# Patient Record
Sex: FEMALE | Race: WHITE | NOT HISPANIC OR LATINO | Employment: OTHER | URBAN - METROPOLITAN AREA
[De-identification: names, ages, dates, MRNs, and addresses within clinical notes are randomized per-mention and may not be internally consistent; named-entity substitution may affect disease eponyms.]

---

## 2017-01-23 ENCOUNTER — LAB (OUTPATIENT)
Dept: LAB | Facility: HOSPITAL | Age: 67
End: 2017-01-23

## 2017-01-23 DIAGNOSIS — C90.00 METASTATIC MULTIPLE MYELOMA TO BONE (HCC): ICD-10-CM

## 2017-01-23 DIAGNOSIS — C90.01 MULTIPLE MYELOMA IN REMISSION (HCC): ICD-10-CM

## 2017-01-23 LAB
ALBUMIN SERPL-MCNC: 4.5 G/DL (ref 3.2–4.8)
ALBUMIN/GLOB SERPL: 1.7 G/DL (ref 1.5–2.5)
ALP SERPL-CCNC: 111 U/L (ref 25–100)
ALT SERPL W P-5'-P-CCNC: 19 U/L (ref 7–40)
ANION GAP SERPL CALCULATED.3IONS-SCNC: 11 MMOL/L (ref 3–11)
AST SERPL-CCNC: 23 U/L (ref 0–33)
BASOPHILS # BLD AUTO: 0.01 10*3/MM3 (ref 0–0.2)
BASOPHILS NFR BLD AUTO: 0.3 % (ref 0–1)
BILIRUB SERPL-MCNC: 0.4 MG/DL (ref 0.3–1.2)
BUN BLD-MCNC: 20 MG/DL (ref 9–23)
BUN/CREAT SERPL: 16.7 (ref 7–25)
CALCIUM SPEC-SCNC: 9 MG/DL (ref 8.7–10.4)
CHLORIDE SERPL-SCNC: 111 MMOL/L (ref 99–109)
CO2 SERPL-SCNC: 28 MMOL/L (ref 20–31)
CREAT BLD-MCNC: 1.2 MG/DL (ref 0.6–1.3)
DEPRECATED RDW RBC AUTO: 44.7 FL (ref 37–54)
EOSINOPHIL # BLD AUTO: 0.04 10*3/MM3 (ref 0.1–0.3)
EOSINOPHIL NFR BLD AUTO: 1.1 % (ref 0–3)
ERYTHROCYTE [DISTWIDTH] IN BLOOD BY AUTOMATED COUNT: 12.9 % (ref 11.3–14.5)
GFR SERPL CREATININE-BSD FRML MDRD: 45 ML/MIN/1.73
GLOBULIN UR ELPH-MCNC: 2.7 GM/DL
GLUCOSE BLD-MCNC: 73 MG/DL (ref 70–100)
HCT VFR BLD AUTO: 36.9 % (ref 34.5–44)
HGB BLD-MCNC: 12.8 G/DL (ref 11.5–15.5)
IMM GRANULOCYTES # BLD: 0.01 10*3/MM3 (ref 0–0.03)
IMM GRANULOCYTES NFR BLD: 0.3 % (ref 0–0.6)
LYMPHOCYTES # BLD AUTO: 0.65 10*3/MM3 (ref 0.6–4.8)
LYMPHOCYTES NFR BLD AUTO: 18.6 % (ref 24–44)
MAGNESIUM SERPL-MCNC: 2.1 MG/DL (ref 1.3–2.7)
MCH RBC QN AUTO: 33.2 PG (ref 27–31)
MCHC RBC AUTO-ENTMCNC: 34.7 G/DL (ref 32–36)
MCV RBC AUTO: 95.6 FL (ref 80–99)
MONOCYTES # BLD AUTO: 0.26 10*3/MM3 (ref 0–1)
MONOCYTES NFR BLD AUTO: 7.4 % (ref 0–12)
NEUTROPHILS # BLD AUTO: 2.52 10*3/MM3 (ref 1.5–8.3)
NEUTROPHILS NFR BLD AUTO: 72.3 % (ref 41–71)
PHOSPHATE SERPL-MCNC: 3.7 MG/DL (ref 2.4–5.1)
PLATELET # BLD AUTO: 40 10*3/MM3 (ref 150–450)
PMV BLD AUTO: 11.4 FL (ref 6–12)
POTASSIUM BLD-SCNC: 5.3 MMOL/L (ref 3.5–5.5)
PROT SERPL-MCNC: 7.2 G/DL (ref 5.7–8.2)
RBC # BLD AUTO: 3.86 10*6/MM3 (ref 3.89–5.14)
SODIUM BLD-SCNC: 150 MMOL/L (ref 132–146)
WBC NRBC COR # BLD: 3.49 10*3/MM3 (ref 3.5–10.8)

## 2017-01-23 PROCEDURE — 85025 COMPLETE CBC W/AUTO DIFF WBC: CPT | Performed by: INTERNAL MEDICINE

## 2017-01-23 PROCEDURE — 84155 ASSAY OF PROTEIN SERUM: CPT | Performed by: INTERNAL MEDICINE

## 2017-01-23 PROCEDURE — 80053 COMPREHEN METABOLIC PANEL: CPT | Performed by: INTERNAL MEDICINE

## 2017-01-23 PROCEDURE — 86334 IMMUNOFIX E-PHORESIS SERUM: CPT | Performed by: INTERNAL MEDICINE

## 2017-01-23 PROCEDURE — 83883 ASSAY NEPHELOMETRY NOT SPEC: CPT | Performed by: INTERNAL MEDICINE

## 2017-01-23 PROCEDURE — 84100 ASSAY OF PHOSPHORUS: CPT | Performed by: INTERNAL MEDICINE

## 2017-01-23 PROCEDURE — 83735 ASSAY OF MAGNESIUM: CPT | Performed by: INTERNAL MEDICINE

## 2017-01-23 PROCEDURE — 36415 COLL VENOUS BLD VENIPUNCTURE: CPT

## 2017-01-23 PROCEDURE — 84165 PROTEIN E-PHORESIS SERUM: CPT | Performed by: INTERNAL MEDICINE

## 2017-01-24 LAB
ALBUMIN SERPL-MCNC: 3.7 G/DL (ref 2.9–4.4)
ALBUMIN/GLOB SERPL: 1.2 {RATIO} (ref 0.7–1.7)
ALPHA1 GLOB FLD ELPH-MCNC: 0.3 G/DL (ref 0–0.4)
ALPHA2 GLOB SERPL ELPH-MCNC: 0.8 G/DL (ref 0.4–1)
B-GLOBULIN SERPL ELPH-MCNC: 1.3 G/DL (ref 0.7–1.3)
GAMMA GLOB SERPL ELPH-MCNC: 0.9 G/DL (ref 0.4–1.8)
GLOBULIN SER CALC-MCNC: 3.3 G/DL (ref 2.2–3.9)
IGA SERPL-MCNC: 207 MG/DL (ref 87–352)
IGG SERPL-MCNC: 827 MG/DL (ref 700–1600)
IGM SERPL-MCNC: 21 MG/DL (ref 26–217)
INTERPRETATION SERPL IEP-IMP: ABNORMAL
KAPPA LC SERPL-MCNC: 19.17 MG/L (ref 3.3–19.4)
KAPPA LC/LAMBDA SER: 1.39 {RATIO} (ref 0.26–1.65)
LAMBDA LC FREE SERPL-MCNC: 13.8 MG/L (ref 5.71–26.3)
Lab: ABNORMAL
M-SPIKE: ABNORMAL G/DL
PROT SERPL-MCNC: 7 G/DL (ref 6–8.5)

## 2017-01-30 ENCOUNTER — INFUSION (OUTPATIENT)
Dept: ONCOLOGY | Facility: HOSPITAL | Age: 67
End: 2017-01-30

## 2017-01-30 ENCOUNTER — OFFICE VISIT (OUTPATIENT)
Dept: ONCOLOGY | Facility: CLINIC | Age: 67
End: 2017-01-30

## 2017-01-30 VITALS
SYSTOLIC BLOOD PRESSURE: 137 MMHG | BODY MASS INDEX: 28.07 KG/M2 | DIASTOLIC BLOOD PRESSURE: 74 MMHG | HEART RATE: 95 BPM | WEIGHT: 151 LBS | RESPIRATION RATE: 16 BRPM | TEMPERATURE: 98.1 F

## 2017-01-30 VITALS
BODY MASS INDEX: 27.79 KG/M2 | SYSTOLIC BLOOD PRESSURE: 144 MMHG | DIASTOLIC BLOOD PRESSURE: 70 MMHG | TEMPERATURE: 97.4 F | RESPIRATION RATE: 14 BRPM | HEIGHT: 62 IN | WEIGHT: 151 LBS | HEART RATE: 92 BPM

## 2017-01-30 DIAGNOSIS — C90.00 METASTATIC MULTIPLE MYELOMA TO BONE (HCC): ICD-10-CM

## 2017-01-30 DIAGNOSIS — C90.01 MULTIPLE MYELOMA IN REMISSION (HCC): ICD-10-CM

## 2017-01-30 DIAGNOSIS — C90.01 MULTIPLE MYELOMA IN REMISSION (HCC): Primary | ICD-10-CM

## 2017-01-30 PROCEDURE — 25010000002 ZOLEDRONIC ACID PER 1 MG: Performed by: INTERNAL MEDICINE

## 2017-01-30 PROCEDURE — 99214 OFFICE O/P EST MOD 30 MIN: CPT | Performed by: INTERNAL MEDICINE

## 2017-01-30 PROCEDURE — 96374 THER/PROPH/DIAG INJ IV PUSH: CPT

## 2017-01-30 RX ORDER — SODIUM CHLORIDE 9 MG/ML
250 INJECTION, SOLUTION INTRAVENOUS ONCE
Status: CANCELLED | OUTPATIENT
Start: 2017-03-28 | End: 2017-03-27

## 2017-01-30 RX ORDER — SODIUM CHLORIDE 9 MG/ML
250 INJECTION, SOLUTION INTRAVENOUS ONCE
Status: CANCELLED | OUTPATIENT
Start: 2017-02-28 | End: 2017-02-27

## 2017-01-30 RX ADMIN — ZOLEDRONIC ACID 3.3 MG: 4 INJECTION, SOLUTION, CONCENTRATE INTRAVENOUS at 10:35

## 2017-01-30 NOTE — CONSULTS
Zometa dose reduced to 3.3 mg due to SCr= 1.2 and CrCl= 41.4 ml/min;     Elias Alvares [facility]  1/30/2017  10:26 AM

## 2017-01-30 NOTE — MR AVS SNAPSHOT
Doris Miranda   2017 9:00 AM   Office Visit    Dept Phone:  [phone]   Encounter #:  [id number]    Provider:  Joseph Mckinley MD   Department:  [facility] GROUP HEMATOLOGY  AND ONCOLOGY                Your Full Care Plan              Your Updated Medication List          This list is accurate as of: 17  9:14 AM.  Always use your most recent med list.                acyclovir 400 MG tablet   Commonly known as:  ZOVIRAX       aspirin 81 MG EC tablet       cyclobenzaprine 10 MG tablet   Commonly known as:  FLEXERIL   Take 1 tablet by mouth 3 (three) times a day as needed for muscle spasms.       docusate sodium 100 MG capsule   Commonly known as:  COLACE       melatonin 5 MG tablet tablet       Omeprazole Magnesium 20.6 (20 BASE) MG capsule delayed-release       pregabalin 100 MG capsule   Commonly known as:  LYRICA   Take 1 capsule by mouth 2 (Two) Times a Day.               Instructions     None    Patient Instructions History      Thrupoint Signup     [facility] Thrupoint allows you to send messages to your doctor, view your test results, renew your prescriptions, schedule appointments, and more. To sign up, go to [employer] and click on the Sign Up Now link in the New User? box. Enter your Thrupoint Activation Code exactly as it appears below along with the last four digits of your Social Security Number and your Date of Birth () to complete the sign-up process. If you do not sign up before the expiration date, you must request a new code.    Thrupoint Activation Code: [email or web address]  Expires: 2017  9:10 AM    If you have questions, you can email [email or web address] or call [phone] to talk to our Thrupoint staff. Remember, Thrupoint is NOT to be used for urgent needs. For medical emergencies, dial 911.               Other Info from Your Visit           Your appointments     Date & Time Provider Appointment Department    ,  "2017 11:00 AM EST DANILO MEJIA [facility] CHAIR 4 INFUSION [facility] OUTPATIENT ONCOLOGY AT [facility]    Apr 24, 2017  9:00 AM EDT Joseph Mckinley MD FOLLOW UP [facility] HEMATOLOGY  AND ONCOLOGY    Apr 24, 2017 10:30 AM EDT DANILO MEJIA [facility] CHAIR 3 INFUSION [facility] OUTPATIENT ONCOLOGY AT [facility] OUTPATIENT ONCOLOGY AT [facility]  [address]  [facility] [address] [phone] [facility] HEMATOLOGY  AND ONCOLOGY  [place]  [address] [facility] [address]  [facility] [id number] [phone]          Vital Signs     Blood Pressure Pulse Temperature Respirations Height Weight    144/70 92 97.4 °F (36.3 °C) (Temporal Artery ) 14 61.5\" (156.2 cm) 151 lb (68.5 kg)    Body Mass Index Smoking Status                28.07 kg/m2 Former Smoker          Problems and Diagnoses Noted     Metastatic multiple myeloma to bone    Multiple myeloma in remission        "

## 2017-01-30 NOTE — MR AVS SNAPSHOT
Doris Miranda   2017 11:00 AM   Infusion    Dept Phone:  [phone]   Encounter #:  [id number]    Provider:  DANILO OLMEDO CHAIR 4   Department:  [facility] OUTPATIENT ONCOLOGY AT [facility]                Your Full Care Plan              Your Updated Medication List          This list is accurate as of: 17 10:46 AM.  Always use your most recent med list.                acyclovir 400 MG tablet   Commonly known as:  ZOVIRAX       aspirin 81 MG EC tablet       cyclobenzaprine 10 MG tablet   Commonly known as:  FLEXERIL   Take 1 tablet by mouth 3 (three) times a day as needed for muscle spasms.       docusate sodium 100 MG capsule   Commonly known as:  COLACE       melatonin 5 MG tablet tablet       Omeprazole Magnesium 20.6 (20 BASE) MG capsule delayed-release       pregabalin 100 MG capsule   Commonly known as:  LYRICA   Take 1 capsule by mouth 2 (Two) Times a Day.               Instructions     None    Patient Instructions History      [employer] Signup     [facility] [employer]X [employer]ORY allows you to send messages to your doctor, view your test results, renew your prescriptions, schedule appointments, and more. To sign up, go to [employer] and click on the Sign Up Now link in the New User? box. Enter your [employer] Activation Code exactly as it appears below along with the last four digits of your Social Security Number and your Date of Birth () to complete the sign-up process. If you do not sign up before the expiration date, you must request a new code.    [employer] Activation Code: [email or web address]  Expires: 2017  9:10 AM    If you have questions, you can email [email or web address] or call [phone] to talk to our OX FACTORY staff. Remember, OX FACTORY is NOT to be used for urgent needs. For medical emergencies, dial 911.               Other Info from Your Visit           Your appointments     Date & Time Provider Appointment Department    ,  2017 11:00 AM EST [facility] CHAIR 4 INFUSION [facility] OUTPATIENT ONCOLOGY AT [facility]    Feb 27, 2017 11:00 AM EST [facility] CHAIR 4 INFUSION [facility] OUTPATIENT ONCOLOGY AT [facility]    Apr 24, 2017  9:00 AM EDT Joseph Mckinley MD FOLLOW UP [facility] HEMATOLOGY  AND ONCOLOGY    Apr 24, 2017 10:30 AM EDT [facility] CHAIR 3 INFUSION [facility] OUTPATIENT ONCOLOGY AT [facility] OUTPATIENT ONCOLOGY AT [facility]  [address]  [facility] [address]  [phone] [facility] HEMATOLOGY  AND ONCOLOGY  [place]  [address] [facility] [address]  [facility] [id number] [phone]          Vital Signs     Blood Pressure Pulse Temperature Respirations Weight Body Mass Index    137/74 95 98.1 °F (36.7 °C) 16 151 lb (68.5 kg) 28.07 kg/m2    Smoking Status                   Former Smoker           Problems and Diagnoses Noted     Metastatic multiple myeloma to bone    Multiple myeloma in remission      Medications Administered     zoledronic acid (ZOMETA) 3.3 mg in sodium chloride 0.9 % 100 mL IVPB

## 2017-01-30 NOTE — PROGRESS NOTES
.justyna  PROBLEM LIST:  Oncology/Hematology History    PROBLEM LIST:   1. Stage III IgA kappa clonal multiple myeloma. Diagnosed 9/20152. FISH panel reports multiple abnormalities including gain of 1q21 monosomy.  3. Monosomy 13 and gain of chromosomes 9, 15 and CCND1.  4. Initial M-spike of 7.1 g/dL at time of diagnosis and after 3 cycles, had  undetectable M-spike currently on Velcade, Revlimid and dexamethasone cycle #6  this week.  5. S/p Autologous SCT at [facility] with Dr. Venu Aceves in March 11,2016  6. Will Start on Revlimid maintenance on 15mg/day 21 on and 7 off  7.Right leg pain - on lyrica        Multiple myeloma in remission    7/11/2016 Initial Diagnosis    Multiple myeloma       REASON FOR VISIT: Multiple myeloma    HISTORY OF PRESENT ILLNESS:   66-year-old lady with multiple myeloma returns today for follow-up.  She is 11 months out from completion of autologous stem cell transplant.  She is currently on Zometa .  Her Revlimid is currently on hold secondary to severe thrombocytopenia.  So she is only receiving monthly Zometa.  She reports bilateral shoulder plane that but otherwise doing well.  She visits with Dr. Spicer next month for series of vaccinations.  Denies any other issues ongoing at this time.    Past medical history, social history and family history was reviewed and unchanged from prior visit.    Review of Systems:    Review of Systems   Constitutional: Positive for fatigue.   HENT:  Negative.    Eyes: Negative.    Respiratory: Negative.    Cardiovascular: Negative.    Gastrointestinal: Negative.    Endocrine: Negative.    Genitourinary: Negative.     Musculoskeletal: Positive for arthralgias.   Skin: Negative.    Neurological: Negative.    Hematological: Negative.    Psychiatric/Behavioral: Negative.       A comprehensive 14 point review of systems was performed and was negative except as mentioned.      Medications:  The current medication list was reviewed in the EMR    ALLERGIES:  No  "Known Allergies      Physical Exam    VITAL SIGNS:  Visit Vitals   • /70   • Pulse 92   • Temp 97.4 °F (36.3 °C) (Temporal Artery )   • Resp 14   • Ht 61.5\" (156.2 cm)   • Wt 151 lb (68.5 kg)   • BMI 28.07 kg/m2        Performance Status: 1    General: well appearing, in no acute distress  HEENT: sclera anicteric, oropharynx clear, neck is supple  Lymphatics: no cervical, supraclavicular, or axillary adenopathy  Cardiovascular: regular rate and rhythm, no murmurs, rubs or gallops  Lungs: clear to auscultation bilaterally  Abdomen: soft, nontender, nondistended.  No palpable organomegaly  Extremities: no lower extremity edema  Skin: no rashes, lesions, bruising, or petechiae  Msk:  Shows no weakness of the large muscle groups  Psych: Mood is stable        RECENT LABS:    Lab Results   Component Value Date    WBC 3.49 (L) 01/23/2017    HGB 12.8 01/23/2017    HCT 36.9 01/23/2017    MCV 95.6 01/23/2017    PLT 40 (L) 01/23/2017     Lab Results   Component Value Date    MSPIKE Not Observed 01/23/2017    MSPIKE Not Observed 12/05/2016    MSPIKE Not Observed 11/07/2016     Lab Results   Component Value Date    FREEKAPPAL 19.17 01/23/2017    FREEKAPPAL 23.47 (H) 12/05/2016    FREEKAPPAL 28.46 (H) 11/07/2016     Lab Results   Component Value Date    IGLFLC 13.80 01/23/2017    IGLFLC 13.54 12/05/2016    IGLFLC 17.74 11/07/2016     Lab Results   Component Value Date    GLUCOSE 73 01/23/2017    BUN 20 01/23/2017    CREATININE 1.20 01/23/2017    EGFRIFNONA 45 (L) 01/23/2017    BCR 16.7 01/23/2017    CO2 28.0 01/23/2017    CALCIUM 9.0 01/23/2017    PROTENTOTREF 7.0 01/23/2017    ALBUMIN 3.7 01/23/2017    ALBUMIN 4.50 01/23/2017    LABIL2 1.2 01/23/2017    LABIL2 1.7 01/23/2017    AST 23 01/23/2017    ALT 19 01/23/2017         Assessment/Plan    66-year-old lady with multiple myeloma status post autologous stem cell transplant.  I'm going to continue holding her Revlimid until her platelets recover.  This is likely liver " related to marrow suppression related to her transplant conditioning.  Unfortunately we cannot place her on maintenance due to platelets being well below 50,000.  Her M spike continues to be undetectable at this time.  I will repeat it in April.  We'll see if Dr. Spicer recommends very low dose Revlimid.  If she has recurrence we would still have issues with treatment due to the severe thrombocytopenia.     See her back my clinic in 3 month.  She continues to get Zometa monthly.            Joseph Mckinley MD  [facility] Hematology and Oncology    1/30/2017

## 2017-02-13 RX ORDER — CYCLOBENZAPRINE HCL 10 MG
TABLET ORAL
Qty: 90 TABLET | Refills: 4 | Status: SHIPPED | OUTPATIENT
Start: 2017-02-13 | End: 2017-08-07 | Stop reason: SDUPTHER

## 2017-02-27 ENCOUNTER — INFUSION (OUTPATIENT)
Dept: ONCOLOGY | Facility: HOSPITAL | Age: 67
End: 2017-02-27

## 2017-02-27 VITALS
RESPIRATION RATE: 16 BRPM | HEART RATE: 94 BPM | BODY MASS INDEX: 29.56 KG/M2 | DIASTOLIC BLOOD PRESSURE: 79 MMHG | WEIGHT: 159 LBS | SYSTOLIC BLOOD PRESSURE: 147 MMHG | TEMPERATURE: 97.8 F

## 2017-02-27 DIAGNOSIS — C90.00 METASTATIC MULTIPLE MYELOMA TO BONE (HCC): ICD-10-CM

## 2017-02-27 DIAGNOSIS — C90.01 MULTIPLE MYELOMA IN REMISSION (HCC): Primary | ICD-10-CM

## 2017-02-27 LAB
ALBUMIN SERPL-MCNC: 4.3 G/DL (ref 3.2–4.8)
ALBUMIN/GLOB SERPL: 1.7 G/DL (ref 1.5–2.5)
ALP SERPL-CCNC: 98 U/L (ref 25–100)
ALT SERPL W P-5'-P-CCNC: 19 U/L (ref 7–40)
ANION GAP SERPL CALCULATED.3IONS-SCNC: 8 MMOL/L (ref 3–11)
AST SERPL-CCNC: 24 U/L (ref 0–33)
BASOPHILS # BLD AUTO: 0.01 10*3/MM3 (ref 0–0.2)
BASOPHILS NFR BLD AUTO: 0.3 % (ref 0–1)
BILIRUB SERPL-MCNC: 0.4 MG/DL (ref 0.3–1.2)
BUN BLD-MCNC: 22 MG/DL (ref 9–23)
BUN/CREAT SERPL: 22 (ref 7–25)
CALCIUM SPEC-SCNC: 8.8 MG/DL (ref 8.7–10.4)
CHLORIDE SERPL-SCNC: 106 MMOL/L (ref 99–109)
CO2 SERPL-SCNC: 27 MMOL/L (ref 20–31)
CREAT BLD-MCNC: 1 MG/DL (ref 0.6–1.3)
CREAT BLDA-MCNC: 1.2 MG/DL (ref 0.6–1.3)
CREAT SERPL-MCNC: 1.2 MG/DL
DEPRECATED RDW RBC AUTO: 45.2 FL (ref 37–54)
EOSINOPHIL # BLD AUTO: 0.04 10*3/MM3 (ref 0.1–0.3)
EOSINOPHIL NFR BLD AUTO: 1 % (ref 0–3)
ERYTHROCYTE [DISTWIDTH] IN BLOOD BY AUTOMATED COUNT: 12.9 % (ref 11.3–14.5)
GFR SERPL CREATININE-BSD FRML MDRD: 55 ML/MIN/1.73
GLOBULIN UR ELPH-MCNC: 2.5 GM/DL
GLUCOSE BLD-MCNC: 76 MG/DL (ref 70–100)
HCT VFR BLD AUTO: 37.3 % (ref 34.5–44)
HGB BLD-MCNC: 12.9 G/DL (ref 11.5–15.5)
IMM GRANULOCYTES # BLD: 0.01 10*3/MM3 (ref 0–0.03)
IMM GRANULOCYTES NFR BLD: 0.3 % (ref 0–0.6)
LYMPHOCYTES # BLD AUTO: 0.63 10*3/MM3 (ref 0.6–4.8)
LYMPHOCYTES NFR BLD AUTO: 16.4 % (ref 24–44)
MAGNESIUM SERPL-MCNC: 2.1 MG/DL (ref 1.3–2.7)
MCH RBC QN AUTO: 33.2 PG (ref 27–31)
MCHC RBC AUTO-ENTMCNC: 34.6 G/DL (ref 32–36)
MCV RBC AUTO: 95.9 FL (ref 80–99)
MONOCYTES # BLD AUTO: 0.27 10*3/MM3 (ref 0–1)
MONOCYTES NFR BLD AUTO: 7 % (ref 0–12)
NEUTROPHILS # BLD AUTO: 2.87 10*3/MM3 (ref 1.5–8.3)
NEUTROPHILS NFR BLD AUTO: 75 % (ref 41–71)
PHOSPHATE SERPL-MCNC: 3.9 MG/DL (ref 2.4–5.1)
PLATELET # BLD AUTO: 37 10*3/MM3 (ref 150–450)
PMV BLD AUTO: 11.5 FL (ref 6–12)
POTASSIUM BLD-SCNC: 4.3 MMOL/L (ref 3.5–5.5)
PROT SERPL-MCNC: 6.8 G/DL (ref 5.7–8.2)
RBC # BLD AUTO: 3.89 10*6/MM3 (ref 3.89–5.14)
SODIUM BLD-SCNC: 141 MMOL/L (ref 132–146)
WBC NRBC COR # BLD: 3.83 10*3/MM3 (ref 3.5–10.8)

## 2017-02-27 PROCEDURE — 96374 THER/PROPH/DIAG INJ IV PUSH: CPT

## 2017-02-27 PROCEDURE — 25010000002 ZOLEDRONIC ACID PER 1 MG: Performed by: INTERNAL MEDICINE

## 2017-02-27 PROCEDURE — 36415 COLL VENOUS BLD VENIPUNCTURE: CPT

## 2017-02-27 PROCEDURE — 96365 THER/PROPH/DIAG IV INF INIT: CPT

## 2017-02-27 RX ADMIN — ZOLEDRONIC ACID 3.3 MG: 4 INJECTION, SOLUTION, CONCENTRATE INTRAVENOUS at 11:21

## 2017-02-27 NOTE — PLAN OF CARE
Zometa dose reduced to 3.3 mg due to SCr= 1.2, CrCl= 42.4 ml/min;    Elias Alvares [facility]  2/27/2017  11:09 AM

## 2017-02-28 LAB
ALBUMIN SERPL-MCNC: 3.9 G/DL (ref 2.9–4.4)
ALBUMIN/GLOB SERPL: 1.4 {RATIO} (ref 0.7–1.7)
ALPHA1 GLOB FLD ELPH-MCNC: 0.3 G/DL (ref 0–0.4)
ALPHA2 GLOB SERPL ELPH-MCNC: 0.7 G/DL (ref 0.4–1)
B-GLOBULIN SERPL ELPH-MCNC: 1.2 G/DL (ref 0.7–1.3)
GAMMA GLOB SERPL ELPH-MCNC: 0.6 G/DL (ref 0.4–1.8)
GLOBULIN SER CALC-MCNC: 2.8 G/DL (ref 2.2–3.9)
IGA SERPL-MCNC: 177 MG/DL (ref 87–352)
IGG SERPL-MCNC: 722 MG/DL (ref 700–1600)
IGM SERPL-MCNC: 18 MG/DL (ref 26–217)
INTERPRETATION SERPL IEP-IMP: ABNORMAL
KAPPA LC SERPL-MCNC: 16.54 MG/L (ref 3.3–19.4)
KAPPA LC/LAMBDA SER: 1.62 {RATIO} (ref 0.26–1.65)
LAMBDA LC FREE SERPL-MCNC: 10.21 MG/L (ref 5.71–26.3)
Lab: ABNORMAL
M-SPIKE: ABNORMAL G/DL
PROT SERPL-MCNC: 6.7 G/DL (ref 6–8.5)

## 2017-03-16 RX ORDER — PREGABALIN 100 MG/1
100 CAPSULE ORAL 2 TIMES DAILY
Qty: 60 CAPSULE | Refills: 3 | OUTPATIENT
Start: 2017-03-16 | End: 2017-07-14 | Stop reason: SDUPTHER

## 2017-03-27 ENCOUNTER — INFUSION (OUTPATIENT)
Dept: ONCOLOGY | Facility: HOSPITAL | Age: 67
End: 2017-03-27

## 2017-03-27 VITALS
HEART RATE: 91 BPM | DIASTOLIC BLOOD PRESSURE: 66 MMHG | RESPIRATION RATE: 16 BRPM | SYSTOLIC BLOOD PRESSURE: 124 MMHG | BODY MASS INDEX: 30.11 KG/M2 | TEMPERATURE: 96.8 F | WEIGHT: 162 LBS

## 2017-03-27 DIAGNOSIS — C90.00 METASTATIC MULTIPLE MYELOMA TO BONE (HCC): ICD-10-CM

## 2017-03-27 DIAGNOSIS — C90.01 MULTIPLE MYELOMA IN REMISSION (HCC): Primary | ICD-10-CM

## 2017-03-27 LAB
ALBUMIN SERPL-MCNC: 4.3 G/DL (ref 3.2–4.8)
ALBUMIN/GLOB SERPL: 2.2 G/DL (ref 1.5–2.5)
ALP SERPL-CCNC: 116 U/L (ref 25–100)
ALT SERPL W P-5'-P-CCNC: 24 U/L (ref 7–40)
ANION GAP SERPL CALCULATED.3IONS-SCNC: 5 MMOL/L (ref 3–11)
AST SERPL-CCNC: 24 U/L (ref 0–33)
BASOPHILS # BLD AUTO: 0.03 10*3/MM3 (ref 0–0.2)
BASOPHILS NFR BLD AUTO: 0.9 % (ref 0–1)
BILIRUB SERPL-MCNC: 0.3 MG/DL (ref 0.3–1.2)
BUN BLD-MCNC: 21 MG/DL (ref 9–23)
BUN/CREAT SERPL: 19.1 (ref 7–25)
CALCIUM SPEC-SCNC: 9 MG/DL (ref 8.7–10.4)
CHLORIDE SERPL-SCNC: 108 MMOL/L (ref 99–109)
CO2 SERPL-SCNC: 29 MMOL/L (ref 20–31)
CREAT BLD-MCNC: 1.1 MG/DL (ref 0.6–1.3)
CREAT BLDA-MCNC: 1.2 MG/DL (ref 0.6–1.3)
CREAT SERPL-MCNC: 1.2 MG/DL
DEPRECATED RDW RBC AUTO: 42.7 FL (ref 37–54)
EOSINOPHIL # BLD AUTO: 0.08 10*3/MM3 (ref 0.1–0.3)
EOSINOPHIL NFR BLD AUTO: 2.4 % (ref 0–3)
ERYTHROCYTE [DISTWIDTH] IN BLOOD BY AUTOMATED COUNT: 12.6 % (ref 11.3–14.5)
GFR SERPL CREATININE-BSD FRML MDRD: 50 ML/MIN/1.73
GLOBULIN UR ELPH-MCNC: 2 GM/DL
GLUCOSE BLD-MCNC: 75 MG/DL (ref 70–100)
HCT VFR BLD AUTO: 35.3 % (ref 34.5–44)
HGB BLD-MCNC: 12.3 G/DL (ref 11.5–15.5)
IMM GRANULOCYTES # BLD: 0.01 10*3/MM3 (ref 0–0.03)
IMM GRANULOCYTES NFR BLD: 0.3 % (ref 0–0.6)
LYMPHOCYTES # BLD AUTO: 0.67 10*3/MM3 (ref 0.6–4.8)
LYMPHOCYTES NFR BLD AUTO: 19.9 % (ref 24–44)
MAGNESIUM SERPL-MCNC: 2.1 MG/DL (ref 1.3–2.7)
MCH RBC QN AUTO: 32 PG (ref 27–31)
MCHC RBC AUTO-ENTMCNC: 34.8 G/DL (ref 32–36)
MCV RBC AUTO: 91.9 FL (ref 80–99)
MONOCYTES # BLD AUTO: 0.25 10*3/MM3 (ref 0–1)
MONOCYTES NFR BLD AUTO: 7.4 % (ref 0–12)
NEUTROPHILS # BLD AUTO: 2.33 10*3/MM3 (ref 1.5–8.3)
NEUTROPHILS NFR BLD AUTO: 69.1 % (ref 41–71)
PHOSPHATE SERPL-MCNC: 3.7 MG/DL (ref 2.4–5.1)
PLATELET # BLD AUTO: 40 10*3/MM3 (ref 150–450)
PMV BLD AUTO: 11.1 FL (ref 6–12)
POTASSIUM BLD-SCNC: 4.2 MMOL/L (ref 3.5–5.5)
PROT SERPL-MCNC: 6.3 G/DL (ref 5.7–8.2)
RBC # BLD AUTO: 3.84 10*6/MM3 (ref 3.89–5.14)
SODIUM BLD-SCNC: 142 MMOL/L (ref 132–146)
WBC NRBC COR # BLD: 3.37 10*3/MM3 (ref 3.5–10.8)

## 2017-03-27 PROCEDURE — 96374 THER/PROPH/DIAG INJ IV PUSH: CPT

## 2017-03-27 PROCEDURE — 25010000002 ZOLEDRONIC ACID PER 1 MG: Performed by: INTERNAL MEDICINE

## 2017-03-27 RX ADMIN — ZOLEDRONIC ACID 3.3 MG: 4 INJECTION, SOLUTION, CONCENTRATE INTRAVENOUS at 11:21

## 2017-03-27 NOTE — PHARMACY RECOMMENDATION
Zometa dose reduced to 3.3 mg due to ClCr= 42.8 ml/min;     Elias Alvares [facility]  3/27/2017  11:15 AM

## 2017-03-28 LAB
ALBUMIN SERPL-MCNC: 3.4 G/DL (ref 2.9–4.4)
ALBUMIN/GLOB SERPL: 1.3 {RATIO} (ref 0.7–1.7)
ALPHA1 GLOB FLD ELPH-MCNC: 0.3 G/DL (ref 0–0.4)
ALPHA2 GLOB SERPL ELPH-MCNC: 0.7 G/DL (ref 0.4–1)
B-GLOBULIN SERPL ELPH-MCNC: 1.1 G/DL (ref 0.7–1.3)
GAMMA GLOB SERPL ELPH-MCNC: 0.6 G/DL (ref 0.4–1.8)
GLOBULIN SER CALC-MCNC: 2.7 G/DL (ref 2.2–3.9)
IGA SERPL-MCNC: 157 MG/DL (ref 87–352)
IGG SERPL-MCNC: 635 MG/DL (ref 700–1600)
IGM SERPL-MCNC: 19 MG/DL (ref 26–217)
INTERPRETATION SERPL IEP-IMP: ABNORMAL
KAPPA LC SERPL-MCNC: 18.64 MG/L (ref 3.3–19.4)
KAPPA LC/LAMBDA SER: 1.64 {RATIO} (ref 0.26–1.65)
LAMBDA LC FREE SERPL-MCNC: 11.38 MG/L (ref 5.71–26.3)
Lab: ABNORMAL
M-SPIKE: ABNORMAL G/DL
PROT SERPL-MCNC: 6.1 G/DL (ref 6–8.5)

## 2017-04-21 DIAGNOSIS — C90.00 METASTATIC MULTIPLE MYELOMA TO BONE (HCC): ICD-10-CM

## 2017-04-21 DIAGNOSIS — C90.01 MULTIPLE MYELOMA IN REMISSION (HCC): ICD-10-CM

## 2017-04-21 RX ORDER — SODIUM CHLORIDE 9 MG/ML
250 INJECTION, SOLUTION INTRAVENOUS ONCE
Status: CANCELLED | OUTPATIENT
Start: 2017-04-24 | End: 2017-04-25

## 2017-04-24 ENCOUNTER — OFFICE VISIT (OUTPATIENT)
Dept: ONCOLOGY | Facility: CLINIC | Age: 67
End: 2017-04-24

## 2017-04-24 ENCOUNTER — INFUSION (OUTPATIENT)
Dept: ONCOLOGY | Facility: HOSPITAL | Age: 67
End: 2017-04-24

## 2017-04-24 ENCOUNTER — HOSPITAL ENCOUNTER (OUTPATIENT)
Dept: GENERAL RADIOLOGY | Facility: HOSPITAL | Age: 67
Discharge: HOME | End: 2017-04-24
Attending: INTERNAL MEDICINE | Admitting: INTERNAL MEDICINE

## 2017-04-24 ENCOUNTER — LAB (OUTPATIENT)
Dept: LAB | Facility: HOSPITAL | Age: 67
End: 2017-04-24

## 2017-04-24 VITALS
TEMPERATURE: 97.5 F | HEIGHT: 62 IN | BODY MASS INDEX: 30.36 KG/M2 | DIASTOLIC BLOOD PRESSURE: 71 MMHG | HEART RATE: 95 BPM | SYSTOLIC BLOOD PRESSURE: 125 MMHG | WEIGHT: 165 LBS | RESPIRATION RATE: 16 BRPM

## 2017-04-24 DIAGNOSIS — C90.01 MULTIPLE MYELOMA IN REMISSION (HCC): Primary | ICD-10-CM

## 2017-04-24 DIAGNOSIS — C90.01 MULTIPLE MYELOMA IN REMISSION (HCC): ICD-10-CM

## 2017-04-24 DIAGNOSIS — C90.00 METASTATIC MULTIPLE MYELOMA TO BONE (HCC): ICD-10-CM

## 2017-04-24 LAB
ALBUMIN SERPL-MCNC: 4.5 G/DL (ref 3.2–4.8)
ALBUMIN/GLOB SERPL: 1.7 G/DL (ref 1.5–2.5)
ALP SERPL-CCNC: 101 U/L (ref 25–100)
ALT SERPL W P-5'-P-CCNC: 17 U/L (ref 7–40)
ANION GAP SERPL CALCULATED.3IONS-SCNC: 10 MMOL/L (ref 3–11)
AST SERPL-CCNC: 23 U/L (ref 0–33)
BILIRUB SERPL-MCNC: 0.4 MG/DL (ref 0.3–1.2)
BUN BLD-MCNC: 19 MG/DL (ref 9–23)
BUN/CREAT SERPL: 14.6 (ref 7–25)
CALCIUM SPEC-SCNC: 9.1 MG/DL (ref 8.7–10.4)
CHLORIDE SERPL-SCNC: 107 MMOL/L (ref 99–109)
CO2 SERPL-SCNC: 25 MMOL/L (ref 20–31)
CREAT BLD-MCNC: 1.3 MG/DL (ref 0.6–1.3)
ERYTHROCYTE [DISTWIDTH] IN BLOOD BY AUTOMATED COUNT: 13.2 % (ref 11.3–14.5)
GFR SERPL CREATININE-BSD FRML MDRD: 41 ML/MIN/1.73
GLOBULIN UR ELPH-MCNC: 2.7 GM/DL
GLUCOSE BLD-MCNC: 79 MG/DL (ref 70–100)
HCT VFR BLD AUTO: 36.9 % (ref 34.5–44)
HGB BLD-MCNC: 12.4 G/DL (ref 11.5–15.5)
LYMPHOCYTES # BLD AUTO: 0.8 10*3/MM3 (ref 0.6–4.8)
LYMPHOCYTES NFR BLD AUTO: 17 % (ref 24–44)
MAGNESIUM SERPL-MCNC: 2.2 MG/DL (ref 1.3–2.7)
MCH RBC QN AUTO: 31.2 PG (ref 27–31)
MCHC RBC AUTO-ENTMCNC: 33.6 G/DL (ref 32–36)
MCV RBC AUTO: 92.8 FL (ref 80–99)
MONOCYTES # BLD AUTO: 0.1 10*3/MM3 (ref 0–1)
MONOCYTES NFR BLD AUTO: 2.9 % (ref 0–12)
NEUTROPHILS # BLD AUTO: 3.6 10*3/MM3 (ref 1.5–8.3)
NEUTROPHILS NFR BLD AUTO: 80.1 % (ref 41–71)
PHOSPHATE SERPL-MCNC: 4.2 MG/DL (ref 2.4–5.1)
PLATELET # BLD AUTO: 39 10*3/MM3 (ref 150–450)
PMV BLD AUTO: 9.9 FL (ref 6–12)
POTASSIUM BLD-SCNC: 4 MMOL/L (ref 3.5–5.5)
PROT SERPL-MCNC: 7.2 G/DL (ref 5.7–8.2)
RBC # BLD AUTO: 3.97 10*6/MM3 (ref 3.89–5.14)
SODIUM BLD-SCNC: 142 MMOL/L (ref 132–146)
WBC NRBC COR # BLD: 4.5 10*3/MM3 (ref 3.5–10.8)

## 2017-04-24 PROCEDURE — 84155 ASSAY OF PROTEIN SERUM: CPT

## 2017-04-24 PROCEDURE — 84100 ASSAY OF PHOSPHORUS: CPT

## 2017-04-24 PROCEDURE — 36415 COLL VENOUS BLD VENIPUNCTURE: CPT

## 2017-04-24 PROCEDURE — 85025 COMPLETE CBC W/AUTO DIFF WBC: CPT

## 2017-04-24 PROCEDURE — 80053 COMPREHEN METABOLIC PANEL: CPT

## 2017-04-24 PROCEDURE — 83735 ASSAY OF MAGNESIUM: CPT

## 2017-04-24 PROCEDURE — 86334 IMMUNOFIX E-PHORESIS SERUM: CPT

## 2017-04-24 PROCEDURE — 25010000002 ZOLEDRONIC ACID PER 1 MG: Performed by: INTERNAL MEDICINE

## 2017-04-24 PROCEDURE — 99214 OFFICE O/P EST MOD 30 MIN: CPT | Performed by: INTERNAL MEDICINE

## 2017-04-24 PROCEDURE — 96374 THER/PROPH/DIAG INJ IV PUSH: CPT

## 2017-04-24 PROCEDURE — 83883 ASSAY NEPHELOMETRY NOT SPEC: CPT

## 2017-04-24 PROCEDURE — 84165 PROTEIN E-PHORESIS SERUM: CPT

## 2017-04-24 PROCEDURE — 77075 RADEX OSSEOUS SURVEY COMPL: CPT

## 2017-04-24 RX ORDER — SODIUM CHLORIDE 9 MG/ML
250 INJECTION, SOLUTION INTRAVENOUS ONCE
Status: CANCELLED | OUTPATIENT
Start: 2017-06-19 | End: 2017-06-19

## 2017-04-24 RX ORDER — SODIUM CHLORIDE 9 MG/ML
250 INJECTION, SOLUTION INTRAVENOUS ONCE
Status: CANCELLED | OUTPATIENT
Start: 2017-05-22 | End: 2017-05-23

## 2017-04-24 RX ADMIN — ZOLEDRONIC ACID 3.3 MG: 4 INJECTION, SOLUTION, CONCENTRATE INTRAVENOUS at 11:22

## 2017-04-24 NOTE — PHARMACY RECOMMENDATION
Zometa dose reduced to 3.3 mg due to Clcr = 40 ml/min;     Elias Alvares, [facility]  4/24/2017  11:08 AM

## 2017-04-24 NOTE — PROGRESS NOTES
.justyna  PROBLEM LIST:  Oncology/Hematology History    PROBLEM LIST:   1. Stage III IgA kappa clonal multiple myeloma. Diagnosed 9/20152. FISH panel reports multiple abnormalities including gain of 1q21 monosomy.  3. Monosomy 13 and gain of chromosomes 9, 15 and CCND1.  4. Initial M-spike of 7.1 g/dL at time of diagnosis and after 3 cycles, had  undetectable M-spike currently on Velcade, Revlimid and dexamethasone cycle #6  this week.  5. S/p Autologous SCT at [facility] with Dr. Venu Spicer in March 11,2016  6. Revlimid on hold due to low platelets  7.Right leg pain - on lyrica        Multiple myeloma in remission    7/11/2016 Initial Diagnosis    Multiple myeloma       REASON FOR VISIT: Multiple myeloma    HISTORY OF PRESENT ILLNESS:   66-year-old lady with multiple myeloma returns today for follow-up.  She is 11 months out from completion of autologous stem cell transplant.  She is currently on Zometa .  Her Revlimid is currently on hold secondary to severe thrombocytopenia.  She has considerable difficulty ambulating with a cane.  She is having pain in her right leg and also bilateral shoulders.  I'm going to repeat a bone survey to see if there is any changes.  Otherwise clinically no sign of recurrence at this time.  These are been chronic issues.  She is scheduled for post transplant vaccinations to be performed in May.      Past medical history, social history and family history was reviewed and unchanged from prior visit.    Review of Systems:    Review of Systems   Constitutional: Positive for fatigue.   HENT:  Negative.    Eyes: Negative.    Respiratory: Negative.    Cardiovascular: Negative.    Gastrointestinal: Negative.    Endocrine: Negative.    Genitourinary: Negative.     Musculoskeletal: Positive for arthralgias and back pain.   Skin: Negative.    Neurological: Negative.    Hematological: Negative.    Psychiatric/Behavioral: Negative.       A comprehensive 14 point review of systems was performed and was  "negative except as mentioned.      Medications:  The current medication list was reviewed in the EMR    ALLERGIES:  No Known Allergies      Physical Exam    VITAL SIGNS:  /71  Pulse 95  Temp 97.5 °F (36.4 °C) (Temporal Artery )   Resp 16  Ht 61.5\" (156.2 cm)  Wt 165 lb (74.8 kg)  BMI 30.67 kg/m2     Performance Status: 1    General: well appearing, in no acute distress  HEENT: sclera anicteric, oropharynx clear, neck is supple  Lymphatics: no cervical, supraclavicular, or axillary adenopathy  Cardiovascular: regular rate and rhythm, no murmurs, rubs or gallops  Lungs: clear to auscultation bilaterally  Abdomen: soft, nontender, nondistended.  No palpable organomegaly  Extremities: no lower extremity edema  Skin: no rashes, lesions, bruising, or petechiae  Msk:  Shows no weakness of the large muscle groups  Psych: Mood is stable        RECENT LABS:    Lab Results   Component Value Date    WBC 4.50 04/24/2017    HGB 12.4 04/24/2017    HCT 36.9 04/24/2017    MCV 92.8 04/24/2017    PLT 39 (L) 04/24/2017     Lab Results   Component Value Date    MSPIKE Not Observed 03/27/2017    MSPIKE Not Observed 02/27/2017    MSPIKE Not Observed 01/23/2017     Lab Results   Component Value Date    FREEKAPPAL 18.64 03/27/2017    FREEKAPPAL 16.54 02/27/2017    FREEKAPPAL 19.17 01/23/2017     Lab Results   Component Value Date    IGLFLC 11.38 03/27/2017    IGLFLC 10.21 02/27/2017    IGLFLC 13.80 01/23/2017     Lab Results   Component Value Date    GLUCOSE 75 03/27/2017    BUN 21 03/27/2017    CREATININE 1.20 03/27/2017    EGFRIFNONA 50 (L) 03/27/2017    BCR 19.1 03/27/2017    CO2 29.0 03/27/2017    CALCIUM 9.0 03/27/2017    PROTENTOTREF 6.1 03/27/2017    ALBUMIN 3.4 03/27/2017    ALBUMIN 4.30 03/27/2017    LABIL2 1.3 03/27/2017    LABIL2 2.2 03/27/2017    AST 24 03/27/2017    ALT 24 03/27/2017         Assessment/Plan    66-year-old lady with multiple myeloma status post autologous stem cell transplant.  Her platelets are " still fairly severely depressed.  Difficult to place her on even low-dose Revlimid due to this.  The other option is considering Velcade maintenance.  However at this time I think it's reasonable just to watch her since she has no M spike.  If she does have progression it is going to be difficult to see how we can treat her due to cytopenia.  Will repeat her xrays to see if there is any new lesions.      Joseph Mckinley MD  [facility] Hematology and Oncology    4/24/2017

## 2017-04-25 LAB
ALBUMIN SERPL-MCNC: 4 G/DL (ref 2.9–4.4)
ALBUMIN/GLOB SERPL: 1.3 {RATIO} (ref 0.7–1.7)
ALPHA1 GLOB FLD ELPH-MCNC: 0.3 G/DL (ref 0–0.4)
ALPHA2 GLOB SERPL ELPH-MCNC: 0.8 G/DL (ref 0.4–1)
B-GLOBULIN SERPL ELPH-MCNC: 1.3 G/DL (ref 0.7–1.3)
GAMMA GLOB SERPL ELPH-MCNC: 0.7 G/DL (ref 0.4–1.8)
GLOBULIN SER CALC-MCNC: 3.1 G/DL (ref 2.2–3.9)
IGA SERPL-MCNC: 176 MG/DL (ref 87–352)
IGG SERPL-MCNC: 744 MG/DL (ref 700–1600)
IGM SERPL-MCNC: 29 MG/DL (ref 26–217)
INTERPRETATION SERPL IEP-IMP: ABNORMAL
KAPPA LC SERPL-MCNC: 21.75 MG/L (ref 3.3–19.4)
KAPPA LC/LAMBDA SER: 1.63 {RATIO} (ref 0.26–1.65)
LAMBDA LC FREE SERPL-MCNC: 13.35 MG/L (ref 5.71–26.3)
Lab: ABNORMAL
M-SPIKE: ABNORMAL G/DL
PROT SERPL-MCNC: 7.1 G/DL (ref 6–8.5)

## 2017-05-22 ENCOUNTER — INFUSION (OUTPATIENT)
Dept: ONCOLOGY | Facility: HOSPITAL | Age: 67
End: 2017-05-22

## 2017-05-22 VITALS
TEMPERATURE: 97.9 F | WEIGHT: 167 LBS | BODY MASS INDEX: 31.04 KG/M2 | RESPIRATION RATE: 20 BRPM | HEART RATE: 97 BPM | DIASTOLIC BLOOD PRESSURE: 84 MMHG | SYSTOLIC BLOOD PRESSURE: 142 MMHG

## 2017-05-22 DIAGNOSIS — C90.01 MULTIPLE MYELOMA IN REMISSION (HCC): Primary | ICD-10-CM

## 2017-05-22 DIAGNOSIS — C90.00 METASTATIC MULTIPLE MYELOMA TO BONE (HCC): ICD-10-CM

## 2017-05-22 LAB
ALBUMIN SERPL-MCNC: 4.6 G/DL (ref 3.2–4.8)
ALBUMIN/GLOB SERPL: 2.1 G/DL (ref 1.5–2.5)
ALP SERPL-CCNC: 101 U/L (ref 25–100)
ALT SERPL W P-5'-P-CCNC: 15 U/L (ref 7–40)
ANION GAP SERPL CALCULATED.3IONS-SCNC: 8 MMOL/L (ref 3–11)
AST SERPL-CCNC: 21 U/L (ref 0–33)
BASOPHILS # BLD AUTO: 0.02 10*3/MM3 (ref 0–0.2)
BASOPHILS NFR BLD AUTO: 0.4 % (ref 0–1)
BILIRUB SERPL-MCNC: 0.4 MG/DL (ref 0.3–1.2)
BUN BLD-MCNC: 23 MG/DL (ref 9–23)
BUN/CREAT SERPL: 17.7 (ref 7–25)
CALCIUM SPEC-SCNC: 9.3 MG/DL (ref 8.7–10.4)
CHLORIDE SERPL-SCNC: 109 MMOL/L (ref 99–109)
CO2 SERPL-SCNC: 23 MMOL/L (ref 20–31)
CREAT BLD-MCNC: 1.3 MG/DL (ref 0.6–1.3)
CREAT BLDA-MCNC: 1.4 MG/DL (ref 0.6–1.3)
CREAT SERPL-MCNC: 1.4 MG/DL
DEPRECATED RDW RBC AUTO: 45.2 FL (ref 37–54)
EOSINOPHIL # BLD AUTO: 0.06 10*3/MM3 (ref 0.1–0.3)
EOSINOPHIL NFR BLD AUTO: 1.3 % (ref 0–3)
ERYTHROCYTE [DISTWIDTH] IN BLOOD BY AUTOMATED COUNT: 13.2 % (ref 11.3–14.5)
GFR SERPL CREATININE-BSD FRML MDRD: 41 ML/MIN/1.73
GLOBULIN UR ELPH-MCNC: 2.2 GM/DL
GLUCOSE BLD-MCNC: 104 MG/DL (ref 70–100)
HCT VFR BLD AUTO: 37.4 % (ref 34.5–44)
HGB BLD-MCNC: 12.6 G/DL (ref 11.5–15.5)
IMM GRANULOCYTES # BLD: 0.01 10*3/MM3 (ref 0–0.03)
IMM GRANULOCYTES NFR BLD: 0.2 % (ref 0–0.6)
LYMPHOCYTES # BLD AUTO: 0.66 10*3/MM3 (ref 0.6–4.8)
LYMPHOCYTES NFR BLD AUTO: 14.4 % (ref 24–44)
MAGNESIUM SERPL-MCNC: 2.1 MG/DL (ref 1.3–2.7)
MCH RBC QN AUTO: 31.3 PG (ref 27–31)
MCHC RBC AUTO-ENTMCNC: 33.7 G/DL (ref 32–36)
MCV RBC AUTO: 92.8 FL (ref 80–99)
MONOCYTES # BLD AUTO: 0.29 10*3/MM3 (ref 0–1)
MONOCYTES NFR BLD AUTO: 6.3 % (ref 0–12)
NEUTROPHILS # BLD AUTO: 3.53 10*3/MM3 (ref 1.5–8.3)
NEUTROPHILS NFR BLD AUTO: 77.4 % (ref 41–71)
PHOSPHATE SERPL-MCNC: 3.4 MG/DL (ref 2.4–5.1)
PLATELET # BLD AUTO: 41 10*3/MM3 (ref 150–450)
PMV BLD AUTO: 11.3 FL (ref 6–12)
POTASSIUM BLD-SCNC: 3.7 MMOL/L (ref 3.5–5.5)
PROT SERPL-MCNC: 6.8 G/DL (ref 5.7–8.2)
RBC # BLD AUTO: 4.03 10*6/MM3 (ref 3.89–5.14)
SODIUM BLD-SCNC: 140 MMOL/L (ref 132–146)
WBC NRBC COR # BLD: 4.57 10*3/MM3 (ref 3.5–10.8)

## 2017-05-22 PROCEDURE — 83735 ASSAY OF MAGNESIUM: CPT

## 2017-05-22 PROCEDURE — 85025 COMPLETE CBC W/AUTO DIFF WBC: CPT

## 2017-05-22 PROCEDURE — 25010000002 ZOLEDRONIC ACID PER 1 MG: Performed by: INTERNAL MEDICINE

## 2017-05-22 PROCEDURE — 82565 ASSAY OF CREATININE: CPT

## 2017-05-22 PROCEDURE — 80053 COMPREHEN METABOLIC PANEL: CPT

## 2017-05-22 PROCEDURE — G0009 ADMIN PNEUMOCOCCAL VACCINE: HCPCS | Performed by: INTERNAL MEDICINE

## 2017-05-22 PROCEDURE — 96365 THER/PROPH/DIAG IV INF INIT: CPT

## 2017-05-22 PROCEDURE — 36415 COLL VENOUS BLD VENIPUNCTURE: CPT

## 2017-05-22 PROCEDURE — 84165 PROTEIN E-PHORESIS SERUM: CPT

## 2017-05-22 PROCEDURE — 83883 ASSAY NEPHELOMETRY NOT SPEC: CPT

## 2017-05-22 PROCEDURE — 25010000002 PNEUMOCOCCAL CONJ. 13-VALENT SUSPENSION: Performed by: INTERNAL MEDICINE

## 2017-05-22 PROCEDURE — 90670 PCV13 VACCINE IM: CPT | Performed by: INTERNAL MEDICINE

## 2017-05-22 PROCEDURE — 90471 IMMUNIZATION ADMIN: CPT

## 2017-05-22 PROCEDURE — 90472 IMMUNIZATION ADMIN EACH ADD: CPT

## 2017-05-22 PROCEDURE — 86334 IMMUNOFIX E-PHORESIS SERUM: CPT

## 2017-05-22 PROCEDURE — 84100 ASSAY OF PHOSPHORUS: CPT

## 2017-05-22 PROCEDURE — 84155 ASSAY OF PROTEIN SERUM: CPT

## 2017-05-22 PROCEDURE — 96372 THER/PROPH/DIAG INJ SC/IM: CPT

## 2017-05-22 RX ADMIN — POLIOVIRUS TYPE 1 ANTIGEN (FORMALDEHYDE INACTIVATED), POLIOVIRUS TYPE 2 ANTIGEN (FORMALDEHYDE INACTIVATED), AND POLIOVIRUS TYPE 3 ANTIGEN (FORMALDEHYDE INACTIVATED) 0.5 ML: 40; 8; 32 INJECTION, SUSPENSION INTRAMUSCULAR at 11:36

## 2017-05-22 RX ADMIN — DIPHTHERIA AND TETANUS TOXOIDS AND ACELLULAR PERTUSSIS VACCINE ADSORBED 0.5 ML: 10; 25; 25; 25; 8 SUSPENSION INTRAMUSCULAR at 12:03

## 2017-05-22 RX ADMIN — ZOLEDRONIC ACID 3.3 MG: 4 INJECTION, SOLUTION, CONCENTRATE INTRAVENOUS at 11:41

## 2017-05-22 RX ADMIN — PNEUMOCOCCAL 13-VALENT CONJUGATE VACCINE 0.5 ML: 2.2; 2.2; 2.2; 2.2; 2.2; 4.4; 2.2; 2.2; 2.2; 2.2; 2.2; 2.2; 2.2 INJECTION, SUSPENSION INTRAMUSCULAR at 11:32

## 2017-05-22 RX ADMIN — HAEMOPHILUS INFLUENZAE TYPE B STRAIN 1482 CAPSULAR POLYSACCHARIDE TETANUS TOXOID CONJUGATE ANTIGEN 0.5 ML: KIT at 11:29

## 2017-05-23 LAB
ALBUMIN SERPL-MCNC: 4 G/DL (ref 2.9–4.4)
ALBUMIN/GLOB SERPL: 1.5 {RATIO} (ref 0.7–1.7)
ALPHA1 GLOB FLD ELPH-MCNC: 0.3 G/DL (ref 0–0.4)
ALPHA2 GLOB SERPL ELPH-MCNC: 0.8 G/DL (ref 0.4–1)
B-GLOBULIN SERPL ELPH-MCNC: 1.1 G/DL (ref 0.7–1.3)
GAMMA GLOB SERPL ELPH-MCNC: 0.7 G/DL (ref 0.4–1.8)
GLOBULIN SER CALC-MCNC: 2.8 G/DL (ref 2.2–3.9)
IGA SERPL-MCNC: 169 MG/DL (ref 87–352)
IGG SERPL-MCNC: 661 MG/DL (ref 700–1600)
IGM SERPL-MCNC: 27 MG/DL (ref 26–217)
INTERPRETATION SERPL IEP-IMP: ABNORMAL
KAPPA LC SERPL-MCNC: 17.99 MG/L (ref 3.3–19.4)
KAPPA LC/LAMBDA SER: 1.29 {RATIO} (ref 0.26–1.65)
LAMBDA LC FREE SERPL-MCNC: 13.9 MG/L (ref 5.71–26.3)
Lab: ABNORMAL
M-SPIKE: ABNORMAL G/DL
PROT SERPL-MCNC: 6.8 G/DL (ref 6–8.5)

## 2017-06-16 RX ORDER — ACYCLOVIR 400 MG/1
400 TABLET ORAL 2 TIMES DAILY
Qty: 60 TABLET | Refills: 5 | Status: SHIPPED | OUTPATIENT
Start: 2017-06-16 | End: 2017-12-22 | Stop reason: SDUPTHER

## 2017-06-19 ENCOUNTER — INFUSION (OUTPATIENT)
Dept: ONCOLOGY | Facility: HOSPITAL | Age: 67
End: 2017-06-19

## 2017-06-19 VITALS
BODY MASS INDEX: 31.23 KG/M2 | TEMPERATURE: 98 F | SYSTOLIC BLOOD PRESSURE: 135 MMHG | RESPIRATION RATE: 18 BRPM | DIASTOLIC BLOOD PRESSURE: 61 MMHG | WEIGHT: 168 LBS | HEART RATE: 93 BPM

## 2017-06-19 DIAGNOSIS — C90.00 METASTATIC MULTIPLE MYELOMA TO BONE (HCC): ICD-10-CM

## 2017-06-19 DIAGNOSIS — C90.01 MULTIPLE MYELOMA IN REMISSION (HCC): Primary | ICD-10-CM

## 2017-06-19 LAB
ALBUMIN SERPL-MCNC: 4.5 G/DL (ref 3.2–4.8)
ALBUMIN/GLOB SERPL: 1.6 G/DL (ref 1.5–2.5)
ALP SERPL-CCNC: 117 U/L (ref 25–100)
ALT SERPL W P-5'-P-CCNC: 20 U/L (ref 7–40)
ANION GAP SERPL CALCULATED.3IONS-SCNC: 11 MMOL/L (ref 3–11)
AST SERPL-CCNC: 30 U/L (ref 0–33)
BASOPHILS # BLD AUTO: 0.02 10*3/MM3 (ref 0–0.2)
BASOPHILS NFR BLD AUTO: 0.4 % (ref 0–1)
BILIRUB SERPL-MCNC: 0.4 MG/DL (ref 0.3–1.2)
BUN BLD-MCNC: 20 MG/DL (ref 9–23)
BUN/CREAT SERPL: 14.3 (ref 7–25)
CALCIUM SPEC-SCNC: 9.5 MG/DL (ref 8.7–10.4)
CHLORIDE SERPL-SCNC: 110 MMOL/L (ref 99–109)
CO2 SERPL-SCNC: 23 MMOL/L (ref 20–31)
CREAT BLD-MCNC: 1.4 MG/DL (ref 0.6–1.3)
CREAT BLDA-MCNC: 1.4 MG/DL (ref 0.6–1.3)
CREAT SERPL-MCNC: 1.4 MG/DL
DEPRECATED RDW RBC AUTO: 44.4 FL (ref 37–54)
EOSINOPHIL # BLD AUTO: 0.11 10*3/MM3 (ref 0.1–0.3)
EOSINOPHIL NFR BLD AUTO: 2.3 % (ref 0–3)
ERYTHROCYTE [DISTWIDTH] IN BLOOD BY AUTOMATED COUNT: 13.2 % (ref 11.3–14.5)
GFR SERPL CREATININE-BSD FRML MDRD: 38 ML/MIN/1.73
GLOBULIN UR ELPH-MCNC: 2.8 GM/DL
GLUCOSE BLD-MCNC: 87 MG/DL (ref 70–100)
HCT VFR BLD AUTO: 37.1 % (ref 34.5–44)
HGB BLD-MCNC: 12.5 G/DL (ref 11.5–15.5)
IMM GRANULOCYTES # BLD: 0.01 10*3/MM3 (ref 0–0.03)
IMM GRANULOCYTES NFR BLD: 0.2 % (ref 0–0.6)
LYMPHOCYTES # BLD AUTO: 0.65 10*3/MM3 (ref 0.6–4.8)
LYMPHOCYTES NFR BLD AUTO: 13.7 % (ref 24–44)
MAGNESIUM SERPL-MCNC: 2.3 MG/DL (ref 1.3–2.7)
MCH RBC QN AUTO: 30.9 PG (ref 27–31)
MCHC RBC AUTO-ENTMCNC: 33.7 G/DL (ref 32–36)
MCV RBC AUTO: 91.8 FL (ref 80–99)
MONOCYTES # BLD AUTO: 0.42 10*3/MM3 (ref 0–1)
MONOCYTES NFR BLD AUTO: 8.8 % (ref 0–12)
NEUTROPHILS # BLD AUTO: 3.54 10*3/MM3 (ref 1.5–8.3)
NEUTROPHILS NFR BLD AUTO: 74.6 % (ref 41–71)
PHOSPHATE SERPL-MCNC: 4 MG/DL (ref 2.4–5.1)
PLAT MORPH BLD: NORMAL
PLATELET # BLD AUTO: 49 10*3/MM3 (ref 150–450)
PMV BLD AUTO: 12.5 FL (ref 6–12)
POTASSIUM BLD-SCNC: 4.2 MMOL/L (ref 3.5–5.5)
PROT SERPL-MCNC: 7.3 G/DL (ref 5.7–8.2)
RBC # BLD AUTO: 4.04 10*6/MM3 (ref 3.89–5.14)
RBC MORPH BLD: NORMAL
SODIUM BLD-SCNC: 144 MMOL/L (ref 132–146)
WBC MORPH BLD: NORMAL
WBC NRBC COR # BLD: 4.75 10*3/MM3 (ref 3.5–10.8)

## 2017-06-19 PROCEDURE — 83883 ASSAY NEPHELOMETRY NOT SPEC: CPT

## 2017-06-19 PROCEDURE — 85025 COMPLETE CBC W/AUTO DIFF WBC: CPT

## 2017-06-19 PROCEDURE — 84155 ASSAY OF PROTEIN SERUM: CPT

## 2017-06-19 PROCEDURE — 84165 PROTEIN E-PHORESIS SERUM: CPT

## 2017-06-19 PROCEDURE — 80053 COMPREHEN METABOLIC PANEL: CPT

## 2017-06-19 PROCEDURE — 85007 BL SMEAR W/DIFF WBC COUNT: CPT

## 2017-06-19 PROCEDURE — 82565 ASSAY OF CREATININE: CPT

## 2017-06-19 PROCEDURE — 36415 COLL VENOUS BLD VENIPUNCTURE: CPT

## 2017-06-19 PROCEDURE — 84100 ASSAY OF PHOSPHORUS: CPT

## 2017-06-19 PROCEDURE — 96365 THER/PROPH/DIAG IV INF INIT: CPT

## 2017-06-19 PROCEDURE — 83735 ASSAY OF MAGNESIUM: CPT

## 2017-06-19 PROCEDURE — 86334 IMMUNOFIX E-PHORESIS SERUM: CPT

## 2017-06-19 PROCEDURE — 25010000002 ZOLEDRONIC ACID PER 1 MG: Performed by: INTERNAL MEDICINE

## 2017-06-19 PROCEDURE — 96374 THER/PROPH/DIAG INJ IV PUSH: CPT

## 2017-06-19 RX ADMIN — ZOLEDRONIC ACID 3 MG: 4 INJECTION, SOLUTION, CONCENTRATE INTRAVENOUS at 10:31

## 2017-06-19 NOTE — PLAN OF CARE
zometa dose reduced to 3 mg due to SCr=1.4 (CrCl= 37.4 ml/min);    Elias Alvares [facility]  6/19/2017  10:24 AM

## 2017-06-21 LAB
ALBUMIN SERPL-MCNC: 3.8 G/DL (ref 2.9–4.4)
ALBUMIN/GLOB SERPL: 1.4 {RATIO} (ref 0.7–1.7)
ALPHA1 GLOB FLD ELPH-MCNC: 0.3 G/DL (ref 0–0.4)
ALPHA2 GLOB SERPL ELPH-MCNC: 0.8 G/DL (ref 0.4–1)
B-GLOBULIN SERPL ELPH-MCNC: 1.1 G/DL (ref 0.7–1.3)
GAMMA GLOB SERPL ELPH-MCNC: 0.7 G/DL (ref 0.4–1.8)
GLOBULIN SER CALC-MCNC: 2.9 G/DL (ref 2.2–3.9)
IGA SERPL-MCNC: 174 MG/DL (ref 87–352)
IGG SERPL-MCNC: 673 MG/DL (ref 700–1600)
IGM SERPL-MCNC: 28 MG/DL (ref 26–217)
INTERPRETATION SERPL IEP-IMP: ABNORMAL
KAPPA LC SERPL-MCNC: 20 MG/L (ref 3.3–19.4)
KAPPA LC/LAMBDA SER: 1.43 {RATIO} (ref 0.26–1.65)
LAMBDA LC FREE SERPL-MCNC: 14 MG/L (ref 5.7–26.3)
Lab: ABNORMAL
M-SPIKE: ABNORMAL G/DL
PROT SERPL-MCNC: 6.7 G/DL (ref 6–8.5)

## 2017-07-14 ENCOUNTER — TELEPHONE (OUTPATIENT)
Dept: ONCOLOGY | Facility: CLINIC | Age: 67
End: 2017-07-14

## 2017-07-14 RX ORDER — PREGABALIN 100 MG/1
100 CAPSULE ORAL 2 TIMES DAILY
Qty: 60 CAPSULE | Refills: 3 | OUTPATIENT
Start: 2017-07-14 | End: 2017-09-27

## 2017-07-14 NOTE — TELEPHONE ENCOUNTER
----- Message from Arlette Galindo sent at 7/14/2017 10:44 AM EDT -----  Regarding: ELIO - RX REFILL   Contact: [phone]  PATIENT NEEDS A REFILL ON  LYRICA 100 MG CAPSULE.     PHARMACY:  [place], [place]

## 2017-07-17 ENCOUNTER — APPOINTMENT (OUTPATIENT)
Dept: ONCOLOGY | Facility: HOSPITAL | Age: 67
End: 2017-07-17

## 2017-07-21 ENCOUNTER — HOSPITAL ENCOUNTER (OUTPATIENT)
Dept: GENERAL RADIOLOGY | Facility: HOSPITAL | Age: 67
Discharge: HOME | End: 2017-07-21
Admitting: NURSE PRACTITIONER

## 2017-07-21 ENCOUNTER — INFUSION (OUTPATIENT)
Dept: ONCOLOGY | Facility: HOSPITAL | Age: 67
End: 2017-07-21

## 2017-07-21 ENCOUNTER — OFFICE VISIT (OUTPATIENT)
Dept: ONCOLOGY | Facility: CLINIC | Age: 67
End: 2017-07-21

## 2017-07-21 VITALS
BODY MASS INDEX: 31.1 KG/M2 | HEART RATE: 101 BPM | TEMPERATURE: 97.9 F | RESPIRATION RATE: 18 BRPM | WEIGHT: 169 LBS | SYSTOLIC BLOOD PRESSURE: 136 MMHG | HEIGHT: 62 IN | DIASTOLIC BLOOD PRESSURE: 79 MMHG

## 2017-07-21 DIAGNOSIS — C90.00 METASTATIC MULTIPLE MYELOMA TO BONE (HCC): Primary | ICD-10-CM

## 2017-07-21 DIAGNOSIS — C90.01 MULTIPLE MYELOMA IN REMISSION (HCC): ICD-10-CM

## 2017-07-21 PROCEDURE — 73590 X-RAY EXAM OF LOWER LEG: CPT

## 2017-07-21 PROCEDURE — 99213 OFFICE O/P EST LOW 20 MIN: CPT | Performed by: NURSE PRACTITIONER

## 2017-07-21 RX ORDER — SODIUM CHLORIDE 9 MG/ML
250 INJECTION, SOLUTION INTRAVENOUS ONCE
Status: CANCELLED | OUTPATIENT
Start: 2017-07-21 | End: 2017-07-21

## 2017-07-21 NOTE — PROGRESS NOTES
"      PROBLEM LIST:  Oncology/Hematology History     PROBLEM LIST:   1. Stage III IgA kappa clonal multiple myeloma. Diagnosed 9/20152. FISH panel reports multiple abnormalities including gain of 1q21 monosomy.  3. Monosomy 13 and gain of chromosomes 9, 15 and CCND1.  4. Initial M-spike of 7.1 g/dL at time of diagnosis and after 3 cycles, had  undetectable M-spike currently on Velcade, Revlimid and dexamethasone cycle #6  this week.  5. S/p Autologous SCT at [facility] with Dr. Venu Spicer in March 11,2016  6. Revlimid on hold due to low platelets  7.Right leg pain - on lyrica       Multiple myeloma in remission     7/11/2016 Initial Diagnosis     Multiple myeloma         REASON FOR VISIT: Multiple myeloma       Subjective     HISTORY OF PRESENT ILLNESS:   Ms. Miranda is here for follow up evaluation of myeloma management. She complains of right hip and lower extremity pain that has been getting progressively worse over the past month. The pain can be moderate at times and is made worse with standing and walking. It is relieved by rest. She denies any recent falls or injuries. She denies any peripheral neuropathy. No fevers, recurring infections, nose/gum bleeding, melena or hematochezia. Her Revlimid remains on hold due to thrombocytopenia. She received her post transplant immunizations 5/22/2017. Her next immunizations are due 3/2018.     Past Medical History, Past Surgical History, Social History, Family History have been reviewed and are without significant changes except as mentioned.    Review of Systems   A comprehensive 14 point review of systems was performed and was negative except as mentioned.    Medications:  The current medication list was reviewed in the EMR    ALLERGIES:  No Known Allergies    Objective      /79 Comment: LUE  Pulse 101  Temp 97.9 °F (36.6 °C) (Temporal Artery )   Resp 18  Ht 61.5\" (156.2 cm)  Wt 169 lb (76.7 kg)  BMI 31.42 kg/m2     Performance Status: 1    General: well " appearing, in no acute distress  HEENT: sclera anicteric, oropharynx clear  Lymphatics: no cervical, supraclavicular, or axillary adenopathy  Cardiovascular: regular rate and rhythm, no murmurs  Lungs: clear to auscultation bilaterally  Abdomen: soft, nontender, nondistended.  No palpable organomegaly  Extremeties: no lower extremity edema  Skin: no rashes, lesions, bruising, or petechiae    RECENT LABS:  Hematology WBC   Date Value Ref Range Status   06/19/2017 4.75 3.50 - 10.80 10*3/mm3 Final     Hemoglobin   Date Value Ref Range Status   06/19/2017 12.5 11.5 - 15.5 g/dL Final     Hematocrit   Date Value Ref Range Status   06/19/2017 37.1 34.5 - 44.0 % Final     MCV   Date Value Ref Range Status   06/19/2017 91.8 80.0 - 99.0 fL Final     RDW   Date Value Ref Range Status   06/19/2017 13.2 11.3 - 14.5 % Final     MPV   Date Value Ref Range Status   06/19/2017 12.5 (H) 6.0 - 12.0 fL Final     Platelets   Date Value Ref Range Status   06/19/2017 49 (L) 150 - 450 10*3/mm3 Final     Immature Grans %   Date Value Ref Range Status   06/19/2017 0.2 0.0 - 0.6 % Final     Neutrophils, Absolute   Date Value Ref Range Status   06/19/2017 3.54 1.50 - 8.30 10*3/mm3 Final     Lymphocytes, Absolute   Date Value Ref Range Status   06/19/2017 0.65 0.60 - 4.80 10*3/mm3 Final     Monocytes, Absolute   Date Value Ref Range Status   06/19/2017 0.42 0.00 - 1.00 10*3/mm3 Final     Eosinophils, Absolute   Date Value Ref Range Status   06/19/2017 0.11 0.10 - 0.30 10*3/mm3 Final     Basophils, Absolute   Date Value Ref Range Status   06/19/2017 0.02 0.00 - 0.20 10*3/mm3 Final     Immature Grans, Absolute   Date Value Ref Range Status   06/19/2017 0.01 0.00 - 0.03 10*3/mm3 Final       Glucose   Date Value Ref Range Status   06/19/2017 87 70 - 100 mg/dL Final     Sodium   Date Value Ref Range Status   06/19/2017 144 132 - 146 mmol/L Final     Potassium   Date Value Ref Range Status   06/19/2017 4.2 3.5 - 5.5 mmol/L Final     CO2   Date Value  Ref Range Status   06/19/2017 23.0 20.0 - 31.0 mmol/L Final     Chloride   Date Value Ref Range Status   06/19/2017 110 (H) 99 - 109 mmol/L Final     Anion Gap   Date Value Ref Range Status   06/19/2017 11.0 3.0 - 11.0 mmol/L Final     Creatinine   Date Value Ref Range Status   06/19/2017 1.40 (H) 0.60 - 1.30 mg/dL Final     Comment:     Serial Number: [id number]    : [id number]   06/19/2017 1.4 mg/dL Final     BUN   Date Value Ref Range Status   06/19/2017 20 9 - 23 mg/dL Final     BUN/Creatinine Ratio   Date Value Ref Range Status   06/19/2017 14.3 7.0 - 25.0 Final     Calcium   Date Value Ref Range Status   06/19/2017 9.5 8.7 - 10.4 mg/dL Final     eGFR Non  Amer   Date Value Ref Range Status   06/19/2017 38 (L) >60 mL/min/1.73 Final     Alkaline Phosphatase   Date Value Ref Range Status   06/19/2017 117 (H) 25 - 100 U/L Final     Total Protein   Date Value Ref Range Status   06/19/2017 7.3 5.7 - 8.2 g/dL Final     ALT (SGPT)   Date Value Ref Range Status   06/19/2017 20 7 - 40 U/L Final     AST (SGOT)   Date Value Ref Range Status   06/19/2017 30 0 - 33 U/L Final     Total Bilirubin   Date Value Ref Range Status   06/19/2017 0.4 0.3 - 1.2 mg/dL Final     Albumin   Date Value Ref Range Status   06/19/2017 3.8 2.9 - 4.4 g/dL Final   06/19/2017 4.50 3.20 - 4.80 g/dL Final     Globulin   Date Value Ref Range Status   06/19/2017 2.8 gm/dL Final     A/G Ratio   Date Value Ref Range Status   06/19/2017 1.4 0.7 - 1.7 Final   06/19/2017 1.6 1.5 - 2.5 g/dL Final       No results found for: LDH, URICACID    Lab Results   Component Value Date    MSPIKE Not Observed 06/19/2017    KAPPALAMB 1.43 06/19/2017    IGLFLC 14.0 06/19/2017    FREEKAPPAL 20.0 (H) 06/19/2017    FREEKAPPAL 17.99 05/22/2017    FREEKAPPAL 21.75 (H) 04/24/2017             Assessment/Plan   66-year-old lady with multiple myeloma status post autologous stem cell transplant.  Her platelets remain low so we are continuing to hold her Revlimid.  We will  continue to watch her off maintenance treatment since she has no M spike.  If she does have progression it is going to be difficult to see how we can treat her due to cytopenia. Since she is having increased right hip and leg pain we will do  xrays to see if there are any new lesions. She will receive her Zometa today and labs obtained. We will contact her with any adverse findings. We will see her back in clinic in 3 months for follow up evaluation. She has been instructed to contact us in the interm if any new symptoms arise.             Wandy Richards, [facility] Hematology and Oncology    7/21/2017          CC:

## 2017-07-24 ENCOUNTER — INFUSION (OUTPATIENT)
Dept: ONCOLOGY | Facility: HOSPITAL | Age: 67
End: 2017-07-24

## 2017-07-24 ENCOUNTER — TELEPHONE (OUTPATIENT)
Dept: ONCOLOGY | Facility: CLINIC | Age: 67
End: 2017-07-24

## 2017-07-24 VITALS
DIASTOLIC BLOOD PRESSURE: 56 MMHG | SYSTOLIC BLOOD PRESSURE: 121 MMHG | RESPIRATION RATE: 18 BRPM | TEMPERATURE: 97.6 F | BODY MASS INDEX: 31.23 KG/M2 | HEART RATE: 88 BPM | WEIGHT: 168 LBS

## 2017-07-24 DIAGNOSIS — C90.00 METASTATIC MULTIPLE MYELOMA TO BONE (HCC): ICD-10-CM

## 2017-07-24 DIAGNOSIS — C90.01 MULTIPLE MYELOMA IN REMISSION (HCC): Primary | ICD-10-CM

## 2017-07-24 LAB
ALBUMIN SERPL-MCNC: 4.5 G/DL (ref 3.2–4.8)
ALBUMIN/GLOB SERPL: 1.9 G/DL (ref 1.5–2.5)
ALP SERPL-CCNC: 116 U/L (ref 25–100)
ALT SERPL W P-5'-P-CCNC: 19 U/L (ref 7–40)
ANION GAP SERPL CALCULATED.3IONS-SCNC: 8 MMOL/L (ref 3–11)
AST SERPL-CCNC: 23 U/L (ref 0–33)
BASOPHILS # BLD AUTO: 0.02 10*3/MM3 (ref 0–0.2)
BASOPHILS NFR BLD AUTO: 0.5 % (ref 0–1)
BILIRUB SERPL-MCNC: 0.4 MG/DL (ref 0.3–1.2)
BUN BLD-MCNC: 23 MG/DL (ref 9–23)
BUN/CREAT SERPL: 15.3 (ref 7–25)
CALCIUM SPEC-SCNC: 9.5 MG/DL (ref 8.7–10.4)
CHLORIDE SERPL-SCNC: 110 MMOL/L (ref 99–109)
CO2 SERPL-SCNC: 23 MMOL/L (ref 20–31)
CREAT BLD-MCNC: 1.5 MG/DL (ref 0.6–1.3)
CREAT BLDA-MCNC: 1.5 MG/DL (ref 0.6–1.3)
CREAT SERPL-MCNC: 1.5 MG/DL
DEPRECATED RDW RBC AUTO: 43.4 FL (ref 37–54)
EOSINOPHIL # BLD AUTO: 0.1 10*3/MM3 (ref 0–0.3)
EOSINOPHIL NFR BLD AUTO: 2.3 % (ref 0–3)
ERYTHROCYTE [DISTWIDTH] IN BLOOD BY AUTOMATED COUNT: 13.1 % (ref 11.3–14.5)
GFR SERPL CREATININE-BSD FRML MDRD: 35 ML/MIN/1.73
GLOBULIN UR ELPH-MCNC: 2.4 GM/DL
GLUCOSE BLD-MCNC: 92 MG/DL (ref 70–100)
HCT VFR BLD AUTO: 36.2 % (ref 34.5–44)
HGB BLD-MCNC: 12.6 G/DL (ref 11.5–15.5)
IMM GRANULOCYTES # BLD: 0.01 10*3/MM3 (ref 0–0.03)
IMM GRANULOCYTES NFR BLD: 0.2 % (ref 0–0.6)
LYMPHOCYTES # BLD AUTO: 0.56 10*3/MM3 (ref 0.6–4.8)
LYMPHOCYTES NFR BLD AUTO: 12.8 % (ref 24–44)
MAGNESIUM SERPL-MCNC: 2.3 MG/DL (ref 1.3–2.7)
MCH RBC QN AUTO: 31.7 PG (ref 27–31)
MCHC RBC AUTO-ENTMCNC: 34.8 G/DL (ref 32–36)
MCV RBC AUTO: 91.2 FL (ref 80–99)
MONOCYTES # BLD AUTO: 0.4 10*3/MM3 (ref 0–1)
MONOCYTES NFR BLD AUTO: 9.1 % (ref 0–12)
NEUTROPHILS # BLD AUTO: 3.3 10*3/MM3 (ref 1.5–8.3)
NEUTROPHILS NFR BLD AUTO: 75.1 % (ref 41–71)
PHOSPHATE SERPL-MCNC: 3.6 MG/DL (ref 2.4–5.1)
PLATELET # BLD AUTO: 52 10*3/MM3 (ref 150–450)
PMV BLD AUTO: 12.9 FL (ref 6–12)
POTASSIUM BLD-SCNC: 4.1 MMOL/L (ref 3.5–5.5)
PROT SERPL-MCNC: 6.9 G/DL (ref 5.7–8.2)
RBC # BLD AUTO: 3.97 10*6/MM3 (ref 3.89–5.14)
SODIUM BLD-SCNC: 141 MMOL/L (ref 132–146)
WBC NRBC COR # BLD: 4.39 10*3/MM3 (ref 3.5–10.8)

## 2017-07-24 PROCEDURE — 90732 PPSV23 VACC 2 YRS+ SUBQ/IM: CPT | Performed by: INTERNAL MEDICINE

## 2017-07-24 PROCEDURE — 25010000002 PNEUMOCOCCAL VAC POLYVALENT PER 0.5 ML: Performed by: INTERNAL MEDICINE

## 2017-07-24 PROCEDURE — G0010 ADMIN HEPATITIS B VACCINE: HCPCS

## 2017-07-24 PROCEDURE — 80053 COMPREHEN METABOLIC PANEL: CPT

## 2017-07-24 PROCEDURE — 83883 ASSAY NEPHELOMETRY NOT SPEC: CPT

## 2017-07-24 PROCEDURE — 82565 ASSAY OF CREATININE: CPT

## 2017-07-24 PROCEDURE — 84165 PROTEIN E-PHORESIS SERUM: CPT

## 2017-07-24 PROCEDURE — 90744 HEPB VACC 3 DOSE PED/ADOL IM: CPT

## 2017-07-24 PROCEDURE — 84155 ASSAY OF PROTEIN SERUM: CPT

## 2017-07-24 PROCEDURE — 84100 ASSAY OF PHOSPHORUS: CPT

## 2017-07-24 PROCEDURE — 25010000002 ZOLEDRONIC ACID PER 1 MG: Performed by: NURSE PRACTITIONER

## 2017-07-24 PROCEDURE — 36415 COLL VENOUS BLD VENIPUNCTURE: CPT

## 2017-07-24 PROCEDURE — 96365 THER/PROPH/DIAG IV INF INIT: CPT

## 2017-07-24 PROCEDURE — 96374 THER/PROPH/DIAG INJ IV PUSH: CPT

## 2017-07-24 PROCEDURE — 96372 THER/PROPH/DIAG INJ SC/IM: CPT

## 2017-07-24 PROCEDURE — 85025 COMPLETE CBC W/AUTO DIFF WBC: CPT

## 2017-07-24 PROCEDURE — G0009 ADMIN PNEUMOCOCCAL VACCINE: HCPCS | Performed by: INTERNAL MEDICINE

## 2017-07-24 PROCEDURE — 83735 ASSAY OF MAGNESIUM: CPT

## 2017-07-24 PROCEDURE — 25010000002

## 2017-07-24 PROCEDURE — 86334 IMMUNOFIX E-PHORESIS SERUM: CPT

## 2017-07-24 RX ADMIN — ZOLEDRONIC ACID 3 MG: 4 INJECTION, SOLUTION, CONCENTRATE INTRAVENOUS at 11:01

## 2017-07-24 RX ADMIN — PNEUMOCOCCAL VACCINE POLYVALENT 0.5 ML
25; 25; 25; 25; 25; 25; 25; 25; 25; 25; 25; 25; 25; 25; 25; 25; 25; 25; 25; 25; 25; 25; 25 INJECTION, SOLUTION INTRAMUSCULAR; SUBCUTANEOUS at 10:53

## 2017-07-25 LAB
ALBUMIN SERPL-MCNC: 3.7 G/DL (ref 2.9–4.4)
ALBUMIN/GLOB SERPL: 1.2 {RATIO} (ref 0.7–1.7)
ALPHA1 GLOB FLD ELPH-MCNC: 0.3 G/DL (ref 0–0.4)
ALPHA2 GLOB SERPL ELPH-MCNC: 0.9 G/DL (ref 0.4–1)
B-GLOBULIN SERPL ELPH-MCNC: 1.2 G/DL (ref 0.7–1.3)
GAMMA GLOB SERPL ELPH-MCNC: 0.7 G/DL (ref 0.4–1.8)
GLOBULIN SER CALC-MCNC: 3.2 G/DL (ref 2.2–3.9)
IGA SERPL-MCNC: 166 MG/DL (ref 87–352)
IGG SERPL-MCNC: 643 MG/DL (ref 700–1600)
IGM SERPL-MCNC: 31 MG/DL (ref 26–217)
INTERPRETATION SERPL IEP-IMP: ABNORMAL
KAPPA LC SERPL-MCNC: 22.7 MG/L (ref 3.3–19.4)
KAPPA LC/LAMBDA SER: 1.52 {RATIO} (ref 0.26–1.65)
LAMBDA LC FREE SERPL-MCNC: 14.9 MG/L (ref 5.7–26.3)
Lab: ABNORMAL
M-SPIKE: ABNORMAL G/DL
PROT SERPL-MCNC: 6.9 G/DL (ref 6–8.5)

## 2017-08-07 ENCOUNTER — TELEPHONE (OUTPATIENT)
Dept: ONCOLOGY | Facility: CLINIC | Age: 67
End: 2017-08-07

## 2017-08-07 RX ORDER — CYCLOBENZAPRINE HCL 10 MG
10 TABLET ORAL 3 TIMES DAILY PRN
Qty: 90 TABLET | Refills: 4 | Status: SHIPPED | OUTPATIENT
Start: 2017-08-07 | End: 2018-02-23 | Stop reason: SDUPTHER

## 2017-08-07 NOTE — TELEPHONE ENCOUNTER
----- Message from Arlette Galindo sent at 8/7/2017  1:05 PM EDT -----  Regarding: ELIO - RX REFILL   Contact: [phone]  PATIENT CALLED FOR REFILL HER MUSCLE RELAXER , Cyclobencaprine 10 mg.     PHARMACY: [place], [place]

## 2017-08-18 DIAGNOSIS — C90.01 MULTIPLE MYELOMA IN REMISSION (HCC): ICD-10-CM

## 2017-08-18 DIAGNOSIS — C90.00 METASTATIC MULTIPLE MYELOMA TO BONE (HCC): ICD-10-CM

## 2017-08-18 RX ORDER — SODIUM CHLORIDE 9 MG/ML
250 INJECTION, SOLUTION INTRAVENOUS ONCE
Status: CANCELLED | OUTPATIENT
Start: 2017-08-21 | End: 2017-08-18

## 2017-08-21 ENCOUNTER — INFUSION (OUTPATIENT)
Dept: ONCOLOGY | Facility: HOSPITAL | Age: 67
End: 2017-08-21

## 2017-08-21 VITALS
RESPIRATION RATE: 18 BRPM | DIASTOLIC BLOOD PRESSURE: 79 MMHG | HEART RATE: 88 BPM | TEMPERATURE: 97.1 F | SYSTOLIC BLOOD PRESSURE: 127 MMHG

## 2017-08-21 DIAGNOSIS — C90.00 METASTATIC MULTIPLE MYELOMA TO BONE (HCC): ICD-10-CM

## 2017-08-21 DIAGNOSIS — C90.01 MULTIPLE MYELOMA IN REMISSION (HCC): Primary | ICD-10-CM

## 2017-08-21 LAB
ALBUMIN SERPL-MCNC: 4.3 G/DL (ref 3.2–4.8)
ALBUMIN/GLOB SERPL: 1.9 G/DL (ref 1.5–2.5)
ALP SERPL-CCNC: 109 U/L (ref 25–100)
ALT SERPL W P-5'-P-CCNC: 18 U/L (ref 7–40)
ANION GAP SERPL CALCULATED.3IONS-SCNC: 9 MMOL/L (ref 3–11)
AST SERPL-CCNC: 17 U/L (ref 0–33)
BASOPHILS # BLD AUTO: 0.02 10*3/MM3 (ref 0–0.2)
BASOPHILS NFR BLD AUTO: 0.4 % (ref 0–1)
BILIRUB SERPL-MCNC: 0.5 MG/DL (ref 0.3–1.2)
BUN BLD-MCNC: 24 MG/DL (ref 9–23)
BUN/CREAT SERPL: 17.1 (ref 7–25)
CALCIUM SPEC-SCNC: 9.2 MG/DL (ref 8.7–10.4)
CHLORIDE SERPL-SCNC: 109 MMOL/L (ref 99–109)
CO2 SERPL-SCNC: 24 MMOL/L (ref 20–31)
CREAT BLD-MCNC: 1.4 MG/DL (ref 0.6–1.3)
CREATINE SERPL-MCNC: 1.5 MG/DL
DEPRECATED RDW RBC AUTO: 46.3 FL (ref 37–54)
EOSINOPHIL # BLD AUTO: 0.06 10*3/MM3 (ref 0–0.3)
EOSINOPHIL NFR BLD AUTO: 1.3 % (ref 0–3)
ERYTHROCYTE [DISTWIDTH] IN BLOOD BY AUTOMATED COUNT: 13.4 % (ref 11.3–14.5)
GFR SERPL CREATININE-BSD FRML MDRD: 38 ML/MIN/1.73
GLOBULIN UR ELPH-MCNC: 2.3 GM/DL
GLUCOSE BLD-MCNC: 80 MG/DL (ref 70–100)
HCT VFR BLD AUTO: 37.3 % (ref 34.5–44)
HGB BLD-MCNC: 12.3 G/DL (ref 11.5–15.5)
IMM GRANULOCYTES # BLD: 0 10*3/MM3 (ref 0–0.03)
IMM GRANULOCYTES NFR BLD: 0 % (ref 0–0.6)
LYMPHOCYTES # BLD AUTO: 0.63 10*3/MM3 (ref 0.6–4.8)
LYMPHOCYTES NFR BLD AUTO: 13.6 % (ref 24–44)
MAGNESIUM SERPL-MCNC: 2.1 MG/DL (ref 1.3–2.7)
MCH RBC QN AUTO: 31 PG (ref 27–31)
MCHC RBC AUTO-ENTMCNC: 33 G/DL (ref 32–36)
MCV RBC AUTO: 94 FL (ref 80–99)
MONOCYTES # BLD AUTO: 0.51 10*3/MM3 (ref 0–1)
MONOCYTES NFR BLD AUTO: 11 % (ref 0–12)
NEUTROPHILS # BLD AUTO: 3.41 10*3/MM3 (ref 1.5–8.3)
NEUTROPHILS NFR BLD AUTO: 73.7 % (ref 41–71)
PHOSPHATE SERPL-MCNC: 3.7 MG/DL (ref 2.4–5.1)
PLATELET # BLD AUTO: 44 10*3/MM3 (ref 150–450)
PMV BLD AUTO: 12.5 FL (ref 6–12)
POTASSIUM BLD-SCNC: 3.9 MMOL/L (ref 3.5–5.5)
PROT SERPL-MCNC: 6.6 G/DL (ref 5.7–8.2)
RBC # BLD AUTO: 3.97 10*6/MM3 (ref 3.89–5.14)
SODIUM BLD-SCNC: 142 MMOL/L (ref 132–146)
WBC NRBC COR # BLD: 4.63 10*3/MM3 (ref 3.5–10.8)

## 2017-08-21 PROCEDURE — 85025 COMPLETE CBC W/AUTO DIFF WBC: CPT

## 2017-08-21 PROCEDURE — 82565 ASSAY OF CREATININE: CPT

## 2017-08-21 PROCEDURE — 96374 THER/PROPH/DIAG INJ IV PUSH: CPT

## 2017-08-21 PROCEDURE — 83735 ASSAY OF MAGNESIUM: CPT

## 2017-08-21 PROCEDURE — 84100 ASSAY OF PHOSPHORUS: CPT

## 2017-08-21 PROCEDURE — 83883 ASSAY NEPHELOMETRY NOT SPEC: CPT

## 2017-08-21 PROCEDURE — 25010000002 ZOLEDRONIC ACID PER 1 MG: Performed by: INTERNAL MEDICINE

## 2017-08-21 PROCEDURE — 86334 IMMUNOFIX E-PHORESIS SERUM: CPT

## 2017-08-21 PROCEDURE — 80053 COMPREHEN METABOLIC PANEL: CPT

## 2017-08-21 PROCEDURE — 84155 ASSAY OF PROTEIN SERUM: CPT

## 2017-08-21 PROCEDURE — 96365 THER/PROPH/DIAG IV INF INIT: CPT

## 2017-08-21 PROCEDURE — 84165 PROTEIN E-PHORESIS SERUM: CPT

## 2017-08-21 RX ADMIN — ZOLEDRONIC ACID 3 MG: 4 INJECTION, SOLUTION, CONCENTRATE INTRAVENOUS at 10:09

## 2017-08-22 LAB
ALBUMIN SERPL-MCNC: 3.7 G/DL (ref 2.9–4.4)
ALBUMIN/GLOB SERPL: 1.3 {RATIO} (ref 0.7–1.7)
ALPHA1 GLOB FLD ELPH-MCNC: 0.3 G/DL (ref 0–0.4)
ALPHA2 GLOB SERPL ELPH-MCNC: 0.7 G/DL (ref 0.4–1)
B-GLOBULIN SERPL ELPH-MCNC: 1.2 G/DL (ref 0.7–1.3)
CREAT BLDA-MCNC: 1.5 MG/DL (ref 0.6–1.3)
GAMMA GLOB SERPL ELPH-MCNC: 0.6 G/DL (ref 0.4–1.8)
GLOBULIN SER CALC-MCNC: 2.9 G/DL (ref 2.2–3.9)
IGA SERPL-MCNC: 162 MG/DL (ref 87–352)
IGG SERPL-MCNC: 616 MG/DL (ref 700–1600)
IGM SERPL-MCNC: 26 MG/DL (ref 26–217)
INTERPRETATION SERPL IEP-IMP: ABNORMAL
KAPPA LC SERPL-MCNC: 19 MG/L (ref 3.3–19.4)
KAPPA LC/LAMBDA SER: 1.84 {RATIO} (ref 0.26–1.65)
LAMBDA LC FREE SERPL-MCNC: 10.3 MG/L (ref 5.7–26.3)
Lab: ABNORMAL
M-SPIKE: ABNORMAL G/DL
PROT SERPL-MCNC: 6.6 G/DL (ref 6–8.5)

## 2017-08-24 ENCOUNTER — OFFICE VISIT (OUTPATIENT)
Dept: INTERNAL MEDICINE | Facility: CLINIC | Age: 67
End: 2017-08-24

## 2017-08-24 VITALS
BODY MASS INDEX: 31.1 KG/M2 | SYSTOLIC BLOOD PRESSURE: 120 MMHG | HEIGHT: 62 IN | DIASTOLIC BLOOD PRESSURE: 74 MMHG | HEART RATE: 68 BPM | WEIGHT: 169 LBS

## 2017-08-24 DIAGNOSIS — M54.50 CHRONIC BILATERAL LOW BACK PAIN WITHOUT SCIATICA: ICD-10-CM

## 2017-08-24 DIAGNOSIS — J44.9 CHRONIC OBSTRUCTIVE PULMONARY DISEASE, UNSPECIFIED COPD TYPE (HCC): ICD-10-CM

## 2017-08-24 DIAGNOSIS — G89.29 CHRONIC BILATERAL LOW BACK PAIN WITHOUT SCIATICA: ICD-10-CM

## 2017-08-24 DIAGNOSIS — C90.01 MULTIPLE MYELOMA IN REMISSION (HCC): ICD-10-CM

## 2017-08-24 DIAGNOSIS — K57.30 DIVERTICULOSIS OF LARGE INTESTINE WITHOUT HEMORRHAGE: ICD-10-CM

## 2017-08-24 DIAGNOSIS — M19.90 ARTHRITIS: ICD-10-CM

## 2017-08-24 DIAGNOSIS — C90.00 METASTATIC MULTIPLE MYELOMA TO BONE (HCC): ICD-10-CM

## 2017-08-24 DIAGNOSIS — K21.9 GASTROESOPHAGEAL REFLUX DISEASE WITHOUT ESOPHAGITIS: ICD-10-CM

## 2017-08-24 DIAGNOSIS — I10 ESSENTIAL HYPERTENSION: Primary | ICD-10-CM

## 2017-08-24 DIAGNOSIS — D12.6 TUBULAR ADENOMA OF COLON: ICD-10-CM

## 2017-08-24 PROCEDURE — G0439 PPPS, SUBSEQ VISIT: HCPCS | Performed by: INTERNAL MEDICINE

## 2017-08-24 NOTE — PROGRESS NOTES
QUICK REFERENCE INFORMATION:  The ABCs of the Annual Wellness Visit    Subsequent Medicare Wellness Visit    HEALTH RISK ASSESSMENT    1950    Recent Hospitalizations:  No hospitalization(s) within the last year..        Current Medical Providers:  Patient Care Team:  Earl Fuentes MD as PCP - General  Earl Fuentes MD as PCP - Family Medicine  Joseph Mckinley MD as PCP - Claims Attributed  Josiah Euceda MD as Consulting Physician (Hematology and Oncology)        Smoking Status:  History   Smoking Status   • Former Smoker   • Quit date: 8/1/2015   Smokeless Tobacco   • Never Used       Alcohol Consumption:  History   Alcohol Use No       Depression Screen:   PHQ-9 Depression Screening 8/24/2017   Little interest or pleasure in doing things 1   Feeling down, depressed, or hopeless 0   Trouble falling or staying asleep, or sleeping too much 0   Feeling tired or having little energy 0   Poor appetite or overeating 0   Feeling bad about yourself - or that you are a failure or have let yourself or your family down 0   Trouble concentrating on things, such as reading the newspaper or watching television 0   Moving or speaking so slowly that other people could have noticed. Or the opposite - being so fidgety or restless that you have been moving around a lot more than usual 0   Thoughts that you would be better off dead, or of hurting yourself in some way 0   PHQ-9 Total Score 1   If you checked off any problems, how difficult have these problems made it for you to do your work, take care of things at home, or get along with other people? Not difficult at all       Health Habits and Functional and Cognitive Screening:  Functional & Cognitive Status 8/24/2017   Do you have difficulty preparing food and eating? No   Do you have difficulty bathing yourself? No   Do you have difficulty getting dressed? No   Do you have difficulty using the toilet? No   Do you have difficulty moving around from place  to place? No   In the past year have you fallen or experienced a near fall? No   Do you need help using the phone?  No   Are you deaf or do you have serious difficulty hearing?  No   Do you need help with transportation? No   Do you need help shopping? No   Do you need help preparing meals?  No   Do you need help with housework?  No   Do you need help with laundry? No   Do you need help taking your medications? No   Do you need help managing money? No   Do you have difficulty concentrating, remembering or making decisions? No       Health Habits  Current Diet: Well Balanced Diet  Dental Exam: Up to date  Eye Exam: Up to date  Exercise (times per week): 3 times per week  Current Exercise Activities Include: Housecleaning      Does the patient have evidence of cognitive impairment? No    Aspirin use counseling: Taking ASA appropriately as indicated      Recent Lab Results:  CMP:  Lab Results   Component Value Date    BUN 24 (H) 08/21/2017    CREATININE 1.50 (H) 08/21/2017    EGFRIFNONA 38 (L) 08/21/2017    BCR 17.1 08/21/2017     08/21/2017    K 3.9 08/21/2017    CO2 24.0 08/21/2017    CALCIUM 9.2 08/21/2017    PROTENTOTREF 6.6 08/21/2017    ALBUMIN 3.7 08/21/2017    ALBUMIN 4.30 08/21/2017    LABGLOBREF 2.9 08/21/2017    LABIL2 1.3 08/21/2017    LABIL2 1.9 08/21/2017    BILITOT 0.5 08/21/2017    ALKPHOS 109 (H) 08/21/2017    AST 17 08/21/2017    ALT 18 08/21/2017     Lipid Panel:     HbA1c:       Visual Acuity:  No exam data present    Age-appropriate Screening Schedule:  Refer to the list below for future screening recommendations based on patient's age, sex and/or medical conditions. Orders for these recommended tests are listed in the plan section. The patient has been provided with a written plan.    Health Maintenance   Topic Date Due   • TDAP/TD VACCINES (1 - Tdap) 09/07/1969   • MAMMOGRAM  04/21/2017   • ZOSTER VACCINE  04/21/2017   • INFLUENZA VACCINE  09/01/2017   • COLONOSCOPY  12/29/2026   •  PNEUMOCOCCAL VACCINES (65+ LOW/MEDIUM RISK)  Completed        Subjective   History of Present Illness    Doris Miranda is a 66 y.o. female who presents for an Subsequent Wellness Visit.    The following portions of the patient's history were reviewed and updated as appropriate: current medications, past family history, past medical history, past social history, past surgical history and problem list.    Outpatient Medications Prior to Visit   Medication Sig Dispense Refill   • acyclovir (ZOVIRAX) 400 MG tablet Take 1 tablet by mouth 2 (Two) Times a Day. 60 tablet 5   • aspirin 81 MG EC tablet Take 81 mg by mouth daily.     • cyclobenzaprine (FLEXERIL) 10 MG tablet Take 1 tablet by mouth 3 (Three) Times a Day As Needed for Muscle Spasms. 90 tablet 4   • docusate sodium (COLACE) 100 MG capsule Take 100 mg by mouth Daily.     • melatonin 5 MG tablet tablet Take 11 mg by mouth every night.     • Omeprazole Magnesium 20.6 (20 BASE) MG capsule delayed-release Take  by mouth daily.     • pregabalin (LYRICA) 100 MG capsule Take 1 capsule by mouth 2 (Two) Times a Day. 60 capsule 3     No facility-administered medications prior to visit.        Patient Active Problem List   Diagnosis   • Multiple myeloma in remission   • Metastatic multiple myeloma to bone   • Diverticulosis of large intestine without hemorrhage   • Tubular adenoma of colon   • Hypertension   • Multiple myeloma, failed remission   • COPD (chronic obstructive pulmonary disease)   • Chronic bronchitis   • Arthritis   • Gastroesophageal reflux disease without esophagitis   • Chronic bilateral low back pain without sciatica       Advance Care Planning:  has NO advance directive - not interested in additional information    Identification of Risk Factors:  Risk factors include: weight , unhealthy diet, inactivity, chronic pain, lack of transportation and financial stress.    Review of Systems   Constitutional: Positive for fatigue. Negative for chills, fever and  unexpected weight change.   HENT: Negative for congestion, ear pain, hearing loss, rhinorrhea, sinus pressure, sore throat and trouble swallowing.    Eyes: Negative for discharge and itching.   Respiratory: Negative for cough, chest tightness and shortness of breath.    Cardiovascular: Negative for chest pain, palpitations and leg swelling.   Gastrointestinal: Negative for abdominal pain, blood in stool, constipation, diarrhea and vomiting.        11/13 colonoscopy with hyperplastic polyps  12/16 colonoscopy with TA times one   Endocrine: Negative for polydipsia and polyuria.   Genitourinary: Negative for difficulty urinating, dysuria, enuresis, frequency, hematuria and urgency.   Musculoskeletal: Positive for arthralgias, back pain and gait problem. Negative for joint swelling.   Skin: Negative for rash and wound.   Allergic/Immunologic: Negative for immunocompromised state.   Neurological: Positive for weakness. Negative for dizziness, syncope, light-headedness, numbness and headaches.   Hematological: Does not bruise/bleed easily.   Psychiatric/Behavioral: Negative for behavioral problems, dysphoric mood and sleep disturbance. The patient is not nervous/anxious.        Compared to one year ago, the patient feels her physical health is better.  Compared to one year ago, the patient feels her mental health is better.    Objective     Physical Exam   Constitutional: She is oriented to person, place, and time. She appears well-developed and well-nourished.   HENT:   Head: Normocephalic and atraumatic.   Right Ear: External ear normal.   Left Ear: External ear normal.   Mouth/Throat: Oropharynx is clear and moist.   Eyes: Conjunctivae and EOM are normal.   Neck: Normal range of motion. Neck supple.   Cardiovascular: Normal rate, regular rhythm and normal heart sounds.    Pulmonary/Chest: Effort normal.   Mild bibasilar rales   Abdominal: Soft. Bowel sounds are normal.   Musculoskeletal:   Using cane   Lymphadenopathy:  "    She has no cervical adenopathy.   Neurological: She is alert and oriented to person, place, and time.   Skin: Skin is warm and dry.   Psychiatric: She has a normal mood and affect. Her behavior is normal. Thought content normal.       Vitals:    08/24/17 1339 08/24/17 1348   BP: 124/74 120/74   BP Location: Left arm    Patient Position: Sitting    Pulse: 68    Weight: 169 lb (76.7 kg)    Height: 61.5\" (156.2 cm)    PainSc:   4        Body mass index is 31.42 kg/(m^2).  Discussed the patient's BMI with her. The BMI is above average; no BMI management plan is appropriate..    Assessment/Plan   Patient Self-Management and Personalized Health Advice  The patient has been provided with information about: diet, exercise, weight management and fall prevention and preventive services including:   · Exercise counseling provided, Nutrition counseling provided.    Visit Diagnoses:    ICD-10-CM ICD-9-CM   1. Essential hypertension I10 401.9   2. Chronic obstructive pulmonary disease, unspecified COPD type J44.9 496   3. Diverticulosis of large intestine without hemorrhage K57.30 562.10   4. Gastroesophageal reflux disease without esophagitis K21.9 530.81   5. Tubular adenoma of colon D12.6 211.3   6. Arthritis M19.90 716.90   7. Metastatic multiple myeloma to bone C90.00 203.00   8. Multiple myeloma in remission C90.01 203.01   9. Chronic bilateral low back pain without sciatica M54.5 724.2    G89.29 338.29       No orders of the defined types were placed in this encounter.      Outpatient Encounter Prescriptions as of 8/24/2017   Medication Sig Dispense Refill   • acyclovir (ZOVIRAX) 400 MG tablet Take 1 tablet by mouth 2 (Two) Times a Day. 60 tablet 5   • aspirin 81 MG EC tablet Take 81 mg by mouth daily.     • cyclobenzaprine (FLEXERIL) 10 MG tablet Take 1 tablet by mouth 3 (Three) Times a Day As Needed for Muscle Spasms. 90 tablet 4   • docusate sodium (COLACE) 100 MG capsule Take 100 mg by mouth Daily.     • melatonin " 5 MG tablet tablet Take 11 mg by mouth every night.     • Omeprazole Magnesium 20.6 (20 BASE) MG capsule delayed-release Take  by mouth daily.     • pregabalin (LYRICA) 100 MG capsule Take 1 capsule by mouth 2 (Two) Times a Day. 60 capsule 3     No facility-administered encounter medications on file as of 8/24/2017.        Reviewed use of high risk medication in the elderly: yes  Reviewed for potential of harmful drug interactions in the elderly: yes    Follow Up:  No Follow-up on file.     An After Visit Summary and PPPS with all of these plans were given to the patient.         nausea-not an issue  htn-stable off meds  copd-not an issue since stopping tob  gerd/gastritis-cont ppi  MM/back pain with radiculopathy-overall improved, Oncology notes reviewed  insomnia-cont melatonin

## 2017-09-12 ENCOUNTER — APPOINTMENT (OUTPATIENT)
Dept: GENERAL RADIOLOGY | Facility: HOSPITAL | Age: 67
End: 2017-09-12

## 2017-09-12 ENCOUNTER — TELEPHONE (OUTPATIENT)
Dept: INTERNAL MEDICINE | Facility: CLINIC | Age: 67
End: 2017-09-12

## 2017-09-12 ENCOUNTER — HOSPITAL ENCOUNTER (INPATIENT)
Facility: HOSPITAL | Age: 67
LOS: 6 days | Discharge: HOME | End: 2017-09-18
Attending: EMERGENCY MEDICINE | Admitting: FAMILY MEDICINE

## 2017-09-12 DIAGNOSIS — J18.9 PNEUMONIA OF RIGHT LOWER LOBE DUE TO INFECTIOUS ORGANISM: ICD-10-CM

## 2017-09-12 DIAGNOSIS — J44.1 CHRONIC OBSTRUCTIVE PULMONARY DISEASE WITH ACUTE EXACERBATION (HCC): Primary | ICD-10-CM

## 2017-09-12 DIAGNOSIS — Z87.891 FORMER SMOKER: Chronic | ICD-10-CM

## 2017-09-12 DIAGNOSIS — R09.02 HYPOXEMIA: ICD-10-CM

## 2017-09-12 DIAGNOSIS — C90.01 MULTIPLE MYELOMA IN REMISSION (HCC): Chronic | ICD-10-CM

## 2017-09-12 DIAGNOSIS — I36.1 NON-RHEUMATIC TRICUSPID VALVE INSUFFICIENCY: Chronic | ICD-10-CM

## 2017-09-12 DIAGNOSIS — D69.6 THROMBOCYTOPENIA (HCC): ICD-10-CM

## 2017-09-12 LAB
ALBUMIN SERPL-MCNC: 4 G/DL (ref 3.2–4.8)
ALBUMIN/GLOB SERPL: 1.3 G/DL (ref 1.5–2.5)
ALP SERPL-CCNC: 127 U/L (ref 25–100)
ALT SERPL W P-5'-P-CCNC: 9 U/L (ref 7–40)
ANION GAP SERPL CALCULATED.3IONS-SCNC: 13 MMOL/L (ref 3–11)
AST SERPL-CCNC: 8 U/L (ref 0–33)
BACTERIA UR QL AUTO: ABNORMAL /HPF
BASOPHILS # BLD AUTO: 0.01 10*3/MM3 (ref 0–0.2)
BASOPHILS NFR BLD AUTO: 0.1 % (ref 0–1)
BILIRUB SERPL-MCNC: 1.6 MG/DL (ref 0.3–1.2)
BILIRUB UR QL STRIP: ABNORMAL
BNP SERPL-MCNC: 147 PG/ML (ref 0–100)
BUN BLD-MCNC: 22 MG/DL (ref 9–23)
BUN/CREAT SERPL: 13.8 (ref 7–25)
CALCIUM SPEC-SCNC: 8.7 MG/DL (ref 8.7–10.4)
CHLORIDE SERPL-SCNC: 105 MMOL/L (ref 99–109)
CLARITY UR: ABNORMAL
CO2 SERPL-SCNC: 19 MMOL/L (ref 20–31)
COLOR UR: ABNORMAL
CREAT BLD-MCNC: 1.6 MG/DL (ref 0.6–1.3)
CRP SERPL-MCNC: 29.48 MG/DL (ref 0–1)
D-LACTATE SERPL-SCNC: 1 MMOL/L (ref 0.5–2)
DEPRECATED RDW RBC AUTO: 43.9 FL (ref 37–54)
EOSINOPHIL # BLD AUTO: 0 10*3/MM3 (ref 0–0.3)
EOSINOPHIL NFR BLD AUTO: 0 % (ref 0–3)
ERYTHROCYTE [DISTWIDTH] IN BLOOD BY AUTOMATED COUNT: 13 % (ref 11.3–14.5)
FLUAV SUBTYP SPEC NAA+PROBE: NOT DETECTED
FLUBV RNA ISLT QL NAA+PROBE: NOT DETECTED
GFR SERPL CREATININE-BSD FRML MDRD: 32 ML/MIN/1.73
GLOBULIN UR ELPH-MCNC: 3 GM/DL
GLUCOSE BLD-MCNC: 109 MG/DL (ref 70–100)
GLUCOSE UR STRIP-MCNC: NEGATIVE MG/DL
HCT VFR BLD AUTO: 36 % (ref 34.5–44)
HGB BLD-MCNC: 12.4 G/DL (ref 11.5–15.5)
HGB UR QL STRIP.AUTO: ABNORMAL
HYALINE CASTS UR QL AUTO: ABNORMAL /LPF
IMM GRANULOCYTES # BLD: 0.02 10*3/MM3 (ref 0–0.03)
IMM GRANULOCYTES NFR BLD: 0.3 % (ref 0–0.6)
KETONES UR QL STRIP: ABNORMAL
LEUKOCYTE ESTERASE UR QL STRIP.AUTO: NEGATIVE
LYMPHOCYTES # BLD AUTO: 0.33 10*3/MM3 (ref 0.6–4.8)
LYMPHOCYTES NFR BLD AUTO: 4.7 % (ref 24–44)
MCH RBC QN AUTO: 31.4 PG (ref 27–31)
MCHC RBC AUTO-ENTMCNC: 34.4 G/DL (ref 32–36)
MCV RBC AUTO: 91.1 FL (ref 80–99)
MONOCYTES # BLD AUTO: 0.82 10*3/MM3 (ref 0–1)
MONOCYTES NFR BLD AUTO: 11.7 % (ref 0–12)
NEUTROPHILS # BLD AUTO: 5.82 10*3/MM3 (ref 1.5–8.3)
NEUTROPHILS NFR BLD AUTO: 83.2 % (ref 41–71)
NITRITE UR QL STRIP: NEGATIVE
PH UR STRIP.AUTO: 5.5 [PH] (ref 5–8)
PLAT MORPH BLD: NORMAL
PLATELET # BLD AUTO: 22 10*3/MM3 (ref 150–450)
PMV BLD AUTO: 11.6 FL (ref 6–12)
POTASSIUM BLD-SCNC: 3.1 MMOL/L (ref 3.5–5.5)
PROCALCITONIN SERPL-MCNC: 3.08 NG/ML
PROT SERPL-MCNC: 7 G/DL (ref 5.7–8.2)
PROT UR QL STRIP: ABNORMAL
RBC # BLD AUTO: 3.95 10*6/MM3 (ref 3.89–5.14)
RBC # UR: ABNORMAL /HPF
RBC MORPH BLD: NORMAL
REF LAB TEST METHOD: ABNORMAL
SODIUM BLD-SCNC: 137 MMOL/L (ref 132–146)
SP GR UR STRIP: 1.01 (ref 1–1.03)
SQUAMOUS #/AREA URNS HPF: ABNORMAL /HPF
TROPONIN I SERPL-MCNC: 0 NG/ML (ref 0–0.07)
UROBILINOGEN UR QL STRIP: ABNORMAL
WBC MORPH BLD: NORMAL
WBC NRBC COR # BLD: 7 10*3/MM3 (ref 3.5–10.8)
WBC UR QL AUTO: ABNORMAL /HPF

## 2017-09-12 PROCEDURE — 87040 BLOOD CULTURE FOR BACTERIA: CPT | Performed by: PHYSICIAN ASSISTANT

## 2017-09-12 PROCEDURE — 99284 EMERGENCY DEPT VISIT MOD MDM: CPT

## 2017-09-12 PROCEDURE — 87449 NOS EACH ORGANISM AG IA: CPT | Performed by: INTERNAL MEDICINE

## 2017-09-12 PROCEDURE — 94760 N-INVAS EAR/PLS OXIMETRY 1: CPT

## 2017-09-12 PROCEDURE — 87086 URINE CULTURE/COLONY COUNT: CPT | Performed by: INTERNAL MEDICINE

## 2017-09-12 PROCEDURE — 94799 UNLISTED PULMONARY SVC/PX: CPT

## 2017-09-12 PROCEDURE — 86140 C-REACTIVE PROTEIN: CPT | Performed by: INTERNAL MEDICINE

## 2017-09-12 PROCEDURE — 87899 AGENT NOS ASSAY W/OPTIC: CPT | Performed by: INTERNAL MEDICINE

## 2017-09-12 PROCEDURE — 85007 BL SMEAR W/DIFF WBC COUNT: CPT | Performed by: PHYSICIAN ASSISTANT

## 2017-09-12 PROCEDURE — 87502 INFLUENZA DNA AMP PROBE: CPT | Performed by: INTERNAL MEDICINE

## 2017-09-12 PROCEDURE — 80053 COMPREHEN METABOLIC PANEL: CPT | Performed by: PHYSICIAN ASSISTANT

## 2017-09-12 PROCEDURE — 85025 COMPLETE CBC W/AUTO DIFF WBC: CPT | Performed by: PHYSICIAN ASSISTANT

## 2017-09-12 PROCEDURE — 25010000002 CEFTRIAXONE PER 250 MG: Performed by: PHYSICIAN ASSISTANT

## 2017-09-12 PROCEDURE — 99223 1ST HOSP IP/OBS HIGH 75: CPT | Performed by: INTERNAL MEDICINE

## 2017-09-12 PROCEDURE — 84145 PROCALCITONIN (PCT): CPT | Performed by: PHYSICIAN ASSISTANT

## 2017-09-12 PROCEDURE — 25010000002 PIPERACILLIN SOD-TAZOBACTAM PER 1 G: Performed by: INTERNAL MEDICINE

## 2017-09-12 PROCEDURE — 81001 URINALYSIS AUTO W/SCOPE: CPT | Performed by: INTERNAL MEDICINE

## 2017-09-12 PROCEDURE — 93005 ELECTROCARDIOGRAM TRACING: CPT | Performed by: INTERNAL MEDICINE

## 2017-09-12 PROCEDURE — 94640 AIRWAY INHALATION TREATMENT: CPT

## 2017-09-12 PROCEDURE — 71010 HC CHEST PA OR AP: CPT

## 2017-09-12 PROCEDURE — 25010000002 AZITHROMYCIN: Performed by: PHYSICIAN ASSISTANT

## 2017-09-12 PROCEDURE — 84484 ASSAY OF TROPONIN QUANT: CPT

## 2017-09-12 PROCEDURE — 93005 ELECTROCARDIOGRAM TRACING: CPT | Performed by: PHYSICIAN ASSISTANT

## 2017-09-12 PROCEDURE — 83605 ASSAY OF LACTIC ACID: CPT | Performed by: PHYSICIAN ASSISTANT

## 2017-09-12 PROCEDURE — 83880 ASSAY OF NATRIURETIC PEPTIDE: CPT | Performed by: PHYSICIAN ASSISTANT

## 2017-09-12 PROCEDURE — 93010 ELECTROCARDIOGRAM REPORT: CPT | Performed by: INTERNAL MEDICINE

## 2017-09-12 RX ORDER — DOXYCYCLINE HYCLATE 100 MG
100 TABLET ORAL EVERY 12 HOURS SCHEDULED
Status: DISCONTINUED | OUTPATIENT
Start: 2017-09-12 | End: 2017-09-18 | Stop reason: HOSPADM

## 2017-09-12 RX ORDER — CHOLECALCIFEROL (VITAMIN D3) 125 MCG
5 CAPSULE ORAL NIGHTLY PRN
COMMUNITY

## 2017-09-12 RX ORDER — PANTOPRAZOLE SODIUM 40 MG/1
40 TABLET, DELAYED RELEASE ORAL EVERY MORNING
Status: DISCONTINUED | OUTPATIENT
Start: 2017-09-13 | End: 2017-09-18 | Stop reason: HOSPADM

## 2017-09-12 RX ORDER — ALBUTEROL SULFATE 2.5 MG/3ML
2.5 SOLUTION RESPIRATORY (INHALATION) EVERY 4 HOURS PRN
Status: DISCONTINUED | OUTPATIENT
Start: 2017-09-12 | End: 2017-09-18 | Stop reason: HOSPADM

## 2017-09-12 RX ORDER — IPRATROPIUM BROMIDE AND ALBUTEROL SULFATE 2.5; .5 MG/3ML; MG/3ML
3 SOLUTION RESPIRATORY (INHALATION)
Status: DISCONTINUED | OUTPATIENT
Start: 2017-09-12 | End: 2017-09-18 | Stop reason: HOSPADM

## 2017-09-12 RX ORDER — CEFTRIAXONE SODIUM 1 G/50ML
1 INJECTION, SOLUTION INTRAVENOUS
Status: DISCONTINUED | OUTPATIENT
Start: 2017-09-13 | End: 2017-09-12

## 2017-09-12 RX ORDER — CYCLOBENZAPRINE HCL 10 MG
10 TABLET ORAL 3 TIMES DAILY PRN
Status: DISCONTINUED | OUTPATIENT
Start: 2017-09-12 | End: 2017-09-18 | Stop reason: HOSPADM

## 2017-09-12 RX ORDER — PREGABALIN 100 MG/1
100 CAPSULE ORAL EVERY 12 HOURS SCHEDULED
Status: DISCONTINUED | OUTPATIENT
Start: 2017-09-12 | End: 2017-09-18 | Stop reason: HOSPADM

## 2017-09-12 RX ORDER — CEFTRIAXONE SODIUM 1 G/50ML
1 INJECTION, SOLUTION INTRAVENOUS ONCE
Status: COMPLETED | OUTPATIENT
Start: 2017-09-12 | End: 2017-09-12

## 2017-09-12 RX ORDER — ACETAMINOPHEN 325 MG/1
650 TABLET ORAL EVERY 4 HOURS PRN
Status: DISCONTINUED | OUTPATIENT
Start: 2017-09-12 | End: 2017-09-18 | Stop reason: HOSPADM

## 2017-09-12 RX ORDER — DOCUSATE SODIUM 100 MG/1
100 CAPSULE, LIQUID FILLED ORAL DAILY
Status: DISCONTINUED | OUTPATIENT
Start: 2017-09-12 | End: 2017-09-18 | Stop reason: HOSPADM

## 2017-09-12 RX ORDER — ACYCLOVIR 200 MG/1
400 CAPSULE ORAL EVERY 12 HOURS SCHEDULED
Status: DISCONTINUED | OUTPATIENT
Start: 2017-09-12 | End: 2017-09-18 | Stop reason: HOSPADM

## 2017-09-12 RX ORDER — IPRATROPIUM BROMIDE AND ALBUTEROL SULFATE 2.5; .5 MG/3ML; MG/3ML
3 SOLUTION RESPIRATORY (INHALATION) ONCE
Status: COMPLETED | OUTPATIENT
Start: 2017-09-12 | End: 2017-09-12

## 2017-09-12 RX ORDER — POTASSIUM CHLORIDE 750 MG/1
40 CAPSULE, EXTENDED RELEASE ORAL ONCE
Status: COMPLETED | OUTPATIENT
Start: 2017-09-12 | End: 2017-09-12

## 2017-09-12 RX ORDER — SODIUM CHLORIDE 9 MG/ML
75 INJECTION, SOLUTION INTRAVENOUS CONTINUOUS
Status: ACTIVE | OUTPATIENT
Start: 2017-09-12 | End: 2017-09-13

## 2017-09-12 RX ORDER — SODIUM CHLORIDE 0.9 % (FLUSH) 0.9 %
1-10 SYRINGE (ML) INJECTION AS NEEDED
Status: DISCONTINUED | OUTPATIENT
Start: 2017-09-12 | End: 2017-09-18 | Stop reason: HOSPADM

## 2017-09-12 RX ORDER — ONDANSETRON 4 MG/1
4 TABLET, FILM COATED ORAL EVERY 6 HOURS PRN
Status: DISCONTINUED | OUTPATIENT
Start: 2017-09-12 | End: 2017-09-18 | Stop reason: HOSPADM

## 2017-09-12 RX ORDER — BISACODYL 5 MG/1
5 TABLET, DELAYED RELEASE ORAL DAILY PRN
Status: DISCONTINUED | OUTPATIENT
Start: 2017-09-12 | End: 2017-09-18 | Stop reason: HOSPADM

## 2017-09-12 RX ORDER — ONDANSETRON 2 MG/ML
4 INJECTION INTRAMUSCULAR; INTRAVENOUS EVERY 6 HOURS PRN
Status: DISCONTINUED | OUTPATIENT
Start: 2017-09-12 | End: 2017-09-18 | Stop reason: HOSPADM

## 2017-09-12 RX ADMIN — CEFTRIAXONE SODIUM 1 G: 1 INJECTION, SOLUTION INTRAVENOUS at 11:03

## 2017-09-12 RX ADMIN — IPRATROPIUM BROMIDE AND ALBUTEROL SULFATE 3 ML: .5; 3 SOLUTION RESPIRATORY (INHALATION) at 09:34

## 2017-09-12 RX ADMIN — POTASSIUM CHLORIDE 40 MEQ: 750 CAPSULE, EXTENDED RELEASE ORAL at 12:53

## 2017-09-12 RX ADMIN — CEFTAROLINE FOSAMIL 400 MG: 600 POWDER, FOR SOLUTION INTRAVENOUS at 18:54

## 2017-09-12 RX ADMIN — IPRATROPIUM BROMIDE AND ALBUTEROL SULFATE 3 ML: .5; 3 SOLUTION RESPIRATORY (INHALATION) at 14:59

## 2017-09-12 RX ADMIN — DOCUSATE SODIUM 100 MG: 100 CAPSULE, LIQUID FILLED ORAL at 21:23

## 2017-09-12 RX ADMIN — ACYCLOVIR 400 MG: 200 CAPSULE ORAL at 21:50

## 2017-09-12 RX ADMIN — ACETAMINOPHEN 650 MG: 325 TABLET, FILM COATED ORAL at 15:15

## 2017-09-12 RX ADMIN — CYCLOBENZAPRINE HYDROCHLORIDE 10 MG: 10 TABLET, FILM COATED ORAL at 21:50

## 2017-09-12 RX ADMIN — SODIUM CHLORIDE 75 ML/HR: 9 INJECTION, SOLUTION INTRAVENOUS at 15:16

## 2017-09-12 RX ADMIN — IPRATROPIUM BROMIDE AND ALBUTEROL SULFATE 3 ML: .5; 3 SOLUTION RESPIRATORY (INHALATION) at 21:33

## 2017-09-12 RX ADMIN — TAZOBACTAM SODIUM AND PIPERACILLIN SODIUM 3.38 G: 375; 3 INJECTION, SOLUTION INTRAVENOUS at 22:23

## 2017-09-12 RX ADMIN — TAZOBACTAM SODIUM AND PIPERACILLIN SODIUM 3.38 G: 375; 3 INJECTION, SOLUTION INTRAVENOUS at 18:24

## 2017-09-12 RX ADMIN — AZITHROMYCIN 500 MG: 500 INJECTION, POWDER, LYOPHILIZED, FOR SOLUTION INTRAVENOUS at 12:32

## 2017-09-12 RX ADMIN — DOXYCYCLINE HYCLATE 100 MG: 100 TABLET, COATED ORAL at 21:23

## 2017-09-12 RX ADMIN — Medication 5 MG: at 21:23

## 2017-09-12 RX ADMIN — PREGABALIN 100 MG: 100 CAPSULE ORAL at 21:23

## 2017-09-12 NOTE — CONSULTS
INFECTIOUS DISEASE CONSULT/INITIAL HOSPITAL VISIT    Doris Miranda  1950  [id number]    Date of consult: 9/12/2017    Admit date: 9/12/2017    Requesting Provider: Dionisio Srivastava MD  Evaluating physician: Mario Odell MD  Reason for Consultation: Pneumonia, immunocompromised host  Chief Complaint: Same as above      Subjective   History of present illness:  Patient is a very pleasant  67 y.o.  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  She denied any ill contacts, zoonotic exposures, travel history, or smoking times years.  She was admitted to [facility] on 9/12/17.  I was consulted on 9/12/17 by Dr. Bryant.  Urinalysis was negative, creatinine 1.6, alkaline phosphatase 126, total bilirubin 1.6, lactate 1.0, pro-calcitonin 3.08, , WBC 7.0, hemoglobin 12.4, platelets 22,000, 83% neutrophils, 4% lymphocytes.  Chest x-ray with bibasilar increased infiltrates right greater than left with an increased interstitial pattern bilateral.  Cardiac size normal.    Past Medical History:   Diagnosis Date   • Arthritis    • Chronic bronchitis    • COPD (chronic obstructive pulmonary disease)    • Hypertension    • Multiple myeloma    • Multiple myeloma, failed remission        Past Surgical History:   Procedure Laterality Date   • LIMBAL STEM CELL TRANSPLANT  03/2016    @[facility] Dr. Josiah Spicer   • MOUTH SURGERY         Pediatric History   Patient Guardian Status   • Not on file.     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    No Known Allergies    Immunization History   Administered Date(s) Administered   • DTaP 05/22/2017   • Hib (PRP-T) 05/22/2017   • IPV 05/22/2017   • Pneumococcal Conjugate 13-Valent 05/22/2017   • Pneumococcal Polysaccharide 07/24/2017       Medication:    Current Facility-Administered Medications:   •  acetaminophen (TYLENOL) tablet 650 mg, 650 mg, Oral, Q4H PRN, Yoav Bryant MD, 650 mg at 09/12/17 1515  •  albuterol (PROVENTIL) nebulizer solution 0.083% 2.5 mg/3mL, 2.5 mg, Nebulization, Q4H PRN, Yoav Bryant MD  •  [START ON 9/13/2017] AZITHROMYCIN 500 MG/250 ML 0.9% NS IVPB (MBP), 500 mg, Intravenous, Q24H, Yova Bryant MD  •  bisacodyl (DULCOLAX) EC tablet 5 mg, 5 mg, Oral, Daily PRN, Yoav Bryant MD  •  [START ON 9/13/2017] cefTRIAXone (ROCEPHIN) IVPB 1 g, 1 g, Intravenous, Q24H, Yoav Bryant MD  •  ipratropium-albuterol (DUO-NEB) nebulizer solution 3 mL, 3 mL, Nebulization, 4x Daily - RT, Yoav Bryant MD, 3 mL at 09/12/17 1459  •  ondansetron (ZOFRAN) tablet 4 mg, 4 mg, Oral, Q6H PRN **OR** ondansetron (ZOFRAN) injection 4 mg, 4 mg, Intravenous, Q6H PRN, Yoav Bryant MD  •  sodium chloride 0.9 % flush 1-10 mL, 1-10 mL, Intravenous, PRN, Yoav Bryant MD  •  sodium chloride 0.9 % infusion, 75 mL/hr, Intravenous, Continuous, Yoav Bryant MD, Last Rate: 75 mL/hr at 09/12/17 1516, 75 mL/hr at 09/12/17 1516    Current Outpatient Prescriptions:   •  acyclovir (ZOVIRAX) 400 MG tablet, Take 1 tablet by mouth 2 (Two) Times a Day., Disp: 60 tablet, Rfl: 5  •  aspirin 81 MG EC tablet, Take 81 mg by mouth daily., Disp: , Rfl:   •  cyclobenzaprine (FLEXERIL) 10 MG tablet, Take 1 tablet by mouth 3 (Three) Times a Day As Needed for Muscle Spasms., Disp: 90 tablet, Rfl: 4  •  docusate sodium (COLACE) 100 MG capsule, Take 100 mg by mouth Daily., Disp: , Rfl:   •  esomeprazole (nexIUM) 20 MG capsule, Take 20 mg by mouth Every Morning Before Breakfast., Disp: , Rfl:   •  Melatonin 5 MG  "chewable tablet, Chew 5 mg Every Night., Disp: , Rfl:   •  pregabalin (LYRICA) 100 MG capsule, Take 1 capsule by mouth 2 (Two) Times a Day., Disp: 60 capsule, Rfl: 3    Please refer to the medical record for a full medication list    Review of Systems:    Constitutional-- Fever (up to 101 Fahrenheit), chills, but no sweats.  Appetite poor, and no malaise. No fatigue.  HEENT-- No new vision, hearing or throat complaints.  No epistaxis or oral sores.  Denies odynophagia or dysphagia.  No odynophagia or dysphagia. No headache, photophobia or neck stiffness.  CV-- No chest pain, palpitation or syncope  Resp-- SOB/cough with green sputum production, no Hemoptysis  GI- No nausea, vomiting, or diarrhea.  No hematochezia, melena, or hematemesis. Denies jaundice or chronic liver disease.  -- No dysuria, hematuria, or flank pain.  Denies hesitancy, urgency or flank pain.  Lymph- no swollen lymph nodes in neck/axilla or groin.   Heme- No active bruising or bleeding; no Hx of DVT or PE.  MS-- no swelling or pain in the bones or joints of arms/legs.  No new back pain.  Neuro-- No acute focal weakness or numbness in the arms or legs.  No seizures.  Skin--No rashes or lesions    Physical Exam:   Vital Signs   Temp:  [98.6 °F (37 °C)-99.9 °F (37.7 °C)] 99.9 °F (37.7 °C)  Heart Rate:  [] 57  Resp:  [16-22] 20  BP: (102-143)/(66-85) 124/66    Temp  Min: 98.6 °F (37 °C)  Max: 99.9 °F (37.7 °C)  BP  Min: 102/72  Max: 143/75  Pulse  Min: 57  Max: 122  Resp  Min: 16  Max: 22  SpO2  Min: 89 %  Max: 98 %    Blood pressure 124/66, pulse 57, temperature 99.9 °F (37.7 °C), temperature source Rectal, resp. rate 20, height 61\" (154.9 cm), weight 159 lb (72.1 kg), SpO2 98 %.  GENERAL: Awake and alert, in moderate distress. Appears older than stated age.  HEENT:  Normocephalic, atraumatic.  Oropharynx without thrush. Dentition in good repair. No cervical adenopathy. No neck masses.  Ears externally normal, Nose externally normal.  EYES: " PERRL. No conjunctival injection. No icterus. EOM full.  LYMPHATICS: No lymphadenopathy of the neck or axillary or inguinal regions.   HEART: No murmur, gallop, or pericardial friction rub. Reg rate rhythm, No JVD at 45 degrees.  LUNGS: Bilateral rales lower lobes. No respiratory distress, no use of accessory muscles.  ABDOMEN: Soft, nontender, nondistended. No appreciable HSM.  Bowel sounds normal.  GENITAL: No external lesions, breasts without masses, back straight, no CVAT, rectal external without lesions.   SKIN: Warm and dry without cutaneous eruptions.  No nodules.    PSYCHIATRIC: Mental status lucid. No confusion.  EXT:  No cellulitic change.  NEURO: Oriented to name, CN 2 to 12 intact, DTR 1 + and symmetric, sensory intact to LT upper and lower extremitiy, motor 5/5 upper and lower extremity, cerebellar and gait not tested.      Results Review:   I reviewed the patient's new clinical results.  I reviewed the patient's new imaging results and agree with the interpretation.  I reviewed the patient's other test results and agree with the interpretation.      Results from last 7 days  Lab Units 09/12/17  1019   WBC 10*3/mm3 7.00   HEMOGLOBIN g/dL 12.4   HEMATOCRIT % 36.0   PLATELETS 10*3/mm3 22*       Results from last 7 days  Lab Units 09/12/17  1019   SODIUM mmol/L 137   POTASSIUM mmol/L 3.1*   CHLORIDE mmol/L 105   CO2 mmol/L 19.0*   BUN mg/dL 22   CREATININE mg/dL 1.60*   GLUCOSE mg/dL 109*   CALCIUM mg/dL 8.7       Results from last 7 days  Lab Units 09/12/17  1019   ALK PHOS U/L 127*   BILIRUBIN mg/dL 1.6*   ALT (SGPT) U/L 9   AST (SGOT) U/L 8                   Results from last 7 days  Lab Units 09/12/17  1019   LACTATE mmol/L 1.0     Estimated Creatinine Clearance: 31 mL/min (by C-G formula based on Cr of 1.6).    Microbiology:  Microbiology Results Abnormal     None          Radiology:  Imaging Results (last 72 hours)     Procedure Component Value Units Date/Time    XR Chest 1 View [[id number]]  Collected:  09/12/17 1122     Updated:  09/12/17 1147    Narrative:       EXAMINATION: XR CHEST 1 VW-09/12/2017:      INDICATION: Cough, shortness of breath.      COMPARISON: NONE.     FINDINGS: The cardiac silhouette is normal. There are patchy bibasilar  airspace changes, greater on the right than on the left. There is no  consolidation or effusion.           Impression:       There is patchy bibasilar airspace disease, right greater  than left. There is no old chest x-ray for comparison.     D:  09/12/2017  E:  09/12/2017     This report was finalized on 9/12/2017 11:45 AM by Dr. Claudio Chong MD.             IMPRESSION:     1.  Right greater than left lower lobe pneumonia in an immunocompromised host.  Increased risk for progression to respiratory failure.  Usual organisms are staph, strep, possible gram-negative rods given her COPD.  Less likely mycobacterial or fungal.  2.  Possible sepsis present on admission with elevated pro-calcitonin level, thrombocytopenia, and a bone marrow transplantation.  Probably from pulmonary source.  3.  Acute hypoxic respiratory failure.  4.  Underlying COPD with no smoking since 2015.  5.  Thrombocytopenia, probably related to her underlying previous myeloma and bone marrow transplant versus other.  6.  Multiple myeloma status post stem cell transplant 2015 at [facility].  7.  Hypokalemia 3.1.  8.  Elevated alkaline phosphatase 127, probably related to bone marrow process.  9.  Cholestasis with bilirubin 1.6, probably related to medications versus other.  10.  Acute renal failure creatinine 1.6.  May have underlying CK D.    RECOMMENDATIONS:    1.  Diagnostically, continue to follow patient's physical exam, CBC, CMP, CRP, lactic acid, pro-calcitonin level, respiratory panel PCR, blood cultures ×2, sputum CNS, radiographic studies, fungal serologies, QuantiFERON gold TB assay, serum cryptococcal antigen, histoplasma urine antigen, sputum for fungus and AFB, and  ABGs.  2.  Therapeutically, consider Zosyn, doxycycline, and Teflaro pending further culture data covering Pseudomonas, atypicals, anaerobes, and MRSA.  Avoiding vanc given her renal failure.  3.  O2 supportive care.    I discussed the patients findings and my recommendations with patient, family, nursing staff and primary care team.  Increased risk for adverse drug reaction, readmission, death.    Thank you for asking me to see Doris HECK Miranda.  Our group would be pleased to follow this patient over the course of their hospitalization and assist with outpatient antimicrobial therapy, as indicated.  Further recommendations depend on the results of the cultures and clinical course.    Mario Odell MD  9/12/2017

## 2017-09-12 NOTE — ED PROVIDER NOTES
Subjective   HPI Comments: 67-year-old female presents to the emergency department with complaints of cough productive of green sputum.  She is also having shortness of breath.  Her cough has been present for about 3 weeks and is gradually worsening.  She noted low-grade fever this past weekend.  She states that she has a history of multiple myeloma with metastases to the bone, currently in remission after stem cell transplant at .  She receives Zometa shots monthly.  She also has a history of COPD (not on home O2), hypertension, arthritis, chronic bronchitis.  She is a former smoker but quit in 2015.  She denies any alcohol or drug use.  Her oncologist is Dr. Mckinley.  Her PCP is Dr. Goode.      Patient is a 67 y.o. female presenting with cough.   History provided by:  Patient  Cough   Cough characteristics:  Productive  Sputum characteristics:  Green  Severity:  Moderate  Onset quality:  Gradual  Duration: 3 weeks.  Timing:  Constant  Progression:  Waxing and waning  Chronicity:  Recurrent  Smoker: Former smoker, quit 2 years ago.    Relieved by:  Nothing  Worsened by:  Activity  Ineffective treatments:  Beta-agonist inhaler  Associated symptoms: fever (low-grade fever last weekend) and shortness of breath    Associated symptoms: no chest pain, no chills, no diaphoresis, no ear pain, no eye discharge, no headaches, no rash, no rhinorrhea, no sore throat and no wheezing        Review of Systems   Constitutional: Positive for fatigue and fever (low-grade fever last weekend). Negative for chills and diaphoresis.   HENT: Negative for congestion, ear pain, nosebleeds, rhinorrhea and sore throat.    Eyes: Negative for pain, discharge and visual disturbance.   Respiratory: Positive for cough and shortness of breath. Negative for wheezing.    Cardiovascular: Negative for chest pain, palpitations and leg swelling.   Gastrointestinal: Negative for abdominal pain, blood in stool, diarrhea, nausea and vomiting.    Endocrine: Negative.    Genitourinary: Negative for dysuria, hematuria and urgency.   Musculoskeletal: Negative for arthralgias and back pain.   Skin: Negative for pallor and rash.   Allergic/Immunologic: Negative for immunocompromised state.   Neurological: Negative for dizziness, speech difficulty, weakness and headaches.   Hematological: Negative for adenopathy. Does not bruise/bleed easily.   Psychiatric/Behavioral: Negative.        Past Medical History:   Diagnosis Date   • Arthritis    • Chronic bronchitis    • COPD (chronic obstructive pulmonary disease)    • Hypertension    • Multiple myeloma    • Multiple myeloma, failed remission        No Known Allergies    Past Surgical History:   Procedure Laterality Date   • LIMBAL STEM CELL TRANSPLANT  03/2016    @ Dr. Josiah Spicer   • MOUTH SURGERY         Family History   Problem Relation Age of Onset   • Breast cancer Other    • Lung cancer Other    • Liver cancer Other    • Bone cancer Other        Social History     Social History   • Marital status:      Spouse name: N/A   • Number of children: N/A   • Years of education: N/A     Social History Main Topics   • Smoking status: Former Smoker     Quit date: 8/1/2015   • Smokeless tobacco: Never Used   • Alcohol use No   • Drug use: No   • Sexual activity: Not Asked     Other Topics Concern   • None     Social History Narrative   • None           Objective   Physical Exam   Constitutional: She is oriented to person, place, and time. She appears well-developed and well-nourished. No distress.   HENT:   Head: Normocephalic and atraumatic.   Nose: Nose normal.   Mouth/Throat: Oropharynx is clear and moist.   Eyes: EOM are normal. Pupils are equal, round, and reactive to light. No scleral icterus.   Neck: Normal range of motion. Neck supple.   Cardiovascular: Normal rate, regular rhythm, normal heart sounds and intact distal pulses.    No murmur heard.  Pulmonary/Chest:   Moderate bilateral rhonchi  posteriorly, right greater than left.   Abdominal: Soft. Bowel sounds are normal. There is no tenderness.   Musculoskeletal: Normal range of motion. She exhibits no edema or tenderness.   Neurological: She is alert and oriented to person, place, and time.   Skin: Skin is warm and dry. No rash noted. She is not diaphoretic.   Psychiatric: She has a normal mood and affect.   Nursing note and vitals reviewed.      Procedures         ED Course  ED Course    Chest x-ray shows an area of increased density in the right lower lung concerning for infiltrate.  The patient has hypoxemia without supplemental oxygen with O2 sats in the upper 80s.  She is not on any oxygen at home.  Blood cultures have been obtained.  She'll be started on IV antibiotics and admitted after her labs have resulted.                MDM    Final diagnoses:   Pneumonia of right lower lobe due to infectious organism   Hypoxemia   Thrombocytopenia            SUMI Valentin  09/12/17 1824

## 2017-09-12 NOTE — TELEPHONE ENCOUNTER
ESTELLE Keyana called and her mother is in [facility]. SHE HAS PNUEMONIA.

## 2017-09-12 NOTE — H&P
[facility] Medicine Services  HISTORY AND PHYSICAL    Primary Care Physician: Earl Fuentes MD    Subjective     Chief Complaint:  Fever, cough    History of Present Illness:     66 yo with history of COPD and Multiple Myeloma, s/p autologous stem cell transplant 3/2016 with subsequent Revlimid therapy on hold due to thrombocytopenia, presents with fever and cough. Patient says the cough has been present for four weeks, worsened approximately 2 weeks ago with green sputum production. No improvement with OTC Delsym and Robitussin.  Fevers noted over the past 3 days, with Tmax of 101.         Review of Systems   Constitutional: Positive for activity change, fatigue and fever.   HENT: Negative.    Eyes: Negative.    Respiratory: Positive for cough and shortness of breath.    Cardiovascular: Negative.  Negative for chest pain, palpitations and leg swelling.   Gastrointestinal: Negative.  Negative for diarrhea and vomiting.   Endocrine: Negative.    Genitourinary: Positive for urgency.   Musculoskeletal: Negative.    Skin: Negative.    Allergic/Immunologic: Positive for immunocompromised state.   Neurological: Positive for weakness.   Hematological: Negative.    Psychiatric/Behavioral: Negative.       Otherwise complete 10 system ROS performed and negative except as mentioned in the HPI.    Past Medical History:   Diagnosis Date   • Arthritis    • Chronic bronchitis    • COPD (chronic obstructive pulmonary disease)    • Hypertension    • Multiple myeloma    • Multiple myeloma, failed remission        Past Surgical History:   Procedure Laterality Date   • LIMBAL STEM CELL TRANSPLANT  03/2016    @ Dr. Josiah Spicer   • MOUTH SURGERY         Family History   Problem Relation Age of Onset   • Breast cancer Other    • Lung cancer Other    • Liver cancer Other    • Bone cancer Other        Social History     Social History   • Marital status:      Spouse name: N/A   • Number of  "children: N/A   • Years of education: N/A     Occupational History   • Not on file.     Social History Main Topics   • Smoking status: Former Smoker     Quit date: 8/1/2015   • Smokeless tobacco: Never Used   • Alcohol use No   • Drug use: No   • Sexual activity: Not on file     Other Topics Concern   • Not on file     Social History Narrative   • No narrative on file       Medications:    (Not in a hospital admission)    Allergies:  No Known Allergies      Objective     Physical Exam:  Vital Signs: /82  Pulse (!) 122  Temp 98.6 °F (37 °C) (Oral)   Resp 18  Ht 61\" (154.9 cm)  Wt 159 lb (72.1 kg)  SpO2 95%  BMI 30.04 kg/m2  Physical Exam     Gen-ill appearing, in bed   HEENT-NCAT  CV-RRR, S1 S2 normal, no m/r/g  Resp-moderately diminished bilaterally with RLL rales, rare end expiratory wheezes  Abd-soft, non-tender, non-distended, normo active bowel sounds  Ext-No lower extremity cyanosis, clubbing or edema bilaterally  Neuro-alert and oriented, speech clear, moves all extremities   Psych-flat affect   Skin- No rash on exposed UE or LE bilaterally        Results Reviewed:    Results from last 7 days  Lab Units 09/12/17  1019   WBC 10*3/mm3 7.00   HEMOGLOBIN g/dL 12.4   PLATELETS 10*3/mm3 22*       Results from last 7 days  Lab Units 09/12/17  1019   SODIUM mmol/L 137   POTASSIUM mmol/L 3.1*   CO2 mmol/L 19.0*   CREATININE mg/dL 1.60*   GLUCOSE mg/dL 109*   CALCIUM mg/dL 8.7       I have personally reviewed and interpreted available lab data, radiology studies and ECG obtained at time of admission.     Assessment / Plan     Problem List:   Hospital Problem List     Multiple myeloma in remission    Overview Signed 9/6/2016 11:21 AM by Neha Phillip              Hypertension    COPD (chronic obstructive pulmonary disease)    Gastroesophageal reflux disease without esophagitis    Pneumonia of right lower lobe due to infectious organism          Assessment/Plan    RLL pneumonia  - immunocompromised state " secondary to multiple myeloma and stem cell transplant  - at risk for not only viral, encapsulated and gram negative bacterial infections, but also PCP pneumonia  - check influenza pcr, urinary antigens  - continue rocephin and azithromycin  - nebs scheduled and prn  - cxr am  - consider speech eval for dysphagia  - ID consultation  - with rales on exam and elevated BNP, will also check Echo (revlimid can be associated with CHF)    Multiple Myeloma  - s/p autologous SCT 2016  - revlimid on hold due to thrombocytopenia    Thrombocytopenia    CKDIII?  - creatinine appears near baseline    Tachycardia  - sinus    COPD  - prior smoker    DVT prophylaxis: mechanical only due to thrombocytopenia  Code Status:   Admission Status: Patient will be admitted to INPATIENT status due to the need for care which can only be reasonably provided in an hospital setting such as aggressive/expedited ancillary services and/or consultation services, the necessity for IV medications, close physician monitoring and/or the possible need for procedures.  In such, I feel patient’s risk for adverse outcomes and need for care warrant INPATIENT evaluation and predict the patient’s care encounter to likely last beyond 2 midnights.       Yoav Bryant MD 09/12/17 2:02 PM

## 2017-09-13 ENCOUNTER — EPISODE CHANGES (OUTPATIENT)
Dept: CASE MANAGEMENT | Facility: OTHER | Age: 67
End: 2017-09-13

## 2017-09-13 ENCOUNTER — APPOINTMENT (OUTPATIENT)
Dept: CARDIOLOGY | Facility: HOSPITAL | Age: 67
End: 2017-09-13
Attending: INTERNAL MEDICINE

## 2017-09-13 ENCOUNTER — APPOINTMENT (OUTPATIENT)
Dept: GENERAL RADIOLOGY | Facility: HOSPITAL | Age: 67
End: 2017-09-13

## 2017-09-13 LAB
ALBUMIN SERPL-MCNC: 3.8 G/DL (ref 3.2–4.8)
ALBUMIN/GLOB SERPL: 1.4 G/DL (ref 1.5–2.5)
ALP SERPL-CCNC: 126 U/L (ref 25–100)
ALT SERPL W P-5'-P-CCNC: 14 U/L (ref 7–40)
ANION GAP SERPL CALCULATED.3IONS-SCNC: 9 MMOL/L (ref 3–11)
AST SERPL-CCNC: 14 U/L (ref 0–33)
BASOPHILS # BLD AUTO: 0.02 10*3/MM3 (ref 0–0.2)
BASOPHILS NFR BLD AUTO: 0.3 % (ref 0–1)
BILIRUB SERPL-MCNC: 0.9 MG/DL (ref 0.3–1.2)
BUN BLD-MCNC: 24 MG/DL (ref 9–23)
BUN/CREAT SERPL: 16 (ref 7–25)
CALCIUM SPEC-SCNC: 9 MG/DL (ref 8.7–10.4)
CHLORIDE SERPL-SCNC: 111 MMOL/L (ref 99–109)
CO2 SERPL-SCNC: 22 MMOL/L (ref 20–31)
CREAT BLD-MCNC: 1.5 MG/DL (ref 0.6–1.3)
CRYPTOC AG CSF QL: NEGATIVE
D-LACTATE SERPL-SCNC: 1.5 MMOL/L (ref 0.5–2)
DEPRECATED RDW RBC AUTO: 46.9 FL (ref 37–54)
EOSINOPHIL # BLD AUTO: 0.02 10*3/MM3 (ref 0–0.3)
EOSINOPHIL NFR BLD AUTO: 0.3 % (ref 0–3)
ERYTHROCYTE [DISTWIDTH] IN BLOOD BY AUTOMATED COUNT: 13.5 % (ref 11.3–14.5)
GFR SERPL CREATININE-BSD FRML MDRD: 35 ML/MIN/1.73
GLOBULIN UR ELPH-MCNC: 2.8 GM/DL
GLUCOSE BLD-MCNC: 90 MG/DL (ref 70–100)
HCT VFR BLD AUTO: 37 % (ref 34.5–44)
HGB BLD-MCNC: 11.7 G/DL (ref 11.5–15.5)
IMM GRANULOCYTES # BLD: 0.03 10*3/MM3 (ref 0–0.03)
IMM GRANULOCYTES NFR BLD: 0.4 % (ref 0–0.6)
KOH PREP NAIL: NORMAL
LYMPHOCYTES # BLD AUTO: 0.46 10*3/MM3 (ref 0.6–4.8)
LYMPHOCYTES NFR BLD AUTO: 6.7 % (ref 24–44)
MCH RBC QN AUTO: 29.6 PG (ref 27–31)
MCHC RBC AUTO-ENTMCNC: 31.6 G/DL (ref 32–36)
MCV RBC AUTO: 93.7 FL (ref 80–99)
MONOCYTES # BLD AUTO: 0.71 10*3/MM3 (ref 0–1)
MONOCYTES NFR BLD AUTO: 10.3 % (ref 0–12)
NEUTROPHILS # BLD AUTO: 5.66 10*3/MM3 (ref 1.5–8.3)
NEUTROPHILS NFR BLD AUTO: 82 % (ref 41–71)
PLAT MORPH BLD: NORMAL
PLATELET # BLD AUTO: 24 10*3/MM3 (ref 150–450)
PMV BLD AUTO: ABNORMAL FL (ref 6–12)
POTASSIUM BLD-SCNC: 4.3 MMOL/L (ref 3.5–5.5)
PROCALCITONIN SERPL-MCNC: 2.26 NG/ML
PROT SERPL-MCNC: 6.6 G/DL (ref 5.7–8.2)
RBC # BLD AUTO: 3.95 10*6/MM3 (ref 3.89–5.14)
RBC MORPH BLD: NORMAL
SODIUM BLD-SCNC: 142 MMOL/L (ref 132–146)
WBC MORPH BLD: NORMAL
WBC NRBC COR # BLD: 6.9 10*3/MM3 (ref 3.5–10.8)

## 2017-09-13 PROCEDURE — 94640 AIRWAY INHALATION TREATMENT: CPT

## 2017-09-13 PROCEDURE — 87106 FUNGI IDENTIFICATION YEAST: CPT | Performed by: INTERNAL MEDICINE

## 2017-09-13 PROCEDURE — 83605 ASSAY OF LACTIC ACID: CPT | Performed by: INTERNAL MEDICINE

## 2017-09-13 PROCEDURE — 87581 M.PNEUMON DNA AMP PROBE: CPT | Performed by: INTERNAL MEDICINE

## 2017-09-13 PROCEDURE — 85007 BL SMEAR W/DIFF WBC COUNT: CPT | Performed by: INTERNAL MEDICINE

## 2017-09-13 PROCEDURE — 87070 CULTURE OTHR SPECIMN AEROBIC: CPT | Performed by: INTERNAL MEDICINE

## 2017-09-13 PROCEDURE — 87798 DETECT AGENT NOS DNA AMP: CPT | Performed by: INTERNAL MEDICINE

## 2017-09-13 PROCEDURE — 94799 UNLISTED PULMONARY SVC/PX: CPT

## 2017-09-13 PROCEDURE — 87449 NOS EACH ORGANISM AG IA: CPT | Performed by: INTERNAL MEDICINE

## 2017-09-13 PROCEDURE — 25010000002 PIPERACILLIN SOD-TAZOBACTAM PER 1 G: Performed by: INTERNAL MEDICINE

## 2017-09-13 PROCEDURE — 94760 N-INVAS EAR/PLS OXIMETRY 1: CPT

## 2017-09-13 PROCEDURE — 87205 SMEAR GRAM STAIN: CPT | Performed by: INTERNAL MEDICINE

## 2017-09-13 PROCEDURE — 87899 AGENT NOS ASSAY W/OPTIC: CPT | Performed by: INTERNAL MEDICINE

## 2017-09-13 PROCEDURE — 80053 COMPREHEN METABOLIC PANEL: CPT | Performed by: INTERNAL MEDICINE

## 2017-09-13 PROCEDURE — 99233 SBSQ HOSP IP/OBS HIGH 50: CPT | Performed by: INTERNAL MEDICINE

## 2017-09-13 PROCEDURE — 86480 TB TEST CELL IMMUN MEASURE: CPT | Performed by: INTERNAL MEDICINE

## 2017-09-13 PROCEDURE — 85025 COMPLETE CBC W/AUTO DIFF WBC: CPT | Performed by: INTERNAL MEDICINE

## 2017-09-13 PROCEDURE — 87486 CHLMYD PNEUM DNA AMP PROBE: CPT | Performed by: INTERNAL MEDICINE

## 2017-09-13 PROCEDURE — 84145 PROCALCITONIN (PCT): CPT | Performed by: INTERNAL MEDICINE

## 2017-09-13 PROCEDURE — 71010 HC CHEST PA OR AP: CPT

## 2017-09-13 PROCEDURE — 87220 TISSUE EXAM FOR FUNGI: CPT | Performed by: INTERNAL MEDICINE

## 2017-09-13 PROCEDURE — 87102 FUNGUS ISOLATION CULTURE: CPT | Performed by: INTERNAL MEDICINE

## 2017-09-13 PROCEDURE — 87633 RESP VIRUS 12-25 TARGETS: CPT | Performed by: INTERNAL MEDICINE

## 2017-09-13 RX ORDER — ACETYLCYSTEINE 200 MG/ML
4 SOLUTION ORAL; RESPIRATORY (INHALATION)
Status: DISPENSED | OUTPATIENT
Start: 2017-09-13 | End: 2017-09-14

## 2017-09-13 RX ORDER — SACCHAROMYCES BOULARDII 250 MG
250 CAPSULE ORAL 2 TIMES DAILY
Status: DISCONTINUED | OUTPATIENT
Start: 2017-09-13 | End: 2017-09-18 | Stop reason: HOSPADM

## 2017-09-13 RX ADMIN — DOXYCYCLINE HYCLATE 100 MG: 100 TABLET, COATED ORAL at 20:54

## 2017-09-13 RX ADMIN — IPRATROPIUM BROMIDE AND ALBUTEROL SULFATE 3 ML: .5; 3 SOLUTION RESPIRATORY (INHALATION) at 11:29

## 2017-09-13 RX ADMIN — IPRATROPIUM BROMIDE AND ALBUTEROL SULFATE 3 ML: .5; 3 SOLUTION RESPIRATORY (INHALATION) at 20:22

## 2017-09-13 RX ADMIN — CEFTAROLINE FOSAMIL 400 MG: 600 POWDER, FOR SOLUTION INTRAVENOUS at 17:11

## 2017-09-13 RX ADMIN — CEFTAROLINE FOSAMIL 400 MG: 600 POWDER, FOR SOLUTION INTRAVENOUS at 05:05

## 2017-09-13 RX ADMIN — Medication 5 MG: at 20:54

## 2017-09-13 RX ADMIN — PANTOPRAZOLE SODIUM 40 MG: 40 TABLET, DELAYED RELEASE ORAL at 06:37

## 2017-09-13 RX ADMIN — IPRATROPIUM BROMIDE AND ALBUTEROL SULFATE 3 ML: .5; 3 SOLUTION RESPIRATORY (INHALATION) at 08:15

## 2017-09-13 RX ADMIN — ACYCLOVIR 400 MG: 200 CAPSULE ORAL at 20:54

## 2017-09-13 RX ADMIN — CYCLOBENZAPRINE HYDROCHLORIDE 10 MG: 10 TABLET, FILM COATED ORAL at 20:54

## 2017-09-13 RX ADMIN — PREGABALIN 100 MG: 100 CAPSULE ORAL at 09:25

## 2017-09-13 RX ADMIN — ACYCLOVIR 400 MG: 200 CAPSULE ORAL at 09:25

## 2017-09-13 RX ADMIN — TAZOBACTAM SODIUM AND PIPERACILLIN SODIUM 3.38 G: 375; 3 INJECTION, SOLUTION INTRAVENOUS at 06:38

## 2017-09-13 RX ADMIN — PREGABALIN 100 MG: 100 CAPSULE ORAL at 20:54

## 2017-09-13 RX ADMIN — Medication 250 MG: at 18:27

## 2017-09-13 RX ADMIN — IPRATROPIUM BROMIDE AND ALBUTEROL SULFATE 3 ML: .5; 3 SOLUTION RESPIRATORY (INHALATION) at 16:10

## 2017-09-13 RX ADMIN — TAZOBACTAM SODIUM AND PIPERACILLIN SODIUM 3.38 G: 375; 3 INJECTION, SOLUTION INTRAVENOUS at 15:18

## 2017-09-13 RX ADMIN — TAZOBACTAM SODIUM AND PIPERACILLIN SODIUM 3.38 G: 375; 3 INJECTION, SOLUTION INTRAVENOUS at 23:33

## 2017-09-13 RX ADMIN — DOXYCYCLINE HYCLATE 100 MG: 100 TABLET, COATED ORAL at 09:25

## 2017-09-13 NOTE — PROGRESS NOTES
[facility] Progress Note    Admission Date: 9/12/2017    Doris Miranda  1950  [id number]    Date: 9/13/2017    Antibiotics:  IV Anti-Infectives     Ordered     Dose/Rate Route Frequency Start Stop    09/12/17 1615  piperacillin-tazobactam (ZOSYN) 3.375 g in iso-osmotic dextrose 50 ml (premix)     Ordering Provider:  Mario Odell MD    3.375 g  over 4 Hours Intravenous Every 8 Hours 09/12/17 2300      09/12/17 1611  doxycycline (VIBRAMYICN) tablet 100 mg     Ordering Provider:  Mario Odell MD    100 mg Oral Every 12 Hours Scheduled 09/12/17 2100      09/12/17 2009  acyclovir (ZOVIRAX) capsule 400 mg     Ordering Provider:  Yoav Bryant MD    400 mg Oral Every 12 Hours Scheduled 09/12/17 2100      09/12/17 1611  ceftaroline (TEFLARO) 400 mg/100 mL 0.9% NS (mbp)     Ordering Provider:  Mario Odell MD    400 mg Intravenous Every 12 Hours 09/12/17 1700      09/12/17 1615  piperacillin-tazobactam (ZOSYN) 3.375 g in iso-osmotic dextrose 50 ml (premix)     Ordering Provider:  Mario Odell MD    3.375 g  over 30 Minutes Intravenous Once 09/12/17 1700 09/12/17 1849    09/12/17 1039  cefTRIAXone (ROCEPHIN) IVPB 1 g     Ordering Provider:  SUMI Valentin    1 g  over 30 Minutes Intravenous Once 09/12/17 1041 09/12/17 1133    09/12/17 1039  AZITHROMYCIN 500 MG/250 ML 0.9% NS IVPB (MBP)     Ordering Provider:  SUMI Valentin    500 mg  over 60 Minutes Intravenous Once 09/12/17 1041 09/12/17 1356             CC:  pneumonia    HPI:  Patient is a very pleasant  67 y.o.  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  She denied any ill contacts, zoonotic exposures, travel history, or smoking times years.  She was admitted to [facility] on  9/12/17.  I was consulted on 9/12/17 by Dr. Bryant.  Urinalysis was negative, creatinine 1.6, alkaline phosphatase 126, total bilirubin 1.6, lactate 1.0, pro-calcitonin 3.08, , WBC 7.0, hemoglobin 12.4, platelets 22,000, 83% neutrophils, 4% lymphocytes.  Chest x-ray with bibasilar increased infiltrates right greater than left with an increased interstitial pattern bilateral.  Cardiac size normal.    9/13/17 history reviewed.  Still with difficulties breathing.  Some improvement with nebulized treatment.  Minimum sputum production.  No hemoptysis.    ROS:  No f/c/s. No n/v/d. No rash. No new ADR to Abx.     Constitutional-- No Fever, chills or sweats.  Appetite good, and no malaise. No fatigue.  Heent-- No new vision, hearing or throat complaints.  No epistaxis or oral sores.  Denies odynophagia or dysphagia.  No flashers, floaters or eye pain. No odynophagia or dysphagia. No headache, photophobia or neck stiffness.  CV-- No chest pain, palpitation or syncope  Resp-- SOB/cough With minimum sputum/No Hemoptysis  GI- No nausea, vomiting, or diarrhea.  No hematochezia, melena, or hematemesis. Denies jaundice or chronic liver disease.  -- No dysuria, hematuria, or flank pain.  Denies hesitancy, urgency or flank pain.  Lymph- no swollen lymph nodes in neck/axilla or groin.   Heme- No active bruising or bleeding; no Hx of DVT or PE.  MS-- no swelling or pain in the bones or joints of arms/legs.  No new back pain.  Neuro-- No acute focal weakness or numbness in the arms or legs.  No seizures.  Skin--No rash, nodules, blisters.    Objective   PE:  Vital Signs  Temp  Min: 98.1 °F (36.7 °C)  Max: 99.9 °F (37.7 °C)  BP  Min: 102/72  Max: 138/77  Pulse  Min: 44  Max: 122  Resp  Min: 16  Max: 24  SpO2  Min: 91 %  Max: 98 %    GENERAL: Awake and alert, in Moderate distress. Appears older than stated age.  HEENT: Normocephalic, atraumatic.  PERRL. EOMI. No conjunctival injection. No icterus. Oropharynx clear without  evidence of thrush or exudate. No evidence of peridontal disease.    NECK: Supple without nuchal rigidity. No mass.  LYMPH: No cervical, axillary or inguinal lymphadenopathy.  HEART: RRR; No murmur, rubs, gallops.   LUNGS: Rales bilateral. Normal respiratory effort. Nonlabored. No dullness.  ABDOMEN: Soft, nontender, nondistended. Positive bowel sounds. No rebound or guarding. NO mass or HSM.Obese.  EXT:  No cyanosis, clubbing or edema. No cord.  MSK: FROM without joint effusions noted arms/legs.    SKIN: Warm and dry without cutaneous eruptions on Inspection/palpation.    NEURO: Oriented to PPT. No focal deficits on motor/sensory exam at arms/legs.    Laboratory Data      Results from last 7 days  Lab Units 09/13/17  0458 09/12/17  1019   WBC 10*3/mm3 6.90 7.00   HEMOGLOBIN g/dL 11.7 12.4   HEMATOCRIT % 37.0 36.0   PLATELETS 10*3/mm3 24* 22*       Results from last 7 days  Lab Units 09/13/17  0458   SODIUM mmol/L 142   POTASSIUM mmol/L 4.3   CHLORIDE mmol/L 111*   CO2 mmol/L 22.0   BUN mg/dL 24*   CREATININE mg/dL 1.50*   GLUCOSE mg/dL 90   CALCIUM mg/dL 9.0       Results from last 7 days  Lab Units 09/13/17  0458   ALK PHOS U/L 126*   BILIRUBIN mg/dL 0.9   ALT (SGPT) U/L 14   AST (SGOT) U/L 14           Results from last 7 days  Lab Units 09/12/17  1019   CRP mg/dL 29.48*               Results from last 7 days  Lab Units 09/13/17  0458   LACTATE mmol/L 1.5     Estimated Creatinine Clearance: 32.9 mL/min (by C-G formula based on Cr of 1.5).      Microbiology:  Microbiology Results Abnormal     Procedure Component Value - Date/Time    Blood Culture [[id number]]  (Normal) Collected:  09/12/17 1005    Lab Status:  Preliminary result Specimen:  Blood from Arm, Left Updated:  09/13/17 1101     Blood Culture No growth at 24 hours    Blood Culture [[id number]]  (Normal) Collected:  09/12/17 1015    Lab Status:  Preliminary result Specimen:  Blood from Arm, Left Updated:  09/13/17 1101     Blood Culture No growth at 24 hours     Urine Culture [[id number]]  (Normal) Collected:  09/12/17 1456    Lab Status:  Preliminary result Specimen:  Urine from Urine, Clean Catch Updated:  09/13/17 0744     Urine Culture No growth    Influenza A & B, RT PCR [[id number]]  (Normal) Collected:  09/12/17 1451    Lab Status:  Final result Specimen:  Swab from Nasopharynx Updated:  09/12/17 1759     Influenza A PCR Not Detected     Influenza B PCR Not Detected          Radiology:  Imaging Results (last 72 hours)     Procedure Component Value Units Date/Time    XR Chest 1 View [[id number]] Collected:  09/12/17 1122     Updated:  09/12/17 1147    Narrative:       EXAMINATION: XR CHEST 1 VW-09/12/2017:      INDICATION: Cough, shortness of breath.      COMPARISON: NONE.     FINDINGS: The cardiac silhouette is normal. There are patchy bibasilar  airspace changes, greater on the right than on the left. There is no  consolidation or effusion.           Impression:       There is patchy bibasilar airspace disease, right greater  than left. There is no old chest x-ray for comparison.     D:  09/12/2017  E:  09/12/2017     This report was finalized on 9/12/2017 11:45 AM by Dr. Claudio Chong MD.       XR Chest 1 View [[id number]] Collected:  09/13/17 1019     Updated:  09/13/17 1140    Narrative:       EXAMINATION: XR CHEST 1 VW- 09/13/2017     INDICATION:  J18.9-Pneumonia, unspecified organism; R09.02-Hypoxemia;  D69.6-Thrombocytopenia, unspecified      COMPARISON: 09/12/2017     FINDINGS: There is a somewhat fibronodular pulmonary pattern, relatively  sparing of the apical regions, right greater than left. Cardiac  silhouette is normal. There has been no change since 09/12/2017.           Impression:       There has been no change since 09/12/2017.     D:  09/13/2017  E:  09/13/2017     This report was finalized on 9/13/2017 11:38 AM by Dr. Claudio Chong MD.             I personally reviewed the radiographic studies     Assessment/Plan   IMPRESSION:     1.  Right greater  than left lower lobe pneumonia in an immunocompromised host.  Increased risk for progression to respiratory failure.  Usual organisms are staph, strep, possible gram-negative rods given her COPD.  Less likely mycobacterial or fungal.  Remains quite ill.  2.  Possible sepsis present on admission with elevated pro-calcitonin level, thrombocytopenia, and a bone marrow transplantation.  Probably from pulmonary source.  3.  Acute hypoxic respiratory failure.  Seems worse.  4.  Underlying COPD with no smoking since 2015.  5.  Thrombocytopenia, probably related to her underlying previous myeloma and bone marrow transplant versus other.  6.  Multiple myeloma status post stem cell transplant 2015 at the [facility].  7.  Hypokalemia 3.1.  8.  Elevated alkaline phosphatase 127, probably related to bone marrow process.  9.  Cholestasis with bilirubin 1.6, probably related to medications versus other.  10.  Acute renal failure creatinine 1.5.  May have underlying CK D.     Plan:     1.  Diagnostically, continue to follow patient's physical exam, CBC, CMP, CRP, lactic acid, pro-calcitonin level, respiratory panel PCR, blood cultures ×2, sputum CNS, radiographic studies, fungal serologies, QuantiFERON gold TB assay, serum cryptococcal antigen, histoplasma urine antigen, sputum for fungus and AFB, and ABGs.  Consider pulmonary bronchoscopy.  2.  Therapeutically, consider Zosyn, doxycycline, and Teflaro pending further culture data covering Pseudomonas, atypicals, anaerobes, and MRSA.  Avoiding vanc given her renal failure.  3.  O2 supportive care.    Increased risk for adverse drug reactions,  progressive respiratory failure, Death.  Discussed with the patient's .    Mario Odell MD  9/13/2017

## 2017-09-13 NOTE — PLAN OF CARE
Problem: Patient Care Overview (Adult)  Goal: Plan of Care Review  Outcome: Ongoing (interventions implemented as appropriate)    09/13/17 1728   Coping/Psychosocial Response Interventions   Plan Of Care Reviewed With patient   Patient Care Overview   Progress improving   Outcome Evaluation   Outcome Summary/Follow up Plan vss, o2 sat 90 % on 2L NC, on iv abx       Goal: Adult Individualization and Mutuality  Outcome: Ongoing (interventions implemented as appropriate)    09/13/17 1728   Individualization   Patient Specific Goals pt will maintain o2 sat > 90% on 2L NC   Patient Specific Interventions maintain pt on 02 2L NC, assess o2 sat         Problem: Pneumonia (Adult)  Goal: Signs and Symptoms of Listed Potential Problems Will be Absent or Manageable (Pneumonia)  Outcome: Ongoing (interventions implemented as appropriate)    09/13/17 1728   Pneumonia   Problems Assessed (Pneumonia) all   Problems Present (Pneumonia) progression of infection;respiratory compromise         Problem: Fall Risk (Adult)  Goal: Identify Related Risk Factors and Signs and Symptoms  Outcome: Ongoing (interventions implemented as appropriate)    09/13/17 1728   Fall Risk   Fall Risk: Related Risk Factors age-related changes;fatigue/slow reaction;gait/mobility problems;environment unfamiliar   Fall Risk: Signs and Symptoms presence of risk factors       Goal: Absence of Falls  Outcome: Ongoing (interventions implemented as appropriate)    09/13/17 1728   Fall Risk (Adult)   Absence of Falls making progress toward outcome

## 2017-09-13 NOTE — PROGRESS NOTES
"      HOSPITALIST DAILY PROGRESS NOTE    Chief Complaint: cough and fever    Subjective   SUBJECTIVE/OVERNIGHT EVENTS   No acute events overnight, patient states that she had arestful night, denies any fevers, cough and SOA still present.    Review of Systems:  Gen-no fevers, no chills  CV-no chest pain, no palpitations  Resp-+ cough, + dyspnea  GI-no N/V/D, no abd pain    Otherwise complete ROS is negative except as mentioned in the HPI.    Objective   OBJECTIVE   I have reviewed the vital signs.  /65 (BP Location: Right arm, Patient Position: Lying)  Pulse 111  Temp 99 °F (37.2 °C) (Oral)   Resp 24  Ht 61\" (154.9 cm)  Wt 157 lb 12.8 oz (71.6 kg)  SpO2 95%  BMI 29.82 kg/m2    Physical Exam:  Gen-this lady chronically ill appearing inno acute distress  CV-tachy RR, S1 S2 normal, no m/r/g  Resp-mod labored respirations, coarse BS with bibasilar rales, + wheezes  Abd-soft, NT, ND, +BS  Ext-no edema  Neuro-A&Ox3, no focal deficits  Psych-appropriate mood    Results:  I have reviewed the labs, culture data, radiology results, and diagnostic studies.      Results from last 7 days  Lab Units 09/13/17  0458 09/12/17  1019   WBC 10*3/mm3 6.90 7.00   HEMOGLOBIN g/dL 11.7 12.4   HEMATOCRIT % 37.0 36.0   PLATELETS 10*3/mm3 24* 22*       Results from last 7 days  Lab Units 09/13/17  0458   SODIUM mmol/L 142   POTASSIUM mmol/L 4.3   CHLORIDE mmol/L 111*   CO2 mmol/L 22.0   BUN mg/dL 24*   CREATININE mg/dL 1.50*   GLUCOSE mg/dL 90   CALCIUM mg/dL 9.0       Culture Data:  Cultures:    Blood Culture   Date Value Ref Range Status   09/12/2017 No growth at less than 24 hours  Preliminary   09/12/2017 No growth at less than 24 hours  Preliminary       Radiology Results:  Imaging Results (last 24 hours)     Procedure Component Value Units Date/Time    XR Chest 1 View [[id number]] Collected:  09/12/17 1122     Updated:  09/12/17 1147    Narrative:       EXAMINATION: XR CHEST 1 VW-09/12/2017:      INDICATION: Cough, shortness " of breath.      COMPARISON: NONE.     FINDINGS: The cardiac silhouette is normal. There are patchy bibasilar  airspace changes, greater on the right than on the left. There is no  consolidation or effusion.           Impression:       There is patchy bibasilar airspace disease, right greater  than left. There is no old chest x-ray for comparison.     D:  09/12/2017  E:  09/12/2017     This report was finalized on 9/12/2017 11:45 AM by Dr. Claudio Chong MD.       XR Chest 1 View [[id number]] Updated:  09/13/17 0600        I have reviewed the medications.    Assessment/Plan   ASSESSMENT/PLAN    Principal Problem:    Pneumonia of right lower lobe due to infectious organism  Active Problems:    Multiple myeloma in remission    Hypertension    COPD (chronic obstructive pulmonary disease)    Gastroesophageal reflux disease without esophagitis      67 yof with hx of COPD, HTN and MM s/p ASCT 3/2016 on revlimid therapy, p/w fever and cough, admitted for suspected RLL pneumonia    Plan  - Pneumonia in a immunocompromised host entails a wide ddx, viral vs bacterial vs PCP,  started on rocephin, doxy and azithromycin, ID consulted to guide therapy  - f/u sputum cultures, continue o2 supp, duonebs, give a trial of mucomyst  - Patient with elevated BNP, echo ordered, concern for revlimid toxicity  - she is s/p asct on revlimId which is currently on hold sec to thrombocytopenia, follows with   - continue to monitor platelets  - resume home rx  - DVT ppx- mechanical    Dispo: KERRY Almendarez MD  09/13/17  8:51 AM

## 2017-09-13 NOTE — PLAN OF CARE
Problem: Patient Care Overview (Adult)  Goal: Plan of Care Review  Outcome: Ongoing (interventions implemented as appropriate)    09/13/17 0418   Coping/Psychosocial Response Interventions   Plan Of Care Reviewed With patient   Patient Care Overview   Progress progress toward functional goals as expected   Outcome Evaluation   Outcome Summary/Follow up Plan pt no longer running tempature, vitals remain stable, pt on ivfluids and iv antibiotics. Pt does need assistance to ambulate, consult with hem/onc on 9/13/17         Problem: Pneumonia (Adult)  Goal: Signs and Symptoms of Listed Potential Problems Will be Absent or Manageable (Pneumonia)  Outcome: Ongoing (interventions implemented as appropriate)    Problem: Fall Risk (Adult)  Goal: Identify Related Risk Factors and Signs and Symptoms  Outcome: Ongoing (interventions implemented as appropriate)

## 2017-09-13 NOTE — PROGRESS NOTES
Discharge Planning Assessment  [facility]     Patient Name: Doris Miranda  MRN: [id number]  Today's Date: 9/13/2017    Admit Date: 9/12/2017          Discharge Needs Assessment       09/13/17 1402    Living Environment    Lives With spouse    Living Arrangements house    Home Accessibility no concerns    Stair Railings at Home none    Type of Financial/Environmental Concern none    Transportation Available car;family or friend will provide    Living Environment    Provides Primary Care For no one    Quality Of Family Relationships supportive;helpful;involved    Able to Return to Prior Living Arrangements yes    Discharge Needs Assessment    Concerns To Be Addressed discharge planning concerns    Readmission Within The Last 30 Days no previous admission in last 30 days    Anticipated Changes Related to Illness inability to care for self    Equipment Currently Used at Home cane, quad;walker, rolling    Equipment Needed After Discharge none    Discharge Facility/Level Of Care Needs home with home health    Discharge Contact Information if Applicable Doron Miranda [phone]            Discharge Plan       09/13/17 1406    Case Management/Social Work Plan    Plan Home    Patient/Family In Agreement With Plan yes    Additional Comments Pt lives in [facility] with her . She uses a rollator for mobility and also owns a cane. No other DME at this time. Pt denies current use of HH. She was independent with all ADLs prior to admit. She is followed by her PCP and reports her insurance covers the cost of her medications. Her goal for discharge is to return home with HH if needed. CM to follow.        Discharge Placement     No information found        Expected Discharge Date and Time     Expected Discharge Date Expected Discharge Time    Sep 14, 2017               Demographic Summary       09/13/17 1401    Referral Information    Admission Type inpatient    Referral Source physician    Reason For Consult  discharge planning    Contact Information    Permission Granted to Share Information With ;family/designee    Primary Care Physician Information    Name Wandy Euceda            Functional Status       09/13/17 8342    Functional Status Current    Current Functional Level Comment See PT eval    Functional Status Prior    Ambulation 1-->assistive equipment    Transferring 1-->assistive equipment    Toileting 0-->independent    Bathing 0-->independent    Dressing 0-->independent    Eating 0-->independent    Communication 0-->understands/communicates without difficulty    Swallowing 0-->swallows foods/liquids without difficulty    IADL    Medications independent    Meal Preparation independent    Housekeeping independent    Laundry independent    Shopping independent    Oral Care independent            Psychosocial     None            Abuse/Neglect     None            Legal     None            Substance Abuse     None            Patient Forms     None          Nicole Baca RN

## 2017-09-14 ENCOUNTER — APPOINTMENT (OUTPATIENT)
Dept: CARDIOLOGY | Facility: HOSPITAL | Age: 67
End: 2017-09-14
Attending: INTERNAL MEDICINE

## 2017-09-14 ENCOUNTER — APPOINTMENT (OUTPATIENT)
Dept: CT IMAGING | Facility: HOSPITAL | Age: 67
End: 2017-09-14

## 2017-09-14 PROBLEM — N18.30 CKD (CHRONIC KIDNEY DISEASE), STAGE III: Chronic | Status: ACTIVE | Noted: 2017-09-14

## 2017-09-14 PROBLEM — I36.1 NON-RHEUMATIC TRICUSPID VALVE INSUFFICIENCY: Status: ACTIVE | Noted: 2017-09-14

## 2017-09-14 PROBLEM — T50.905A THROMBOCYTOPENIA DUE TO DRUGS: Chronic | Status: ACTIVE | Noted: 2017-09-14

## 2017-09-14 PROBLEM — Z87.891 FORMER SMOKER: Status: ACTIVE | Noted: 2017-09-14

## 2017-09-14 PROBLEM — D84.9 IMMUNOCOMPROMISED STATE (HCC): Status: ACTIVE | Noted: 2017-09-14

## 2017-09-14 PROBLEM — I27.20 PULMONARY HYPERTENSION: Chronic | Status: ACTIVE | Noted: 2017-09-14

## 2017-09-14 PROBLEM — D69.59 THROMBOCYTOPENIA DUE TO DRUGS: Status: ACTIVE | Noted: 2017-09-14

## 2017-09-14 PROBLEM — I36.1 NON-RHEUMATIC TRICUSPID VALVE INSUFFICIENCY: Chronic | Status: ACTIVE | Noted: 2017-09-14

## 2017-09-14 PROBLEM — K21.9 GASTROESOPHAGEAL REFLUX DISEASE WITHOUT ESOPHAGITIS: Chronic | Status: ACTIVE | Noted: 2017-08-24

## 2017-09-14 PROBLEM — J96.11 CHRONIC RESPIRATORY FAILURE WITH HYPOXIA: Status: ACTIVE | Noted: 2017-09-14

## 2017-09-14 PROBLEM — Z87.891 FORMER SMOKER: Chronic | Status: ACTIVE | Noted: 2017-09-14

## 2017-09-14 PROBLEM — D69.59 THROMBOCYTOPENIA DUE TO DRUGS: Chronic | Status: ACTIVE | Noted: 2017-09-14

## 2017-09-14 PROBLEM — T50.905A THROMBOCYTOPENIA DUE TO DRUGS: Status: ACTIVE | Noted: 2017-09-14

## 2017-09-14 PROBLEM — Z94.84: Chronic | Status: ACTIVE | Noted: 2017-09-14

## 2017-09-14 PROBLEM — D84.9 IMMUNOCOMPROMISED STATE: Chronic | Status: ACTIVE | Noted: 2017-09-14

## 2017-09-14 PROBLEM — N18.30 CKD (CHRONIC KIDNEY DISEASE), STAGE III (HCC): Status: ACTIVE | Noted: 2017-09-14

## 2017-09-14 PROBLEM — Z94.84 HX OF AUTOLOGOUS STEM CELL TRANSPLANT (HCC): Status: ACTIVE | Noted: 2017-09-14

## 2017-09-14 LAB
B PERT.PT PRMT NPH QL NAA+NON-PROBE: NOT DETECTED
BACTERIA FLD CULT: NORMAL
BACTERIA SPEC AEROBE CULT: NORMAL
BH CV ECHO MEAS - AO ROOT AREA (BSA CORRECTED): 1.6
BH CV ECHO MEAS - AO ROOT AREA: 6.2 CM^2
BH CV ECHO MEAS - AO ROOT DIAM: 2.8 CM
BH CV ECHO MEAS - BSA(HAYCOCK): 1.8 M^2
BH CV ECHO MEAS - BSA: 1.7 M^2
BH CV ECHO MEAS - BZI_BMI: 31 KILOGRAMS/M^2
BH CV ECHO MEAS - BZI_METRIC_HEIGHT: 154.9 CM
BH CV ECHO MEAS - BZI_METRIC_WEIGHT: 74.4 KG
BH CV ECHO MEAS - CONTRAST EF (2CH): 57.8 ML/M^2
BH CV ECHO MEAS - CONTRAST EF 4CH: 62.2 ML/M^2
BH CV ECHO MEAS - EDV(CUBED): 62.6 ML
BH CV ECHO MEAS - EDV(MOD-SP2): 64 ML
BH CV ECHO MEAS - EDV(MOD-SP4): 74 ML
BH CV ECHO MEAS - EDV(TEICH): 68.8 ML
BH CV ECHO MEAS - EF(CUBED): 76.8 %
BH CV ECHO MEAS - EF(MOD-SP2): 57.8 %
BH CV ECHO MEAS - EF(MOD-SP4): 62.2 %
BH CV ECHO MEAS - EF(TEICH): 69.4 %
BH CV ECHO MEAS - ESV(CUBED): 14.5 ML
BH CV ECHO MEAS - ESV(MOD-SP2): 27 ML
BH CV ECHO MEAS - ESV(MOD-SP4): 28 ML
BH CV ECHO MEAS - ESV(TEICH): 21 ML
BH CV ECHO MEAS - FS: 38.5 %
BH CV ECHO MEAS - IVS/LVPW: 0.97
BH CV ECHO MEAS - IVSD: 0.91 CM
BH CV ECHO MEAS - LA DIMENSION: 2.9 CM
BH CV ECHO MEAS - LA/AO: 1
BH CV ECHO MEAS - LAT PEAK E' VEL: 9.2 CM/SEC
BH CV ECHO MEAS - LV DIASTOLIC VOL/BSA (35-75): 42.6 ML/M^2
BH CV ECHO MEAS - LV IVRT: 0.09 SEC
BH CV ECHO MEAS - LV MASS(C)D: 111.7 GRAMS
BH CV ECHO MEAS - LV MASS(C)DI: 64.3 GRAMS/M^2
BH CV ECHO MEAS - LV SYSTOLIC VOL/BSA (12-30): 16.1 ML/M^2
BH CV ECHO MEAS - LVIDD: 4 CM
BH CV ECHO MEAS - LVIDS: 2.4 CM
BH CV ECHO MEAS - LVLD AP2: 7.1 CM
BH CV ECHO MEAS - LVLD AP4: 7.3 CM
BH CV ECHO MEAS - LVLS AP2: 6 CM
BH CV ECHO MEAS - LVLS AP4: 5.9 CM
BH CV ECHO MEAS - LVOT AREA (M): 2.5 CM^2
BH CV ECHO MEAS - LVOT AREA: 2.5 CM^2
BH CV ECHO MEAS - LVOT DIAM: 1.8 CM
BH CV ECHO MEAS - LVPWD: 0.93 CM
BH CV ECHO MEAS - MED PEAK E' VEL: 10.2 CM/SEC
BH CV ECHO MEAS - MV A MAX VEL: 116 CM/SEC
BH CV ECHO MEAS - MV DEC TIME: 0.19 SEC
BH CV ECHO MEAS - MV E MAX VEL: 67.2 CM/SEC
BH CV ECHO MEAS - MV E/A: 0.58
BH CV ECHO MEAS - PA ACC SLOPE: 1146 CM/SEC^2
BH CV ECHO MEAS - PA ACC TIME: 0.08 SEC
BH CV ECHO MEAS - PA PR(ACCEL): 41.7 MMHG
BH CV ECHO MEAS - RAP SYSTOLE: 3 MMHG
BH CV ECHO MEAS - RVSP: 48 MMHG
BH CV ECHO MEAS - SI(CUBED): 27.7 ML/M^2
BH CV ECHO MEAS - SI(MOD-SP2): 21.3 ML/M^2
BH CV ECHO MEAS - SI(MOD-SP4): 26.5 ML/M^2
BH CV ECHO MEAS - SI(TEICH): 27.5 ML/M^2
BH CV ECHO MEAS - SV(CUBED): 48 ML
BH CV ECHO MEAS - SV(MOD-SP2): 37 ML
BH CV ECHO MEAS - SV(MOD-SP4): 46 ML
BH CV ECHO MEAS - SV(TEICH): 47.7 ML
BH CV ECHO MEAS - TAPSE (>1.6): 2.1 CM2
BH CV ECHO MEAS - TR MAX V: 45 MMHG
BH CV ECHO MEAS - TR MAX VEL: 335 CM/SEC
BH CV VAS BP LEFT ARM: NORMAL MMHG
BH CV XLRA - RV BASE: 4.1 CM
BH CV XLRA - RV LENGTH: 5.8 CM
BH CV XLRA - RV MID: 3.6 CM
BH CV XLRA - TDI S': 15.4 CM/SEC
C PNEUM DNA NPH QL NAA+NON-PROBE: NOT DETECTED
E/E' RATIO: 7
FLUAV H1 RNA NPH QL NAA+NON-PROBE: NOT DETECTED
FLUAV H3 RNA NPH QL NAA+NON-PROBE: NOT DETECTED
FLUAV RNA SPEC QL NAA+PROBE: NOT DETECTED
FLUBV RNA SPEC QL NAA+PROBE: NOT DETECTED
HADV DNA SPEC QL NAA+PROBE: NOT DETECTED
HCOV 229E RNA NPH QL NAA+NON-PROBE: NOT DETECTED
HCOV HKU1 RNA NPH QL NAA+NON-PROBE: NOT DETECTED
HCOV NL63 RNA NPH QL NAA+NON-PROBE: NOT DETECTED
HCOV OC43 RNA NPH QL NAA+NON-PROBE: NOT DETECTED
HMPV RNA SPEC QL NAA+PROBE: NOT DETECTED
HPIV1 RNA SPEC QL NAA+PROBE: NOT DETECTED
HPIV2 SPEC QL CULT: NOT DETECTED
HPIV3 SPEC QL CULT: NOT DETECTED
HPIV4 RNA NPH QL NAA+NON-PROBE: NOT DETECTED
INR PPP: NOT DETECTED
LEFT ATRIUM VOLUME INDEX: 24.1 ML/M2
LV EF 2D ECHO EST: 75 %
M PNEUMO DNA SPEC QL NAA+PROBE: NOT DETECTED
ORGANISM ID: NORMAL
RHINOVIRUS RNA SPEC QL NAA+PROBE: DETECTED
RSV AG SPEC QL: NOT DETECTED
S PNEUM AG SPEC QL LA: NEGATIVE
SPECIMEN SOURCE: NORMAL

## 2017-09-14 PROCEDURE — 93306 TTE W/DOPPLER COMPLETE: CPT | Performed by: INTERNAL MEDICINE

## 2017-09-14 PROCEDURE — 25010000002 PIPERACILLIN SOD-TAZOBACTAM PER 1 G: Performed by: FAMILY MEDICINE

## 2017-09-14 PROCEDURE — 94640 AIRWAY INHALATION TREATMENT: CPT

## 2017-09-14 PROCEDURE — 94760 N-INVAS EAR/PLS OXIMETRY 1: CPT

## 2017-09-14 PROCEDURE — 99233 SBSQ HOSP IP/OBS HIGH 50: CPT | Performed by: FAMILY MEDICINE

## 2017-09-14 PROCEDURE — 99222 1ST HOSP IP/OBS MODERATE 55: CPT | Performed by: INTERNAL MEDICINE

## 2017-09-14 PROCEDURE — 71250 CT THORAX DX C-: CPT

## 2017-09-14 PROCEDURE — 25010000002 PIPERACILLIN SOD-TAZOBACTAM PER 1 G: Performed by: INTERNAL MEDICINE

## 2017-09-14 PROCEDURE — 94799 UNLISTED PULMONARY SVC/PX: CPT

## 2017-09-14 PROCEDURE — 25010000002 METHYLPREDNISOLONE PER 125 MG: Performed by: FAMILY MEDICINE

## 2017-09-14 PROCEDURE — 93306 TTE W/DOPPLER COMPLETE: CPT

## 2017-09-14 RX ORDER — METHYLPREDNISOLONE SODIUM SUCCINATE 125 MG/2ML
60 INJECTION, POWDER, LYOPHILIZED, FOR SOLUTION INTRAMUSCULAR; INTRAVENOUS EVERY 12 HOURS
Status: COMPLETED | OUTPATIENT
Start: 2017-09-14 | End: 2017-09-15

## 2017-09-14 RX ORDER — BUDESONIDE 0.5 MG/2ML
0.5 INHALANT ORAL
Status: DISCONTINUED | OUTPATIENT
Start: 2017-09-14 | End: 2017-09-18 | Stop reason: HOSPADM

## 2017-09-14 RX ADMIN — CEFTAROLINE FOSAMIL 400 MG: 600 POWDER, FOR SOLUTION INTRAVENOUS at 05:07

## 2017-09-14 RX ADMIN — ACETYLCYSTEINE 4 ML: 200 SOLUTION ORAL; RESPIRATORY (INHALATION) at 03:29

## 2017-09-14 RX ADMIN — METHYLPREDNISOLONE SODIUM SUCCINATE 60 MG: 125 INJECTION, POWDER, FOR SOLUTION INTRAMUSCULAR; INTRAVENOUS at 13:27

## 2017-09-14 RX ADMIN — PREGABALIN 100 MG: 100 CAPSULE ORAL at 21:00

## 2017-09-14 RX ADMIN — DOXYCYCLINE HYCLATE 100 MG: 100 TABLET, COATED ORAL at 09:42

## 2017-09-14 RX ADMIN — TAZOBACTAM SODIUM AND PIPERACILLIN SODIUM 3.38 G: 375; 3 INJECTION, SOLUTION INTRAVENOUS at 17:50

## 2017-09-14 RX ADMIN — DOXYCYCLINE HYCLATE 100 MG: 100 TABLET, COATED ORAL at 21:07

## 2017-09-14 RX ADMIN — Medication 250 MG: at 17:50

## 2017-09-14 RX ADMIN — IPRATROPIUM BROMIDE AND ALBUTEROL SULFATE 3 ML: .5; 3 SOLUTION RESPIRATORY (INHALATION) at 06:47

## 2017-09-14 RX ADMIN — PANTOPRAZOLE SODIUM 40 MG: 40 TABLET, DELAYED RELEASE ORAL at 06:23

## 2017-09-14 RX ADMIN — BUDESONIDE 0.5 MG: 0.5 INHALANT RESPIRATORY (INHALATION) at 20:41

## 2017-09-14 RX ADMIN — IPRATROPIUM BROMIDE AND ALBUTEROL SULFATE 3 ML: .5; 3 SOLUTION RESPIRATORY (INHALATION) at 20:41

## 2017-09-14 RX ADMIN — CEFTAROLINE FOSAMIL 400 MG: 600 POWDER, FOR SOLUTION INTRAVENOUS at 16:48

## 2017-09-14 RX ADMIN — CYCLOBENZAPRINE HYDROCHLORIDE 10 MG: 10 TABLET, FILM COATED ORAL at 21:07

## 2017-09-14 RX ADMIN — ACETYLCYSTEINE 4 ML: 200 SOLUTION ORAL; RESPIRATORY (INHALATION) at 06:47

## 2017-09-14 RX ADMIN — IPRATROPIUM BROMIDE AND ALBUTEROL SULFATE 3 ML: .5; 3 SOLUTION RESPIRATORY (INHALATION) at 03:30

## 2017-09-14 RX ADMIN — TAZOBACTAM SODIUM AND PIPERACILLIN SODIUM 3.38 G: 375; 3 INJECTION, SOLUTION INTRAVENOUS at 06:22

## 2017-09-14 RX ADMIN — IPRATROPIUM BROMIDE AND ALBUTEROL SULFATE 3 ML: .5; 3 SOLUTION RESPIRATORY (INHALATION) at 15:57

## 2017-09-14 RX ADMIN — Medication 5 MG: at 21:07

## 2017-09-14 RX ADMIN — PREGABALIN 100 MG: 100 CAPSULE ORAL at 09:42

## 2017-09-14 RX ADMIN — ACYCLOVIR 400 MG: 200 CAPSULE ORAL at 09:42

## 2017-09-14 RX ADMIN — Medication 250 MG: at 09:42

## 2017-09-14 RX ADMIN — ACYCLOVIR 400 MG: 200 CAPSULE ORAL at 21:07

## 2017-09-14 NOTE — DISCHARGE PLACEMENT REQUEST
"Luisa Miranda (67 y.o. Female)   Nicole (CM) [phone]    Date of Birth Social Security Number Address Home Phone MRN    1950  [address] [place] [address] [phone] [id number]    Anabaptist Marital Status          None        Admission Date Admission Type Admitting Provider Attending Provider Department, Room/Bed    9/12/17 Emergency Flavia Bender MD Hall, Holly, MD [address], [address]    Discharge Date Discharge Disposition Discharge Destination                      Attending Provider: Flavia Bender MD     Allergies:  No Known Allergies    Isolation:  Airborne   Infection:  None   Code Status:  FULL    Ht:  61\" (154.9 cm)   Wt:  164 lb (74.4 kg)    Admission Cmt:  None   Principal Problem:  Pneumonia of right lower lobe due to infectious organism [J18.9]                 Active Insurance as of 9/12/2017     Primary Coverage     Payor Plan Insurance Group Employer/Plan Group    MEDICARE MEDICARE A & B      Payor Plan Address Payor Plan Phone Number Effective From Effective To    [address] [phone] 9/1/2015     [place] [address]       Subscriber Name Subscriber Birth Date Member ID       LUISA MIRANDA 1950 [id number]           Secondary Coverage     Payor Plan Insurance Group Employer/Plan Group    AARP MED SUPP AARP HEALTH CARE OPTIONS PLAN F     Payor Plan Address Payor Plan Phone Number Effective From Effective To    [facility] [phone] 1/1/2016     [address]       [place] [address]       Subscriber Name Subscriber Birth Date Member ID       LUISA MIRANDA 1950 [id number]                 Emergency Contacts      (Rel.) Home Phone Work Phone Mobile Phone    Doron Miranda (Spouse) [phone] -- --            Insurance Information                MEDICARE/MEDICARE A & B Phone: [phone]    Subscriber: Luisa Miranda Subscriber#: [id number]    Group#:  Precert#:         AARP MED SUPP/AARP HEALTH CARE OPTIONS " Phone: [phone]    Subscriber: Doris Miranda Subscriber#: [id number]    Group#: PLAN [id number] Precert#:              History & Physical      Yoav Bryant MD at 9/12/2017  2:01 PM              [facility] Medicine [facility]  HISTORY AND PHYSICAL    Primary Care Physician: Earl Fuentes MD    Subjective     Chief Complaint:  Fever, cough    History of Present Illness:     68 yo with history of COPD and Multiple Myeloma, s/p autologous stem cell transplant 3/2016 with subsequent Revlimid therapy on hold due to thrombocytopenia, presents with fever and cough. Patient says the cough has been present for four weeks, worsened approximately 2 weeks ago with green sputum production. No improvement with OTC Delsym and Robitussin.  Fevers noted over the past 3 days, with Tmax of 101.         Review of Systems   Constitutional: Positive for activity change, fatigue and fever.   HENT: Negative.    Eyes: Negative.    Respiratory: Positive for cough and shortness of breath.    Cardiovascular: Negative.  Negative for chest pain, palpitations and leg swelling.   Gastrointestinal: Negative.  Negative for diarrhea and vomiting.   Endocrine: Negative.    Genitourinary: Positive for urgency.   Musculoskeletal: Negative.    Skin: Negative.    Allergic/Immunologic: Positive for immunocompromised state.   Neurological: Positive for weakness.   Hematological: Negative.    Psychiatric/Behavioral: Negative.       Otherwise complete 10 system ROS performed and negative except as mentioned in the HPI.    Past Medical History:   Diagnosis Date   • Arthritis    • Chronic bronchitis    • COPD (chronic obstructive pulmonary disease)    • Hypertension    • Multiple myeloma    • Multiple myeloma, failed remission        Past Surgical History:   Procedure Laterality Date   • LIMBAL STEM CELL TRANSPLANT  03/2016    @ Dr. Josiah Spicer   • MOUTH SURGERY         Family History   Problem Relation Age of Onset   • Breast  "cancer Other    • Lung cancer Other    • Liver cancer Other    • Bone cancer Other        Social History     Social History   • Marital status:      Spouse name: N/A   • Number of children: N/A   • Years of education: N/A     Occupational History   • Not on file.     Social History Main Topics   • Smoking status: Former Smoker     Quit date: 8/1/2015   • Smokeless tobacco: Never Used   • Alcohol use No   • Drug use: No   • Sexual activity: Not on file     Other Topics Concern   • Not on file     Social History Narrative   • No narrative on file       Medications:    (Not in a hospital admission)    Allergies:  No Known Allergies      Objective     Physical Exam:  Vital Signs: /82  Pulse (!) 122  Temp 98.6 °F (37 °C) (Oral)   Resp 18  Ht 61\" (154.9 cm)  Wt 159 lb (72.1 kg)  SpO2 95%  BMI 30.04 kg/m2  Physical Exam     Gen-ill appearing, in bed   HEENT-NCAT  CV-RRR, S1 S2 normal, no m/r/g  Resp-moderately diminished bilaterally with RLL rales, rare end expiratory wheezes  Abd-soft, non-tender, non-distended, normo active bowel sounds  Ext-No lower extremity cyanosis, clubbing or edema bilaterally  Neuro-alert and oriented, speech clear, moves all extremities   Psych-flat affect   Skin- No rash on exposed UE or LE bilaterally        Results Reviewed:    Results from last 7 days  Lab Units 09/12/17  1019   WBC 10*3/mm3 7.00   HEMOGLOBIN g/dL 12.4   PLATELETS 10*3/mm3 22*       Results from last 7 days  Lab Units 09/12/17  1019   SODIUM mmol/L 137   POTASSIUM mmol/L 3.1*   CO2 mmol/L 19.0*   CREATININE mg/dL 1.60*   GLUCOSE mg/dL 109*   CALCIUM mg/dL 8.7       I have personally reviewed and interpreted available lab data, radiology studies and ECG obtained at time of admission.     Assessment / Plan     Problem List:   Hospital Problem List     Multiple myeloma in remission    Overview Signed 9/6/2016 11:21 AM by Neha Phillip              Hypertension    COPD (chronic obstructive pulmonary disease) "    Gastroesophageal reflux disease without esophagitis    Pneumonia of right lower lobe due to infectious organism          Assessment/Plan    RLL pneumonia  - immunocompromised state secondary to multiple myeloma and stem cell transplant  - at risk for not only viral, encapsulated and gram negative bacterial infections, but also PCP pneumonia  - check influenza pcr, urinary antigens  - continue rocephin and azithromycin  - nebs scheduled and prn  - cxr am  - consider speech eval for dysphagia  - ID consultation  - with rales on exam and elevated BNP, will also check Echo (revlimid can be associated with CHF)    Multiple Myeloma  - s/p autologous SCT   - revlimid on hold due to thrombocytopenia    Thrombocytopenia    CKDIII?  - creatinine appears near baseline    Tachycardia  - sinus    COPD  - prior smoker    DVT prophylaxis: mechanical only due to thrombocytopenia  Code Status:   Admission Status: Patient will be admitted to INPATIENT status due to the need for care which can only be reasonably provided in an hospital setting such as aggressive/expedited ancillary services and/or consultation services, the necessity for IV medications, close physician monitoring and/or the possible need for procedures.  In such, I feel patient’s risk for adverse outcomes and need for care warrant INPATIENT evaluation and predict the patient’s care encounter to likely last beyond 2 midnights.       Yoav Bryant MD 17 2:02 PM         Electronically signed by Yoav Bryant MD at 2017  2:42 PM        [address] [address]  Phone:  [phone]  Fax:          Patient:     Doris Miranda MRN:  [id number]   [address] [place], [place] [address] :  1950  SSN:    Phone: [phone] Sex:  F      INSURANCE PAYOR PLAN GROUP # SUBSCRIBER ID   Primary:  Secondary: MEDICARE  AARP MED SUPP [id number]  [id number]    PLAN F [id number]  [id number]    Admitting Diagnosis: Pneumonia of right lower lobe due to infectious organism [J18.9]  Order Date:  Sep 14, 2017               Inpatient Consult to Case [facility]        (Order ID: [id number])     Diagnosis:         Priority:  Routine Expected Date:   Expiration Date:        Interval:  Once Count:    Reason for Consult? Please arrange oxygen for discharge, patient has a history of outpatient oxygen but has noncompliant.  Unsure if she has appropriate oxygen at home or concentrator.     Specimen Type:   Specimen Source:   Specimen Taken Date:   Specimen Taken Time:                         Authorizing Provider:Flavia Bender MD  Authorizing Provider's NPI: [id number]  Order Entered By: Flavia Bender MD 9/14/2017  2:02 PM     Electronically signed by: Flavia Bender MD 9/14/2017  2:02 PM         SpO2  92 %    89 %  90 %  89 %      Pulse Oximetry Type               SpO2: Pre-Ductal (Right Hand)               SpO2: Post-Ductal (Left Hand/Foot)               O2 Device  nasal cannula    nasal cannula  nasal cannula  nasal cannula           pt had oxygen  off. placed on  pt. after Tamara-  Tment

## 2017-09-14 NOTE — PLAN OF CARE
Problem: Patient Care Overview (Adult)  Goal: Plan of Care Review  Outcome: Ongoing (interventions implemented as appropriate)    09/14/17 0230   Coping/Psychosocial Response Interventions   Plan Of Care Reviewed With patient   Patient Care Overview   Progress progress toward functional goals is gradual   Outcome Evaluation   Outcome Summary/Follow up Plan pt vitals stable, o2 on 2 liters NC  94% iv antbiotics continues, probiotic started for frequent loose stool, pt states she is feeling better.         Problem: Pneumonia (Adult)  Goal: Signs and Symptoms of Listed Potential Problems Will be Absent or Manageable (Pneumonia)  Outcome: Ongoing (interventions implemented as appropriate)    Problem: Fall Risk (Adult)  Goal: Identify Related Risk Factors and Signs and Symptoms  Outcome: Ongoing (interventions implemented as appropriate)

## 2017-09-14 NOTE — PROGRESS NOTES
Pulmonary New Patient Evaluation     REFERRING PHYSICIAN:  Dr. Bender      Chief Complaint: Shortness of breath, cough         SUBJECTIVE     Ms. Miranda is a 66yo F with a history of multiple myeloma s/p autologous stem cell transplant on 3/11/2017 who presented on 9/12 with cough and shortness of breath. She notes that she first developed a cough approximately 4 weeks ago. This worsened in the last 2 weeks and she now has productive green sputum with fevers over the past 3 months. Her Tmax was 101.     She has not been on any therapy for multiple myeloma since her transplant. She has not been on Velcade or Revlimid due to thrombocytopenia. She has not been on Prednisone. She is only on Zometa for fracture prevention. She has been told that she had COPD in the past and that she needed home oxygen. She, however, did not like the home O2 and sent the tanks back.     Since admission, she has been started on Ceftaroline, Zosyn, and Doxycycline. She notes some improvement in her symptoms with inhalers. Her cough has improved and she is afebrile.       PMH: She  has a past medical history of Arthritis; Chronic bronchitis; COPD (chronic obstructive pulmonary disease); Hypertension; Multiple myeloma; and Multiple myeloma, failed remission.   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  [email or web address]      Medications:     Current Facility-Administered Medications:   •  acetaminophen (TYLENOL) tablet 650 mg, 650 mg, Oral, Q4H PRN, Yoav Bryant MD, 650 mg at 09/12/17 1515  •  acetylcysteine (MUCOMYST) 20 % solution 4 mL, 4 mL, Nebulization, Q4H - RT, Saúl Almendarez MD, 4 mL at 09/14/17 0647  •  acyclovir (ZOVIRAX) capsule 400 mg, 400 mg, Oral, Q12H, Yoav Bryant MD, 400 mg at 09/14/17 0942  •  albuterol (PROVENTIL) nebulizer solution 0.083% 2.5 mg/3mL, 2.5 mg, Nebulization, Q4H PRN, Yoav Bryant MD  •  bisacodyl (DULCOLAX) EC tablet 5 mg, 5 mg, Oral, Daily  PRN, Yoav Bryant MD  •  budesonide (PULMICORT) nebulizer solution 0.5 mg, 0.5 mg, Nebulization, BID - RT, Flavia Bender MD  •  ceftaroline (TEFLARO) 400 mg/100 mL 0.9% NS (mbp), 400 mg, Intravenous, Q12H, Mario Odell MD, Last Rate: 0 mL/hr at 09/12/17 2150, 400 mg at 09/14/17 0507  •  cyclobenzaprine (FLEXERIL) tablet 10 mg, 10 mg, Oral, TID PRN, Yoav Bryant MD, 10 mg at 09/13/17 2054  •  docusate sodium (COLACE) capsule 100 mg, 100 mg, Oral, Daily, Yoav Bryant MD, 100 mg at 09/12/17 2123  •  doxycycline (VIBRAMYICN) tablet 100 mg, 100 mg, Oral, Q12H, Mario Odell MD, 100 mg at 09/14/17 0942  •  ipratropium-albuterol (DUO-NEB) nebulizer solution 3 mL, 3 mL, Nebulization, 4x Daily - RT, Yoav Bryant MD, 3 mL at 09/14/17 0647  •  melatonin sublingual tablet 5 mg, 5 mg, Sublingual, Nightly, Yoav Bryant MD, 5 mg at 09/13/17 2054  •  methylPREDNISolone sodium succinate (SOLU-Medrol) injection 60 mg, 60 mg, Intravenous, Q12H, Flavia Bender MD  •  ondansetron (ZOFRAN) tablet 4 mg, 4 mg, Oral, Q6H PRN **OR** ondansetron (ZOFRAN) injection 4 mg, 4 mg, Intravenous, Q6H PRN, Yoav Bryant MD  •  pantoprazole (PROTONIX) EC tablet 40 mg, 40 mg, Oral, QAM, Yoav Bryant MD, 40 mg at 09/14/17 0623  •  [COMPLETED] piperacillin-tazobactam (ZOSYN) 3.375 g in iso-osmotic dextrose 50 ml (premix), 3.375 g, Intravenous, Once, Stopped at 09/12/17 1849 **FOLLOWED BY** piperacillin-tazobactam (ZOSYN) 3.375 g in iso-osmotic dextrose 50 ml (premix), 3.375 g, Intravenous, Q8H, Mario Odell MD, 3.375 g at 09/14/17 0622  •  pregabalin (LYRICA) capsule 100 mg, 100 mg, Oral, Q12H, Yoav Bryant MD, 100 mg at 09/14/17 0942  •  saccharomyces boulardii (FLORASTOR) capsule 250 mg, 250 mg, Oral, BID, Saúl Almendarez MD, 250 mg at 09/14/17 0942  •  sodium chloride 0.9 % flush 1-10 mL, 1-10 mL, Intravenous, PRN, Yoav Bryant MD    Allergies: She has No Known Allergies.   FH: Her family history includes Bone  cancer in her other; Breast cancer in her other; Liver cancer in her other; Lung cancer in her other.   SH: She  reports that she quit smoking about 2 years ago. She has never used smokeless tobacco. She reports that she does not drink alcohol or use illicit drugs.     The patient's relevant past medical, surgical and social history were reviewed and updated in Epic as appropriate.    ROS (14):   Review of Systems  As described in the HPI. Otherwise rest of ROS (14 systems) were negative.      Objective   OBJECTIVE     Physical Examination   Vitals:    09/14/17 0400 09/14/17 0600 09/14/17 0647 09/14/17 0718   BP:    123/67   BP Location:    Right arm   Patient Position:    Lying   Pulse: 108 110 107 120   Resp:   18 22   Temp:    99.4 °F (37.4 °C)   TempSrc:    Oral   SpO2: (!) 89% 90% (!) 89%    Weight:  164 lb (74.4 kg)     Height:           Body mass index is 30.99 kg/(m^2).    Physical Examination    Constitutional:  Mild distress.   Neck:  Oropharynx clear.  Normal range of motion. Neck supple.   No JVD present. No tracheal deviation present.  No thyromegaly present.    Cardiovascular: Tachycardic. Normal heart sounds.  No murmurs, gallop or rub.  No peripheral edema.   Respiratory: Mild respiratory distress. Increased respiratory effort.  Faint expiratory wheezing on the left.   Prolonged expiratory phase.     Abdominal:  Soft. No masses. Non-tender. No distension. No HSM.   Extremities: No digital cyanosis. No clubbing.   Lymphadenopathy: None.   Skin: Skin is warm and dry. No rashes, lesions or ulcers noted.    Neurological:   Alert and Oriented to person, place, and time.    Psychiatric:  Normal affect. Normal behavior.     Diagnostic Data    CXR 09/13/2017 Personally reviewed. There is a right lower lobe infiltrate.    CT Chest 09/14/2017 Personally reviewed. There is a right lower lobe consolidation consistent with pneumonia. No pleural effusions. Emphysematous changes bilaterally.          SpO2 Readings  from Last 3 Encounters:   09/14/17 (!) 89%       Assessment/Plan   ASSESSMENT / PLAN     Assessment:    1. Acute vs Chronic Hypoxic Respiratory Failure  1. Told she needed O2 in the past, not using  2. Currently requiring 2L NC  2. Right Lower Lobe Pneumonia consistent with CAP  1. ABX: Ceftaroline, Zosyn, Doxycycline  2. CX: Light growth normal respiratory izzy  3. COPD, unknown stage  1. No home medications  2. No PFTs in our system  3. Currently on Duonebs    Ms. Miranda is a 66yo F with multiple myeloma who is s/p autologous stem cell transplant in March 2016, not on any immunosuppressive therapy, who presented with fever and cough and was found to have a right lower lobe pneumonia. She likely has chronic hypoxic respiratory failure and is not using home oxygen. At this time, I do not feel that she represents an immunocompromised patient as she has not been on any immunosuppressive therapy in quite some time and she is not neutropenic. She does not have any appreciable risk factors for pcp pneumonia or invasive fungal infection. I do not feel that bronchoscopy is required at this time. If her pneumonia worsens despite appropriate therapy, bronchoscopy should be considered at that time.     Plan:  1. Continue antibiotics for pneumonia. May be able to de-escalate to CAP coverage as she does not appear to be immunocompromised.   2. Recommend treatment for COPD exacerbation. Agree with steroids and adding Pulmicort to Duonebs.   3. Agree with an echocardiogram in the setting of elevated BNP and history of previous Revlimid therapy.       I have discussed my recommendations with Dr. Bender.       I discussed the diagnosis, evaluation, and  treatment options with the patient and/or appropriate family members.    Thank you very much for allowing me to participate in the care of your patient.    I spent 45 minutes with the patient and family. I spent > 50% percent of this time counseling and discussing diagnosis, current  status and management.    Aleida V Robert, DO  Pulmonary and Critical Care Medicine    C.C.:  Patient Care Team:  Earl Fuentes MD as PCP - General  Earl Fuentes MD as PCP - Family Medicine  Joseph Mckinley MD as PCP - Claims Attributed  Josiah Euceda MD as Consulting Physician (Hematology and Oncology)  Deandra Howard RN as Care Coordinator (Population Health)

## 2017-09-14 NOTE — PROGRESS NOTES
Continued Stay Note  [facility]     Patient Name: Doris Miranda  MRN: [id number]  Today's Date: 9/14/2017    Admit Date: 9/12/2017          Discharge Plan       09/14/17 9416    Case Management/Social Work Plan    Plan Home O2 consult    Patient/Family In Agreement With Plan yes    Additional Comments CM spoke w pt re: Home O2. Pt does not have a Home O2 set up at home. Presented pt a list of DME companies and pt chose [employer] for Home O2. CM contacted Ahsan tucker [employer] who was answering the phone for Mabel at [employer]. He requested the fax be sent to [employer] and an [employer] rep will deliver tomorrow. CM will need to f/u with this order in the morning to make sure fax was recieved and pt is being d/c on Friday. No other needs at this time.              Discharge Codes     None        Expected Discharge Date and Time     Expected Discharge Date Expected Discharge Time    Sep 15, 2017             Luci Bennett RN

## 2017-09-14 NOTE — PROGRESS NOTES
"      HOSPITALIST DAILY PROGRESS NOTE    Chief Complaint: cough and fever    Subjective   SUBJECTIVE/OVERNIGHT EVENTS   No acute events overnight.  Less short of air today at rest.  Patient denies any current active complaint.  Still with intermittent bronchospasm type hacking cough.      Review of Systems:  Gen-no fevers, no chills  CV-no chest pain, no palpitations  Resp-+ cough, + dyspnea  GI-no N/V/D, no abd pain    Otherwise complete ROS is negative except as mentioned in the HPI.    Objective   OBJECTIVE   I have reviewed the vital signs.  /67 (BP Location: Right arm, Patient Position: Lying)  Pulse 120  Temp 99.4 °F (37.4 °C) (Oral)   Resp 22  Ht 61\" (154.9 cm)  Wt 164 lb (74.4 kg)  SpO2 (!) 89%  BMI 30.99 kg/m2    Physical Exam:  Gen-this lady chronically ill appearing inno acute distress  CV-tachy RR, S1 S2 normal, no m/r/g  Resp-Nonlabored respirations at rest, mild dyspnea with conversation, slight wheeze, posterior and lateral right mid and base rales  Abd-soft, NT, ND, +BS  Ext-no edema  Neuro-A&Ox3, no focal deficits  Psych-appropriate mood    Results:  I have reviewed the labs, culture data, radiology results, and diagnostic studies.      Results from last 7 days  Lab Units 09/13/17  0458 09/12/17  1019   WBC 10*3/mm3 6.90 7.00   HEMOGLOBIN g/dL 11.7 12.4   HEMATOCRIT % 37.0 36.0   PLATELETS 10*3/mm3 24* 22*       Results from last 7 days  Lab Units 09/13/17  0458   SODIUM mmol/L 142   POTASSIUM mmol/L 4.3   CHLORIDE mmol/L 111*   CO2 mmol/L 22.0   BUN mg/dL 24*   CREATININE mg/dL 1.50*   GLUCOSE mg/dL 90   CALCIUM mg/dL 9.0       Culture Data:  Cultures:    Blood Culture   Date Value Ref Range Status   09/12/2017 No growth at less than 24 hours  Preliminary   09/12/2017 No growth at less than 24 hours  Preliminary       Radiology Results:  Imaging Results (last 24 hours)     Procedure Component Value Units Date/Time    CT Chest Without Contrast [[id number]] Collected:  09/14/17 1044     " Updated:  09/14/17 1044    Narrative:       EXAMINATION: CT CHEST WO CONTRAST-09/14/2017:      INDICATION: Hypoxia, pneumonia; J18.9-Pneumonia, unspecified organism;  R09.02-Hypoxemia; D69.6-Thrombocytopenia, unspecified.         TECHNIQUE: Spiral acquisition 5 mm axial images through the chest and  upper abdomen.     The radiation dose reduction device was turned on for each scan per the  ALARA (As Low as Reasonably Achievable) protocol.     COMPARISON: Portable chest radiograph 09/13/2017.     FINDINGS: Previous exam report indicates chronic appearing lung changes  similar to prior studies. History indicates pneumonia and hypoxia.     Today's exam shows an extensive right lower lobe pneumonia,  significantly greater than would be expected from the appearance on  yesterday's chest radiograph. There is mild subpleural interstitial  disease of the medial left upper lobe which is favored to be chronic.  There is a trace amount of disease in the lingula which is nonspecific,  and similar milder right middle lobe interstitial changes favored to  represent pneumonia, perhaps via transpleural spread. There is no  effusion. Mediastinal window images show no evidence of significant  adenopathy and no pericardial effusion.     Included images of the upper abdomen show no significant abnormalities  of the visualized portions of the liver, spleen, pancreas, adrenal  glands, or upper renal poles. A debris layer or layer of noncalcified  small gallstones is noted in the gallbladder. No inflammatory changes  are seen.       Impression:       1. Extensive right lower lobe pneumonia. Mild right middle lobe and  lingular disease. No evidence of effusion.  2. No other evidence of active chest disease elsewhere.  3. Gallbladder sludge versus noncalcified gallstones.     D:  09/14/2017  E:  09/14/2017                 I have reviewed the medications.    Assessment/Plan   ASSESSMENT/PLAN    Principal Problem:    Pneumonia of right lower  lobe due to infectious organism  Active Problems:    Multiple myeloma in remission    Hypertension    Chronic obstructive pulmonary disease with acute exacerbation    Gastroesophageal reflux disease without esophagitis    Thrombocytopenia since stem cell transplant March 2016    Hx of autologous stem cell transplant (March 2016)    CKD (chronic kidney disease), stage III    Former smoker    Non-rheumatic tricuspid valve insufficiency    Pulmonary hypertension      67 yof with hx of COPD (supposed to be on chronic oxygen but is noncompliant), HTN and MM s/p autologous stem cell transplant 3/2016 with subsequent chronic thrombocytopenia limiting her ability to be on immunosuppressive therapy, p/w fever and cough, admitted for suspected RLL pneumonia    Plan  - ID follows for abx therapy.  Initially it was thought that patient was immunocompromised due to questionable history of taking Revlimid therapy however upon further history since determined that patient was never on immunosuppressants status post her stem cell transplant due to chronic thrombocytopenia, therefore she is not immunocompromised.  - More aggressive treatment of COPD component of her respiratory status, have added Pulmicort nebs and Solu-Medrol.  Continue scheduled duo nebs.   - Patient with elevated BNP, ECHO with no evidence of CHF but does confirm pulmonary hypertension with tricuspid regurgitation (RVSP 48mmHg)  - continue to monitor platelets, urgently lower than her previous bradford, appears her baseline is ~40.  Followed by Dr. Epps.  - resume home rx as ordered      - DVT ppx- mechanical    Dispo: Possibly home in~48 hrs if respiratory status improves and have antibiotic plan from ID.  Will need home O2 arranged as patient previously was told she needed chronic O2 but was noncompliant.    Flavia Bender MD  09/14/17  1:54 PM

## 2017-09-14 NOTE — PLAN OF CARE
Problem: Patient Care Overview (Adult)  Goal: Adult Individualization and Mutuality  Outcome: Ongoing (interventions implemented as appropriate)  Goal: Discharge Needs Assessment  Outcome: Ongoing (interventions implemented as appropriate)    Problem: Pneumonia (Adult)  Goal: Signs and Symptoms of Listed Potential Problems Will be Absent or Manageable (Pneumonia)  Outcome: Ongoing (interventions implemented as appropriate)    Problem: Fall Risk (Adult)  Goal: Identify Related Risk Factors and Signs and Symptoms  Outcome: Ongoing (interventions implemented as appropriate)  Goal: Absence of Falls  Outcome: Ongoing (interventions implemented as appropriate)

## 2017-09-14 NOTE — PROGRESS NOTES
[facility] Progress Note    Admission Date: 9/12/2017    Doris Miranda  1950  [id number]    Date: 9/14/2017    Antibiotics:  IV Anti-Infectives     Ordered     Dose/Rate Route Frequency Start Stop    09/12/17 1615  piperacillin-tazobactam (ZOSYN) 3.375 g in iso-osmotic dextrose 50 ml (premix)     Ordering Provider:  Mario Odell MD    3.375 g  over 4 Hours Intravenous Every 8 Hours 09/12/17 2300      09/12/17 1611  doxycycline (VIBRAMYICN) tablet 100 mg     Ordering Provider:  Mario Odell MD    100 mg Oral Every 12 Hours Scheduled 09/12/17 2100      09/12/17 2009  acyclovir (ZOVIRAX) capsule 400 mg     Ordering Provider:  Yoav Bryant MD    400 mg Oral Every 12 Hours Scheduled 09/12/17 2100      09/12/17 1611  ceftaroline (TEFLARO) 400 mg/100 mL 0.9% NS (mbp)     Ordering Provider:  Mario Odell MD    400 mg Intravenous Every 12 Hours 09/12/17 1700      09/12/17 1615  piperacillin-tazobactam (ZOSYN) 3.375 g in iso-osmotic dextrose 50 ml (premix)     Ordering Provider:  Mario Odell MD    3.375 g  over 30 Minutes Intravenous Once 09/12/17 1700 09/12/17 1849    09/12/17 1039  cefTRIAXone (ROCEPHIN) IVPB 1 g     Ordering Provider:  SUMI Valentin    1 g  over 30 Minutes Intravenous Once 09/12/17 1041 09/12/17 1133    09/12/17 1039  AZITHROMYCIN 500 MG/250 ML 0.9% NS IVPB (MBP)     Ordering Provider:  SUMI Valentin    500 mg  over 60 Minutes Intravenous Once 09/12/17 1041 09/12/17 1356             CC:  pneumonia    HPI:  Patient is a very pleasant  67 y.o.  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  She denied any ill contacts, zoonotic exposures, travel history, or smoking times years.  She was admitted to [facility] on  9/12/17.  I was consulted on 9/12/17 by Dr. Bryant.  Urinalysis was negative, creatinine 1.6, alkaline phosphatase 126, total bilirubin 1.6, lactate 1.0, pro-calcitonin 3.08, , WBC 7.0, hemoglobin 12.4, platelets 22,000, 83% neutrophils, 4% lymphocytes.  Chest x-ray with bibasilar increased infiltrates right greater than left with an increased interstitial pattern bilateral.  Cardiac size normal.    9/13/17 history reviewed.  Still with difficulties breathing.  Some improvement with nebulized treatment.  Minimum sputum production.  No hemoptysis.  9/14/17 hx rev.  Better today with nebs etc.  No fever.    ROS:  No f/c/s. No n/v/d. No rash. No new ADR to Abx.     Constitutional-- No Fever, chills or sweats.  Appetite good, and no malaise. No fatigue.  Heent-- No new vision, hearing or throat complaints.  No epistaxis or oral sores.  Denies odynophagia or dysphagia.  No flashers, floaters or eye pain. No odynophagia or dysphagia. No headache, photophobia or neck stiffness.  CV-- No chest pain, palpitation or syncope  Resp-- SOB/cough With minimum sputum/small Hemoptysis  GI- No nausea, vomiting, or diarrhea.  No hematochezia, melena, or hematemesis. Denies jaundice or chronic liver disease.  -- No dysuria, hematuria, or flank pain.  Denies hesitancy, urgency or flank pain.  Lymph- no swollen lymph nodes in neck/axilla or groin.   Heme- No active bruising or bleeding; no Hx of DVT or PE.  MS-- no swelling or pain in the bones or joints of arms/legs.  No new back pain.  Neuro-- No acute focal weakness or numbness in the arms or legs.  No seizures.  Skin--No rash, nodules, blisters.    Objective   PE:  Vital Signs  Temp  Min: 99 °F (37.2 °C)  Max: 99.3 °F (37.4 °C)  BP  Min: 103/61  Max: 105/65  Pulse  Min: 103  Max: 117  Resp  Min: 16  Max: 24  SpO2  Min: 94 %  Max: 99 %    GENERAL: Awake and alert, in moderate distress. Appears older than stated age.  HEENT: Normocephalic, atraumatic.  PERRL. EOMI. No  conjunctival injection. No icterus. Oropharynx clear without evidence of thrush or exudate. No evidence of peridontal disease.    NECK: Supple without nuchal rigidity. No mass.  LYMPH: No cervical, axillary or inguinal lymphadenopathy.  HEART: RRR; No murmur, rubs, gallops.   LUNGS: Rales bilateral. Normal respiratory effort. Nonlabored. No dullness.  No wheeze.  ABDOMEN: Soft, nontender, nondistended. Positive bowel sounds. No rebound or guarding. NO mass or HSM.Obese.  EXT:  No cyanosis, clubbing or edema. No cord.  MSK: FROM without joint effusions noted arms/legs.    SKIN: Warm and dry without cutaneous eruptions on Inspection/palpation.    NEURO: Oriented to PPT. No focal deficits on motor/sensory exam at arms/legs.    Laboratory Data      Results from last 7 days  Lab Units 09/13/17  0458 09/12/17  1019   WBC 10*3/mm3 6.90 7.00   HEMOGLOBIN g/dL 11.7 12.4   HEMATOCRIT % 37.0 36.0   PLATELETS 10*3/mm3 24* 22*       Results from last 7 days  Lab Units 09/13/17  0458   SODIUM mmol/L 142   POTASSIUM mmol/L 4.3   CHLORIDE mmol/L 111*   CO2 mmol/L 22.0   BUN mg/dL 24*   CREATININE mg/dL 1.50*   GLUCOSE mg/dL 90   CALCIUM mg/dL 9.0       Results from last 7 days  Lab Units 09/13/17  0458   ALK PHOS U/L 126*   BILIRUBIN mg/dL 0.9   ALT (SGPT) U/L 14   AST (SGOT) U/L 14           Results from last 7 days  Lab Units 09/12/17  1019   CRP mg/dL 29.48*               Results from last 7 days  Lab Units 09/13/17  0458   LACTATE mmol/L 1.5     Estimated Creatinine Clearance: 32.9 mL/min (by C-G formula based on Cr of 1.5).      Microbiology:  Microbiology Results Abnormal     Procedure Component Value - Date/Time    Respiratory Culture [[id number]] Collected:  09/13/17 1045    Lab Status:  Preliminary result Specimen:  Sputum from Lung, Right Lower Lobe Updated:  09/13/17 2053     Gram Stain Result Greater than 25 WBCs seen      Less than 10 Epithelial cells seen      Few (2+) Gram positive cocci    CAL Prep [[id number]]   (Normal) Collected:  09/13/17 1601    Lab Status:  Final result Specimen:  Swab from Lung Updated:  09/13/17 1614     KOH Prep No yeast or hyphal elements seen    Blood Culture [[id number]]  (Normal) Collected:  09/12/17 1005    Lab Status:  Preliminary result Specimen:  Blood from Arm, Left Updated:  09/13/17 1101     Blood Culture No growth at 24 hours    Blood Culture [[id number]]  (Normal) Collected:  09/12/17 1015    Lab Status:  Preliminary result Specimen:  Blood from Arm, Left Updated:  09/13/17 1101     Blood Culture No growth at 24 hours    Urine Culture [[id number]]  (Normal) Collected:  09/12/17 1456    Lab Status:  Preliminary result Specimen:  Urine from Urine, Clean Catch Updated:  09/13/17 0744     Urine Culture No growth    Influenza A & B, RT PCR [[id number]]  (Normal) Collected:  09/12/17 1451    Lab Status:  Final result Specimen:  Swab from Nasopharynx Updated:  09/12/17 1759     Influenza A PCR Not Detected     Influenza B PCR Not Detected          Radiology:  Imaging Results (last 72 hours)     Procedure Component Value Units Date/Time    XR Chest 1 View [[id number]] Collected:  09/12/17 1122     Updated:  09/12/17 1147    Narrative:       EXAMINATION: XR CHEST 1 VW-09/12/2017:      INDICATION: Cough, shortness of breath.      COMPARISON: NONE.     FINDINGS: The cardiac silhouette is normal. There are patchy bibasilar  airspace changes, greater on the right than on the left. There is no  consolidation or effusion.           Impression:       There is patchy bibasilar airspace disease, right greater  than left. There is no old chest x-ray for comparison.     D:  09/12/2017  E:  09/12/2017     This report was finalized on 9/12/2017 11:45 AM by Dr. Claudio Chong MD.       XR Chest 1 View [[id number]] Collected:  09/13/17 1019     Updated:  09/13/17 1140    Narrative:       EXAMINATION: XR CHEST 1 VW- 09/13/2017     INDICATION:  J18.9-Pneumonia, unspecified organism;  R09.02-Hypoxemia;  D69.6-Thrombocytopenia, unspecified      COMPARISON: 09/12/2017     FINDINGS: There is a somewhat fibronodular pulmonary pattern, relatively  sparing of the apical regions, right greater than left. Cardiac  silhouette is normal. There has been no change since 09/12/2017.           Impression:       There has been no change since 09/12/2017.     D:  09/13/2017  E:  09/13/2017     This report was finalized on 9/13/2017 11:38 AM by Dr. Claudio Chong MD.             I personally reviewed the radiographic studies     Assessment/Plan   IMPRESSION:     1.  Right greater than left lower lobe pneumonia in an immunocompromised host.  Increased risk for progression to respiratory failure.  Usual organisms are staph, strep, possible gram-negative rods given her COPD.  Less likely mycobacterial or fungal.  Remains quite ill but better today, and maybe the nebs having effect.  2.  Possible sepsis present on admission with elevated pro-calcitonin level, thrombocytopenia, and a bone marrow transplantation.  Probably from pulmonary source.  3.  Acute hypoxic respiratory failure.  Ongoing.  4.  Underlying COPD with no smoking since 2015.  5.  Thrombocytopenia, probably related to her underlying previous myeloma and bone marrow transplant versus other.  6.  Multiple myeloma status post stem cell transplant 2015 at the [facility].  7.  Hypokalemia 3.1.  8.  Elevated alkaline phosphatase 127, probably related to bone marrow process.  9.  Cholestasis with bilirubin 1.6, probably related to medications versus other.  10.  Acute renal failure creatinine 1.5.  May have underlying CK D.  11.  Hemoptysis.  CT pending.  Could some of this be alveolar hemorrhage with her severe thrombocytopenia.     Plan:     1.  Diagnostically, continue to follow patient's physical exam, CBC, CMP, CRP, lactic acid, pro-calcitonin level, respiratory panel PCR, blood cultures ×2, sputum CNS, radiographic studies, fungal serologies,  QuantiFERON gold TB assay, serum cryptococcal antigen, histoplasma urine antigen, sputum for fungus and AFB, and ABGs.  Consider pulmonary bronchoscopy.  CT chest pending.  2.  Therapeutically, consider Zosyn, doxycycline, and Teflaro pending further culture data covering Pseudomonas, atypicals, anaerobes, and MRSA.  Avoiding vanc given her renal failure.  Duration 9/21/17.  3.  O2 supportive care.  4.  Pulmonary following.    Increased risk for adverse drug reactions,  progressive respiratory failure, Death.  Discussed with the patient's , and Dr. Bender.    Mario Odell MD  9/14/2017

## 2017-09-15 ENCOUNTER — EPISODE CHANGES (OUTPATIENT)
Dept: CASE MANAGEMENT | Facility: OTHER | Age: 67
End: 2017-09-15

## 2017-09-15 LAB
ALBUMIN SERPL-MCNC: 3.6 G/DL (ref 3.2–4.8)
ALBUMIN/GLOB SERPL: 1.1 G/DL (ref 1.5–2.5)
ALP SERPL-CCNC: 138 U/L (ref 25–100)
ALT SERPL W P-5'-P-CCNC: 10 U/L (ref 7–40)
ANION GAP SERPL CALCULATED.3IONS-SCNC: 12 MMOL/L (ref 3–11)
AST SERPL-CCNC: 11 U/L (ref 0–33)
BACTERIA SPEC RESP CULT: NORMAL
BASOPHILS # BLD AUTO: 0 10*3/MM3 (ref 0–0.2)
BASOPHILS NFR BLD AUTO: 0 % (ref 0–1)
BILIRUB SERPL-MCNC: 0.7 MG/DL (ref 0.3–1.2)
BUN BLD-MCNC: 25 MG/DL (ref 9–23)
BUN/CREAT SERPL: 19.2 (ref 7–25)
CALCIUM SPEC-SCNC: 8.8 MG/DL (ref 8.7–10.4)
CHLORIDE SERPL-SCNC: 111 MMOL/L (ref 99–109)
CO2 SERPL-SCNC: 20 MMOL/L (ref 20–31)
CREAT BLD-MCNC: 1.3 MG/DL (ref 0.6–1.3)
DEPRECATED RDW RBC AUTO: 46.1 FL (ref 37–54)
EOSINOPHIL # BLD AUTO: 0 10*3/MM3 (ref 0–0.3)
EOSINOPHIL NFR BLD AUTO: 0 % (ref 0–3)
ERYTHROCYTE [DISTWIDTH] IN BLOOD BY AUTOMATED COUNT: 13.6 % (ref 11.3–14.5)
GFR SERPL CREATININE-BSD FRML MDRD: 41 ML/MIN/1.73
GLOBULIN UR ELPH-MCNC: 3.2 GM/DL
GLUCOSE BLD-MCNC: 120 MG/DL (ref 70–100)
GRAM STN SPEC: NORMAL
H CAPSUL AG UR-MCNC: 0 NG/ML (ref 0–0.49)
HCT VFR BLD AUTO: 35.6 % (ref 34.5–44)
HGB BLD-MCNC: 11.8 G/DL (ref 11.5–15.5)
IMM GRANULOCYTES # BLD: 0.04 10*3/MM3 (ref 0–0.03)
IMM GRANULOCYTES NFR BLD: 0.9 % (ref 0–0.6)
L PNEUMO1 AG UR QL IA: POSITIVE
LYMPHOCYTES # BLD AUTO: 0.32 10*3/MM3 (ref 0.6–4.8)
LYMPHOCYTES NFR BLD AUTO: 7.1 % (ref 24–44)
MCH RBC QN AUTO: 30.6 PG (ref 27–31)
MCHC RBC AUTO-ENTMCNC: 33.1 G/DL (ref 32–36)
MCV RBC AUTO: 92.5 FL (ref 80–99)
MONOCYTES # BLD AUTO: 0.13 10*3/MM3 (ref 0–1)
MONOCYTES NFR BLD AUTO: 2.9 % (ref 0–12)
NEUTROPHILS # BLD AUTO: 3.99 10*3/MM3 (ref 1.5–8.3)
NEUTROPHILS NFR BLD AUTO: 89.1 % (ref 41–71)
PLAT MORPH BLD: NORMAL
PLATELET # BLD AUTO: 20 10*3/MM3 (ref 150–450)
POTASSIUM BLD-SCNC: 3.5 MMOL/L (ref 3.5–5.5)
PROT SERPL-MCNC: 6.8 G/DL (ref 5.7–8.2)
RBC # BLD AUTO: 3.85 10*6/MM3 (ref 3.89–5.14)
RBC MORPH BLD: NORMAL
RESULT: <31 PG/ML
SODIUM BLD-SCNC: 143 MMOL/L (ref 132–146)
WBC MORPH BLD: NORMAL
WBC NRBC COR # BLD: 4.48 10*3/MM3 (ref 3.5–10.8)

## 2017-09-15 PROCEDURE — 94640 AIRWAY INHALATION TREATMENT: CPT

## 2017-09-15 PROCEDURE — 82785 ASSAY OF IGE: CPT | Performed by: INTERNAL MEDICINE

## 2017-09-15 PROCEDURE — 94799 UNLISTED PULMONARY SVC/PX: CPT

## 2017-09-15 PROCEDURE — 99232 SBSQ HOSP IP/OBS MODERATE 35: CPT | Performed by: INTERNAL MEDICINE

## 2017-09-15 PROCEDURE — 80053 COMPREHEN METABOLIC PANEL: CPT | Performed by: INTERNAL MEDICINE

## 2017-09-15 PROCEDURE — 25010000002 METHYLPREDNISOLONE PER 125 MG: Performed by: FAMILY MEDICINE

## 2017-09-15 PROCEDURE — 99232 SBSQ HOSP IP/OBS MODERATE 35: CPT | Performed by: FAMILY MEDICINE

## 2017-09-15 PROCEDURE — 85025 COMPLETE CBC W/AUTO DIFF WBC: CPT | Performed by: INTERNAL MEDICINE

## 2017-09-15 PROCEDURE — 85007 BL SMEAR W/DIFF WBC COUNT: CPT | Performed by: INTERNAL MEDICINE

## 2017-09-15 PROCEDURE — 25010000002 PIPERACILLIN SOD-TAZOBACTAM PER 1 G: Performed by: FAMILY MEDICINE

## 2017-09-15 PROCEDURE — 94760 N-INVAS EAR/PLS OXIMETRY 1: CPT

## 2017-09-15 RX ORDER — PREDNISONE 20 MG/1
40 TABLET ORAL
Status: DISCONTINUED | OUTPATIENT
Start: 2017-09-16 | End: 2017-09-18

## 2017-09-15 RX ADMIN — METHYLPREDNISOLONE SODIUM SUCCINATE 60 MG: 125 INJECTION, POWDER, FOR SOLUTION INTRAMUSCULAR; INTRAVENOUS at 00:18

## 2017-09-15 RX ADMIN — PREGABALIN 100 MG: 100 CAPSULE ORAL at 10:22

## 2017-09-15 RX ADMIN — Medication 5 MG: at 22:49

## 2017-09-15 RX ADMIN — ACYCLOVIR 400 MG: 200 CAPSULE ORAL at 10:22

## 2017-09-15 RX ADMIN — IPRATROPIUM BROMIDE AND ALBUTEROL SULFATE 3 ML: .5; 3 SOLUTION RESPIRATORY (INHALATION) at 15:36

## 2017-09-15 RX ADMIN — METHYLPREDNISOLONE SODIUM SUCCINATE 60 MG: 125 INJECTION, POWDER, FOR SOLUTION INTRAMUSCULAR; INTRAVENOUS at 22:55

## 2017-09-15 RX ADMIN — TAZOBACTAM SODIUM AND PIPERACILLIN SODIUM 3.38 G: 375; 3 INJECTION, SOLUTION INTRAVENOUS at 10:41

## 2017-09-15 RX ADMIN — IPRATROPIUM BROMIDE AND ALBUTEROL SULFATE 3 ML: .5; 3 SOLUTION RESPIRATORY (INHALATION) at 12:08

## 2017-09-15 RX ADMIN — PREGABALIN 100 MG: 100 CAPSULE ORAL at 22:49

## 2017-09-15 RX ADMIN — TAZOBACTAM SODIUM AND PIPERACILLIN SODIUM 3.38 G: 375; 3 INJECTION, SOLUTION INTRAVENOUS at 01:23

## 2017-09-15 RX ADMIN — Medication 250 MG: at 10:22

## 2017-09-15 RX ADMIN — IPRATROPIUM BROMIDE AND ALBUTEROL SULFATE 3 ML: .5; 3 SOLUTION RESPIRATORY (INHALATION) at 20:29

## 2017-09-15 RX ADMIN — ACYCLOVIR 400 MG: 200 CAPSULE ORAL at 22:54

## 2017-09-15 RX ADMIN — BUDESONIDE 0.5 MG: 0.5 INHALANT RESPIRATORY (INHALATION) at 08:36

## 2017-09-15 RX ADMIN — BUDESONIDE 0.5 MG: 0.5 INHALANT RESPIRATORY (INHALATION) at 20:30

## 2017-09-15 RX ADMIN — METHYLPREDNISOLONE SODIUM SUCCINATE 60 MG: 125 INJECTION, POWDER, FOR SOLUTION INTRAMUSCULAR; INTRAVENOUS at 10:42

## 2017-09-15 RX ADMIN — DOXYCYCLINE HYCLATE 100 MG: 100 TABLET, COATED ORAL at 22:53

## 2017-09-15 RX ADMIN — IPRATROPIUM BROMIDE AND ALBUTEROL SULFATE 3 ML: .5; 3 SOLUTION RESPIRATORY (INHALATION) at 08:36

## 2017-09-15 RX ADMIN — PANTOPRAZOLE SODIUM 40 MG: 40 TABLET, DELAYED RELEASE ORAL at 05:57

## 2017-09-15 RX ADMIN — TAZOBACTAM SODIUM AND PIPERACILLIN SODIUM 3.38 G: 375; 3 INJECTION, SOLUTION INTRAVENOUS at 18:00

## 2017-09-15 RX ADMIN — Medication 250 MG: at 18:00

## 2017-09-15 RX ADMIN — DOXYCYCLINE HYCLATE 100 MG: 100 TABLET, COATED ORAL at 10:22

## 2017-09-15 RX ADMIN — CEFTAROLINE FOSAMIL 400 MG: 600 POWDER, FOR SOLUTION INTRAVENOUS at 05:57

## 2017-09-15 NOTE — PROGRESS NOTES
[facility] Progress Note    Admission Date: 9/12/2017    Doris Miranda  1950  [id number]    Date: 9/15/2017    Antibiotics:  IV Anti-Infectives     Ordered     Dose/Rate Route Frequency Start Stop    09/14/17 1650  piperacillin-tazobactam (ZOSYN) 3.375 g in iso-osmotic dextrose 50 ml (premix)     Ordering Provider:  Flavia Bender MD    3.375 g  over 4 Hours Intravenous Every 8 Hours 09/14/17 1800      09/12/17 1611  doxycycline (VIBRAMYICN) tablet 100 mg     Ordering Provider:  Mario Odell MD    100 mg Oral Every 12 Hours Scheduled 09/12/17 2100      09/12/17 2009  acyclovir (ZOVIRAX) capsule 400 mg     Ordering Provider:  Yoav Bryant MD    400 mg Oral Every 12 Hours Scheduled 09/12/17 2100      09/12/17 1615  piperacillin-tazobactam (ZOSYN) 3.375 g in iso-osmotic dextrose 50 ml (premix)     Ordering Provider:  Mario Odell MD    3.375 g  over 30 Minutes Intravenous Once 09/12/17 1700 09/12/17 1849    09/12/17 1039  cefTRIAXone (ROCEPHIN) IVPB 1 g     Ordering Provider:  SUMI Valentin    1 g  over 30 Minutes Intravenous Once 09/12/17 1041 09/12/17 1133    09/12/17 1039  AZITHROMYCIN 500 MG/250 ML 0.9% NS IVPB (MBP)     Ordering Provider:  SUMI Valentin    500 mg  over 60 Minutes Intravenous Once 09/12/17 1041 09/12/17 1356             CC:  pneumonia    HPI:  Patient is a very pleasant  67 y.o.  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  She denied any ill contacts, zoonotic exposures, travel history, or smoking times years.  She was admitted to [facility] on 9/12/17.  I was consulted on 9/12/17 by Dr. Bryant.  Urinalysis was negative, creatinine 1.6, alkaline phosphatase 126, total bilirubin 1.6, lactate 1.0, pro-calcitonin 3.08, BNP  147, WBC 7.0, hemoglobin 12.4, platelets 22,000, 83% neutrophils, 4% lymphocytes.  Chest x-ray with bibasilar increased infiltrates right greater than left with an increased interstitial pattern bilateral.  Cardiac size normal.    9/13/17 history reviewed.  Still with difficulties breathing.  Some improvement with nebulized treatment.  Minimum sputum production.  No hemoptysis.  9/14/17 hx rev.  Better today with nebs etc.  No fever. 9/15/17: hx reviewed.  Still with productive cough and some shortness of breath.  Afebrile.  Loose stools with pasty diarrhea.  Some blood in nasal secretions but not sputum.  Rash in left AC area, very pruritic. Overall, though feeling much improved.    ROS:  No f/c/s. No n/v/d. No rash. No new ADR to Abx.     Constitutional-- No Fever, chills or sweats.  Appetite good, and no malaise. No fatigue.  Heent-- No new vision, hearing or throat complaints.  No epistaxis or oral sores.  Denies odynophagia or dysphagia.  No flashers, floaters or eye pain. No headache, photophobia or neck stiffness. Some blood in nasal secretions.   CV-- No chest pain, palpitation or syncope  Resp-- SOB/cough With small amt of  sputum/no Hemoptysis  GI- No nausea, vomiting, has pasty diarrhea.  No hematochezia, melena, or hematemesis. Denies jaundice or chronic liver disease.  -- No dysuria, hematuria, or flank pain.  Denies hesitancy, urgency or flank pain.  Lymph- no swollen lymph nodes in neck/axilla or groin.   Heme- No active bruising or bleeding; no Hx of DVT or PE.  MS-- no swelling or pain in the bones or joints of arms/legs.  No new back pain.  Neuro-- No acute focal weakness or numbness in the arms or legs.  No seizures.  Skin--No rash, nodules, blisters.    Objective   PE:  Vital Signs  Temp  Min: 96.5 °F (35.8 °C)  Max: 97.8 °F (36.6 °C)  BP  Min: 115/69  Max: 117/69  Pulse  Min: 85  Max: 101  Resp  Min: 12  Max: 18  SpO2  Min: 86 %  Max: 93 %    GENERAL: Awake and alert, in moderate distress.  Appears older than stated age.  HEENT: Normocephalic, atraumatic.  PERRL. EOMI. No conjunctival injection. No icterus. Oropharynx clear without evidence of thrush or exudate. No evidence of peridontal disease.    NECK: Supple without nuchal rigidity. No mass.  LYMPH: No cervical, axillary or inguinal lymphadenopathy.  HEART: RRR; No murmur, rubs, gallops.   LUNGS: Sc. Rales bilateral left. Diminished right lung. Normal respiratory effort. Nonlabored. No dullness.  No wheeze.  ABDOMEN: Soft, nontender, nondistended. Positive bowel sounds. No rebound or guarding. NO mass or HSM.Obese.  EXT:  No cyanosis, clubbing or edema. No cord.  MSK: FROM without joint effusions noted arms/legs.    SKIN: Warm and dry without cutaneous eruptions on Inspection/palpation.    NEURO: Oriented to PPT. No focal deficits on motor/sensory exam at arms/legs.    Laboratory Data      Results from last 7 days  Lab Units 09/15/17  0429 09/13/17  0458 09/12/17  1019   WBC 10*3/mm3 4.48 6.90 7.00   HEMOGLOBIN g/dL 11.8 11.7 12.4   HEMATOCRIT % 35.6 37.0 36.0   PLATELETS 10*3/mm3 20* 24* 22*       Results from last 7 days  Lab Units 09/15/17  0429   SODIUM mmol/L 143   POTASSIUM mmol/L 3.5   CHLORIDE mmol/L 111*   CO2 mmol/L 20.0   BUN mg/dL 25*   CREATININE mg/dL 1.30   GLUCOSE mg/dL 120*   CALCIUM mg/dL 8.8       Results from last 7 days  Lab Units 09/15/17  0429   ALK PHOS U/L 138*   BILIRUBIN mg/dL 0.7   ALT (SGPT) U/L 10   AST (SGOT) U/L 11           Results from last 7 days  Lab Units 09/12/17  1019   CRP mg/dL 29.48*               Results from last 7 days  Lab Units 09/13/17  0458   LACTATE mmol/L 1.5     Estimated Creatinine Clearance: 38.7 mL/min (by C-G formula based on Cr of 1.3).      Microbiology:  Microbiology Results Abnormal     Procedure Component Value - Date/Time    Blood Culture [[id number]]  (Normal) Collected:  09/12/17 1015    Lab Status:  Preliminary result Specimen:  Blood from Arm, Left Updated:  09/15/17 1101     Blood  Culture No growth at 3 days    Blood Culture [[id number]]  (Normal) Collected:  09/12/17 1005    Lab Status:  Preliminary result Specimen:  Blood from Arm, Left Updated:  09/15/17 1101     Blood Culture No growth at 3 days    Respiratory Culture [[id number]] Collected:  09/13/17 1045    Lab Status:  Final result Specimen:  Sputum from Lung, Right Lower Lobe Updated:  09/15/17 0822     Respiratory Culture Light growth (2+) Normal Respiratory Nya     Gram Stain Result Greater than 25 WBCs seen      Less than 10 Epithelial cells seen      Few (2+) Gram positive cocci    Respiratory Panel, PCR [[id number]]  (Abnormal) Collected:  09/13/17 1735    Lab Status:  Final result Specimen:  Swab from Nasopharynx Updated:  09/14/17 2011     Adenovirus Detection by PCR Not Detected     Coronavirus HKU1 Not Detected     Coronavirus NL63 Not Detected     Coronavirus 229E Not Detected     Coronavirus OC43 Not Detected     Human Metapneumovirus Not Detected     Human Rhinovirus/Enterovirus Detected (A)     Influenza A PCR Not Detected     Influenza A H1 Not Detected     Influenza 2009 H1N1 by PCR Not Detected     Influenza A H3 Not Detected     Influenza B PCR Not Detected     Parainfluenza Virus 1 Not Detected     Parainfluenza Virus 2 Not Detected     Parainfluenza Virus 3 Not Detected     Parainfluenza Virus 4 Not Detected     Respiratory Syncytial Virus Not Detected     Bordetella pertussis pcr Not Detected     Chlamydophila pneumoniae PCR Not Detected     Mycoplasma pneumo by PCR Not Detected    Narrative:       Performed at:  [address] [address]  [id number]  : Bj Camacho MD, Phone:  [phone]    S. Pneumo Ag Urine or CSF [[id number]] Collected:  09/12/17 1456    Lab Status:  Final result Specimen:  Urine from Urine, Clean Catch Updated:  09/14/17 1519     Specimen Source Urine     STREP PNEUMONIAE ANTIGEN Negative     Body Fluid Culture, Sterile Not Indicated     Organism ID  Not indicated.    Narrative:       Performed at:  [address]  [id number]  : Bj Camacho MD, Phone:  [phone]    Urine Culture [[id number]]  (Normal) Collected:  09/12/17 1456    Lab Status:  Final result Specimen:  Urine from Urine, Clean Catch Updated:  09/14/17 0919     Urine Culture No growth at 2 days    KOH Prep [[id number]]  (Normal) Collected:  09/13/17 1601    Lab Status:  Final result Specimen:  Swab from Lung Updated:  09/13/17 1614     KOH Prep No yeast or hyphal elements seen    Influenza A & B, RT PCR [[id number]]  (Normal) Collected:  09/12/17 1451    Lab Status:  Final result Specimen:  Swab from Nasopharynx Updated:  09/12/17 1759     Influenza A PCR Not Detected     Influenza B PCR Not Detected          Radiology:  Imaging Results (last 24 hours)     Procedure Component Value Units Date/Time    CT Chest Without Contrast [[id number]] Collected:  09/14/17 1044     Updated:  09/14/17 2308    Narrative:       EXAMINATION: CT CHEST WO CONTRAST-09/14/2017:      INDICATION: Hypoxia, pneumonia; J18.9-Pneumonia, unspecified organism;  R09.02-Hypoxemia; D69.6-Thrombocytopenia, unspecified.         TECHNIQUE: Spiral acquisition 5 mm axial images through the chest and  upper abdomen.     The radiation dose reduction device was turned on for each scan per the  ALARA (As Low as Reasonably Achievable) protocol.     COMPARISON: Portable chest radiograph 09/13/2017.     FINDINGS: Previous exam report indicates chronic appearing lung changes  similar to prior studies. History indicates pneumonia and hypoxia.     Today's exam shows an extensive right lower lobe pneumonia,  significantly greater than would be expected from the appearance on  yesterday's chest radiograph. There is mild subpleural interstitial  disease of the medial left upper lobe which is favored to be chronic.  There is a trace amount of disease in the lingula which is nonspecific,  and  similar milder right middle lobe interstitial changes favored to  represent pneumonia, perhaps via transpleural spread. There is no  effusion. Mediastinal window images show no evidence of significant  adenopathy and no pericardial effusion.     Included images of the upper abdomen show no significant abnormalities  of the visualized portions of the liver, spleen, pancreas, adrenal  glands, or upper renal poles. A debris layer or layer of noncalcified  small gallstones is noted in the gallbladder. No inflammatory changes  are seen.       Impression:       1. Extensive right lower lobe pneumonia. Mild right middle lobe and  lingular disease. No evidence of effusion.  2. No other evidence of active chest disease elsewhere.  3. Gallbladder sludge versus noncalcified gallstones.     D:  09/14/2017  E:  09/14/2017           This report was finalized on 9/14/2017 11:06 PM by DR. Manjit Schofield MD.             I personally reviewed the radiographic studies     Assessment/Plan   IMPRESSION:     1.  Right greater than left lower lobe pneumonia in an immunocompromised host.  Increased risk for progression to respiratory failure.  Usual organisms are staph, strep, possible gram-negative rods given her COPD.  Less likely mycobacterial or fungal.  Remains quite ill but better today, and maybe the nebs having effect.  Respiratory panel is positive for Rhinovirus/Enterovirus which probably was the primary culprit with possible secondary infection (elevated PCT).    2.  Possible sepsis present on admission with elevated pro-calcitonin level, thrombocytopenia, and a bone marrow transplantation.  Probably from pulmonary source.  3.  Acute hypoxic respiratory failure.  Ongoing.  4.  Underlying COPD with no smoking since 2015.  5.  Thrombocytopenia, probably related to her underlying previous myeloma and bone marrow transplant versus other.  Worse.  6.  Multiple myeloma status post stem cell transplant 2015 at the [facility]  [place].  7.  Hypokalemia 3.1.  8.  Elevated alkaline phosphatase 138, probably related to bone marrow process.  9.  Cholestasis with bilirubin 0.7, probably related to medications versus other.  10.  Acute renal failure creatinine 1.3.  May have underlying CK D.  11.  Hemoptysis.  CT pending.  Could some of this be alveolar hemorrhage with her severe thrombocytopenia. Improved     Plan:     1.  Diagnostically, continue to follow patient's physical exam, CBC, CMP, CRP, lactic acid, pro-calcitonin level, respiratory panel PCR (+Rhinovirus/Enterovirus), blood cultures ×2, sputum CNS, radiographic studies, fungal serologies, QuantiFERON gold TB assay (pending), serum cryptococcal antigen (negative), histoplasma urine antigen (pending), sputum for fungus and AFB, and ABGs. Strep pneumo UAg negative. CT chest with extensive RLL pneumonia.  Check C diff.  2.  Therapeutically, consider Zosyn and doxycycline.  D/c Teflaro.  Avoiding vanc given her renal failure.  Can probably d/c on Monday on oral doxycycline and cefuroxime.  Duration 9/21/17.   3.  O2 supportive care.  4.  Pulmonary following.    Increased risk for adverse drug reactions,  progressive respiratory failure, Death.  Discussed with the patient's , and Dr. Bender.  D/w family    Mario Odell MD  9/15/2017

## 2017-09-15 NOTE — PROGRESS NOTES
Continued Stay Note  [facility]     Patient Name: Doris Miranda  MRN: [id number]  Today's Date: 9/15/2017    Admit Date: 9/12/2017          Discharge Plan       09/15/17 1043    Case Management/Social Work Plan    Additional Comments CM has contacted [facility] about pt discharging next week. They have requested an updated room air sat on day of discharge. This will be completed.              Discharge Codes     None        Expected Discharge Date and Time     Expected Discharge Date Expected Discharge Time    Sep 15, 2017             Nicole Baca RN

## 2017-09-15 NOTE — PROGRESS NOTES
Continued Stay Note  [facility]     Patient Name: Doris Miranda  MRN: [id number]  Today's Date: 9/15/2017    Admit Date: 9/12/2017          Discharge Plan  Consent obtained for the participation in the [facility] Transitions Program. Rita Ruiz RN          09/15/17 1043    Case Management/Social Work Plan    Additional Comments CM has contacted [facility] about pt discharging next week. They have requested an updated room air sat on day of discharge. This will be completed.              Discharge Codes     None        Expected Discharge Date and Time     Expected Discharge Date Expected Discharge Time    Sep 15, 2017             Rita Ruiz RN

## 2017-09-15 NOTE — PLAN OF CARE
Problem: Patient Care Overview (Adult)  Goal: Plan of Care Review  Outcome: Ongoing (interventions implemented as appropriate)    09/15/17 0138   Coping/Psychosocial Response Interventions   Plan Of Care Reviewed With patient   Patient Care Overview   Progress progress towards functional goals is fair   Outcome Evaluation   Outcome Summary/Follow up Plan vital signs stable, pt still requires oxygen on 2 l pt 92%, iv abx continued         Problem: Pneumonia (Adult)  Goal: Signs and Symptoms of Listed Potential Problems Will be Absent or Manageable (Pneumonia)  Outcome: Ongoing (interventions implemented as appropriate)    Problem: Fall Risk (Adult)  Goal: Identify Related Risk Factors and Signs and Symptoms  Outcome: Ongoing (interventions implemented as appropriate)

## 2017-09-15 NOTE — PLAN OF CARE
Problem: Patient Care Overview (Adult)  Goal: Plan of Care Review  Outcome: Ongoing (interventions implemented as appropriate)  Goal: Adult Individualization and Mutuality  Outcome: Ongoing (interventions implemented as appropriate)  Goal: Discharge Needs Assessment  Outcome: Ongoing (interventions implemented as appropriate)    Problem: Pneumonia (Adult)  Goal: Signs and Symptoms of Listed Potential Problems Will be Absent or Manageable (Pneumonia)  Outcome: Ongoing (interventions implemented as appropriate)    Problem: Fall Risk (Adult)  Goal: Identify Related Risk Factors and Signs and Symptoms  Outcome: Ongoing (interventions implemented as appropriate)  Goal: Absence of Falls  Outcome: Ongoing (interventions implemented as appropriate)

## 2017-09-15 NOTE — PROGRESS NOTES
Pulmonary Inpatient Follow Up     REFERRING PHYSICIAN:  Dr. Bender      Chief Complaint: Shortness of breath, cough         SUBJECTIVE     Ms. Miranda is a 66yo F with a history of multiple myeloma s/p autologous stem cell transplant on 3/11/2017 who presented on 9/12 with cough and shortness of breath. She notes that she first developed a cough approximately 4 weeks ago. This worsened in the last 2 weeks and she now has productive green sputum with fevers over the past 3 months. Her Tmax was 101.     She has not been on any therapy for multiple myeloma since her transplant. She has not been on Velcade or Revlimid due to thrombocytopenia. She has not been on Prednisone. She is only on Zometa for fracture prevention. She has been told that she had COPD in the past and that she needed home oxygen. She, however, did not like the home O2 and sent the tanks back.     Since admission, she has been started on Ceftaroline, Zosyn, and Doxycycline. She notes some improvement in her symptoms with inhalers. Her cough has improved and she is afebrile.     Interval History:  No events overnight. She feels that her breathing is better with the addition of steroids for COPD exacerbation.         PMH: She  has a past medical history of Arthritis; Chronic bronchitis; COPD (chronic obstructive pulmonary disease); Hypertension; Multiple myeloma; and Multiple myeloma, failed remission.   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            Allergies: She has No Known Allergies.   FH: Her family history includes Bone cancer in her other; Breast cancer in her other; Liver cancer in her other; Lung cancer in her other.   SH: She  reports that she quit smoking about 2 years ago. She has never used smokeless tobacco. She reports that she does not drink alcohol or use illicit drugs.     The patient's relevant past medical, surgical and social history were reviewed and updated in  Epic as appropriate.    ROS (14):   Review of Systems  As described in the HPI. Otherwise rest of ROS (14 systems) were negative.      Objective   OBJECTIVE     Physical Examination   Vitals:    09/15/17 0755 09/15/17 0836 09/15/17 1044 09/15/17 1208   BP: 117/69      BP Location: Right arm      Patient Position: Lying      Pulse: 85 89  90   Resp: 16 12     Temp: 97.8 °F (36.6 °C)      TempSrc: Oral      SpO2:  90% (!) 86% 93%   Weight:       Height:           Body mass index is 30.99 kg/(m^2).    Physical Examination    Constitutional:  No distress.   Neck:  Oropharynx clear.  Normal range of motion. Neck supple.   No JVD present. No tracheal deviation present.  No thyromegaly present.    Cardiovascular: Tachycardic. Normal heart sounds.  No murmurs, gallop or rub.  No peripheral edema.   Respiratory: No respiratory distress. Normal respiratory effort.  Diminished breath sounds bilaterally. No wheezing.   Prolonged expiratory phase.     Abdominal:  Soft. No masses. Non-tender. No distension. No HSM.   Extremities: No digital cyanosis. No clubbing.   Lymphadenopathy: None.   Skin: Skin is warm and dry. No rashes, lesions or ulcers noted.    Neurological:   Alert and Oriented to person, place, and time.    Psychiatric:  Normal affect. Normal behavior.     Diagnostic Data    No new imaging available for review.     Lab Results (last 24 hours)     Procedure Component Value Units Date/Time    CBC & Differential [[id number]] Collected:  09/15/17 0429    Specimen:  Blood Updated:  09/15/17 0653    Narrative:       The following orders were created for panel order CBC & Differential.  Procedure                               Abnormality         Status                     ---------                               -----------         ------                     Scan Slide[[id number]]                   Normal              Final result               CBC Auto Differential[[id number]]        Abnormal            Final result                  Please view results for these tests on the individual orders.    Comprehensive Metabolic Panel [[id number]]  (Abnormal) Collected:  09/15/17 0429    Specimen:  Blood Updated:  09/15/17 0604     Glucose 120 (H) mg/dL      BUN 25 (H) mg/dL      Creatinine 1.30 mg/dL      Sodium 143 mmol/L      Potassium 3.5 mmol/L      Chloride 111 (H) mmol/L      CO2 20.0 mmol/L      Calcium 8.8 mg/dL      Total Protein 6.8 g/dL      Albumin 3.60 g/dL      ALT (SGPT) 10 U/L      AST (SGOT) 11 U/L      Alkaline Phosphatase 138 (H) U/L      Total Bilirubin 0.7 mg/dL      eGFR Non African Amer 41 (L) mL/min/1.73      Globulin 3.2 gm/dL      A/G Ratio 1.1 (L) g/dL      BUN/Creatinine Ratio 19.2     Anion Gap 12.0 (H) mmol/L     Narrative:       National [employer] Guidelines    Stage     Description        GFR  1         Normal or High     90+  2         Mild decrease      60-89  3         Moderate decrease  30-59  4         Severe decrease    15-29  5         Kidney failure     <15    CBC Auto Differential [[id number]]  (Abnormal) Collected:  09/15/17 0429    Specimen:  Blood Updated:  09/15/17 0559     WBC 4.48 10*3/mm3      RBC 3.85 (L) 10*6/mm3      Hemoglobin 11.8 g/dL      Hematocrit 35.6 %      MCV 92.5 fL      MCH 30.6 pg      MCHC 33.1 g/dL      RDW 13.6 %      RDW-SD 46.1 fl      Platelets 20 (C) 10*3/mm3      Neutrophil % 89.1 (H) %      Lymphocyte % 7.1 (L) %      Monocyte % 2.9 %      Eosinophil % 0.0 %      Basophil % 0.0 %      Immature Grans % 0.9 (H) %      Neutrophils, Absolute 3.99 10*3/mm3      Lymphocytes, Absolute 0.32 (L) 10*3/mm3      Monocytes, Absolute 0.13 10*3/mm3      Eosinophils, Absolute 0.00 10*3/mm3      Basophils, Absolute 0.00 10*3/mm3      Immature Grans, Absolute 0.04 (H) 10*3/mm3     Scan Slide [[id number]]  (Normal) Collected:  09/15/17 0429    Specimen:  Blood Updated:  09/15/17 0653     RBC Morphology Normal     WBC Morphology Normal     Platelet Morphology Normal    Immunoglobulins Quant  [[id number]] Collected:  09/15/17 1019    Specimen:  Blood Updated:  09/15/17 1029          I have reviewed the patient's new laboratory results.       Assessment/Plan   ASSESSMENT / PLAN     Assessment:    1. Acute vs Chronic Hypoxic Respiratory Failure  1. Told she needed O2 in the past, not using  2. Currently requiring 2L NC  2. Right Lower Lobe Pneumonia consistent with CAP  1. ABX: Ceftaroline, Zosyn, Doxycycline  2. CX: Light growth normal respiratory izzy  3. COPD, unknown stage  1. No home medications  2. No PFTs in our system  3. Currently on Duonebs    Ms. Miranda is a 68yo F with multiple myeloma who is s/p autologous stem cell transplant in March 2016, not on any immunosuppressive therapy, who presented with fever and cough and was found to have a right lower lobe pneumonia. She likely has chronic hypoxic respiratory failure and is not using home oxygen. At this time, I do not feel that she represents an immunocompromised patient as she has not been on any immunosuppressive therapy in quite some time and she is not neutropenic. She does not have any appreciable risk factors for pcp pneumonia or invasive fungal infection. I do not feel that bronchoscopy is required at this time. If her pneumonia worsens despite appropriate therapy, bronchoscopy should be considered at that time.     Her echocardiogram shows an elevated RVSP of 48 and she may have some element of pulmonary hypertension likely WHO Group III secondary to lung disease.     Plan:  1. Continue antibiotics for pneumonia. May be able to de-escalate to CAP coverage as she does not appear to be immunocompromised.   2. Continue treatment for COPD exacerbation. Can likely change to PO steroids tomorrow.   3.  I have counseled her extensively on the importance of wearing her oxygen.       I have discussed my recommendations with Dr. Bender. We will not plan to see the patient over the weekend. Please let pulmonary know of any questions or concerns over  the weekend.       I discussed the diagnosis, evaluation, and  treatment options with the patient and/or appropriate family members.    Thank you very much for allowing me to participate in the care of your patient.    I spent 20 minutes with the patient and family. I spent > 50% percent of this time counseling and discussing diagnosis, current status and management.    Aleida Jones, DO  Pulmonary and Critical Care Medicine    C.C.:  Patient Care Team:  Earl Fuentes MD as PCP - General  Earl Fuentes MD as PCP - Family Medicine  Joseph Mckinley MD as PCP - [facility]  Josiah Euceda MD as Consulting Physician (Hematology and Oncology)  Deandra Howard RN as Care Coordinator (Population Health)

## 2017-09-16 LAB
IGA SERPL-MCNC: 171 MG/DL (ref 87–352)
IGG SERPL-MCNC: 554 MG/DL (ref 700–1600)
IGM SERPL-MCNC: 32 MG/DL (ref 26–217)
TOTAL IGE SMQN RAST: 37 IU/ML (ref 0–100)

## 2017-09-16 PROCEDURE — 99232 SBSQ HOSP IP/OBS MODERATE 35: CPT | Performed by: INTERNAL MEDICINE

## 2017-09-16 PROCEDURE — 25010000002 PIPERACILLIN SOD-TAZOBACTAM PER 1 G: Performed by: FAMILY MEDICINE

## 2017-09-16 PROCEDURE — 94640 AIRWAY INHALATION TREATMENT: CPT

## 2017-09-16 PROCEDURE — 94799 UNLISTED PULMONARY SVC/PX: CPT

## 2017-09-16 PROCEDURE — 94760 N-INVAS EAR/PLS OXIMETRY 1: CPT

## 2017-09-16 PROCEDURE — 63710000001 PREDNISONE PER 1 MG: Performed by: FAMILY MEDICINE

## 2017-09-16 RX ADMIN — BUDESONIDE 0.5 MG: 0.5 INHALANT RESPIRATORY (INHALATION) at 21:27

## 2017-09-16 RX ADMIN — IPRATROPIUM BROMIDE AND ALBUTEROL SULFATE 3 ML: .5; 3 SOLUTION RESPIRATORY (INHALATION) at 21:27

## 2017-09-16 RX ADMIN — PANTOPRAZOLE SODIUM 40 MG: 40 TABLET, DELAYED RELEASE ORAL at 09:09

## 2017-09-16 RX ADMIN — CYCLOBENZAPRINE HYDROCHLORIDE 10 MG: 10 TABLET, FILM COATED ORAL at 21:54

## 2017-09-16 RX ADMIN — IPRATROPIUM BROMIDE AND ALBUTEROL SULFATE 3 ML: .5; 3 SOLUTION RESPIRATORY (INHALATION) at 08:08

## 2017-09-16 RX ADMIN — TAZOBACTAM SODIUM AND PIPERACILLIN SODIUM 3.38 G: 375; 3 INJECTION, SOLUTION INTRAVENOUS at 09:12

## 2017-09-16 RX ADMIN — IPRATROPIUM BROMIDE AND ALBUTEROL SULFATE 3 ML: .5; 3 SOLUTION RESPIRATORY (INHALATION) at 12:33

## 2017-09-16 RX ADMIN — DOXYCYCLINE HYCLATE 100 MG: 100 TABLET, COATED ORAL at 21:42

## 2017-09-16 RX ADMIN — DOXYCYCLINE HYCLATE 100 MG: 100 TABLET, COATED ORAL at 09:12

## 2017-09-16 RX ADMIN — Medication 5 MG: at 21:42

## 2017-09-16 RX ADMIN — PREGABALIN 100 MG: 100 CAPSULE ORAL at 09:08

## 2017-09-16 RX ADMIN — ACYCLOVIR 400 MG: 200 CAPSULE ORAL at 09:12

## 2017-09-16 RX ADMIN — TAZOBACTAM SODIUM AND PIPERACILLIN SODIUM 3.38 G: 375; 3 INJECTION, SOLUTION INTRAVENOUS at 17:58

## 2017-09-16 RX ADMIN — DOCUSATE SODIUM 100 MG: 100 CAPSULE, LIQUID FILLED ORAL at 09:08

## 2017-09-16 RX ADMIN — BUDESONIDE 0.5 MG: 0.5 INHALANT RESPIRATORY (INHALATION) at 08:08

## 2017-09-16 RX ADMIN — PREDNISONE 40 MG: 20 TABLET ORAL at 09:10

## 2017-09-16 RX ADMIN — PREGABALIN 100 MG: 100 CAPSULE ORAL at 21:42

## 2017-09-16 RX ADMIN — ACYCLOVIR 400 MG: 200 CAPSULE ORAL at 21:43

## 2017-09-16 RX ADMIN — Medication 250 MG: at 09:09

## 2017-09-16 RX ADMIN — Medication 250 MG: at 17:58

## 2017-09-16 RX ADMIN — IPRATROPIUM BROMIDE AND ALBUTEROL SULFATE 3 ML: .5; 3 SOLUTION RESPIRATORY (INHALATION) at 16:56

## 2017-09-16 RX ADMIN — TAZOBACTAM SODIUM AND PIPERACILLIN SODIUM 3.38 G: 375; 3 INJECTION, SOLUTION INTRAVENOUS at 03:26

## 2017-09-16 NOTE — PROGRESS NOTES
[facility] Progress Note    Admission Date: 9/12/2017    Doris Miranda  1950  [id number]    Date: 9/16/2017    Antibiotics:  IV Anti-Infectives     Ordered     Dose/Rate Route Frequency Start Stop    09/14/17 1650  piperacillin-tazobactam (ZOSYN) 3.375 g in iso-osmotic dextrose 50 ml (premix)     Ordering Provider:  Flavia Bender MD    3.375 g  over 4 Hours Intravenous Every 8 Hours 09/14/17 1800      09/12/17 1611  doxycycline (VIBRAMYICN) tablet 100 mg     Ordering Provider:  Mario Odell MD    100 mg Oral Every 12 Hours Scheduled 09/12/17 2100      09/12/17 2009  acyclovir (ZOVIRAX) capsule 400 mg     Ordering Provider:  Yoav Bryant MD    400 mg Oral Every 12 Hours Scheduled 09/12/17 2100      09/12/17 1615  piperacillin-tazobactam (ZOSYN) 3.375 g in iso-osmotic dextrose 50 ml (premix)     Ordering Provider:  Mario Odell MD    3.375 g  over 30 Minutes Intravenous Once 09/12/17 1700 09/12/17 1849    09/12/17 1039  cefTRIAXone (ROCEPHIN) IVPB 1 g     Ordering Provider:  SUMI Valentin    1 g  over 30 Minutes Intravenous Once 09/12/17 1041 09/12/17 1133    09/12/17 1039  AZITHROMYCIN 500 MG/250 ML 0.9% NS IVPB (MBP)     Ordering Provider:  SUMI Valentin    500 mg  over 60 Minutes Intravenous Once 09/12/17 1041 09/12/17 1356      /       CC:  pneumonia    HPI:  Patient is a very pleasant  67 y.o.  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 2015 in remission, who complained about increasing shortness of breath with cough and green sputum production since 8/25/17.  She denied any ill contacts, zoonotic exposures, travel history, or smoking times years.  She was admitted to [facility] on 9/12/17.  I was consulted on 9/12/17 by Dr. Bryant.  Urinalysis was negative, creatinine 1.6, alkaline phosphatase 126, total bilirubin 1.6, lactate 1.0, pro-calcitonin 3.08, BNP  147, WBC 7.0, hemoglobin 12.4, platelets 22,000, 83% neutrophils, 4% lymphocytes.  Chest x-ray with bibasilar increased infiltrates right greater than left with an increased interstitial pattern bilateral.  Cardiac size normal.    9/13/17 history reviewed.  Still with difficulties breathing.  Some improvement with nebulized treatment.  Minimum sputum production.  No hemoptysis.  9/14/17 hx rev.  Better today with nebs etc.  No fever. 9/15/17: hx reviewed.  Still with productive cough and some shortness of breath.  Afebrile.  Loose stools with pasty diarrhea.  Some blood in nasal secretions but not sputum.  Rash in left AC area, very pruritic. Overall, though feeling much improved. 9/16/17:  Hx reviewed.  SOA improved on RA.  Pale green sputum.  Rash left ac resolved.  No diarrhea.  Unable to send sample for cdiff because diarrhea resolved.    ROS:  No f/c/s. No n/v/d. No rash. No new ADR to Abx.     Constitutional-- No Fever, chills or sweats.  Appetite good, and no malaise. No fatigue.  Heent-- No new vision, hearing or throat complaints.  No epistaxis or oral sores.  Denies odynophagia or dysphagia.  No flashers, floaters or eye pain. No headache, photophobia or neck stiffness. Some blood in nasal secretions.   CV-- No chest pain, palpitation or syncope  Resp-- SOB/cough With small amt of  sputum/no Hemoptysis  GI- No nausea, vomiting, diarrhea.  No hematochezia, melena, or hematemesis. Denies jaundice or chronic liver disease.  -- No dysuria, hematuria, or flank pain.  Denies hesitancy, urgency or flank pain.  Lymph- no swollen lymph nodes in neck/axilla or groin.   Heme- No active bruising or bleeding; no Hx of DVT or PE.  MS-- no swelling or pain in the bones or joints of arms/legs.  No new back pain.  Neuro-- No acute focal weakness or numbness in the arms or legs.  No seizures.  Skin--No rash, nodules, blisters.    Objective   PE:  Vital Signs  Temp  Min: 97.6 °F (36.4 °C)  Max: 97.8 °F (36.6 °C)  BP  Min:  100/49  Max: 117/69  Pulse  Min: 85  Max: 102  Resp  Min: 12  Max: 20  SpO2  Min: 86 %  Max: 93 %    GENERAL: Awake and alert, in mild distress. Appears older than stated age.  HEENT: Normocephalic, atraumatic.  PERRL. EOMI. No conjunctival injection. No icterus. Oropharynx clear without evidence of thrush or exudate. No evidence of peridontal disease.    NECK: Supple without nuchal rigidity. No mass.  LYMPH: No cervical, axillary or inguinal lymphadenopathy.  HEART: RRR; No murmur, rubs, gallops.  No JVD.  LUNGS: Diminished right lung. Normal respiratory effort. Nonlabored. No dullness.  No wheeze.  ABDOMEN: Soft, nontender, nondistended. Positive bowel sounds. No rebound or guarding. NO mass or HSM.Obese.  EXT:  No cyanosis, clubbing or edema. No cord.  MSK: FROM without joint effusions noted arms/legs.    SKIN: Warm and dry without cutaneous eruptions on Inspection/palpation.    NEURO: Oriented to PPT. No focal deficits on motor/sensory exam at arms/legs.    Laboratory Data      Results from last 7 days  Lab Units 09/15/17  0429 09/13/17  0458 09/12/17  1019   WBC 10*3/mm3 4.48 6.90 7.00   HEMOGLOBIN g/dL 11.8 11.7 12.4   HEMATOCRIT % 35.6 37.0 36.0   PLATELETS 10*3/mm3 20* 24* 22*       Results from last 7 days  Lab Units 09/15/17  0429   SODIUM mmol/L 143   POTASSIUM mmol/L 3.5   CHLORIDE mmol/L 111*   CO2 mmol/L 20.0   BUN mg/dL 25*   CREATININE mg/dL 1.30   GLUCOSE mg/dL 120*   CALCIUM mg/dL 8.8       Results from last 7 days  Lab Units 09/15/17  0429   ALK PHOS U/L 138*   BILIRUBIN mg/dL 0.7   ALT (SGPT) U/L 10   AST (SGOT) U/L 11           Results from last 7 days  Lab Units 09/12/17  1019   CRP mg/dL 29.48*               Results from last 7 days  Lab Units 09/13/17  0458   LACTATE mmol/L 1.5     Estimated Creatinine Clearance: 38.7 mL/min (by C-G formula based on Cr of 1.3).      Microbiology:  Microbiology Results Abnormal     Procedure Component Value - Date/Time    Blood Culture [[id number]]  (Normal)  Collected:  09/12/17 1015    Lab Status:  Preliminary result Specimen:  Blood from Arm, Left Updated:  09/15/17 1101     Blood Culture No growth at 3 days    Blood Culture [[id number]]  (Normal) Collected:  09/12/17 1005    Lab Status:  Preliminary result Specimen:  Blood from Arm, Left Updated:  09/15/17 1101     Blood Culture No growth at 3 days    Respiratory Culture [[id number]] Collected:  09/13/17 1045    Lab Status:  Final result Specimen:  Sputum from Lung, Right Lower Lobe Updated:  09/15/17 0822     Respiratory Culture Light growth (2+) Normal Respiratory Nya     Gram Stain Result Greater than 25 WBCs seen      Less than 10 Epithelial cells seen      Few (2+) Gram positive cocci    Respiratory Panel, PCR [[id number]]  (Abnormal) Collected:  09/13/17 1735    Lab Status:  Final result Specimen:  Swab from Nasopharynx Updated:  09/14/17 2011     Adenovirus Detection by PCR Not Detected     Coronavirus HKU1 Not Detected     Coronavirus NL63 Not Detected     Coronavirus 229E Not Detected     Coronavirus OC43 Not Detected     Human Metapneumovirus Not Detected     Human Rhinovirus/Enterovirus Detected (A)     Influenza A PCR Not Detected     Influenza A H1 Not Detected     Influenza 2009 H1N1 by PCR Not Detected     Influenza A H3 Not Detected     Influenza B PCR Not Detected     Parainfluenza Virus 1 Not Detected     Parainfluenza Virus 2 Not Detected     Parainfluenza Virus 3 Not Detected     Parainfluenza Virus 4 Not Detected     Respiratory Syncytial Virus Not Detected     Bordetella pertussis pcr Not Detected     Chlamydophila pneumoniae PCR Not Detected     Mycoplasma pneumo by PCR Not Detected    Narrative:       Performed at:  [address] [place] [address]  [id number]  : Bj Camacho MD, Phone:  [phone]    S. Pneumo Ag Urine or CSF [[id number]] Collected:  09/12/17 1457    Lab Status:  Final result Specimen:  Urine from Urine, Clean Catch Updated:  09/14/17  1519     Specimen Source Urine     STREP PNEUMONIAE ANTIGEN Negative     Body Fluid Culture, Sterile Not Indicated     Organism ID Not indicated.    Narrative:       Performed at:  [address], [place], [place]  [id number]  : Bj Camacho MD, Phone:  [phone]    Urine Culture [[id number]]  (Normal) Collected:  09/12/17 1456    Lab Status:  Final result Specimen:  Urine from Urine, Clean Catch Updated:  09/14/17 0919     Urine Culture No growth at 2 days    KOH Prep [[id number]]  (Normal) Collected:  09/13/17 1601    Lab Status:  Final result Specimen:  Swab from Lung Updated:  09/13/17 1614     KOH Prep No yeast or hyphal elements seen    Influenza A & B, RT PCR [[id number]]  (Normal) Collected:  09/12/17 1451    Lab Status:  Final result Specimen:  Swab from Nasopharynx Updated:  09/12/17 1759     Influenza A PCR Not Detected     Influenza B PCR Not Detected          Radiology:  Imaging Results (last 24 hours)     ** No results found for the last 24 hours. **          I personally reviewed the radiographic studies     Assessment/Plan   IMPRESSION:     1.  Right greater than left lower lobe pneumonia.  Increased risk for progression to respiratory failure.  Usual organisms are staph, strep, possible gram-negative rods given her COPD.  Less likely mycobacterial or fungal.  Remains quite ill but better today, and maybe the nebs having effect.  Respiratory panel is positive for Rhinovirus/Enterovirus which probably was the primary culprit with possible secondary infection (elevated PCT).  Improved.  2.  Possible sepsis present on admission with elevated pro-calcitonin level, thrombocytopenia, and a bone marrow transplantation.  Probably from pulmonary source.  3.  Acute hypoxic respiratory failure.  Improved on RA.  4.  Underlying COPD with no smoking since 2015.  5.  Thrombocytopenia, probably related to her underlying previous myeloma and bone marrow transplant versus other.   Continued low.  6.  Multiple myeloma status post stem cell transplant 2015 at the [facility].  7.  Hypokalemia 3.1.  8.  Elevated alkaline phosphatase 138, probably related to bone marrow process.  9.  Cholestasis with bilirubin 0.7, probably related to medications versus other.  10.  Acute renal failure creatinine 1.3.  May have underlying CKD.  11.  Hemoptysis.  CT with Extensive right lower lobe pneumonia. Mild right middle lobe and  lingular disease. Resolved hemoptysis.  Exacerbated by her thrombocytopenia.     Plan:     1.  Diagnostically, continue to follow patient's physical exam, CBC, CMP, CRP, lactic acid, pro-calcitonin level, respiratory panel PCR (+Rhinovirus/Enterovirus), blood cultures ×2, sputum CNS, radiographic studies, fungal serologies, QuantiFERON gold TB assay (pending), serum cryptococcal antigen (negative), histoplasma urine antigen (neg), sputum for fungus and AFB, and ABGs. Strep pneumo UAg negative. CT chest with extensive RLL pneumonia.  Check C diff (pending collection).  2.  Therapeutically, consider Zosyn and doxycycline.  D/c Teflaro.  Avoiding vanc given her renal failure.  Can probably d/c on Monday on oral doxycycline and cefuroxime.  Duration 9/21/17.   3.  O2 supportive care.  4.  Pulmonary following.    Increased risk for adverse drug reactions,  progressive respiratory failure, Death.  Discussed with the patient's , and Dr. Bender.  D/w pt today    DAYTON Jj for Dr. Odell  9/16/2017

## 2017-09-16 NOTE — PROGRESS NOTES
"      HOSPITALIST DAILY PROGRESS NOTE    Chief Complaint: cough and fever    Subjective   SUBJECTIVE/OVERNIGHT EVENTS   The patient continues to make slow progress.  She is less dyspneic.  Afebrile.  Resolved cough.  Improving appetite.  No nausea, vomiting, abdominal pain or diarrhea.  No urinary symptoms.    Review of Systems:  Gen-no fevers, no chills  CV-no chest pain, no palpitations  Resp-+ cough, + dyspnea  GI-no N/V/D, no abd pain    Otherwise complete ROS is negative except as mentioned in the HPI.    Objective   OBJECTIVE   I have reviewed the vital signs.  /49 (BP Location: Left arm, Patient Position: Lying)  Pulse 100  Temp 97.6 °F (36.4 °C) (Oral)   Resp 20  Ht 61\" (154.9 cm)  Wt 164 lb (74.4 kg)  SpO2 91%  BMI 30.99 kg/m2    Physical Exam:  Gen-this lady chronically ill appearing inno acute distress  CV-tachy RR, S1 S2 normal, no m/r/g  Resp-Nonlabored respirations at rest, no longer has dyspnea with conversation, no wheeze, posterior and lateral right mid and base rales very slight and much improved from previous  Abd-soft, NT, ND, +BS.  No palpable masses.  Ext-no edema, skin lesions or rashes  Neuro-A&Ox3, no focal deficits  Psych-appropriate mood    Results:  I have reviewed the labs, culture data, radiology results, and diagnostic studies.  1.  Troponin 0.    2.  Stable creatinine at 1.3 mg/dL  3.  Non-gap metabolic acidosis with a bicarbonate of 20 and a gap of 12.  Normal liver function tests  4.  Elevated pro-calcitonin  5.  Serum protein electrophoresis with immunofixation from August 21, 2017.  No M spike  6.  Hemoglobin 11.8.  Platelet count 20,000.    Positive Legionella antigen.    Radiology Results:  Imaging Results (last 24 hours)     ** No results found for the last 24 hours. **        I have reviewed the medications.    Assessment/Plan   ASSESSMENT/PLAN    Principal Problem:    Pneumonia of right lower lobe due to infectious organism  Active Problems:    Multiple " myeloma in remission    Hypertension    Chronic obstructive pulmonary disease with acute exacerbation    Gastroesophageal reflux disease without esophagitis    Thrombocytopenia since stem cell transplant March 2016    Hx of autologous stem cell transplant (March 2016)    CKD (chronic kidney disease), stage III    Former smoker    Non-rheumatic tricuspid valve insufficiency    Pulmonary hypertension    Chronic respiratory failure with hypoxia (been noncompliant with outpatient oxygen in past)      67 yof with hx of COPD (supposed to be on chronic oxygen but is noncompliant), HTN and MM s/p autologous stem cell transplant 3/2016 with subsequent chronic thrombocytopenia limiting her ability to be on immunosuppressive therapy, p/w fever and cough, admitted for suspected RLL pneumonia    Plan  - ID follows for abx therapy.  Initially it was thought that patient was immunocompromised due to questionable history of taking Revlimid therapy however upon further history since determined that patient was never on immunosuppressants status post her stem cell transplant due to chronic thrombocytopenia, therefore she is not immunocompromised.  - Teflaro d/c'd and remains on Zosyn and doxy.  Tentative plan is d/c ~Monday on PO cefuroxime and doxy.  - More aggressive treatment of COPD component of her respiratory status, 9/14/17 added Pulmicort nebs and Solu-Medrol with good improvement.  Will transition to PO prednisone.    - Patient with elevated BNP, ECHO with no evidence of CHF but does confirm pulmonary hypertension with tricuspid regurgitation (RVSP 48mmHg)  - continue to monitor platelets, currently lower than her previous bradford, appears her baseline is ~40. Likely secondary to acute illness.  Followed by Dr. Epps, per d/w with 9/15/17 he added immunoglobin panel and may consider giving her IVIG outpatient infusion if indicated.  At d/c she will need close follow-up with him for lab surveillance.  - Will need home O2 arranged  as patient previously was told she needed chronic O2 but was noncompliant.  CM orders complete.         DVT ppx- mechanical due to thrombocytopenia    Dispo -  tentative plan is home ~Monday      Jose Jara MD  09/16/17  8:08 AM

## 2017-09-16 NOTE — PLAN OF CARE
Problem: Patient Care Overview (Adult)  Goal: Plan of Care Review  Outcome: Ongoing (interventions implemented as appropriate)    09/16/17 0319   Coping/Psychosocial Response Interventions   Plan Of Care Reviewed With patient   Patient Care Overview   Progress progress toward functional goals is gradual   Outcome Evaluation   Outcome Summary/Follow up Plan Oxygen required pt alert and oriented x 3. IV antibiotics continued         Problem: Pneumonia (Adult)  Goal: Signs and Symptoms of Listed Potential Problems Will be Absent or Manageable (Pneumonia)  Outcome: Ongoing (interventions implemented as appropriate)    Problem: Fall Risk (Adult)  Goal: Identify Related Risk Factors and Signs and Symptoms  Outcome: Ongoing (interventions implemented as appropriate)  Goal: Absence of Falls  Outcome: Ongoing (interventions implemented as appropriate)

## 2017-09-17 LAB
BACTERIA SPEC AEROBE CULT: NORMAL
BACTERIA SPEC AEROBE CULT: NORMAL

## 2017-09-17 PROCEDURE — 94640 AIRWAY INHALATION TREATMENT: CPT

## 2017-09-17 PROCEDURE — 63710000001 PREDNISONE PER 1 MG: Performed by: FAMILY MEDICINE

## 2017-09-17 PROCEDURE — 99232 SBSQ HOSP IP/OBS MODERATE 35: CPT | Performed by: INTERNAL MEDICINE

## 2017-09-17 PROCEDURE — 25010000002 PIPERACILLIN SOD-TAZOBACTAM PER 1 G: Performed by: FAMILY MEDICINE

## 2017-09-17 PROCEDURE — 94760 N-INVAS EAR/PLS OXIMETRY 1: CPT

## 2017-09-17 PROCEDURE — 94799 UNLISTED PULMONARY SVC/PX: CPT

## 2017-09-17 RX ADMIN — DOXYCYCLINE HYCLATE 100 MG: 100 TABLET, COATED ORAL at 09:33

## 2017-09-17 RX ADMIN — TAZOBACTAM SODIUM AND PIPERACILLIN SODIUM 3.38 G: 375; 3 INJECTION, SOLUTION INTRAVENOUS at 03:12

## 2017-09-17 RX ADMIN — PREDNISONE 40 MG: 20 TABLET ORAL at 09:33

## 2017-09-17 RX ADMIN — IPRATROPIUM BROMIDE AND ALBUTEROL SULFATE 3 ML: .5; 3 SOLUTION RESPIRATORY (INHALATION) at 20:32

## 2017-09-17 RX ADMIN — DOXYCYCLINE HYCLATE 100 MG: 100 TABLET, COATED ORAL at 21:05

## 2017-09-17 RX ADMIN — ACYCLOVIR 400 MG: 200 CAPSULE ORAL at 21:05

## 2017-09-17 RX ADMIN — Medication 250 MG: at 09:33

## 2017-09-17 RX ADMIN — DOCUSATE SODIUM 100 MG: 100 CAPSULE, LIQUID FILLED ORAL at 09:33

## 2017-09-17 RX ADMIN — BUDESONIDE 0.5 MG: 0.5 INHALANT RESPIRATORY (INHALATION) at 09:14

## 2017-09-17 RX ADMIN — ACYCLOVIR 400 MG: 200 CAPSULE ORAL at 09:33

## 2017-09-17 RX ADMIN — IPRATROPIUM BROMIDE AND ALBUTEROL SULFATE 3 ML: .5; 3 SOLUTION RESPIRATORY (INHALATION) at 16:04

## 2017-09-17 RX ADMIN — PANTOPRAZOLE SODIUM 40 MG: 40 TABLET, DELAYED RELEASE ORAL at 09:33

## 2017-09-17 RX ADMIN — CYCLOBENZAPRINE HYDROCHLORIDE 10 MG: 10 TABLET, FILM COATED ORAL at 21:04

## 2017-09-17 RX ADMIN — PREGABALIN 100 MG: 100 CAPSULE ORAL at 09:33

## 2017-09-17 RX ADMIN — IPRATROPIUM BROMIDE AND ALBUTEROL SULFATE 3 ML: .5; 3 SOLUTION RESPIRATORY (INHALATION) at 09:14

## 2017-09-17 RX ADMIN — BUDESONIDE 0.5 MG: 0.5 INHALANT RESPIRATORY (INHALATION) at 20:32

## 2017-09-17 RX ADMIN — Medication 5 MG: at 21:04

## 2017-09-17 RX ADMIN — TAZOBACTAM SODIUM AND PIPERACILLIN SODIUM 3.38 G: 375; 3 INJECTION, SOLUTION INTRAVENOUS at 18:39

## 2017-09-17 RX ADMIN — Medication 250 MG: at 18:39

## 2017-09-17 RX ADMIN — IPRATROPIUM BROMIDE AND ALBUTEROL SULFATE 3 ML: .5; 3 SOLUTION RESPIRATORY (INHALATION) at 12:26

## 2017-09-17 RX ADMIN — PREGABALIN 100 MG: 100 CAPSULE ORAL at 21:04

## 2017-09-17 RX ADMIN — TAZOBACTAM SODIUM AND PIPERACILLIN SODIUM 3.38 G: 375; 3 INJECTION, SOLUTION INTRAVENOUS at 09:34

## 2017-09-17 NOTE — PROGRESS NOTES
"      HOSPITALIST DAILY PROGRESS NOTE    Chief Complaint: cough and fever    Subjective   SUBJECTIVE/OVERNIGHT EVENTS   The patient is doing much better today.  Her appetite has significantly improved..  She is less dyspneic.  Afebrile.  Resolved cough.  Improving appetite.  No nausea, vomiting, abdominal pain or diarrhea.  No urinary symptoms.    Review of Systems:  Gen-no fevers, no chills  CV-no chest pain, no palpitations  Resp-+ cough, + dyspnea  GI-no N/V/D, no abd pain    Otherwise complete ROS is negative except as mentioned in the HPI.    Objective   OBJECTIVE   I have reviewed the vital signs.  /62 (BP Location: Right arm, Patient Position: Lying)  Pulse 85  Temp 97.4 °F (36.3 °C) (Oral)   Resp 16  Ht 61\" (154.9 cm)  Wt 164 lb (74.4 kg)  SpO2 94%  BMI 30.99 kg/m2    Physical Exam:  Gen-this lady chronically ill appearing in no acute distress.  Sitting up in bed and  CV-regular rate and rhythm.  No murmurs rubs or gallop   Resp-Nonlabored respirations at rest, no longer has dyspnea with conversation, no wheeze, posterior and lateral right mid and base rales very slight and much improved from previous  Abd-soft, NT, ND, +BS.  No palpable masses.  Ext-no edema, skin lesions or rashes  Neuro-A&Ox3, no focal deficits  Psych-appropriate mood    Results:  I have reviewed the labs, culture data, radiology results, and diagnostic studies.  1.  Troponin 0.    2.  Stable creatinine at 1.3 mg/dL  3.  Non-gap metabolic acidosis with a bicarbonate of 20 and a gap of 12.  Normal liver function tests  4.  Elevated pro-calcitonin  5.  Serum protein electrophoresis with immunofixation from August 21, 2017.  No M spike  6.  Hemoglobin 11.8.  Platelet count 20,000.    Positive Legionella antigen.    Radiology Results:  Imaging Results (last 24 hours)     ** No results found for the last 24 hours. **        I have reviewed the medications.    Assessment/Plan   ASSESSMENT/PLAN    Principal Problem:    " Pneumonia of right lower lobe due to infectious organism  Active Problems:    Multiple myeloma in remission    Hypertension    Chronic obstructive pulmonary disease with acute exacerbation    Gastroesophageal reflux disease without esophagitis    Thrombocytopenia since stem cell transplant March 2016    Hx of autologous stem cell transplant (March 2016)    CKD (chronic kidney disease), stage III    Former smoker    Non-rheumatic tricuspid valve insufficiency    Pulmonary hypertension    Chronic respiratory failure with hypoxia (been noncompliant with outpatient oxygen in past)      67 yof with hx of COPD, HTN and MM s/p autologous stem cell transplant 3/2016 with subsequent chronic thrombocytopenia limiting her ability to be on immunosuppressive therapy, presenting with fever and cough and findings of community acquired pneumonia, acute on chronic hypoxic respiratory failure and chronic obstructive pulmonary disease exacerbation    Plan  Teflaro d/c'd and remains on Zosyn and doxy.  Tentative plan is d/c ~Monday on PO cefuroxime and doxy.      - Patient with elevated BNP, ECHO with no evidence of CHF but does confirm pulmonary hypertension with tricuspid regurgitation (RVSP 48mmHg)    - Continue to monitor platelets, currently lower than her previous bradford, appears her baseline is ~40. Likely secondary to acute illness.       DVT ppx- mechanical due to thrombocytopenia    Dispo -  tentative plan is home ~Monday      Jose Jara MD  09/17/17  10:48 AM

## 2017-09-18 ENCOUNTER — APPOINTMENT (OUTPATIENT)
Dept: ONCOLOGY | Facility: HOSPITAL | Age: 67
End: 2017-09-18

## 2017-09-18 VITALS
OXYGEN SATURATION: 97 % | DIASTOLIC BLOOD PRESSURE: 68 MMHG | WEIGHT: 150 LBS | BODY MASS INDEX: 28.32 KG/M2 | TEMPERATURE: 97.4 F | HEIGHT: 61 IN | RESPIRATION RATE: 16 BRPM | SYSTOLIC BLOOD PRESSURE: 116 MMHG | HEART RATE: 89 BPM

## 2017-09-18 PROBLEM — A48.1 LEGIONELLA PNEUMONIA: Status: ACTIVE | Noted: 2017-09-18

## 2017-09-18 LAB
ALBUMIN SERPL-MCNC: 3.3 G/DL (ref 3.2–4.8)
ANION GAP SERPL CALCULATED.3IONS-SCNC: 6 MMOL/L (ref 3–11)
BASOPHILS # BLD AUTO: 0 10*3/MM3 (ref 0–0.2)
BASOPHILS NFR BLD AUTO: 0 % (ref 0–1)
BUN BLD-MCNC: 38 MG/DL (ref 9–23)
BUN/CREAT SERPL: 27.1 (ref 7–25)
CALCIUM SPEC-SCNC: 8.3 MG/DL (ref 8.7–10.4)
CHLORIDE SERPL-SCNC: 113 MMOL/L (ref 99–109)
CO2 SERPL-SCNC: 26 MMOL/L (ref 20–31)
CREAT BLD-MCNC: 1.4 MG/DL (ref 0.6–1.3)
DEPRECATED RDW RBC AUTO: 46.4 FL (ref 37–54)
EOSINOPHIL # BLD AUTO: 0 10*3/MM3 (ref 0–0.3)
EOSINOPHIL NFR BLD AUTO: 0 % (ref 0–3)
ERYTHROCYTE [DISTWIDTH] IN BLOOD BY AUTOMATED COUNT: 13.7 % (ref 11.3–14.5)
GFR SERPL CREATININE-BSD FRML MDRD: 38 ML/MIN/1.73
GLUCOSE BLD-MCNC: 79 MG/DL (ref 70–100)
HCT VFR BLD AUTO: 33.3 % (ref 34.5–44)
HGB BLD-MCNC: 11.1 G/DL (ref 11.5–15.5)
IMM GRANULOCYTES # BLD: 0.02 10*3/MM3 (ref 0–0.03)
IMM GRANULOCYTES NFR BLD: 0.5 % (ref 0–0.6)
LYMPHOCYTES # BLD AUTO: 0.87 10*3/MM3 (ref 0.6–4.8)
LYMPHOCYTES NFR BLD AUTO: 22.4 % (ref 24–44)
MCH RBC QN AUTO: 30.7 PG (ref 27–31)
MCHC RBC AUTO-ENTMCNC: 33.3 G/DL (ref 32–36)
MCV RBC AUTO: 92.2 FL (ref 80–99)
MONOCYTES # BLD AUTO: 0.41 10*3/MM3 (ref 0–1)
MONOCYTES NFR BLD AUTO: 10.5 % (ref 0–12)
NEUTROPHILS # BLD AUTO: 2.59 10*3/MM3 (ref 1.5–8.3)
NEUTROPHILS NFR BLD AUTO: 66.6 % (ref 41–71)
PHOSPHATE SERPL-MCNC: 2.8 MG/DL (ref 2.4–5.1)
PLATELET # BLD AUTO: 22 10*3/MM3 (ref 150–450)
POTASSIUM BLD-SCNC: 2.8 MMOL/L (ref 3.5–5.5)
RBC # BLD AUTO: 3.61 10*6/MM3 (ref 3.89–5.14)
SODIUM BLD-SCNC: 145 MMOL/L (ref 132–146)
WBC NRBC COR # BLD: 3.89 10*3/MM3 (ref 3.5–10.8)

## 2017-09-18 PROCEDURE — 85025 COMPLETE CBC W/AUTO DIFF WBC: CPT | Performed by: INTERNAL MEDICINE

## 2017-09-18 PROCEDURE — 94640 AIRWAY INHALATION TREATMENT: CPT

## 2017-09-18 PROCEDURE — 80069 RENAL FUNCTION PANEL: CPT | Performed by: INTERNAL MEDICINE

## 2017-09-18 PROCEDURE — 99232 SBSQ HOSP IP/OBS MODERATE 35: CPT | Performed by: INTERNAL MEDICINE

## 2017-09-18 PROCEDURE — 97161 PT EVAL LOW COMPLEX 20 MIN: CPT

## 2017-09-18 PROCEDURE — 25010000002 PIPERACILLIN SOD-TAZOBACTAM PER 1 G: Performed by: FAMILY MEDICINE

## 2017-09-18 PROCEDURE — 63710000001 PREDNISONE PER 1 MG: Performed by: FAMILY MEDICINE

## 2017-09-18 PROCEDURE — 99238 HOSP IP/OBS DSCHRG MGMT 30/<: CPT | Performed by: INTERNAL MEDICINE

## 2017-09-18 PROCEDURE — 94799 UNLISTED PULMONARY SVC/PX: CPT

## 2017-09-18 RX ORDER — AMOXICILLIN AND CLAVULANATE POTASSIUM 875; 125 MG/1; MG/1
1 TABLET, FILM COATED ORAL EVERY 12 HOURS SCHEDULED
Status: DISCONTINUED | OUTPATIENT
Start: 2017-09-18 | End: 2017-09-18 | Stop reason: HOSPADM

## 2017-09-18 RX ORDER — DOXYCYCLINE HYCLATE 100 MG
100 TABLET ORAL 2 TIMES DAILY
Qty: 10 TABLET | Refills: 0 | Status: SHIPPED | OUTPATIENT
Start: 2017-09-18 | End: 2017-09-23

## 2017-09-18 RX ORDER — POTASSIUM CHLORIDE 750 MG/1
40 CAPSULE, EXTENDED RELEASE ORAL DAILY
Status: DISCONTINUED | OUTPATIENT
Start: 2017-09-18 | End: 2017-09-18 | Stop reason: HOSPADM

## 2017-09-18 RX ORDER — AMOXICILLIN AND CLAVULANATE POTASSIUM 875; 125 MG/1; MG/1
1 TABLET, FILM COATED ORAL 2 TIMES DAILY
Qty: 10 TABLET | Refills: 0 | Status: SHIPPED | OUTPATIENT
Start: 2017-09-18 | End: 2017-09-23

## 2017-09-18 RX ADMIN — DOXYCYCLINE HYCLATE 100 MG: 100 TABLET, COATED ORAL at 10:11

## 2017-09-18 RX ADMIN — TAZOBACTAM SODIUM AND PIPERACILLIN SODIUM 3.38 G: 375; 3 INJECTION, SOLUTION INTRAVENOUS at 10:15

## 2017-09-18 RX ADMIN — PREGABALIN 100 MG: 100 CAPSULE ORAL at 10:11

## 2017-09-18 RX ADMIN — PREDNISONE 40 MG: 20 TABLET ORAL at 10:11

## 2017-09-18 RX ADMIN — IPRATROPIUM BROMIDE AND ALBUTEROL SULFATE 3 ML: .5; 3 SOLUTION RESPIRATORY (INHALATION) at 12:32

## 2017-09-18 RX ADMIN — DOCUSATE SODIUM 100 MG: 100 CAPSULE, LIQUID FILLED ORAL at 10:11

## 2017-09-18 RX ADMIN — POTASSIUM CHLORIDE 40 MEQ: 750 CAPSULE, EXTENDED RELEASE ORAL at 16:09

## 2017-09-18 RX ADMIN — ACYCLOVIR 400 MG: 200 CAPSULE ORAL at 10:11

## 2017-09-18 RX ADMIN — BUDESONIDE 0.5 MG: 0.5 INHALANT RESPIRATORY (INHALATION) at 08:32

## 2017-09-18 RX ADMIN — TAZOBACTAM SODIUM AND PIPERACILLIN SODIUM 3.38 G: 375; 3 INJECTION, SOLUTION INTRAVENOUS at 02:55

## 2017-09-18 RX ADMIN — Medication 250 MG: at 10:10

## 2017-09-18 RX ADMIN — PANTOPRAZOLE SODIUM 40 MG: 40 TABLET, DELAYED RELEASE ORAL at 10:10

## 2017-09-18 RX ADMIN — AMOXICILLIN AND CLAVULANATE POTASSIUM 1 TABLET: 875; 125 TABLET, FILM COATED ORAL at 16:09

## 2017-09-18 RX ADMIN — IPRATROPIUM BROMIDE AND ALBUTEROL SULFATE 3 ML: .5; 3 SOLUTION RESPIRATORY (INHALATION) at 08:32

## 2017-09-18 NOTE — PLAN OF CARE
Problem: Patient Care Overview (Adult)  Goal: Plan of Care Review  Outcome: Ongoing (interventions implemented as appropriate)    09/18/17 1102   Coping/Psychosocial Response Interventions   Plan Of Care Reviewed With patient   Outcome Evaluation   Outcome Summary/Follow up Plan PT abigail completed. Pt walks in cruz x 160 ft using hurricane and CGA with SpO2 stable while on 1.5 L O2 per NC.Pt reports her gait is baseline. Pt would benefit from HHPT, but declines when discussed.         Problem: Inpatient Physical Therapy  Goal: Bed Mobility Goal LTG- PT  Outcome: Ongoing (interventions implemented as appropriate)    09/18/17 1102   Bed Mobility PT LTG   Bed Mobility PT LTG, Date Established 09/18/17   Bed Mobility PT LTG, Time to Achieve 2 wks   Bed Mobility PT LTG, [place] Level independent       Goal: Transfer Training Goal 1 LTG- PT  Outcome: Ongoing (interventions implemented as appropriate)    09/18/17 1102   Transfer Training PT LTG   Transfer Training PT LTG, Date Established 09/18/17   Transfer Training PT LTG, Time to Achieve 2 wks   Transfer Training PT LTG, Activity Type sit to stand/stand to sit   Transfer Training PT LTG, [place] Level conditional independence   Transfer Training PT LTG, Assist Device cane, quad;walker, rolling       Goal: Gait Training Goal LTG- PT  Outcome: Ongoing (interventions implemented as appropriate)    09/18/17 1102   Gait Training PT LTG   Gait Training Goal PT LTG, Date Established 09/18/17   Gait Training Goal PT LTG, Time to Achieve 2 wks   Gait Training Goal PT LTG, [place] Level conditional independence   Gait Training Goal PT LTG, Assist Device walker, rolling;cane, quad   Gait Training Goal PT LTG, Distance to Achieve 200

## 2017-09-18 NOTE — THERAPY EVALUATION
Inpatient Rehabilitation - Physical Therapy Initial Evaluation  [facility]     Patient Name: Doris Miranda  : 1950  MRN: [id number]  Today's Date: 2017   Onset of Illness/Injury or Date of Surgery Date: 17  Date of Referral to PT: 17  Referring Physician: MD Napoleon      Admit Date: 2017     Visit Dx:    ICD-10-CM ICD-9-CM   1. Chronic obstructive pulmonary disease with acute exacerbation J44.1 491.21   2. Pneumonia of right lower lobe due to infectious organism J18.9 483.8   3. Hypoxemia R09.02 799.02   4. Thrombocytopenia D69.6 287.5   5. Multiple myeloma in remission C90.01 203.01   6. Former smoker Z87.891 V15.82   7. Non-rheumatic tricuspid valve insufficiency I36.1 424.2     Patient Active Problem List   Diagnosis   • Multiple myeloma in remission   • Diverticulosis of large intestine without hemorrhage   • Tubular adenoma of colon   • Hypertension   • Chronic obstructive pulmonary disease with acute exacerbation   • Chronic bronchitis   • Arthritis   • Gastroesophageal reflux disease without esophagitis   • Chronic bilateral low back pain without sciatica   • Pneumonia of right lower lobe due to infectious organism   • Thrombocytopenia since stem cell transplant 2016   • Hx of autologous stem cell transplant (2016)   • CKD (chronic kidney disease), stage III   • Former smoker   • Non-rheumatic tricuspid valve insufficiency   • Pulmonary hypertension   • Chronic respiratory failure with hypoxia (been noncompliant with outpatient oxygen in past)     Past Medical History:   Diagnosis Date   • Arthritis    • Chronic bronchitis    • COPD (chronic obstructive pulmonary disease)    • Hypertension    • Multiple myeloma    • Multiple myeloma, failed remission      Past Surgical History:   Procedure Laterality Date   • LIMBAL STEM CELL TRANSPLANT  2016    @[facility] Dr. Josiah Spicer   • MOUTH SURGERY            PT ASSESSMENT (last 72 hours)      PT Evaluation       17 1010        Rehab Evaluation    Document Type therapy note (daily note)  -     Subjective Information agree to therapy;no complaints  -     Symptoms Noted During/After Treatment fatigue  -     General Information    Patient Profile Review yes  -     Onset of Illness/Injury or Date of Surgery Date 09/17/17  -     Referring Physician MD Napoleon  -     General Observations Pt reclined in chair, IV intact, on 1.5L O2 per NC  -     Pertinent History Of Current Problem Pt with community aquired PNA RLL with 3 week hx of cough, green sputum. Pt with hx of multiple myeloma with mets to bone, in remission with thromobocytopenia. COPD exacerbation, A on chronic respiratory failure.  -     Precautions/Limitations oxygen therapy device and L/min  -     Prior Level of Function independent:;all household mobility;ADL's;home management   used cane or walker  -     Equipment Currently Used at Home rollator   3 prong cane; has shower seat availible, does not use  -     Plans/Goals Discussed With patient;agreed upon  -     Risks Reviewed patient:;LOB;change in vital signs  -     Benefits Reviewed patient:;improve function;increase independence  -     Barriers to Rehab none identified  -     Living Environment    Lives With spouse  -     Living Arrangements house  -     Home Accessibility tub/shower is not walk in   pt reports no prior issues with tub shower  -     Clinical Impression    Date of Referral to PT 09/17/17  -     Criteria for Skilled Therapeutic Interventions Met yes;treatment indicated  -     Rehab Potential good, to achieve stated therapy goals  -     Vital Signs    Pre SpO2 (%) 94  -EH     O2 Delivery Pre Treatment supplemental O2  -EH     Intra SpO2 (%) 93  -EH     O2 Delivery Intra Treatment supplemental O2  -EH     Post SpO2 (%) 91  -EH     O2 Delivery Post Treatment supplemental O2  -EH     Pre Patient Position Sitting  -EH     Intra Patient Position Standing  -EH     Post Patient  Position Sitting  -     Pain Assessment    Pain Assessment No/denies pain  -     Vision Assessment/Intervention    Visual Impairment WFL with corrective lenses  -     Cognitive Assessment/Intervention    Current Cognitive/Communication Assessment functional  -     Orientation Status oriented x 4  -     Follows Commands/Answers Questions 100% of the time;able to follow single-step instructions  -     Personal Safety WNL/WFL  -     ROM (Range of Motion)    General ROM no range of motion deficits identified  -     MMT (Manual Muscle Testing)    General MMT Assessment Detail BLE grossly 4+/5  -EH     Bed Mobility, Assessment/Treatment    Bed Mobility, Comment Pt UIC  -     Transfer Assessment/Treatment    Transfers, Sit-Stand [place] contact guard assist  -     Transfers, Stand-Sit [place] contact guard assist  -     Transfers, Sit-Stand-Sit, Assist Device other (see comments)   3 point cane  -     Toilet Transfer, [place] contact guard assist  -     Toilet Transfer, Assistive Device --   3 point cane  -     Gait Assessment/Treatment    Gait, [place] Level contact guard assist  -     Gait, Assistive Device other (see comments)   3 prong cane  -     Gait, Distance (Feet) 160  -     Gait, Gait Pattern Analysis swing-to gait  -     Gait, Gait Deviations step length decreased;elie decreased  -     Gait, Safety Issues supplemental O2;step length decreased  -     Gait, Comment Pt performes step to gait and alternates between holding cane with  RLE,or resting LLE on top of right (voices she just doesnt know what to do with the other hand. Pt cued for forward gaze, but does not maintain stating it makes her feel more likely to lose her balance. Pt states this is her baseline.   -     Motor Skills/Interventions    Additional Documentation Balance Skills Training (Group)  -     Balance Skills Training    Sitting-Level of Assistance Independent  -      Sitting-Balance Support Right upper extremity supported;Left upper extremity supported  -     Standing-Level of Assistance Independent  -     Gait Balance-Level of Assistance Contact guard  -     Gait Balance Support assistive device  -     Gait Balance Activities scanning environment R/L;side-stepping  -     Sensory Assessment/Intervention    Light Touch LLE;RLE;LUE;RUE  -     LUE Light Touch --   pt states she has intermittent tingling  -     RUE Light Touch --   intermittent tingling  -     LLE Light Touch WNL  -     RLE Light Touch WNL  -EH     Positioning and Restraints    Pre-Treatment Position sitting in chair/recliner  -     Post Treatment Position chair  -EH     In Chair notified nsg;reclined;call light within reach;encouraged to call for assist  -       User Key  (r) = Recorded By, (t) = Taken By, (c) = Cosigned By    Initials Name Provider Type     Trista Sweeney, PT Physical Therapist          Physical Therapy Education     Title: PT OT SLP Therapies (Done)     Topic: Physical Therapy (Done)     Point: Mobility training (Done)    Learning Progress Summary    Learner Readiness Method Response Comment Documented by Status   Patient Acceptance E [facility] 09/18/17 1102 Done               Point: Body mechanics (Done)    Learning Progress Summary    Learner Readiness Method Response Comment Documented by Status   Patient Acceptance E [facility] 09/18/17 1102 Done               Point: Precautions (Done)    Learning Progress Summary    Learner Readiness Method Response Comment Documented by Status   Patient Acceptance E [facility] 09/18/17 1102 Done                      User Key     Initials Effective Dates Name Provider Type [facility] 06/19/15 -  Trista Seweney PT Physical Therapist PT                PT Recommendation and Plan  Anticipated Discharge Disposition: home with assist (pt declines home health)  Planned Therapy Interventions: bed mobility training, gait training, balance  training, transfer training, home exercise program  PT Frequency: daily  Plan of Care Review  Plan Of Care Reviewed With: patient  Outcome Summary/Follow up Plan: PT tachoal completed. Pt walks in cruz x 160 ft using hurricane and CGA with SpO2 stable while on 1.5 L O2 per NC.Pt reports her gait is baseline. Pt would benefit from HHPT, but declines when discussed.          IP PT Goals       09/18/17 1102          Bed Mobility PT LTG    Bed Mobility PT LTG, Date Established 09/18/17  -EH      Bed Mobility PT LTG, Time to Achieve 2 wks  -EH      Bed Mobility PT LTG, [place] Level independent  -EH      Transfer Training PT LTG    Transfer Training PT LTG, Date Established 09/18/17  -EH      Transfer Training PT LTG, Time to Achieve 2 wks  -EH      Transfer Training PT LTG, Activity Type sit to stand/stand to sit  -EH      Transfer Training PT LTG, [place] Level conditional independence  -EH      Transfer Training PT LTG, Assist Device cane, quad;walker, rolling  -EH      Gait Training PT LTG    Gait Training Goal PT LTG, Date Established 09/18/17  -EH      Gait Training Goal PT LTG, Time to Achieve 2 wks  -EH      Gait Training Goal PT LTG, [place] Level conditional independence  -EH      Gait Training Goal PT LTG, Assist Device walker, rolling;cane, quad  -EH      Gait Training Goal PT LTG, Distance to Achieve 200  -EH        User Key  (r) = Recorded By, (t) = Taken By, (c) = Cosigned By    Initials Name Provider Type    EH Trista Sweeney, PT Physical Therapist                Outcome Measures       09/18/17 1010          How much help from another person do you currently need...    Turning from your back to your side while in flat bed without using bedrails? 3  -EH      Moving from lying on back to sitting on the side of a flat bed without bedrails? 3  -EH      Moving to and from a bed to a chair (including a wheelchair)? 3  -EH      Standing up from a chair using your arms (e.g., wheelchair, bedside  chair)? 4  -EH      Climbing 3-5 steps with a railing? 3  -EH      To walk in hospital room? 3  -EH      AM-PAC 6 Clicks Score 19  -EH      Functional Assessment    Outcome Measure Options AM-PAC 6 Clicks Basic Mobility (PT)  -        User Key  (r) = Recorded By, (t) = Taken By, (c) = Cosigned By    Initials Name Provider Type     Trista Sweeney, PT Physical Therapist           Time Calculation:         PT Charges       09/18/17 1107          Time Calculation    Start Time 1010  -      PT Received On 09/18/17  -      PT Goal Re-Cert Due Date 09/28/17  -      Time Calculation- PT    Total Timed Code Minutes- PT 0 minute(s)  -        User Key  (r) = Recorded By, (t) = Taken By, (c) = Cosigned By    Initials Name Provider Type     Trista Sweeney, PT Physical Therapist          Therapy Charges for Today     Code Description Service Date Service Provider Modifiers Qty    20855821675 HC PT EVAL LOW COMPLEXITY 4 9/18/2017 Trista Sweeney, PT GP 1          PT G-Codes  Outcome Measure Options: AM-PAC 6 Clicks Basic Mobility (PT)      Trista Sweeney, PT  9/18/2017

## 2017-09-18 NOTE — DISCHARGE SUMMARY
[facility] Medicine Services  DISCHARGE SUMMARY       Date of Admission: 9/12/2017  Date of Discharge:  9/18/2017  Primary Care Physician: Earl Fuentes MD  Consulting Physician(s)     Provider Relationship    Joseph Mckinley MD Consulting Physician    Mario Odell MD Consulting Physician    Lo Thornton MD Consulting Physician          Discharge Diagnoses:  Active Hospital Problems (** Indicates Principal Problem)    Diagnosis Date Noted   • **Pneumonia of right lower lobe due to infectious organism [J18.9] 09/12/2017   • Legionella pneumonia [A48.1] 09/18/2017   • Thrombocytopenia since stem cell transplant March 2016 [D69.59] 09/14/2017     Has limited ability to place on immunocompromising agents post-transplant     • Hx of autologous stem cell transplant (March 2016) [Z94.84] 09/14/2017     NOT on immunosuppressive therapy d/t chronic TCP s/p stem cell transplant      • CKD (chronic kidney disease), stage III [N18.3] 09/14/2017     Baseline creatinine ~1.5     • Former smoker [Z87.891] 09/14/2017   • Non-rheumatic tricuspid valve insufficiency [I36.1] 09/14/2017   • Pulmonary hypertension [I27.2] 09/14/2017   • Chronic respiratory failure with hypoxia (been noncompliant with outpatient oxygen in past) [J96.11] 09/14/2017   • Gastroesophageal reflux disease without esophagitis [K21.9] 08/24/2017   • Hypertension [I10]    • Chronic obstructive pulmonary disease with acute exacerbation [J44.1]    • Multiple myeloma in remission [C90.01] 07/11/2016             Resolved Hospital Problems    Diagnosis Date Noted Date Resolved   No resolved problems to display.       67 yof with hx of COPD, HTN and MM s/p autologous stem cell transplant 3/2016 with subsequent chronic thrombocytopenia limiting her ability to be on immunosuppressive therapy, presenting with fever and cough and findings of community acquired pneumonia, acute on chronic hypoxic respiratory failure and  "chronic obstructive pulmonary disease exacerbation  At the time of discharge the patient was doing well.  She was tolerating a regular diet.  She was breathing comfortably.  There was no fever, cough or hemoptysis.  The patient will be transitioned to oral Augmentin and doxycycline for an additional 5 days  She will follow-up with Dr. Epps regarding her chronic thrombocytopenia.  She does not have evidence of bleeding         Consults:   Consults     Date and Time Order Name Status Description    9/13/2017 1844 Inpatient Consult to Pulmonology      9/12/2017 1356 Inpatient Consult to Infectious Diseases Completed     9/12/2017 1356 Inpatient Consult to Hematology & Oncology            Pertinent Test Results:    Condition on Discharge:  Good    Physical Exam on Discharge:/68 (BP Location: Right arm, Patient Position: Lying)  Pulse 89  Temp 97.4 °F (36.3 °C) (Oral)   Resp 16  Ht 61\" (154.9 cm)  Wt 150 lb (68 kg)  SpO2 97%  BMI 28.34 kg/m2  Physical Exam   this lady chronically ill appearing in no acute distress.  Sitting up in bed and  CV-regular rate and rhythm.  No murmurs rubs or gallop   Resp-Nonlabored respirations at rest, no wheezing, rales or rhonchi   Abd-soft, NT, ND, +BS.  No palpable masses.  Ext-no edema, skin lesions or rashes  Neuro-A&Ox3, no focal deficits  Psych-appropriate mood    Discharge Disposition  Home or Self Care    Discharge Medications   Doris Miranda   Home Medication Instructions SOLOMON:[id number]    Printed on:09/18/17 1347   Medication Information                      acyclovir (ZOVIRAX) 400 MG tablet  Take 1 tablet by mouth 2 (Two) Times a Day.             amoxicillin-clavulanate (AUGMENTIN) 875-125 MG per tablet  Take 1 tablet by mouth 2 (Two) Times a Day for 5 days. Indications: Pneumonia             cyclobenzaprine (FLEXERIL) 10 MG tablet  Take 1 tablet by mouth 3 (Three) Times a Day As Needed for Muscle Spasms.             doxycycline (VIBRAMYICN) 100 MG tablet  Take 1 " tablet by mouth 2 (Two) Times a Day for 5 days. Indications: Pneumonia             esomeprazole (nexIUM) 20 MG capsule  Take 20 mg by mouth Every Morning Before Breakfast.             Melatonin 5 MG chewable tablet  Chew 5 mg Every Night.             pregabalin (LYRICA) 100 MG capsule  Take 1 capsule by mouth 2 (Two) Times a Day.                 Discharge Diet:  regular         Activity at Discharge:  resume as previously tolerated    Follow-up Appointments  Future Appointments  Date Time Provider Department Center   10/20/2017 1:30 PM Joseph Mckinley MD [facility]   10/20/2017 2:30 PM  [facility]   2/23/2018 1:30 PM Earl Fuentes MD [facility] None         Test Results Pending at Discharge   Order Current Status    Blastomyces Antigen In process    QuantiFERON TB Client Incubated In process    Fungus Culture Preliminary result           Jose Jara MD 09/18/17 1:45 PM    Time: 20 minutes of time were spent in the preparation of the discharge summary including patient education and coordination of care    Please note that portions of this note may have been completed with a voice recognition program. Efforts were made to edit the dictations, but occasionally words are mistranscribed.

## 2017-09-18 NOTE — PLAN OF CARE
Problem: Patient Care Overview (Adult)  Goal: Plan of Care Review  Outcome: Ongoing (interventions implemented as appropriate)    09/18/17 0624   Coping/Psychosocial Response Interventions   Plan Of Care Reviewed With patient   Patient Care Overview   Progress improving   Outcome Evaluation   Outcome Summary/Follow up Plan IV antibiotics continued. May change to PO today and possibly discharged. No acute distress. Will require oxygen upon discharge.         Problem: Pneumonia (Adult)  Goal: Signs and Symptoms of Listed Potential Problems Will be Absent or Manageable (Pneumonia)  Outcome: Ongoing (interventions implemented as appropriate)    09/18/17 0624   Pneumonia   Problems Assessed (Pneumonia) all   Problems Present (Pneumonia) none         Problem: Fall Risk (Adult)  Goal: Identify Related Risk Factors and Signs and Symptoms  Outcome: Outcome(s) achieved Date Met:  09/18/17 09/18/17 0624   Fall Risk   Fall Risk: Related Risk Factors age-related changes;environment unfamiliar   Fall Risk: Signs and Symptoms presence of risk factors       Goal: Absence of Falls  Outcome: Ongoing (interventions implemented as appropriate)    09/18/17 0624   Fall Risk (Adult)   Absence of Falls making progress toward outcome

## 2017-09-18 NOTE — DISCHARGE PLACEMENT REQUEST
"Luisa Miranda (67 y.o. Female)     Pts room air sat is 85%        Date of Birth Social Security Number Address Home Phone MRN    1950  [address] [place], [place] [id number] [phone] [id number]    Religious Marital Status          None        Admission Date Admission Type Admitting Provider Attending Provider Department, Room/Bed    9/12/17 Emergency Jose Jara MD [place], Jose Baker MD [facility] 4G, S463/1    Discharge Date Discharge Disposition Discharge Destination                      Attending Provider: Jose Jara MD     Allergies:  No Known Allergies    Isolation:  None   Infection:  None   Code Status:  FULL    Ht:  61\" (154.9 cm)   Wt:  150 lb (68 kg)    Admission Cmt:  None   Principal Problem:  Pneumonia of right lower lobe due to infectious organism [J18.9]                 Active Insurance as of 9/12/2017     Primary Coverage     Payor Plan Insurance Group Employer/Plan Group    MEDICARE MEDICARE A & B      Payor Plan Address Payor Plan Phone Number Effective From Effective To    [address] [phone] 9/1/2015     [place] [address]       Subscriber Name Subscriber Birth Date Member ID       LUISA MIRANDA 1950 [id number]           Secondary Coverage     Payor Plan Insurance Group Employer/Plan Group    AARP MED SUPP AAR HEALTH CARE OPTIONS PLAN F     Payor Plan Address Payor Plan Phone Number Effective From Effective To    [facility] [phone] 1/1/2016     [address]       [place] [address]       Subscriber Name Subscriber Birth Date Member ID       LUISA MIRANDA 1950 [id number]                 Emergency Contacts      (Rel.) Home Phone Work Phone Mobile Phone    Doron Miranda (Spouse) [phone] -- --        [address]  [address] [facility] [address]  Phone:  [phone]  Fax:   Date Ordered: Sep 18, 2017         Patient:  Luisa Miranda MRN:  [id number] " "  [address] [place] [id number] :  1950  SSN:    Phone: [phone] Sex:  F     Weight: 150 lb (68 kg)         Ht Readings from Last 1 Encounters:   17 61\" (154.9 cm)         Oxygen Therapy                   (Order ID: [id number])    Diagnosis:  Pneumonia of right lower lobe due to infectious organism (J18.9 [ICD-10-CM] 483.8 [ICD-9-CM])  Multiple myeloma in remission (C90.01 [ICD-10-CM] 203.01 [ICD-9-CM])  Former smoker (Z87.891 [ICD-10-CM] V15.82 [ICD-9-CM])  Non-rheumatic tricuspid valve insufficiency (I36.1 [ICD-10-CM] 424.2 [ICD-9-CM])   Quantity:  1     Delivery Modality: Nasal Cannula  Liters Per Minute: 2  Duration: Continuous  Equipment:  Oxygen Concentrator &  &  Portable Gaseous Oxygen System & Portable Oxygen Contents Gaseous &  Conserving Regulator  The face to face evaluation was performed on: 2017  Length of Need (99 Months = Lifetime): 99 Months = Lifetime            Verbal Order Mode: Per protocol: cosign required  Authorizing Provider: Jose Jara MD  Authorizing Provider's NPI: [id number]     Order Entered By: Nicole Baca RN 2017 10:28 AM         Insurance Information                MEDICARE/MEDICARE A & B Phone: [phone]    Subscriber: Doris Miranda Subscriber#: [id number]    Group#:  Precert#:         AARP MED SUPP/AARP HEALTH CARE OPTIONS Phone: [phone]    Subscriber: Doris Miranda Subscriber#: [id number]    Group#: [id number] Precert#:              History & Physical      Yoav Bryant MD at 2017  2:01 PM              [facility] Medicine Services  HISTORY AND PHYSICAL    Primary Care Physician: Earl Fuentes MD    Subjective     Chief Complaint:  Fever, cough    History of Present Illness:     68 yo with history of COPD and Multiple Myeloma, s/p autologous stem cell transplant 3/2016 with subsequent Revlimid therapy on hold due to thrombocytopenia, presents with fever and " cough. Patient says the cough has been present for four weeks, worsened approximately 2 weeks ago with green sputum production. No improvement with OTC Delsym and Robitussin.  Fevers noted over the past 3 days, with Tmax of 101.         Review of Systems   Constitutional: Positive for activity change, fatigue and fever.   HENT: Negative.    Eyes: Negative.    Respiratory: Positive for cough and shortness of breath.    Cardiovascular: Negative.  Negative for chest pain, palpitations and leg swelling.   Gastrointestinal: Negative.  Negative for diarrhea and vomiting.   Endocrine: Negative.    Genitourinary: Positive for urgency.   Musculoskeletal: Negative.    Skin: Negative.    Allergic/Immunologic: Positive for immunocompromised state.   Neurological: Positive for weakness.   Hematological: Negative.    Psychiatric/Behavioral: Negative.       Otherwise complete 10 system ROS performed and negative except as mentioned in the HPI.    Past Medical History:   Diagnosis Date   • Arthritis    • Chronic bronchitis    • COPD (chronic obstructive pulmonary disease)    • Hypertension    • Multiple myeloma    • Multiple myeloma, failed remission        Past Surgical History:   Procedure Laterality Date   • LIMBAL STEM CELL TRANSPLANT  03/2016    @ Dr. Josiah Spicer   • MOUTH SURGERY         Family History   Problem Relation Age of Onset   • Breast cancer Other    • Lung cancer Other    • Liver cancer Other    • Bone cancer Other        Social History     Social History   • Marital status:      Spouse name: N/A   • Number of children: N/A   • Years of education: N/A     Occupational History   • Not on file.     Social History Main Topics   • Smoking status: Former Smoker     Quit date: 8/1/2015   • Smokeless tobacco: Never Used   • Alcohol use No   • Drug use: No   • Sexual activity: Not on file     Other Topics Concern   • Not on file     Social History Narrative   • No narrative on file       Medications:    (Not  "in a hospital admission)    Allergies:  No Known Allergies      Objective     Physical Exam:  Vital Signs: /82  Pulse (!) 122  Temp 98.6 °F (37 °C) (Oral)   Resp 18  Ht 61\" (154.9 cm)  Wt 159 lb (72.1 kg)  SpO2 95%  BMI 30.04 kg/m2  Physical Exam     Gen-ill appearing, in bed   HEENT-NCAT  CV-RRR, S1 S2 normal, no m/r/g  Resp-moderately diminished bilaterally with RLL rales, rare end expiratory wheezes  Abd-soft, non-tender, non-distended, normo active bowel sounds  Ext-No lower extremity cyanosis, clubbing or edema bilaterally  Neuro-alert and oriented, speech clear, moves all extremities   Psych-flat affect   Skin- No rash on exposed UE or LE bilaterally        Results Reviewed:    Results from last 7 days  Lab Units 09/12/17  1019   WBC 10*3/mm3 7.00   HEMOGLOBIN g/dL 12.4   PLATELETS 10*3/mm3 22*       Results from last 7 days  Lab Units 09/12/17  1019   SODIUM mmol/L 137   POTASSIUM mmol/L 3.1*   CO2 mmol/L 19.0*   CREATININE mg/dL 1.60*   GLUCOSE mg/dL 109*   CALCIUM mg/dL 8.7       I have personally reviewed and interpreted available lab data, radiology studies and ECG obtained at time of admission.     Assessment / Plan     Problem List:   Hospital Problem List     Multiple myeloma in remission    Overview Signed 9/6/2016 11:21 AM by Neha Phillip              Hypertension    COPD (chronic obstructive pulmonary disease)    Gastroesophageal reflux disease without esophagitis    Pneumonia of right lower lobe due to infectious organism          Assessment/Plan    RLL pneumonia  - immunocompromised state secondary to multiple myeloma and stem cell transplant  - at risk for not only viral, encapsulated and gram negative bacterial infections, but also PCP pneumonia  - check influenza pcr, urinary antigens  - continue rocephin and azithromycin  - nebs scheduled and prn  - cxr am  - consider speech eval for dysphagia  - ID consultation  - with rales on exam and elevated BNP, will also check Echo " (revlimid can be associated with CHF)    Multiple Myeloma  - s/p autologous SCT 2016  - revlimid on hold due to thrombocytopenia    Thrombocytopenia    CKDIII?  - creatinine appears near baseline    Tachycardia  - sinus    COPD  - prior smoker    DVT prophylaxis: mechanical only due to thrombocytopenia  Code Status:   Admission Status: Patient will be admitted to INPATIENT status due to the need for care which can only be reasonably provided in an hospital setting such as aggressive/expedited ancillary services and/or consultation services, the necessity for IV medications, close physician monitoring and/or the possible need for procedures.  In such, I feel patient’s risk for adverse outcomes and need for care warrant INPATIENT evaluation and predict the patient’s care encounter to likely last beyond 2 midnights.       Yoav Bryant MD 09/12/17 2:02 PM         Electronically signed by Yoav Bryant MD at 9/12/2017  2:42 PM        Physician Progress Notes (last 24 hours) (Notes from 9/17/2017  1:35 PM through 9/18/2017  1:35 PM)     No notes of this type exist for this encounter.

## 2017-09-18 NOTE — PROGRESS NOTES
Continued Stay Note  [facility]     Patient Name: Doris Miranda  MRN: [id number]  Today's Date: 9/18/2017    Admit Date: 9/12/2017          Discharge Plan       09/18/17 1335    Case Management/Social Work Plan    Additional Comments Updated orders for home O2 have been faxed to [facility]. Portable tank will be delievered to pts room.              Discharge Codes     None        Expected Discharge Date and Time     Expected Discharge Date Expected Discharge Time    Sep 15, 2017             Nicole Baca RN

## 2017-09-18 NOTE — PROGRESS NOTES
[facility] Progress Note    Admission Date: 9/12/2017    Doris Miranda  1950  [id number]    Date: 9/18/2017    Antibiotics:  IV Anti-Infectives     Ordered     Dose/Rate Route Frequency Start Stop    09/18/17 1343  amoxicillin-clavulanate (AUGMENTIN) 875-125 MG per tablet 1 tablet     Ordering Provider:  Jose Jara MD    1 tablet Oral Every 12 Hours Scheduled 09/18/17 1415      09/18/17 1345  amoxicillin-clavulanate (AUGMENTIN) 875-125 MG per tablet     Ordering Provider:  Jose Jara MD    1 tablet Oral 2 Times Daily 09/18/17 0000 09/23/17 2359    09/18/17 1345  doxycycline (VIBRAMYICN) 100 MG tablet     Ordering Provider:  Jose Jara MD    100 mg Oral 2 Times Daily 09/18/17 0000 09/23/17 2359    09/12/17 1611  doxycycline (VIBRAMYICN) tablet 100 mg     Ordering Provider:  Mario Odell MD    100 mg Oral Every 12 Hours Scheduled 09/12/17 2100      09/12/17 2009  acyclovir (ZOVIRAX) capsule 400 mg     Ordering Provider:  Yoav Bryant MD    400 mg Oral Every 12 Hours Scheduled 09/12/17 2100      09/12/17 1615  piperacillin-tazobactam (ZOSYN) 3.375 g in iso-osmotic dextrose 50 ml (premix)     Ordering Provider:  Mario Odell MD    3.375 g  over 30 Minutes Intravenous Once 09/12/17 1700 09/12/17 1849    09/12/17 1039  cefTRIAXone (ROCEPHIN) IVPB 1 g     Ordering Provider:  SUMI Valentin    1 g  over 30 Minutes Intravenous Once 09/12/17 1041 09/12/17 1133    09/12/17 1039  AZITHROMYCIN 500 MG/250 ML 0.9% NS IVPB (MBP)     Ordering Provider:  SUMI Valentin    500 mg  over 60 Minutes Intravenous Once 09/12/17 1041 09/12/17 1356      /       CC:  pneumonia    HPI:  Patient is a very pleasant  67 y.o.  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  She denied any ill contacts, zoonotic exposures, travel history, or smoking times years.  She was admitted to [facility] on 9/12/17.  I was consulted on 9/12/17 by Dr. Bryant.  Urinalysis was negative, creatinine 1.6, alkaline phosphatase 126, total bilirubin 1.6, lactate 1.0, pro-calcitonin 3.08, , WBC 7.0, hemoglobin 12.4, platelets 22,000, 83% neutrophils, 4% lymphocytes.  Chest x-ray with bibasilar increased infiltrates right greater than left with an increased interstitial pattern bilateral.  Cardiac size normal.    9/13/17 history reviewed.  Still with difficulties breathing.  Some improvement with nebulized treatment.  Minimum sputum production.  No hemoptysis.  9/14/17 hx rev.  Better today with nebs etc.  No fever. 9/15/17: hx reviewed.  Still with productive cough and some shortness of breath.  Afebrile.  Loose stools with pasty diarrhea.  Some blood in nasal secretions but not sputum.  Rash in left AC area, very pruritic. Overall, though feeling much improved. 9/16/17:  Hx reviewed.  SOA improved on RA.  Pale green sputum.  Rash left ac resolved.  No diarrhea.  Unable to send sample for cdiff because diarrhea resolved.    9/18 - co weakness  No fever or cough  \"I feel better\"    ROS:  No f/c/s. No n/v/d. No rash. No new ADR to Abx.     Constitutional-- No Fever, chills or sweats.  Appetite good, and no malaise. No fatigue.  Heent-- No new vision, hearing or throat complaints.  No epistaxis or oral sores.  Denies odynophagia or dysphagia.  No flashers, floaters or eye pain. No headache, photophobia or neck stiffness. Some blood in nasal secretions.   CV-- No chest pain, palpitation or syncope  Resp-- SOB/cough With small amt of  sputum/no Hemoptysis  GI- No nausea, vomiting, diarrhea.  No hematochezia, melena, or hematemesis. Denies jaundice or chronic liver disease.  -- No dysuria, hematuria, or flank pain.  Denies hesitancy, urgency or flank pain.  Lymph- no swollen " lymph nodes in neck/axilla or groin.   Heme- No active bruising or bleeding; no Hx of DVT or PE.  MS-- no swelling or pain in the bones or joints of arms/legs.  No new back pain.  Neuro-- No acute focal weakness or numbness in the arms or legs.  No seizures.  Skin--No rash, nodules, blisters.    Objective   PE:  Vital Signs  Temp  Min: 97.4 °F (36.3 °C)  Max: 97.9 °F (36.6 °C)  BP  Min: 108/64  Max: 116/68  Pulse  Min: 65  Max: 102  Resp  Min: 16  Max: 20  SpO2  Min: 85 %  Max: 100 %    GENERAL: Awake and alert, in no distress. Appears older than stated age.  HEENT: Normocephalic, atraumatic.  PERRL. EOMI. No conjunctival injection. No icterus. Oropharynx clear without evidence of thrush or exudate. No evidence of peridontal disease.    NECK: Supple without nuchal rigidity. No mass.  LYMPH: No cervical, axillary or inguinal lymphadenopathy.  HEART: RRR; No murmur, rubs, gallops.  No JVD.  LUNGS: Diminished right lung. Normal respiratory effort. Nonlabored. No dullness.  No wheeze.  ABDOMEN: Soft, nontender, nondistended. Positive bowel sounds. No rebound or guarding. NO mass or HSM.Obese.  EXT:  No cyanosis, clubbing or edema. No cord.  MSK: FROM without joint effusions noted arms/legs.    SKIN: Warm and dry without cutaneous eruptions on Inspection/palpation.    NEURO: Oriented to PPT. No focal deficits on motor/sensory exam at arms/legs.    Laboratory Data      Results from last 7 days  Lab Units 09/18/17  0526 09/15/17  0429 09/13/17  0458   WBC 10*3/mm3 3.89 4.48 6.90   HEMOGLOBIN g/dL 11.1* 11.8 11.7   HEMATOCRIT % 33.3* 35.6 37.0   PLATELETS 10*3/mm3 22* 20* 24*       Results from last 7 days  Lab Units 09/18/17  0526   SODIUM mmol/L 145   POTASSIUM mmol/L 2.8*   CHLORIDE mmol/L 113*   CO2 mmol/L 26.0   BUN mg/dL 38*   CREATININE mg/dL 1.40*   GLUCOSE mg/dL 79   CALCIUM mg/dL 8.3*       Results from last 7 days  Lab Units 09/15/17  0429   ALK PHOS U/L 138*   BILIRUBIN mg/dL 0.7   ALT (SGPT) U/L 10   AST (SGOT)  U/L 11           Results from last 7 days  Lab Units 09/12/17  1019   CRP mg/dL 29.48*               Results from last 7 days  Lab Units 09/13/17  0458   LACTATE mmol/L 1.5     Estimated Creatinine Clearance: 34.4 mL/min (by C-G formula based on Cr of 1.4).      Microbiology:  Microbiology Results Abnormal     Procedure Component Value - Date/Time    Fungus Culture [[id number]] Collected:  09/13/17 1045    Lab Status:  Preliminary result Specimen:  Sputum from Lung Updated:  09/18/17 1101     Fungus Culture No fungus isolated at less than 1 week    Blood Culture [[id number]]  (Normal) Collected:  09/12/17 1015    Lab Status:  Final result Specimen:  Blood from Arm, Left Updated:  09/17/17 1101     Blood Culture No growth at 5 days    Blood Culture [[id number]]  (Normal) Collected:  09/12/17 1005    Lab Status:  Final result Specimen:  Blood from Arm, Left Updated:  09/17/17 1101     Blood Culture No growth at 5 days    Respiratory Culture [[id number]] Collected:  09/13/17 1045    Lab Status:  Final result Specimen:  Sputum from Lung, Right Lower Lobe Updated:  09/15/17 0822     Respiratory Culture Light growth (2+) Normal Respiratory Nya     Gram Stain Result Greater than 25 WBCs seen      Less than 10 Epithelial cells seen      Few (2+) Gram positive cocci    Respiratory Panel, PCR [[id number]]  (Abnormal) Collected:  09/13/17 1735    Lab Status:  Final result Specimen:  Swab from Nasopharynx Updated:  09/14/17 2011     Adenovirus Detection by PCR Not Detected     Coronavirus HKU1 Not Detected     Coronavirus NL63 Not Detected     Coronavirus 229E Not Detected     Coronavirus OC43 Not Detected     Human Metapneumovirus Not Detected     Human Rhinovirus/Enterovirus Detected (A)     Influenza A PCR Not Detected     Influenza A H1 Not Detected     Influenza 2009 H1N1 by PCR Not Detected     Influenza A H3 Not Detected     Influenza B PCR Not Detected     Parainfluenza Virus 1 Not Detected     Parainfluenza Virus 2 Not  Detected     Parainfluenza Virus 3 Not Detected     Parainfluenza Virus 4 Not Detected     Respiratory Syncytial Virus Not Detected     Bordetella pertussis pcr Not Detected     Chlamydophila pneumoniae PCR Not Detected     Mycoplasma pneumo by PCR Not Detected    Narrative:       Performed at:  [address] [facility]  [id number]  : Bj Camacho MD, Phone:  [phone]    S. Pneumo Ag Urine or CSF [[id number]] Collected:  09/12/17 1456    Lab Status:  Final result Specimen:  Urine from Urine, Clean Catch Updated:  09/14/17 1519     Specimen Source Urine     STREP PNEUMONIAE ANTIGEN Negative     Body Fluid Culture, Sterile Not Indicated     Organism ID Not indicated.    Narrative:       Performed at:  [address]  [facility]  [id number]  : Bj Camacho MD, Phone:  [phone]    Urine Culture [[id number]]  (Normal) Collected:  09/12/17 1456    Lab Status:  Final result Specimen:  Urine from Urine, Clean Catch Updated:  09/14/17 0919     Urine Culture No growth at 2 days    KOH Prep [[id number]]  (Normal) Collected:  09/13/17 1601    Lab Status:  Final result Specimen:  Swab from Lung Updated:  09/13/17 1614     KOH Prep No yeast or hyphal elements seen    Influenza A & B, RT PCR [[id number]]  (Normal) Collected:  09/12/17 1451    Lab Status:  Final result Specimen:  Swab from Nasopharynx Updated:  09/12/17 1759     Influenza A PCR Not Detected     Influenza B PCR Not Detected          Radiology:  Imaging Results (last 24 hours)     ** No results found for the last 24 hours. **          I personally reviewed the radiographic studies     Assessment/Plan   IMPRESSION:     1.  Right greater than left lower lobe pneumonia.  Increased risk for progression to respiratory failure.  Usual organisms are staph, strep, possible gram-negative rods given her COPD.  Less likely mycobacterial or fungal.  Remains quite ill but better today,  and maybe the nebs having effect.  Respiratory panel is positive for Rhinovirus/Enterovirus which probably was the primary culprit with possible secondary infection (elevated PCT).  Improved.  2.  Possible sepsis present on admission with elevated pro-calcitonin level, thrombocytopenia, and a bone marrow transplantation.  Probably from pulmonary source.  3.  Acute hypoxic respiratory failure.  Improved on RA.  4.  Underlying COPD with no smoking since 2015.  5.  Thrombocytopenia, probably related to her underlying previous myeloma and bone marrow transplant versus other.  Continued low.  6.  Multiple myeloma status post stem cell transplant 2015 at the [facility].  7.  Hypokalemia 3.1.  8.  Elevated alkaline phosphatase 138, probably related to bone marrow process.  9.  Cholestasis with bilirubin 0.7, probably related to medications versus other.  10.  Acute renal failure creatinine 1.3.  May have underlying CKD.  11.  Hemoptysis.  CT with Extensive right lower lobe pneumonia. Mild right middle lobe and  lingular disease. Resolved hemoptysis.  Exacerbated by her thrombocytopenia.     Plan:     1.  Diagnostically, continue to follow patient's physical exam, CBC, CMP, CRP, lactic acid, pro-calcitonin level, respiratory panel PCR (+Rhinovirus/Enterovirus), blood cultures ×2, sputum CNS, radiographic studies, fungal serologies, QuantiFERON gold TB assay (pending), serum cryptococcal antigen (negative), histoplasma urine antigen (neg), sputum for fungus and AFB, and ABGs. Strep pneumo UAg negative. CT chest with extensive RLL pneumonia.  Check C diff (pending collection).  2.  Therapeutically, Augmentin and doxycycline.  D/c Teflaro.  Avoiding vanc given her renal failure.  Can  d/c on today on oral doxycycline and cefuroxime.    3.  O2 supportive care.  4.  Pulmonary following.    Increased risk for adverse drug reactions,  progressive respiratory failure, Death.  Discussed with the patient's , and  Dr. Bender.  D/w pt today    UM discussed with staff    Discussed with family  Kris Solis MD for Dr. Odell  9/18/2017

## 2017-09-18 NOTE — PROGRESS NOTES
"PULMONARY PROGRESS NOTE    Patient Care Team:  Earl Fuentes MD as PCP - General  Earl Fuentes MD as PCP - Family Medicine  Joseph Mckinley MD as PCP - Claims Attributed  Josiah Euceda MD as Consulting Physician (Hematology and Oncology)  Deandra Howard RN as Care Coordinator (Population Health)    Reason for Consult pneumonia, immunocompromised    Subjective     Interval History:   67-year-old woman with a history of autologous bone marrow transplant for multiple myeloma who was hospitalized September 12 with a right lower lobe pneumonia. She has a previous history of tobacco use and some COPD. She quit smoking in 2015. He is feeling improved. Fever has resolved. Sputum production has significantly improved though she still is pretty up some \"globs of mucus\". She does have a history of reflux and takes omeprazole twice daily with good symptomatic relief.    Review of Systems:     ROS negative except for: Fatigue, productive cough      Objective     Vital Signs  Blood pressure 116/68, pulse 89, temperature 97.4 °F (36.3 °C), temperature source Oral, resp. rate 16, height 61\" (154.9 cm), weight 150 lb (68 kg), SpO2 97 %.    Physical Exam:  GENERAL: Well-developed older woman in no distress  HEENT: Conjunctiva pink, no jaundice, oral pharynx without lesions  NECK: Trachea midline, no palpable adenopathy, no JVD  CHEST: Symmetric chest expansion , inspiratory crackles bilateral lower lobes, right greater than left, no wheeze or rhonchi  HEART: Regular, normal S1, S2, no appreciable murmur  ABDOMEN: Bowel sounds are present, nondistended, soft  EXTREMITIES: No edema or cyanosis. No calf tenderness, pedal pulses present  NEURO: Alert and oriented,  symmetric    Results Review:    Lab Results (last 24 hours)     Procedure Component Value Units Date/Time    CBC & Differential [[id number]] Collected:  09/18/17 0526    Specimen:  Blood Updated:  09/18/17 0645    Narrative:       The following " orders were created for panel order CBC & Differential.  Procedure                               Abnormality         Status                     ---------                               -----------         ------                     Scan Slide[[id number]]                                                                  CBC Auto Differential[[id number]]        Abnormal            Final result                 Please view results for these tests on the individual orders.    CBC Auto Differential [[id number]]  (Abnormal) Collected:  09/18/17 0526    Specimen:  Blood Updated:  09/18/17 0645     WBC 3.89 10*3/mm3      RBC 3.61 (L) 10*6/mm3      Hemoglobin 11.1 (L) g/dL      Hematocrit 33.3 (L) %      MCV 92.2 fL      MCH 30.7 pg      MCHC 33.3 g/dL      RDW 13.7 %      RDW-SD 46.4 fl      Platelets 22 (C) 10*3/mm3      Neutrophil % 66.6 %      Lymphocyte % 22.4 (L) %      Monocyte % 10.5 %      Eosinophil % 0.0 %      Basophil % 0.0 %      Immature Grans % 0.5 %      Neutrophils, Absolute 2.59 10*3/mm3      Lymphocytes, Absolute 0.87 10*3/mm3      Monocytes, Absolute 0.41 10*3/mm3      Eosinophils, Absolute 0.00 10*3/mm3      Basophils, Absolute 0.00 10*3/mm3      Immature Grans, Absolute 0.02 10*3/mm3     Renal Function Panel [[id number]]  (Abnormal) Collected:  09/18/17 0526    Specimen:  Blood Updated:  09/18/17 0718     Glucose 79 mg/dL      BUN 38 (H) mg/dL      Creatinine 1.40 (H) mg/dL      Sodium 145 mmol/L      Potassium 2.8 (L) mmol/L      Chloride 113 (H) mmol/L      CO2 26.0 mmol/L      Calcium 8.3 (L) mg/dL      Albumin 3.30 g/dL      Phosphorus 2.8 mg/dL      Anion Gap 6.0 mmol/L      BUN/Creatinine Ratio 27.1 (H)     eGFR Non  Amer 38 (L) mL/min/1.73     Narrative:       [employer] Guidelines    Stage     Description        GFR  1         Normal or High     90+  2         Mild decrease      60-89  3         Moderate decrease  30-59  4         Severe decrease    15-29  5         Kidney  failure     <15    Fungus Culture [[id number]] Collected:  09/13/17 1045    Specimen:  Sputum from Lung Updated:  09/18/17 1101     Fungus Culture No fungus isolated at less than 1 week      Sputum culture usual respiratory izzy, Gram stain with gram-positive cocci  Nasal swab for virus positive for Rhinovirus  Legionella urinary antigen positive  Strep pneumonia antigen, urine, negative    Imaging Results (last 24 hours)     ** No results found for the last 24 hours. **            Today's exam shows an extensive right lower lobe pneumonia,  significantly greater than would be expected from the appearance on  yesterday's chest radiograph. There is mild subpleural interstitial  disease of the medial left upper lobe which is favored to be chronic.  There is a trace amount of disease in the lingula which is nonspecific,  and similar milder right middle lobe interstitial changes favored to  represent pneumonia, perhaps via transpleural spread. There is no  effusion. Mediastinal window images show no evidence of significant  adenopathy and no pericardial effusion.      Included images of the upper abdomen show no significant abnormalities  of the visualized portions of the liver, spleen, pancreas, adrenal  glands, or upper renal poles. A debris layer or layer of noncalcified  small gallstones is noted in the gallbladder. No inflammatory changes  are seen.      IMPRESSION:  1. Extensive right lower lobe pneumonia. Mild right middle lobe and  lingular disease. No evidence of effusion.  2. No other evidence of active chest disease elsewhere.  3. Gallbladder sludge versus noncalcified gallstones.      D:  09/14/2017    acyclovir 400 mg Oral Q12H   budesonide 0.5 mg Nebulization BID - RT   docusate sodium 100 mg Oral Daily   doxycycline 100 mg Oral Q12H   ipratropium-albuterol 3 mL Nebulization 4x Daily - RT   melatonin 5 mg Sublingual Nightly   pantoprazole 40 mg Oral QAM   pharmacy consult - MTM  Does not apply Daily    piperacillin-tazobactam 3.375 g Intravenous Q8H   predniSONE 40 mg Oral Daily With Breakfast   pregabalin 100 mg Oral Q12H   saccharomyces boulardii 250 mg Oral BID       Principal Problem:    Pneumonia of right lower lobe due to infectious organism       A.  Legionella urinary antigen positive       B.  sputum culture usual respiratory izzy on Gram stain with gram-positive cocci       C.  nasal swab positive for rhinovirus  Active Problems:    Multiple myeloma in remission    Hypertension    Chronic obstructive pulmonary disease with acute exacerbation  Emphysema on CT chest    Gastroesophageal reflux disease without esophagitis    Thrombocytopenia since stem cell transplant March 2016    Hx of autologous stem cell transplant (March 2016)    CKD (chronic kidney disease), stage III    Former smoker, 2015    Pulmonary hypertension; RVSP 48mmHg by ECHO        Assessment/Plan   67-year-old woman with a history of multiple myeloma who underwent autologous stem cell transplant March 11, 2017. She is now admitted with right lower lobe pneumonia, fever, purulent sputum. She has had improved clinical course and is afebrile and off oxygen with a saturation of 92%. She has no wheezing on examination. Her Legionella urinary antigen is positive as well as a swab for a rhinovirus from her nares. She is currently receiving Zosyn, doxycycline, acyclovir and prednisone 40 mg. She continues to have some inspiratory crackles in the bases right greater than left. Her serum creatinine is mildly elevated at 1.4, so that is unchanged compared to August. Baseline platelet count is 44,000 and it is currently running 22,000. She has no sign of active bleeding.     Antibiotics per infectious disease; Zosyn, doxycycline, acyclovir  Continue nebulized bronchodilators  Decrease steroids  Mobilize  Initiate incentive spirometry  Continue proton pump inhibitor  Replace potassium  No immediate plans for bronchoscopy with improved clinical  status    will sign off, please call me if her situation changes    Lo Thornton MD  09/18/17  1:26 PM

## 2017-09-19 ENCOUNTER — PATIENT OUTREACH (OUTPATIENT)
Dept: CASE MANAGEMENT | Facility: OTHER | Age: 67
End: 2017-09-19

## 2017-09-19 DIAGNOSIS — C90.01 MULTIPLE MYELOMA IN REMISSION (HCC): ICD-10-CM

## 2017-09-19 LAB
MVISTA(R) BLASTOMYCES AG: NORMAL NG/ML
SPECIMEN SOURCE: NORMAL

## 2017-09-19 RX ORDER — SODIUM CHLORIDE 9 MG/ML
250 INJECTION, SOLUTION INTRAVENOUS ONCE
Status: CANCELLED | OUTPATIENT
Start: 2017-09-21 | End: 2017-09-21

## 2017-09-19 NOTE — OUTREACH NOTE
Contact made with patient. States to have no SOB, eating and sleeping well..Discusses will  receive additional medications and oxygen today. Clarified with patient she is not receiving home health services at this time. Will continue follow up at needed.

## 2017-09-19 NOTE — PROGRESS NOTES
Continued Stay Note  [facility]     Patient Name: Doris Miranda  MRN: [id number]  Today's Date: 9/19/2017    Admit Date: 9/12/2017          Discharge Plan       09/19/17 1332    Case Management/Social Work Plan    Additional Comments Pts room air sat at time of discharge was 85%               Discharge Codes     None        Expected Discharge Date and Time     Expected Discharge Date Expected Discharge Time    Sep 18, 2017             Nicole Baca RN

## 2017-09-20 ENCOUNTER — INFUSION (OUTPATIENT)
Dept: ONCOLOGY | Facility: HOSPITAL | Age: 67
End: 2017-09-20

## 2017-09-20 ENCOUNTER — TRANSITIONAL CARE MANAGEMENT TELEPHONE ENCOUNTER (OUTPATIENT)
Dept: INTERNAL MEDICINE | Facility: CLINIC | Age: 67
End: 2017-09-20

## 2017-09-20 VITALS
WEIGHT: 165.6 LBS | BODY MASS INDEX: 31.29 KG/M2 | TEMPERATURE: 97.6 F | HEART RATE: 95 BPM | RESPIRATION RATE: 18 BRPM | DIASTOLIC BLOOD PRESSURE: 69 MMHG | SYSTOLIC BLOOD PRESSURE: 127 MMHG

## 2017-09-20 DIAGNOSIS — C90.01 MULTIPLE MYELOMA IN REMISSION (HCC): Primary | ICD-10-CM

## 2017-09-20 PROCEDURE — 96365 THER/PROPH/DIAG IV INF INIT: CPT

## 2017-09-20 PROCEDURE — 96374 THER/PROPH/DIAG INJ IV PUSH: CPT

## 2017-09-20 PROCEDURE — 36415 COLL VENOUS BLD VENIPUNCTURE: CPT

## 2017-09-20 PROCEDURE — 25010000002 ZOLEDRONIC ACID PER 1 MG: Performed by: NURSE PRACTITIONER

## 2017-09-20 RX ADMIN — ZOLEDRONIC ACID 4 MG: 4 INJECTION, SOLUTION, CONCENTRATE INTRAVENOUS at 11:03

## 2017-09-21 LAB
INTERPRETATION: NORMAL
M TB TUBERC IFN-G BLD QL: NEGATIVE
QFT TB AG MINUS NIL VALUE: <0 IU/ML
QUANTIFERON CRITERIA: NORMAL
QUANTIFERON MITOGEN VALUE: 4.72 IU/ML
QUANTIFERON NIL VALUE: 0.1 IU/ML
QUANTIFERON TB AG VALUE: 0.09 IU/ML

## 2017-09-27 ENCOUNTER — OFFICE VISIT (OUTPATIENT)
Dept: INTERNAL MEDICINE | Facility: CLINIC | Age: 67
End: 2017-09-27

## 2017-09-27 VITALS
DIASTOLIC BLOOD PRESSURE: 60 MMHG | HEIGHT: 61 IN | WEIGHT: 161 LBS | SYSTOLIC BLOOD PRESSURE: 110 MMHG | OXYGEN SATURATION: 94 % | TEMPERATURE: 97.6 F | BODY MASS INDEX: 30.4 KG/M2 | HEART RATE: 98 BPM

## 2017-09-27 DIAGNOSIS — M54.50 CHRONIC BILATERAL LOW BACK PAIN WITHOUT SCIATICA: ICD-10-CM

## 2017-09-27 DIAGNOSIS — Z94.84 HX OF AUTOLOGOUS STEM CELL TRANSPLANT (HCC): Chronic | ICD-10-CM

## 2017-09-27 DIAGNOSIS — T50.905A THROMBOCYTOPENIA DUE TO DRUGS: Chronic | ICD-10-CM

## 2017-09-27 DIAGNOSIS — Z23 NEED FOR IMMUNIZATION AGAINST INFLUENZA: ICD-10-CM

## 2017-09-27 DIAGNOSIS — J96.11 CHRONIC RESPIRATORY FAILURE WITH HYPOXIA (HCC): ICD-10-CM

## 2017-09-27 DIAGNOSIS — G89.29 CHRONIC BILATERAL LOW BACK PAIN WITHOUT SCIATICA: ICD-10-CM

## 2017-09-27 DIAGNOSIS — I36.1 NON-RHEUMATIC TRICUSPID VALVE INSUFFICIENCY: Primary | Chronic | ICD-10-CM

## 2017-09-27 DIAGNOSIS — K57.30 DIVERTICULOSIS OF LARGE INTESTINE WITHOUT HEMORRHAGE: ICD-10-CM

## 2017-09-27 DIAGNOSIS — I27.20 PULMONARY HYPERTENSION (HCC): Chronic | ICD-10-CM

## 2017-09-27 DIAGNOSIS — K21.9 GASTROESOPHAGEAL REFLUX DISEASE WITHOUT ESOPHAGITIS: Chronic | ICD-10-CM

## 2017-09-27 DIAGNOSIS — M19.90 ARTHRITIS: ICD-10-CM

## 2017-09-27 DIAGNOSIS — D12.6 TUBULAR ADENOMA OF COLON: ICD-10-CM

## 2017-09-27 DIAGNOSIS — C90.01 MULTIPLE MYELOMA IN REMISSION (HCC): Chronic | ICD-10-CM

## 2017-09-27 DIAGNOSIS — D69.59 THROMBOCYTOPENIA DUE TO DRUGS: Chronic | ICD-10-CM

## 2017-09-27 DIAGNOSIS — N18.30 CKD (CHRONIC KIDNEY DISEASE), STAGE III (HCC): Chronic | ICD-10-CM

## 2017-09-27 PROBLEM — A48.1 LEGIONELLA PNEUMONIA (HCC): Status: RESOLVED | Noted: 2017-09-18 | Resolved: 2017-09-27

## 2017-09-27 PROBLEM — J18.9 PNEUMONIA OF RIGHT LOWER LOBE DUE TO INFECTIOUS ORGANISM: Status: RESOLVED | Noted: 2017-09-12 | Resolved: 2017-09-27

## 2017-09-27 PROCEDURE — 90662 IIV NO PRSV INCREASED AG IM: CPT | Performed by: INTERNAL MEDICINE

## 2017-09-27 PROCEDURE — G0008 ADMIN INFLUENZA VIRUS VAC: HCPCS | Performed by: INTERNAL MEDICINE

## 2017-09-27 PROCEDURE — 99495 TRANSJ CARE MGMT MOD F2F 14D: CPT | Performed by: INTERNAL MEDICINE

## 2017-09-27 RX ORDER — GABAPENTIN 400 MG/1
400 CAPSULE ORAL 3 TIMES DAILY
Qty: 90 CAPSULE | Refills: 2 | Status: SHIPPED | OUTPATIENT
Start: 2017-09-27 | End: 2018-02-23 | Stop reason: SDUPTHER

## 2017-09-27 NOTE — PROGRESS NOTES
Patient is a 67 y.o. female who is here for a follow up of recent hospital stay.  Chief Complaint   Patient presents with   • Follow-up     [place] for pneumonia         HPI:  Here for f/u.  Recently discharged on dual antibiotics which she finished on Saturday.  Using O2 on prn basis. Occasional cough.  No fever or chills.  No dizziness or lightheadedness.  No hemoptysis.  Had diarrhea but has resolved.  No CP.      History:    Patient Active Problem List   Diagnosis   • Multiple myeloma in remission   • Diverticulosis of large intestine without hemorrhage   • Tubular adenoma of colon   • Chronic obstructive pulmonary disease with acute exacerbation   • Chronic bronchitis   • Arthritis   • Gastroesophageal reflux disease without esophagitis   • Chronic bilateral low back pain without sciatica   • Thrombocytopenia since stem cell transplant March 2016   • Hx of autologous stem cell transplant (March 2016)   • CKD (chronic kidney disease), stage III   • Former smoker   • Non-rheumatic tricuspid valve insufficiency   • Pulmonary hypertension   • Chronic respiratory failure with hypoxia (been noncompliant with outpatient oxygen in past)       Past Medical History:   Diagnosis Date   • Arthritis    • Chronic bronchitis    • COPD (chronic obstructive pulmonary disease)    • Hypertension    • Multiple myeloma    • Multiple myeloma, failed remission        Past Surgical History:   Procedure Laterality Date   • LIMBAL STEM CELL TRANSPLANT  03/2016    @ Dr. Josiah Spicer   • MOUTH SURGERY         Current Outpatient Prescriptions on File Prior to Visit   Medication Sig   • acyclovir (ZOVIRAX) 400 MG tablet Take 1 tablet by mouth 2 (Two) Times a Day.   • cyclobenzaprine (FLEXERIL) 10 MG tablet Take 1 tablet by mouth 3 (Three) Times a Day As Needed for Muscle Spasms.   • esomeprazole (nexIUM) 20 MG capsule Take 20 mg by mouth Every Morning Before Breakfast.   • Melatonin 5 MG chewable tablet Chew 5 mg Every Night.   •  [DISCONTINUED] pregabalin (LYRICA) 100 MG capsule Take 1 capsule by mouth 2 (Two) Times a Day.     No current facility-administered medications on file prior to visit.        Family History   Problem Relation Age of Onset   • Breast cancer Other    • Lung cancer Other    • Liver cancer Other    • Bone cancer Other        Social History     Social History   • Marital status:      Spouse name: N/A   • Number of children: N/A   • Years of education: N/A     Occupational History   • Not on file.     Social History Main Topics   • Smoking status: Former Smoker     Quit date: 8/1/2015   • Smokeless tobacco: Never Used   • Alcohol use No   • Drug use: No   • Sexual activity: Not on file     Other Topics Concern   • Not on file     Social History Narrative         ROS:    Review of Systems   Constitutional: Positive for fatigue. Negative for chills, fever and unexpected weight change.   HENT: Negative for congestion, ear pain, hearing loss, rhinorrhea, sinus pressure, sore throat and trouble swallowing.    Eyes: Negative for discharge and itching.   Respiratory: Negative for cough, chest tightness and shortness of breath.    Cardiovascular: Negative for chest pain, palpitations and leg swelling.   Gastrointestinal: Negative for abdominal pain, blood in stool, constipation, diarrhea and vomiting.        11/13 colonoscopy with hyperplastic polyps  12/16 colonoscopy with TA times one   Endocrine: Positive for heat intolerance. Negative for polydipsia and polyuria.   Genitourinary: Negative for difficulty urinating, dysuria, enuresis, frequency, hematuria and urgency.   Musculoskeletal: Positive for arthralgias, back pain and gait problem. Negative for joint swelling.   Skin: Negative for rash and wound.   Allergic/Immunologic: Negative for immunocompromised state.   Neurological: Positive for weakness. Negative for dizziness, syncope, light-headedness, numbness and headaches.   Hematological: Does not bruise/bleed  "easily.   Psychiatric/Behavioral: Negative for behavioral problems, dysphoric mood and sleep disturbance. The patient is not nervous/anxious.        /60  Pulse 98  Temp 97.6 °F (36.4 °C) (Temporal Artery )   Ht 61\" (154.9 cm)  Wt 161 lb (73 kg)  SpO2 94%  BMI 30.42 kg/m2    Physical Exam:    Physical Exam   Constitutional: She is oriented to person, place, and time. She appears well-developed and well-nourished.   HENT:   Head: Normocephalic and atraumatic.   Right Ear: External ear normal.   Left Ear: External ear normal.   Mouth/Throat: Oropharynx is clear and moist.   Eyes: Conjunctivae and EOM are normal.   Neck: Normal range of motion. Neck supple.   Cardiovascular: Normal rate, regular rhythm and normal heart sounds.    Pulmonary/Chest: Effort normal.   Mild bibasilar rales   Abdominal: Soft. Bowel sounds are normal.   Musculoskeletal:   Using cane   Lymphadenopathy:     She has no cervical adenopathy.   Neurological: She is alert and oriented to person, place, and time.   Skin: Skin is warm and dry.   Psychiatric: She has a normal mood and affect. Her behavior is normal. Thought content normal.       Procedure:    This patient care was coordinated using transitional care management strategy. I have reviewed The discharge records are available and reviewed    Discussion/Summary:  nausea-not an issue  htn-stable off meds  copd-not an issue since stopping tob  gerd/gastritis-cont ppi  MM/back pain with radiculopathy-overall improved, change to gabapentin, Oncology notes reviewed  insomnia-cont melatonin    Hospital records noted  Will give flu shot today          Current Outpatient Prescriptions:   •  acyclovir (ZOVIRAX) 400 MG tablet, Take 1 tablet by mouth 2 (Two) Times a Day., Disp: 60 tablet, Rfl: 5  •  cyclobenzaprine (FLEXERIL) 10 MG tablet, Take 1 tablet by mouth 3 (Three) Times a Day As Needed for Muscle Spasms., Disp: 90 tablet, Rfl: 4  •  esomeprazole (nexIUM) 20 MG capsule, Take 20 mg by " mouth Every Morning Before Breakfast., Disp: , Rfl:   •  Melatonin 5 MG chewable tablet, Chew 5 mg Every Night., Disp: , Rfl:   •  gabapentin (NEURONTIN) 400 MG capsule, Take 1 capsule by mouth 3 (Three) Times a Day., Disp: 90 capsule, Rfl: 2        Doris was seen today for follow-up.    Diagnoses and all orders for this visit:    Non-rheumatic tricuspid valve insufficiency    Pulmonary hypertension    Chronic respiratory failure with hypoxia (been noncompliant with outpatient oxygen in past)    Diverticulosis of large intestine without hemorrhage    Gastroesophageal reflux disease without esophagitis    Tubular adenoma of colon    Arthritis    CKD (chronic kidney disease), stage III    Hx of autologous stem cell transplant (March 2016)    Multiple myeloma in remission    Thrombocytopenia since stem cell transplant March 2016    Chronic bilateral low back pain without sciatica  -     gabapentin (NEURONTIN) 400 MG capsule; Take 1 capsule by mouth 3 (Three) Times a Day.    Need for immunization against influenza  -     Flu Vaccine High Dose PF 65YR+

## 2017-09-29 ENCOUNTER — EPISODE CHANGES (OUTPATIENT)
Dept: CASE MANAGEMENT | Facility: OTHER | Age: 67
End: 2017-09-29

## 2017-10-16 ENCOUNTER — PATIENT OUTREACH (OUTPATIENT)
Dept: CASE MANAGEMENT | Facility: OTHER | Age: 67
End: 2017-10-16

## 2017-10-16 NOTE — OUTREACH NOTE
Contacted patient. Patient lives with  and family; is independent with ADL''s; receiving  Assistance from family with transportation. She  uses 3 prong cane or rolling walker for ambulation; O2 at night during sleep. States to have no breathing difficulties at this time. She states to be compliant with medications; medical appointments and recommendations . Reviewed with patient gaps in care; 24/7 Nurse Line Telephone number and HRCM program.. She verbalized understanding and interest in HRCM program. No questions or concerns voiced at this time.

## 2017-10-18 ENCOUNTER — EPISODE CHANGES (OUTPATIENT)
Dept: CASE MANAGEMENT | Facility: OTHER | Age: 67
End: 2017-10-18

## 2017-10-19 DIAGNOSIS — C90.01 MULTIPLE MYELOMA IN REMISSION (HCC): ICD-10-CM

## 2017-10-19 RX ORDER — SODIUM CHLORIDE 9 MG/ML
250 INJECTION, SOLUTION INTRAVENOUS ONCE
Status: CANCELLED | OUTPATIENT
Start: 2017-12-23 | End: 2017-12-23

## 2017-10-19 RX ORDER — SODIUM CHLORIDE 9 MG/ML
250 INJECTION, SOLUTION INTRAVENOUS ONCE
Status: CANCELLED | OUTPATIENT
Start: 2017-10-22 | End: 2017-10-22

## 2017-10-19 RX ORDER — SODIUM CHLORIDE 9 MG/ML
250 INJECTION, SOLUTION INTRAVENOUS ONCE
Status: CANCELLED | OUTPATIENT
Start: 2017-11-22 | End: 2017-11-22

## 2017-10-20 ENCOUNTER — APPOINTMENT (OUTPATIENT)
Dept: ONCOLOGY | Facility: HOSPITAL | Age: 67
End: 2017-10-20

## 2017-10-20 ENCOUNTER — OFFICE VISIT (OUTPATIENT)
Dept: ONCOLOGY | Facility: CLINIC | Age: 67
End: 2017-10-20

## 2017-10-20 VITALS
BODY MASS INDEX: 31.15 KG/M2 | HEART RATE: 101 BPM | HEIGHT: 61 IN | RESPIRATION RATE: 16 BRPM | SYSTOLIC BLOOD PRESSURE: 129 MMHG | TEMPERATURE: 98 F | DIASTOLIC BLOOD PRESSURE: 69 MMHG | WEIGHT: 165 LBS

## 2017-10-20 DIAGNOSIS — C90.01 MULTIPLE MYELOMA IN REMISSION (HCC): ICD-10-CM

## 2017-10-20 PROCEDURE — 99214 OFFICE O/P EST MOD 30 MIN: CPT | Performed by: INTERNAL MEDICINE

## 2017-10-20 RX ORDER — SODIUM CHLORIDE 9 MG/ML
250 INJECTION, SOLUTION INTRAVENOUS ONCE
Status: CANCELLED | OUTPATIENT
Start: 2017-12-04

## 2017-10-20 NOTE — PROGRESS NOTES
PROBLEM LIST:  Oncology/Hematology History     PROBLEM LIST:   1. Stage III IgA kappa clonal multiple myeloma. Diagnosed 9/20152. FISH panel reports multiple abnormalities including gain of 1q21 monosomy.  3. Monosomy 13 and gain of chromosomes 9, 15 and CCND1.  4. Initial M-spike of 7.1 g/dL at time of diagnosis and after 3 cycles, had  undetectable M-spike currently on Velcade, Revlimid and dexamethasone cycle #6  this week.  5. S/p Autologous SCT at [facility] with Dr. Venu Spicer in March 11,2016  6. Revlimid on hold due to low platelets  7.Right leg pain - on lyrica       Multiple myeloma in remission     7/11/2016 Initial Diagnosis     Multiple myeloma         REASON FOR VISIT: Multiple myeloma       Subjective     HISTORY OF PRESENT ILLNESS:   Ms. Miranda is here for follow up evaluation of myeloma management.She was recently admitted with pneumonia.  She is recovered nicely from that.  Still fairly weak and having trouble with her right lower extremity.  Denies any issues or shortness of breath at this time.  She received her post transplant immunizations 5/22/2017. Her next immunizations are due 3/2018.     Past Medical History, Past Surgical History, Social History, Family History have been reviewed and are without significant changes except as mentioned.    Review of Systems   Constitutional: Negative.    HENT: Negative.    Eyes: Negative.    Respiratory: Negative.    Cardiovascular: Negative.    Gastrointestinal: Negative.    Endocrine: Negative.    Genitourinary: Negative.    Musculoskeletal: Positive for arthralgias and gait problem.   Skin: Negative.    Allergic/Immunologic: Negative.    Hematological: Negative.    Psychiatric/Behavioral: Negative.       A comprehensive 14 point review of systems was performed and was negative except as mentioned.    Medications:  The current medication list was reviewed in the EMR    ALLERGIES:  No Known Allergies    Objective      /69  Pulse 101  Temp 98 °F  "(36.7 °C)  Resp 16  Ht 61\" (154.9 cm)  Wt 165 lb (74.8 kg)  BMI 31.18 kg/m2     Performance Status: 1    General: well appearing, in no acute distress  HEENT: sclera anicteric, oropharynx clear  Lymphatics: no cervical, supraclavicular, or axillary adenopathy  Cardiovascular: regular rate and rhythm, no murmurs  Lungs: clear to auscultation bilaterally  Abdomen: soft, nontender, nondistended.  No palpable organomegaly  Extremeties: no lower extremity edema  Skin: no rashes, lesions, bruising, or petechiae    RECENT LABS:    Lab Results   Component Value Date    MSPIKE Not Observed 08/21/2017    MSPIKE Not Observed 07/24/2017    MSPIKE Not Observed 06/19/2017     Lab Results   Component Value Date    FREEKAPPAL 19.0 08/21/2017    FREEKAPPAL 22.7 (H) 07/24/2017    FREEKAPPAL 20.0 (H) 06/19/2017     Lab Results   Component Value Date    IGLFLC 10.3 08/21/2017    IGLFLC 14.9 07/24/2017    IGLFLC 14.0 06/19/2017     Lab Results   Component Value Date    WBC 3.89 09/18/2017    HGB 11.1 (L) 09/18/2017    HCT 33.3 (L) 09/18/2017    MCV 92.2 09/18/2017    PLT 22 (C) 09/18/2017       Lab Results   Component Value Date    GLUCOSE 79 09/18/2017    BUN 38 (H) 09/18/2017    CREATININE 1.40 (H) 09/18/2017    EGFRIFNONA 38 (L) 09/18/2017    BCR 27.1 (H) 09/18/2017    K 2.8 (L) 09/18/2017    CO2 26.0 09/18/2017    CALCIUM 8.3 (L) 09/18/2017    PROTENTOTREF 6.6 08/21/2017    ALBUMIN 3.30 09/18/2017    LABIL2 1.1 (L) 09/15/2017    AST 11 09/15/2017    ALT 10 09/15/2017         Assessment/Plan   67-year-old lady with multiple myeloma status post autologous stem cell transplant.  Her platelets remain low so we are continuing to hold her Revlimid.  She otherwise is receiving Zometa due to her bone Disease.  Her immunoglobulins are within normal range.  I think it's reasonable to continue just watchful waiting at this time.  If she does have progression as suspect we may have to consider reintroducing her and then transplanting her " again.  However her platelets have not recovered since transplant which is concerning.    Joseph Mckinley MD  [facility]    10/20/2017          CC:

## 2017-10-24 LAB
FUNGUS WND CULT: ABNORMAL
FUNGUS WND CULT: ABNORMAL

## 2017-12-04 ENCOUNTER — INFUSION (OUTPATIENT)
Dept: ONCOLOGY | Facility: HOSPITAL | Age: 67
End: 2017-12-04

## 2017-12-04 VITALS
WEIGHT: 168 LBS | DIASTOLIC BLOOD PRESSURE: 78 MMHG | SYSTOLIC BLOOD PRESSURE: 125 MMHG | BODY MASS INDEX: 31.74 KG/M2 | RESPIRATION RATE: 16 BRPM | HEART RATE: 99 BPM | TEMPERATURE: 97.7 F

## 2017-12-04 DIAGNOSIS — C90.01 MULTIPLE MYELOMA IN REMISSION (HCC): Primary | ICD-10-CM

## 2017-12-04 LAB
ALBUMIN SERPL-MCNC: 4.5 G/DL (ref 3.2–4.8)
ALBUMIN/GLOB SERPL: 2 G/DL (ref 1.5–2.5)
ALP SERPL-CCNC: 123 U/L (ref 25–100)
ALT SERPL W P-5'-P-CCNC: 15 U/L (ref 7–40)
ANION GAP SERPL CALCULATED.3IONS-SCNC: 10 MMOL/L (ref 3–11)
AST SERPL-CCNC: 21 U/L (ref 0–33)
BASOPHILS # BLD AUTO: 0.01 10*3/MM3 (ref 0–0.2)
BASOPHILS NFR BLD AUTO: 0.2 % (ref 0–1)
BILIRUB SERPL-MCNC: 0.5 MG/DL (ref 0.3–1.2)
BUN BLD-MCNC: 21 MG/DL (ref 9–23)
BUN/CREAT SERPL: 17.5 (ref 7–25)
CALCIUM SPEC-SCNC: 9 MG/DL (ref 8.7–10.4)
CHLORIDE SERPL-SCNC: 108 MMOL/L (ref 99–109)
CO2 SERPL-SCNC: 25 MMOL/L (ref 20–31)
CREAT BLD-MCNC: 1.2 MG/DL (ref 0.6–1.3)
CREAT BLDA-MCNC: 1.2 MG/DL (ref 0.6–1.3)
DEPRECATED RDW RBC AUTO: 47.7 FL (ref 37–54)
EOSINOPHIL # BLD AUTO: 0.05 10*3/MM3 (ref 0–0.3)
EOSINOPHIL NFR BLD AUTO: 1.1 % (ref 0–3)
ERYTHROCYTE [DISTWIDTH] IN BLOOD BY AUTOMATED COUNT: 13.7 % (ref 11.3–14.5)
GFR SERPL CREATININE-BSD FRML MDRD: 45 ML/MIN/1.73
GLOBULIN UR ELPH-MCNC: 2.2 GM/DL
GLUCOSE BLD-MCNC: 70 MG/DL (ref 70–100)
HCT VFR BLD AUTO: 40.7 % (ref 34.5–44)
HGB BLD-MCNC: 13.6 G/DL (ref 11.5–15.5)
IMM GRANULOCYTES # BLD: 0 10*3/MM3 (ref 0–0.03)
IMM GRANULOCYTES NFR BLD: 0 % (ref 0–0.6)
LYMPHOCYTES # BLD AUTO: 0.72 10*3/MM3 (ref 0.6–4.8)
LYMPHOCYTES NFR BLD AUTO: 15.8 % (ref 24–44)
MAGNESIUM SERPL-MCNC: 2.2 MG/DL (ref 1.3–2.7)
MCH RBC QN AUTO: 31.5 PG (ref 27–31)
MCHC RBC AUTO-ENTMCNC: 33.4 G/DL (ref 32–36)
MCV RBC AUTO: 94.2 FL (ref 80–99)
MONOCYTES # BLD AUTO: 0.32 10*3/MM3 (ref 0–1)
MONOCYTES NFR BLD AUTO: 7 % (ref 0–12)
NEUTROPHILS # BLD AUTO: 3.45 10*3/MM3 (ref 1.5–8.3)
NEUTROPHILS NFR BLD AUTO: 75.9 % (ref 41–71)
PHOSPHATE SERPL-MCNC: 3.1 MG/DL (ref 2.4–5.1)
PLATELET # BLD AUTO: 55 10*3/MM3 (ref 150–450)
PMV BLD AUTO: 12 FL (ref 6–12)
POTASSIUM BLD-SCNC: 4 MMOL/L (ref 3.5–5.5)
PROT SERPL-MCNC: 6.7 G/DL (ref 5.7–8.2)
RBC # BLD AUTO: 4.32 10*6/MM3 (ref 3.89–5.14)
SODIUM BLD-SCNC: 143 MMOL/L (ref 132–146)
WBC NRBC COR # BLD: 4.55 10*3/MM3 (ref 3.5–10.8)

## 2017-12-04 PROCEDURE — 96365 THER/PROPH/DIAG IV INF INIT: CPT

## 2017-12-04 PROCEDURE — 86334 IMMUNOFIX E-PHORESIS SERUM: CPT

## 2017-12-04 PROCEDURE — 83735 ASSAY OF MAGNESIUM: CPT

## 2017-12-04 PROCEDURE — 84100 ASSAY OF PHOSPHORUS: CPT

## 2017-12-04 PROCEDURE — 96374 THER/PROPH/DIAG INJ IV PUSH: CPT

## 2017-12-04 PROCEDURE — 82565 ASSAY OF CREATININE: CPT

## 2017-12-04 PROCEDURE — 84165 PROTEIN E-PHORESIS SERUM: CPT

## 2017-12-04 PROCEDURE — 83883 ASSAY NEPHELOMETRY NOT SPEC: CPT

## 2017-12-04 PROCEDURE — 25010000002 ZOLEDRONIC ACID PER 1 MG: Performed by: INTERNAL MEDICINE

## 2017-12-04 PROCEDURE — 80053 COMPREHEN METABOLIC PANEL: CPT

## 2017-12-04 PROCEDURE — 85025 COMPLETE CBC W/AUTO DIFF WBC: CPT

## 2017-12-04 RX ADMIN — ZOLEDRONIC ACID 3.3 MG: 4 INJECTION, SOLUTION, CONCENTRATE INTRAVENOUS at 13:44

## 2017-12-04 NOTE — PHARMACY RECOMMENDATION
Zometa dose reduced to 3.3 mg due to clcr=42.6 ml/min;    Elias Alvares [facility]  12/4/2017  1:34 PM

## 2017-12-05 LAB
ALBUMIN SERPL-MCNC: 4.1 G/DL (ref 2.9–4.4)
ALBUMIN/GLOB SERPL: 1.5 {RATIO} (ref 0.7–1.7)
ALPHA1 GLOB FLD ELPH-MCNC: 0.3 G/DL (ref 0–0.4)
ALPHA2 GLOB SERPL ELPH-MCNC: 0.8 G/DL (ref 0.4–1)
B-GLOBULIN SERPL ELPH-MCNC: 1.1 G/DL (ref 0.7–1.3)
GAMMA GLOB SERPL ELPH-MCNC: 0.6 G/DL (ref 0.4–1.8)
GLOBULIN SER CALC-MCNC: 2.8 G/DL (ref 2.2–3.9)
IGA SERPL-MCNC: 122 MG/DL (ref 87–352)
IGG SERPL-MCNC: 601 MG/DL (ref 700–1600)
IGM SERPL-MCNC: 32 MG/DL (ref 26–217)
INTERPRETATION SERPL IEP-IMP: ABNORMAL
KAPPA LC SERPL-MCNC: 15.2 MG/L (ref 3.3–19.4)
KAPPA LC/LAMBDA SER: 1.25 {RATIO} (ref 0.26–1.65)
LAMBDA LC FREE SERPL-MCNC: 12.2 MG/L (ref 5.7–26.3)
Lab: ABNORMAL
M-SPIKE: ABNORMAL G/DL
PROT SERPL-MCNC: 6.9 G/DL (ref 6–8.5)

## 2017-12-11 ENCOUNTER — OFFICE VISIT (OUTPATIENT)
Dept: ONCOLOGY | Facility: CLINIC | Age: 67
End: 2017-12-11

## 2017-12-11 VITALS
HEART RATE: 90 BPM | RESPIRATION RATE: 15 BRPM | TEMPERATURE: 97.7 F | DIASTOLIC BLOOD PRESSURE: 82 MMHG | SYSTOLIC BLOOD PRESSURE: 146 MMHG | BODY MASS INDEX: 31.55 KG/M2 | WEIGHT: 167 LBS

## 2017-12-11 DIAGNOSIS — C90.01 MULTIPLE MYELOMA IN REMISSION (HCC): ICD-10-CM

## 2017-12-11 PROCEDURE — 99214 OFFICE O/P EST MOD 30 MIN: CPT | Performed by: INTERNAL MEDICINE

## 2017-12-11 RX ORDER — SODIUM CHLORIDE 9 MG/ML
250 INJECTION, SOLUTION INTRAVENOUS ONCE
Status: CANCELLED | OUTPATIENT
Start: 2018-02-26

## 2017-12-11 NOTE — PROGRESS NOTES
PROBLEM LIST:  Oncology/Hematology History     PROBLEM LIST:   1. Stage III IgA kappa clonal multiple myeloma. Diagnosed 9/20152. FISH panel reports multiple abnormalities including gain of 1q21 monosomy.  3. Monosomy 13 and gain of chromosomes 9, 15 and CCND1.  4. Initial M-spike of 7.1 g/dL at time of diagnosis and after 3 cycles, had  undetectable M-spike currently on Velcade, Revlimid and dexamethasone cycle #6  this week.  5. S/p Autologous SCT at [facility] with Dr. Venu Spicer in March 11,2016  6. Revlimid on hold due to low platelets  7.Right leg pain - on lyrica       Multiple myeloma in remission     7/11/2016 Initial Diagnosis     Multiple myeloma         REASON FOR VISIT: Multiple myeloma       Subjective     HISTORY OF PRESENT ILLNESS:   Ms. Miranda is here for follow up evaluation of myeloma management.   She continues having trouble with her right lower extremity.  Denies any issues or shortness of breath at this time.  She received her post transplant immunizations 5/22/2017. Her next immunizations are due 3/2018.     Past Medical History, Past Surgical History, Social History, Family History have been reviewed and are without significant changes except as mentioned.    Review of Systems   Constitutional: Negative.    HENT: Negative.    Eyes: Negative.    Respiratory: Negative.    Cardiovascular: Negative.    Gastrointestinal: Negative.    Endocrine: Negative.    Genitourinary: Negative.    Musculoskeletal: Positive for arthralgias and gait problem.   Skin: Negative.    Allergic/Immunologic: Negative.    Hematological: Negative.    Psychiatric/Behavioral: Negative.       A comprehensive 14 point review of systems was performed and was negative except as mentioned.    Medications:  The current medication list was reviewed in the EMR    ALLERGIES:  No Known Allergies    Objective      /82  Pulse 90  Temp 97.7 °F (36.5 °C) (Temporal Artery )   Resp 15  Wt 75.8 kg (167 lb)  BMI 31.55 kg/m2      Performance Status: 1    General: well appearing, in no acute distress  HEENT: sclera anicteric, oropharynx clear  Lymphatics: no cervical, supraclavicular, or axillary adenopathy  Cardiovascular: regular rate and rhythm, no murmurs  Lungs: clear to auscultation bilaterally  Abdomen: soft, nontender, nondistended.  No palpable organomegaly  Extremeties: no lower extremity edema  Skin: no rashes, lesions, bruising, or petechiae    RECENT LABS:    Lab Results   Component Value Date    MSPIKE Not Observed 12/04/2017    MSPIKE Not Observed 08/21/2017    MSPIKE Not Observed 07/24/2017     Lab Results   Component Value Date    FREEKAPPAL 15.2 12/04/2017    FREEKAPPAL 19.0 08/21/2017    FREEKAPPAL 22.7 (H) 07/24/2017     Lab Results   Component Value Date    IGLFLC 12.2 12/04/2017    IGLFLC 10.3 08/21/2017    IGLFLC 14.9 07/24/2017     Lab Results   Component Value Date    WBC 4.55 12/04/2017    HGB 13.6 12/04/2017    HCT 40.7 12/04/2017    MCV 94.2 12/04/2017    PLT 55 (L) 12/04/2017       Lab Results   Component Value Date    GLUCOSE 70 12/04/2017    BUN 21 12/04/2017    CREATININE 1.20 12/04/2017    EGFRIFNONA 45 (L) 12/04/2017    BCR 17.5 12/04/2017    K 4.0 12/04/2017    CO2 25.0 12/04/2017    CALCIUM 9.0 12/04/2017    PROTENTOTREF 6.9 12/04/2017    ALBUMIN 4.50 12/04/2017    ALBUMIN 4.1 12/04/2017    LABIL2 2.0 12/04/2017    LABIL2 1.5 12/04/2017    AST 21 12/04/2017    ALT 15 12/04/2017         Assessment/Plan   67-year-old lady with multiple myeloma status post autologous stem cell transplant.  Her platelets remain low so we are continuing to hold her Revlimid.  She otherwise is receiving Zometa due to her bone Disease. Her initial MSpike was almost 8 gms.  Continues to be undetectable currently.  Will continue close monitoring with rtc in 3 months.        Joseph Mckinley MD  [facility]    12/11/2017          CC:

## 2017-12-22 RX ORDER — ACYCLOVIR 400 MG/1
400 TABLET ORAL 2 TIMES DAILY WITH MEALS
Qty: 60 TABLET | Refills: 5 | Status: SHIPPED | OUTPATIENT
Start: 2017-12-22 | End: 2018-05-30 | Stop reason: SDUPTHER

## 2018-02-23 ENCOUNTER — OFFICE VISIT (OUTPATIENT)
Dept: INTERNAL MEDICINE | Facility: CLINIC | Age: 68
End: 2018-02-23

## 2018-02-23 VITALS
HEART RATE: 68 BPM | DIASTOLIC BLOOD PRESSURE: 72 MMHG | WEIGHT: 164 LBS | HEIGHT: 62 IN | BODY MASS INDEX: 30.18 KG/M2 | SYSTOLIC BLOOD PRESSURE: 130 MMHG

## 2018-02-23 DIAGNOSIS — I27.20 PULMONARY HYPERTENSION (HCC): Chronic | ICD-10-CM

## 2018-02-23 DIAGNOSIS — N18.30 CKD (CHRONIC KIDNEY DISEASE), STAGE III (HCC): Chronic | ICD-10-CM

## 2018-02-23 DIAGNOSIS — Z94.84 HX OF AUTOLOGOUS STEM CELL TRANSPLANT (HCC): Chronic | ICD-10-CM

## 2018-02-23 DIAGNOSIS — M19.90 ARTHRITIS: ICD-10-CM

## 2018-02-23 DIAGNOSIS — I36.1 NON-RHEUMATIC TRICUSPID VALVE INSUFFICIENCY: Primary | Chronic | ICD-10-CM

## 2018-02-23 DIAGNOSIS — D12.6 TUBULAR ADENOMA OF COLON: ICD-10-CM

## 2018-02-23 DIAGNOSIS — G89.29 CHRONIC BILATERAL LOW BACK PAIN WITHOUT SCIATICA: ICD-10-CM

## 2018-02-23 DIAGNOSIS — M54.50 CHRONIC BILATERAL LOW BACK PAIN WITHOUT SCIATICA: ICD-10-CM

## 2018-02-23 DIAGNOSIS — T50.905A THROMBOCYTOPENIA DUE TO DRUGS: Chronic | ICD-10-CM

## 2018-02-23 DIAGNOSIS — D69.59 THROMBOCYTOPENIA DUE TO DRUGS: Chronic | ICD-10-CM

## 2018-02-23 DIAGNOSIS — C90.01 MULTIPLE MYELOMA IN REMISSION (HCC): Chronic | ICD-10-CM

## 2018-02-23 DIAGNOSIS — J44.1 CHRONIC OBSTRUCTIVE PULMONARY DISEASE WITH ACUTE EXACERBATION (HCC): ICD-10-CM

## 2018-02-23 DIAGNOSIS — K57.30 DIVERTICULOSIS OF LARGE INTESTINE WITHOUT HEMORRHAGE: ICD-10-CM

## 2018-02-23 DIAGNOSIS — J96.11 CHRONIC RESPIRATORY FAILURE WITH HYPOXIA (HCC): ICD-10-CM

## 2018-02-23 DIAGNOSIS — K21.9 GASTROESOPHAGEAL REFLUX DISEASE WITHOUT ESOPHAGITIS: Chronic | ICD-10-CM

## 2018-02-23 PROCEDURE — 99214 OFFICE O/P EST MOD 30 MIN: CPT | Performed by: INTERNAL MEDICINE

## 2018-02-23 RX ORDER — GABAPENTIN 400 MG/1
400 CAPSULE ORAL 3 TIMES DAILY
Qty: 90 CAPSULE | Refills: 2 | Status: SHIPPED | OUTPATIENT
Start: 2018-02-23 | End: 2018-06-20 | Stop reason: SDUPTHER

## 2018-02-23 RX ORDER — ALBUTEROL SULFATE 90 UG/1
2 AEROSOL, METERED RESPIRATORY (INHALATION) EVERY 6 HOURS PRN
Qty: 1 INHALER | Refills: 5 | Status: SHIPPED | OUTPATIENT
Start: 2018-02-23 | End: 2018-11-09 | Stop reason: SDUPTHER

## 2018-02-23 RX ORDER — CYCLOBENZAPRINE HCL 10 MG
10 TABLET ORAL 3 TIMES DAILY PRN
Qty: 90 TABLET | Refills: 4 | Status: SHIPPED | OUTPATIENT
Start: 2018-02-23 | End: 2018-10-25

## 2018-02-23 RX ORDER — ALBUTEROL SULFATE 90 UG/1
2 AEROSOL, METERED RESPIRATORY (INHALATION) EVERY 6 HOURS PRN
COMMUNITY
End: 2018-02-23 | Stop reason: SDUPTHER

## 2018-02-23 NOTE — PROGRESS NOTES
Patient is a 67 y.o. female who is here for a follow up of chronic conditions.  Chief Complaint   Patient presents with   • Hypertension         HPI:    Here for f/u.  Occasional nausea.  Some diarrhea.  GERD is controlled.  Arthritis is under good control.  No dizziness or lightheadedness.  Using oxygen on prn basis.  Occasional wheezing.  Still smoke free.   History:    Patient Active Problem List   Diagnosis   • Multiple myeloma in remission   • Diverticulosis of large intestine without hemorrhage   • Tubular adenoma of colon   • Chronic obstructive pulmonary disease with acute exacerbation   • Chronic bronchitis   • Arthritis   • Gastroesophageal reflux disease without esophagitis   • Chronic bilateral low back pain without sciatica   • Thrombocytopenia since stem cell transplant March 2016   • Hx of autologous stem cell transplant (March 2016)   • CKD (chronic kidney disease), stage III   • Former smoker   • Non-rheumatic tricuspid valve insufficiency   • Pulmonary hypertension   • Chronic respiratory failure with hypoxia (been noncompliant with outpatient oxygen in past)       Past Medical History:   Diagnosis Date   • Arthritis    • Chronic bronchitis    • COPD (chronic obstructive pulmonary disease)    • Hypertension    • Multiple myeloma    • Multiple myeloma, failed remission        Past Surgical History:   Procedure Laterality Date   • LIMBAL STEM CELL TRANSPLANT  03/2016    @ Dr. Josiah Spicer   • MOUTH SURGERY         Current Outpatient Prescriptions on File Prior to Visit   Medication Sig   • acyclovir (ZOVIRAX) 400 MG tablet Take 1 tablet by mouth 2 (Two) Times a Day With Meals.   • esomeprazole (nexIUM) 20 MG capsule Take 20 mg by mouth Every Morning Before Breakfast.   • Melatonin 5 MG chewable tablet Chew 5 mg Every Night.   • [DISCONTINUED] cyclobenzaprine (FLEXERIL) 10 MG tablet Take 1 tablet by mouth 3 (Three) Times a Day As Needed for Muscle Spasms.   • [DISCONTINUED] gabapentin (NEURONTIN)  400 MG capsule Take 1 capsule by mouth 3 (Three) Times a Day.     No current facility-administered medications on file prior to visit.        Family History   Problem Relation Age of Onset   • Breast cancer Other    • Lung cancer Other    • Liver cancer Other    • Bone cancer Other        Social History     Social History   • Marital status:      Spouse name: N/A   • Number of children: N/A   • Years of education: N/A     Occupational History   • Not on file.     Social History Main Topics   • Smoking status: Former Smoker     Quit date: 8/1/2015   • Smokeless tobacco: Never Used   • Alcohol use No   • Drug use: No   • Sexual activity: Not on file     Other Topics Concern   • Not on file     Social History Narrative         ROS:    Review of Systems   Constitutional: Positive for fatigue and unexpected weight change. Negative for chills and fever.   HENT: Negative for congestion, ear pain, hearing loss, rhinorrhea, sinus pressure, sore throat and trouble swallowing.    Eyes: Negative for discharge and itching.   Respiratory: Negative for cough, chest tightness and shortness of breath.    Cardiovascular: Negative for chest pain, palpitations and leg swelling.   Gastrointestinal: Negative for abdominal pain, blood in stool, constipation, diarrhea and vomiting.        11/13 colonoscopy with hyperplastic polyps  12/16 colonoscopy with TA times one   Endocrine: Positive for heat intolerance. Negative for polydipsia and polyuria.   Genitourinary: Negative for difficulty urinating, dysuria, enuresis, frequency, hematuria and urgency.   Musculoskeletal: Positive for arthralgias, back pain and gait problem. Negative for joint swelling.   Skin: Negative for rash and wound.   Allergic/Immunologic: Negative for immunocompromised state.   Neurological: Positive for weakness. Negative for dizziness, syncope, light-headedness, numbness and headaches.   Hematological: Does not bruise/bleed easily.   Psychiatric/Behavioral:  "Negative for behavioral problems, dysphoric mood and sleep disturbance. The patient is not nervous/anxious.        /72  Pulse 68  Ht 156.2 cm (61.5\")  Wt 74.4 kg (164 lb)  BMI 30.49 kg/m2    Physical Exam:    Physical Exam   Constitutional: She is oriented to person, place, and time. She appears well-developed and well-nourished.   HENT:   Head: Normocephalic and atraumatic.   Right Ear: External ear normal.   Left Ear: External ear normal.   Mouth/Throat: Oropharynx is clear and moist.   Eyes: Conjunctivae and EOM are normal.   Neck: Normal range of motion. Neck supple.   Cardiovascular: Normal rate, regular rhythm and normal heart sounds.    Pulmonary/Chest: Effort normal.   Mild bibasilar rales   Abdominal: Soft. Bowel sounds are normal.   Musculoskeletal:   Using cane   Lymphadenopathy:     She has no cervical adenopathy.   Neurological: She is alert and oriented to person, place, and time.   Skin: Skin is warm and dry.   Psychiatric: She has a normal mood and affect. Her behavior is normal. Thought content normal.       Procedure:      Discussion/Summary:    nausea-not an issue  htn-stable off meds  copd-not an issue since stopping tob, proventil prn  gerd/gastritis-cont ppi  MM/back pain with radiculopathy-overall improved, cont gabapentin, Oncology notes reviewed  insomnia-cont melatonin     Will give flu shot last visit    Current Outpatient Prescriptions:   •  acyclovir (ZOVIRAX) 400 MG tablet, Take 1 tablet by mouth 2 (Two) Times a Day With Meals., Disp: 60 tablet, Rfl: 5  •  albuterol (PROVENTIL HFA;VENTOLIN HFA) 108 (90 Base) MCG/ACT inhaler, Inhale 2 puffs Every 6 (Six) Hours As Needed for Wheezing or Shortness of Air., Disp: 1 inhaler, Rfl: 5  •  cyclobenzaprine (FLEXERIL) 10 MG tablet, Take 1 tablet by mouth 3 (Three) Times a Day As Needed for Muscle Spasms., Disp: 90 tablet, Rfl: 4  •  esomeprazole (nexIUM) 20 MG capsule, Take 20 mg by mouth Every Morning Before Breakfast., Disp: , Rfl:   • "  gabapentin (NEURONTIN) 400 MG capsule, Take 1 capsule by mouth 3 (Three) Times a Day., Disp: 90 capsule, Rfl: 2  •  Melatonin 5 MG chewable tablet, Chew 5 mg Every Night., Disp: , Rfl:         Doris was seen today for hypertension.    Diagnoses and all orders for this visit:    Non-rheumatic tricuspid valve insufficiency    Pulmonary hypertension    Chronic obstructive pulmonary disease with acute exacerbation  -     albuterol (PROVENTIL HFA;VENTOLIN HFA) 108 (90 Base) MCG/ACT inhaler; Inhale 2 puffs Every 6 (Six) Hours As Needed for Wheezing or Shortness of Air.    Chronic respiratory failure with hypoxia (been noncompliant with outpatient oxygen in past)    Diverticulosis of large intestine without hemorrhage    Gastroesophageal reflux disease without esophagitis    Tubular adenoma of colon    Chronic bilateral low back pain without sciatica  -     gabapentin (NEURONTIN) 400 MG capsule; Take 1 capsule by mouth 3 (Three) Times a Day.  -     cyclobenzaprine (FLEXERIL) 10 MG tablet; Take 1 tablet by mouth 3 (Three) Times a Day As Needed for Muscle Spasms.    Arthritis    CKD (chronic kidney disease), stage III    Hx of autologous stem cell transplant (March 2016)    Multiple myeloma in remission    Thrombocytopenia since stem cell transplant March 2016

## 2018-02-26 ENCOUNTER — INFUSION (OUTPATIENT)
Dept: ONCOLOGY | Facility: HOSPITAL | Age: 68
End: 2018-02-26

## 2018-02-26 VITALS
SYSTOLIC BLOOD PRESSURE: 132 MMHG | BODY MASS INDEX: 31.04 KG/M2 | HEART RATE: 95 BPM | RESPIRATION RATE: 16 BRPM | DIASTOLIC BLOOD PRESSURE: 69 MMHG | TEMPERATURE: 98.4 F | WEIGHT: 167 LBS

## 2018-02-26 DIAGNOSIS — C90.01 MULTIPLE MYELOMA IN REMISSION (HCC): Primary | ICD-10-CM

## 2018-02-26 LAB
ALBUMIN SERPL-MCNC: 4.5 G/DL (ref 3.2–4.8)
ALBUMIN/GLOB SERPL: 1.7 G/DL (ref 1.5–2.5)
ALP SERPL-CCNC: 129 U/L (ref 25–100)
ALT SERPL W P-5'-P-CCNC: 19 U/L (ref 7–40)
ANION GAP SERPL CALCULATED.3IONS-SCNC: 5 MMOL/L (ref 3–11)
AST SERPL-CCNC: 20 U/L (ref 0–33)
BASOPHILS # BLD AUTO: 0.03 10*3/MM3 (ref 0–0.2)
BASOPHILS NFR BLD AUTO: 0.5 % (ref 0–1)
BILIRUB SERPL-MCNC: 0.6 MG/DL (ref 0.3–1.2)
BUN BLD-MCNC: 17 MG/DL (ref 9–23)
BUN/CREAT SERPL: 14.2 (ref 7–25)
CALCIUM SPEC-SCNC: 9.1 MG/DL (ref 8.7–10.4)
CHLORIDE SERPL-SCNC: 105 MMOL/L (ref 99–109)
CO2 SERPL-SCNC: 27 MMOL/L (ref 20–31)
CREAT BLD-MCNC: 1.2 MG/DL (ref 0.6–1.3)
CREAT BLDA-MCNC: 1.2 MG/DL (ref 0.6–1.3)
CREATINE SERPL-MCNC: 1.2 MG/DL
DEPRECATED RDW RBC AUTO: 45 FL (ref 37–54)
EOSINOPHIL # BLD AUTO: 0.05 10*3/MM3 (ref 0–0.3)
EOSINOPHIL NFR BLD AUTO: 0.9 % (ref 0–3)
ERYTHROCYTE [DISTWIDTH] IN BLOOD BY AUTOMATED COUNT: 13.4 % (ref 11.3–14.5)
GFR SERPL CREATININE-BSD FRML MDRD: 45 ML/MIN/1.73
GLOBULIN UR ELPH-MCNC: 2.6 GM/DL
GLUCOSE BLD-MCNC: 88 MG/DL (ref 70–100)
HCT VFR BLD AUTO: 41.1 % (ref 34.5–44)
HGB BLD-MCNC: 13.9 G/DL (ref 11.5–15.5)
IMM GRANULOCYTES # BLD: 0.02 10*3/MM3 (ref 0–0.03)
IMM GRANULOCYTES NFR BLD: 0.3 % (ref 0–0.6)
LYMPHOCYTES # BLD AUTO: 0.81 10*3/MM3 (ref 0.6–4.8)
LYMPHOCYTES NFR BLD AUTO: 13.8 % (ref 24–44)
MAGNESIUM SERPL-MCNC: 2.3 MG/DL (ref 1.3–2.7)
MCH RBC QN AUTO: 31.2 PG (ref 27–31)
MCHC RBC AUTO-ENTMCNC: 33.8 G/DL (ref 32–36)
MCV RBC AUTO: 92.4 FL (ref 80–99)
MONOCYTES # BLD AUTO: 0.53 10*3/MM3 (ref 0–1)
MONOCYTES NFR BLD AUTO: 9 % (ref 0–12)
NEUTROPHILS # BLD AUTO: 4.43 10*3/MM3 (ref 1.5–8.3)
NEUTROPHILS NFR BLD AUTO: 75.5 % (ref 41–71)
PHOSPHATE SERPL-MCNC: 3.3 MG/DL (ref 2.4–5.1)
PLATELET # BLD AUTO: 74 10*3/MM3 (ref 150–450)
PMV BLD AUTO: 12.1 FL (ref 6–12)
POTASSIUM BLD-SCNC: 4.2 MMOL/L (ref 3.5–5.5)
PROT SERPL-MCNC: 7.1 G/DL (ref 5.7–8.2)
RBC # BLD AUTO: 4.45 10*6/MM3 (ref 3.89–5.14)
SODIUM BLD-SCNC: 137 MMOL/L (ref 132–146)
WBC NRBC COR # BLD: 5.87 10*3/MM3 (ref 3.5–10.8)

## 2018-02-26 PROCEDURE — 36415 COLL VENOUS BLD VENIPUNCTURE: CPT

## 2018-02-26 PROCEDURE — 83735 ASSAY OF MAGNESIUM: CPT

## 2018-02-26 PROCEDURE — 80053 COMPREHEN METABOLIC PANEL: CPT

## 2018-02-26 PROCEDURE — 86334 IMMUNOFIX E-PHORESIS SERUM: CPT

## 2018-02-26 PROCEDURE — 83883 ASSAY NEPHELOMETRY NOT SPEC: CPT

## 2018-02-26 PROCEDURE — 25010000002 ZOLEDRONIC ACID PER 1 MG: Performed by: INTERNAL MEDICINE

## 2018-02-26 PROCEDURE — 85025 COMPLETE CBC W/AUTO DIFF WBC: CPT

## 2018-02-26 PROCEDURE — 82565 ASSAY OF CREATININE: CPT

## 2018-02-26 PROCEDURE — 84165 PROTEIN E-PHORESIS SERUM: CPT

## 2018-02-26 PROCEDURE — 96365 THER/PROPH/DIAG IV INF INIT: CPT

## 2018-02-26 PROCEDURE — 82784 ASSAY IGA/IGD/IGG/IGM EACH: CPT

## 2018-02-26 PROCEDURE — 84100 ASSAY OF PHOSPHORUS: CPT

## 2018-02-26 PROCEDURE — 96374 THER/PROPH/DIAG INJ IV PUSH: CPT

## 2018-02-26 RX ADMIN — ZOLEDRONIC ACID 3.3 MG: 4 INJECTION, SOLUTION, CONCENTRATE INTRAVENOUS at 10:51

## 2018-02-26 NOTE — PHARMACY RECOMMENDATION
Zometa dose reduced to 3.3 mg due to SCr=1.2 (creatinine clearance = 42.9 ml/min).    Elias Alvares [facility]  2/26/2018  10:45 AM

## 2018-02-27 LAB
ALBUMIN SERPL-MCNC: 3.8 G/DL (ref 2.9–4.4)
ALBUMIN/GLOB SERPL: 1.3 {RATIO} (ref 0.7–1.7)
ALPHA1 GLOB FLD ELPH-MCNC: 0.3 G/DL (ref 0–0.4)
ALPHA2 GLOB SERPL ELPH-MCNC: 0.8 G/DL (ref 0.4–1)
B-GLOBULIN SERPL ELPH-MCNC: 1.2 G/DL (ref 0.7–1.3)
GAMMA GLOB SERPL ELPH-MCNC: 0.7 G/DL (ref 0.4–1.8)
GLOBULIN SER CALC-MCNC: 3 G/DL (ref 2.2–3.9)
IGA SERPL-MCNC: 185 MG/DL (ref 87–352)
IGG SERPL-MCNC: 666 MG/DL (ref 700–1600)
IGM SERPL-MCNC: 32 MG/DL (ref 26–217)
INTERPRETATION SERPL IEP-IMP: ABNORMAL
KAPPA LC SERPL-MCNC: 16.1 MG/L (ref 3.3–19.4)
KAPPA LC/LAMBDA SER: 1.23 {RATIO} (ref 0.26–1.65)
LAMBDA LC FREE SERPL-MCNC: 13.1 MG/L (ref 5.7–26.3)
Lab: ABNORMAL
M-SPIKE: ABNORMAL G/DL
PROT SERPL-MCNC: 6.8 G/DL (ref 6–8.5)

## 2018-03-07 ENCOUNTER — OFFICE VISIT (OUTPATIENT)
Dept: ONCOLOGY | Facility: CLINIC | Age: 68
End: 2018-03-07

## 2018-03-07 VITALS
TEMPERATURE: 97.9 F | SYSTOLIC BLOOD PRESSURE: 146 MMHG | HEIGHT: 62 IN | BODY MASS INDEX: 31.65 KG/M2 | DIASTOLIC BLOOD PRESSURE: 81 MMHG | OXYGEN SATURATION: 96 % | RESPIRATION RATE: 18 BRPM | HEART RATE: 112 BPM | WEIGHT: 172 LBS

## 2018-03-07 DIAGNOSIS — C90.01 MULTIPLE MYELOMA IN REMISSION (HCC): Primary | Chronic | ICD-10-CM

## 2018-03-07 PROCEDURE — 99214 OFFICE O/P EST MOD 30 MIN: CPT | Performed by: INTERNAL MEDICINE

## 2018-03-07 RX ORDER — SODIUM CHLORIDE 9 MG/ML
250 INJECTION, SOLUTION INTRAVENOUS ONCE
Status: CANCELLED | OUTPATIENT
Start: 2018-05-21

## 2018-03-07 NOTE — PROGRESS NOTES
PROBLEM LIST:  Oncology/Hematology History     PROBLEM LIST:   1. Stage III IgA kappa clonal multiple myeloma. Diagnosed 9/20152. FISH panel reports multiple abnormalities including gain of 1q21 monosomy.  3. Monosomy 13 and gain of chromosomes 9, 15 and CCND1.  4. Initial M-spike of 7.1 g/dL at time of diagnosis and after 3 cycles, had  undetectable M-spike currently on Velcade, Revlimid and dexamethasone cycle #6  this week.  5. S/p Autologous SCT at [facility] with Dr. Venu Spicer in March 11,2016  6. Revlimid on hold due to low platelets  7.Right leg pain - on lyrica       Multiple myeloma in remission     7/11/2016 Initial Diagnosis     Multiple myeloma         REASON FOR VISIT: Multiple myeloma       Subjective     HISTORY OF PRESENT ILLNESS:   Ms. Miranda is here for follow up evaluation of myeloma management.   She continues having trouble with her right lower extremity.  Denies any issues or shortness of breath at this time.  She received her post transplant immunizations 5/22/2017. Her next immunizations are due 3/2018.  She is due for her MMR vaccine.  Clinically her biggest issue is leg pain in that right leg.  She also has gained weight so it is causing some issues.  Lyrica seems to help but her insurance cost is too high for her to be able to do it.  Gabapentin causes a fair bit of drowsiness when she doesn't take it as often.    Past Medical History, Past Surgical History, Social History, Family History have been reviewed and are without significant changes except as mentioned.    Review of Systems   Constitutional: Negative.    HENT: Negative.    Eyes: Negative.    Respiratory: Negative.    Cardiovascular: Negative.    Gastrointestinal: Negative.    Endocrine: Negative.    Genitourinary: Negative.    Musculoskeletal: Positive for arthralgias and gait problem.   Skin: Negative.    Allergic/Immunologic: Negative.    Hematological: Negative.    Psychiatric/Behavioral: Negative.       A comprehensive 14  "point review of systems was performed and was negative except as mentioned.    Medications:  The current medication list was reviewed in the EMR    ALLERGIES:  No Known Allergies    Objective      /81  Pulse 112  Temp 97.9 °F (36.6 °C)  Resp 18  Ht 156.2 cm (61.5\")  Wt 78 kg (172 lb)  SpO2 96%  BMI 31.97 kg/m2     Performance Status: 1    General: well appearing, in no acute distress  HEENT: sclera anicteric, oropharynx clear  Lymphatics: no cervical, supraclavicular, or axillary adenopathy  Cardiovascular: regular rate and rhythm, no murmurs  Lungs: clear to auscultation bilaterally  Abdomen: soft, nontender, nondistended.  No palpable organomegaly  Extremeties: no lower extremity edema  Skin: no rashes, lesions, bruising, or petechiae    RECENT LABS:    Lab Results   Component Value Date    MSPIKE Not Observed 02/26/2018    MSPIKE Not Observed 12/04/2017    MSPIKE Not Observed 08/21/2017     Lab Results   Component Value Date    FREEKAPPAL 16.1 02/26/2018    FREEKAPPAL 15.2 12/04/2017    FREEKAPPAL 19.0 08/21/2017     Lab Results   Component Value Date    IGLFLC 13.1 02/26/2018    IGLFLC 12.2 12/04/2017    IGLFLC 10.3 08/21/2017     Lab Results   Component Value Date    WBC 5.87 02/26/2018    HGB 13.9 02/26/2018    HCT 41.1 02/26/2018    MCV 92.4 02/26/2018    PLT 74 (L) 02/26/2018       Lab Results   Component Value Date    GLUCOSE 88 02/26/2018    BUN 17 02/26/2018    CREATININE 1.20 02/26/2018    EGFRIFNONA 45 (L) 02/26/2018    BCR 14.2 02/26/2018    K 4.2 02/26/2018    CO2 27.0 02/26/2018    CALCIUM 9.1 02/26/2018    PROTENTOTREF 6.8 02/26/2018    ALBUMIN 4.50 02/26/2018    LABIL2 1.7 02/26/2018    AST 20 02/26/2018    ALT 19 02/26/2018         Assessment/Plan   67-year-old lady with multiple myeloma status post autologous stem cell transplant.  Her platelets have started to improve over the last 6 months.  It is now up to 74.  She may be a candidate to restart Revlimid maintenance.  But after 2 " years of not sure it makes a whole lot of difference.  She is seeing Dr. Spicer and if he decides he wants to initiate Revlimid maintenance again then we can do that.  I also am considering surgical intervention for her right leg which is causing a fair bit of difficulty.  She wants to wait and see how she does in the next several months.  Continue every 3 months of Zometa.      Joseph Mckinley MD  [facility] Hematology and Oncology    3/7/2018          CC:

## 2018-03-14 ENCOUNTER — OFFICE VISIT (OUTPATIENT)
Dept: INTERNAL MEDICINE | Facility: CLINIC | Age: 68
End: 2018-03-14

## 2018-03-14 VITALS — DIASTOLIC BLOOD PRESSURE: 70 MMHG | HEIGHT: 61 IN | HEART RATE: 68 BPM | SYSTOLIC BLOOD PRESSURE: 128 MMHG

## 2018-03-14 DIAGNOSIS — D69.59 THROMBOCYTOPENIA DUE TO DRUGS: Chronic | ICD-10-CM

## 2018-03-14 DIAGNOSIS — K57.30 DIVERTICULOSIS OF LARGE INTESTINE WITHOUT HEMORRHAGE: ICD-10-CM

## 2018-03-14 DIAGNOSIS — T50.905A THROMBOCYTOPENIA DUE TO DRUGS: Chronic | ICD-10-CM

## 2018-03-14 DIAGNOSIS — R60.0 LEG EDEMA, RIGHT: ICD-10-CM

## 2018-03-14 DIAGNOSIS — Z23 NEED FOR PROPHYLACTIC VACCINATION WITH COMBINED VACCINE: ICD-10-CM

## 2018-03-14 DIAGNOSIS — N18.30 CKD (CHRONIC KIDNEY DISEASE), STAGE III (HCC): Chronic | ICD-10-CM

## 2018-03-14 DIAGNOSIS — K21.9 GASTROESOPHAGEAL REFLUX DISEASE WITHOUT ESOPHAGITIS: Chronic | ICD-10-CM

## 2018-03-14 DIAGNOSIS — J44.1 CHRONIC OBSTRUCTIVE PULMONARY DISEASE WITH ACUTE EXACERBATION (HCC): Primary | ICD-10-CM

## 2018-03-14 DIAGNOSIS — M19.90 ARTHRITIS: ICD-10-CM

## 2018-03-14 PROCEDURE — 99214 OFFICE O/P EST MOD 30 MIN: CPT | Performed by: INTERNAL MEDICINE

## 2018-03-14 NOTE — PROGRESS NOTES
Patient is a 67 y.o. female who is here for R leg pain, swelling and L shoulder pain.  Chief Complaint   Patient presents with   • Pain     R leg and L shoulder         HPI:    Here for f/u.  Patient c/o right LE swelling and pain.  Pain is mostly in right knee , improved with voltaren gel. Cannot recall any trauma.  Onset about 1 week.  No aggravating factors.  Also has some left shoulder pain.  Some help with the gabapentin.  Nausea is improved.    History:    Patient Active Problem List   Diagnosis   • Multiple myeloma in remission   • Diverticulosis of large intestine without hemorrhage   • Tubular adenoma of colon   • Chronic bronchitis   • Arthritis   • Gastroesophageal reflux disease without esophagitis   • Chronic bilateral low back pain without sciatica   • Thrombocytopenia since stem cell transplant March 2016   • Hx of autologous stem cell transplant (March 2016)   • CKD (chronic kidney disease), stage III   • Former smoker   • Non-rheumatic tricuspid valve insufficiency   • Pulmonary hypertension   • Chronic respiratory failure with hypoxia (been noncompliant with outpatient oxygen in past)   • Leg edema, right       Past Medical History:   Diagnosis Date   • Arthritis    • Chronic bronchitis    • COPD (chronic obstructive pulmonary disease)    • Hypertension    • Multiple myeloma    • Multiple myeloma, failed remission        Past Surgical History:   Procedure Laterality Date   • LIMBAL STEM CELL TRANSPLANT  03/2016    @ Dr. Josiah Spicer   • MOUTH SURGERY         Current Outpatient Prescriptions on File Prior to Visit   Medication Sig   • acyclovir (ZOVIRAX) 400 MG tablet Take 1 tablet by mouth 2 (Two) Times a Day With Meals.   • albuterol (PROVENTIL HFA;VENTOLIN HFA) 108 (90 Base) MCG/ACT inhaler Inhale 2 puffs Every 6 (Six) Hours As Needed for Wheezing or Shortness of Air.   • cyclobenzaprine (FLEXERIL) 10 MG tablet Take 1 tablet by mouth 3 (Three) Times a Day As Needed for Muscle Spasms.   •  esomeprazole (nexIUM) 20 MG capsule Take 20 mg by mouth 2 (Two) Times a Day.   • gabapentin (NEURONTIN) 400 MG capsule Take 1 capsule by mouth 3 (Three) Times a Day.   • Melatonin 5 MG chewable tablet Chew 5 mg Every Night.     No current facility-administered medications on file prior to visit.        Family History   Problem Relation Age of Onset   • Breast cancer Other    • Lung cancer Other    • Liver cancer Other    • Bone cancer Other        Social History     Social History   • Marital status:      Spouse name: N/A   • Number of children: N/A   • Years of education: N/A     Occupational History   • Not on file.     Social History Main Topics   • Smoking status: Former Smoker     Quit date: 8/1/2015   • Smokeless tobacco: Never Used   • Alcohol use No   • Drug use: No   • Sexual activity: Not on file     Other Topics Concern   • Not on file     Social History Narrative   • No narrative on file         ROS:    Review of Systems   Constitutional: Positive for fatigue. Negative for chills, fever and unexpected weight change.   HENT: Negative for congestion, ear pain, hearing loss, rhinorrhea, sinus pressure, sore throat and trouble swallowing.    Eyes: Negative for discharge and itching.   Respiratory: Negative for cough, chest tightness and shortness of breath.    Cardiovascular: Positive for leg swelling. Negative for chest pain and palpitations.   Gastrointestinal: Negative for abdominal pain, blood in stool, constipation, diarrhea and vomiting.        11/13 colonoscopy with hyperplastic polyps  12/16 colonoscopy with TA times one   Endocrine: Positive for heat intolerance. Negative for polydipsia and polyuria.   Genitourinary: Negative for difficulty urinating, dysuria, enuresis, frequency, hematuria and urgency.   Musculoskeletal: Positive for arthralgias, back pain and gait problem. Negative for joint swelling.   Skin: Negative for rash and wound.   Allergic/Immunologic: Negative for  "immunocompromised state.   Neurological: Positive for weakness. Negative for dizziness, syncope, light-headedness, numbness and headaches.   Hematological: Does not bruise/bleed easily.   Psychiatric/Behavioral: Negative for behavioral problems, dysphoric mood and sleep disturbance. The patient is not nervous/anxious.        /70   Pulse 68   Ht 156.2 cm (61.5\")     Physical Exam:    Physical Exam   Constitutional: She is oriented to person, place, and time. She appears well-developed and well-nourished.   HENT:   Head: Normocephalic and atraumatic.   Right Ear: External ear normal.   Left Ear: External ear normal.   Mouth/Throat: Oropharynx is clear and moist.   Eyes: Conjunctivae and EOM are normal.   Neck: Normal range of motion. Neck supple.   Cardiovascular: Normal rate, regular rhythm and normal heart sounds.    Pulmonary/Chest: Effort normal.   Mild bibasilar rales   Abdominal: Soft. Bowel sounds are normal.   Musculoskeletal: She exhibits edema (RLE) and tenderness (on ROM of left shoulder).   Using cane   Lymphadenopathy:     She has no cervical adenopathy.   Neurological: She is alert and oriented to person, place, and time.   Skin: Skin is warm and dry.   Psychiatric: She has a normal mood and affect. Her behavior is normal. Thought content normal.       Procedure:      Discussion/Summary:    nausea-not an issue  htn-stable off meds  copd-not an issue since stopping tob, proventil prn  gerd/gastritis-cont ppi  MM/back pain with radiculopathy-overall improved, cont gabapentin, Oncology notes reviewed  insomnia-cont melatonin  Leg edema-will check US  Left shoulder pain-tylenol prn     Will give MMR    Current Outpatient Prescriptions:   •  acyclovir (ZOVIRAX) 400 MG tablet, Take 1 tablet by mouth 2 (Two) Times a Day With Meals., Disp: 60 tablet, Rfl: 5  •  albuterol (PROVENTIL HFA;VENTOLIN HFA) 108 (90 Base) MCG/ACT inhaler, Inhale 2 puffs Every 6 (Six) Hours As Needed for Wheezing or Shortness of " Air., Disp: 1 inhaler, Rfl: 5  •  cyclobenzaprine (FLEXERIL) 10 MG tablet, Take 1 tablet by mouth 3 (Three) Times a Day As Needed for Muscle Spasms., Disp: 90 tablet, Rfl: 4  •  esomeprazole (nexIUM) 20 MG capsule, Take 20 mg by mouth 2 (Two) Times a Day., Disp: , Rfl:   •  gabapentin (NEURONTIN) 400 MG capsule, Take 1 capsule by mouth 3 (Three) Times a Day., Disp: 90 capsule, Rfl: 2  •  Melatonin 5 MG chewable tablet, Chew 5 mg Every Night., Disp: , Rfl:         Doris was seen today for pain.    Diagnoses and all orders for this visit:    Chronic obstructive pulmonary disease with acute exacerbation    Diverticulosis of large intestine without hemorrhage    Gastroesophageal reflux disease without esophagitis    Arthritis    CKD (chronic kidney disease), stage III    Thrombocytopenia since stem cell transplant March 2016    Leg edema, right  -     Duplex Venous Lower Extremity - Right CAR    Need for prophylactic vaccination with combined vaccine  -     MMR Vaccine Subcutaneous

## 2018-03-15 ENCOUNTER — HOSPITAL ENCOUNTER (OUTPATIENT)
Dept: CARDIOLOGY | Facility: HOSPITAL | Age: 68
Discharge: HOME | End: 2018-03-15
Attending: INTERNAL MEDICINE | Admitting: INTERNAL MEDICINE

## 2018-03-15 VITALS — BODY MASS INDEX: 32.47 KG/M2 | WEIGHT: 172 LBS | HEIGHT: 61 IN

## 2018-03-15 LAB
BH CV ECHO MEAS - BSA(HAYCOCK): 1.9 M^2
BH CV ECHO MEAS - BSA: 1.8 M^2
BH CV ECHO MEAS - BZI_BMI: 32.5 KILOGRAMS/M^2
BH CV ECHO MEAS - BZI_METRIC_HEIGHT: 154.9 CM
BH CV ECHO MEAS - BZI_METRIC_WEIGHT: 78 KG
BH CV LOWER VASCULAR LEFT COMMON FEMORAL AUGMENT: NORMAL
BH CV LOWER VASCULAR LEFT COMMON FEMORAL COMPRESS: NORMAL
BH CV LOWER VASCULAR LEFT COMMON FEMORAL PHASIC: NORMAL
BH CV LOWER VASCULAR LEFT COMMON FEMORAL SPONT: NORMAL
BH CV LOWER VASCULAR RIGHT COMMON FEMORAL AUGMENT: NORMAL
BH CV LOWER VASCULAR RIGHT COMMON FEMORAL COMPRESS: NORMAL
BH CV LOWER VASCULAR RIGHT COMMON FEMORAL PHASIC: NORMAL
BH CV LOWER VASCULAR RIGHT COMMON FEMORAL SPONT: NORMAL
BH CV LOWER VASCULAR RIGHT DISTAL FEMORAL AUGMENT: NORMAL
BH CV LOWER VASCULAR RIGHT DISTAL FEMORAL COMPRESS: NORMAL
BH CV LOWER VASCULAR RIGHT DISTAL FEMORAL PHASIC: NORMAL
BH CV LOWER VASCULAR RIGHT DISTAL FEMORAL SPONT: NORMAL
BH CV LOWER VASCULAR RIGHT GASTRONEMIUS COMPRESS: NORMAL
BH CV LOWER VASCULAR RIGHT GREATER SAPH AK COMPRESS: NORMAL
BH CV LOWER VASCULAR RIGHT GREATER SAPH BK COMPRESS: NORMAL
BH CV LOWER VASCULAR RIGHT LESSER SAPH COMPRESS: NORMAL
BH CV LOWER VASCULAR RIGHT MID FEMORAL AUGMENT: NORMAL
BH CV LOWER VASCULAR RIGHT MID FEMORAL COMPRESS: NORMAL
BH CV LOWER VASCULAR RIGHT MID FEMORAL PHASIC: NORMAL
BH CV LOWER VASCULAR RIGHT MID FEMORAL SPONT: NORMAL
BH CV LOWER VASCULAR RIGHT PERONEAL COMPRESS: NORMAL
BH CV LOWER VASCULAR RIGHT POPLITEAL AUGMENT: NORMAL
BH CV LOWER VASCULAR RIGHT POPLITEAL COMPRESS: NORMAL
BH CV LOWER VASCULAR RIGHT POPLITEAL PHASIC: NORMAL
BH CV LOWER VASCULAR RIGHT POPLITEAL SPONT: NORMAL
BH CV LOWER VASCULAR RIGHT POSTERIOR TIBIAL COMPRESS: NORMAL
BH CV LOWER VASCULAR RIGHT PROFUNDA FEMORAL AUGMENT: NORMAL
BH CV LOWER VASCULAR RIGHT PROFUNDA FEMORAL COMPRESS: NORMAL
BH CV LOWER VASCULAR RIGHT PROFUNDA FEMORAL PHASIC: NORMAL
BH CV LOWER VASCULAR RIGHT PROFUNDA FEMORAL SPONT: NORMAL
BH CV LOWER VASCULAR RIGHT PROXIMAL FEMORAL AUGMENT: NORMAL
BH CV LOWER VASCULAR RIGHT PROXIMAL FEMORAL COMPRESS: NORMAL
BH CV LOWER VASCULAR RIGHT PROXIMAL FEMORAL PHASIC: NORMAL
BH CV LOWER VASCULAR RIGHT PROXIMAL FEMORAL SPONT: NORMAL
BH CV LOWER VASCULAR RIGHT SAPHENOFEMORAL JUNCTION COMPRESS: NORMAL
BH CV LOWER VASCULAR RIGHT SAPHENOFEMORAL JUNCTION SPONT: NORMAL

## 2018-03-15 PROCEDURE — 90707 MMR VACCINE SC: CPT | Performed by: INTERNAL MEDICINE

## 2018-03-15 PROCEDURE — 93971 EXTREMITY STUDY: CPT

## 2018-03-15 PROCEDURE — 93971 EXTREMITY STUDY: CPT | Performed by: INTERNAL MEDICINE

## 2018-03-15 PROCEDURE — 90471 IMMUNIZATION ADMIN: CPT | Performed by: INTERNAL MEDICINE

## 2018-05-21 ENCOUNTER — INFUSION (OUTPATIENT)
Dept: ONCOLOGY | Facility: HOSPITAL | Age: 68
End: 2018-05-21

## 2018-05-21 VITALS
RESPIRATION RATE: 16 BRPM | HEART RATE: 97 BPM | SYSTOLIC BLOOD PRESSURE: 143 MMHG | HEIGHT: 61 IN | DIASTOLIC BLOOD PRESSURE: 79 MMHG | WEIGHT: 173 LBS | TEMPERATURE: 97.6 F | BODY MASS INDEX: 32.66 KG/M2

## 2018-05-21 DIAGNOSIS — C90.01 MULTIPLE MYELOMA IN REMISSION (HCC): Primary | ICD-10-CM

## 2018-05-21 LAB
ALBUMIN SERPL-MCNC: 4.4 G/DL (ref 3.2–4.8)
ALBUMIN/GLOB SERPL: 1.8 G/DL (ref 1.5–2.5)
ALP SERPL-CCNC: 121 U/L (ref 25–100)
ALT SERPL W P-5'-P-CCNC: 16 U/L (ref 7–40)
ANION GAP SERPL CALCULATED.3IONS-SCNC: 9 MMOL/L (ref 3–11)
AST SERPL-CCNC: 19 U/L (ref 0–33)
BILIRUB SERPL-MCNC: 0.5 MG/DL (ref 0.3–1.2)
BUN BLD-MCNC: 19 MG/DL (ref 9–23)
BUN/CREAT SERPL: 15.8 (ref 7–25)
CALCIUM SPEC-SCNC: 9.1 MG/DL (ref 8.7–10.4)
CHLORIDE SERPL-SCNC: 108 MMOL/L (ref 99–109)
CO2 SERPL-SCNC: 24 MMOL/L (ref 20–31)
CREAT BLD-MCNC: 1.2 MG/DL (ref 0.6–1.3)
CREAT BLDA-MCNC: 1.2 MG/DL (ref 0.6–1.3)
ERYTHROCYTE [DISTWIDTH] IN BLOOD BY AUTOMATED COUNT: 12.8 % (ref 11.3–14.5)
GFR SERPL CREATININE-BSD FRML MDRD: 45 ML/MIN/1.73
GLOBULIN UR ELPH-MCNC: 2.4 GM/DL
GLUCOSE BLD-MCNC: 89 MG/DL (ref 70–100)
HCT VFR BLD AUTO: 40.9 % (ref 34.5–44)
HGB BLD-MCNC: 12.9 G/DL (ref 11.5–15.5)
LYMPHOCYTES # BLD AUTO: 0.9 10*3/MM3 (ref 0.6–4.8)
LYMPHOCYTES NFR BLD AUTO: 15.8 % (ref 24–44)
MAGNESIUM SERPL-MCNC: 2.2 MG/DL (ref 1.3–2.7)
MCH RBC QN AUTO: 28.9 PG (ref 27–31)
MCHC RBC AUTO-ENTMCNC: 31.5 G/DL (ref 32–36)
MCV RBC AUTO: 91.6 FL (ref 80–99)
MONOCYTES # BLD AUTO: 0.1 10*3/MM3 (ref 0–1)
MONOCYTES NFR BLD AUTO: 1.6 % (ref 0–12)
NEUTROPHILS # BLD AUTO: 4.5 10*3/MM3 (ref 1.5–8.3)
NEUTROPHILS NFR BLD AUTO: 82.6 % (ref 41–71)
PHOSPHATE SERPL-MCNC: 3.8 MG/DL (ref 2.4–5.1)
PLATELET # BLD AUTO: 70 10*3/MM3 (ref 150–450)
PMV BLD AUTO: 9 FL (ref 6–12)
POTASSIUM BLD-SCNC: 4.5 MMOL/L (ref 3.5–5.5)
PROT SERPL-MCNC: 6.8 G/DL (ref 5.7–8.2)
RBC # BLD AUTO: 4.47 10*6/MM3 (ref 3.89–5.14)
SODIUM BLD-SCNC: 141 MMOL/L (ref 132–146)
WBC NRBC COR # BLD: 5.5 10*3/MM3 (ref 3.5–10.8)

## 2018-05-21 PROCEDURE — 83883 ASSAY NEPHELOMETRY NOT SPEC: CPT

## 2018-05-21 PROCEDURE — 80053 COMPREHEN METABOLIC PANEL: CPT

## 2018-05-21 PROCEDURE — 82784 ASSAY IGA/IGD/IGG/IGM EACH: CPT

## 2018-05-21 PROCEDURE — 85025 COMPLETE CBC W/AUTO DIFF WBC: CPT

## 2018-05-21 PROCEDURE — 82565 ASSAY OF CREATININE: CPT

## 2018-05-21 PROCEDURE — 86334 IMMUNOFIX E-PHORESIS SERUM: CPT

## 2018-05-21 PROCEDURE — 83735 ASSAY OF MAGNESIUM: CPT

## 2018-05-21 PROCEDURE — 84165 PROTEIN E-PHORESIS SERUM: CPT

## 2018-05-21 PROCEDURE — 96374 THER/PROPH/DIAG INJ IV PUSH: CPT

## 2018-05-21 PROCEDURE — 84100 ASSAY OF PHOSPHORUS: CPT

## 2018-05-21 PROCEDURE — 25010000002 ZOLEDRONIC ACID PER 1 MG: Performed by: INTERNAL MEDICINE

## 2018-05-21 RX ORDER — SODIUM CHLORIDE 9 MG/ML
250 INJECTION, SOLUTION INTRAVENOUS ONCE
Status: DISCONTINUED | OUTPATIENT
Start: 2018-05-21 | End: 2018-05-21 | Stop reason: HOSPADM

## 2018-05-21 RX ADMIN — ZOLEDRONIC ACID 3.3 MG: 4 INJECTION, SOLUTION, CONCENTRATE INTRAVENOUS at 11:43

## 2018-05-21 NOTE — PHARMACY RECOMMENDATION
Zometa dose reduced to 3.3 mg due to CrCl= 43.2 ml/min.    Elias Alvares [facility]  5/21/2018  11:31 AM    
2018 08:53

## 2018-05-22 LAB
ALBUMIN SERPL-MCNC: 3.8 G/DL (ref 2.9–4.4)
ALBUMIN/GLOB SERPL: 1.3 {RATIO} (ref 0.7–1.7)
ALPHA1 GLOB FLD ELPH-MCNC: 0.3 G/DL (ref 0–0.4)
ALPHA2 GLOB SERPL ELPH-MCNC: 0.9 G/DL (ref 0.4–1)
B-GLOBULIN SERPL ELPH-MCNC: 1.2 G/DL (ref 0.7–1.3)
GAMMA GLOB SERPL ELPH-MCNC: 0.6 G/DL (ref 0.4–1.8)
GLOBULIN SER CALC-MCNC: 3 G/DL (ref 2.2–3.9)
IGA SERPL-MCNC: 187 MG/DL (ref 87–352)
IGG SERPL-MCNC: 647 MG/DL (ref 700–1600)
IGM SERPL-MCNC: 29 MG/DL (ref 26–217)
INTERPRETATION SERPL IEP-IMP: ABNORMAL
KAPPA LC SERPL-MCNC: 19.9 MG/L (ref 3.3–19.4)
KAPPA LC/LAMBDA SER: 1.12 {RATIO} (ref 0.26–1.65)
LAMBDA LC FREE SERPL-MCNC: 17.8 MG/L (ref 5.7–26.3)
Lab: ABNORMAL
M-SPIKE: ABNORMAL G/DL
PROT SERPL-MCNC: 6.8 G/DL (ref 6–8.5)

## 2018-05-30 ENCOUNTER — OFFICE VISIT (OUTPATIENT)
Dept: ONCOLOGY | Facility: CLINIC | Age: 68
End: 2018-05-30

## 2018-05-30 VITALS
BODY MASS INDEX: 32.3 KG/M2 | HEIGHT: 61 IN | HEART RATE: 99 BPM | DIASTOLIC BLOOD PRESSURE: 79 MMHG | RESPIRATION RATE: 16 BRPM | TEMPERATURE: 97.4 F | OXYGEN SATURATION: 96 % | WEIGHT: 171.1 LBS | SYSTOLIC BLOOD PRESSURE: 139 MMHG

## 2018-05-30 DIAGNOSIS — C90.01 MULTIPLE MYELOMA IN REMISSION (HCC): ICD-10-CM

## 2018-05-30 PROCEDURE — 99214 OFFICE O/P EST MOD 30 MIN: CPT | Performed by: INTERNAL MEDICINE

## 2018-05-30 RX ORDER — ACYCLOVIR 400 MG/1
400 TABLET ORAL 2 TIMES DAILY WITH MEALS
Qty: 60 TABLET | Refills: 5 | Status: SHIPPED | OUTPATIENT
Start: 2018-05-30 | End: 2018-12-03 | Stop reason: SDUPTHER

## 2018-05-30 RX ORDER — SODIUM CHLORIDE 9 MG/ML
250 INJECTION, SOLUTION INTRAVENOUS ONCE
Status: CANCELLED | OUTPATIENT
Start: 2018-08-14

## 2018-05-30 NOTE — PROGRESS NOTES
PROBLEM LIST:  Oncology/Hematology History     PROBLEM LIST:   1. Stage III IgA kappa clonal multiple myeloma. Diagnosed 9/20152. FISH panel reports multiple abnormalities including gain of 1q21 monosomy.  3. Monosomy 13 and gain of chromosomes 9, 15 and CCND1.  4. Initial M-spike of 7.1 g/dL at time of diagnosis and after 3 cycles, had  undetectable M-spike currently on Velcade, Revlimid and dexamethasone cycle #6  this week.  5. S/p Autologous SCT at [facility] with Dr. Venu Spicer in March 11,2016  6. Revlimid on hold due to low platelets  7.Right leg pain - on lyrica       Multiple myeloma in remission     7/11/2016 Initial Diagnosis     Multiple myeloma         REASON FOR VISIT: Multiple myeloma       Subjective     HISTORY OF PRESENT ILLNESS:   Ms. Miranda is here for follow up evaluation of myeloma management.   She continues having trouble with her right lower extremity.  Denies any issues or shortness of breath at this time.  She received her post transplant immunizations 5/22/2017. Her next immunizations are due 3/2018.  She is due for her MMR vaccine.  Clinically her biggest issue is leg pain in that right leg.  She also has gained weight so it is causing some issues.  Lyrica seems to help but her insurance cost is too high for her to be able to do it.  Gabapentin causes a fair bit of drowsiness when she doesn't take it as often.    Past Medical History, Past Surgical History, Social History, Family History have been reviewed and are without significant changes except as mentioned.    Review of Systems   Constitutional: Negative.    HENT: Negative.    Eyes: Negative.    Respiratory: Negative.    Cardiovascular: Negative.    Gastrointestinal: Negative.    Endocrine: Negative.    Genitourinary: Negative.    Musculoskeletal: Positive for arthralgias and gait problem.   Skin: Negative.    Allergic/Immunologic: Negative.    Hematological: Negative.    Psychiatric/Behavioral: Negative.       A comprehensive 14  "point review of systems was performed and was negative except as mentioned.    Medications:  The current medication list was reviewed in the EMR    ALLERGIES:  No Known Allergies    Objective      /79   Pulse 99   Temp 97.4 °F (36.3 °C) (Temporal Artery )   Resp 16   Ht 154.9 cm (60.98\")   Wt 77.6 kg (171 lb 1.6 oz)   SpO2 96% Comment: RA  BMI 32.35 kg/m²      Performance Status: 1    General: well appearing, in no acute distress  HEENT: sclera anicteric, oropharynx clear  Lymphatics: no cervical, supraclavicular, or axillary adenopathy  Cardiovascular: regular rate and rhythm, no murmurs  Lungs: clear to auscultation bilaterally  Abdomen: soft, nontender, nondistended.  No palpable organomegaly  Extremeties: no lower extremity edema  Skin: no rashes, lesions, bruising, or petechiae    RECENT LABS:    Lab Results   Component Value Date    MSPIKE Not Observed 05/21/2018    MSPIKE Not Observed 02/26/2018    MSPIKE Not Observed 12/04/2017     Lab Results   Component Value Date    FREEKAPPAL 19.9 (H) 05/21/2018    FREEKAPPAL 16.1 02/26/2018    FREEKAPPAL 15.2 12/04/2017     Lab Results   Component Value Date    IGLFLC 17.8 05/21/2018    IGLFLC 13.1 02/26/2018    IGLFLC 12.2 12/04/2017     Lab Results   Component Value Date    WBC 5.50 05/21/2018    HGB 12.9 05/21/2018    HCT 40.9 05/21/2018    MCV 91.6 05/21/2018    PLT 70 (L) 05/21/2018       Lab Results   Component Value Date    GLUCOSE 89 05/21/2018    BUN 19 05/21/2018    CREATININE 1.20 05/21/2018    EGFRIFNONA 45 (L) 05/21/2018    BCR 15.8 05/21/2018    K 4.5 05/21/2018    CO2 24.0 05/21/2018    CALCIUM 9.1 05/21/2018    PROTENTOTREF 6.8 05/21/2018    ALBUMIN 3.8 05/21/2018    ALBUMIN 4.40 05/21/2018    LABIL2 1.3 05/21/2018    LABIL2 1.8 05/21/2018    AST 19 05/21/2018    ALT 16 05/21/2018         Assessment/Plan       1. multiple myeloma status post autologous stem cell transplant.  Her platelets have started to improve over the last 6 months.  It " is now up to 70.   Continue every 3 months of Zometa. M-spike undetectable.    2. Joint pain: will write for diclofenac gel.  Seem to help her    rtc in 3 months        Joseph Mckinley MD  [facility] Hematology and Oncology    5/30/2018      Return on: 08/29/18  Return in (Approximately): 3 months        CC:

## 2018-06-20 DIAGNOSIS — G89.29 CHRONIC BILATERAL LOW BACK PAIN WITHOUT SCIATICA: ICD-10-CM

## 2018-06-20 DIAGNOSIS — M54.50 CHRONIC BILATERAL LOW BACK PAIN WITHOUT SCIATICA: ICD-10-CM

## 2018-06-20 RX ORDER — GABAPENTIN 400 MG/1
CAPSULE ORAL
Qty: 90 CAPSULE | Refills: 2 | Status: SHIPPED | OUTPATIENT
Start: 2018-06-20 | End: 2018-10-25

## 2018-08-14 ENCOUNTER — INFUSION (OUTPATIENT)
Dept: ONCOLOGY | Facility: HOSPITAL | Age: 68
End: 2018-08-14

## 2018-08-14 VITALS
RESPIRATION RATE: 16 BRPM | DIASTOLIC BLOOD PRESSURE: 62 MMHG | HEART RATE: 96 BPM | SYSTOLIC BLOOD PRESSURE: 136 MMHG | WEIGHT: 176 LBS | TEMPERATURE: 96.7 F | BODY MASS INDEX: 33.27 KG/M2

## 2018-08-14 DIAGNOSIS — C90.01 MULTIPLE MYELOMA IN REMISSION (HCC): Primary | ICD-10-CM

## 2018-08-14 LAB
ALBUMIN SERPL-MCNC: 4.34 G/DL (ref 3.2–4.8)
ALBUMIN/GLOB SERPL: 1.8 G/DL (ref 1.5–2.5)
ALP SERPL-CCNC: 111 U/L (ref 25–100)
ALT SERPL W P-5'-P-CCNC: 22 U/L (ref 7–40)
ANION GAP SERPL CALCULATED.3IONS-SCNC: 8 MMOL/L (ref 3–11)
AST SERPL-CCNC: 25 U/L (ref 0–33)
BASOPHILS # BLD AUTO: 0.03 10*3/MM3 (ref 0–0.2)
BASOPHILS NFR BLD AUTO: 0.7 % (ref 0–1)
BILIRUB SERPL-MCNC: 0.6 MG/DL (ref 0.3–1.2)
BUN BLD-MCNC: 12 MG/DL (ref 9–23)
BUN/CREAT SERPL: 10.3 (ref 7–25)
CALCIUM SPEC-SCNC: 9 MG/DL (ref 8.7–10.4)
CHLORIDE SERPL-SCNC: 106 MMOL/L (ref 99–109)
CO2 SERPL-SCNC: 25 MMOL/L (ref 20–31)
CREAT BLD-MCNC: 1.17 MG/DL (ref 0.6–1.3)
CREATINE SERPL-MCNC: 1.1 MG/DL
DEPRECATED RDW RBC AUTO: 44 FL (ref 37–54)
EOSINOPHIL # BLD AUTO: 0.12 10*3/MM3 (ref 0–0.3)
EOSINOPHIL NFR BLD AUTO: 2.9 % (ref 0–3)
ERYTHROCYTE [DISTWIDTH] IN BLOOD BY AUTOMATED COUNT: 13.3 % (ref 11.3–14.5)
GFR SERPL CREATININE-BSD FRML MDRD: 46 ML/MIN/1.73
GLOBULIN UR ELPH-MCNC: 2.5 GM/DL
GLUCOSE BLD-MCNC: 88 MG/DL (ref 70–100)
HCT VFR BLD AUTO: 43.1 % (ref 34.5–44)
HGB BLD-MCNC: 14.3 G/DL (ref 11.5–15.5)
IMM GRANULOCYTES # BLD: 0.01 10*3/MM3 (ref 0–0.03)
IMM GRANULOCYTES NFR BLD: 0.2 % (ref 0–0.6)
LYMPHOCYTES # BLD AUTO: 0.75 10*3/MM3 (ref 0.6–4.8)
LYMPHOCYTES NFR BLD AUTO: 18.2 % (ref 24–44)
MAGNESIUM SERPL-MCNC: 2.3 MG/DL (ref 1.3–2.7)
MCH RBC QN AUTO: 30.2 PG (ref 27–31)
MCHC RBC AUTO-ENTMCNC: 33.2 G/DL (ref 32–36)
MCV RBC AUTO: 91.1 FL (ref 80–99)
MONOCYTES # BLD AUTO: 0.33 10*3/MM3 (ref 0–1)
MONOCYTES NFR BLD AUTO: 8 % (ref 0–12)
NEUTROPHILS # BLD AUTO: 2.89 10*3/MM3 (ref 1.5–8.3)
NEUTROPHILS NFR BLD AUTO: 70.2 % (ref 41–71)
PHOSPHATE SERPL-MCNC: 3.8 MG/DL (ref 2.4–5.1)
PLATELET # BLD AUTO: 82 10*3/MM3 (ref 150–450)
PMV BLD AUTO: 11.8 FL (ref 6–12)
POTASSIUM BLD-SCNC: 3.9 MMOL/L (ref 3.5–5.5)
PROT SERPL-MCNC: 6.8 G/DL (ref 5.7–8.2)
RBC # BLD AUTO: 4.73 10*6/MM3 (ref 3.89–5.14)
SODIUM BLD-SCNC: 139 MMOL/L (ref 132–146)
WBC NRBC COR # BLD: 4.12 10*3/MM3 (ref 3.5–10.8)

## 2018-08-14 PROCEDURE — 82784 ASSAY IGA/IGD/IGG/IGM EACH: CPT

## 2018-08-14 PROCEDURE — 25010000002 ZOLEDRONIC ACID PER 1 MG: Performed by: INTERNAL MEDICINE

## 2018-08-14 PROCEDURE — 83735 ASSAY OF MAGNESIUM: CPT

## 2018-08-14 PROCEDURE — 80053 COMPREHEN METABOLIC PANEL: CPT

## 2018-08-14 PROCEDURE — 83883 ASSAY NEPHELOMETRY NOT SPEC: CPT

## 2018-08-14 PROCEDURE — 86334 IMMUNOFIX E-PHORESIS SERUM: CPT

## 2018-08-14 PROCEDURE — 96374 THER/PROPH/DIAG INJ IV PUSH: CPT

## 2018-08-14 PROCEDURE — 84100 ASSAY OF PHOSPHORUS: CPT

## 2018-08-14 PROCEDURE — 84165 PROTEIN E-PHORESIS SERUM: CPT

## 2018-08-14 PROCEDURE — 82565 ASSAY OF CREATININE: CPT

## 2018-08-14 PROCEDURE — 85025 COMPLETE CBC W/AUTO DIFF WBC: CPT

## 2018-08-14 RX ADMIN — ZOLEDRONIC ACID 3.3 MG: 4 INJECTION, SOLUTION, CONCENTRATE INTRAVENOUS at 11:17

## 2018-08-15 LAB
ALBUMIN SERPL-MCNC: 3.6 G/DL (ref 2.9–4.4)
ALBUMIN/GLOB SERPL: 1.2 {RATIO} (ref 0.7–1.7)
ALPHA1 GLOB FLD ELPH-MCNC: 0.3 G/DL (ref 0–0.4)
ALPHA2 GLOB SERPL ELPH-MCNC: 0.8 G/DL (ref 0.4–1)
B-GLOBULIN SERPL ELPH-MCNC: 1.3 G/DL (ref 0.7–1.3)
CREAT BLDA-MCNC: 1.1 MG/DL (ref 0.6–1.3)
GAMMA GLOB SERPL ELPH-MCNC: 0.7 G/DL (ref 0.4–1.8)
GLOBULIN SER CALC-MCNC: 3.2 G/DL (ref 2.2–3.9)
IGA SERPL-MCNC: 222 MG/DL (ref 87–352)
IGG SERPL-MCNC: 742 MG/DL (ref 700–1600)
IGM SERPL-MCNC: 44 MG/DL (ref 26–217)
INTERPRETATION SERPL IEP-IMP: NORMAL
KAPPA LC SERPL-MCNC: 15.9 MG/L (ref 3.3–19.4)
KAPPA LC/LAMBDA SER: 1.26 {RATIO} (ref 0.26–1.65)
LAMBDA LC FREE SERPL-MCNC: 12.6 MG/L (ref 5.7–26.3)
Lab: NORMAL
M-SPIKE: NORMAL G/DL
PROT SERPL-MCNC: 6.8 G/DL (ref 6–8.5)

## 2018-08-23 ENCOUNTER — TELEPHONE (OUTPATIENT)
Dept: INTERNAL MEDICINE | Facility: CLINIC | Age: 68
End: 2018-08-23

## 2018-08-23 ENCOUNTER — OFFICE VISIT (OUTPATIENT)
Dept: INTERNAL MEDICINE | Facility: CLINIC | Age: 68
End: 2018-08-23

## 2018-08-23 VITALS
HEART RATE: 68 BPM | BODY MASS INDEX: 33.04 KG/M2 | SYSTOLIC BLOOD PRESSURE: 124 MMHG | WEIGHT: 175 LBS | HEIGHT: 61 IN | DIASTOLIC BLOOD PRESSURE: 74 MMHG

## 2018-08-23 DIAGNOSIS — M54.50 CHRONIC BILATERAL LOW BACK PAIN WITHOUT SCIATICA: ICD-10-CM

## 2018-08-23 DIAGNOSIS — M19.90 ARTHRITIS: ICD-10-CM

## 2018-08-23 DIAGNOSIS — K21.9 GASTROESOPHAGEAL REFLUX DISEASE WITHOUT ESOPHAGITIS: Chronic | ICD-10-CM

## 2018-08-23 DIAGNOSIS — R60.0 LEG EDEMA, RIGHT: ICD-10-CM

## 2018-08-23 DIAGNOSIS — N18.30 CKD (CHRONIC KIDNEY DISEASE), STAGE III (HCC): Chronic | ICD-10-CM

## 2018-08-23 DIAGNOSIS — I27.20 PULMONARY HYPERTENSION (HCC): Primary | Chronic | ICD-10-CM

## 2018-08-23 DIAGNOSIS — K57.30 DIVERTICULOSIS OF LARGE INTESTINE WITHOUT HEMORRHAGE: ICD-10-CM

## 2018-08-23 DIAGNOSIS — D12.6 TUBULAR ADENOMA OF COLON: ICD-10-CM

## 2018-08-23 DIAGNOSIS — G89.29 CHRONIC BILATERAL LOW BACK PAIN WITHOUT SCIATICA: ICD-10-CM

## 2018-08-23 LAB
ALBUMIN SERPL-MCNC: 4.64 G/DL (ref 3.2–4.8)
ALBUMIN/GLOB SERPL: 1.9 G/DL (ref 1.5–2.5)
ALP SERPL-CCNC: 119 U/L (ref 25–100)
ALT SERPL W P-5'-P-CCNC: 24 U/L (ref 7–40)
ANION GAP SERPL CALCULATED.3IONS-SCNC: 9 MMOL/L (ref 3–11)
AST SERPL-CCNC: 27 U/L (ref 0–33)
BILIRUB SERPL-MCNC: 0.5 MG/DL (ref 0.3–1.2)
BUN BLD-MCNC: 16 MG/DL (ref 9–23)
BUN/CREAT SERPL: 12.5 (ref 7–25)
CALCIUM SPEC-SCNC: 9.8 MG/DL (ref 8.7–10.4)
CHLORIDE SERPL-SCNC: 105 MMOL/L (ref 99–109)
CO2 SERPL-SCNC: 29 MMOL/L (ref 20–31)
CREAT BLD-MCNC: 1.28 MG/DL (ref 0.6–1.3)
GFR SERPL CREATININE-BSD FRML MDRD: 42 ML/MIN/1.73
GLOBULIN UR ELPH-MCNC: 2.5 GM/DL
GLUCOSE BLD-MCNC: 70 MG/DL (ref 70–100)
POTASSIUM BLD-SCNC: 4.7 MMOL/L (ref 3.5–5.5)
PROT SERPL-MCNC: 7.1 G/DL (ref 5.7–8.2)
SODIUM BLD-SCNC: 143 MMOL/L (ref 132–146)
TSH SERPL DL<=0.05 MIU/L-ACNC: 1.5 MIU/ML (ref 0.35–5.35)
VIT B12 BLD-MCNC: 537 PG/ML (ref 211–911)

## 2018-08-23 PROCEDURE — 84443 ASSAY THYROID STIM HORMONE: CPT | Performed by: INTERNAL MEDICINE

## 2018-08-23 PROCEDURE — 80053 COMPREHEN METABOLIC PANEL: CPT | Performed by: INTERNAL MEDICINE

## 2018-08-23 PROCEDURE — 99214 OFFICE O/P EST MOD 30 MIN: CPT | Performed by: INTERNAL MEDICINE

## 2018-08-23 PROCEDURE — 82607 VITAMIN B-12: CPT | Performed by: INTERNAL MEDICINE

## 2018-08-23 NOTE — PROGRESS NOTES
Patient is a 67 y.o. female who is here for a follow up of chronic conditions.  Chief Complaint   Patient presents with   • Follow-up     pulmonary htn         HPI:    Here for f/u.  Continues to use supplemental oxygen and benefit from use secondary to pulmonary hypertension.  Getting stiffness in lower back.  Simple chores are difficult.  GERD is controlled.  Sleeping good.  No dizziness.    History:    Patient Active Problem List   Diagnosis   • Multiple myeloma in remission (CMS/HCC)   • Diverticulosis of large intestine without hemorrhage   • Tubular adenoma of colon   • Chronic bronchitis (CMS/HCC)   • Arthritis   • Gastroesophageal reflux disease without esophagitis   • Chronic bilateral low back pain without sciatica   • Thrombocytopenia since stem cell transplant March 2016   • Hx of autologous stem cell transplant (March 2016)   • CKD (chronic kidney disease), stage III   • Former smoker   • Non-rheumatic tricuspid valve insufficiency   • Pulmonary hypertension   • Chronic respiratory failure with hypoxia (been noncompliant with outpatient oxygen in past)   • Leg edema, right       Past Medical History:   Diagnosis Date   • Arthritis    • Chronic bronchitis (CMS/HCC)    • COPD (chronic obstructive pulmonary disease) (CMS/HCC)    • Hypertension    • Multiple myeloma (CMS/HCC)    • Multiple myeloma, failed remission (CMS/HCC)        Past Surgical History:   Procedure Laterality Date   • LIMBAL STEM CELL TRANSPLANT  03/2016    @ Dr. Josiah Spicer   • MOUTH SURGERY         Current Outpatient Prescriptions on File Prior to Visit   Medication Sig   • acyclovir (ZOVIRAX) 400 MG tablet Take 1 tablet by mouth 2 (Two) Times a Day With Meals.   • albuterol (PROVENTIL HFA;VENTOLIN HFA) 108 (90 Base) MCG/ACT inhaler Inhale 2 puffs Every 6 (Six) Hours As Needed for Wheezing or Shortness of Air.   • cyclobenzaprine (FLEXERIL) 10 MG tablet Take 1 tablet by mouth 3 (Three) Times a Day As Needed for Muscle Spasms.   •  diclofenac (VOLTAREN) 1 % gel gel Apply 4 g topically 3 (Three) Times a Day.   • esomeprazole (nexIUM) 20 MG capsule Take 20 mg by mouth 2 (Two) Times a Day.   • gabapentin (NEURONTIN) 400 MG capsule TAKE ONE CAPSULE BY MOUTH THREE TIMES A DAY   • Melatonin 5 MG chewable tablet Chew 5 mg Every Night.     No current facility-administered medications on file prior to visit.        Family History   Problem Relation Age of Onset   • Breast cancer Other    • Lung cancer Other    • Liver cancer Other    • Bone cancer Other        Social History     Social History   • Marital status:      Spouse name: N/A   • Number of children: N/A   • Years of education: N/A     Occupational History   • Not on file.     Social History Main Topics   • Smoking status: Former Smoker     Quit date: 8/1/2015   • Smokeless tobacco: Never Used   • Alcohol use No   • Drug use: No   • Sexual activity: Not on file     Other Topics Concern   • Not on file     Social History Narrative   • No narrative on file         Review of Systems   Constitutional: Positive for fatigue. Negative for chills, fever and unexpected weight change.   HENT: Negative for congestion, ear pain, hearing loss, rhinorrhea, sinus pressure, sore throat and trouble swallowing.    Eyes: Negative for discharge and itching.   Respiratory: Negative for cough, chest tightness and shortness of breath.    Cardiovascular: Positive for leg swelling. Negative for chest pain and palpitations.   Gastrointestinal: Negative for abdominal pain, blood in stool, constipation, diarrhea and vomiting.        11/13 colonoscopy with hyperplastic polyps  12/16 colonoscopy with TA times one   Endocrine: Negative for polydipsia and polyuria.   Genitourinary: Negative for difficulty urinating, dysuria, enuresis, frequency, hematuria and urgency.   Musculoskeletal: Positive for arthralgias, back pain and gait problem. Negative for joint swelling.   Skin: Negative for rash and wound.  "  Allergic/Immunologic: Negative for immunocompromised state.   Neurological: Positive for weakness. Negative for dizziness, syncope, light-headedness, numbness and headaches.   Hematological: Does not bruise/bleed easily.   Psychiatric/Behavioral: Negative for behavioral problems, dysphoric mood and sleep disturbance. The patient is not nervous/anxious.        /74 (BP Location: Left arm, Patient Position: Sitting)   Pulse 68   Ht 154.9 cm (60.98\")   Wt 79.4 kg (175 lb)   BMI 33.08 kg/m²       Physical Exam   Constitutional: She is oriented to person, place, and time. She appears well-developed and well-nourished.   HENT:   Head: Normocephalic and atraumatic.   Right Ear: External ear normal.   Left Ear: External ear normal.   Mouth/Throat: Oropharynx is clear and moist.   Eyes: Conjunctivae and EOM are normal.   Neck: Normal range of motion. Neck supple.   Cardiovascular: Normal rate, regular rhythm and normal heart sounds.    Pulmonary/Chest: Effort normal.   Mild bibasilar rales   Abdominal: Soft. Bowel sounds are normal.   Musculoskeletal: She exhibits edema (RLE) and tenderness (on ROM of left shoulder).   Using cane   Lymphadenopathy:     She has no cervical adenopathy.   Neurological: She is alert and oriented to person, place, and time.   Skin: Skin is warm and dry.   Psychiatric: She has a normal mood and affect. Her behavior is normal. Thought content normal.       Procedure:      Discussion/Summary:    nausea-not an issue  htn-stable off meds  copd-not an issue since stopping tob, proventil prn  gerd/gastritis-cont ppi  MM/back pain with radiculopathy-overall improved, cont gabapentin, Oncology notes reviewed  insomnia-cont melatonin  Leg edema-improved  High risk meds-labs today  Left shoulder pain-tylenol prn     Labs noted and dw patient    Current Outpatient Prescriptions:   •  acyclovir (ZOVIRAX) 400 MG tablet, Take 1 tablet by mouth 2 (Two) Times a Day With Meals., Disp: 60 tablet, Rfl: " 5  •  albuterol (PROVENTIL HFA;VENTOLIN HFA) 108 (90 Base) MCG/ACT inhaler, Inhale 2 puffs Every 6 (Six) Hours As Needed for Wheezing or Shortness of Air., Disp: 1 inhaler, Rfl: 5  •  cyclobenzaprine (FLEXERIL) 10 MG tablet, Take 1 tablet by mouth 3 (Three) Times a Day As Needed for Muscle Spasms., Disp: 90 tablet, Rfl: 4  •  diclofenac (VOLTAREN) 1 % gel gel, Apply 4 g topically 3 (Three) Times a Day., Disp: 1 tube, Rfl: 5  •  esomeprazole (nexIUM) 20 MG capsule, Take 20 mg by mouth 2 (Two) Times a Day., Disp: , Rfl:   •  gabapentin (NEURONTIN) 400 MG capsule, TAKE ONE CAPSULE BY MOUTH THREE TIMES A DAY, Disp: 90 capsule, Rfl: 2  •  Melatonin 5 MG chewable tablet, Chew 5 mg Every Night., Disp: , Rfl:         Doris was seen today for follow-up.    Diagnoses and all orders for this visit:    Pulmonary hypertension  -     Comprehensive Metabolic Panel  -     TSH    Diverticulosis of large intestine without hemorrhage  -     Vitamin B12    Gastroesophageal reflux disease without esophagitis    Tubular adenoma of colon    Chronic bilateral low back pain without sciatica    Arthritis    CKD (chronic kidney disease), stage III  -     TSH    Leg edema, right  -     TSH

## 2018-08-23 NOTE — TELEPHONE ENCOUNTER
CARMINA FROM [facility] IS NEEDING CHART NOTES FROM TODAY'S VISIT SENT TO HER AT [phone]

## 2018-08-31 ENCOUNTER — OFFICE VISIT (OUTPATIENT)
Dept: ONCOLOGY | Facility: CLINIC | Age: 68
End: 2018-08-31

## 2018-08-31 VITALS
HEART RATE: 98 BPM | BODY MASS INDEX: 33.61 KG/M2 | RESPIRATION RATE: 16 BRPM | TEMPERATURE: 97.6 F | SYSTOLIC BLOOD PRESSURE: 133 MMHG | WEIGHT: 178 LBS | DIASTOLIC BLOOD PRESSURE: 83 MMHG | HEIGHT: 61 IN | OXYGEN SATURATION: 91 %

## 2018-08-31 DIAGNOSIS — C90.01 MULTIPLE MYELOMA IN REMISSION (HCC): ICD-10-CM

## 2018-08-31 PROCEDURE — 99214 OFFICE O/P EST MOD 30 MIN: CPT | Performed by: INTERNAL MEDICINE

## 2018-08-31 RX ORDER — SODIUM CHLORIDE 9 MG/ML
250 INJECTION, SOLUTION INTRAVENOUS ONCE
Status: CANCELLED | OUTPATIENT
Start: 2018-11-06

## 2018-08-31 RX ORDER — OXYCODONE HYDROCHLORIDE AND ACETAMINOPHEN 5; 325 MG/1; MG/1
1 TABLET ORAL EVERY 6 HOURS PRN
Qty: 30 TABLET | Refills: 0 | Status: SHIPPED | OUTPATIENT
Start: 2018-08-31 | End: 2018-11-06

## 2018-09-12 ENCOUNTER — TELEPHONE (OUTPATIENT)
Dept: ONCOLOGY | Facility: CLINIC | Age: 68
End: 2018-09-12

## 2018-09-12 NOTE — TELEPHONE ENCOUNTER
Dr. Epps has never ordered any scans for the patient's back/spine in the past but Dr. Fuentes has.    Contacted pt and notified her of above and let her know that those records need to be requested from Dr. Toure office.    Pt verbalized understanding.

## 2018-09-12 NOTE — TELEPHONE ENCOUNTER
----- Message from Sara Black LPN sent at 9/12/2018  2:51 PM EDT -----  Regarding: FW: ELIO ZHOU NEEDS MRI  Contact: [phone]      ----- Message -----  From: Imelda Saldana  Sent: 9/12/2018   2:48 PM  To: [facility] Clinical Pool  Subject: ELIO ZHOU NEEDS MRI                  PATIENT CALLED SAYING DR BOSS'S OFFICE CALLED HER AND SCHEDULED AN APPOINTMENT, BUT THEY ARE REQUESTING ANY MRI RESULTS DONE ON HER BACK.  PATIENT WOULD LIKE A CALL BACK.

## 2018-10-11 ENCOUNTER — EPISODE CHANGES (OUTPATIENT)
Dept: CASE MANAGEMENT | Facility: OTHER | Age: 68
End: 2018-10-11

## 2018-10-25 ENCOUNTER — OFFICE VISIT (OUTPATIENT)
Dept: INTERNAL MEDICINE | Facility: CLINIC | Age: 68
End: 2018-10-25

## 2018-10-25 VITALS
WEIGHT: 162 LBS | SYSTOLIC BLOOD PRESSURE: 130 MMHG | DIASTOLIC BLOOD PRESSURE: 70 MMHG | HEART RATE: 64 BPM | BODY MASS INDEX: 30.58 KG/M2 | HEIGHT: 61 IN

## 2018-10-25 DIAGNOSIS — T50.905A THROMBOCYTOPENIA DUE TO DRUGS: Chronic | ICD-10-CM

## 2018-10-25 DIAGNOSIS — K21.9 GASTROESOPHAGEAL REFLUX DISEASE WITHOUT ESOPHAGITIS: Chronic | ICD-10-CM

## 2018-10-25 DIAGNOSIS — Z87.891 FORMER SMOKER: Chronic | ICD-10-CM

## 2018-10-25 DIAGNOSIS — M54.50 CHRONIC BILATERAL LOW BACK PAIN WITHOUT SCIATICA: ICD-10-CM

## 2018-10-25 DIAGNOSIS — J42 CHRONIC BRONCHITIS, UNSPECIFIED CHRONIC BRONCHITIS TYPE (HCC): ICD-10-CM

## 2018-10-25 DIAGNOSIS — D12.6 TUBULAR ADENOMA OF COLON: ICD-10-CM

## 2018-10-25 DIAGNOSIS — N18.30 CKD (CHRONIC KIDNEY DISEASE), STAGE III (HCC): Chronic | ICD-10-CM

## 2018-10-25 DIAGNOSIS — G89.29 CHRONIC BILATERAL LOW BACK PAIN WITHOUT SCIATICA: ICD-10-CM

## 2018-10-25 DIAGNOSIS — D69.59 THROMBOCYTOPENIA DUE TO DRUGS: Chronic | ICD-10-CM

## 2018-10-25 DIAGNOSIS — K57.30 DIVERTICULOSIS OF LARGE INTESTINE WITHOUT HEMORRHAGE: ICD-10-CM

## 2018-10-25 DIAGNOSIS — N95.1 SWEATS, MENOPAUSAL: ICD-10-CM

## 2018-10-25 DIAGNOSIS — I27.20 PULMONARY HYPERTENSION (HCC): Primary | Chronic | ICD-10-CM

## 2018-10-25 DIAGNOSIS — C90.01 MULTIPLE MYELOMA IN REMISSION (HCC): Chronic | ICD-10-CM

## 2018-10-25 PROCEDURE — 99214 OFFICE O/P EST MOD 30 MIN: CPT | Performed by: INTERNAL MEDICINE

## 2018-10-25 RX ORDER — PAROXETINE 10 MG/1
10 TABLET, FILM COATED ORAL
Qty: 30 TABLET | Refills: 2 | Status: SHIPPED | OUTPATIENT
Start: 2018-10-25 | End: 2019-03-26 | Stop reason: SDUPTHER

## 2018-10-25 NOTE — PROGRESS NOTES
Patient is a 68 y.o. female who is here for a follow up of chronic conditions.  Chief Complaint   Patient presents with   • Pain         HPI:    Here for f/u.  Started going to pain mgmt and pain control is much improved.  Was recently in ER last 10/13 and diagnosed with Acute Bronchitis and completed steroids/azithromax.  Having problems with profuse diaphoresis.  No night sweats.  Some cold intolerance.     History:    Patient Active Problem List   Diagnosis   • Multiple myeloma in remission (CMS/HCC)   • Diverticulosis of large intestine without hemorrhage   • Tubular adenoma of colon   • Chronic bronchitis (CMS/HCC)   • Arthritis   • Gastroesophageal reflux disease without esophagitis   • Chronic bilateral low back pain without sciatica   • Thrombocytopenia since stem cell transplant March 2016   • Hx of autologous stem cell transplant (March 2016)   • CKD (chronic kidney disease), stage III (CMS/HCC)   • Former smoker   • Non-rheumatic tricuspid valve insufficiency   • Pulmonary hypertension (CMS/HCC)   • Chronic respiratory failure with hypoxia (been noncompliant with outpatient oxygen in past)   • Leg edema, right       Past Medical History:   Diagnosis Date   • Arthritis    • Chronic bronchitis (CMS/HCC)    • COPD (chronic obstructive pulmonary disease) (CMS/HCC)    • Hypertension    • Multiple myeloma (CMS/HCC)    • Multiple myeloma, failed remission (CMS/HCC)        Past Surgical History:   Procedure Laterality Date   • LIMBAL STEM CELL TRANSPLANT  03/2016    @[facility] Dr. Josiah Spicer   • MOUTH SURGERY         Current Outpatient Prescriptions on File Prior to Visit   Medication Sig   • acyclovir (ZOVIRAX) 400 MG tablet Take 1 tablet by mouth 2 (Two) Times a Day With Meals.   • albuterol (PROVENTIL HFA;VENTOLIN HFA) 108 (90 Base) MCG/ACT inhaler Inhale 2 puffs Every 6 (Six) Hours As Needed for Wheezing or Shortness of Air.   • diclofenac (VOLTAREN) 1 % gel gel Apply 4 g topically 3 (Three) Times a Day.   •  esomeprazole (nexIUM) 20 MG capsule Take 20 mg by mouth 2 (Two) Times a Day.   • Melatonin 5 MG chewable tablet Chew 5 mg Every Night.   • oxyCODONE-acetaminophen (PERCOCET) 5-325 MG per tablet Take 1 tablet by mouth Every 6 (Six) Hours As Needed for Moderate Pain .   • [DISCONTINUED] cyclobenzaprine (FLEXERIL) 10 MG tablet Take 1 tablet by mouth 3 (Three) Times a Day As Needed for Muscle Spasms.   • [DISCONTINUED] gabapentin (NEURONTIN) 400 MG capsule TAKE ONE CAPSULE BY MOUTH THREE TIMES A DAY     No current facility-administered medications on file prior to visit.        Family History   Problem Relation Age of Onset   • Breast cancer Other    • Lung cancer Other    • Liver cancer Other    • Bone cancer Other        Social History     Social History   • Marital status:      Spouse name: N/A   • Number of children: N/A   • Years of education: N/A     Occupational History   • Not on file.     Social History Main Topics   • Smoking status: Former Smoker     Quit date: 8/1/2015   • Smokeless tobacco: Never Used   • Alcohol use No   • Drug use: No   • Sexual activity: Defer     Other Topics Concern   • Not on file     Social History Narrative   • No narrative on file         Review of Systems   Constitutional: Positive for fatigue. Negative for chills, fever and unexpected weight change.   HENT: Negative for congestion, ear pain, hearing loss, rhinorrhea, sinus pressure, sore throat and trouble swallowing.    Eyes: Negative for discharge and itching.   Respiratory: Negative for cough, chest tightness and shortness of breath.    Cardiovascular: Positive for leg swelling. Negative for chest pain and palpitations.   Gastrointestinal: Negative for abdominal pain, blood in stool, constipation, diarrhea and vomiting.        11/13 colonoscopy with hyperplastic polyps  12/16 colonoscopy with TA times one   Endocrine: Negative for polydipsia and polyuria.   Genitourinary: Negative for difficulty urinating, dysuria,  "enuresis, frequency, hematuria and urgency.   Musculoskeletal: Positive for arthralgias, back pain and gait problem. Negative for joint swelling.   Skin: Negative for rash and wound.   Allergic/Immunologic: Negative for immunocompromised state.   Neurological: Positive for weakness. Negative for dizziness, syncope, light-headedness, numbness and headaches.   Hematological: Does not bruise/bleed easily.   Psychiatric/Behavioral: Negative for behavioral problems, dysphoric mood and sleep disturbance. The patient is not nervous/anxious.        /70   Pulse 64   Ht 154.9 cm (60.98\")   Wt 73.5 kg (162 lb)   BMI 30.63 kg/m²       Physical Exam   Constitutional: She is oriented to person, place, and time. She appears well-developed and well-nourished.   HENT:   Head: Normocephalic and atraumatic.   Right Ear: External ear normal.   Left Ear: External ear normal.   Mouth/Throat: Oropharynx is clear and moist.   Eyes: Conjunctivae and EOM are normal.   Neck: Normal range of motion. Neck supple.   Cardiovascular: Normal rate, regular rhythm and normal heart sounds.    Pulmonary/Chest: Effort normal. She has rales (LLL).   Abdominal: Soft. Bowel sounds are normal.   Musculoskeletal:   Using cane   Lymphadenopathy:     She has no cervical adenopathy.   Neurological: She is alert and oriented to person, place, and time.   Skin: Skin is warm and dry.   Psychiatric: She has a normal mood and affect. Her behavior is normal. Thought content normal.       Procedure:      Discussion/Summary:    htn-stable off meds  copd-not an issue since stopping tob, proventil prn  gerd/gastritis-cont ppi  MM/back pain with radiculopathy-overall improved, Oncology notes reviewed  insomnia-cont melatonin  Abnormal lung sounds-f/u CT scan from [facility]  Leg edema-improved  High risk meds-labs noted     Labs noted and dw patient       Current Outpatient Prescriptions:   •  acyclovir (ZOVIRAX) 400 MG tablet, Take 1 tablet by mouth 2 (Two) Times a " Day With Meals., Disp: 60 tablet, Rfl: 5  •  albuterol (PROVENTIL HFA;VENTOLIN HFA) 108 (90 Base) MCG/ACT inhaler, Inhale 2 puffs Every 6 (Six) Hours As Needed for Wheezing or Shortness of Air., Disp: 1 inhaler, Rfl: 5  •  diclofenac (VOLTAREN) 1 % gel gel, Apply 4 g topically 3 (Three) Times a Day., Disp: 1 tube, Rfl: 5  •  esomeprazole (nexIUM) 20 MG capsule, Take 20 mg by mouth 2 (Two) Times a Day., Disp: , Rfl:   •  Melatonin 5 MG chewable tablet, Chew 5 mg Every Night., Disp: , Rfl:   •  oxyCODONE-acetaminophen (PERCOCET) 5-325 MG per tablet, Take 1 tablet by mouth Every 6 (Six) Hours As Needed for Moderate Pain ., Disp: 30 tablet, Rfl: 0  •  PARoxetine (PAXIL) 10 MG tablet, Take 1 tablet by mouth every night at bedtime., Disp: 30 tablet, Rfl: 2        Doris was seen today for pain.    Diagnoses and all orders for this visit:    Pulmonary hypertension (CMS/HCC)    Chronic bronchitis, unspecified chronic bronchitis type (CMS/HCC)    Diverticulosis of large intestine without hemorrhage    Gastroesophageal reflux disease without esophagitis    Tubular adenoma of colon    Chronic bilateral low back pain without sciatica    CKD (chronic kidney disease), stage III (CMS/HCC)    Multiple myeloma in remission (CMS/HCC)    Thrombocytopenia since stem cell transplant March 2016    Former smoker    Sweats, menopausal  -     PARoxetine (PAXIL) 10 MG tablet; Take 1 tablet by mouth every night at bedtime.

## 2018-11-06 ENCOUNTER — INFUSION (OUTPATIENT)
Dept: ONCOLOGY | Facility: HOSPITAL | Age: 68
End: 2018-11-06

## 2018-11-06 VITALS
RESPIRATION RATE: 16 BRPM | BODY MASS INDEX: 30.78 KG/M2 | HEIGHT: 61 IN | DIASTOLIC BLOOD PRESSURE: 79 MMHG | SYSTOLIC BLOOD PRESSURE: 142 MMHG | TEMPERATURE: 97 F | HEART RATE: 86 BPM | WEIGHT: 163 LBS

## 2018-11-06 DIAGNOSIS — C90.01 MULTIPLE MYELOMA IN REMISSION (HCC): Primary | ICD-10-CM

## 2018-11-06 LAB
ALBUMIN SERPL-MCNC: 4.12 G/DL (ref 3.2–4.8)
ALBUMIN/GLOB SERPL: 2.2 G/DL (ref 1.5–2.5)
ALP SERPL-CCNC: 78 U/L (ref 25–100)
ALT SERPL W P-5'-P-CCNC: 19 U/L (ref 7–40)
ANION GAP SERPL CALCULATED.3IONS-SCNC: 7 MMOL/L (ref 3–11)
AST SERPL-CCNC: 26 U/L (ref 0–33)
BASOPHILS # BLD AUTO: 0.02 10*3/MM3 (ref 0–0.2)
BASOPHILS NFR BLD AUTO: 0.5 % (ref 0–1)
BILIRUB SERPL-MCNC: 0.6 MG/DL (ref 0.3–1.2)
BUN BLD-MCNC: 11 MG/DL (ref 9–23)
BUN/CREAT SERPL: 10.1 (ref 7–25)
CALCIUM SPEC-SCNC: 8.6 MG/DL (ref 8.7–10.4)
CHLORIDE SERPL-SCNC: 108 MMOL/L (ref 99–109)
CO2 SERPL-SCNC: 27 MMOL/L (ref 20–31)
CREAT BLD-MCNC: 1.09 MG/DL (ref 0.6–1.3)
CREAT BLDA-MCNC: 1.1 MG/DL (ref 0.6–1.3)
DEPRECATED RDW RBC AUTO: 46 FL (ref 37–54)
EOSINOPHIL # BLD AUTO: 0.04 10*3/MM3 (ref 0–0.3)
EOSINOPHIL NFR BLD AUTO: 1 % (ref 0–3)
ERYTHROCYTE [DISTWIDTH] IN BLOOD BY AUTOMATED COUNT: 13.6 % (ref 11.3–14.5)
GFR SERPL CREATININE-BSD FRML MDRD: 50 ML/MIN/1.73
GLOBULIN UR ELPH-MCNC: 1.9 GM/DL
GLUCOSE BLD-MCNC: 96 MG/DL (ref 70–100)
HCT VFR BLD AUTO: 44.2 % (ref 34.5–44)
HGB BLD-MCNC: 14.7 G/DL (ref 11.5–15.5)
IMM GRANULOCYTES # BLD: 0.02 10*3/MM3 (ref 0–0.03)
IMM GRANULOCYTES NFR BLD: 0.5 % (ref 0–0.6)
LYMPHOCYTES # BLD AUTO: 0.62 10*3/MM3 (ref 0.6–4.8)
LYMPHOCYTES NFR BLD AUTO: 15.2 % (ref 24–44)
MAGNESIUM SERPL-MCNC: 1.9 MG/DL (ref 1.3–2.7)
MCH RBC QN AUTO: 30.9 PG (ref 27–31)
MCHC RBC AUTO-ENTMCNC: 33.3 G/DL (ref 32–36)
MCV RBC AUTO: 93.1 FL (ref 80–99)
MONOCYTES # BLD AUTO: 0.33 10*3/MM3 (ref 0–1)
MONOCYTES NFR BLD AUTO: 8.1 % (ref 0–12)
NEUTROPHILS # BLD AUTO: 3.04 10*3/MM3 (ref 1.5–8.3)
NEUTROPHILS NFR BLD AUTO: 74.7 % (ref 41–71)
PHOSPHATE SERPL-MCNC: 2.6 MG/DL (ref 2.4–5.1)
PLATELET # BLD AUTO: 57 10*3/MM3 (ref 150–450)
PMV BLD AUTO: 12.6 FL (ref 6–12)
POTASSIUM BLD-SCNC: 3.8 MMOL/L (ref 3.5–5.5)
PROT SERPL-MCNC: 6 G/DL (ref 5.7–8.2)
RBC # BLD AUTO: 4.75 10*6/MM3 (ref 3.89–5.14)
SODIUM BLD-SCNC: 142 MMOL/L (ref 132–146)
WBC NRBC COR # BLD: 4.07 10*3/MM3 (ref 3.5–10.8)

## 2018-11-06 PROCEDURE — 84165 PROTEIN E-PHORESIS SERUM: CPT

## 2018-11-06 PROCEDURE — 83883 ASSAY NEPHELOMETRY NOT SPEC: CPT

## 2018-11-06 PROCEDURE — 85025 COMPLETE CBC W/AUTO DIFF WBC: CPT

## 2018-11-06 PROCEDURE — 86334 IMMUNOFIX E-PHORESIS SERUM: CPT

## 2018-11-06 PROCEDURE — 83735 ASSAY OF MAGNESIUM: CPT

## 2018-11-06 PROCEDURE — 25010000002 ZOLEDRONIC ACID PER 1 MG: Performed by: INTERNAL MEDICINE

## 2018-11-06 PROCEDURE — 80053 COMPREHEN METABOLIC PANEL: CPT

## 2018-11-06 PROCEDURE — 96374 THER/PROPH/DIAG INJ IV PUSH: CPT

## 2018-11-06 PROCEDURE — 82784 ASSAY IGA/IGD/IGG/IGM EACH: CPT

## 2018-11-06 PROCEDURE — 82565 ASSAY OF CREATININE: CPT

## 2018-11-06 PROCEDURE — 84100 ASSAY OF PHOSPHORUS: CPT

## 2018-11-06 RX ADMIN — ZOLEDRONIC ACID 3.3 MG: 4 INJECTION, SOLUTION, CONCENTRATE INTRAVENOUS at 11:04

## 2018-11-06 NOTE — PHARMACY RECOMMENDATION
zometa dose decreased to 3.3 mg due to SCr=1.1 (Clcr=45 ml/min).    Elias Alvares [facility]  11/6/2018  10:56 AM

## 2018-11-07 LAB
ALBUMIN SERPL-MCNC: 3.4 G/DL (ref 2.9–4.4)
ALBUMIN/GLOB SERPL: 1.2 {RATIO} (ref 0.7–1.7)
ALPHA1 GLOB FLD ELPH-MCNC: 0.3 G/DL (ref 0–0.4)
ALPHA2 GLOB SERPL ELPH-MCNC: 0.8 G/DL (ref 0.4–1)
B-GLOBULIN SERPL ELPH-MCNC: 1.2 G/DL (ref 0.7–1.3)
GAMMA GLOB SERPL ELPH-MCNC: 0.6 G/DL (ref 0.4–1.8)
GLOBULIN SER CALC-MCNC: 2.9 G/DL (ref 2.2–3.9)
IGA SERPL-MCNC: 140 MG/DL (ref 87–352)
IGG SERPL-MCNC: 618 MG/DL (ref 700–1600)
IGM SERPL-MCNC: 55 MG/DL (ref 26–217)
INTERPRETATION SERPL IEP-IMP: ABNORMAL
KAPPA LC SERPL-MCNC: 9.2 MG/L (ref 3.3–19.4)
KAPPA LC/LAMBDA SER: 0.6 {RATIO} (ref 0.26–1.65)
LAMBDA LC FREE SERPL-MCNC: 15.4 MG/L (ref 5.7–26.3)
Lab: ABNORMAL
M-SPIKE: ABNORMAL G/DL
PROT SERPL-MCNC: 6.3 G/DL (ref 6–8.5)

## 2018-11-09 ENCOUNTER — TELEPHONE (OUTPATIENT)
Dept: INTERNAL MEDICINE | Facility: CLINIC | Age: 68
End: 2018-11-09

## 2018-11-09 DIAGNOSIS — J44.1 CHRONIC OBSTRUCTIVE PULMONARY DISEASE WITH ACUTE EXACERBATION (HCC): ICD-10-CM

## 2018-11-09 RX ORDER — ALBUTEROL SULFATE 90 UG/1
2 AEROSOL, METERED RESPIRATORY (INHALATION) EVERY 6 HOURS PRN
Qty: 1 INHALER | Refills: 5 | Status: SHIPPED | OUTPATIENT
Start: 2018-11-09 | End: 2019-12-03 | Stop reason: SDUPTHER

## 2018-11-30 ENCOUNTER — OFFICE VISIT (OUTPATIENT)
Dept: ONCOLOGY | Facility: CLINIC | Age: 68
End: 2018-11-30

## 2018-11-30 VITALS
OXYGEN SATURATION: 93 % | RESPIRATION RATE: 28 BRPM | DIASTOLIC BLOOD PRESSURE: 77 MMHG | TEMPERATURE: 97.5 F | SYSTOLIC BLOOD PRESSURE: 158 MMHG | HEART RATE: 99 BPM | BODY MASS INDEX: 32.47 KG/M2 | HEIGHT: 61 IN | WEIGHT: 172 LBS

## 2018-11-30 DIAGNOSIS — C90.01 MULTIPLE MYELOMA IN REMISSION (HCC): Primary | Chronic | ICD-10-CM

## 2018-11-30 PROCEDURE — 99213 OFFICE O/P EST LOW 20 MIN: CPT | Performed by: INTERNAL MEDICINE

## 2018-11-30 RX ORDER — CYCLOBENZAPRINE HCL 10 MG
10 TABLET ORAL 3 TIMES DAILY PRN
COMMUNITY
End: 2019-01-10 | Stop reason: SDUPTHER

## 2018-11-30 NOTE — PROGRESS NOTES
"      PROBLEM LIST:  Oncology/Hematology History     PROBLEM LIST:   1. Stage III IgA kappa clonal multiple myeloma. Diagnosed 9/20152. FISH panel reports multiple abnormalities including gain of 1q21 monosomy.  3. Monosomy 13 and gain of chromosomes 9, 15 and CCND1.  4. Initial M-spike of 7.1 g/dL at time of diagnosis and after 3 cycles, had  undetectable M-spike currently on Velcade, Revlimid and dexamethasone cycle #6  this week.  5. S/p Autologous SCT at [facility] with Dr. Venu Spicer in March 11,2016  6. Revlimid on hold due to low platelets  7.Right leg pain - on lyrica       Multiple myeloma in remission     7/11/2016 Initial Diagnosis     Multiple myeloma         REASON FOR VISIT: Multiple myeloma       Subjective     HISTORY OF PRESENT ILLNESS:   Ms. Miranda is here for follow up evaluation of myeloma management.   Since I last saw her she saw pain management and has been receiving injections.  This has made a big difference in her pain control.  She is now 2-1/2 years out from her transplant.  Doing reasonably well.    Past Medical History, Past Surgical History, Social History, Family History have been reviewed and are without significant changes except as mentioned.    Review of Systems   Constitutional: Negative.    HENT: Negative.    Eyes: Negative.    Respiratory: Negative.    Cardiovascular: Negative.    Gastrointestinal: Negative.    Endocrine: Negative.    Genitourinary: Negative.    Musculoskeletal: Positive for arthralgias and gait problem.   Skin: Negative.    Allergic/Immunologic: Negative.    Hematological: Negative.    Psychiatric/Behavioral: Negative.       A comprehensive 14 point review of systems was performed and was negative except as mentioned.    Medications:  The current medication list was reviewed in the EMR    ALLERGIES:  No Known Allergies    Objective      /77 Comment: LUE  Pulse 99   Temp 97.5 °F (36.4 °C) (Temporal)   Resp 28   Ht 154.9 cm (61\")   Wt 78 kg (172 lb)   SpO2 " 93% Comment: RA  BMI 32.50 kg/m²      Performance Status: 1    General: well appearing, in no acute distress  HEENT: sclera anicteric, oropharynx clear  Lymphatics: no cervical, supraclavicular, or axillary adenopathy  Cardiovascular: regular rate and rhythm, no murmurs  Lungs: clear to auscultation bilaterally  Abdomen: soft, nontender, nondistended.  No palpable organomegaly  Extremeties: no lower extremity edema  Skin: no rashes, lesions, bruising, or petechiae    RECENT LABS:    Lab Results   Component Value Date    MSPIKE Not Observed 11/06/2018    MSPIKE Not Observed 08/14/2018    MSPIKE Not Observed 05/21/2018     Lab Results   Component Value Date    FREEKAPPAL 9.2 11/06/2018    FREEKAPPAL 15.9 08/14/2018    FREEKAPPAL 19.9 (H) 05/21/2018     Lab Results   Component Value Date    IGLFLC 15.4 11/06/2018    IGLFLC 12.6 08/14/2018    IGLFLC 17.8 05/21/2018     Lab Results   Component Value Date    WBC 4.07 11/06/2018    HGB 14.7 11/06/2018    HCT 44.2 (H) 11/06/2018    MCV 93.1 11/06/2018    PLT 57 (L) 11/06/2018       Lab Results   Component Value Date    GLUCOSE 96 11/06/2018    BUN 11 11/06/2018    CREATININE 1.10 11/06/2018    EGFRIFNONA 50 (L) 11/06/2018    BCR 10.1 11/06/2018    K 3.8 11/06/2018    CO2 27.0 11/06/2018    CALCIUM 8.6 (L) 11/06/2018    PROTENTOTREF 6.3 11/06/2018    ALBUMIN 3.4 11/06/2018    ALBUMIN 4.12 11/06/2018    LABIL2 1.2 11/06/2018    AST 26 11/06/2018    ALT 19 11/06/2018         Assessment/Plan       1. multiple myeloma in remission  status post autologous stem cell transplant.  Her platelets have again decreased.   Continue every 3 months of Zometa. M-spike still undetectable.    2.  Back pain: followed by Dr. Mixon    rtc in 3 months    I spent 15 minutes on the patient's plan and care with more than 50% of time spent counseling the patient.    Joseph Mckinley MD  [facility]    11/30/2018      Return on: 02/13/19  Return in  (Approximately): 3 months        CC:

## 2018-12-03 ENCOUNTER — OFFICE VISIT (OUTPATIENT)
Dept: INTERNAL MEDICINE | Facility: CLINIC | Age: 68
End: 2018-12-03

## 2018-12-03 VITALS
SYSTOLIC BLOOD PRESSURE: 126 MMHG | DIASTOLIC BLOOD PRESSURE: 70 MMHG | HEART RATE: 72 BPM | WEIGHT: 171 LBS | HEIGHT: 61 IN | BODY MASS INDEX: 32.28 KG/M2

## 2018-12-03 DIAGNOSIS — K57.30 DIVERTICULOSIS OF LARGE INTESTINE WITHOUT HEMORRHAGE: ICD-10-CM

## 2018-12-03 DIAGNOSIS — T50.905A THROMBOCYTOPENIA DUE TO DRUGS: Chronic | ICD-10-CM

## 2018-12-03 DIAGNOSIS — G89.29 CHRONIC BILATERAL LOW BACK PAIN WITHOUT SCIATICA: ICD-10-CM

## 2018-12-03 DIAGNOSIS — D69.59 THROMBOCYTOPENIA DUE TO DRUGS: Chronic | ICD-10-CM

## 2018-12-03 DIAGNOSIS — M19.90 ARTHRITIS: ICD-10-CM

## 2018-12-03 DIAGNOSIS — N18.30 CKD (CHRONIC KIDNEY DISEASE), STAGE III (HCC): Chronic | ICD-10-CM

## 2018-12-03 DIAGNOSIS — K21.9 GASTROESOPHAGEAL REFLUX DISEASE WITHOUT ESOPHAGITIS: Chronic | ICD-10-CM

## 2018-12-03 DIAGNOSIS — I27.20 PULMONARY HYPERTENSION (HCC): Primary | Chronic | ICD-10-CM

## 2018-12-03 DIAGNOSIS — J96.11 CHRONIC RESPIRATORY FAILURE WITH HYPOXIA (HCC): ICD-10-CM

## 2018-12-03 DIAGNOSIS — M54.50 CHRONIC BILATERAL LOW BACK PAIN WITHOUT SCIATICA: ICD-10-CM

## 2018-12-03 PROCEDURE — 99214 OFFICE O/P EST MOD 30 MIN: CPT | Performed by: INTERNAL MEDICINE

## 2018-12-03 RX ORDER — ACYCLOVIR 400 MG/1
400 TABLET ORAL 2 TIMES DAILY WITH MEALS
Qty: 60 TABLET | Refills: 5 | Status: SHIPPED | OUTPATIENT
Start: 2018-12-03 | End: 2019-07-06 | Stop reason: SDUPTHER

## 2018-12-03 NOTE — PROGRESS NOTES
Patient is a 68 y.o. female who is here for a follow up of chronic conditions.  Chief Complaint   Patient presents with   • Follow-up     pulmonary htn, arthritis,         HPI:    Here for f/u.  Recently seen by Hem/Onc.  Would like to try CBD oil.  Still seeing pain mgmt.  Using gabapentin bid.  GERD is controlled.  Breathing is ok.  No dizziness or lightheadedness.  Occasional dry cough.      History:    Patient Active Problem List   Diagnosis   • Multiple myeloma in remission (CMS/HCC)   • Diverticulosis of large intestine without hemorrhage   • Tubular adenoma of colon   • Chronic bronchitis (CMS/HCC)   • Arthritis   • Gastroesophageal reflux disease without esophagitis   • Chronic bilateral low back pain without sciatica   • Thrombocytopenia since stem cell transplant March 2016   • Hx of autologous stem cell transplant (March 2016)   • CKD (chronic kidney disease), stage III (CMS/HCC)   • Former smoker   • Non-rheumatic tricuspid valve insufficiency   • Pulmonary hypertension (CMS/HCC)   • Chronic respiratory failure with hypoxia (been noncompliant with outpatient oxygen in past)   • Leg edema, right       Past Medical History:   Diagnosis Date   • Arthritis    • Chronic bronchitis (CMS/HCC)    • COPD (chronic obstructive pulmonary disease) (CMS/HCC)    • Hypertension    • Multiple myeloma (CMS/HCC)    • Multiple myeloma, failed remission (CMS/HCC)        Past Surgical History:   Procedure Laterality Date   • LIMBAL STEM CELL TRANSPLANT  03/2016    @[facility] Dr. Josiah Spicer   • MOUTH SURGERY         Current Outpatient Medications on File Prior to Visit   Medication Sig   • albuterol (PROVENTIL HFA;VENTOLIN HFA) 108 (90 Base) MCG/ACT inhaler Inhale 2 puffs Every 6 (Six) Hours As Needed for Wheezing or Shortness of Air.   • cyclobenzaprine (FLEXERIL) 10 MG tablet Take 10 mg by mouth 3 (Three) Times a Day As Needed for Muscle Spasms.   • esomeprazole (nexIUM) 20 MG capsule Take 20 mg by mouth 2 (Two) Times a Day.    • GABAPENTIN PO Take  by mouth 3 (Three) Times a Day As Needed.   • Melatonin 5 MG chewable tablet Chew 5 mg Every Night.   • PARoxetine (PAXIL) 10 MG tablet Take 1 tablet by mouth every night at bedtime.   • [DISCONTINUED] acyclovir (ZOVIRAX) 400 MG tablet Take 1 tablet by mouth 2 (Two) Times a Day With Meals.     No current facility-administered medications on file prior to visit.        Family History   Problem Relation Age of Onset   • Breast cancer Other    • Lung cancer Other    • Liver cancer Other    • Bone cancer Other        Social History     Socioeconomic History   • Marital status:      Spouse name: Not on file   • Number of children: Not on file   • Years of education: Not on file   • Highest education level: Not on file   Social Needs   • Financial resource strain: Not on file   • Food insecurity - worry: Not on file   • Food insecurity - inability: Not on file   • Transportation needs - medical: Not on file   • Transportation needs - non-medical: Not on file   Occupational History   • Not on file   Tobacco Use   • Smoking status: Former Smoker     Last attempt to quit: 8/1/2015     Years since quitting: 3.3   • Smokeless tobacco: Never Used   Substance and Sexual Activity   • Alcohol use: No   • Drug use: No   • Sexual activity: Defer   Other Topics Concern   • Not on file   Social History Narrative   • Not on file         Review of Systems   Constitutional: Positive for fatigue. Negative for chills, fever and unexpected weight change.   HENT: Negative for congestion, ear pain, hearing loss, rhinorrhea, sinus pressure, sore throat and trouble swallowing.    Eyes: Negative for discharge and itching.   Respiratory: Negative for cough, chest tightness and shortness of breath.    Cardiovascular: Positive for leg swelling (improved). Negative for chest pain and palpitations.   Gastrointestinal: Negative for abdominal pain, blood in stool, constipation, diarrhea and vomiting.        11/13  "colonoscopy with hyperplastic polyps  12/16 colonoscopy with TA times one   Endocrine: Negative for polydipsia and polyuria.   Genitourinary: Negative for difficulty urinating, dysuria, enuresis, frequency, hematuria and urgency.   Musculoskeletal: Positive for arthralgias, back pain and gait problem. Negative for joint swelling.   Skin: Negative for rash and wound.   Allergic/Immunologic: Negative for immunocompromised state.   Neurological: Positive for weakness. Negative for dizziness, syncope, light-headedness, numbness and headaches.   Hematological: Does not bruise/bleed easily.   Psychiatric/Behavioral: Negative for behavioral problems, dysphoric mood and sleep disturbance. The patient is not nervous/anxious.        /70   Pulse 72   Ht 154.9 cm (60.98\")   Wt 77.6 kg (171 lb)   BMI 32.33 kg/m²       Physical Exam   Constitutional: She is oriented to person, place, and time. She appears well-developed and well-nourished.   HENT:   Head: Normocephalic and atraumatic.   Right Ear: External ear normal.   Left Ear: External ear normal.   Mouth/Throat: Oropharynx is clear and moist.   Eyes: Conjunctivae and EOM are normal.   Neck: Normal range of motion. Neck supple.   Cardiovascular: Normal rate, regular rhythm and normal heart sounds.   Pulmonary/Chest: Effort normal. She has rales (mild bibasilar).   Abdominal: Soft. Bowel sounds are normal.   Musculoskeletal:   Using cane   Lymphadenopathy:     She has no cervical adenopathy.   Neurological: She is alert and oriented to person, place, and time.   Skin: Skin is warm and dry.   Psychiatric: She has a normal mood and affect. Her behavior is normal. Thought content normal.       Procedure:      Discussion/Summary:    htn-stable off meds  copd-not an issue since stopping tob, proventil prn  gerd/gastritis-cont ppi  MM/back pain with radiculopathy-overall improved, Oncology notes reviewed  insomnia-cont melatonin  Abnormal lung sounds-CT from [facility] " noted  Leg edema-improved  High risk meds-labs noted     Labs noted and dw patient           Current Outpatient Medications:   •  acyclovir (ZOVIRAX) 400 MG tablet, Take 1 tablet by mouth 2 (Two) Times a Day With Meals., Disp: 60 tablet, Rfl: 5  •  albuterol (PROVENTIL HFA;VENTOLIN HFA) 108 (90 Base) MCG/ACT inhaler, Inhale 2 puffs Every 6 (Six) Hours As Needed for Wheezing or Shortness of Air., Disp: 1 inhaler, Rfl: 5  •  cyclobenzaprine (FLEXERIL) 10 MG tablet, Take 10 mg by mouth 3 (Three) Times a Day As Needed for Muscle Spasms., Disp: , Rfl:   •  esomeprazole (nexIUM) 20 MG capsule, Take 20 mg by mouth 2 (Two) Times a Day., Disp: , Rfl:   •  GABAPENTIN PO, Take  by mouth 3 (Three) Times a Day As Needed., Disp: , Rfl:   •  Melatonin 5 MG chewable tablet, Chew 5 mg Every Night., Disp: , Rfl:   •  PARoxetine (PAXIL) 10 MG tablet, Take 1 tablet by mouth every night at bedtime., Disp: 30 tablet, Rfl: 2        Doris was seen today for follow-up.    Diagnoses and all orders for this visit:    Pulmonary hypertension (CMS/HCC)    Chronic respiratory failure with hypoxia (been noncompliant with outpatient oxygen in past)    Gastroesophageal reflux disease without esophagitis    Diverticulosis of large intestine without hemorrhage    Chronic bilateral low back pain without sciatica    Arthritis    CKD (chronic kidney disease), stage III (CMS/HCC)    Thrombocytopenia since stem cell transplant March 2016    Other orders  -     acyclovir (ZOVIRAX) 400 MG tablet; Take 1 tablet by mouth 2 (Two) Times a Day With Meals.

## 2019-01-10 RX ORDER — CYCLOBENZAPRINE HCL 10 MG
10 TABLET ORAL 3 TIMES DAILY PRN
Qty: 90 TABLET | Refills: 2 | Status: SHIPPED | OUTPATIENT
Start: 2019-01-10 | End: 2019-05-23 | Stop reason: SDUPTHER

## 2019-01-10 RX ORDER — GABAPENTIN 400 MG/1
400 CAPSULE ORAL 3 TIMES DAILY
Qty: 90 CAPSULE | Refills: 2 | Status: SHIPPED | OUTPATIENT
Start: 2019-01-10 | End: 2019-05-23 | Stop reason: SDUPTHER

## 2019-01-25 DIAGNOSIS — J42 CHRONIC BRONCHITIS, UNSPECIFIED CHRONIC BRONCHITIS TYPE (HCC): ICD-10-CM

## 2019-01-25 DIAGNOSIS — J96.11 CHRONIC RESPIRATORY FAILURE WITH HYPOXIA (HCC): ICD-10-CM

## 2019-01-25 DIAGNOSIS — C90.01 MULTIPLE MYELOMA IN REMISSION (HCC): ICD-10-CM

## 2019-01-25 DIAGNOSIS — I27.20 PULMONARY HYPERTENSION (HCC): ICD-10-CM

## 2019-01-25 RX ORDER — SODIUM CHLORIDE 9 MG/ML
250 INJECTION, SOLUTION INTRAVENOUS ONCE
Status: CANCELLED | OUTPATIENT
Start: 2019-01-29

## 2019-01-29 ENCOUNTER — INFUSION (OUTPATIENT)
Dept: ONCOLOGY | Facility: HOSPITAL | Age: 69
End: 2019-01-29

## 2019-01-29 VITALS
DIASTOLIC BLOOD PRESSURE: 78 MMHG | WEIGHT: 177 LBS | BODY MASS INDEX: 33.46 KG/M2 | SYSTOLIC BLOOD PRESSURE: 142 MMHG | HEART RATE: 106 BPM | TEMPERATURE: 96.8 F | RESPIRATION RATE: 20 BRPM

## 2019-01-29 DIAGNOSIS — C90.01 MULTIPLE MYELOMA IN REMISSION (HCC): Primary | ICD-10-CM

## 2019-01-29 LAB
ALBUMIN SERPL-MCNC: 4.49 G/DL (ref 3.2–4.8)
ALBUMIN/GLOB SERPL: 2.4 G/DL (ref 1.5–2.5)
ALP SERPL-CCNC: 113 U/L (ref 25–100)
ALT SERPL W P-5'-P-CCNC: 27 U/L (ref 7–40)
ANION GAP SERPL CALCULATED.3IONS-SCNC: 10 MMOL/L (ref 3–11)
AST SERPL-CCNC: 26 U/L (ref 0–33)
BASOPHILS # BLD AUTO: 0.02 10*3/MM3 (ref 0–0.2)
BASOPHILS NFR BLD AUTO: 0.4 % (ref 0–1)
BILIRUB SERPL-MCNC: 0.5 MG/DL (ref 0.3–1.2)
BUN BLD-MCNC: 16 MG/DL (ref 9–23)
BUN/CREAT SERPL: 13 (ref 7–25)
CALCIUM SPEC-SCNC: 8.9 MG/DL (ref 8.7–10.4)
CHLORIDE SERPL-SCNC: 105 MMOL/L (ref 99–109)
CO2 SERPL-SCNC: 25 MMOL/L (ref 20–31)
CREAT BLD-MCNC: 1.23 MG/DL (ref 0.6–1.3)
CREAT BLDA-MCNC: 1.3 MG/DL (ref 0.6–1.3)
CREATINE SERPL-MCNC: 1.3 MG/DL
DEPRECATED RDW RBC AUTO: 45.3 FL (ref 37–54)
EOSINOPHIL # BLD AUTO: 0.05 10*3/MM3 (ref 0–0.3)
EOSINOPHIL NFR BLD AUTO: 1 % (ref 0–3)
ERYTHROCYTE [DISTWIDTH] IN BLOOD BY AUTOMATED COUNT: 13 % (ref 11.3–14.5)
GFR SERPL CREATININE-BSD FRML MDRD: 43 ML/MIN/1.73
GLOBULIN UR ELPH-MCNC: 1.9 GM/DL
GLUCOSE BLD-MCNC: 86 MG/DL (ref 70–100)
HCT VFR BLD AUTO: 45.3 % (ref 34.5–44)
HGB BLD-MCNC: 14.7 G/DL (ref 11.5–15.5)
IMM GRANULOCYTES # BLD AUTO: 0.02 10*3/MM3 (ref 0–0.03)
IMM GRANULOCYTES NFR BLD AUTO: 0.4 % (ref 0–0.6)
LYMPHOCYTES # BLD AUTO: 0.82 10*3/MM3 (ref 0.6–4.8)
LYMPHOCYTES NFR BLD AUTO: 15.9 % (ref 24–44)
MAGNESIUM SERPL-MCNC: 2.1 MG/DL (ref 1.3–2.7)
MCH RBC QN AUTO: 30.8 PG (ref 27–31)
MCHC RBC AUTO-ENTMCNC: 32.5 G/DL (ref 32–36)
MCV RBC AUTO: 94.8 FL (ref 80–99)
MONOCYTES # BLD AUTO: 0.42 10*3/MM3 (ref 0–1)
MONOCYTES NFR BLD AUTO: 8.1 % (ref 0–12)
NEUTROPHILS # BLD AUTO: 3.84 10*3/MM3 (ref 1.5–8.3)
NEUTROPHILS NFR BLD AUTO: 74.2 % (ref 41–71)
PHOSPHATE SERPL-MCNC: 3.7 MG/DL (ref 2.4–5.1)
PLATELET # BLD AUTO: 97 10*3/MM3 (ref 150–450)
PMV BLD AUTO: 11.1 FL (ref 6–12)
POTASSIUM BLD-SCNC: 3.9 MMOL/L (ref 3.5–5.5)
PROT SERPL-MCNC: 6.4 G/DL (ref 5.7–8.2)
RBC # BLD AUTO: 4.78 10*6/MM3 (ref 3.89–5.14)
SODIUM BLD-SCNC: 140 MMOL/L (ref 132–146)
WBC NRBC COR # BLD: 5.17 10*3/MM3 (ref 3.5–10.8)

## 2019-01-29 PROCEDURE — 96365 THER/PROPH/DIAG IV INF INIT: CPT

## 2019-01-29 PROCEDURE — 80053 COMPREHEN METABOLIC PANEL: CPT | Performed by: INTERNAL MEDICINE

## 2019-01-29 PROCEDURE — 25010000002 ZOLEDRONIC ACID PER 1 MG: Performed by: INTERNAL MEDICINE

## 2019-01-29 PROCEDURE — 86334 IMMUNOFIX E-PHORESIS SERUM: CPT | Performed by: INTERNAL MEDICINE

## 2019-01-29 PROCEDURE — 83883 ASSAY NEPHELOMETRY NOT SPEC: CPT | Performed by: INTERNAL MEDICINE

## 2019-01-29 PROCEDURE — 83735 ASSAY OF MAGNESIUM: CPT | Performed by: INTERNAL MEDICINE

## 2019-01-29 PROCEDURE — 84100 ASSAY OF PHOSPHORUS: CPT | Performed by: INTERNAL MEDICINE

## 2019-01-29 PROCEDURE — 82784 ASSAY IGA/IGD/IGG/IGM EACH: CPT | Performed by: INTERNAL MEDICINE

## 2019-01-29 PROCEDURE — 96374 THER/PROPH/DIAG INJ IV PUSH: CPT

## 2019-01-29 PROCEDURE — 84165 PROTEIN E-PHORESIS SERUM: CPT | Performed by: INTERNAL MEDICINE

## 2019-01-29 PROCEDURE — 85025 COMPLETE CBC W/AUTO DIFF WBC: CPT | Performed by: INTERNAL MEDICINE

## 2019-01-29 PROCEDURE — 82565 ASSAY OF CREATININE: CPT | Performed by: INTERNAL MEDICINE

## 2019-01-29 RX ADMIN — ZOLEDRONIC ACID 3.3 MG: 4 INJECTION, SOLUTION, CONCENTRATE INTRAVENOUS at 10:17

## 2019-01-31 LAB
ALBUMIN SERPL-MCNC: 3.8 G/DL (ref 2.9–4.4)
ALBUMIN/GLOB SERPL: 1.4 {RATIO} (ref 0.7–1.7)
ALPHA1 GLOB FLD ELPH-MCNC: 0.3 G/DL (ref 0–0.4)
ALPHA2 GLOB SERPL ELPH-MCNC: 0.8 G/DL (ref 0.4–1)
B-GLOBULIN SERPL ELPH-MCNC: 1.2 G/DL (ref 0.7–1.3)
GAMMA GLOB SERPL ELPH-MCNC: 0.5 G/DL (ref 0.4–1.8)
GLOBULIN SER CALC-MCNC: 2.8 G/DL (ref 2.2–3.9)
IGA SERPL-MCNC: 127 MG/DL (ref 87–352)
IGG SERPL-MCNC: 496 MG/DL (ref 700–1600)
IGM SERPL-MCNC: 34 MG/DL (ref 26–217)
INTERPRETATION SERPL IEP-IMP: ABNORMAL
KAPPA LC SERPL-MCNC: 13.7 MG/L (ref 3.3–19.4)
KAPPA LC/LAMBDA SER: 1.44 {RATIO} (ref 0.26–1.65)
LAMBDA LC FREE SERPL-MCNC: 9.5 MG/L (ref 5.7–26.3)
Lab: ABNORMAL
M-SPIKE: ABNORMAL G/DL
PROT SERPL-MCNC: 6.6 G/DL (ref 6–8.5)

## 2019-02-15 ENCOUNTER — OFFICE VISIT (OUTPATIENT)
Dept: ONCOLOGY | Facility: CLINIC | Age: 69
End: 2019-02-15

## 2019-02-15 VITALS
HEIGHT: 61 IN | HEART RATE: 104 BPM | SYSTOLIC BLOOD PRESSURE: 166 MMHG | OXYGEN SATURATION: 93 % | BODY MASS INDEX: 33.99 KG/M2 | TEMPERATURE: 97.5 F | WEIGHT: 180 LBS | DIASTOLIC BLOOD PRESSURE: 75 MMHG | RESPIRATION RATE: 24 BRPM

## 2019-02-15 DIAGNOSIS — C90.01 MULTIPLE MYELOMA IN REMISSION (HCC): ICD-10-CM

## 2019-02-15 PROCEDURE — 99214 OFFICE O/P EST MOD 30 MIN: CPT | Performed by: INTERNAL MEDICINE

## 2019-02-15 RX ORDER — SODIUM CHLORIDE 9 MG/ML
250 INJECTION, SOLUTION INTRAVENOUS ONCE
Status: CANCELLED | OUTPATIENT
Start: 2019-04-23

## 2019-02-15 NOTE — PROGRESS NOTES
"      PROBLEM LIST:  Oncology/Hematology History     PROBLEM LIST:   1. Stage III IgA kappa clonal multiple myeloma. Diagnosed 9/20152. FISH panel reports multiple abnormalities including gain of 1q21 monosomy.  3. Monosomy 13 and gain of chromosomes 9, 15 and CCND1.  4. Initial M-spike of 7.1 g/dL at time of diagnosis and after 3 cycles, had  undetectable M-spike currently on Velcade, Revlimid and dexamethasone cycle #6  this week.  5. S/p Autologous SCT at [facility] with Dr. Venu Spicer in March 11,2016  6. Revlimid on hold due to low platelets  7.Right leg pain - on lyrica       Multiple myeloma in remission     7/11/2016 Initial Diagnosis     Multiple myeloma         REASON FOR VISIT: Multiple myeloma       Subjective     HISTORY OF PRESENT ILLNESS:   Ms. Miranda is here for follow up evaluation of myeloma management.  She is now 3 years out from her diagnosis.  Clinically doing well.  Denies any major issues with infections.  She still has pain issues.  This seems to be better.      Past Medical History, Past Surgical History, Social History, Family History have been reviewed and are without significant changes except as mentioned.    Review of Systems   Constitutional: Negative.    HENT: Negative.    Eyes: Negative.    Respiratory: Negative.    Cardiovascular: Negative.    Gastrointestinal: Negative.    Endocrine: Negative.    Genitourinary: Negative.    Musculoskeletal: Positive for arthralgias and gait problem.   Skin: Negative.    Allergic/Immunologic: Negative.    Hematological: Negative.    Psychiatric/Behavioral: Negative.       A comprehensive 14 point review of systems was performed and was negative except as mentioned.    Medications:  The current medication list was reviewed in the EMR    ALLERGIES:  No Known Allergies    Objective      Ht 154.9 cm (61\")   BMI 33.44 kg/m²      Performance Status: 1    General: well appearing, in no acute distress  HEENT: sclera anicteric, oropharynx clear  Lymphatics: no " cervical, supraclavicular, or axillary adenopathy  Cardiovascular: regular rate and rhythm, no murmurs  Lungs: clear to auscultation bilaterally  Abdomen: soft, nontender, nondistended.  No palpable organomegaly  Extremeties: no lower extremity edema  Skin: no rashes, lesions, bruising, or petechiae    RECENT LABS:    Lab Results   Component Value Date    MSPIKE Not Observed 01/29/2019    MSPIKE Not Observed 11/06/2018    MSPIKE Not Observed 08/14/2018     Lab Results   Component Value Date    FREEKAPPAL 13.7 01/29/2019    FREEKAPPAL 9.2 11/06/2018    FREEKAPPAL 15.9 08/14/2018     Lab Results   Component Value Date    IGLFLC 9.5 01/29/2019    IGLFLC 15.4 11/06/2018    IGLFLC 12.6 08/14/2018     Lab Results   Component Value Date    WBC 5.17 01/29/2019    HGB 14.7 01/29/2019    HCT 45.3 (H) 01/29/2019    MCV 94.8 01/29/2019    PLT 97 (L) 01/29/2019       Lab Results   Component Value Date    GLUCOSE 86 01/29/2019    BUN 16 01/29/2019    CREATININE 1.30 01/29/2019    EGFRIFNONA 43 (L) 01/29/2019    BCR 13.0 01/29/2019    K 3.9 01/29/2019    CO2 25.0 01/29/2019    CALCIUM 8.9 01/29/2019    PROTENTOTREF 6.6 01/29/2019    ALBUMIN 3.8 01/29/2019    ALBUMIN 4.49 01/29/2019    LABIL2 1.4 01/29/2019    AST 26 01/29/2019    ALT 27 01/29/2019         Assessment/Plan       1. multiple myeloma in remission  status post autologous stem cell transplant.  We had initially placed her on Revlimid and maintenance.  However due to severe thrombocytopenia she was taken off of that.  This is the first month that her platelets have improved.  I may consider placing her on Revlimid again.  However she is seeing Dr. Spicer next month.  We'll see how her numbers are before we do any changes.  Continue every 3 months of Zometa at this point may consider every 4 months..  M-spike still undetectable.    2.  Back pain: stable.    rtc in 3 months    I spent a total of 25 minutes in direct patient care, greater than 15 minutes (greater than 50%)  were spent in coordination of care, and counseling the patient regarding  Myeloma . Answered any questions patient had with medication and plan.      Joseph Mckinley MD  [facility]    2/15/2019               CC:

## 2019-03-01 ENCOUNTER — OFFICE VISIT (OUTPATIENT)
Dept: INTERNAL MEDICINE | Facility: CLINIC | Age: 69
End: 2019-03-01

## 2019-03-01 VITALS
SYSTOLIC BLOOD PRESSURE: 120 MMHG | BODY MASS INDEX: 33.99 KG/M2 | WEIGHT: 180 LBS | HEART RATE: 76 BPM | DIASTOLIC BLOOD PRESSURE: 78 MMHG | HEIGHT: 61 IN

## 2019-03-01 DIAGNOSIS — C90.01 MULTIPLE MYELOMA IN REMISSION (HCC): Chronic | ICD-10-CM

## 2019-03-01 DIAGNOSIS — G89.29 CHRONIC LEFT-SIDED LOW BACK PAIN WITH LEFT-SIDED SCIATICA: ICD-10-CM

## 2019-03-01 DIAGNOSIS — I36.1 NON-RHEUMATIC TRICUSPID VALVE INSUFFICIENCY: Chronic | ICD-10-CM

## 2019-03-01 DIAGNOSIS — K21.9 GASTROESOPHAGEAL REFLUX DISEASE WITHOUT ESOPHAGITIS: Chronic | ICD-10-CM

## 2019-03-01 DIAGNOSIS — Z12.31 ENCOUNTER FOR SCREENING MAMMOGRAM FOR MALIGNANT NEOPLASM OF BREAST: ICD-10-CM

## 2019-03-01 DIAGNOSIS — J96.11 CHRONIC RESPIRATORY FAILURE WITH HYPOXIA (HCC): ICD-10-CM

## 2019-03-01 DIAGNOSIS — K57.30 DIVERTICULOSIS OF LARGE INTESTINE WITHOUT HEMORRHAGE: ICD-10-CM

## 2019-03-01 DIAGNOSIS — M19.90 ARTHRITIS: ICD-10-CM

## 2019-03-01 DIAGNOSIS — J42 CHRONIC BRONCHITIS, UNSPECIFIED CHRONIC BRONCHITIS TYPE (HCC): ICD-10-CM

## 2019-03-01 DIAGNOSIS — N18.30 CKD (CHRONIC KIDNEY DISEASE), STAGE III (HCC): Chronic | ICD-10-CM

## 2019-03-01 DIAGNOSIS — M54.42 CHRONIC LEFT-SIDED LOW BACK PAIN WITH LEFT-SIDED SCIATICA: ICD-10-CM

## 2019-03-01 DIAGNOSIS — I27.20 PULMONARY HYPERTENSION (HCC): Primary | Chronic | ICD-10-CM

## 2019-03-01 PROBLEM — M54.40 CHRONIC LEFT-SIDED LOW BACK PAIN WITH SCIATICA: Status: ACTIVE | Noted: 2017-08-24

## 2019-03-01 PROCEDURE — 99214 OFFICE O/P EST MOD 30 MIN: CPT | Performed by: INTERNAL MEDICINE

## 2019-03-01 RX ORDER — PREDNISONE 10 MG/1
TABLET ORAL SEE ADMIN INSTRUCTIONS
Qty: 1 EACH | Refills: 0 | Status: SHIPPED | OUTPATIENT
Start: 2019-03-01 | End: 2019-03-22 | Stop reason: SDUPTHER

## 2019-03-01 NOTE — PROGRESS NOTES
Patient is a 68 y.o. female who is here for sciatica.  Chief Complaint   Patient presents with   • Sciatica         HPI:    Here for 1-2 week hx of left sciatica.  Worst with standing and sitting.  No falls.  Supine helps.  Sleep is not the best.  GERD is ok with PPI.  No dizziness or lightheadedness.  Breathing is good with prn inhaler.    History:    Patient Active Problem List   Diagnosis   • Multiple myeloma in remission (CMS/HCC)   • Diverticulosis of large intestine without hemorrhage   • Tubular adenoma of colon   • Chronic bronchitis (CMS/HCC)   • Arthritis   • Gastroesophageal reflux disease without esophagitis   • Chronic left-sided low back pain with sciatica   • Thrombocytopenia since stem cell transplant March 2016   • Hx of autologous stem cell transplant (March 2016)   • CKD (chronic kidney disease), stage III (CMS/HCC)   • Former smoker   • Non-rheumatic tricuspid valve insufficiency   • Pulmonary hypertension (CMS/HCC)   • Chronic respiratory failure with hypoxia (been noncompliant with outpatient oxygen in past)   • Leg edema, right       Past Medical History:   Diagnosis Date   • Arthritis    • Chronic bronchitis (CMS/HCC)    • COPD (chronic obstructive pulmonary disease) (CMS/HCC)    • Hypertension    • Multiple myeloma (CMS/HCC)    • Multiple myeloma, failed remission (CMS/HCC)        Past Surgical History:   Procedure Laterality Date   • LIMBAL STEM CELL TRANSPLANT  03/2016    @ Dr. Josiah Spicer   • MOUTH SURGERY         Current Outpatient Medications on File Prior to Visit   Medication Sig   • acyclovir (ZOVIRAX) 400 MG tablet Take 1 tablet by mouth 2 (Two) Times a Day With Meals.   • albuterol (PROVENTIL HFA;VENTOLIN HFA) 108 (90 Base) MCG/ACT inhaler Inhale 2 puffs Every 6 (Six) Hours As Needed for Wheezing or Shortness of Air.   • cyclobenzaprine (FLEXERIL) 10 MG tablet Take 1 tablet by mouth 3 (Three) Times a Day As Needed for Muscle Spasms.   • esomeprazole (nexIUM) 20 MG capsule  Take 20 mg by mouth 2 (Two) Times a Day.   • gabapentin (NEURONTIN) 400 MG capsule Take 1 capsule by mouth 3 (Three) Times a Day.   • Melatonin 5 MG chewable tablet Chew 5 mg Every Night.   • PARoxetine (PAXIL) 10 MG tablet Take 1 tablet by mouth every night at bedtime.     No current facility-administered medications on file prior to visit.        Family History   Problem Relation Age of Onset   • Breast cancer Other    • Lung cancer Other    • Liver cancer Other    • Bone cancer Other        Social History     Socioeconomic History   • Marital status:      Spouse name: Not on file   • Number of children: Not on file   • Years of education: Not on file   • Highest education level: Not on file   Social Needs   • Financial resource strain: Not on file   • Food insecurity - worry: Not on file   • Food insecurity - inability: Not on file   • Transportation needs - medical: Not on file   • Transportation needs - non-medical: Not on file   Occupational History   • Not on file   Tobacco Use   • Smoking status: Former Smoker     Last attempt to quit: 8/1/2015     Years since quitting: 3.5   • Smokeless tobacco: Never Used   Substance and Sexual Activity   • Alcohol use: No   • Drug use: No   • Sexual activity: Defer   Other Topics Concern   • Not on file   Social History Narrative   • Not on file         Review of Systems   Constitutional: Positive for fatigue. Negative for chills, fever and unexpected weight change.   HENT: Negative for congestion, ear pain, hearing loss, rhinorrhea, sinus pressure, sore throat and trouble swallowing.    Eyes: Negative for discharge and itching.   Respiratory: Negative for cough, chest tightness and shortness of breath.    Cardiovascular: Positive for leg swelling (improved). Negative for chest pain and palpitations.   Gastrointestinal: Negative for abdominal pain, blood in stool, constipation, diarrhea and vomiting.        11/13 colonoscopy with hyperplastic polyps  12/16  "colonoscopy with TA times one   Endocrine: Negative for polydipsia and polyuria.   Genitourinary: Negative for difficulty urinating, dysuria, enuresis, frequency, hematuria and urgency.   Musculoskeletal: Positive for arthralgias, back pain and gait problem. Negative for joint swelling.   Skin: Negative for rash and wound.   Allergic/Immunologic: Negative for immunocompromised state.   Neurological: Positive for weakness and numbness. Negative for dizziness, syncope, light-headedness and headaches.   Hematological: Does not bruise/bleed easily.   Psychiatric/Behavioral: Negative for behavioral problems, dysphoric mood and sleep disturbance. The patient is not nervous/anxious.        /78   Pulse 76   Ht 154.9 cm (60.98\")   Wt 81.6 kg (180 lb)   BMI 34.03 kg/m²       Physical Exam   Constitutional: She is oriented to person, place, and time. She appears well-developed and well-nourished.   HENT:   Head: Normocephalic and atraumatic.   Right Ear: External ear normal.   Left Ear: External ear normal.   Mouth/Throat: Oropharynx is clear and moist.   Eyes: Conjunctivae and EOM are normal.   Neck: Normal range of motion. Neck supple.   Cardiovascular: Normal rate, regular rhythm and normal heart sounds.   Pulmonary/Chest: Effort normal. She has rales (mild bibasilar).   Abdominal: Soft. Bowel sounds are normal.   Musculoskeletal:   Using cane  + Left SLE   Lymphadenopathy:     She has no cervical adenopathy.   Neurological: She is alert and oriented to person, place, and time.   Skin: Skin is warm and dry.   Psychiatric: She has a normal mood and affect. Her behavior is normal. Thought content normal.       Procedure:      Discussion/Summary:    htn-stable off meds  copd-not an issue since stopping tob, proventil prn  gerd/gastritis-cont ppi  MM/back pain with radiculopathy-will rx burst of steroids  insomnia-cont melatonin  Abnormal lung sounds-CT from [facility] noted  Leg edema-improved  High risk " meds-labs noted     Labs noted and dw patient        Current Outpatient Medications:   •  acyclovir (ZOVIRAX) 400 MG tablet, Take 1 tablet by mouth 2 (Two) Times a Day With Meals., Disp: 60 tablet, Rfl: 5  •  albuterol (PROVENTIL HFA;VENTOLIN HFA) 108 (90 Base) MCG/ACT inhaler, Inhale 2 puffs Every 6 (Six) Hours As Needed for Wheezing or Shortness of Air., Disp: 1 inhaler, Rfl: 5  •  cyclobenzaprine (FLEXERIL) 10 MG tablet, Take 1 tablet by mouth 3 (Three) Times a Day As Needed for Muscle Spasms., Disp: 90 tablet, Rfl: 2  •  esomeprazole (nexIUM) 20 MG capsule, Take 20 mg by mouth 2 (Two) Times a Day., Disp: , Rfl:   •  gabapentin (NEURONTIN) 400 MG capsule, Take 1 capsule by mouth 3 (Three) Times a Day., Disp: 90 capsule, Rfl: 2  •  Melatonin 5 MG chewable tablet, Chew 5 mg Every Night., Disp: , Rfl:   •  PARoxetine (PAXIL) 10 MG tablet, Take 1 tablet by mouth every night at bedtime., Disp: 30 tablet, Rfl: 2  •  predniSONE (DELTASONE) 10 MG (48) tablet pack, Take  by mouth See Admin Instructions., Disp: 1 each, Rfl: 0        Doris was seen today for sciatica.    Diagnoses and all orders for this visit:    Pulmonary hypertension (CMS/HCC)    Non-rheumatic tricuspid valve insufficiency    Chronic respiratory failure with hypoxia (been noncompliant with outpatient oxygen in past)    Chronic bronchitis, unspecified chronic bronchitis type (CMS/HCC)    Gastroesophageal reflux disease without esophagitis    Diverticulosis of large intestine without hemorrhage    CKD (chronic kidney disease), stage III (CMS/HCC)    Arthritis    Chronic left-sided low back pain with left-sided sciatica  -     predniSONE (DELTASONE) 10 MG (48) tablet pack; Take  by mouth See Admin Instructions.    Multiple myeloma in remission (CMS/HCC)

## 2019-03-21 ENCOUNTER — TELEPHONE (OUTPATIENT)
Dept: INTERNAL MEDICINE | Facility: CLINIC | Age: 69
End: 2019-03-21

## 2019-03-22 DIAGNOSIS — M54.42 CHRONIC LEFT-SIDED LOW BACK PAIN WITH LEFT-SIDED SCIATICA: ICD-10-CM

## 2019-03-22 DIAGNOSIS — G89.29 CHRONIC LEFT-SIDED LOW BACK PAIN WITH LEFT-SIDED SCIATICA: ICD-10-CM

## 2019-03-22 RX ORDER — PREDNISONE 10 MG/1
TABLET ORAL SEE ADMIN INSTRUCTIONS
Qty: 1 EACH | Refills: 0 | Status: SHIPPED | OUTPATIENT
Start: 2019-03-22 | End: 2019-04-04

## 2019-03-26 DIAGNOSIS — N95.1 SWEATS, MENOPAUSAL: ICD-10-CM

## 2019-03-26 RX ORDER — PAROXETINE 10 MG/1
10 TABLET, FILM COATED ORAL
Qty: 30 TABLET | Refills: 2 | Status: SHIPPED | OUTPATIENT
Start: 2019-03-26 | End: 2019-07-17 | Stop reason: SDUPTHER

## 2019-04-04 ENCOUNTER — OFFICE VISIT (OUTPATIENT)
Dept: INTERNAL MEDICINE | Facility: CLINIC | Age: 69
End: 2019-04-04

## 2019-04-04 VITALS
WEIGHT: 184 LBS | HEIGHT: 61 IN | DIASTOLIC BLOOD PRESSURE: 68 MMHG | SYSTOLIC BLOOD PRESSURE: 124 MMHG | BODY MASS INDEX: 34.74 KG/M2 | HEART RATE: 68 BPM

## 2019-04-04 DIAGNOSIS — M19.90 ARTHRITIS: ICD-10-CM

## 2019-04-04 DIAGNOSIS — K57.30 DIVERTICULOSIS OF LARGE INTESTINE WITHOUT HEMORRHAGE: ICD-10-CM

## 2019-04-04 DIAGNOSIS — I27.20 PULMONARY HYPERTENSION (HCC): Primary | Chronic | ICD-10-CM

## 2019-04-04 DIAGNOSIS — D12.6 TUBULAR ADENOMA OF COLON: ICD-10-CM

## 2019-04-04 DIAGNOSIS — R26.81 GAIT INSTABILITY: ICD-10-CM

## 2019-04-04 DIAGNOSIS — N18.30 CKD (CHRONIC KIDNEY DISEASE), STAGE III (HCC): Chronic | ICD-10-CM

## 2019-04-04 DIAGNOSIS — G89.29 CHRONIC LEFT-SIDED LOW BACK PAIN WITH LEFT-SIDED SCIATICA: ICD-10-CM

## 2019-04-04 DIAGNOSIS — K21.9 GASTROESOPHAGEAL REFLUX DISEASE WITHOUT ESOPHAGITIS: Chronic | ICD-10-CM

## 2019-04-04 DIAGNOSIS — T50.905A THROMBOCYTOPENIA DUE TO DRUGS: Chronic | ICD-10-CM

## 2019-04-04 DIAGNOSIS — M54.42 CHRONIC LEFT-SIDED LOW BACK PAIN WITH LEFT-SIDED SCIATICA: ICD-10-CM

## 2019-04-04 DIAGNOSIS — J96.11 CHRONIC RESPIRATORY FAILURE WITH HYPOXIA (HCC): ICD-10-CM

## 2019-04-04 DIAGNOSIS — D69.59 THROMBOCYTOPENIA DUE TO DRUGS: Chronic | ICD-10-CM

## 2019-04-04 PROCEDURE — 99214 OFFICE O/P EST MOD 30 MIN: CPT | Performed by: INTERNAL MEDICINE

## 2019-04-04 NOTE — PROGRESS NOTES
Patient is a 68 y.o. female who is here for a follow up of sciatica.  Chief Complaint   Patient presents with   • Sciatica         HPI:    FACE TO FACE MOBILITY EXAM    Here for f/u.  Continues to have issues with low back pain.  Worst with activity.  Having more trouble ambulating.  Using a walker/cane around the house.  Some improvement with steroids.  Does not go up and down stairs unassisted.  Going to bathroom is difficult.  Has UE weakness.  Fortunately no falls.      History:    Patient Active Problem List   Diagnosis   • Multiple myeloma in remission (CMS/HCC)   • Diverticulosis of large intestine without hemorrhage   • Tubular adenoma of colon   • Chronic bronchitis (CMS/HCC)   • Arthritis   • Gastroesophageal reflux disease without esophagitis   • Chronic left-sided low back pain with sciatica   • Thrombocytopenia since stem cell transplant March 2016   • Hx of autologous stem cell transplant (March 2016)   • CKD (chronic kidney disease), stage III (CMS/HCC)   • Former smoker   • Non-rheumatic tricuspid valve insufficiency   • Pulmonary hypertension (CMS/HCC)   • Chronic respiratory failure with hypoxia (been noncompliant with outpatient oxygen in past)   • Leg edema, right   • Gait instability       Past Medical History:   Diagnosis Date   • Arthritis    • Chronic bronchitis (CMS/HCC)    • COPD (chronic obstructive pulmonary disease) (CMS/HCC)    • Hypertension    • Multiple myeloma (CMS/HCC)    • Multiple myeloma, failed remission (CMS/HCC)        Past Surgical History:   Procedure Laterality Date   • LIMBAL STEM CELL TRANSPLANT  03/2016    @ Dr. Josiah Spicer   • MOUTH SURGERY         Current Outpatient Medications on File Prior to Visit   Medication Sig   • acyclovir (ZOVIRAX) 400 MG tablet Take 1 tablet by mouth 2 (Two) Times a Day With Meals.   • albuterol (PROVENTIL HFA;VENTOLIN HFA) 108 (90 Base) MCG/ACT inhaler Inhale 2 puffs Every 6 (Six) Hours As Needed for Wheezing or Shortness of Air.   •  cyclobenzaprine (FLEXERIL) 10 MG tablet Take 1 tablet by mouth 3 (Three) Times a Day As Needed for Muscle Spasms.   • esomeprazole (nexIUM) 20 MG capsule Take 20 mg by mouth 2 (Two) Times a Day.   • gabapentin (NEURONTIN) 400 MG capsule Take 1 capsule by mouth 3 (Three) Times a Day.   • Melatonin 5 MG chewable tablet Chew 5 mg Every Night.   • PARoxetine (PAXIL) 10 MG tablet Take 1 tablet by mouth every night at bedtime.   • [DISCONTINUED] predniSONE (DELTASONE) 10 MG (48) tablet pack Take  by mouth See Admin Instructions.     No current facility-administered medications on file prior to visit.        Family History   Problem Relation Age of Onset   • Breast cancer Other    • Lung cancer Other    • Liver cancer Other    • Bone cancer Other        Social History     Socioeconomic History   • Marital status:      Spouse name: Not on file   • Number of children: Not on file   • Years of education: Not on file   • Highest education level: Not on file   Tobacco Use   • Smoking status: Former Smoker     Last attempt to quit: 8/1/2015     Years since quitting: 3.6   • Smokeless tobacco: Never Used   Substance and Sexual Activity   • Alcohol use: No   • Drug use: No   • Sexual activity: Defer         Review of Systems   Constitutional: Positive for fatigue. Negative for chills, fever and unexpected weight change.   HENT: Negative for congestion, ear pain, hearing loss, rhinorrhea, sinus pressure, sore throat and trouble swallowing.    Eyes: Negative for discharge and itching.   Respiratory: Negative for cough, chest tightness and shortness of breath.    Cardiovascular: Positive for leg swelling (improved). Negative for chest pain and palpitations.   Gastrointestinal: Negative for abdominal pain, blood in stool, constipation, diarrhea and vomiting.        11/13 colonoscopy with hyperplastic polyps  12/16 colonoscopy with TA times one   Endocrine: Negative for polydipsia and polyuria.   Genitourinary: Negative for  "difficulty urinating, dysuria, enuresis, frequency, hematuria and urgency.   Musculoskeletal: Positive for arthralgias, back pain and gait problem. Negative for joint swelling.   Skin: Negative for rash and wound.   Allergic/Immunologic: Negative for immunocompromised state.   Neurological: Positive for weakness and numbness. Negative for dizziness, syncope, light-headedness and headaches.   Hematological: Does not bruise/bleed easily.   Psychiatric/Behavioral: Negative for behavioral problems, dysphoric mood and sleep disturbance. The patient is not nervous/anxious.        /68 (BP Location: Left arm, Patient Position: Sitting)   Pulse 68   Ht 154.9 cm (60.98\")   Wt 83.5 kg (184 lb)   BMI 34.78 kg/m²       Physical Exam   Constitutional: She is oriented to person, place, and time. She appears well-developed and well-nourished.   HENT:   Head: Normocephalic and atraumatic.   Right Ear: External ear normal.   Left Ear: External ear normal.   Mouth/Throat: Oropharynx is clear and moist.   Eyes: Conjunctivae and EOM are normal.   Neck: Normal range of motion. Neck supple.   Cardiovascular: Normal rate, regular rhythm and normal heart sounds.   Pulmonary/Chest: Effort normal. She has rales (mild bibasilar).   Abdominal: Soft. Bowel sounds are normal.   Musculoskeletal:   Using cane  + Left SLE but improved    MOTOR-LE 4/5, UE 4/5    Unstable gait   Lymphadenopathy:     She has no cervical adenopathy.   Neurological: She is alert and oriented to person, place, and time.   Skin: Skin is warm and dry.   Psychiatric: She has a normal mood and affect. Her behavior is normal. Thought content normal.       Procedure:      Discussion/Summary:    htn-stable off meds  copd-not an issue since stopping tob, proventil prn  gerd/gastritis-cont ppi  MM/back pain with radiculopathy-has upcoming bone scan  insomnia-cont melatonin  Abnormal lung sounds-CT from [facility] noted  Leg edema-improved  Gait instability/chronic back " pain-will need PMD  High risk meds-labs noted     Labs noted and dw patient    Will need PMD for ambulation in house and outside house.  She is able to maneuver the device unassisted.          Current Outpatient Medications:   •  acyclovir (ZOVIRAX) 400 MG tablet, Take 1 tablet by mouth 2 (Two) Times a Day With Meals., Disp: 60 tablet, Rfl: 5  •  albuterol (PROVENTIL HFA;VENTOLIN HFA) 108 (90 Base) MCG/ACT inhaler, Inhale 2 puffs Every 6 (Six) Hours As Needed for Wheezing or Shortness of Air., Disp: 1 inhaler, Rfl: 5  •  cyclobenzaprine (FLEXERIL) 10 MG tablet, Take 1 tablet by mouth 3 (Three) Times a Day As Needed for Muscle Spasms., Disp: 90 tablet, Rfl: 2  •  esomeprazole (nexIUM) 20 MG capsule, Take 20 mg by mouth 2 (Two) Times a Day., Disp: , Rfl:   •  gabapentin (NEURONTIN) 400 MG capsule, Take 1 capsule by mouth 3 (Three) Times a Day., Disp: 90 capsule, Rfl: 2  •  Melatonin 5 MG chewable tablet, Chew 5 mg Every Night., Disp: , Rfl:   •  PARoxetine (PAXIL) 10 MG tablet, Take 1 tablet by mouth every night at bedtime., Disp: 30 tablet, Rfl: 2        Doris was seen today for sciatica.    Diagnoses and all orders for this visit:    Pulmonary hypertension (CMS/HCC)    Chronic respiratory failure with hypoxia (been noncompliant with outpatient oxygen in past)    Tubular adenoma of colon    Gastroesophageal reflux disease without esophagitis    Diverticulosis of large intestine without hemorrhage    Chronic left-sided low back pain with left-sided sciatica    Arthritis    CKD (chronic kidney disease), stage III (CMS/HCC)    Thrombocytopenia since stem cell transplant March 2016    Gait instability

## 2019-04-23 ENCOUNTER — INFUSION (OUTPATIENT)
Dept: ONCOLOGY | Facility: HOSPITAL | Age: 69
End: 2019-04-23

## 2019-04-23 VITALS
TEMPERATURE: 97.7 F | SYSTOLIC BLOOD PRESSURE: 140 MMHG | DIASTOLIC BLOOD PRESSURE: 71 MMHG | HEART RATE: 98 BPM | WEIGHT: 188 LBS | RESPIRATION RATE: 16 BRPM | BODY MASS INDEX: 35.54 KG/M2

## 2019-04-23 DIAGNOSIS — C90.01 MULTIPLE MYELOMA IN REMISSION (HCC): Primary | ICD-10-CM

## 2019-04-23 LAB
ALBUMIN SERPL-MCNC: 4 G/DL (ref 3.5–5.2)
ALBUMIN/GLOB SERPL: 1.4 G/DL
ALP SERPL-CCNC: 101 U/L (ref 39–117)
ALT SERPL W P-5'-P-CCNC: 16 U/L (ref 1–33)
ANION GAP SERPL CALCULATED.3IONS-SCNC: 11 MMOL/L
AST SERPL-CCNC: 17 U/L (ref 1–32)
BASOPHILS # BLD AUTO: 0.03 10*3/MM3 (ref 0–0.2)
BASOPHILS NFR BLD AUTO: 0.5 % (ref 0–1.5)
BILIRUB SERPL-MCNC: 0.5 MG/DL (ref 0.2–1.2)
BUN BLD-MCNC: 12 MG/DL (ref 8–23)
BUN/CREAT SERPL: 10.1 (ref 7–25)
CALCIUM SPEC-SCNC: 9.7 MG/DL (ref 8.6–10.5)
CHLORIDE SERPL-SCNC: 104 MMOL/L (ref 98–107)
CO2 SERPL-SCNC: 25 MMOL/L (ref 22–29)
CREAT BLD-MCNC: 1.19 MG/DL (ref 0.57–1)
CREAT BLDA-MCNC: 1.3 MG/DL (ref 0.6–1.3)
DEPRECATED RDW RBC AUTO: 45.2 FL (ref 37–54)
EOSINOPHIL # BLD AUTO: 0.04 10*3/MM3 (ref 0–0.4)
EOSINOPHIL NFR BLD AUTO: 0.7 % (ref 0.3–6.2)
ERYTHROCYTE [DISTWIDTH] IN BLOOD BY AUTOMATED COUNT: 13.4 % (ref 12.3–15.4)
GFR SERPL CREATININE-BSD FRML MDRD: 45 ML/MIN/1.73
GLOBULIN UR ELPH-MCNC: 2.9 GM/DL
GLUCOSE BLD-MCNC: 79 MG/DL (ref 65–99)
HCT VFR BLD AUTO: 43.3 % (ref 34–46.6)
HGB BLD-MCNC: 14.5 G/DL (ref 12–15.9)
IMM GRANULOCYTES # BLD AUTO: 0.03 10*3/MM3 (ref 0–0.05)
IMM GRANULOCYTES NFR BLD AUTO: 0.5 % (ref 0–0.5)
LYMPHOCYTES # BLD AUTO: 1.05 10*3/MM3 (ref 0.7–3.1)
LYMPHOCYTES NFR BLD AUTO: 19.1 % (ref 19.6–45.3)
MAGNESIUM SERPL-MCNC: 2.1 MG/DL (ref 1.6–2.4)
MCH RBC QN AUTO: 30.6 PG (ref 26.6–33)
MCHC RBC AUTO-ENTMCNC: 33.5 G/DL (ref 31.5–35.7)
MCV RBC AUTO: 91.4 FL (ref 79–97)
MONOCYTES # BLD AUTO: 0.32 10*3/MM3 (ref 0.1–0.9)
MONOCYTES NFR BLD AUTO: 5.8 % (ref 5–12)
NEUTROPHILS # BLD AUTO: 4.02 10*3/MM3 (ref 1.7–7)
NEUTROPHILS NFR BLD AUTO: 73.4 % (ref 42.7–76)
PHOSPHATE SERPL-MCNC: 3.6 MG/DL (ref 2.5–4.5)
PLATELET # BLD AUTO: 79 10*3/MM3 (ref 140–450)
PMV BLD AUTO: 11 FL (ref 6–12)
POTASSIUM BLD-SCNC: 4.3 MMOL/L (ref 3.5–5.2)
PROT SERPL-MCNC: 6.9 G/DL (ref 6–8.5)
RBC # BLD AUTO: 4.74 10*6/MM3 (ref 3.77–5.28)
SODIUM BLD-SCNC: 140 MMOL/L (ref 136–145)
WBC NRBC COR # BLD: 5.49 10*3/MM3 (ref 3.4–10.8)

## 2019-04-23 PROCEDURE — 86334 IMMUNOFIX E-PHORESIS SERUM: CPT | Performed by: INTERNAL MEDICINE

## 2019-04-23 PROCEDURE — 80053 COMPREHEN METABOLIC PANEL: CPT | Performed by: INTERNAL MEDICINE

## 2019-04-23 PROCEDURE — 82784 ASSAY IGA/IGD/IGG/IGM EACH: CPT | Performed by: INTERNAL MEDICINE

## 2019-04-23 PROCEDURE — 85025 COMPLETE CBC W/AUTO DIFF WBC: CPT | Performed by: INTERNAL MEDICINE

## 2019-04-23 PROCEDURE — 96374 THER/PROPH/DIAG INJ IV PUSH: CPT

## 2019-04-23 PROCEDURE — 83735 ASSAY OF MAGNESIUM: CPT | Performed by: INTERNAL MEDICINE

## 2019-04-23 PROCEDURE — 83883 ASSAY NEPHELOMETRY NOT SPEC: CPT | Performed by: INTERNAL MEDICINE

## 2019-04-23 PROCEDURE — 25010000002 ZOLEDRONIC ACID PER 1 MG: Performed by: INTERNAL MEDICINE

## 2019-04-23 PROCEDURE — 84165 PROTEIN E-PHORESIS SERUM: CPT | Performed by: INTERNAL MEDICINE

## 2019-04-23 PROCEDURE — 84100 ASSAY OF PHOSPHORUS: CPT | Performed by: INTERNAL MEDICINE

## 2019-04-23 PROCEDURE — 82565 ASSAY OF CREATININE: CPT | Performed by: INTERNAL MEDICINE

## 2019-04-23 PROCEDURE — 96365 THER/PROPH/DIAG IV INF INIT: CPT

## 2019-04-23 RX ADMIN — ZOLEDRONIC ACID 3.3 MG: 4 INJECTION, SOLUTION, CONCENTRATE INTRAVENOUS at 13:49

## 2019-04-25 LAB
ALBUMIN SERPL-MCNC: 3.4 G/DL (ref 2.9–4.4)
ALBUMIN/GLOB SERPL: 1.2 {RATIO} (ref 0.7–1.7)
ALPHA1 GLOB FLD ELPH-MCNC: 0.3 G/DL (ref 0–0.4)
ALPHA2 GLOB SERPL ELPH-MCNC: 0.9 G/DL (ref 0.4–1)
B-GLOBULIN SERPL ELPH-MCNC: 1.2 G/DL (ref 0.7–1.3)
GAMMA GLOB SERPL ELPH-MCNC: 0.5 G/DL (ref 0.4–1.8)
GLOBULIN SER CALC-MCNC: 2.9 G/DL (ref 2.2–3.9)
IGA SERPL-MCNC: 99 MG/DL (ref 87–352)
IGG SERPL-MCNC: 439 MG/DL (ref 700–1600)
IGM SERPL-MCNC: 42 MG/DL (ref 26–217)
INTERPRETATION SERPL IEP-IMP: ABNORMAL
KAPPA LC SERPL-MCNC: 14.3 MG/L (ref 3.3–19.4)
KAPPA LC/LAMBDA SER: 1.21 {RATIO} (ref 0.26–1.65)
LAMBDA LC FREE SERPL-MCNC: 11.8 MG/L (ref 5.7–26.3)
Lab: ABNORMAL
M-SPIKE: ABNORMAL G/DL
PROT SERPL-MCNC: 6.3 G/DL (ref 6–8.5)

## 2019-05-10 ENCOUNTER — LAB (OUTPATIENT)
Dept: LAB | Facility: HOSPITAL | Age: 69
End: 2019-05-10

## 2019-05-10 ENCOUNTER — OFFICE VISIT (OUTPATIENT)
Dept: ONCOLOGY | Facility: CLINIC | Age: 69
End: 2019-05-10

## 2019-05-10 VITALS
TEMPERATURE: 97.1 F | DIASTOLIC BLOOD PRESSURE: 72 MMHG | SYSTOLIC BLOOD PRESSURE: 150 MMHG | HEIGHT: 61 IN | BODY MASS INDEX: 35.5 KG/M2 | OXYGEN SATURATION: 94 % | WEIGHT: 188 LBS | HEART RATE: 109 BPM | RESPIRATION RATE: 24 BRPM

## 2019-05-10 DIAGNOSIS — C90.01 MULTIPLE MYELOMA IN REMISSION (HCC): Chronic | ICD-10-CM

## 2019-05-10 DIAGNOSIS — R73.03 PREDIABETES: ICD-10-CM

## 2019-05-10 DIAGNOSIS — D64.9 ANEMIA, UNSPECIFIED TYPE: ICD-10-CM

## 2019-05-10 DIAGNOSIS — C90.01 MULTIPLE MYELOMA IN REMISSION (HCC): Primary | Chronic | ICD-10-CM

## 2019-05-10 LAB
T3FREE SERPL-MCNC: 3.12 PG/ML (ref 2–4.4)
T4 SERPL-MCNC: 7.1 MCG/DL (ref 4.5–11.7)
TSH SERPL DL<=0.05 MIU/L-ACNC: 1.94 MIU/ML (ref 0.27–4.2)

## 2019-05-10 PROCEDURE — 84481 FREE ASSAY (FT-3): CPT

## 2019-05-10 PROCEDURE — 36415 COLL VENOUS BLD VENIPUNCTURE: CPT

## 2019-05-10 PROCEDURE — 84436 ASSAY OF TOTAL THYROXINE: CPT

## 2019-05-10 PROCEDURE — 84443 ASSAY THYROID STIM HORMONE: CPT

## 2019-05-10 PROCEDURE — 99214 OFFICE O/P EST MOD 30 MIN: CPT | Performed by: INTERNAL MEDICINE

## 2019-05-10 RX ORDER — SODIUM CHLORIDE 9 MG/ML
250 INJECTION, SOLUTION INTRAVENOUS ONCE
Status: CANCELLED | OUTPATIENT
Start: 2019-07-16

## 2019-05-10 NOTE — PROGRESS NOTES
"      PROBLEM LIST:  Oncology/Hematology History     PROBLEM LIST:   1. Stage III IgA kappa clonal multiple myeloma. Diagnosed 9/2015  2. FISH panel reports multiple abnormalities including gain of 1q21 monosomy.  3. Monosomy 13 and gain of chromosomes 9, 15 and CCND1.  4. Initial M-spike of 7.1 g/dL at time of diagnosis and after 3 cycles, had  undetectable M-spike currently on Velcade, Revlimid and dexamethasone cycle #6  this week.  5. S/p Autologous SCT at [facility] with Dr. Venu Spicer in March 11,2016  6. Revlimid on hold due to low platelets  7.Right leg pain - on lyrica       Multiple myeloma in remission     7/11/2016 Initial Diagnosis     Multiple myeloma         REASON FOR VISIT: Multiple myeloma       Subjective     HISTORY OF PRESENT ILLNESS:   Ms. Miranda is here for follow up evaluation of myeloma management.  She is now over 3 years out from her transplant  Clinically doing well.  Denies any major issues with infections.  She still has pain issues in her right hip.        Past Medical History, Past Surgical History, Social History, Family History have been reviewed and are without significant changes except as mentioned.    Review of Systems   Constitutional: Negative.    HENT: Negative.    Eyes: Negative.    Respiratory: Negative.    Cardiovascular: Negative.    Gastrointestinal: Negative.    Endocrine: Negative.    Genitourinary: Negative.    Musculoskeletal: Positive for arthralgias and gait problem.   Skin: Negative.    Allergic/Immunologic: Negative.    Hematological: Negative.    Psychiatric/Behavioral: Negative.       A comprehensive 14 point review of systems was performed and was negative except as mentioned.    Medications:  The current medication list was reviewed in the EMR    ALLERGIES:  No Known Allergies    Objective      /72 Comment: LUE  Pulse 109   Temp 97.1 °F (36.2 °C) (Temporal)   Resp 24   Ht 154.9 cm (61\")   Wt 85.3 kg (188 lb)   SpO2 94% Comment: RA  BMI 35.52 kg/m²    "   Performance Status: 1    General: well appearing, in no acute distress  HEENT: sclera anicteric, oropharynx clear  Lymphatics: no cervical, supraclavicular, or axillary adenopathy  Cardiovascular: regular rate and rhythm, no murmurs  Lungs: clear to auscultation bilaterally  Abdomen: soft, nontender, nondistended.  No palpable organomegaly  Extremeties: no lower extremity edema  Skin: no rashes, lesions, bruising, or petechiae    RECENT LABS:    Lab Results   Component Value Date    MSPIKE Not Observed 04/23/2019    MSPIKE Not Observed 01/29/2019    MSPIKE Not Observed 11/06/2018     Lab Results   Component Value Date    FREEKAPPAL 14.3 04/23/2019    FREEKAPPAL 13.7 01/29/2019    FREEKAPPAL 9.2 11/06/2018     Lab Results   Component Value Date    IGLFLC 11.8 04/23/2019    IGLFLC 9.5 01/29/2019    IGLFLC 15.4 11/06/2018     Lab Results   Component Value Date    WBC 5.49 04/23/2019    HGB 14.5 04/23/2019    HCT 43.3 04/23/2019    MCV 91.4 04/23/2019    PLT 79 (L) 04/23/2019       Lab Results   Component Value Date    GLUCOSE 79 04/23/2019    BUN 12 04/23/2019    CREATININE 1.30 04/23/2019    EGFRIFNONA 45 (L) 04/23/2019    BCR 10.1 04/23/2019    K 4.3 04/23/2019    CO2 25.0 04/23/2019    CALCIUM 9.7 04/23/2019    PROTENTOTREF 6.3 04/23/2019    ALBUMIN 4.00 04/23/2019    ALBUMIN 3.4 04/23/2019    LABIL2 1.2 04/23/2019    AST 17 04/23/2019    ALT 16 04/23/2019         Assessment/Plan       1. multiple myeloma in remission  status post autologous stem cell transplant.  We had initially placed her on Revlimid and maintenance.  However due to severe thrombocytopenia she was taken off of that.   her platelets have improved in the last 3 months. We will continue having her off revlimid.  Continue every 3 months of Zometa at this point may consider every 4 months..  M-spike still undetectable. Bone survey today    2.  Back pain/ hip pain: will have her see ortho.    rtc in 3 months    I spent a total of 25 minutes in direct  patient care, greater than 15 minutes (greater than 50%) were spent in coordination of care, and counseling the patient regarding  Myeloma . Answered any questions patient had with medication and plan.      Joseph Mckinley MD  [facility] Hematology [facility] On[facility]    5/10/2019               CC:

## 2019-05-14 ENCOUNTER — HOSPITAL ENCOUNTER (OUTPATIENT)
Dept: GENERAL RADIOLOGY | Facility: HOSPITAL | Age: 69
Discharge: HOME | End: 2019-05-14
Admitting: INTERNAL MEDICINE

## 2019-05-14 DIAGNOSIS — C90.01 MULTIPLE MYELOMA IN REMISSION (HCC): Chronic | ICD-10-CM

## 2019-05-14 PROCEDURE — 77075 RADEX OSSEOUS SURVEY COMPL: CPT

## 2019-05-16 ENCOUNTER — OFFICE VISIT (OUTPATIENT)
Dept: ORTHOPEDIC SURGERY | Facility: CLINIC | Age: 69
End: 2019-05-16

## 2019-05-16 VITALS — HEIGHT: 61 IN | WEIGHT: 188.05 LBS | BODY MASS INDEX: 35.5 KG/M2 | OXYGEN SATURATION: 98 % | HEART RATE: 94 BPM

## 2019-05-16 DIAGNOSIS — M16.0 PRIMARY OSTEOARTHRITIS OF BOTH HIPS: Primary | ICD-10-CM

## 2019-05-16 DIAGNOSIS — C90.01 MULTIPLE MYELOMA IN REMISSION (HCC): ICD-10-CM

## 2019-05-16 PROCEDURE — 99203 OFFICE O/P NEW LOW 30 MIN: CPT | Performed by: ORTHOPAEDIC SURGERY

## 2019-05-16 NOTE — PROGRESS NOTES
Orthopaedic Clinic Note: Hip New Patient    Chief Complaint   Patient presents with   • Right Hip - Pain        HPI    Doris Miranda is a 68 y.o. female who presents with right hip pain for 3 year(s). Onset has been atraumatic and gradual nature.  Patient has a known history of multiple myeloma that is currently in remission.  She underwent stem cell transplant in March 2016.  She has a history of multiple respiratory ailments as well including chronic respiratory failure with hypoxia, pulmonary hypertension, and stage III kidney disease.  She presents today complaining of pain that she rates a 6/10 on the pain scale localized to the lateral trochanter and groin of the right hip.  Her pain is worse with walking and weightbearing activities.  She is ambulate with the assistance of a cane.  She describes her pain as a dull achy sensation.  She is here to discuss treatment options for ongoing hip pain.      Past Medical History:   Diagnosis Date   • Arthritis    • Chronic bronchitis (CMS/HCC)    • COPD (chronic obstructive pulmonary disease) (CMS/HCC)    • Hypertension    • Multiple myeloma (CMS/HCC)    • Multiple myeloma, failed remission (CMS/HCC)       Past Surgical History:   Procedure Laterality Date   • LIMBAL STEM CELL TRANSPLANT  03/2016    @ Dr. Josiah Spicer   • MOUTH SURGERY        Family History   Problem Relation Age of Onset   • Breast cancer Other    • Lung cancer Other    • Liver cancer Other    • Bone cancer Other      Social History     Socioeconomic History   • Marital status:      Spouse name: Not on file   • Number of children: Not on file   • Years of education: Not on file   • Highest education level: Not on file   Tobacco Use   • Smoking status: Former Smoker     Last attempt to quit: 8/1/2015     Years since quitting: 3.7   • Smokeless tobacco: Never Used   Substance and Sexual Activity   • Alcohol use: No   • Drug use: No   • Sexual activity: Defer      Current Outpatient  "Medications on File Prior to Visit   Medication Sig Dispense Refill   • acyclovir (ZOVIRAX) 400 MG tablet Take 1 tablet by mouth 2 (Two) Times a Day With Meals. 60 tablet 5   • albuterol (PROVENTIL HFA;VENTOLIN HFA) 108 (90 Base) MCG/ACT inhaler Inhale 2 puffs Every 6 (Six) Hours As Needed for Wheezing or Shortness of Air. 1 inhaler 5   • cyclobenzaprine (FLEXERIL) 10 MG tablet Take 1 tablet by mouth 3 (Three) Times a Day As Needed for Muscle Spasms. 90 tablet 2   • esomeprazole (nexIUM) 20 MG capsule Take 20 mg by mouth 2 (Two) Times a Day.     • gabapentin (NEURONTIN) 400 MG capsule Take 1 capsule by mouth 3 (Three) Times a Day. 90 capsule 2   • Melatonin 5 MG chewable tablet Chew 5 mg Every Night.     • PARoxetine (PAXIL) 10 MG tablet Take 1 tablet by mouth every night at bedtime. 30 tablet 2     No current facility-administered medications on file prior to visit.       No Known Allergies     Review of Systems   Constitutional: Positive for activity change, appetite change, fatigue and unexpected weight change.   HENT: Negative.    Eyes: Negative.    Respiratory: Positive for shortness of breath and wheezing.    Gastrointestinal: Negative.    Endocrine: Negative.    Genitourinary: Negative.    Musculoskeletal: Positive for back pain and gait problem.   Skin: Negative.    Allergic/Immunologic: Negative.    Hematological: Bruises/bleeds easily.   Psychiatric/Behavioral: Negative.         The patient's Review of Systems was personally reviewed and confirmed as accurate.    The following portions of the patient's history were reviewed and updated as appropriate: allergies, current medications, past family history, past medical history, past social history, past surgical history and problem list.    Physical Exam  Pulse 94, height 154.9 cm (61\"), weight 85.3 kg (188 lb 0.8 oz), SpO2 98 %, not currently breastfeeding.    Body mass index is 35.53 kg/m².    GENERAL APPEARANCE: awake, alert & oriented x 3, in no acute " distress and well developed, well nourished  PSYCH: normal affect  LUNGS:  breathing nonlabored  EYES: sclera anicteric  CARDIOVASCULAR: palpable dorsalis pedis, palpable posterior tibial bilaterally. Capillary refill less than 2 seconds  EXTREMITIES: no clubbing, cyanosis  GAIT:  Antalgic           Right Hip Exam:  RANGE OF MOTION:   FLEXION CONTRACTURE: 5 degree  FLEXION: 90 degrees  INTERNAL ROTATION: -5 degrees at 90 degrees of flexion   EXTERNAL ROTATION: 30 degrees at 90 degrees of flexion    PAIN WITH HIP MOTION: yes  PAIN WITH LOGROLL: no  STINCHFIELD TEST: positive    KNEE EXAM: full knee ROM (0-120), stable to varus/valgus stress at terminal extension and 30 degrees     STRENGTH:  4/5 hip adduction, abduction, flexion. 5/5 knee flexion, extension. 5/5 ankle dorsiflexion and plantarflexion.     GREATER TROCHANTER BURSAL PAIN:  yes     REFLEXES:   PATELLAR 2+/4   ACHILLES 2+/4    CLONUS: no  STRAIGHT LEG TEST:   negative    SENSATION TO LIGHT TOUCH:  DEEP PERONEAL/SUPERFICIAL PERONEAL/SURAL/SAPHENOUS/TIBIAL:  intact    EDEMA:   no  ERYTHEMA:  no  WOUNDS/INCISIONS: no        Left Hip Exam:   RANGE OF MOTION:   FLEXION CONTRACTURE: 5 degree  FLEXION: 90 degrees  INTERNAL ROTATION: Neutral at 90 degrees of flexion   EXTERNAL ROTATION: 30 degrees at 90 degrees of flexion    PAIN WITH HIP MOTION: yes  PAIN WITH LOGROLL: no  STINCHFIELD TEST: positive    KNEE EXAM: full knee ROM (0-120), stable to varus/valgus stress at terminal extension and 30 degrees     STRENGTH:  4/5 hip adduction, abduction, flexion. 5/5 knee flexion, extension. 5/5 ankle dorsiflexion and plantarflexion.     GREATER TROCHANTER BURSAL PAIN:  yes     REFLEXES:   PATELLAR 2+/4   ACHILLES 2+/4    CLONUS: no  STRAIGHT LEG TEST:   negative    SENSATION TO LIGHT TOUCH:  DEEP PERONEAL/SUPERFICIAL PERONEAL/SURAL/SAPHENOUS/TIBIAL:  intact    EDEMA:   no  ERYTHEMA:  no  WOUNDS/INCISIONS:  no        ------------------------------------------------------------------    LEG LENGTHS:  left leg shorter  _____________________________________________________  _____________________________________________________    RADIOGRAPHIC FINDINGS:   Radiographs from the bone survey on 5/14/2019 were personally reviewed.  Radiographs demonstrate severe bilateral hip osteoarthritis with large para-articular osteophyte formation and subchondral sclerosis.  No acute bony injury or fracture.    Assessment/Plan:   Diagnosis Plan   1. Primary osteoarthritis of both hips  Ambulatory Referral to Orthopedic Surgery   2. Multiple myeloma in remission (CMS/[facility])  Ambulatory Referral to Orthopedic Surgery     Patient has evidence of end-stage osteoarthritis of bilateral hips.  Given her extensive medical issues, history of multiple myeloma, and stem cell transplant, I discussed that her total joint replacement would probably be best performed by someone who has experience in treatment of both bone tumor and joint replacement.  I recommended referral to Dr. Jose Lopez at the [facility] for treatment of this patient's hip pain.  Patient is agreeable to this.  She will follow-up with me as needed.    Ricky Quarles MD  05/16/19  1:34 PM

## 2019-05-23 RX ORDER — CYCLOBENZAPRINE HCL 10 MG
10 TABLET ORAL 3 TIMES DAILY PRN
Qty: 90 TABLET | Refills: 2 | Status: SHIPPED | OUTPATIENT
Start: 2019-05-23 | End: 2019-08-27 | Stop reason: SDUPTHER

## 2019-05-23 RX ORDER — GABAPENTIN 400 MG/1
400 CAPSULE ORAL 3 TIMES DAILY
Qty: 90 CAPSULE | Refills: 2 | Status: SHIPPED | OUTPATIENT
Start: 2019-05-23 | End: 2019-08-29 | Stop reason: SDUPTHER

## 2019-06-17 ENCOUNTER — HOSPITAL ENCOUNTER (INPATIENT)
Facility: HOSPITAL | Age: 69
LOS: 3 days | Discharge: HOME | End: 2019-06-20
Attending: INTERNAL MEDICINE | Admitting: INTERNAL MEDICINE

## 2019-06-17 PROBLEM — R53.81 DEBILITY: Status: ACTIVE | Noted: 2019-06-17

## 2019-06-17 PROBLEM — J96.21 ACUTE ON CHRONIC RESPIRATORY FAILURE WITH HYPOXIA (HCC): Status: ACTIVE | Noted: 2019-06-17

## 2019-06-17 PROBLEM — G89.29 CHRONIC PAIN: Status: ACTIVE | Noted: 2019-06-17

## 2019-06-17 PROBLEM — Z99.81 ON HOME OXYGEN THERAPY: Status: ACTIVE | Noted: 2019-06-17

## 2019-06-17 PROBLEM — J18.9 SEPSIS DUE TO PNEUMONIA (HCC): Status: ACTIVE | Noted: 2019-06-17

## 2019-06-17 PROBLEM — D69.6 THROMBOCYTOPENIA (HCC): Status: ACTIVE | Noted: 2019-06-17

## 2019-06-17 PROBLEM — J18.9 CAP (COMMUNITY ACQUIRED PNEUMONIA): Status: ACTIVE | Noted: 2019-06-17

## 2019-06-17 PROBLEM — A41.9 SEPSIS DUE TO PNEUMONIA (HCC): Status: ACTIVE | Noted: 2019-06-17

## 2019-06-17 LAB
ARTERIAL PATENCY WRIST A: ABNORMAL
ATMOSPHERIC PRESS: ABNORMAL MMHG
BASE EXCESS BLDA CALC-SCNC: -0.9 MMOL/L (ref 0–2)
BASOPHILS # BLD MANUAL: 0 10*3/MM3 (ref 0–0.2)
BASOPHILS NFR BLD AUTO: 0 % (ref 0–1.5)
BDY SITE: ABNORMAL
BILIRUB UR QL STRIP: NEGATIVE
BODY TEMPERATURE: 37 C
BURR CELLS BLD QL SMEAR: ABNORMAL
CLARITY UR: CLEAR
CO2 BLDA-SCNC: 26.6 MMOL/L (ref 23–27)
COHGB MFR BLD: 1.9 % (ref 0–2)
COLOR UR: YELLOW
DEPRECATED RDW RBC AUTO: 47.6 FL (ref 37–54)
EOSINOPHIL # BLD MANUAL: 0 10*3/MM3 (ref 0–0.4)
EOSINOPHIL NFR BLD MANUAL: 0 % (ref 0.3–6.2)
ERYTHROCYTE [DISTWIDTH] IN BLOOD BY AUTOMATED COUNT: 13.1 % (ref 12.3–15.4)
GLUCOSE UR STRIP-MCNC: NEGATIVE MG/DL
HCO3 BLDA-SCNC: 25.2 MMOL/L (ref 20–26)
HCT VFR BLD AUTO: 42.8 % (ref 34–46.6)
HCT VFR BLD CALC: 41.1 %
HGB BLD-MCNC: 13.3 G/DL (ref 12–15.9)
HGB BLDA-MCNC: 13.4 G/DL (ref 14–18)
HGB UR QL STRIP.AUTO: NEGATIVE
HOROWITZ INDEX BLD+IHG-RTO: 28 %
HYPOCHROMIA BLD QL: ABNORMAL
KETONES UR QL STRIP: NEGATIVE
LEUKOCYTE ESTERASE UR QL STRIP.AUTO: NEGATIVE
LYMPHOCYTES # BLD MANUAL: 0.28 10*3/MM3 (ref 0.7–3.1)
LYMPHOCYTES NFR BLD MANUAL: 1 % (ref 5–12)
LYMPHOCYTES NFR BLD MANUAL: 3 % (ref 19.6–45.3)
MCH RBC QN AUTO: 30.9 PG (ref 26.6–33)
MCHC RBC AUTO-ENTMCNC: 31.1 G/DL (ref 31.5–35.7)
MCV RBC AUTO: 99.3 FL (ref 79–97)
METHGB BLD QL: 0.4 % (ref 0–1.5)
MODALITY: ABNORMAL
MONOCYTES # BLD AUTO: 0.09 10*3/MM3 (ref 0.1–0.9)
NEUTROPHILS # BLD AUTO: 9.1 10*3/MM3 (ref 1.7–7)
NEUTROPHILS NFR BLD MANUAL: 90 % (ref 42.7–76)
NEUTS BAND NFR BLD MANUAL: 6 % (ref 0–5)
NITRITE UR QL STRIP: NEGATIVE
NOTE: ABNORMAL
OVALOCYTES BLD QL SMEAR: ABNORMAL
OXYHGB MFR BLDV: 89.6 % (ref 94–99)
PCO2 BLDA: 46.4 MM HG (ref 35–45)
PCO2 TEMP ADJ BLD: 46.4 MM HG (ref 35–45)
PH BLDA: 7.34 PH UNITS (ref 7.35–7.45)
PH UR STRIP.AUTO: 5.5 [PH] (ref 5–8)
PH, TEMP CORRECTED: 7.34 PH UNITS
PLAT MORPH BLD: NORMAL
PLATELET # BLD AUTO: 74 10*3/MM3 (ref 140–450)
PMV BLD AUTO: 11.6 FL (ref 6–12)
PO2 BLDA: 60.8 MM HG (ref 83–108)
PO2 TEMP ADJ BLD: 60.8 MM HG (ref 83–108)
PROT UR QL STRIP: ABNORMAL
RBC # BLD AUTO: 4.31 10*6/MM3 (ref 3.77–5.28)
SP GR UR STRIP: 1.01 (ref 1–1.03)
TROPONIN T SERPL-MCNC: <0.01 NG/ML (ref 0–0.03)
UROBILINOGEN UR QL STRIP: ABNORMAL
VENTILATOR MODE: ABNORMAL
WBC MORPH BLD: NORMAL
WBC NRBC COR # BLD: 9.48 10*3/MM3 (ref 3.4–10.8)

## 2019-06-17 PROCEDURE — 94660 CPAP INITIATION&MGMT: CPT

## 2019-06-17 PROCEDURE — 84484 ASSAY OF TROPONIN QUANT: CPT | Performed by: NURSE PRACTITIONER

## 2019-06-17 PROCEDURE — 25010000002 HEPARIN (PORCINE) PER 1000 UNITS: Performed by: NURSE PRACTITIONER

## 2019-06-17 PROCEDURE — 94799 UNLISTED PULMONARY SVC/PX: CPT

## 2019-06-17 PROCEDURE — 87070 CULTURE OTHR SPECIMN AEROBIC: CPT | Performed by: NURSE PRACTITIONER

## 2019-06-17 PROCEDURE — 87040 BLOOD CULTURE FOR BACTERIA: CPT | Performed by: NURSE PRACTITIONER

## 2019-06-17 PROCEDURE — 85025 COMPLETE CBC W/AUTO DIFF WBC: CPT | Performed by: FAMILY MEDICINE

## 2019-06-17 PROCEDURE — 82805 BLOOD GASES W/O2 SATURATION: CPT

## 2019-06-17 PROCEDURE — 85007 BL SMEAR W/DIFF WBC COUNT: CPT | Performed by: FAMILY MEDICINE

## 2019-06-17 PROCEDURE — 25810000003 SODIUM CHLORIDE 0.9 % WITH KCL 20 MEQ 20-0.9 MEQ/L-% SOLUTION: Performed by: NURSE PRACTITIONER

## 2019-06-17 PROCEDURE — 25010000002 CEFTRIAXONE PER 250 MG: Performed by: NURSE PRACTITIONER

## 2019-06-17 PROCEDURE — 87205 SMEAR GRAM STAIN: CPT | Performed by: NURSE PRACTITIONER

## 2019-06-17 PROCEDURE — 36600 WITHDRAWAL OF ARTERIAL BLOOD: CPT

## 2019-06-17 PROCEDURE — 87899 AGENT NOS ASSAY W/OPTIC: CPT | Performed by: NURSE PRACTITIONER

## 2019-06-17 PROCEDURE — 99223 1ST HOSP IP/OBS HIGH 75: CPT | Performed by: FAMILY MEDICINE

## 2019-06-17 PROCEDURE — 81003 URINALYSIS AUTO W/O SCOPE: CPT | Performed by: NURSE PRACTITIONER

## 2019-06-17 RX ORDER — IPRATROPIUM BROMIDE AND ALBUTEROL SULFATE 2.5; .5 MG/3ML; MG/3ML
3 SOLUTION RESPIRATORY (INHALATION)
Status: DISCONTINUED | OUTPATIENT
Start: 2019-06-17 | End: 2019-06-20 | Stop reason: HOSPADM

## 2019-06-17 RX ORDER — CYCLOBENZAPRINE HCL 10 MG
10 TABLET ORAL 3 TIMES DAILY PRN
Status: DISCONTINUED | OUTPATIENT
Start: 2019-06-17 | End: 2019-06-20 | Stop reason: HOSPADM

## 2019-06-17 RX ORDER — HEPARIN SODIUM 5000 [USP'U]/ML
5000 INJECTION, SOLUTION INTRAVENOUS; SUBCUTANEOUS EVERY 8 HOURS SCHEDULED
Status: DISCONTINUED | OUTPATIENT
Start: 2019-06-17 | End: 2019-06-20 | Stop reason: HOSPADM

## 2019-06-17 RX ORDER — CHOLECALCIFEROL (VITAMIN D3) 125 MCG
5 CAPSULE ORAL NIGHTLY PRN
Status: DISCONTINUED | OUTPATIENT
Start: 2019-06-17 | End: 2019-06-20 | Stop reason: HOSPADM

## 2019-06-17 RX ORDER — SODIUM CHLORIDE 0.9 % (FLUSH) 0.9 %
3-10 SYRINGE (ML) INJECTION AS NEEDED
Status: DISCONTINUED | OUTPATIENT
Start: 2019-06-17 | End: 2019-06-20 | Stop reason: HOSPADM

## 2019-06-17 RX ORDER — SODIUM CHLORIDE 0.9 % (FLUSH) 0.9 %
3 SYRINGE (ML) INJECTION EVERY 12 HOURS SCHEDULED
Status: DISCONTINUED | OUTPATIENT
Start: 2019-06-17 | End: 2019-06-20 | Stop reason: HOSPADM

## 2019-06-17 RX ORDER — FAMOTIDINE 20 MG/1
40 TABLET, FILM COATED ORAL DAILY
Status: DISCONTINUED | OUTPATIENT
Start: 2019-06-17 | End: 2019-06-20 | Stop reason: HOSPADM

## 2019-06-17 RX ORDER — ALBUTEROL SULFATE 2.5 MG/3ML
2.5 SOLUTION RESPIRATORY (INHALATION) EVERY 4 HOURS PRN
Status: DISCONTINUED | OUTPATIENT
Start: 2019-06-17 | End: 2019-06-20 | Stop reason: HOSPADM

## 2019-06-17 RX ORDER — ACYCLOVIR 200 MG/1
400 CAPSULE ORAL 2 TIMES DAILY
Status: DISCONTINUED | OUTPATIENT
Start: 2019-06-17 | End: 2019-06-20 | Stop reason: HOSPADM

## 2019-06-17 RX ORDER — PAROXETINE HYDROCHLORIDE 20 MG/1
10 TABLET, FILM COATED ORAL DAILY
Status: DISCONTINUED | OUTPATIENT
Start: 2019-06-17 | End: 2019-06-18

## 2019-06-17 RX ORDER — ONDANSETRON 2 MG/ML
4 INJECTION INTRAMUSCULAR; INTRAVENOUS EVERY 6 HOURS PRN
Status: DISCONTINUED | OUTPATIENT
Start: 2019-06-17 | End: 2019-06-20 | Stop reason: HOSPADM

## 2019-06-17 RX ORDER — SODIUM CHLORIDE AND POTASSIUM CHLORIDE 150; 900 MG/100ML; MG/100ML
100 INJECTION, SOLUTION INTRAVENOUS CONTINUOUS
Status: DISCONTINUED | OUTPATIENT
Start: 2019-06-17 | End: 2019-06-18

## 2019-06-17 RX ORDER — GABAPENTIN 400 MG/1
400 CAPSULE ORAL EVERY 8 HOURS SCHEDULED
Status: DISCONTINUED | OUTPATIENT
Start: 2019-06-17 | End: 2019-06-20 | Stop reason: HOSPADM

## 2019-06-17 RX ORDER — ACETAMINOPHEN 325 MG/1
650 TABLET ORAL EVERY 4 HOURS PRN
Status: DISCONTINUED | OUTPATIENT
Start: 2019-06-17 | End: 2019-06-20 | Stop reason: HOSPADM

## 2019-06-17 RX ORDER — DOCUSATE SODIUM 100 MG/1
100 CAPSULE, LIQUID FILLED ORAL 2 TIMES DAILY
Status: DISCONTINUED | OUTPATIENT
Start: 2019-06-17 | End: 2019-06-20 | Stop reason: HOSPADM

## 2019-06-17 RX ADMIN — GABAPENTIN 400 MG: 400 CAPSULE ORAL at 21:16

## 2019-06-17 RX ADMIN — HEPARIN SODIUM 5000 UNITS: 5000 INJECTION, SOLUTION INTRAVENOUS; SUBCUTANEOUS at 21:16

## 2019-06-17 RX ADMIN — IPRATROPIUM BROMIDE AND ALBUTEROL SULFATE 3 ML: 2.5; .5 SOLUTION RESPIRATORY (INHALATION) at 19:29

## 2019-06-17 RX ADMIN — SODIUM CHLORIDE, PRESERVATIVE FREE 3 ML: 5 INJECTION INTRAVENOUS at 21:16

## 2019-06-17 RX ADMIN — PAROXETINE HYDROCHLORIDE 10 MG: 20 TABLET, FILM COATED ORAL at 18:50

## 2019-06-17 RX ADMIN — ACYCLOVIR 400 MG: 200 CAPSULE ORAL at 21:15

## 2019-06-17 RX ADMIN — POTASSIUM CHLORIDE AND SODIUM CHLORIDE 100 ML/HR: 900; 150 INJECTION, SOLUTION INTRAVENOUS at 18:51

## 2019-06-17 RX ADMIN — FAMOTIDINE 40 MG: 20 TABLET ORAL at 18:50

## 2019-06-17 RX ADMIN — IPRATROPIUM BROMIDE AND ALBUTEROL SULFATE 3 ML: 2.5; .5 SOLUTION RESPIRATORY (INHALATION) at 23:16

## 2019-06-17 RX ADMIN — DOXYCYCLINE 100 MG: 100 INJECTION, POWDER, LYOPHILIZED, FOR SOLUTION INTRAVENOUS at 18:51

## 2019-06-17 RX ADMIN — CEFTRIAXONE SODIUM 1 G: 1 INJECTION, POWDER, FOR SOLUTION INTRAMUSCULAR; INTRAVENOUS at 21:15

## 2019-06-17 NOTE — NURSING NOTE
ACC REVIEW REPORT: [facility]        PATIENT NAME: Doris Miranda    PATIENT ID: [id number]    BED: N 629    BED TYPE: telemetry    BED GIVEN TO: Yg    BRUNA BED GIVEN: 1418    YOB: 1950    AGE: 68    GENDER: F    PREVIOUS ADMIT TO [facility]:     PREVIOUS ADMISSION DATE:     PATIENT CLASS:     TODAY'S DATE: 6/17/2019    TRANSFER DATE: 6-17-19    ETA: after 1500    TRANSFERRING FACILITY: [facility]    TRANSFERRING FACILITY PHONE # : [phone]    TRANSFERRING MD: Roger    DATE/TIME REQUEST RECEIVED: 6-17-19    [facility] RN: Chelo Powell    REPORT FROM: Yg [place] REPORT TAKEN: 1405    DIAGNOSIS: pneumonia, multiple myeloma    REASON FOR TRANSFER TO [facility]: higher level of care    TRANSPORTATION: EMS    CLINICAL REASON FOR TRANSFER TO [facility]: 68 y.o. Presented to local ED      CLINICAL INFORMATION    HEIGHT:     WEIGHT: 85.45kg    ALLERGIES: NKA    IZQUIERDO: no    INFECTIOUS DISEASE:     ISOLATION:     1ST VITAL SIGNS:   TIME: on arrival  TEMP: 102  PULSE: 140  B/P: 171/77  RESP: sat on arrival was in the 70's on RA    LAST VITAL SIGNS:  TIME: 1400  TEMP:   PULSE: 118  B/P: 134/64  RESP:     LAB INFORMATION: bun 13, Cr 1.51, Na 140, glucose 125, plt 90, PT 9.8, INR 0.9, d dimer 0.93, lactic acid 1.3, , WBC 11    CULTURE INFORMATION: cultures done before abx given    MEDS/IV FLUIDS: #20 left ac - one liter NS given  Rocephin given  Zithromax to be given prior to departure      CARDIAC SYSTEM:    CHEST PAIN: none reported    RHYTHM: ST    Is patient taking or has patient been given any drugs that could increase bleeding? no  (Plavix, Brilinta, Effient, Eliquis, Xarelto, Warfarin, Integrilin, Angiomax)         CARDIAC ENZYMES:    DATE: 6-17-19  TIME: initial  TROP: 0.017    CARDIAC NOTES: hx HTN, pulmonary htn      RESPIRATORY SYSTEM:    LUNG SOUNDS:  diminished    ABG DATE: not done      OXYGEN: 3 liters    O2 SAT: was 70's on RA on arrival, now 94-95% on 3 liters    RADIOLOGY RESULTS: CXR rt  infiltrate    RESPIRATORY STATUS: unlabored at time of report; pt has home oxygen at 3 liters      CNS/MUSCULOSKELETAL    ALERT AND ORIENTED:  yes    CNS/MUSCULOSKELETAL NOTES: ambulatory with assistance    PAST MEDICAL HISTORY: multiple myeloma (diagnosed 2015), stem cell transplant 2016, CKD, pulmonary htn, COPD, HTN, OA, home oxygen    ADDITIONAL NOTES: pt last seen by [facility] 5-10-19 - per epn - pt receives velcade, dexamethasone, revilimid cycle #6 - revlimid on hold due to low plt; pt gets zometa q3 months          Diana Powell, RN  6/17/2019  2:14 PM

## 2019-06-17 NOTE — PLAN OF CARE
Problem: Patient Care Overview  Goal: Plan of Care Review  Outcome: Ongoing (interventions implemented as appropriate)  Pt a/o, sleepy. O2 2l, HR , S.Tach. Continue Azithromycin infusion, confirmed with pharmacy. Family at bedside. Pt stable, continue monitoring.  Goal: Individualization and Mutuality  Outcome: Ongoing (interventions implemented as appropriate)    Goal: Discharge Needs Assessment  Outcome: Ongoing (interventions implemented as appropriate)    Goal: Interprofessional Rounds/Family Conf  Outcome: Ongoing (interventions implemented as appropriate)      Problem: Pain, Chronic (Adult)  Goal: Identify Related Risk Factors and Signs and Symptoms  Outcome: Ongoing (interventions implemented as appropriate)    Goal: Acceptable Pain/Comfort Level and Functional Ability  Outcome: Ongoing (interventions implemented as appropriate)

## 2019-06-18 ENCOUNTER — APPOINTMENT (OUTPATIENT)
Dept: GENERAL RADIOLOGY | Facility: HOSPITAL | Age: 69
End: 2019-06-18

## 2019-06-18 ENCOUNTER — APPOINTMENT (OUTPATIENT)
Dept: NUCLEAR MEDICINE | Facility: HOSPITAL | Age: 69
End: 2019-06-18

## 2019-06-18 LAB
ALBUMIN SERPL-MCNC: 3.4 G/DL (ref 3.5–5.2)
ALBUMIN/GLOB SERPL: 1.1 G/DL
ALP SERPL-CCNC: 90 U/L (ref 39–117)
ALT SERPL W P-5'-P-CCNC: 9 U/L (ref 1–33)
ANION GAP SERPL CALCULATED.3IONS-SCNC: 13 MMOL/L
AST SERPL-CCNC: 8 U/L (ref 1–32)
BASOPHILS # BLD AUTO: 0.01 10*3/MM3 (ref 0–0.2)
BASOPHILS NFR BLD AUTO: 0.1 % (ref 0–1.5)
BILIRUB SERPL-MCNC: 0.3 MG/DL (ref 0.2–1.2)
BUN BLD-MCNC: 15 MG/DL (ref 8–23)
BUN/CREAT SERPL: 14.6 (ref 7–25)
CALCIUM SPEC-SCNC: 8.6 MG/DL (ref 8.6–10.5)
CHLORIDE SERPL-SCNC: 110 MMOL/L (ref 98–107)
CO2 SERPL-SCNC: 21 MMOL/L (ref 22–29)
CREAT BLD-MCNC: 1.03 MG/DL (ref 0.57–1)
DEPRECATED RDW RBC AUTO: 46 FL (ref 37–54)
EOSINOPHIL # BLD AUTO: 0.15 10*3/MM3 (ref 0–0.4)
EOSINOPHIL NFR BLD AUTO: 2.1 % (ref 0.3–6.2)
ERYTHROCYTE [DISTWIDTH] IN BLOOD BY AUTOMATED COUNT: 12.8 % (ref 12.3–15.4)
GFR SERPL CREATININE-BSD FRML MDRD: 53 ML/MIN/1.73
GLOBULIN UR ELPH-MCNC: 3.2 GM/DL
GLUCOSE BLD-MCNC: 118 MG/DL (ref 65–99)
HCT VFR BLD AUTO: 41.7 % (ref 34–46.6)
HGB BLD-MCNC: 12.9 G/DL (ref 12–15.9)
IMM GRANULOCYTES # BLD AUTO: 0.02 10*3/MM3 (ref 0–0.05)
IMM GRANULOCYTES NFR BLD AUTO: 0.3 % (ref 0–0.5)
L PNEUMO1 AG UR QL IA: NEGATIVE
LYMPHOCYTES # BLD AUTO: 0.5 10*3/MM3 (ref 0.7–3.1)
LYMPHOCYTES NFR BLD AUTO: 7.1 % (ref 19.6–45.3)
MAGNESIUM SERPL-MCNC: 2 MG/DL (ref 1.6–2.4)
MCH RBC QN AUTO: 30.5 PG (ref 26.6–33)
MCHC RBC AUTO-ENTMCNC: 30.9 G/DL (ref 31.5–35.7)
MCV RBC AUTO: 98.6 FL (ref 79–97)
MONOCYTES # BLD AUTO: 0.28 10*3/MM3 (ref 0.1–0.9)
MONOCYTES NFR BLD AUTO: 4 % (ref 5–12)
NEUTROPHILS # BLD AUTO: 6.1 10*3/MM3 (ref 1.7–7)
NEUTROPHILS NFR BLD AUTO: 86.4 % (ref 42.7–76)
NRBC BLD AUTO-RTO: 0 /100 WBC (ref 0–0.2)
PLATELET # BLD AUTO: 79 10*3/MM3 (ref 140–450)
PMV BLD AUTO: 11.4 FL (ref 6–12)
POTASSIUM BLD-SCNC: 4.2 MMOL/L (ref 3.5–5.2)
PROT SERPL-MCNC: 6.6 G/DL (ref 6–8.5)
RBC # BLD AUTO: 4.23 10*6/MM3 (ref 3.77–5.28)
SODIUM BLD-SCNC: 144 MMOL/L (ref 136–145)
WBC NRBC COR # BLD: 7.06 10*3/MM3 (ref 3.4–10.8)

## 2019-06-18 PROCEDURE — 80053 COMPREHEN METABOLIC PANEL: CPT | Performed by: NURSE PRACTITIONER

## 2019-06-18 PROCEDURE — 25010000002 CEFTRIAXONE PER 250 MG: Performed by: NURSE PRACTITIONER

## 2019-06-18 PROCEDURE — 0 XENON XE 133: Performed by: INTERNAL MEDICINE

## 2019-06-18 PROCEDURE — 94660 CPAP INITIATION&MGMT: CPT

## 2019-06-18 PROCEDURE — A9558 XE133 XENON 10MCI: HCPCS | Performed by: INTERNAL MEDICINE

## 2019-06-18 PROCEDURE — 82784 ASSAY IGA/IGD/IGG/IGM EACH: CPT | Performed by: INTERNAL MEDICINE

## 2019-06-18 PROCEDURE — 71045 X-RAY EXAM CHEST 1 VIEW: CPT

## 2019-06-18 PROCEDURE — 97162 PT EVAL MOD COMPLEX 30 MIN: CPT

## 2019-06-18 PROCEDURE — 78582 LUNG VENTILAT&PERFUS IMAGING: CPT

## 2019-06-18 PROCEDURE — 94799 UNLISTED PULMONARY SVC/PX: CPT

## 2019-06-18 PROCEDURE — 0 TECHNETIUM ALBUMIN AGGREGATED: Performed by: INTERNAL MEDICINE

## 2019-06-18 PROCEDURE — A9540 TC99M MAA: HCPCS | Performed by: INTERNAL MEDICINE

## 2019-06-18 PROCEDURE — 25010000002 HEPARIN (PORCINE) PER 1000 UNITS: Performed by: NURSE PRACTITIONER

## 2019-06-18 PROCEDURE — 85025 COMPLETE CBC W/AUTO DIFF WBC: CPT | Performed by: NURSE PRACTITIONER

## 2019-06-18 PROCEDURE — 63710000001 PREDNISONE PER 1 MG: Performed by: INTERNAL MEDICINE

## 2019-06-18 PROCEDURE — 99233 SBSQ HOSP IP/OBS HIGH 50: CPT | Performed by: INTERNAL MEDICINE

## 2019-06-18 PROCEDURE — 92610 EVALUATE SWALLOWING FUNCTION: CPT

## 2019-06-18 PROCEDURE — 25810000003 SODIUM CHLORIDE 0.9 % WITH KCL 20 MEQ 20-0.9 MEQ/L-% SOLUTION: Performed by: NURSE PRACTITIONER

## 2019-06-18 PROCEDURE — 82785 ASSAY OF IGE: CPT | Performed by: INTERNAL MEDICINE

## 2019-06-18 PROCEDURE — 97166 OT EVAL MOD COMPLEX 45 MIN: CPT

## 2019-06-18 PROCEDURE — 83735 ASSAY OF MAGNESIUM: CPT | Performed by: NURSE PRACTITIONER

## 2019-06-18 RX ORDER — PREDNISONE 20 MG/1
40 TABLET ORAL DAILY
Status: DISCONTINUED | OUTPATIENT
Start: 2019-06-18 | End: 2019-06-20 | Stop reason: HOSPADM

## 2019-06-18 RX ORDER — BENZONATATE 100 MG/1
200 CAPSULE ORAL 3 TIMES DAILY PRN
Status: DISCONTINUED | OUTPATIENT
Start: 2019-06-18 | End: 2019-06-20 | Stop reason: HOSPADM

## 2019-06-18 RX ORDER — GUAIFENESIN/DEXTROMETHORPHAN 100-10MG/5
5 SYRUP ORAL EVERY 6 HOURS PRN
Status: DISCONTINUED | OUTPATIENT
Start: 2019-06-18 | End: 2019-06-20 | Stop reason: HOSPADM

## 2019-06-18 RX ORDER — PAROXETINE HYDROCHLORIDE 20 MG/1
10 TABLET, FILM COATED ORAL NIGHTLY
Status: DISCONTINUED | OUTPATIENT
Start: 2019-06-18 | End: 2019-06-20 | Stop reason: HOSPADM

## 2019-06-18 RX ADMIN — DOXYCYCLINE 100 MG: 100 INJECTION, POWDER, LYOPHILIZED, FOR SOLUTION INTRAVENOUS at 06:03

## 2019-06-18 RX ADMIN — GABAPENTIN 400 MG: 400 CAPSULE ORAL at 13:03

## 2019-06-18 RX ADMIN — HEPARIN SODIUM 5000 UNITS: 5000 INJECTION, SOLUTION INTRAVENOUS; SUBCUTANEOUS at 06:02

## 2019-06-18 RX ADMIN — IPRATROPIUM BROMIDE AND ALBUTEROL SULFATE 3 ML: 2.5; .5 SOLUTION RESPIRATORY (INHALATION) at 16:40

## 2019-06-18 RX ADMIN — HEPARIN SODIUM 5000 UNITS: 5000 INJECTION, SOLUTION INTRAVENOUS; SUBCUTANEOUS at 21:34

## 2019-06-18 RX ADMIN — Medication 1 DOSE: at 12:20

## 2019-06-18 RX ADMIN — SODIUM CHLORIDE, PRESERVATIVE FREE 3 ML: 5 INJECTION INTRAVENOUS at 21:34

## 2019-06-18 RX ADMIN — BENZONATATE 200 MG: 100 CAPSULE ORAL at 18:15

## 2019-06-18 RX ADMIN — PREDNISONE 40 MG: 20 TABLET ORAL at 13:03

## 2019-06-18 RX ADMIN — IPRATROPIUM BROMIDE AND ALBUTEROL SULFATE 3 ML: 2.5; .5 SOLUTION RESPIRATORY (INHALATION) at 19:37

## 2019-06-18 RX ADMIN — CEFTRIAXONE SODIUM 1 G: 1 INJECTION, POWDER, FOR SOLUTION INTRAMUSCULAR; INTRAVENOUS at 18:15

## 2019-06-18 RX ADMIN — DOXYCYCLINE 100 MG: 100 INJECTION, POWDER, LYOPHILIZED, FOR SOLUTION INTRAVENOUS at 18:15

## 2019-06-18 RX ADMIN — IPRATROPIUM BROMIDE AND ALBUTEROL SULFATE 3 ML: 2.5; .5 SOLUTION RESPIRATORY (INHALATION) at 09:26

## 2019-06-18 RX ADMIN — MELATONIN TAB 5 MG 5 MG: 5 TAB at 23:31

## 2019-06-18 RX ADMIN — GUAIFENESIN AND DEXTROMETHORPHAN 5 ML: 100; 10 SYRUP ORAL at 21:39

## 2019-06-18 RX ADMIN — FAMOTIDINE 40 MG: 20 TABLET ORAL at 08:50

## 2019-06-18 RX ADMIN — GABAPENTIN 400 MG: 400 CAPSULE ORAL at 21:35

## 2019-06-18 RX ADMIN — ACYCLOVIR 400 MG: 200 CAPSULE ORAL at 21:34

## 2019-06-18 RX ADMIN — POTASSIUM CHLORIDE AND SODIUM CHLORIDE 100 ML/HR: 900; 150 INJECTION, SOLUTION INTRAVENOUS at 06:02

## 2019-06-18 RX ADMIN — DOCUSATE SODIUM 100 MG: 100 CAPSULE, LIQUID FILLED ORAL at 21:34

## 2019-06-18 RX ADMIN — CYCLOBENZAPRINE HYDROCHLORIDE 10 MG: 10 TABLET, FILM COATED ORAL at 08:55

## 2019-06-18 RX ADMIN — IPRATROPIUM BROMIDE AND ALBUTEROL SULFATE 3 ML: 2.5; .5 SOLUTION RESPIRATORY (INHALATION) at 22:58

## 2019-06-18 RX ADMIN — HEPARIN SODIUM 5000 UNITS: 5000 INJECTION, SOLUTION INTRAVENOUS; SUBCUTANEOUS at 13:03

## 2019-06-18 RX ADMIN — IPRATROPIUM BROMIDE AND ALBUTEROL SULFATE 3 ML: 2.5; .5 SOLUTION RESPIRATORY (INHALATION) at 03:38

## 2019-06-18 RX ADMIN — PAROXETINE HYDROCHLORIDE 10 MG: 20 TABLET, FILM COATED ORAL at 21:35

## 2019-06-18 RX ADMIN — ACYCLOVIR 400 MG: 200 CAPSULE ORAL at 08:50

## 2019-06-18 RX ADMIN — SODIUM CHLORIDE, PRESERVATIVE FREE 3 ML: 5 INJECTION INTRAVENOUS at 08:54

## 2019-06-18 RX ADMIN — XENON XE-133 9.71 MILLICURIE: 10 GAS RESPIRATORY (INHALATION) at 12:10

## 2019-06-18 RX ADMIN — GABAPENTIN 400 MG: 400 CAPSULE ORAL at 06:02

## 2019-06-18 RX ADMIN — IPRATROPIUM BROMIDE AND ALBUTEROL SULFATE 3 ML: 2.5; .5 SOLUTION RESPIRATORY (INHALATION) at 13:02

## 2019-06-18 NOTE — PLAN OF CARE
Problem: Patient Care Overview  Goal: Plan of Care Review  Outcome: Ongoing (interventions implemented as appropriate)   06/18/19 0053   OTHER   Outcome Summary Patient continues on 2L NC while awake. Currently on bipap while sleeping. Hem/onc to see in am. VSS. Will continue to monitor.

## 2019-06-18 NOTE — PROGRESS NOTES
Discharge Planning Assessment  [facility]     Patient Name: Doris Miranda  MRN: [id number]  Today's Date: 6/18/2019    Admit Date: 6/17/2019    Discharge Needs Assessment     Row Name 06/18/19 1423       Living Environment    Lives With  spouse    Current Living Arrangements  home/apartment/condo    Primary Care Provided by  self    Provides Primary Care For  no one    Family Caregiver if Needed  spouse    Quality of Family Relationships  helpful;involved       Resource/Environmental Concerns    Resource/Environmental Concerns  none    Transportation Concerns  car, none       Transition Planning    Patient/Family Anticipates Transition to  home    Patient/Family Anticipated Services at Transition  none    Transportation Anticipated  family or friend will provide       Discharge Needs Assessment    Readmission Within the Last 30 Days  no previous admission in last 30 days    Concerns to be Addressed  discharge planning    Equipment Currently Used at Home  walker, rolling;cane, straight;oxygen    Anticipated Changes Related to Illness  none    Equipment Needed After Discharge  none        Discharge Plan     Row Name 06/18/19 1424       Plan    Plan  Home at discharge    Patient/Family in Agreement with Plan  yes    Plan Comments  Spoke with patient at bedside - she lives with her spouse in [place] [facility]. She has a cane/ Rolling walker and home oxygen with [employer]. PT and OT will be following - Patient states her goal is to return home at discharge and spouse to transport - susie [phone]    Final Discharge Disposition Code  01 - home or self-care        Destination      No service coordination in this encounter.      Durable Medical Equipment      No service coordination in this encounter.      Dialysis/Infusion      No service coordination in this encounter.      Home Medical Care      No service coordination in this encounter.      Therapy      No service coordination in this encounter.      [facility]  Resources      No service coordination in this encounter.          Demographic Summary     Row Name 06/18/19 1423       General Information    Admission Type  inpatient    Arrived From  home    Referral Source  admission list    Reason for Consult  discharge planning    Preferred Language  English     Used During This Interaction  no       Contact Information    Permission Granted to Share Info With          Functional Status     Row Name 06/18/19 1423       Functional Status    Usual Activity Tolerance  moderate    Current Activity Tolerance  fair       Functional Status, IADL    Medications  independent    Meal Preparation  assistive person    Housekeeping  assistive person    Laundry  assistive equipment and person    Shopping  assistive equipment and person       Mental Status    General Appearance WDL  WDL       Mental Status Summary    Recent Changes in Mental Status/Cognitive Functioning  no changes        Psychosocial    No documentation.       Abuse/Neglect    No documentation.       Legal    No documentation.       Substance Abuse    No documentation.       Patient Forms    No documentation.           Mray Anderson RN

## 2019-06-18 NOTE — PLAN OF CARE
Problem: Patient Care Overview  Goal: Plan of Care Review  Outcome: Ongoing (interventions implemented as appropriate)  Pt a/o, no complaints of pain. Oxygen at 2L, O2 sat between 94-96%. Ambulated in room with a cane and standby assist. No productive cough at this time per patient. Tolerated activity well, vital signs stable. Will continue to monitor.   Goal: Individualization and Mutuality  Outcome: Ongoing (interventions implemented as appropriate)    Goal: Discharge Needs Assessment  Outcome: Ongoing (interventions implemented as appropriate)    Goal: Interprofessional Rounds/Family Conf  Outcome: Ongoing (interventions implemented as appropriate)      Problem: Pain, Chronic (Adult)  Goal: Identify Related Risk Factors and Signs and Symptoms  Outcome: Ongoing (interventions implemented as appropriate)    Goal: Acceptable Pain/Comfort Level and Functional Ability  Outcome: Ongoing (interventions implemented as appropriate)      Problem: Sepsis/Septic Shock (Adult)  Goal: Signs and Symptoms of Listed Potential Problems Will be Absent, Minimized or Managed (Sepsis/Septic Shock)  Outcome: Ongoing (interventions implemented as appropriate)

## 2019-06-18 NOTE — THERAPY DISCHARGE NOTE
Acute Care - Occupational Therapy Initial Eval/Discharge  Psychiatric     Patient Name: Doris Miranda  : 1950  MRN: [id number]  Today's Date: 2019  Onset of Illness/Injury or Date of Surgery: 19  Date of Referral to OT: 19  Referring Physician: DAYTON Lipscomb      Admit Date: 2019     No diagnosis found.  Patient Active Problem List   Diagnosis   • Multiple myeloma in relapse (CMS/HCC)   • Diverticulosis of large intestine without hemorrhage   • Tubular adenoma of colon   • Essential hypertension   • COPD (chronic obstructive pulmonary disease) (CMS/HCC)   • Chronic bronchitis (CMS/HCC)   • Arthritis   • Gastroesophageal reflux disease without esophagitis   • Chronic left-sided low back pain with sciatica   • Thrombocytopenia since stem cell transplant 2016   • Hx of autologous stem cell transplant (2016)   • CKD (chronic kidney disease), stage III (CMS/HCC)   • Former smoker   • Non-rheumatic tricuspid valve insufficiency   • Pulmonary hypertension (CMS/HCC)   • Chronic respiratory failure with hypoxia (been noncompliant with outpatient oxygen in past)   • Leg edema, right   • Gait instability   • Acute on chronic respiratory failure with hypoxia (CMS/HCC)   • CAP (community acquired pneumonia)   • Chronic pain   • Debility   • Sepsis due to pneumonia (CMS/HCC)   • Thrombocytopenia (CMS/HCC)   • On home oxygen therapy     Past Medical History:   Diagnosis Date   • Arthritis    • Chronic bronchitis (CMS/HCC)    • Chronic kidney disease    • COPD (chronic obstructive pulmonary disease) (CMS/HCC)    • Hypertension    • Multiple myeloma (CMS/HCC)    • Multiple myeloma, failed remission (CMS/HCC)    • Osteoporosis      Past Surgical History:   Procedure Laterality Date   • LIMBAL STEM CELL TRANSPLANT  2016    @ Dr. Josiah Spicer   • MOUTH SURGERY            OT ASSESSMENT FLOWSHEET (last 12 hours)      Occupational Therapy Evaluation     Row Name 19 1216                    OT Evaluation Time/Intention    Subjective Information  no complaints  -TA        Document Type  discharge evaluation/summary  -TA        Mode of Treatment  occupational therapy  -TA        Patient Effort  good  -TA        Symptoms Noted During/After Treatment  none  -TA           General Information    Patient Profile Reviewed?  yes  -TA        Onset of Illness/Injury or Date of Surgery  06/17/19  -TA        Referring Physician  DAYTON Lipscomb  -TA        Patient Observations  alert;cooperative;agree to therapy  -TA        Patient/Family Observations  Spouse, family present in room  -TA        General Observations of Patient  Pt UIC, O2 per NC, exit alarm  -TA        Prior Level of Function  independent:;gait;transfer;bed mobility;ADL's;mod assist:;home management  -TA        Equipment Currently Used at Home  cane, straight;walker, rolling  -TA        Pertinent History of Current Functional Problem  Pt presented to OSH ED with 3 day h/o cough, wheezing, chills, fever. T/f'd to [facility] as her oncologist, Dr Epps is here; being followed for CAPNA, sepsis, A/C resp failure. PMH/o multiple myeloma s/p stem cell transplant, chronic pain d/t occult spinal fractures and Gilles hip lesions.   -TA        Existing Precautions/Restrictions  fall;oxygen therapy device and L/min  -TA        Risks Reviewed  patient:;LOB;dizziness;increased discomfort;change in vital signs  -TA        Benefits Reviewed  patient:;improve function;increase independence;increase strength;increase balance;decrease pain;increase knowledge  -TA           Relationship/Environment    Primary Source of Support/Comfort  spouse  -TA        Lives With  spouse  -TA           Resource/Environmental Concerns    Current Living Arrangements  home/apartment/condo  -TA           Cognitive Assessment/Intervention- PT/OT    Affect/Mental Status (Cognitive)  WFL  -TA        Orientation Status (Cognition)  oriented x 4  -TA        Follows Commands (Cognition)  WFL  -TA         Cognitive Function (Cognitive)  safety deficit  -TA        Safety Deficit (Cognitive)  mild deficit;safety precautions awareness;safety precautions follow-through/compliance  -TA        Personal Safety Interventions  fall prevention program maintained;gait belt;nonskid shoes/slippers when out of bed;supervised activity  -TA           Bed Mobility Assessment/Treatment    Comment (Bed Mobility)  Pt UIC  -TA           Transfer Assessment/Treatment    Transfer Assessment/Treatment  sit-stand transfer;stand-sit transfer  -TA        Comment (Transfers)  Good safety awareness  -TA           Sit-Stand Transfer    Sit-Stand [place] (Transfers)  stand by assist  -TA        Assistive Device (Sit-Stand Transfers)  other (see comments) no   -TA           Stand-Sit Transfer    Stand-Sit [place] (Transfers)  stand by assist  -TA        Assistive Device (Stand-Sit Transfers)  other (see comments) no AD  -TA           ADL Assessment/Intervention    BADL Assessment/Intervention  lower body dressing;toileting  -TA           Lower Body Dressing Assessment/Training    Lower Body Dressing [place] Level  don;doff;pants/bottoms;conditional independence  -TA        Lower Body Dressing Position  supported sitting;supported standing  -TA        Comment (Lower Body Dressing)  no concerns  -TA           Toileting Assessment/Training    [place] Level (Toileting)  toileting skills;independent  -TA        Assistive Devices (Toileting)  grab bar/safety frame  -TA        Toileting Position  unsupported sitting  -TA        Comment (Toileting)  no concerns  -TA           BADL Safety/Performance    Impairments, BADL Safety/Performance  endurance/activity tolerance  -TA        Skilled BADL Treatment/Intervention  energy conservation  -TA           General ROM    GENERAL ROM COMMENTS  BUE AROM WFL  -TA           MMT (Manual Muscle Testing)    General MMT Comments  BUE WFL  -TA           Motor Assessment/Interventions     Additional Documentation  Balance Interventions (Group);Balance (Group)  -TA           Balance    Balance  dynamic standing balance;dynamic sitting balance  -TA           Dynamic Sitting Balance    Level of Love, Reaches Outside Midline (Sitting, Dynamic Balance)  independent  -TA        Sitting Position, Reaches Outside Midline (Sitting, Dynamic Balance)  sitting in chair  -TA        Comment, Reaches Outside Midline (Sitting, Dynamic Balance)  MMT, ADL  -TA           Dynamic Standing Balance    Level of Love, Reaches Outside Midline (Standing, Dynamic Balance)  standby assist  -TA        Time Able to Maintain Position, Reaches Outside Midline (Standing, Dynamic Balance)  1 to 2 minutes  -TA        Assistive Device Utilized (Supported Standing, Dynamic Balance)  other (see comments) no AD  -TA           Sensory Assessment/Intervention    Sensory General Assessment  no sensation deficits identified;other (see comments) BUE intact  -TA           Positioning and Restraints    Pre-Treatment Position  sitting in chair/recliner  -TA        Post Treatment Position  chair  -TA        In Chair  reclined;call light within reach;encouraged to call for assist;exit alarm on;with family/caregiver;waffle cushion;legs elevated  -TA           Pain Assessment    Additional Documentation  Pain Scale: Numbers Pre/Post-Treatment (Group)  -TA           Pain Scale: Numbers Pre/Post-Treatment    Pain Scale: Numbers, Pretreatment  0/10 - no pain  -TA        Pain Scale: Numbers, Post-Treatment  0/10 - no pain  -TA        Pre/Post Treatment Pain Comment  tolerated  -TA        Pain Intervention(s)  Repositioned;Ambulation/increased activity  -TA           Pain Scale: FACES Pre/Post-Treatment    Pain: FACES Scale, Pretreatment  0-->no hurt  -TA        Pain: FACES Scale, Post-Treatment  0-->no hurt  -TA           Coping    Observed Emotional State  calm;cooperative  -TA        Verbalized Emotional State  acceptance  -TA            Plan of Care Review    Plan of Care Reviewed With  patient  -TA           Clinical Impression (OT)    Date of Referral to OT  06/17/19  -TA        Functional Level at Time of Evaluation (OT Eval)  Pt presents at fxl baseline/independent  -TA        Patient/Family Goals Statement (OT Eval)  Return home  -TA        Criteria for Skilled Therapeutic Interventions Met (OT Eval)  no;current level of function same as previous level of function  -TA        Care Plan Review (OT)  evaluation/treatment results reviewed;risks/benefits reviewed;patient/other agree to care plan  -TA        Care Plan Review, Other Participant (OT Eval)  spouse  -TA        Anticipated Discharge Disposition (OT)  home with assist  -TA           Vital Signs    Pre SpO2 (%)  93  -TA        O2 Delivery Pre Treatment  supplemental O2  -TA        Post SpO2 (%)  95  -TA        O2 Delivery Post Treatment  supplemental O2  -TA        Pre Patient Position  Sitting  -TA        Intra Patient Position  Standing  -TA        Post Patient Position  Sitting  -TA           Planned OT Interventions    Planned Therapy Interventions (OT Eval)  other (see comments) No goals established as pt is at fxl baseline/independent  -TA           Living Environment    Home Accessibility  wheelchair accessible  -TA          User Key  (r) = Recorded By, (t) = Taken By, (c) = Cosigned By    Initials Name Effective Dates    TA Claudio Gamino, OT 03/14/16 -           Occupational Therapy Education     Title: PT OT SLP Therapies (In Progress)     Topic: Occupational Therapy (Done)     Point: ADL training (Done)     Description: Instruct learner(s) on proper safety adaptation and remediation techniques during self care or transfers.   Instruct in proper use of assistive devices.    Learning Progress Summary           Patient Acceptance, E,TB, VU by TA at 6/18/2019  1:55 PM                   Point: Home exercise program (Done)     Description: Instruct learner(s) on appropriate  technique for monitoring, assisting and/or progressing therapeutic exercises/activities.    Learning Progress Summary           Patient Acceptance, E,TB, VU by TA at 6/18/2019  1:55 PM                   Point: Precautions (Done)     Description: Instruct learner(s) on prescribed precautions during self-care and functional transfers.    Learning Progress Summary           Patient Acceptance, E,TB, VU by TA at 6/18/2019  1:55 PM                   Point: Body mechanics (Done)     Description: Instruct learner(s) on proper positioning and spine alignment during self-care, functional mobility activities and/or exercises.    Learning Progress Summary           Patient Acceptance, E,TB, VU by TA at 6/18/2019  1:55 PM                               User Key     Initials Effective Dates Name Provider Type Discipline    TA 03/14/16 -  Claudio Gamino, OT Occupational Therapist OT                OT Recommendation and Plan  Outcome Summary/Treatment Plan (OT)  Anticipated Discharge Disposition (OT): home with assist  Planned Therapy Interventions (OT Eval): other (see comments)(No goals established as pt is at fxl baseline/independent)  Plan of Care Review  Plan of Care Reviewed With: patient  Plan of Care Reviewed With: patient  Outcome Summary: VSS; Pt presents at fxl baseline/independent with ADL performance. Pt educated re; pacing and EC. No AE/DME needs identified at this time. No further skilled OT services indicated at this time. Recommend home with assist at discharge. Pt agrees with eval findings and is not interested in HHOT.      Rehab Goal Summary     Row Name 06/18/19 0819             Physical Therapy Goals    Bed Mobility Goal Selection (PT)  bed mobility, PT goal 1  -VG      Transfer Goal Selection (PT)  transfer, PT goal 1  -VG      Gait Training Goal Selection (PT)  gait training, PT goal 1  -VG         Bed Mobility Goal 1 (PT)    Activity/Assistive Device (Bed Mobility Goal 1, PT)  sit to supine/supine to  sit  -VG      [place] Level/Cues Needed (Bed Mobility Goal 1, PT)  independent  -VG      Time Frame (Bed Mobility Goal 1, PT)  long term goal (LTG);2 weeks  -VG         Transfer Goal 1 (PT)    Activity/Assistive Device (Transfer Goal 1, PT)  sit-to-stand/stand-to-sit;walker, rolling  -VG      [place] Level/Cues Needed (Transfer Goal 1, PT)  conditional independence  -VG      Time Frame (Transfer Goal 1, PT)  long term goal (LTG);2 weeks  -VG         Gait Training Goal 1 (PT)    Activity/Assistive Device (Gait Training Goal 1, PT)  gait (walking locomotion);assistive device use;walker, rolling  -VG      [place] Level (Gait Training Goal 1, PT)  conditional independence  -VG      Distance (Gait Goal 1, PT)  300  -VG      Time Frame (Gait Training Goal 1, PT)  long term goal (LTG);2 weeks  -VG         Patient Education Goal (PT)    Activity (Patient Education Goal, PT)  HEP  -VG      [place]/Cues/Accuracy (Memory Goal 2, PT)  independent  -VG      Time Frame (Patient Education Goal, PT)  long term goal (LTG);2 weeks  -VG        User Key  (r) = Recorded By, (t) = Taken By, (c) = Cosigned By    Initials Name Provider Type Discipline    VG Linh Meza, PT Physical Therapist PT          Outcome Measures     Row Name 06/18/19 1305 06/18/19 0819          How much help from another person do you currently need...    Turning from your back to your side while in flat bed without using bedrails?  --  4  -VG     Moving from lying on back to sitting on the side of a flat bed without bedrails?  --  4  -VG     Moving to and from a bed to a chair (including a wheelchair)?  --  3  -VG     Standing up from a chair using your arms (e.g., wheelchair, bedside chair)?  --  3  -VG     Climbing 3-5 steps with a railing?  --  3  -VG     To walk in hospital room?  --  3  -VG     AM-PAC 6 Clicks Score  --  20  -VG        How much help from another is currently needed...    Putting on and taking off regular lower body  clothing?  4  -TA  --     Bathing (including washing, rinsing, and drying)  4  -TA  --     Toileting (which includes using toilet bed pan or urinal)  4  -TA  --     Putting on and taking off regular upper body clothing  4  -TA  --     Taking care of personal grooming (such as brushing teeth)  4  -TA  --     Eating meals  4  -TA  --     Score  24  -TA  --        Functional Assessment    Outcome Measure Options  AM-PAC 6 Clicks Daily Activity (OT)  -TA  AM-PAC 6 Clicks Basic Mobility (PT)  -VG       User Key  (r) = Recorded By, (t) = Taken By, (c) = Cosigned By    Initials Name Provider Type    Claudio Allred OT Occupational Therapist    VG Linh Meza, PT Physical Therapist          Time Calculation:   Time Calculation- OT     Row Name 06/18/19 1359             Time Calculation- OT    OT Start Time  1305 ttc 0 minutes  -TA      OT Received On  06/18/19  -TA        User Key  (r) = Recorded By, (t) = Taken By, (c) = Cosigned By    Initials Name Provider Type    Claudio Allred OT Occupational Therapist        Therapy Suggested Charges     Code   Minutes Charges    None           Therapy Charges for Today     Code Description Service Date Service Provider Modifiers Qty    [id number]  OT EVAL MOD COMPLEXITY 4 6/18/2019 Claudio Gamino OT GO 1               OT Discharge Summary  Anticipated Discharge Disposition (OT): home with assist  Reason for Discharge: Independent, At baseline function  Discharge Destination: Home with assist    Claudio Gamino OT  6/18/2019

## 2019-06-18 NOTE — THERAPY EVALUATION
Acute Care - Physical Therapy Initial Evaluation  [facility]     Patient Name: Doris Miranda  : 1950  MRN: [id number]  Today's Date: 2019   Onset of Illness/Injury or Date of Surgery: 19  Date of Referral to PT: 19  Referring Physician: DAYTON Sweeney      Admit Date: 2019    Visit Dx: No diagnosis found.  Patient Active Problem List   Diagnosis   • Multiple myeloma in relapse (CMS/HCC)   • Diverticulosis of large intestine without hemorrhage   • Tubular adenoma of colon   • Essential hypertension   • COPD (chronic obstructive pulmonary disease) (CMS/HCC)   • Chronic bronchitis (CMS/HCC)   • Arthritis   • Gastroesophageal reflux disease without esophagitis   • Chronic left-sided low back pain with sciatica   • Thrombocytopenia since stem cell transplant 2016   • Hx of autologous stem cell transplant (2016)   • CKD (chronic kidney disease), stage III (CMS/HCC)   • Former smoker   • Non-rheumatic tricuspid valve insufficiency   • Pulmonary hypertension (CMS/HCC)   • Chronic respiratory failure with hypoxia (been noncompliant with outpatient oxygen in past)   • Leg edema, right   • Gait instability   • Acute on chronic respiratory failure with hypoxia (CMS/HCC)   • CAP (community acquired pneumonia)   • Chronic pain   • Debility   • Sepsis due to pneumonia (CMS/HCC)   • Thrombocytopenia (CMS/HCC)   • On home oxygen therapy     Past Medical History:   Diagnosis Date   • Arthritis    • Chronic bronchitis (CMS/HCC)    • Chronic kidney disease    • COPD (chronic obstructive pulmonary disease) (CMS/HCC)    • Hypertension    • Multiple myeloma (CMS/HCC)    • Multiple myeloma, failed remission (CMS/HCC)    • Osteoporosis      Past Surgical History:   Procedure Laterality Date   • LIMBAL STEM CELL TRANSPLANT  2016    @ Dr. Josiah Spicer   • MOUTH SURGERY          PT ASSESSMENT (last 12 hours)      Physical Therapy Evaluation     Row Name 19 0819          PT Evaluation  Time/Intention    Subjective Information  no complaints  -VG     Document Type  evaluation  -VG     Mode of Treatment  individual therapy;physical therapy  -VG     Patient Effort  good  -VG     Symptoms Noted During/After Treatment  fatigue;shortness of breath  -VG     Row Name 06/18/19 0819          General Information    Patient Profile Reviewed?  yes  -VG     Onset of Illness/Injury or Date of Surgery  06/17/19  -VG     Referring Physician  DAYTON Sweeney  -VG     Patient Observations  alert;cooperative;agree to therapy  -VG     Patient/Family Observations  No family present  -VG     General Observations of Patient  In bed, supplemental O2, tele, IV, bed alarm  -VG     Prior Level of Function  independent:;all household mobility;ADL's;mod assist:;home management;dependent:;community mobility  -VG     Equipment Currently Used at Home  cane, straight;walker, rolling  -VG     Pertinent History of Current Functional Problem  Pt admitted from OSH 6/17 with c/o SOA, chills, sweats, and increased lethargy. CXR (+) PNA. Pt with hypoxia and sepsis. PMHx: HTN, arthritis, osteopenia, CKD, COPD, multiple myeloma with failed remission, and chronic pain d/t occult spinal fractures and hip lesions.  -VG     Existing Precautions/Restrictions  fall;oxygen therapy device and L/min  -VG     Risks Reviewed  patient:;LOB;nausea/vomiting;dizziness;increased discomfort;change in vital signs;increased drainage;lines disloged  -VG     Benefits Reviewed  patient:;increase independence;improve function;increase strength;increase balance;decrease pain;decrease risk of DVT;improve skin integrity;increase knowledge  -VG     Barriers to Rehab  medically complex  -VG     Row Name 06/18/19 0819          Relationship/Environment    Primary Source of Support/Comfort  spouse  -VG     Lives With  spouse  -VG     Concerns About Impact on Relationships  Pt lives with  at baseline who provides 24/7 spv/assist.  -VG     Row Name 06/18/19 0819           Resource/Environmental Concerns    Current Living Arrangements  home/apartment/condo  -VG     Resource/Environmental Concerns  none  -VG     Row Name 06/18/19 0819          Cognitive Assessment/Interventions    Additional Documentation  Cognitive Assessment/Intervention (Group)  -VG     Row Name 06/18/19 0819          Cognitive Assessment/Intervention- PT/OT    Affect/Mental Status (Cognitive)  WFL  -VG     Orientation Status (Cognition)  oriented x 4  -VG     Follows Commands (Cognition)  follows one step commands;over 90% accuracy;verbal cues/prompting required  -VG     Cognitive Function (Cognitive)  safety deficit  -VG     Safety Deficit (Cognitive)  mild deficit;insight into deficits/self awareness  -VG     Personal Safety Interventions  fall prevention program maintained;gait belt;nonskid shoes/slippers when out of bed;supervised activity  -VG     Row Name 06/18/19 0819          Bed Mobility Assessment/Treatment    Bed Mobility Assessment/Treatment  supine-sit  -VG     Supine-Sit Clark (Bed Mobility)  supervision;verbal cues  -VG     Assistive Device (Bed Mobility)  bed rails;head of bed elevated  -VG     Comment (Bed Mobility)  Cues for hand placement and sequencing. Inc time/effort.  -VG     Row Name 06/18/19 0819          Transfer Assessment/Treatment    Transfer Assessment/Treatment  sit-stand transfer;stand-sit transfer  -VG     Comment (Transfers)  Cues for hand placement and sequencing. Inc time/effort.  -VG     Sit-Stand Clark (Transfers)  contact guard;verbal cues  -VG     Stand-Sit Clark (Transfers)  contact guard;verbal cues  -VG     Row Name 06/18/19 0819          Sit-Stand Transfer    Assistive Device (Sit-Stand Transfers)  walker, front-wheeled  -VG     Row Name 06/18/19 0819          Stand-Sit Transfer    Assistive Device (Stand-Sit Transfers)  walker, front-wheeled  -VG     Row Name 06/18/19 0819          Gait/Stairs Assessment/Training    Gait/Stairs Assessment/Training   gait/ambulation independence  -VG     [place] Level (Gait)  contact guard;verbal cues  -VG     Assistive Device (Gait)  walker, front-wheeled  -VG     Distance in Feet (Gait)  150  -VG     Pattern (Gait)  step-to;step-through  -VG     Deviations/Abnormal Patterns (Gait)  antalgic;base of support, narrow;elie decreased;festinating/shuffling;gait speed decreased;stride length decreased  -VG     Bilateral Gait Deviations  forward flexed posture;heel strike decreased  -VG     Comment (Gait/Stairs)  Distance limited by fatigue, weakness, and SOA; VSS on 2LO2NC. Cues for upright posture and keeping body within RW frame. Unsteady gait, antalgic. Demonstrates impaired endurance, balance, strength.  -VG     Row Name 06/18/19 0819          General ROM    GENERAL ROM COMMENTS  BLEs WFL  -VG     Row Name 06/18/19 0819          MMT (Manual Muscle Testing)    General MMT Comments  BLEs 4/5 grossly  -VG     Row Name 06/18/19 0819          Sensory Assessment/Intervention    Sensory General Assessment  no sensation deficits identified  -VG     Row Name 06/18/19 0819          Pain Assessment    Additional Documentation  Pain Scale: Numbers Pre/Post-Treatment (Group)  -VG     Row Name 06/18/19 0819          Pain Scale: Numbers Pre/Post-Treatment    Pain Scale: Numbers, Pretreatment  0/10 - no pain  -VG     Pain Scale: Numbers, Post-Treatment  0/10 - no pain  -VG     Row Name 06/18/19 0819          Coping    Observed Emotional State  accepting  -VG     Verbalized Emotional State  acceptance  -VG     Row Name 06/18/19 0819          Plan of Care Review    Plan of Care Reviewed With  patient  -VG     Row Name 06/18/19 0819          Physical Therapy Clinical Impression    Date of Referral to PT  06/17/19  -VG     PT Diagnosis (PT Clinical Impression)  Impaired endurance, balance, strength  -VG     Criteria for Skilled Interventions Met (PT Clinical Impression)  yes;treatment indicated  -VG     Pathology/Pathophysiology Noted  (Describe Specifically for Each System)  musculoskeletal;pulmonary  -VG     Impairments Found (describe specific impairments)  aerobic capacity/endurance;gait, locomotion, and balance;muscle performance  -VG     Rehab Potential (PT Clinical Summary)  good, to achieve stated therapy goals  -VG     Care Plan Review (PT)  evaluation/treatment results reviewed;patient/other agree to care plan  -VG     Patient/Family Concerns, Anticipated Discharge Disposition (PT)  Pt not agreeable to HHPT  -VG     Row Name 06/18/19 0819          Vital Signs    Pre SpO2 (%)  95  -VG     O2 Delivery Pre Treatment  supplemental O2 2LO2NC  -VG     Post SpO2 (%)  96  -VG     O2 Delivery Post Treatment  supplemental O2 2LO2NC  -VG     Pre Patient Position  Supine  -VG     Post Patient Position  Sitting  -VG     Row Name 06/18/19 0819          Physical Therapy Goals    Bed Mobility Goal Selection (PT)  bed mobility, PT goal 1  -VG     Transfer Goal Selection (PT)  transfer, PT goal 1  -VG     Gait Training Goal Selection (PT)  gait training, PT goal 1  -VG     Row Name 06/18/19 0819          Bed Mobility Goal 1 (PT)    Activity/Assistive Device (Bed Mobility Goal 1, PT)  sit to supine/supine to sit  -VG     [place] Level/Cues Needed (Bed Mobility Goal 1, PT)  independent  -VG     Time Frame (Bed Mobility Goal 1, PT)  long term goal (LTG);2 weeks  -VG     Row Name 06/18/19 0819          Transfer Goal 1 (PT)    Activity/Assistive Device (Transfer Goal 1, PT)  sit-to-stand/stand-to-sit;walker, rolling  -VG     [place] Level/Cues Needed (Transfer Goal 1, PT)  conditional independence  -VG     Time Frame (Transfer Goal 1, PT)  long term goal (LTG);2 weeks  -VG     Row Name 06/18/19 0819          Gait Training Goal 1 (PT)    Activity/Assistive Device (Gait Training Goal 1, PT)  gait (walking locomotion);assistive device use;walker, rolling  -VG     [place] Level (Gait Training Goal 1, PT)  conditional independence  -VG     Distance  (Gait Goal 1, PT)  300  -VG     Time Frame (Gait Training Goal 1, PT)  long term goal (LTG);2 weeks  -VG     Row Name 06/18/19 0819          Patient Education Goal (PT)    Activity (Patient Education Goal, PT)  HEP  -VG     [place]/Cues/Accuracy (Memory Goal 2, PT)  independent  -VG     Time Frame (Patient Education Goal, PT)  long term goal (LTG);2 weeks  -VG     Row Name 06/18/19 0819          Positioning and Restraints    Pre-Treatment Position  in bed  -VG     Post Treatment Position  chair  -VG     In Chair  reclined;call light within reach;encouraged to call for assist;exit alarm on;legs elevated  -VG     Row Name 06/18/19 0819          Living Environment    Home Accessibility  wheelchair accessible  -VG       User Key  (r) = Recorded By, (t) = Taken By, (c) = Cosigned By    Initials Name Provider Type    VG Linh Meza, PT Physical Therapist        Physical Therapy Education     Title: PT OT SLP Therapies (In Progress)     Topic: Physical Therapy (In Progress)     Point: Mobility training (Done)     Learning Progress Summary           Patient Acceptance, E, VU by VG at 6/18/2019  8:19 AM    Comment:  Educated on correct mechanics for safe functional mobility. Discussed d/c planning/recommendations and PT POC.                   Point: Body mechanics (Done)     Learning Progress Summary           Patient Acceptance, E, VU by VG at 6/18/2019  8:19 AM    Comment:  Educated on correct mechanics for safe functional mobility. Discussed d/c planning/recommendations and PT POC.                   Point: Precautions (Done)     Learning Progress Summary           Patient Acceptance, E, VU by VG at 6/18/2019  8:19 AM    Comment:  Educated on correct mechanics for safe functional mobility. Discussed d/c planning/recommendations and PT POC.                               User Key     Initials Effective Dates Name Provider Type Discipline    VG 05/29/18 -  Linh Meza, PT Physical Therapist PT              PT  Recommendation and Plan  Anticipated Discharge Disposition (PT): home with assist, home with home health  Planned Therapy Interventions (PT Eval): balance training, bed mobility training, gait training, home exercise program, patient/family education, stair training, strengthening, transfer training  Therapy Frequency (PT Clinical Impression): daily  Outcome Summary/Treatment Plan (PT)  Anticipated Discharge Disposition (PT): home with assist, home with home health  Patient/Family Concerns, Anticipated Discharge Disposition (PT): Pt not agreeable to HHPT  Plan of Care Reviewed With: patient  Outcome Summary: IP PT eval completed. Pt ambulated 150 ft with RW and CGA, limited by fatigue, weakness, and SOA; VSS on 2LO2NC. Slow gait, unsteady. Demonstrates impaired endurance, balance, srength. Recommend home with assist and HH PT upon d/c, however; pt not agreeable to HH. Will continue to progress pt as able per POC.  Outcome Measures     Row Name 06/18/19 0819             How much help from another person do you currently need...    Turning from your back to your side while in flat bed without using bedrails?  4  -VG      Moving from lying on back to sitting on the side of a flat bed without bedrails?  4  -VG      Moving to and from a bed to a chair (including a wheelchair)?  3  -VG      Standing up from a chair using your arms (e.g., wheelchair, bedside chair)?  3  -VG      Climbing 3-5 steps with a railing?  3  -VG      To walk in hospital room?  3  -VG      AM-PAC 6 Clicks Score  20  -VG         Functional Assessment    Outcome Measure Options  AM-PAC 6 Clicks Basic Mobility (PT)  -VG        User Key  (r) = Recorded By, (t) = Taken By, (c) = Cosigned By    Initials Name Provider Type    Linh Mckeon, PT Physical Therapist         Time Calculation:   PT Charges     Row Name 06/18/19 0819             Time Calculation    Start Time  0819  -VG      PT Received On  06/18/19  -VG      PT Goal Re-Cert Due Date   06/28/19  -        User Key  (r) = Recorded By, (t) = Taken By, (c) = Cosigned By    Initials Name Provider Type    VG Linh Meza, PT Physical Therapist        Therapy Charges for Today     Code Description Service Date Service Provider Modifiers Qty    88496305428 HC PT EVAL MOD COMPLEXITY 4 6/18/2019 Linh Meza, PT GP 1          PT G-Codes  Outcome Measure Options: AM-PAC 6 Clicks Basic Mobility (PT)  AM-PAC 6 Clicks Score: 20      Vidhya Meza, PT  6/18/2019

## 2019-06-18 NOTE — PLAN OF CARE
Problem: Patient Care Overview  Goal: Plan of Care Review   06/18/19 5413   Coping/Psychosocial   Plan of Care Reviewed With patient   OTHER   Outcome Summary IP PT eval completed. Pt ambulated 150 ft with RW and CGA, limited by fatigue, weakness, and SOA; VSS on 2LO2NC. Slow gait, unsteady. Demonstrates impaired endurance, balance, srength. Recommend home with assist and HH PT upon d/c, however; pt not agreeable to HH. Will continue to progress pt as able per POC.

## 2019-06-18 NOTE — THERAPY EVALUATION
Acute Care - Speech Language Pathology   Swallow Initial Evaluation [facility]   Clinical Swallow Evaluation     Patient Name: Doris Miranda  : 1950  MRN: [id number]  Today's Date: 2019  Onset of Illness/Injury or Date of Surgery: 19     Referring Physician: DAYTON Sweeney      Admit Date: 2019    Visit Dx:   No diagnosis found.  Patient Active Problem List   Diagnosis   • Multiple myeloma in relapse (CMS/HCC)   • Diverticulosis of large intestine without hemorrhage   • Tubular adenoma of colon   • Essential hypertension   • COPD (chronic obstructive pulmonary disease) (CMS/HCC)   • Chronic bronchitis (CMS/HCC)   • Arthritis   • Gastroesophageal reflux disease without esophagitis   • Chronic left-sided low back pain with sciatica   • Thrombocytopenia since stem cell transplant 2016   • Hx of autologous stem cell transplant (2016)   • CKD (chronic kidney disease), stage III (CMS/HCC)   • Former smoker   • Non-rheumatic tricuspid valve insufficiency   • Pulmonary hypertension (CMS/HCC)   • Chronic respiratory failure with hypoxia (been noncompliant with outpatient oxygen in past)   • Leg edema, right   • Gait instability   • Acute on chronic respiratory failure with hypoxia (CMS/HCC)   • CAP (community acquired pneumonia)   • Chronic pain   • Debility   • Sepsis due to pneumonia (CMS/HCC)   • Thrombocytopenia (CMS/HCC)   • On home oxygen therapy     Past Medical History:   Diagnosis Date   • Arthritis    • Chronic bronchitis (CMS/HCC)    • Chronic kidney disease    • COPD (chronic obstructive pulmonary disease) (CMS/HCC)    • Hypertension    • Multiple myeloma (CMS/HCC)    • Multiple myeloma, failed remission (CMS/HCC)    • Osteoporosis      Past Surgical History:   Procedure Laterality Date   • LIMBAL STEM CELL TRANSPLANT  2016    @ Dr. Josiah Spicer   • MOUTH SURGERY          SWALLOW EVALUATION (last 72 hours)      SLP Adult Swallow Evaluation     Row Name 19 0910                    Rehab Evaluation    Document Type  evaluation  -MP        Subjective Information  no complaints  -MP        Patient Observations  alert;cooperative  -MP        Patient/Family Observations  No family present  -MP        Patient Effort  good  -MP           General Information    Patient Profile Reviewed  yes  -MP        Pertinent History Of Current Problem  Adm 6/17 w/ sepsis 2' PNA. Hx multiple myeloma w/ relapse, HTN, COPD on nighttime O2 @ home, GERD, CKDIII, former smoker. CXR revealed bibasilar PNA.   -MP        Current Method of Nutrition  regular textures;thin liquids  -MP        Precautions/Limitations, Vision  WFL with corrective lenses  -MP        Precautions/Limitations, Hearing  WFL;for purposes of eval  -MP        Prior Level of Function-Communication  WFL  -MP        Prior Level of Function-Swallowing  no diet consistency restrictions;safe, efficient swallowing in all situations  -MP        Plans/Goals Discussed with  patient;agreed upon  -MP        Barriers to Rehab  none identified  -MP        Patient's Goals for Discharge  patient did not state  -MP           Pain Assessment    Additional Documentation  Pain Scale: FACES Pre/Post-Treatment (Group)  -MP           Pain Scale: FACES Pre/Post-Treatment    Pain: FACES Scale, Pretreatment  0-->no hurt  -MP        Pain: FACES Scale, Post-Treatment  0-->no hurt  -MP           Oral Motor and Function    Dentition Assessment  edentulous, does not have dentures  -MP        Secretion Management  WNL/WFL  -MP        Mucosal Quality  moist, healthy  -MP           Oral Musculature and Cranial Nerve Assessment    Oral Motor General Assessment  WFL  -MP           General Eating/Swallowing Observations    Respiratory Support Currently in Use  nasal cannula  -MP        Eating/Swallowing Skills  self-fed;appropriate self-feeding skills observed  -MP        Positioning During Eating  upright in chair  -MP        Utensils Used  spoon;cup;straw  -MP         Consistencies Trialed  thin liquids;soft textures;regular textures  -MP           Clinical Swallow Eval    Oral Prep Phase  WFL  -MP        Oral Transit  WFL  -MP        Oral Residue  WFL  -MP        Pharyngeal Phase  no overt signs/symptoms of pharyngeal impairment  -MP        Clinical Swallow Evaluation Summary  Clinical swallow evaluation completed. Pt observed w/ breakfast tray - accepted trials of thin H2O via tsp/cup/straw, soft solids, and regular solids. Oral phase WFL despite dentition status. No overt s/sxs aspiration or discomfort w/ any consistency tested. Recommend continue regular diet, thin liquids. Meds whole w/ thin or puree, as pt best tolerates. Follow standard aspiration precautions. No further SLP intervention indicated. Will sign off.  -MP           Clinical Impression    SLP Swallowing Diagnosis  other (see comments) no suspected pharyngeal impairment  -MP        Swallow Criteria for Skilled Therapeutic Interventions Met  no problems identified which require skilled intervention;baseline status  -MP           Recommendations    Therapy Frequency (Swallow)  evaluation only  -MP        SLP Diet Recommendation  regular textures;thin liquids  -MP        Recommended Precautions and Strategies  upright posture during/after eating  -MP        SLP Rec. for Method of Medication Administration  meds whole;with thin liquids;with pudding or applesauce;as tolerated  -        Monitor for Signs of Aspiration  yes;notify SLP if any concerns  -MP        Anticipated Dischage Disposition  unknown  -          User Key  (r) = Recorded By, (t) = Taken By, (c) = Cosigned By    Initials Name Effective Dates    Patrice Dueñas MS, CFY-SLP 08/15/18 -           EDUCATION  The patient has been educated in the following areas:   Dysphagia (Swallowing Impairment).    SLP Recommendation and Plan  SLP Swallowing Diagnosis: other (see comments)(no suspected pharyngeal impairment)  SLP Diet Recommendation: regular  textures, thin liquids  Recommended Precautions and Strategies: upright posture during/after eating  SLP Rec. for Method of Medication Administration: meds whole, with thin liquids, with pudding or applesauce, as tolerated     Monitor for Signs of Aspiration: yes, notify SLP if any concerns     Swallow Criteria for Skilled Therapeutic Interventions Met: no problems identified which require skilled intervention, baseline status  Anticipated Dischage Disposition: unknown     Therapy Frequency (Swallow): evaluation only          Plan of Care Reviewed With: patient  Plan of Care Review  Plan of Care Reviewed With: patient           Time Calculation:   Time Calculation- SLP     Row Name 06/18/19 0927             Time Calculation- SLP    SLP Start Time  0910  -MP      SLP Received On  06/18/19  -        User Key  (r) = Recorded By, (t) = Taken By, (c) = Cosigned By    Initials Name Provider Type    Patrice Dueñas MS, CFY-SLP Speech and Language Pathologist          Therapy Charges for Today     Code Description Service Date Service Provider Modifiers Qty    33367885326 HC ST EVAL ORAL PHARYNG SWALLOW 3 6/18/2019 Patrice Torres MS, CFY-SLP GN 1               Patrice Torres MS, KANE-SLP  6/18/2019

## 2019-06-18 NOTE — PLAN OF CARE
Problem: Patient Care Overview  Goal: Plan of Care Review  Outcome: Ongoing (interventions implemented as appropriate)   06/18/19 5774   Coping/Psychosocial   Plan of Care Reviewed With patient   SLP evaluation completed. No further SLP intervention indicated. Will sign-off. Please re-consult if further concerns. Please see note for further details and recommendations.

## 2019-06-18 NOTE — PROGRESS NOTES
"                  Clinical Nutrition     Nutrition Assessment  Reason for Visit:   Identified at risk by screening criteria, MST score 2+      Patient Name: Doris Miranda  YOB: 1950  MRN: [id number]  Date of Encounter: 06/18/19 11:18 AM  Admission date: 6/17/2019      Nutrition Assessment   Assessment     Admission diagnosis  Sepsis due to PNA    Hospital Problem List  Multiple myeloma in relapse- receiving chemotherapy    Acute on chronic respiratory failure with hypoxia   Debility  Chronic pain  Thrombocytopenia    Applicable PMH/PSxH:   HTN  COPD  GERD  CKD, stage III  Former smoker  Stem cell transplant     Other nutrition related factors:  (6/18) SLP signed off; rec regular texture, thin liquids.     Reported/Observed/Food/Nutrition Related History:   Pt sitting up in chair at time of arrival. Pt reports that she has gained weight in the past few months. She stated that she went from 136 lb to 188 lb. She stated that she does not believe this was medication-related, she believes it was diet related. She reports that she consumes fried foods frequently (French fries, fried bologna, chicken tenders), and she drinks 5-6 pepsi's per day. RD inquired if pt was willing to cut back on pepsi- she stated that she could cut back to 2/day but that was all she was willing to do. Pt reports she is allergic to diet drinks. Pt reports her PO intake has been normal- no c/o N/V. Pt reported no known food allergies at time of visit.     Anthropometrics     Height: 154.9 cm (61\")  Last filed wt: Weight: 84 kg (185 lb 3.2 oz) (06/17/19 1600)  Weight Method: Bed scale    BMI: 34.99 kg/m2   Obese Class I: 30-34.9kg/m2    Ideal Body Weight (IBW) (kg): 48.15    Weight Change   UBW: 136 lb   Weight change: 52 lb weight gain      Labs reviewed   Yes   Results from last 7 days   Lab Units 06/18/19  0731   GLUCOSE mg/dL 118*   BUN mg/dL 15   CREATININE mg/dL 1.03*   SODIUM mmol/L 144   CHLORIDE mmol/L 110*   POTASSIUM mmol/L " 4.2   MAGNESIUM mg/dL 2.0   ALT (SGPT) U/L 9     Results from last 7 days   Lab Units 06/18/19  0731   ALBUMIN g/dL 3.40*     Medications reviewed   Pertinent: rocephin, colace, pepcid, prednisone   PRN: [email or web address]     Current Nutrition Prescription     PO: Diet Regular; Cardiac    Intake: insufficient data    Nutrition Diagnosis     6/18  Problem No nutrition diagnosis at this time   Etiology    Signs/Symptoms      Nutrition Intervention   1.  Follow treatment progress, Care plan reviewed  2. Advise alternate selection, Interview for preferences, Encourage intake    Goal:   General: Nutrition support treatment  PO: Maintain intake    Monitoring/Evaluation:   Per protocol, PO intake, Pertinent labs, Weight, Symptoms      Will Continue to follow per protocol      Shira Barrera RD  Time Spent: 30 minutes

## 2019-06-18 NOTE — PROGRESS NOTES
[facility] Medicine Services  PROGRESS NOTE    Patient Name: Doris Miranda  : 1950  MRN: [id number]    Date of Admission: 2019  Length of Stay: 1  Primary Care Physician: Earl Fuentes MD    Subjective   Subjective     CC:  pneumonia    HPI:  Dyspnea improved, still a little wheezy, no n/v. Overall significantly improved today    Review of Systems  No headache, no chest pain, no palpitations, no abd pain, no n/v    Otherwise ROS is negative except as mentioned in the HPI.    Objective   Objective     Vital Signs:   Temp:  [97.2 °F (36.2 °C)-99 °F (37.2 °C)] 97.2 °F (36.2 °C)  Heart Rate:  [] 90  Resp:  [14-22] 15  BP: (103-129)/(45-78) 113/78  FiO2 (%):  [32 %] 32 %        Physical Exam:  Alert, nontoxic, ox4, pleasant  Nasal canula noted, ncat, oroph clear  rrr  Bilateral wheezes scattered in all lung fields, normal effort  abd soft, nontender  No cce  No extremity rash  Face symmetric, speech clear, equal   Normal affect    Results Reviewed:  I have personally reviewed current lab, radiology, and data and agree.    Results from last 7 days   Lab Units 19   WBC 10*3/mm3 9.48   HEMOGLOBIN g/dL 13.3   HEMATOCRIT % 42.8   PLATELETS 10*3/mm3 74*     Results from last 7 days   Lab Units 19  1812   TROPONIN T ng/mL <0.010     CrCl cannot be calculated (Patient's most recent lab result is older than the maximum 30 days allowed.).    Microbiology Results Abnormal     Procedure Component Value - Date/Time    Legionella Antigen, Urine - Urine, Urine, Clean Catch [[id number]]  (Normal) Collected:  19    Lab Status:  Final result Specimen:  Urine, Clean Catch Updated:  19 0240     LEGIONELLA ANTIGEN, URINE Negative    Respiratory Culture - Sputum, Cough [[id number]] Collected:  19    Lab Status:  Preliminary result Specimen:  Sputum from Cough Updated:  19     Gram Stain Moderate (3+) Gram variable bacilli       Occasional Gram positive cocci      Many (4+) WBCs per low power field      Occasional Epithelial cells per low power field          Imaging Results (last 24 hours)     ** No results found for the last 24 hours. **          Results for orders placed during the hospital encounter of 09/12/17   Adult Transthoracic Echo Complete    Narrative · Left ventricular systolic function is hyperdynamic (EF > 70).  · Mild mitral valve regurgitation is present  · Mild aortic valve regurgitation is present.  · Right ventricular cavity is mildly dilated.  · Calculated right ventricular systolic pressure from tricuspid   regurgitation is 48 mmHg.          I have reviewed the medications:  Scheduled Meds:  acyclovir 400 mg Oral BID   ceftriaxone 1 g Intravenous Q24H   And      doxycycline 100 mg Intravenous Q12H   docusate sodium 100 mg Oral BID   famotidine 40 mg Oral Daily   gabapentin 400 mg Oral Q8H   heparin (porcine) 5,000 Units Subcutaneous Q8H   ipratropium-albuterol 3 mL Nebulization Q4H - RT   PARoxetine 10 mg Oral Daily   sodium chloride 3 mL Intravenous Q12H     Continuous Infusions:  sodium chloride 0.9 % with KCl 20 mEq 100 mL/hr Last Rate: 100 mL/hr (06/18/19 0602)     PRN Meds:.•  acetaminophen  •  albuterol  •  cyclobenzaprine  •  magnesium hydroxide  •  melatonin  •  ondansetron  •  sodium chloride      Assessment/Plan   Assessment / Plan     Active Hospital Problems    Diagnosis POA   • **Sepsis due to pneumonia (CMS/HCC) [J18.9, A41.9] Unknown   • Acute on chronic respiratory failure with hypoxia (CMS/HCC) [J96.21] Yes   • CAP (community acquired pneumonia) [J18.9] Unknown   • Chronic pain [G89.29] Unknown   • Debility [R53.81] Unknown   • Thrombocytopenia (CMS/HCC) [D69.6] Unknown   • On home oxygen therapy [Z99.81] Not Applicable   • Gait instability [R26.81] Yes   • CKD (chronic kidney disease), stage III (CMS/HCC) [N18.3] Yes     Baseline creatinine ~1.5     • Former smoker [Z87.891] Not Applicable   • Hx of  autologous stem cell transplant (March 2016) [Z94.84] Not Applicable     NOT on immunosuppressive therapy d/t chronic TCP s/p stem cell transplant      • Pulmonary hypertension (CMS/[facility]) [I27.20] Yes   • Gastroesophageal reflux disease without esophagitis [K21.9] Yes   • Essential hypertension [I10] Unknown   • COPD (chronic obstructive pulmonary disease) (CMS/[facility]) [J44.9] Unknown   • Multiple myeloma in relapse (CMS/[facility]) [C90.02] Yes            Echo 9/2017: ef ~70%, mild ar, mild ar, ervsp ~48mmhg       Brief Hospital Course to date:  Doris Miranda is a 68 y.o. female w/ multiple myeloma (s/p stem cell transplant, currently in remission and followed by Dr. Epps), ckd 3 (basleine cr ~1.2-1.3), 3l o2 dep copd presented to [facility] er w/ 3 day of cough, wheezing, chills. Temp was 102, was in moderate distress breathing 48/minute. Wbc was 11,000, cxr w/ bibasilar infiltrates. Was transferred to [facility] as oncologist (Dr. Epps) is here    *comm acqu Pneumonia   -cxr outside facility reportedly w/ bibasilar infiltrate  *sepsis, poa (due to above)   -fever 102, wbc 11, respiratory distress, pneumonia at outside facility  *acute on chronic hypoxic respiratory failure    -sats 79%, moderate respiratory distress @ 48resp/min at outside facility  *COPD  *chronic hypoxic resp failure (3L oxygen dependence)  *multiple myeloma (s/p stem cell transplant in remission, followed by Dr. Epps)  *chronic thrombocytopenia (range 70,000-90,000)  *ckd 3 (baseline cr ~1.2-1.3 per old labs)  *htn  *hx pulm htn (echo w/ ervsp ~48mmhg 9/2017)  *mildly elevated d-dimer @ osh    -----------------------------------------------------------------    Plan:  -hypoxia and wheezing improved  -continue rocephin/doxy (day #2 abx); follow cultures  -call [facility] 6/19/19 a.m. to get culture results (note to nurse/huc in computer)  -prednisone 40mg daily x 3 days  -continue scheduled nebs q4h  -f/u VQ scan (already ordered,  low suspicion for p.e. Had fever, changed sputum, already improved on therapy)      A.m. Labs: none; follow cultures    Dispo: home 1-2 days depending on clinical course, cultures, etc    DVT Prophylaxis:  Sq heparin    Disposition: I expect the patient to be discharged to be determined    CODE STATUS:   Code Status and Medical Interventions:   Ordered at: 06/17/19 1746     Limited Support to NOT Include:    Intubation    Cardioversion/Defibrillation     Level Of Support Discussed With:    Health Care Surrogate     Code Status:    No CPR     Medical Interventions (Level of Support Prior to Arrest):    Limited         Electronically signed by Chilo Colon MD, 06/18/19, 7:46 AM.

## 2019-06-18 NOTE — PLAN OF CARE
Problem: Patient Care Overview  Goal: Plan of Care Review  Outcome: Ongoing (interventions implemented as appropriate)   06/18/19 0544   Coping/Psychosocial   Plan of Care Reviewed With patient   OTHER   Outcome Summary VSS; Pt presents at fxl baseline/independent with ADL performance. Pt educated re; pacing and EC. No AE/DME needs identified at this time. No further skilled OT services indicated at this time. Recommend home with assist at discharge. Pt agrees with eval findings and is not interested in HHOT.

## 2019-06-19 LAB
BACTERIA SPEC RESP CULT: NORMAL
GRAM STN SPEC: NORMAL

## 2019-06-19 PROCEDURE — 99233 SBSQ HOSP IP/OBS HIGH 50: CPT | Performed by: INTERNAL MEDICINE

## 2019-06-19 PROCEDURE — 94799 UNLISTED PULMONARY SVC/PX: CPT

## 2019-06-19 PROCEDURE — 25010000002 HEPARIN (PORCINE) PER 1000 UNITS: Performed by: NURSE PRACTITIONER

## 2019-06-19 PROCEDURE — 94660 CPAP INITIATION&MGMT: CPT

## 2019-06-19 PROCEDURE — 25010000002 CEFTRIAXONE PER 250 MG: Performed by: NURSE PRACTITIONER

## 2019-06-19 PROCEDURE — 63710000001 PREDNISONE PER 1 MG: Performed by: INTERNAL MEDICINE

## 2019-06-19 RX ORDER — DOXYCYCLINE 100 MG/1
100 CAPSULE ORAL EVERY 12 HOURS SCHEDULED
Status: DISCONTINUED | OUTPATIENT
Start: 2019-06-19 | End: 2019-06-20 | Stop reason: HOSPADM

## 2019-06-19 RX ORDER — MAGNESIUM SULFATE HEPTAHYDRATE 40 MG/ML
4 INJECTION, SOLUTION INTRAVENOUS AS NEEDED
Status: DISCONTINUED | OUTPATIENT
Start: 2019-06-19 | End: 2019-06-20 | Stop reason: HOSPADM

## 2019-06-19 RX ORDER — MAGNESIUM SULFATE 1 G/100ML
1 INJECTION INTRAVENOUS AS NEEDED
Status: DISCONTINUED | OUTPATIENT
Start: 2019-06-19 | End: 2019-06-20 | Stop reason: HOSPADM

## 2019-06-19 RX ORDER — MAGNESIUM SULFATE HEPTAHYDRATE 40 MG/ML
2 INJECTION, SOLUTION INTRAVENOUS AS NEEDED
Status: DISCONTINUED | OUTPATIENT
Start: 2019-06-19 | End: 2019-06-20 | Stop reason: HOSPADM

## 2019-06-19 RX ADMIN — DOXYCYCLINE 100 MG: 100 CAPSULE ORAL at 21:24

## 2019-06-19 RX ADMIN — IPRATROPIUM BROMIDE AND ALBUTEROL SULFATE 3 ML: 2.5; .5 SOLUTION RESPIRATORY (INHALATION) at 23:39

## 2019-06-19 RX ADMIN — DOXYCYCLINE 100 MG: 100 INJECTION, POWDER, LYOPHILIZED, FOR SOLUTION INTRAVENOUS at 05:34

## 2019-06-19 RX ADMIN — MELATONIN TAB 5 MG 5 MG: 5 TAB at 21:24

## 2019-06-19 RX ADMIN — GABAPENTIN 400 MG: 400 CAPSULE ORAL at 21:24

## 2019-06-19 RX ADMIN — HEPARIN SODIUM 5000 UNITS: 5000 INJECTION, SOLUTION INTRAVENOUS; SUBCUTANEOUS at 05:34

## 2019-06-19 RX ADMIN — HEPARIN SODIUM 5000 UNITS: 5000 INJECTION, SOLUTION INTRAVENOUS; SUBCUTANEOUS at 21:25

## 2019-06-19 RX ADMIN — DOCUSATE SODIUM 100 MG: 100 CAPSULE, LIQUID FILLED ORAL at 09:02

## 2019-06-19 RX ADMIN — IPRATROPIUM BROMIDE AND ALBUTEROL SULFATE 3 ML: 2.5; .5 SOLUTION RESPIRATORY (INHALATION) at 10:35

## 2019-06-19 RX ADMIN — IPRATROPIUM BROMIDE AND ALBUTEROL SULFATE 3 ML: 2.5; .5 SOLUTION RESPIRATORY (INHALATION) at 15:22

## 2019-06-19 RX ADMIN — GUAIFENESIN AND DEXTROMETHORPHAN 5 ML: 100; 10 SYRUP ORAL at 21:24

## 2019-06-19 RX ADMIN — ACYCLOVIR 400 MG: 200 CAPSULE ORAL at 21:24

## 2019-06-19 RX ADMIN — GABAPENTIN 400 MG: 400 CAPSULE ORAL at 13:03

## 2019-06-19 RX ADMIN — CEFTRIAXONE SODIUM 1 G: 1 INJECTION, POWDER, FOR SOLUTION INTRAMUSCULAR; INTRAVENOUS at 17:22

## 2019-06-19 RX ADMIN — PREDNISONE 40 MG: 20 TABLET ORAL at 09:02

## 2019-06-19 RX ADMIN — GABAPENTIN 400 MG: 400 CAPSULE ORAL at 05:34

## 2019-06-19 RX ADMIN — SODIUM CHLORIDE, PRESERVATIVE FREE 3 ML: 5 INJECTION INTRAVENOUS at 21:28

## 2019-06-19 RX ADMIN — IPRATROPIUM BROMIDE AND ALBUTEROL SULFATE 3 ML: 2.5; .5 SOLUTION RESPIRATORY (INHALATION) at 07:31

## 2019-06-19 RX ADMIN — FAMOTIDINE 40 MG: 20 TABLET ORAL at 09:02

## 2019-06-19 RX ADMIN — GUAIFENESIN AND DEXTROMETHORPHAN 5 ML: 100; 10 SYRUP ORAL at 12:27

## 2019-06-19 RX ADMIN — ACYCLOVIR 400 MG: 200 CAPSULE ORAL at 09:02

## 2019-06-19 RX ADMIN — HEPARIN SODIUM 5000 UNITS: 5000 INJECTION, SOLUTION INTRAVENOUS; SUBCUTANEOUS at 13:04

## 2019-06-19 RX ADMIN — PAROXETINE HYDROCHLORIDE 10 MG: 20 TABLET, FILM COATED ORAL at 21:24

## 2019-06-19 RX ADMIN — GUAIFENESIN AND DEXTROMETHORPHAN 5 ML: 100; 10 SYRUP ORAL at 05:40

## 2019-06-19 RX ADMIN — DOCUSATE SODIUM 100 MG: 100 CAPSULE, LIQUID FILLED ORAL at 21:24

## 2019-06-19 RX ADMIN — IPRATROPIUM BROMIDE AND ALBUTEROL SULFATE 3 ML: 2.5; .5 SOLUTION RESPIRATORY (INHALATION) at 02:53

## 2019-06-19 RX ADMIN — IPRATROPIUM BROMIDE AND ALBUTEROL SULFATE 3 ML: 2.5; .5 SOLUTION RESPIRATORY (INHALATION) at 20:02

## 2019-06-19 NOTE — PROGRESS NOTES
[facility] Medicine Services  PROGRESS NOTE    Patient Name: Doris Miranda  : 1950  MRN: [id number]    Date of Admission: 2019  Length of Stay: 2  Primary Care Physician: Earl Fuentes MD    Subjective   Subjective     CC:  pneumonia    HPI:  Dyspnea worse overnight w/ dry coughing but improved this a.m. Doesn't feel quite as well today. No fever. Dry cough. No n/v/d    Review of Systems  No headache, no chest pain, no palpitations, no abd pain, no n/v    Otherwise ROS is negative except as mentioned in the HPI.    Objective   Objective     Vital Signs:   Temp:  [97.3 °F (36.3 °C)-98 °F (36.7 °C)] 97.5 °F (36.4 °C)  Heart Rate:  [] 94  Resp:  [16-23] 18  BP: (106-134)/(64-80) 134/80  FiO2 (%):  [32 %] 32 %        Physical Exam:  Alert, nontoxic, ox4, pleasant  Nasal canula noted, ncat, oroph clear  rrr  Faint bilateral end exp wheezes bilaterally, normal effort at rest  abd soft, nontender  No cce  No extremity rash  Face symmetric, speech clear, equal   Normal affect    Results Reviewed:  I have personally reviewed current lab, radiology, and data and agree.    Results from last 7 days   Lab Units 19  0731 19  2031   WBC 10*3/mm3 7.06 9.48   HEMOGLOBIN g/dL 12.9 13.3   HEMATOCRIT % 41.7 42.8   PLATELETS 10*3/mm3 79* 74*     Results from last 7 days   Lab Units 19  0731 19  1812   SODIUM mmol/L 144  --    POTASSIUM mmol/L 4.2  --    CHLORIDE mmol/L 110*  --    CO2 mmol/L 21.0*  --    BUN mg/dL 15  --    CREATININE mg/dL 1.03*  --    GLUCOSE mg/dL 118*  --    CALCIUM mg/dL 8.6  --    ALT (SGPT) U/L 9  --    AST (SGOT) U/L 8  --    TROPONIN T ng/mL  --  <0.010     Estimated Creatinine Clearance: 51.4 mL/min (A) (by C-G formula based on SCr of 1.03 mg/dL (H)).    Microbiology Results Abnormal     Procedure Component Value - Date/Time    Blood Culture - Blood, Hand, Right [[id number]] Collected:  19 0810    Lab Status:  Preliminary  result Specimen:  Blood from Hand, Right Updated:  06/18/19 1845     Blood Culture No growth at 24 hours    Blood Culture - Blood, Arm, Right [[id number]] Collected:  06/17/19 1801    Lab Status:  Preliminary result Specimen:  Blood from Arm, Right Updated:  06/18/19 1845     Blood Culture No growth at 24 hours      Aerobic bottle only    Respiratory Culture - Sputum, Cough [[id number]] Collected:  06/17/19 2047    Lab Status:  Preliminary result Specimen:  Sputum from Cough Updated:  06/18/19 1047     Respiratory Culture Growth present, too young to evaluate     Gram Stain Moderate (3+) Gram variable bacilli      Occasional Gram positive cocci      Many (4+) WBCs per low power field      Occasional Epithelial cells per low power field    Legionella Antigen, Urine - Urine, Urine, Clean Catch [[id number]]  (Normal) Collected:  06/17/19 2040    Lab Status:  Final result Specimen:  Urine, Clean Catch Updated:  06/18/19 0240     LEGIONELLA ANTIGEN, URINE Negative          Imaging Results (last 24 hours)     Procedure Component Value Units Date/Time    NM Lung Ventilation Perfusion [[id number]] Collected:  06/18/19 1310     Updated:  06/18/19 2143    Narrative:       EXAMINATION: NM LUNG VENTILATION/PERFUSION-06/18/2019:     INDICATION: Hypoxia/? PE.     TECHNIQUE: Radiopharmaceutical: 4.57 mCi of Technetium 99m MAA, IV. 9.71  mCi of Xenon-133 by inhalation.     COMPARISON: 06/18/2019 chest radiograph.     FINDINGS: Wash-in images appear mildly heterogeneous but no ventilatory  defects are seen. Equilibrium images are much more uniform. Washout  images show mildly delayed washout of activity from the lung bases, more  so on the left.     Perfusion images are mildly heterogeneous in a diffuse pattern but no  large or small perfusion defects are identified. The study is considered  low probability for pulmonary embolus.       Impression:       Low probability for pulmonary embolus.     D:  06/18/2019  E:  06/18/2019             This report was finalized on 6/18/2019 9:40 PM by DR. Manjit Schofield MD.       XR Chest 1 View [[id number]] Collected:  06/18/19 0828     Updated:  06/18/19 1033    Narrative:       EXAMINATION: XR CHEST 1 VW- 06/18/2019      INDICATION: pneumonia, sepsis      COMPARISON: 09/13/2017     FINDINGS: Cardiac silhouette borderline enlarged. Chronic lung changes  without focal consolidation. No pneumothorax or significant effusion.  Degenerative changes of the spine.           Impression:       Chronic lung changes similar to prior without acute  cardiopulmonary process.     D:  06/18/2019  E:  06/18/2019                Results for orders placed during the hospital encounter of 09/12/17   Adult Transthoracic Echo Complete    Narrative · Left ventricular systolic function is hyperdynamic (EF > 70).  · Mild mitral valve regurgitation is present  · Mild aortic valve regurgitation is present.  · Right ventricular cavity is mildly dilated.  · Calculated right ventricular systolic pressure from tricuspid   regurgitation is 48 mmHg.          I have reviewed the medications:  Scheduled Meds:    acyclovir 400 mg Oral BID   ceftriaxone 1 g Intravenous Q24H   And      doxycycline 100 mg Intravenous Q12H   docusate sodium 100 mg Oral BID   famotidine 40 mg Oral Daily   gabapentin 400 mg Oral Q8H   heparin (porcine) 5,000 Units Subcutaneous Q8H   ipratropium-albuterol 3 mL Nebulization Q4H - RT   PARoxetine 10 mg Oral Nightly   predniSONE 40 mg Oral Daily   sodium chloride 3 mL Intravenous Q12H     Continuous Infusions:   PRN Meds:.•  acetaminophen  •  albuterol  •  benzonatate  •  cyclobenzaprine  •  guaifenesin-dextromethorphan  •  magnesium hydroxide  •  magnesium sulfate **OR** magnesium sulfate in D5W 1g/100mL (PREMIX) **OR** magnesium sulfate  •  melatonin  •  ondansetron  •  sodium chloride      Assessment/Plan   Assessment / Plan     Active Hospital Problems    Diagnosis POA   • **Sepsis due to pneumonia (CMS/[facility]) [J18.9, A41.9]  Unknown   • Acute on chronic respiratory failure with hypoxia (CMS/[facility]) [J96.21] Yes   • CAP (community acquired pneumonia) [J18.9] Unknown   • Chronic pain [G89.29] Unknown   • Debility [R53.81] Unknown   • Thrombocytopenia (CMS/[facility]) [D69.6] Unknown   • On home oxygen therapy [Z99.81] Not Applicable   • Gait instability [R26.81] Yes   • CKD (chronic kidney disease), stage III (CMS/[facility]) [N18.3] Yes     Baseline creatinine ~1.5     • Former smoker [Z87.891] Not Applicable   • Hx of autologous stem cell transplant (March 2016) [Z94.84] Not Applicable     NOT on immunosuppressive therapy d/t chronic TCP s/p stem cell transplant      • Pulmonary hypertension (CMS/[facility]) [I27.20] Yes   • Gastroesophageal reflux disease without esophagitis [K21.9] Yes   • Essential hypertension [I10] Unknown   • COPD (chronic obstructive pulmonary disease) (CMS/[facility]) [J44.9] Unknown   • Multiple myeloma in relapse (CMS/[facility]) [C90.02] Yes            Echo 9/2017: ef ~70%, mild ar, mild ar, ervsp ~48mmhg       Brief Hospital Course to date:  Doris Miranda is a 68 y.o. female w/ multiple myeloma (s/p stem cell transplant, currently in remission and followed by Dr. Epps), ckd 3 (basleine cr ~1.2-1.3), 3l o2 dep copd presented to [facility] er w/ 3 day of cough, wheezing, chills. Temp was 102, was in moderate distress breathing 48/minute. Wbc was 11,000, cxr w/ bibasilar infiltrates. Was transferred to [facility] as oncologist (Dr. Epps) is here    *comm acqu Pneumonia   -cxr outside facility reportedly w/ bibasilar infiltrate  *copd w/ acute exacerbation  *sepsis, poa (due to above)   -fever 102, wbc 11, respiratory distress, pneumonia at outside facility  *acute on chronic hypoxic respiratory failure (3Lnc chronic use at home)   -sats 79%, moderate respiratory distress @ 48resp/min at outside facility  *hx multiple myeloma (s/p stem cell transplant in remission, followed by Dr. Epps)  *chronic thrombocytopenia (range  70,000-90,000)  *ckd 3 (baseline cr ~1.2-1.3 per old labs)  *htn  *hx pulm htn (echo w/ ervsp ~48mmhg 9/2017)  *mildly elevated d-dimer @ osh   -v/q negative here    -----------------------------------------------------------------    Plan:  -hypoxia and wheezing improved, but doesn't feel quite ready for discharge. Had tough night coughing w/ dyspnea on exertion. Slightly improved this morning    -continue rocephin/doxy (treat through 6/24 evening to complete 1 week;; cx's here negative thus far  -called [facility] 6/19 a.m.,blood cultures negative thus far  -continue prednisone 40mg daily x 5 days (last dose 6/22)  -continue bronchodilators q4h    -am: tamara,mag    *dispo: possible d/c tomorrow w/ h.h. Services p.t./o.t. If continues to improve. Call [facility] prior to d/c to ensure cultures still negative    DVT Prophylaxis:  Sq heparin      CODE STATUS:   Code Status and Medical Interventions:   Ordered at: 06/17/19 9605     Limited Support to NOT Include:    Intubation    Cardioversion/Defibrillation     Level Of Support Discussed With:    Health Care Surrogate     Code Status:    No CPR     Medical Interventions (Level of Support Prior to Arrest):    Limited         Electronically signed by Chilo Colon MD, 06/19/19, 9:08 AM.

## 2019-06-19 NOTE — PLAN OF CARE
Problem: Fall Risk (Adult)  Goal: Identify Related Risk Factors and Signs and Symptoms  Outcome: Ongoing (interventions implemented as appropriate)    Goal: Absence of Fall  Outcome: Ongoing (interventions implemented as appropriate)    Goal: Identify Related Risk Factors and Signs and Symptoms  Outcome: Ongoing (interventions implemented as appropriate)      Problem: Patient Care Overview  Goal: Individualization and Mutuality  Outcome: Ongoing (interventions implemented as appropriate)    Goal: Discharge Needs Assessment  Outcome: Ongoing (interventions implemented as appropriate)    Goal: Interprofessional Rounds/Family Conf  Outcome: Ongoing (interventions implemented as appropriate)

## 2019-06-19 NOTE — PROGRESS NOTES
Continued Stay Note   [place]     Patient Name: Doris Miranda  MRN: [id number]  Today's Date: 6/19/2019    Admit Date: 6/17/2019    Discharge Plan     Row Name 06/19/19 1106       Plan    Plan  Home at discharge    Patient/Family in Agreement with Plan  yes    Plan Comments  Spoke with patient regarding PT and OT  recommendations for home health and [facility] is continuing to refuse the offer for home health services, patient educated on the benefits of home health and reminded her that should she change her mind, she cna reach out to her PCP for home health orders. She did ambulate 150 ft with a RW but is limited by weakness/fatigue. Patient states her spouse/family will be able to assist to her. Home 02 already arranged prior to admission - susie [phone]         Discharge Codes    No documentation.             Susie Anderson RN

## 2019-06-19 NOTE — PLAN OF CARE
Problem: Patient Care Overview  Goal: Plan of Care Review  Outcome: Ongoing (interventions implemented as appropriate)   06/19/19 0015   OTHER   Outcome Summary Patient tolerating 2L NC, and bipap at HS. No complaints voiced. VSS. Will continue to monitor.

## 2019-06-20 VITALS
RESPIRATION RATE: 18 BRPM | DIASTOLIC BLOOD PRESSURE: 77 MMHG | HEART RATE: 94 BPM | BODY MASS INDEX: 34.97 KG/M2 | TEMPERATURE: 97.4 F | WEIGHT: 185.2 LBS | SYSTOLIC BLOOD PRESSURE: 131 MMHG | HEIGHT: 61 IN | OXYGEN SATURATION: 96 %

## 2019-06-20 LAB
ANION GAP SERPL CALCULATED.3IONS-SCNC: 12 MMOL/L
BUN BLD-MCNC: 25 MG/DL (ref 8–23)
BUN/CREAT SERPL: 26.9 (ref 7–25)
CALCIUM SPEC-SCNC: 8.8 MG/DL (ref 8.6–10.5)
CHLORIDE SERPL-SCNC: 106 MMOL/L (ref 98–107)
CO2 SERPL-SCNC: 23 MMOL/L (ref 22–29)
CREAT BLD-MCNC: 0.93 MG/DL (ref 0.57–1)
GFR SERPL CREATININE-BSD FRML MDRD: 60 ML/MIN/1.73
GLUCOSE BLD-MCNC: 82 MG/DL (ref 65–99)
MAGNESIUM SERPL-MCNC: 2.1 MG/DL (ref 1.6–2.4)
POTASSIUM BLD-SCNC: 3.8 MMOL/L (ref 3.5–5.2)
SODIUM BLD-SCNC: 141 MMOL/L (ref 136–145)

## 2019-06-20 PROCEDURE — 25010000002 HEPARIN (PORCINE) PER 1000 UNITS: Performed by: NURSE PRACTITIONER

## 2019-06-20 PROCEDURE — 99239 HOSP IP/OBS DSCHRG MGMT >30: CPT | Performed by: INTERNAL MEDICINE

## 2019-06-20 PROCEDURE — 63710000001 PREDNISONE PER 1 MG: Performed by: INTERNAL MEDICINE

## 2019-06-20 PROCEDURE — 94799 UNLISTED PULMONARY SVC/PX: CPT

## 2019-06-20 PROCEDURE — 80048 BASIC METABOLIC PNL TOTAL CA: CPT | Performed by: INTERNAL MEDICINE

## 2019-06-20 PROCEDURE — 83735 ASSAY OF MAGNESIUM: CPT | Performed by: INTERNAL MEDICINE

## 2019-06-20 PROCEDURE — 94660 CPAP INITIATION&MGMT: CPT

## 2019-06-20 RX ORDER — DOXYCYCLINE 100 MG/1
100 CAPSULE ORAL EVERY 12 HOURS SCHEDULED
Qty: 7 CAPSULE | Refills: 0 | Status: SHIPPED | OUTPATIENT
Start: 2019-06-20 | End: 2019-06-24

## 2019-06-20 RX ORDER — CEFUROXIME AXETIL 500 MG/1
500 TABLET ORAL 2 TIMES DAILY
Qty: 8 TABLET | Refills: 0
Start: 2019-06-20 | End: 2019-06-24

## 2019-06-20 RX ORDER — PREDNISONE 20 MG/1
40 TABLET ORAL DAILY
Qty: 4 TABLET | Refills: 0 | Status: SHIPPED | OUTPATIENT
Start: 2019-06-21 | End: 2019-06-23

## 2019-06-20 RX ORDER — BENZONATATE 200 MG/1
200 CAPSULE ORAL 3 TIMES DAILY PRN
Qty: 15 CAPSULE | Refills: 0 | Status: SHIPPED | OUTPATIENT
Start: 2019-06-20 | End: 2019-06-27

## 2019-06-20 RX ADMIN — BENZONATATE 200 MG: 100 CAPSULE ORAL at 11:11

## 2019-06-20 RX ADMIN — DOCUSATE SODIUM 100 MG: 100 CAPSULE, LIQUID FILLED ORAL at 08:30

## 2019-06-20 RX ADMIN — DOXYCYCLINE 100 MG: 100 CAPSULE ORAL at 08:29

## 2019-06-20 RX ADMIN — IPRATROPIUM BROMIDE AND ALBUTEROL SULFATE 3 ML: 2.5; .5 SOLUTION RESPIRATORY (INHALATION) at 11:39

## 2019-06-20 RX ADMIN — HEPARIN SODIUM 5000 UNITS: 5000 INJECTION, SOLUTION INTRAVENOUS; SUBCUTANEOUS at 05:54

## 2019-06-20 RX ADMIN — IPRATROPIUM BROMIDE AND ALBUTEROL SULFATE 3 ML: 2.5; .5 SOLUTION RESPIRATORY (INHALATION) at 07:13

## 2019-06-20 RX ADMIN — FAMOTIDINE 40 MG: 20 TABLET ORAL at 08:30

## 2019-06-20 RX ADMIN — PREDNISONE 40 MG: 20 TABLET ORAL at 08:30

## 2019-06-20 RX ADMIN — GUAIFENESIN AND DEXTROMETHORPHAN 5 ML: 100; 10 SYRUP ORAL at 05:54

## 2019-06-20 RX ADMIN — ACYCLOVIR 400 MG: 200 CAPSULE ORAL at 08:29

## 2019-06-20 RX ADMIN — GABAPENTIN 400 MG: 400 CAPSULE ORAL at 05:53

## 2019-06-20 NOTE — DISCHARGE SUMMARY
[facility] Medicine Services  DISCHARGE SUMMARY    Patient Name: Doris Miranda  : 1950  MRN: [id number]    Date of Admission: 2019  Date of Discharge:  2019  Primary Care Physician: Earl Fuentes MD    Consults     No orders found from 2019 to 2019.          Hospital Course     Presenting Problem:   Pneumonia [J18.9]  H/O multiple myeloma [Z85.79]  Acute on chronic respiratory failure with hypoxia (CMS/[facility]) [J96.21]    Active Hospital Problems    Diagnosis  POA   • **Sepsis due to pneumonia (CMS/[facility]) [J18.9, A41.9]  Unknown   • Acute on chronic respiratory failure with hypoxia (CMS/[facility]) [J96.21]  Yes   • CAP (community acquired pneumonia) [J18.9]  Unknown   • Chronic pain [G89.29]  Unknown   • Debility [R53.81]  Unknown   • Thrombocytopenia (CMS/[facility]) [D69.6]  Unknown   • On home oxygen therapy [Z99.81]  Not Applicable   • Gait instability [R26.81]  Yes   • CKD (chronic kidney disease), stage III (CMS/[facility]) [N18.3]  Yes   • Former smoker [Z87.891]  Not Applicable   • Hx of autologous stem cell transplant (2016) [Z94.84]  Not Applicable   • Pulmonary hypertension (CMS/[facility]) [I27.20]  Yes   • Gastroesophageal reflux disease without esophagitis [K21.9]  Yes   • Essential hypertension [I10]  Unknown   • COPD (chronic obstructive pulmonary disease) (CMS/[facility]) [J44.9]  Unknown   • Multiple myeloma in relapse (CMS/[facility]) [C90.02]  Yes      Resolved Hospital Problems   No resolved problems to display.      ----------------Final Diagnoses-------------  *Comm acqu Pneumonia              -cxr outside facility reportedly w/ bibasilar infiltrate  *copd w/ acute exacerbation  *sepsis, poa (due to above)  *acute on chronic hypoxic respiratory failure (3Lnc chronic use at home)              -sats 79%, moderate respiratory distress @ 48resp/min at outside facility  *hx multiple myeloma (s/p stem cell transplant in remission, followed by Dr. Epps)  *chronic thrombocytopenia  (range 70,000-90,000)  *ckd 3 (baseline cr ~1.2-1.3 per old labs)  *htn  *hx pulm htn (echo w/ ervsp ~48mmhg 9/2017)  ---------------------------------------------------    Hospital Course:  Doris Miranda is a 68 y.o. female w/ multiple myeloma (s/p stem cell transplant, currently in remission and followed by Dr. Epps), ckd 3 (basleine cr ~1.2-1.3), 3l o2 dep copd presented to [facility] er w/ 3 day of cough, wheezing, chills. Temp was 102, was in moderate distress breathing 48/minute. Wbc was 11,000, cxr w/ bibasilar infiltrates. Was transferred to [facility] as oncologist (Dr. Epps) is at [facility].   Patient was treated with antibiotics, bronchodilators, steroids with significant improvement. Now down to her baseline 2-3 liter baseline oxygen requirement. I have called outside facility ([facility]) and they have confirmed their blood cultures are negative to date. Ours are as well. Patient quite eager to go home. Will continue 4 more days of antibiotics to complete 1 week. Follow up pcp within the week. Return if respiratory status worsens. Patient was offered a nebulizer, which she pleasantly declined. Additionally, patient pleasantly declined home health services.    REcommended follow up:  -final [facility] blood cultures (negative thus far on day of discharge per my phone call with them)    Day of Discharge     HPI:   Breathing better. Wants to go home. No fever. Ambulated. Tolerating po    Review of Systems  No headache, no chest pain    Otherwise ROS is negative except as mentioned in the HPI.    Vital Signs:   Temp:  [97.4 °F (36.3 °C)-98.1 °F (36.7 °C)] 97.4 °F (36.3 °C)  Heart Rate:  [88-98] 97  Resp:  [11-18] 18  BP: (117-140)/(67-94) 131/77  FiO2 (%):  [32 %] 32 %     Physical Exam:  Alert, nontoxic, ox4, comfortable  Ncat, oroph clear, nasal canula in place  rrr  Prolonged exp phase bilaterally, but no wheezes, normal effort at rest  abd soft,  nontender  No cce  No extremity rash    Pertinent  and/or Most Recent Results     Results from last 7 days   Lab Units 06/20/19  0646 06/18/19  0731 06/17/19 2031   WBC 10*3/mm3  --  7.06 9.48   HEMOGLOBIN g/dL  --  12.9 13.3   HEMATOCRIT %  --  41.7 42.8   PLATELETS 10*3/mm3  --  79* 74*   SODIUM mmol/L 141 144  --    POTASSIUM mmol/L 3.8 4.2  --    CHLORIDE mmol/L 106 110*  --    CO2 mmol/L 23.0 21.0*  --    BUN mg/dL 25* 15  --    CREATININE mg/dL 0.93 1.03*  --    GLUCOSE mg/dL 82 118*  --    CALCIUM mg/dL 8.8 8.6  --      Results from last 7 days   Lab Units 06/18/19  0731   BILIRUBIN mg/dL 0.3   ALK PHOS U/L 90   ALT (SGPT) U/L 9   AST (SGOT) U/L 8           Invalid input(s): TG, LDLCALC, LDLREALC  Results from last 7 days   Lab Units 06/17/19  1812   TROPONIN T ng/mL <0.010       Brief Urine Lab Results  (Last result in the past 365 days)      Color   Clarity   Blood   Leuk Est   Nitrite   Protein   CREAT   Urine HCG        06/17/19 2040 Yellow Clear Negative Negative Negative Trace               Microbiology Results Abnormal     Procedure Component Value - Date/Time    Blood Culture - Blood, Hand, Right [[id number]] Collected:  06/17/19 1757    Lab Status:  Preliminary result Specimen:  Blood from Hand, Right Updated:  06/19/19 1845     Blood Culture No growth at 2 days    Blood Culture - Blood, Arm, Right [[id number]] Collected:  06/17/19 1801    Lab Status:  Preliminary result Specimen:  Blood from Arm, Right Updated:  06/19/19 1845     Blood Culture No growth at 2 days      Aerobic bottle only    Respiratory Culture - Sputum, Cough [[id number]] Collected:  06/17/19 2047    Lab Status:  Final result Specimen:  Sputum from Cough Updated:  06/19/19 1321     Respiratory Culture Light growth (2+) Normal Respiratory Nya     Gram Stain Moderate (3+) Gram variable bacilli      Occasional Gram positive cocci      Many (4+) WBCs per low power field      Occasional Epithelial cells per low power field    Legionella  Antigen, Urine - Urine, Urine, Clean Catch [[id number]]  (Normal) Collected:  06/17/19 2040    Lab Status:  Final result Specimen:  Urine, Clean Catch Updated:  06/18/19 0240     LEGIONELLA ANTIGEN, URINE Negative          Imaging Results (all)     Procedure Component Value Units Date/Time    NM Lung Ventilation Perfusion [[id number]] Collected:  06/18/19 1310     Updated:  06/18/19 2143    Narrative:       EXAMINATION: NM LUNG VENTILATION/PERFUSION-06/18/2019:     INDICATION: Hypoxia/? PE.     TECHNIQUE: Radiopharmaceutical: 4.57 mCi of Technetium 99m MAA, IV. 9.71  mCi of Xenon-133 by inhalation.     COMPARISON: 06/18/2019 chest radiograph.     FINDINGS: Wash-in images appear mildly heterogeneous but no ventilatory  defects are seen. Equilibrium images are much more uniform. Washout  images show mildly delayed washout of activity from the lung bases, more  so on the left.     Perfusion images are mildly heterogeneous in a diffuse pattern but no  large or small perfusion defects are identified. The study is considered  low probability for pulmonary embolus.       Impression:       Low probability for pulmonary embolus.     D:  06/18/2019  E:  06/18/2019            This report was finalized on 6/18/2019 9:40 PM by DR. Manjit Schofield MD.       XR Chest 1 View [[id number]] Collected:  06/18/19 0828     Updated:  06/18/19 1033    Narrative:       EXAMINATION: XR CHEST 1 VW- 06/18/2019      INDICATION: pneumonia, sepsis      COMPARISON: 09/13/2017     FINDINGS: Cardiac silhouette borderline enlarged. Chronic lung changes  without focal consolidation. No pneumothorax or significant effusion.  Degenerative changes of the spine.           Impression:       Chronic lung changes similar to prior without acute  cardiopulmonary process.     D:  06/18/2019  E:  06/18/2019                Results for orders placed in visit on 03/14/18   Duplex Venous Lower Extremity - Right CAR    Narrative · No evidence of DVT in RLE. All veins  visualized were compressible       .         Results for orders placed in visit on 03/14/18   Duplex Venous Lower Extremity - Right CAR    Narrative · No evidence of DVT in RLE. All veins visualized were compressible       .         Results for orders placed during the hospital encounter of 09/12/17   Adult Transthoracic Echo Complete    Narrative · Left ventricular systolic function is hyperdynamic (EF > 70).  · Mild mitral valve regurgitation is present  · Mild aortic valve regurgitation is present.  · Right ventricular cavity is mildly dilated.  · Calculated right ventricular systolic pressure from tricuspid   regurgitation is 48 mmHg.           Order Current Status    Immunoglobulins A/E/G/M Serum In process    Blood Culture - Blood, Arm, Right Preliminary result    Blood Culture - Blood, Hand, Right Preliminary result        Discharge Details        Discharge Medications      New Medications      Instructions Start Date   benzonatate 200 MG capsule  Commonly known as:  TESSALON   200 mg, Oral, 3 Times Daily PRN      cefuroxime 500 MG tablet  Commonly known as:  CEFTIN   500 mg, Oral, 2 Times Daily      doxycycline 100 MG capsule  Commonly known as:  MONODOX   100 mg, Oral, Every 12 Hours Scheduled      PHARMACY MEDS TO BED CONSULT   Does not apply, Daily      predniSONE 20 MG tablet  Commonly known as:  DELTASONE   40 mg, Oral, Daily   Start Date:  6/21/2019        Continue These Medications      Instructions Start Date   acyclovir 400 MG tablet  Commonly known as:  ZOVIRAX   400 mg, Oral, 2 Times Daily With Meals      albuterol sulfate  (90 Base) MCG/ACT inhaler  Commonly known as:  PROVENTIL HFA;VENTOLIN HFA;PROAIR HFA   2 puffs, Inhalation, Every 6 Hours PRN      cyclobenzaprine 10 MG tablet  Commonly known as:  FLEXERIL   10 mg, Oral, 3 Times Daily PRN      esomeprazole 20 MG capsule  Commonly known as:  nexIUM   20 mg, Oral, 2 Times Daily      gabapentin 400 MG capsule  Commonly known as:   NEURONTIN   400 mg, Oral, 3 Times Daily      Melatonin 5 MG chewable tablet   5 mg, Oral, Nightly      PARoxetine 10 MG tablet  Commonly known as:  PAXIL   10 mg, Oral, Every Night at Bedtime             No Known Allergies      Discharge Disposition:  Home or Self Care    Discharge Diet:  Diet Order   Procedures   • Diet Regular; Cardiac         Discharge Activity:         CODE STATUS:    Code Status and Medical Interventions:   Ordered at: 06/17/19 1746     Limited Support to NOT Include:    Intubation    Cardioversion/Defibrillation     Level Of Support Discussed With:    Health Care Surrogate     Code Status:    No CPR     Medical Interventions (Level of Support Prior to Arrest):    Limited         Future Appointments   Date Time Provider Department Center   7/18/2019 11:00 AM  [facility]   7/26/2019 11:45 AM Joseph Mckinley MD [facility]   8/6/2019 11:30 AM Earl Fuentes MD [facility] None       Additional Instructions for the Follow-ups that You Need to Schedule     Discharge Follow-up with PCP   As directed       Currently Documented PCP:    Earl Fuentes MD    PCP Phone Number:    [phone]     Follow Up Details:  1 week after discharge               Time Spent on Discharge:  35 min    Electronically signed by Chilo Colon MD, 06/20/19, 10:34 AM.

## 2019-06-20 NOTE — THERAPY DISCHARGE NOTE
Acute Care - Physical Therapy Discharge Summary  [facility]       Patient Name: Doris Miranda  : 1950  MRN: [id number]    Today's Date: 2019  Onset of Illness/Injury or Date of Surgery: 19    Date of Referral to PT: 19  Referring Physician: DAYTON Lipscomb      Admit Date: 2019      PT Recommendation and Plan    Visit Dx:  No diagnosis found.    Outcome Measures     Row Name 19 1305 19 0819          How much help from another person do you currently need...    Turning from your back to your side while in flat bed without using bedrails?  --  4  -VG     Moving from lying on back to sitting on the side of a flat bed without bedrails?  --  4  -VG     Moving to and from a bed to a chair (including a wheelchair)?  --  3  -VG     Standing up from a chair using your arms (e.g., wheelchair, bedside chair)?  --  3  -VG     Climbing 3-5 steps with a railing?  --  3  -VG     To walk in hospital room?  --  3  -VG     AM-PAC 6 Clicks Score  --  20  -VG        How much help from another is currently needed...    Putting on and taking off regular lower body clothing?  4  -TA  --     Bathing (including washing, rinsing, and drying)  4  -TA  --     Toileting (which includes using toilet bed pan or urinal)  4  -TA  --     Putting on and taking off regular upper body clothing  4  -TA  --     Taking care of personal grooming (such as brushing teeth)  4  -TA  --     Eating meals  4  -TA  --     Score  24  -TA  --        Functional Assessment    Outcome Measure Options  AM-PAC 6 Clicks Daily Activity (OT)  -TA  AM-PAC 6 Clicks Basic Mobility (PT)  -VG       User Key  (r) = Recorded By, (t) = Taken By, (c) = Cosigned By    Initials Name Provider Type    TA Claudio Gamino, OT Occupational Therapist    VG Linh Meza, PT Physical Therapist              Rehab Goal Summary     Row Name 19 1422             Physical Therapy Goals    Bed Mobility Goal Selection (PT)  bed mobility, PT  goal 1  -VG      Transfer Goal Selection (PT)  transfer, PT goal 1  -VG      Gait Training Goal Selection (PT)  gait training, PT goal 1  -VG         Bed Mobility Goal 1 (PT)    Activity/Assistive Device (Bed Mobility Goal 1, PT)  sit to supine/supine to sit  -VG      [place] Level/Cues Needed (Bed Mobility Goal 1, PT)  independent  -VG      Time Frame (Bed Mobility Goal 1, PT)  long term goal (LTG);2 weeks  -VG      Progress/Outcomes (Bed Mobility Goal 1, PT)  goal partially met;discharged from facility  -VG         Transfer Goal 1 (PT)    Activity/Assistive Device (Transfer Goal 1, PT)  sit-to-stand/stand-to-sit;walker, rolling  -VG      [place] Level/Cues Needed (Transfer Goal 1, PT)  conditional independence  -VG      Time Frame (Transfer Goal 1, PT)  long term goal (LTG);2 weeks  -VG      Progress/Outcome (Transfer Goal 1, PT)  goal not met;discharged from facility  -VG         Gait Training Goal 1 (PT)    Activity/Assistive Device (Gait Training Goal 1, PT)  gait (walking locomotion);assistive device use;walker, rolling  -VG      [place] Level (Gait Training Goal 1, PT)  conditional independence  -VG      Distance (Gait Goal 1, PT)  300  -VG      Time Frame (Gait Training Goal 1, PT)  long term goal (LTG);2 weeks  -VG      Progress/Outcome (Gait Training Goal 1, PT)  goal not met;discharged from facility  -VG         Patient Education Goal (PT)    Activity (Patient Education Goal, PT)  HEP  -VG      [place]/Cues/Accuracy (Memory Goal 2, PT)  independent  -VG      Time Frame (Patient Education Goal, PT)  long term goal (LTG);2 weeks  -VG      Progress/Outcome (Patient Education Goal, PT)  goal not met;discharged from facility  -VG        User Key  (r) = Recorded By, (t) = Taken By, (c) = Cosigned By    Initials Name Provider Type Discipline    Linh Mckeon, PT Physical Therapist PT              PT Discharge Summary  Anticipated Discharge Disposition (PT): home with assist, home with  home health  Reason for Discharge: Discharge from facility  Outcomes Achieved: Patient able to partially acheive established goals  Discharge Destination: Home with assist      Vidhya Meza, PT   6/20/2019

## 2019-06-20 NOTE — PLAN OF CARE
Problem: Patient Care Overview  Goal: Plan of Care Review  Outcome: Ongoing (interventions implemented as appropriate)   06/20/19 4880   OTHER   Outcome Summary Robitussin given for cough, patient states she feels better after recieiving. Patient remains on 2L NC, wore bipap from 1647-7921. No c/o pain. VSS. Will continue to monitor.

## 2019-06-20 NOTE — PROGRESS NOTES
Continued Stay Note  [facility]     Patient Name: Doris Miranda  MRN: [id number]  Today's Date: 6/20/2019    Admit Date: 6/17/2019    Discharge Plan     Row Name 06/20/19 1028       Plan    Plan  Home at discharge - today     Patient/Family in Agreement with Plan  yes    Final Discharge Disposition Code  01 - home or self-care    Final Note  Spoke with patient at bedside, she is aware she will be discharged home today. She has home oxygen already arranged with [facility] and her  plans to bring her portable tank for the ride home. She was offered Home health again but continues to refuse stating she gets around fine at home and has her  if needed. She is aware of her upcoming follow-ups as well.  will transport home - susie [phone]         Discharge Codes    No documentation.             Susie Anderson RN

## 2019-06-21 ENCOUNTER — TRANSITIONAL CARE MANAGEMENT TELEPHONE ENCOUNTER (OUTPATIENT)
Dept: INTERNAL MEDICINE | Facility: CLINIC | Age: 69
End: 2019-06-21

## 2019-06-21 ENCOUNTER — READMISSION MANAGEMENT (OUTPATIENT)
Dept: CALL CENTER | Facility: HOSPITAL | Age: 69
End: 2019-06-21

## 2019-06-21 LAB
IGA SERPL-MCNC: 105 MG/DL (ref 87–352)
IGG SERPL-MCNC: 389 MG/DL (ref 700–1600)
IGM SERPL-MCNC: 34 MG/DL (ref 26–217)
TOTAL IGE SMQN RAST: 44 IU/ML (ref 6–495)

## 2019-06-21 NOTE — OUTREACH NOTE
[place] call completed and has upcoming hospital follow up with PCP Dr Fuentes 6/27/19. See TCM call flowsheet for details.    Pt states she is feeling somewhat better, somewhat not. She is still not breathing as easily. Still has cough that is occasionally productive with clear to sometimes grayish-yellow phlegm. She states wearing her O2 at 3L at all times. She states taking the tessalon rx'd at the hospital for cough but can't tell yet if it's helping. She also reports diarrhea that started yesterday at home. Discussed probiotics and plenty of fluids with pt and she verb understanding. She denies pain, f/c, n/v. She states she slept well last night. Encd her to report back to ER over the weekend if her symptoms worsen or she develops a fever and she verb understanding. She denies any questions or needs at this time and confirms pcp appt.

## 2019-06-22 LAB
BACTERIA SPEC AEROBE CULT: NORMAL

## 2019-06-22 NOTE — OUTREACH NOTE
Prep Survey      Responses   Facility patient discharged from?  [facility]   Is patient eligible?  Yes   Discharge diagnosis  Sepsis due to PNA   Does the patient have one of the following disease processes/diagnoses(primary or secondary)?  COPD/Pneumonia   Does the patient have Home health ordered?  No   Is there a DME ordered?  Yes   What DME was ordered?  Oxygen via [employer]   Prep survey completed?  Yes          Ameena Faustin RN

## 2019-06-24 ENCOUNTER — TELEPHONE (OUTPATIENT)
Dept: ONCOLOGY | Facility: CLINIC | Age: 69
End: 2019-06-24

## 2019-06-24 DIAGNOSIS — D80.1 HYPOGAMMAGLOBULINEMIA, ACQUIRED (HCC): ICD-10-CM

## 2019-06-24 RX ORDER — MEPERIDINE HYDROCHLORIDE 50 MG/ML
25 INJECTION INTRAMUSCULAR; INTRAVENOUS; SUBCUTANEOUS
Status: CANCELLED | OUTPATIENT
Start: 2019-10-18 | End: 2019-08-14

## 2019-06-24 RX ORDER — DIPHENHYDRAMINE HYDROCHLORIDE 50 MG/ML
50 INJECTION INTRAMUSCULAR; INTRAVENOUS AS NEEDED
Status: CANCELLED | OUTPATIENT
Start: 2019-07-16

## 2019-06-24 RX ORDER — FAMOTIDINE 10 MG/ML
20 INJECTION, SOLUTION INTRAVENOUS AS NEEDED
Status: CANCELLED | OUTPATIENT
Start: 2019-10-18

## 2019-06-24 RX ORDER — DIPHENHYDRAMINE HCL 25 MG
25 CAPSULE ORAL ONCE
Status: CANCELLED | OUTPATIENT
Start: 2019-10-18

## 2019-06-24 RX ORDER — ACETAMINOPHEN 325 MG/1
650 TABLET ORAL ONCE
Status: CANCELLED | OUTPATIENT
Start: 2019-10-18

## 2019-06-24 RX ORDER — FAMOTIDINE 10 MG/ML
20 INJECTION, SOLUTION INTRAVENOUS AS NEEDED
Status: CANCELLED | OUTPATIENT
Start: 2019-07-16

## 2019-06-24 RX ORDER — SODIUM CHLORIDE 9 MG/ML
250 INJECTION, SOLUTION INTRAVENOUS ONCE
Status: CANCELLED | OUTPATIENT
Start: 2019-07-16

## 2019-06-24 RX ORDER — DIPHENHYDRAMINE HYDROCHLORIDE 50 MG/ML
50 INJECTION INTRAMUSCULAR; INTRAVENOUS AS NEEDED
Status: CANCELLED | OUTPATIENT
Start: 2019-10-18

## 2019-06-24 RX ORDER — ACETAMINOPHEN 325 MG/1
650 TABLET ORAL ONCE
Status: CANCELLED | OUTPATIENT
Start: 2019-07-16

## 2019-06-24 RX ORDER — SODIUM CHLORIDE 9 MG/ML
250 INJECTION, SOLUTION INTRAVENOUS ONCE
Status: CANCELLED | OUTPATIENT
Start: 2019-10-18

## 2019-06-24 RX ORDER — MEPERIDINE HYDROCHLORIDE 50 MG/ML
25 INJECTION INTRAMUSCULAR; INTRAVENOUS; SUBCUTANEOUS
Status: CANCELLED | OUTPATIENT
Start: 2019-07-16 | End: 2019-07-17

## 2019-06-24 RX ORDER — DIPHENHYDRAMINE HCL 25 MG
25 CAPSULE ORAL ONCE
Status: CANCELLED | OUTPATIENT
Start: 2019-07-16

## 2019-06-24 NOTE — TELEPHONE ENCOUNTER
Called patient to inform that Dr. Epps has decided that patient will need IVIG monthly related to IGG lab results.  Patient verbalized understanding.  Referral Coordinator  to schedule patient and call with date and time.

## 2019-06-24 NOTE — PROGRESS NOTES
68-year-old lady with multiple myeloma who underwent treatment with induction chemotherapy followed by autologous stem cell transplantation.  She is almost 2 years out from a transplantation however she does have recurrent respiratory infections.  I checked her immunoglobulin level and her IgG level is fairly low.  I have recommended she consider monthly IVIG infusions.  This will at least help mitigate her infection risk.  She was only recently admitted with pneumonia.  We will see if we can get that approved through her insurance.      .  Component      Latest Ref Rng & Units 6/18/2019   IgG      700 - 1600 mg/dL 389 (L)   IgA      87 - 352 mg/dL 105   IgM      26 - 217 mg/dL 34   IgE      6 - 495 IU/mL 44

## 2019-06-25 ENCOUNTER — READMISSION MANAGEMENT (OUTPATIENT)
Dept: CALL CENTER | Facility: HOSPITAL | Age: 69
End: 2019-06-25

## 2019-06-25 NOTE — OUTREACH NOTE
COPD/PN Week 1 Survey      Responses   Facility patient discharged from?  [facility]   Does the patient have one of the following disease processes/diagnoses(primary or secondary)?  COPD/Pneumonia   Is there a successful TCM telephone encounter documented?  Yes          Dorinda Herron RN

## 2019-06-27 ENCOUNTER — OFFICE VISIT (OUTPATIENT)
Dept: INTERNAL MEDICINE | Facility: CLINIC | Age: 69
End: 2019-06-27

## 2019-06-27 VITALS
DIASTOLIC BLOOD PRESSURE: 74 MMHG | SYSTOLIC BLOOD PRESSURE: 142 MMHG | BODY MASS INDEX: 35.87 KG/M2 | HEIGHT: 61 IN | HEART RATE: 68 BPM | WEIGHT: 190 LBS

## 2019-06-27 DIAGNOSIS — K57.30 DIVERTICULOSIS OF LARGE INTESTINE WITHOUT HEMORRHAGE: ICD-10-CM

## 2019-06-27 DIAGNOSIS — G89.29 OTHER CHRONIC PAIN: ICD-10-CM

## 2019-06-27 DIAGNOSIS — D69.59 THROMBOCYTOPENIA DUE TO DRUGS: Chronic | ICD-10-CM

## 2019-06-27 DIAGNOSIS — T50.905A THROMBOCYTOPENIA DUE TO DRUGS: Chronic | ICD-10-CM

## 2019-06-27 DIAGNOSIS — R60.0 LOCALIZED EDEMA: ICD-10-CM

## 2019-06-27 DIAGNOSIS — I10 ESSENTIAL HYPERTENSION: Primary | Chronic | ICD-10-CM

## 2019-06-27 DIAGNOSIS — I27.20 PULMONARY HYPERTENSION (HCC): Chronic | ICD-10-CM

## 2019-06-27 DIAGNOSIS — N18.30 CKD (CHRONIC KIDNEY DISEASE), STAGE III (HCC): Chronic | ICD-10-CM

## 2019-06-27 DIAGNOSIS — J44.9 CHRONIC OBSTRUCTIVE PULMONARY DISEASE, UNSPECIFIED COPD TYPE (HCC): ICD-10-CM

## 2019-06-27 DIAGNOSIS — K21.9 GASTROESOPHAGEAL REFLUX DISEASE WITHOUT ESOPHAGITIS: Chronic | ICD-10-CM

## 2019-06-27 DIAGNOSIS — C90.02 MULTIPLE MYELOMA IN RELAPSE (HCC): ICD-10-CM

## 2019-06-27 DIAGNOSIS — R26.81 GAIT INSTABILITY: ICD-10-CM

## 2019-06-27 PROBLEM — J18.9 CAP (COMMUNITY ACQUIRED PNEUMONIA): Status: RESOLVED | Noted: 2019-06-17 | Resolved: 2019-06-27

## 2019-06-27 PROBLEM — J96.21 ACUTE ON CHRONIC RESPIRATORY FAILURE WITH HYPOXIA: Status: RESOLVED | Noted: 2019-06-17 | Resolved: 2019-06-27

## 2019-06-27 PROCEDURE — 99495 TRANSJ CARE MGMT MOD F2F 14D: CPT | Performed by: INTERNAL MEDICINE

## 2019-06-27 RX ORDER — TRIAMTERENE AND HYDROCHLOROTHIAZIDE 37.5; 25 MG/1; MG/1
1 TABLET ORAL DAILY PRN
Qty: 30 TABLET | Refills: 2 | Status: SHIPPED | OUTPATIENT
Start: 2019-06-27 | End: 2019-08-27 | Stop reason: SDUPTHER

## 2019-06-27 NOTE — PROGRESS NOTES
Transitional Care Follow Up Visit  Subjective     oDris Miranda is a 68 y.o. female who presents for a transitional care management visit.    Within 48 business hours after discharge our office contacted her via telephone to coordinate her care and needs.      I reviewed and discussed the details of that call along with the discharge summary, hospital problems, inpatient lab results, inpatient diagnostic studies, and consultation reports with Doris.     Current outpatient and discharge medications have been reconciled for the patient.    Date of TCM Phone Call 9/20/2017 6/21/2019   [facility]   Date of Admission 9/12/2017 6/17/2019   Date of Discharge 9/18/2017 6/20/2019   Discharge Disposition Home or Self Care Home or Self Care     Risk for Readmission (LACE) Score: 12 (6/20/2019  6:00 AM)      History of Present Illness   Course During Hospital Stay: Here for f/u after recent hospitalization for CAP.  Was treated with IV antibiotics and transitioned to Ceftin/Doxy for 4 additional days.  No fever or chills.  No nausea or emesis.  Some diarrhea but now is better.  Breathing is almost to baseline.  Using O2 and nebs at home.         The following portions of the patient's history were reviewed and updated as appropriate: allergies, current medications, past family history, past medical history, past social history, past surgical history and problem list.    Review of Systems   Constitutional: Positive for fatigue and unexpected weight change. Negative for chills and fever.   HENT: Negative for congestion, ear pain, hearing loss, rhinorrhea, sinus pressure, sore throat and trouble swallowing.    Eyes: Negative for discharge and itching.   Respiratory: Negative for cough, chest tightness and shortness of breath.    Cardiovascular: Positive for leg swelling (improved). Negative for chest pain and palpitations.   Gastrointestinal: Negative for abdominal pain, blood in  stool, constipation, diarrhea and vomiting.        11/13 colonoscopy with hyperplastic polyps  12/16 colonoscopy with TA times one   Endocrine: Negative for polydipsia and polyuria.   Genitourinary: Negative for difficulty urinating, dysuria, enuresis, frequency, hematuria and urgency.   Musculoskeletal: Positive for arthralgias, back pain and gait problem. Negative for joint swelling.   Skin: Negative for rash and wound.   Allergic/Immunologic: Negative for immunocompromised state.   Neurological: Positive for weakness and numbness. Negative for dizziness, syncope, light-headedness and headaches.   Hematological: Does not bruise/bleed easily.   Psychiatric/Behavioral: Negative for behavioral problems, dysphoric mood and sleep disturbance. The patient is not nervous/anxious.        Objective   Physical Exam   Constitutional: She is oriented to person, place, and time. She appears well-developed and well-nourished.   HENT:   Head: Normocephalic and atraumatic.   Right Ear: External ear normal.   Left Ear: External ear normal.   Mouth/Throat: Oropharynx is clear and moist.   Eyes: Conjunctivae and EOM are normal.   Neck: Normal range of motion. Neck supple.   Cardiovascular: Normal rate, regular rhythm and normal heart sounds.   Pulmonary/Chest: Effort normal. She has rales (mild bibasilar).   Abdominal: Soft. Bowel sounds are normal.   Musculoskeletal: She exhibits edema.   Using cane  + Left SLE but improved    MOTOR-LE 4/5, UE 4/5    Unstable gait   Lymphadenopathy:     She has no cervical adenopathy.   Neurological: She is alert and oriented to person, place, and time.   Skin: Skin is warm and dry.   Psychiatric: She has a normal mood and affect. Her behavior is normal. Thought content normal.       Assessment/Plan   Doris was seen today for follow-up.    Diagnoses and all orders for this visit:    Essential hypertension  -     triamterene-hydrochlorothiazide (MAXZIDE-25) 37.5-25 MG per tablet; Take 1 tablet by  mouth Daily As Needed (edema).    Chronic obstructive pulmonary disease, unspecified COPD type (CMS/HCC)    Pulmonary hypertension (CMS/HCC)    Gastroesophageal reflux disease without esophagitis    Diverticulosis of large intestine without hemorrhage    Other chronic pain    CKD (chronic kidney disease), stage III (CMS/HCC)    Thrombocytopenia since stem cell transplant March 2016    Multiple myeloma in relapse (CMS/HCC)    Gait instability    Localized edema  -     triamterene-hydrochlorothiazide (MAXZIDE-25) 37.5-25 MG per tablet; Take 1 tablet by mouth Daily As Needed (edema).                Htn/edema-reduce dose of gabapentin, start maxzide  copd-not an issue since stopping tob, proventil prn, stalbe  gerd/gastritis-cont ppi, stable  MM/back pain with radiculopathy-f/u Dr Epps and  ortho  insomnia-cont melatonin, stable  Abnormal lung sounds-CT from [facility] noted  Leg edema-see above  Gait instability/chronic back pain-cont to need PMD  High risk meds-labs noted for hospital     Labs noted and dw patient

## 2019-06-28 ENCOUNTER — READMISSION MANAGEMENT (OUTPATIENT)
Dept: CALL CENTER | Facility: HOSPITAL | Age: 69
End: 2019-06-28

## 2019-06-28 NOTE — OUTREACH NOTE
COPD/PN Week 2 Survey      Responses   Facility patient discharged from?  [place]   Does the patient have one of the following disease processes/diagnoses(primary or secondary)?  COPD/Pneumonia   Week 2 attempt successful?  Yes   Call start time  1103   Call end time  1104   Discharge diagnosis  Sepsis due to PNA   Medication alerts for this patient  Maxide begun   Meds reviewed with patient/caregiver?  Yes   Is the patient having any side effects they believe may be caused by any medication additions or changes?  No   Does the patient have all medications ordered at discharge?  Yes   Is the patient taking all medications as directed (includes completed medication regime)?  Yes   Does the patient have a primary care provider?   Yes   Does the patient have an appointment with their PCP or pulmonologist within 7 days of discharge?  Yes   Comments regarding PCP  Dr Fuentes/ Seen by PCP yesterday 06/27/2019   Has the patient kept scheduled appointments due by today?  Yes   Has home health visited the patient within 72 hours of discharge?  N/A   Psychosocial issues?  No   Did the patient receive a copy of their discharge instructions?  Yes   Nursing interventions  Reviewed instructions with patient   What is the patient's perception of their health status since discharge?  Improving   Nursing Interventions  Nurse provided patient education   Is the patient/caregiver able to teach back the hierarchy of who to call/visit for symptoms/problems? PCP, Specialist, Home health nurse, Urgent Care, ED, 911  Yes   Is the patient/caregiver able to teach back signs and symptoms of worsening condition:  Fever/chills, Shortness of breath, Chest pain   Is the patient/caregiver able to teach back importance of completing antibiotic course of treatment?  Yes   Week 2 call completed?  Yes          Sterling Rubio RN

## 2019-07-08 RX ORDER — ACYCLOVIR 400 MG/1
400 TABLET ORAL 2 TIMES DAILY WITH MEALS
Qty: 60 TABLET | Refills: 5 | Status: SHIPPED | OUTPATIENT
Start: 2019-07-08 | End: 2020-01-06 | Stop reason: SDUPTHER

## 2019-07-15 ENCOUNTER — INFUSION (OUTPATIENT)
Dept: ONCOLOGY | Facility: HOSPITAL | Age: 69
End: 2019-07-15

## 2019-07-15 VITALS
HEART RATE: 75 BPM | WEIGHT: 179 LBS | DIASTOLIC BLOOD PRESSURE: 82 MMHG | TEMPERATURE: 97.3 F | SYSTOLIC BLOOD PRESSURE: 112 MMHG | RESPIRATION RATE: 18 BRPM | BODY MASS INDEX: 33.84 KG/M2

## 2019-07-15 DIAGNOSIS — C90.01 MULTIPLE MYELOMA IN REMISSION (HCC): Primary | ICD-10-CM

## 2019-07-15 DIAGNOSIS — D80.1 HYPOGAMMAGLOBULINEMIA, ACQUIRED (HCC): ICD-10-CM

## 2019-07-15 DIAGNOSIS — C90.02 MULTIPLE MYELOMA IN RELAPSE (HCC): ICD-10-CM

## 2019-07-15 LAB
ALBUMIN SERPL-MCNC: 4.4 G/DL (ref 3.5–5.2)
ALBUMIN/GLOB SERPL: 1.4 G/DL
ALP SERPL-CCNC: 105 U/L (ref 39–117)
ALT SERPL W P-5'-P-CCNC: 17 U/L (ref 1–33)
ANION GAP SERPL CALCULATED.3IONS-SCNC: 15 MMOL/L (ref 5–15)
AST SERPL-CCNC: 23 U/L (ref 1–32)
BASOPHILS # BLD AUTO: 0.03 10*3/MM3 (ref 0–0.2)
BASOPHILS NFR BLD AUTO: 0.4 % (ref 0–1.5)
BILIRUB SERPL-MCNC: 0.4 MG/DL (ref 0.2–1.2)
BUN BLD-MCNC: 17 MG/DL (ref 8–23)
BUN/CREAT SERPL: 12.9 (ref 7–25)
CALCIUM SPEC-SCNC: 9.9 MG/DL (ref 8.6–10.5)
CHLORIDE SERPL-SCNC: 95 MMOL/L (ref 98–107)
CO2 SERPL-SCNC: 28 MMOL/L (ref 22–29)
CREAT BLD-MCNC: 1.32 MG/DL (ref 0.57–1)
CREAT BLDA-MCNC: 1.5 MG/DL (ref 0.6–1.3)
DEPRECATED RDW RBC AUTO: 42.9 FL (ref 37–54)
EOSINOPHIL # BLD AUTO: 0.1 10*3/MM3 (ref 0–0.4)
EOSINOPHIL NFR BLD AUTO: 1.3 % (ref 0.3–6.2)
ERYTHROCYTE [DISTWIDTH] IN BLOOD BY AUTOMATED COUNT: 12.6 % (ref 12.3–15.4)
GFR SERPL CREATININE-BSD FRML MDRD: 40 ML/MIN/1.73
GLOBULIN UR ELPH-MCNC: 3.2 GM/DL
GLUCOSE BLD-MCNC: 93 MG/DL (ref 65–99)
HCT VFR BLD AUTO: 44.8 % (ref 34–46.6)
HGB BLD-MCNC: 14.7 G/DL (ref 12–15.9)
IGA1 MFR SER: 157 MG/DL (ref 70–400)
IGG1 SER-MCNC: 602 MG/DL (ref 700–1600)
IGM SERPL-MCNC: 51 MG/DL (ref 40–230)
IMM GRANULOCYTES # BLD AUTO: 0.04 10*3/MM3 (ref 0–0.05)
IMM GRANULOCYTES NFR BLD AUTO: 0.5 % (ref 0–0.5)
LYMPHOCYTES # BLD AUTO: 0.86 10*3/MM3 (ref 0.7–3.1)
LYMPHOCYTES NFR BLD AUTO: 11.6 % (ref 19.6–45.3)
MAGNESIUM SERPL-MCNC: 2.2 MG/DL (ref 1.6–2.4)
MCH RBC QN AUTO: 30.4 PG (ref 26.6–33)
MCHC RBC AUTO-ENTMCNC: 32.8 G/DL (ref 31.5–35.7)
MCV RBC AUTO: 92.6 FL (ref 79–97)
MONOCYTES # BLD AUTO: 0.63 10*3/MM3 (ref 0.1–0.9)
MONOCYTES NFR BLD AUTO: 8.5 % (ref 5–12)
NEUTROPHILS # BLD AUTO: 5.76 10*3/MM3 (ref 1.7–7)
NEUTROPHILS NFR BLD AUTO: 77.7 % (ref 42.7–76)
NRBC BLD AUTO-RTO: 0 /100 WBC (ref 0–0.2)
PHOSPHATE SERPL-MCNC: 3.6 MG/DL (ref 2.5–4.5)
PLATELET # BLD AUTO: 116 10*3/MM3 (ref 140–450)
PMV BLD AUTO: 11.4 FL (ref 6–12)
POTASSIUM BLD-SCNC: 3.6 MMOL/L (ref 3.5–5.2)
PROT SERPL-MCNC: 7.6 G/DL (ref 6–8.5)
RBC # BLD AUTO: 4.84 10*6/MM3 (ref 3.77–5.28)
SODIUM BLD-SCNC: 138 MMOL/L (ref 136–145)
WBC NRBC COR # BLD: 7.42 10*3/MM3 (ref 3.4–10.8)

## 2019-07-15 PROCEDURE — 25010000002 IMMUNE GLOBULIN (HUMAN) 10 GM/100ML SOLUTION: Performed by: INTERNAL MEDICINE

## 2019-07-15 PROCEDURE — 83735 ASSAY OF MAGNESIUM: CPT | Performed by: INTERNAL MEDICINE

## 2019-07-15 PROCEDURE — 96365 THER/PROPH/DIAG IV INF INIT: CPT

## 2019-07-15 PROCEDURE — 82784 ASSAY IGA/IGD/IGG/IGM EACH: CPT | Performed by: INTERNAL MEDICINE

## 2019-07-15 PROCEDURE — 25010000002 IMMUNE GLOBULIN (HUMAN) 20 GM/200ML SOLUTION: Performed by: INTERNAL MEDICINE

## 2019-07-15 PROCEDURE — 82565 ASSAY OF CREATININE: CPT | Performed by: INTERNAL MEDICINE

## 2019-07-15 PROCEDURE — 83883 ASSAY NEPHELOMETRY NOT SPEC: CPT | Performed by: INTERNAL MEDICINE

## 2019-07-15 PROCEDURE — 84165 PROTEIN E-PHORESIS SERUM: CPT | Performed by: INTERNAL MEDICINE

## 2019-07-15 PROCEDURE — 86334 IMMUNOFIX E-PHORESIS SERUM: CPT | Performed by: INTERNAL MEDICINE

## 2019-07-15 PROCEDURE — 25010000002 IMMUNE GLOBULIN (HUMAN) 5 GM/50ML SOLUTION: Performed by: INTERNAL MEDICINE

## 2019-07-15 PROCEDURE — 96366 THER/PROPH/DIAG IV INF ADDON: CPT

## 2019-07-15 PROCEDURE — 63710000001 DIPHENHYDRAMINE PER 50 MG: Performed by: INTERNAL MEDICINE

## 2019-07-15 PROCEDURE — 80053 COMPREHEN METABOLIC PANEL: CPT | Performed by: INTERNAL MEDICINE

## 2019-07-15 PROCEDURE — 63710000001 ACETAMINOPHEN 325 MG TABLET: Performed by: INTERNAL MEDICINE

## 2019-07-15 PROCEDURE — 84100 ASSAY OF PHOSPHORUS: CPT | Performed by: INTERNAL MEDICINE

## 2019-07-15 PROCEDURE — A9270 NON-COVERED ITEM OR SERVICE: HCPCS | Performed by: INTERNAL MEDICINE

## 2019-07-15 PROCEDURE — 85025 COMPLETE CBC W/AUTO DIFF WBC: CPT | Performed by: INTERNAL MEDICINE

## 2019-07-15 PROCEDURE — 96375 TX/PRO/DX INJ NEW DRUG ADDON: CPT

## 2019-07-15 PROCEDURE — 25010000002 ZOLEDRONIC ACID PER 1 MG: Performed by: INTERNAL MEDICINE

## 2019-07-15 RX ORDER — DIPHENHYDRAMINE HCL 25 MG
25 CAPSULE ORAL ONCE
Status: COMPLETED | OUTPATIENT
Start: 2019-07-15 | End: 2019-07-15

## 2019-07-15 RX ORDER — SODIUM CHLORIDE 9 MG/ML
250 INJECTION, SOLUTION INTRAVENOUS ONCE
Status: COMPLETED | OUTPATIENT
Start: 2019-07-15 | End: 2019-07-15

## 2019-07-15 RX ORDER — ACETAMINOPHEN 325 MG/1
650 TABLET ORAL ONCE
Status: COMPLETED | OUTPATIENT
Start: 2019-07-15 | End: 2019-07-15

## 2019-07-15 RX ADMIN — ZOLEDRONIC ACID 3 MG: 4 INJECTION, SOLUTION, CONCENTRATE INTRAVENOUS at 13:51

## 2019-07-15 RX ADMIN — ACETAMINOPHEN 650 MG: 325 TABLET ORAL at 10:51

## 2019-07-15 RX ADMIN — IMMUNE GLOBULIN INFUSION (HUMAN) 20 G: 100 INJECTION, SOLUTION INTRAVENOUS; SUBCUTANEOUS at 10:56

## 2019-07-15 RX ADMIN — IMMUNE GLOBULIN INFUSION (HUMAN) 10 G: 100 INJECTION, SOLUTION INTRAVENOUS; SUBCUTANEOUS at 12:55

## 2019-07-15 RX ADMIN — IMMUNE GLOBULIN INFUSION (HUMAN) 5 G: 100 INJECTION, SOLUTION INTRAVENOUS; SUBCUTANEOUS at 13:32

## 2019-07-15 RX ADMIN — SODIUM CHLORIDE 250 ML: 9 INJECTION, SOLUTION INTRAVENOUS at 10:56

## 2019-07-15 RX ADMIN — DIPHENHYDRAMINE HYDROCHLORIDE 25 MG: 25 CAPSULE ORAL at 10:51

## 2019-07-16 LAB
ALBUMIN SERPL-MCNC: 3.8 G/DL (ref 2.9–4.4)
ALBUMIN/GLOB SERPL: 1.3 {RATIO} (ref 0.7–1.7)
ALPHA1 GLOB FLD ELPH-MCNC: 0.3 G/DL (ref 0–0.4)
ALPHA2 GLOB SERPL ELPH-MCNC: 1 G/DL (ref 0.4–1)
B-GLOBULIN SERPL ELPH-MCNC: 1.3 G/DL (ref 0.7–1.3)
GAMMA GLOB SERPL ELPH-MCNC: 0.6 G/DL (ref 0.4–1.8)
GLOBULIN SER CALC-MCNC: 3.1 G/DL (ref 2.2–3.9)
IGA SERPL-MCNC: 158 MG/DL (ref 87–352)
IGG SERPL-MCNC: 555 MG/DL (ref 700–1600)
IGM SERPL-MCNC: 54 MG/DL (ref 26–217)
INTERPRETATION SERPL IEP-IMP: ABNORMAL
KAPPA LC SERPL-MCNC: 19.9 MG/L (ref 3.3–19.4)
KAPPA LC/LAMBDA SER: 1.26 {RATIO} (ref 0.26–1.65)
LAMBDA LC FREE SERPL-MCNC: 15.8 MG/L (ref 5.7–26.3)
Lab: ABNORMAL
M-SPIKE: ABNORMAL G/DL
PROT SERPL-MCNC: 6.9 G/DL (ref 6–8.5)

## 2019-07-17 DIAGNOSIS — N95.1 SWEATS, MENOPAUSAL: ICD-10-CM

## 2019-07-18 RX ORDER — PAROXETINE 10 MG/1
10 TABLET, FILM COATED ORAL
Qty: 30 TABLET | Refills: 2 | Status: SHIPPED | OUTPATIENT
Start: 2019-07-18 | End: 2019-10-17 | Stop reason: SDUPTHER

## 2019-07-26 ENCOUNTER — OFFICE VISIT (OUTPATIENT)
Dept: ONCOLOGY | Facility: CLINIC | Age: 69
End: 2019-07-26

## 2019-07-26 ENCOUNTER — LAB (OUTPATIENT)
Dept: LAB | Facility: HOSPITAL | Age: 69
End: 2019-07-26

## 2019-07-26 VITALS
SYSTOLIC BLOOD PRESSURE: 148 MMHG | WEIGHT: 182 LBS | HEIGHT: 61 IN | BODY MASS INDEX: 34.36 KG/M2 | TEMPERATURE: 97.7 F | RESPIRATION RATE: 20 BRPM | HEART RATE: 99 BPM | OXYGEN SATURATION: 94 % | DIASTOLIC BLOOD PRESSURE: 79 MMHG

## 2019-07-26 DIAGNOSIS — C90.01 MULTIPLE MYELOMA IN REMISSION (HCC): Primary | ICD-10-CM

## 2019-07-26 DIAGNOSIS — D80.1 HYPOGAMMAGLOBULINEMIA, ACQUIRED (HCC): ICD-10-CM

## 2019-07-26 LAB
ERYTHROCYTE [DISTWIDTH] IN BLOOD BY AUTOMATED COUNT: 13.2 % (ref 12.3–15.4)
HCT VFR BLD AUTO: 41.2 % (ref 34–46.6)
HGB BLD-MCNC: 13.7 G/DL (ref 12–15.9)
IGA1 MFR SER: 122 MG/DL (ref 70–400)
IGG1 SER-MCNC: 1023 MG/DL (ref 700–1600)
IGM SERPL-MCNC: 71 MG/DL (ref 40–230)
LYMPHOCYTES # BLD AUTO: 1 10*3/MM3 (ref 0.7–3.1)
LYMPHOCYTES NFR BLD AUTO: 16.3 % (ref 19.6–45.3)
MCH RBC QN AUTO: 31 PG (ref 26.6–33)
MCHC RBC AUTO-ENTMCNC: 33.4 G/DL (ref 31.5–35.7)
MCV RBC AUTO: 92.8 FL (ref 79–97)
MONOCYTES # BLD AUTO: 0.2 10*3/MM3 (ref 0.1–0.9)
MONOCYTES NFR BLD AUTO: 2.6 % (ref 5–12)
NEUTROPHILS # BLD AUTO: 4.9 10*3/MM3 (ref 1.7–7)
NEUTROPHILS NFR BLD AUTO: 81.1 % (ref 42.7–76)
PLATELET # BLD AUTO: 129 10*3/MM3 (ref 140–450)
PMV BLD AUTO: 8.4 FL (ref 6–12)
RBC # BLD AUTO: 4.44 10*6/MM3 (ref 3.77–5.28)
WBC NRBC COR # BLD: 6 10*3/MM3 (ref 3.4–10.8)

## 2019-07-26 PROCEDURE — 82784 ASSAY IGA/IGD/IGG/IGM EACH: CPT

## 2019-07-26 PROCEDURE — 99214 OFFICE O/P EST MOD 30 MIN: CPT | Performed by: INTERNAL MEDICINE

## 2019-07-26 PROCEDURE — 85025 COMPLETE CBC W/AUTO DIFF WBC: CPT

## 2019-07-26 PROCEDURE — 36415 COLL VENOUS BLD VENIPUNCTURE: CPT

## 2019-07-26 NOTE — PROGRESS NOTES
PROBLEM LIST:  Oncology/Hematology History     PROBLEM LIST:   1. Stage III IgA kappa clonal multiple myeloma. Diagnosed 9/2015  2. FISH panel reports multiple abnormalities including gain of 1q21 monosomy.  3. Monosomy 13 and gain of chromosomes 9, 15 and CCND1.  4. Initial M-spike of 7.1 g/dL at time of diagnosis and after 3 cycles, had  undetectable M-spike currently on Velcade, Revlimid and dexamethasone cycle #6  this week.  5. S/p Autologous SCT at [facility] with Dr. Venu Spicer in March 11,2016  6. Revlimid on hold due to low platelets  7.Right leg pain - on lyrica       Multiple myeloma in remission     7/11/2016 Initial Diagnosis     Multiple myeloma         REASON FOR VISIT: Multiple myeloma       Subjective     HISTORY OF PRESENT ILLNESS:   Ms. Miranda is here for follow up evaluation of myeloma management.  She is now over 3 years out from her transplant  Clinically doing well.  Denies any major issues with infections.  She still has pain issues in her right hip.  She is undergoing surgery on the right hip with Dr. Lopez next month.  Otherwise seems to be doing okay.      Past Medical History, Past Surgical History, Social History, Family History have been reviewed and are without significant changes except as mentioned.    Review of Systems   Constitutional: Negative.    HENT: Negative.    Eyes: Negative.    Respiratory: Negative.    Cardiovascular: Negative.    Gastrointestinal: Negative.    Endocrine: Negative.    Genitourinary: Negative.    Musculoskeletal: Positive for arthralgias and gait problem.   Skin: Negative.    Allergic/Immunologic: Negative.    Hematological: Negative.    Psychiatric/Behavioral: Negative.       A comprehensive 14 point review of systems was performed and was negative except as mentioned.    Medications:  The current medication list was reviewed in the EMR    ALLERGIES:  No Known Allergies    Objective      /79 Comment: LUE  Pulse 99   Temp 97.7 °F (36.5 °C)  "(Temporal)   Resp 20   Ht 154.9 cm (61\")   Wt 82.6 kg (182 lb)   SpO2 94% Comment: RA  BMI 34.39 kg/m²      Performance Status: 1    General: well appearing, in no acute distress  HEENT: sclera anicteric, oropharynx clear  Lymphatics: no cervical, supraclavicular, or axillary adenopathy  Cardiovascular: regular rate and rhythm, no murmurs  Lungs: clear to auscultation bilaterally  Abdomen: soft, nontender, nondistended.  No palpable organomegaly  Extremeties: no lower extremity edema  Skin: no rashes, lesions, bruising, or petechiae    RECENT LABS:    Lab Results   Component Value Date    MSPIKE Not Observed 07/15/2019    MSPIKE Not Observed 04/23/2019    MSPIKE Not Observed 01/29/2019     Lab Results   Component Value Date    FREEKAPPAL 19.9 (H) 07/15/2019    FREEKAPPAL 14.3 04/23/2019    FREEKAPPAL 13.7 01/29/2019     Lab Results   Component Value Date    IGLFLC 15.8 07/15/2019    IGLFLC 11.8 04/23/2019    IGLFLC 9.5 01/29/2019     Lab Results   Component Value Date    WBC 6.00 07/26/2019    HGB 13.7 07/26/2019    HCT 41.2 07/26/2019    MCV 92.8 07/26/2019     (L) 07/26/2019       Lab Results   Component Value Date    GLUCOSE 93 07/15/2019    BUN 17 07/15/2019    CREATININE 1.50 (H) 07/15/2019    EGFRIFNONA 40 (L) 07/15/2019    BCR 12.9 07/15/2019    K 3.6 07/15/2019    CO2 28.0 07/15/2019    CALCIUM 9.9 07/15/2019    PROTENTOTREF 6.9 07/15/2019    ALBUMIN 3.8 07/15/2019    ALBUMIN 4.40 07/15/2019    LABIL2 1.3 07/15/2019    AST 23 07/15/2019    ALT 17 07/15/2019         Assessment/Plan       1. multiple myeloma in remission  status post autologous stem cell transplant.  We had initially placed her on Revlimid and maintenance.  However due to severe thrombocytopenia she was taken off of that.   her platelets have improved in the last 3 months. We will continue having her off revlimid.  Continue every 3 months of Zometa at this point may consider every 4 months..  M-spike still undetectable.    2.  " Hypogammaglobulinemia.  I was planning to start her on IVIG.  Unfortunately we have a national shortage of IVIG and it becomes increasingly difficult to get the supply.  Going to defer on this for about 3 months.    2.  Back pain/ hip pain: Planning hip replacement next month at  with Dr. Lopez.    rtc in 3 months    I spent a total of 25 minutes in direct patient care, greater than 15 minutes (greater than 50%) were spent in coordination of care, and counseling the patient regarding  Myeloma . Answered any questions patient had with medication and plan.      Joseph Mckinley MD  [facility] Hematology and Oncology    7/26/2019      Return on: 10/16/19  Return in (Approximately): 3 months        CC:

## 2019-08-06 ENCOUNTER — OFFICE VISIT (OUTPATIENT)
Dept: INTERNAL MEDICINE | Facility: CLINIC | Age: 69
End: 2019-08-06

## 2019-08-06 VITALS
BODY MASS INDEX: 34.74 KG/M2 | DIASTOLIC BLOOD PRESSURE: 72 MMHG | HEIGHT: 61 IN | SYSTOLIC BLOOD PRESSURE: 124 MMHG | WEIGHT: 184 LBS | HEART RATE: 68 BPM

## 2019-08-06 DIAGNOSIS — K57.30 DIVERTICULOSIS OF LARGE INTESTINE WITHOUT HEMORRHAGE: ICD-10-CM

## 2019-08-06 DIAGNOSIS — C90.01 MULTIPLE MYELOMA IN REMISSION (HCC): ICD-10-CM

## 2019-08-06 DIAGNOSIS — D12.6 TUBULAR ADENOMA OF COLON: ICD-10-CM

## 2019-08-06 DIAGNOSIS — D69.59 THROMBOCYTOPENIA DUE TO DRUGS: Chronic | ICD-10-CM

## 2019-08-06 DIAGNOSIS — N18.30 CKD (CHRONIC KIDNEY DISEASE), STAGE III (HCC): Chronic | ICD-10-CM

## 2019-08-06 DIAGNOSIS — R26.81 GAIT INSTABILITY: ICD-10-CM

## 2019-08-06 DIAGNOSIS — K21.9 GASTROESOPHAGEAL REFLUX DISEASE WITHOUT ESOPHAGITIS: Chronic | ICD-10-CM

## 2019-08-06 DIAGNOSIS — J96.11 CHRONIC RESPIRATORY FAILURE WITH HYPOXIA (HCC): ICD-10-CM

## 2019-08-06 DIAGNOSIS — G89.29 OTHER CHRONIC PAIN: ICD-10-CM

## 2019-08-06 DIAGNOSIS — I27.20 PULMONARY HYPERTENSION (HCC): Primary | Chronic | ICD-10-CM

## 2019-08-06 DIAGNOSIS — T50.905A THROMBOCYTOPENIA DUE TO DRUGS: Chronic | ICD-10-CM

## 2019-08-06 DIAGNOSIS — J42 CHRONIC BRONCHITIS, UNSPECIFIED CHRONIC BRONCHITIS TYPE (HCC): ICD-10-CM

## 2019-08-06 DIAGNOSIS — M19.90 ARTHRITIS: ICD-10-CM

## 2019-08-06 DIAGNOSIS — I10 ESSENTIAL HYPERTENSION: Chronic | ICD-10-CM

## 2019-08-06 DIAGNOSIS — D80.1 HYPOGAMMAGLOBULINEMIA, ACQUIRED (HCC): ICD-10-CM

## 2019-08-06 PROBLEM — J18.9 SEPSIS DUE TO PNEUMONIA (HCC): Status: RESOLVED | Noted: 2019-06-17 | Resolved: 2019-08-06

## 2019-08-06 PROBLEM — A41.9 SEPSIS DUE TO PNEUMONIA: Status: RESOLVED | Noted: 2019-06-17 | Resolved: 2019-08-06

## 2019-08-06 PROCEDURE — 99214 OFFICE O/P EST MOD 30 MIN: CPT | Performed by: INTERNAL MEDICINE

## 2019-08-06 NOTE — PROGRESS NOTES
Patient is a 68 y.o. female who is here for a follow up of chronic condition.  Chief Complaint   Patient presents with   • Hypertension         HPI:    Here for f/u.  Edema is improved with reduced gabapentin and initiation of Maxzide.  Breathing is fine.  No dizziness or lightheadedness.  No dizziness or lightheadedness.  No f/c/ns.  No falls.      History:     Patient Active Problem List   Diagnosis   • Multiple myeloma in remission (CMS/HCC)   • Diverticulosis of large intestine without hemorrhage   • Tubular adenoma of colon   • Essential hypertension   • COPD (chronic obstructive pulmonary disease) (CMS/HCC)   • Chronic bronchitis (CMS/HCC)   • Arthritis   • Gastroesophageal reflux disease without esophagitis   • Chronic left-sided low back pain with sciatica   • Thrombocytopenia since stem cell transplant March 2016   • Hx of autologous stem cell transplant (March 2016)   • CKD (chronic kidney disease), stage III (CMS/HCC)   • Former smoker   • Non-rheumatic tricuspid valve insufficiency   • Pulmonary hypertension (CMS/HCC)   • Chronic respiratory failure with hypoxia (been noncompliant with outpatient oxygen in past)   • Leg edema, right   • Gait instability   • Chronic pain   • Debility   • Thrombocytopenia (CMS/HCC)   • On home oxygen therapy   • Hypogammaglobulinemia, acquired (CMS/HCC)       Past Medical History:   Diagnosis Date   • Arthritis    • Chronic bronchitis (CMS/HCC)    • Chronic kidney disease    • COPD (chronic obstructive pulmonary disease) (CMS/HCC)    • Hypertension    • Multiple myeloma (CMS/HCC)    • Multiple myeloma, failed remission (CMS/HCC)    • Osteoporosis        Past Surgical History:   Procedure Laterality Date   • LIMBAL STEM CELL TRANSPLANT  03/2016    @ Dr. Josiah Spicer   • MOUTH SURGERY         Current Outpatient Medications on File Prior to Visit   Medication Sig   • acyclovir (ZOVIRAX) 400 MG tablet Take 1 tablet by mouth 2 (Two) Times a Day With Meals.   • albuterol  (PROVENTIL HFA;VENTOLIN HFA) 108 (90 Base) MCG/ACT inhaler Inhale 2 puffs Every 6 (Six) Hours As Needed for Wheezing or Shortness of Air.   • cyclobenzaprine (FLEXERIL) 10 MG tablet Take 1 tablet by mouth 3 (Three) Times a Day As Needed for Muscle Spasms.   • esomeprazole (nexIUM) 20 MG capsule Take 20 mg by mouth 2 (Two) Times a Day.   • gabapentin (NEURONTIN) 400 MG capsule Take 1 capsule by mouth 3 (Three) Times a Day. (Patient taking differently: Take 400 mg by mouth Daily.)   • Melatonin 5 MG chewable tablet Chew 5 mg Every Night.   • PARoxetine (PAXIL) 10 MG tablet Take 1 tablet by mouth every night at bedtime.   • triamterene-hydrochlorothiazide (MAXZIDE-25) 37.5-25 MG per tablet Take 1 tablet by mouth Daily As Needed (edema).   • PHARMACY MEDS TO BED CONSULT Daily.     No current facility-administered medications on file prior to visit.        Family History   Problem Relation Age of Onset   • Breast cancer Other    • Lung cancer Other    • Liver cancer Other    • Bone cancer Other        Social History     Socioeconomic History   • Marital status:      Spouse name: Not on file   • Number of children: Not on file   • Years of education: Not on file   • Highest education level: Not on file   Tobacco Use   • Smoking status: Former Smoker     Last attempt to quit: 2015     Years since quittin.0   • Smokeless tobacco: Never Used   Substance and Sexual Activity   • Alcohol use: No   • Drug use: No   • Sexual activity: Defer   Social History Narrative    Lives in [facility] with . Uses cane or walker for ambulation         Review of Systems   Constitutional: Positive for fatigue. Negative for chills and fever.   HENT: Negative for congestion, ear pain, hearing loss, rhinorrhea, sinus pressure, sore throat and trouble swallowing.    Eyes: Negative for discharge and itching.   Respiratory: Negative for cough, chest tightness and shortness of breath.    Cardiovascular: Negative for chest  "pain and palpitations.   Gastrointestinal: Negative for abdominal pain, blood in stool, constipation, diarrhea and vomiting.        11/13 colonoscopy with hyperplastic polyps  12/16 colonoscopy with TA times one   Endocrine: Negative for polydipsia and polyuria.   Genitourinary: Negative for difficulty urinating, dysuria, enuresis, frequency, hematuria and urgency.   Musculoskeletal: Positive for arthralgias, back pain and gait problem. Negative for joint swelling.   Skin: Negative for rash and wound.   Allergic/Immunologic: Negative for immunocompromised state.   Neurological: Positive for weakness and numbness. Negative for dizziness, syncope, light-headedness and headaches.   Hematological: Does not bruise/bleed easily.   Psychiatric/Behavioral: Negative for behavioral problems, dysphoric mood and sleep disturbance. The patient is not nervous/anxious.        /72 (BP Location: Left arm, Patient Position: Sitting)   Pulse 68   Ht 154.9 cm (60.98\")   Wt 83.5 kg (184 lb)   BMI 34.78 kg/m²       Physical Exam   Constitutional: She is oriented to person, place, and time. She appears well-developed and well-nourished.   HENT:   Head: Normocephalic and atraumatic.   Right Ear: External ear normal.   Left Ear: External ear normal.   Mouth/Throat: Oropharynx is clear and moist.   Eyes: Conjunctivae and EOM are normal.   Neck: Normal range of motion. Neck supple.   Cardiovascular: Normal rate, regular rhythm and normal heart sounds.   Pulmonary/Chest: Effort normal. She has rales (mild bibasilar).   Abdominal: Soft. Bowel sounds are normal.   Musculoskeletal: She exhibits edema.   Using cane  + Left SLE but improved    MOTOR-LE 4/5, UE 4/5    Unstable gait   Lymphadenopathy:     She has no cervical adenopathy.   Neurological: She is alert and oriented to person, place, and time.   Skin: Skin is warm and dry.   Psychiatric: She has a normal mood and affect. Her behavior is normal. Thought content normal. "       Procedure:      Discussion/Summary:    Htn/edema-improved with reduced dose of gabapentin, and starting maxzide  copd-not an issue since stopping tob, proventil prn, stable on prn albuterol  gerd/gastritis-cont ppi, stable  MM/back pain with radiculopathy-f/u Dr Epps and  ortho  insomnia-cont melatonin  Abnormal lung sounds-CT from [facility] noted, stable  Gait instability/chronic back pain-improved, will be having hip surgery soon then PT  High risk meds-labs noted for oncology     Labs noted and dw patient          Current Outpatient Medications:   •  acyclovir (ZOVIRAX) 400 MG tablet, Take 1 tablet by mouth 2 (Two) Times a Day With Meals., Disp: 60 tablet, Rfl: 5  •  albuterol (PROVENTIL HFA;VENTOLIN HFA) 108 (90 Base) MCG/ACT inhaler, Inhale 2 puffs Every 6 (Six) Hours As Needed for Wheezing or Shortness of Air., Disp: 1 inhaler, Rfl: 5  •  cyclobenzaprine (FLEXERIL) 10 MG tablet, Take 1 tablet by mouth 3 (Three) Times a Day As Needed for Muscle Spasms., Disp: 90 tablet, Rfl: 2  •  esomeprazole (nexIUM) 20 MG capsule, Take 20 mg by mouth 2 (Two) Times a Day., Disp: , Rfl:   •  gabapentin (NEURONTIN) 400 MG capsule, Take 1 capsule by mouth 3 (Three) Times a Day. (Patient taking differently: Take 400 mg by mouth Daily.), Disp: 90 capsule, Rfl: 2  •  Melatonin 5 MG chewable tablet, Chew 5 mg Every Night., Disp: , Rfl:   •  PARoxetine (PAXIL) 10 MG tablet, Take 1 tablet by mouth every night at bedtime., Disp: 30 tablet, Rfl: 2  •  triamterene-hydrochlorothiazide (MAXZIDE-25) 37.5-25 MG per tablet, Take 1 tablet by mouth Daily As Needed (edema)., Disp: 30 tablet, Rfl: 2  •  PHARMACY MEDS TO BED CONSULT, Daily., Disp: , Rfl:         Doris was seen today for hypertension.    Diagnoses and all orders for this visit:    Pulmonary hypertension (CMS/HCC)    Essential hypertension    Chronic bronchitis, unspecified chronic bronchitis type (CMS/HCC)    Chronic respiratory failure with hypoxia (been noncompliant with  outpatient oxygen in past)    Tubular adenoma of colon    Gastroesophageal reflux disease without esophagitis    Diverticulosis of large intestine without hemorrhage    Other chronic pain    Arthritis    Hypogammaglobulinemia, acquired (CMS/HCC)    CKD (chronic kidney disease), stage III (CMS/HCC)    Thrombocytopenia since stem cell transplant March 2016    Multiple myeloma in remission (CMS/HCC)    Gait instability

## 2019-08-16 ENCOUNTER — TRANSITIONAL CARE MANAGEMENT TELEPHONE ENCOUNTER (OUTPATIENT)
Dept: INTERNAL MEDICINE | Facility: CLINIC | Age: 69
End: 2019-08-16

## 2019-08-16 NOTE — OUTREACH NOTE
[place] call completed. Please see flow sheet for additional details.  Pt reports doing well.  Denies F/C, N/V, pain controlled w/o narcotic. Eating/drinking well. States she is taking apixaban BID as ordered. No BM since she had 3 BMs on Tues, but + flatus & denies discomfort. Has not started docusate, but she states she will do so if no BM today -  bowel regimen reviewed w/ pt & she verb understanding. She denies chest pain, SOA, dizziness. Pt confirms she understands sx requiring immediate medical help/return to ER. Per PCP MA, since pt has 9/3 ortho f/up she does not require an additional PCP post-op appt unless she develops issues.  Pt verb understanding and says she will not hesitate to call PCP if needed.

## 2019-08-27 DIAGNOSIS — R60.0 LOCALIZED EDEMA: ICD-10-CM

## 2019-08-27 DIAGNOSIS — I10 ESSENTIAL HYPERTENSION: Chronic | ICD-10-CM

## 2019-08-27 RX ORDER — CYCLOBENZAPRINE HCL 10 MG
TABLET ORAL
Qty: 90 TABLET | Refills: 0 | Status: SHIPPED | OUTPATIENT
Start: 2019-08-27 | End: 2019-10-08 | Stop reason: SDUPTHER

## 2019-08-27 RX ORDER — TRIAMTERENE AND HYDROCHLOROTHIAZIDE 37.5; 25 MG/1; MG/1
TABLET ORAL
Qty: 30 TABLET | Refills: 1 | Status: SHIPPED | OUTPATIENT
Start: 2019-08-27 | End: 2019-10-25 | Stop reason: SDUPTHER

## 2019-08-29 RX ORDER — GABAPENTIN 400 MG/1
400 CAPSULE ORAL 3 TIMES DAILY
Qty: 90 CAPSULE | Refills: 2 | Status: SHIPPED | OUTPATIENT
Start: 2019-08-29 | End: 2020-01-06 | Stop reason: SDUPTHER

## 2019-09-23 RX ORDER — DOXYCYCLINE HYCLATE 100 MG/1
100 CAPSULE ORAL 2 TIMES DAILY
Qty: 14 CAPSULE | Refills: 0 | Status: SHIPPED | OUTPATIENT
Start: 2019-09-23 | End: 2020-01-10

## 2019-10-04 DIAGNOSIS — C90.01 MULTIPLE MYELOMA IN REMISSION (HCC): ICD-10-CM

## 2019-10-04 RX ORDER — SODIUM CHLORIDE 9 MG/ML
250 INJECTION, SOLUTION INTRAVENOUS ONCE
Status: CANCELLED | OUTPATIENT
Start: 2019-10-08

## 2019-10-08 ENCOUNTER — INFUSION (OUTPATIENT)
Dept: ONCOLOGY | Facility: HOSPITAL | Age: 69
End: 2019-10-08

## 2019-10-08 VITALS
HEART RATE: 94 BPM | RESPIRATION RATE: 18 BRPM | TEMPERATURE: 98.6 F | BODY MASS INDEX: 33.46 KG/M2 | WEIGHT: 177 LBS | DIASTOLIC BLOOD PRESSURE: 69 MMHG | SYSTOLIC BLOOD PRESSURE: 119 MMHG

## 2019-10-08 DIAGNOSIS — C90.01 MULTIPLE MYELOMA IN REMISSION (HCC): Primary | ICD-10-CM

## 2019-10-08 LAB
ALBUMIN SERPL-MCNC: 3.9 G/DL (ref 3.5–5.2)
ALBUMIN/GLOB SERPL: 1.2 G/DL
ALP SERPL-CCNC: 149 U/L (ref 39–117)
ALT SERPL W P-5'-P-CCNC: 23 U/L (ref 1–33)
ANION GAP SERPL CALCULATED.3IONS-SCNC: 13 MMOL/L (ref 5–15)
AST SERPL-CCNC: 34 U/L (ref 1–32)
BASOPHILS # BLD AUTO: 0.06 10*3/MM3 (ref 0–0.2)
BASOPHILS NFR BLD AUTO: 1.1 % (ref 0–1.5)
BILIRUB SERPL-MCNC: 0.4 MG/DL (ref 0.2–1.2)
BUN BLD-MCNC: 22 MG/DL (ref 8–23)
BUN/CREAT SERPL: 17.2 (ref 7–25)
CALCIUM SPEC-SCNC: 8.9 MG/DL (ref 8.6–10.5)
CHLORIDE SERPL-SCNC: 94 MMOL/L (ref 98–107)
CO2 SERPL-SCNC: 29 MMOL/L (ref 22–29)
CREAT BLD-MCNC: 1.28 MG/DL (ref 0.57–1)
CREAT BLDA-MCNC: 1.4 MG/DL (ref 0.6–1.3)
DEPRECATED RDW RBC AUTO: 46.5 FL (ref 37–54)
EOSINOPHIL # BLD AUTO: 0.07 10*3/MM3 (ref 0–0.4)
EOSINOPHIL NFR BLD AUTO: 1.3 % (ref 0.3–6.2)
ERYTHROCYTE [DISTWIDTH] IN BLOOD BY AUTOMATED COUNT: 13.2 % (ref 12.3–15.4)
GFR SERPL CREATININE-BSD FRML MDRD: 41 ML/MIN/1.73
GLOBULIN UR ELPH-MCNC: 3.2 GM/DL
GLUCOSE BLD-MCNC: 89 MG/DL (ref 65–99)
HCT VFR BLD AUTO: 39.3 % (ref 34–46.6)
HGB BLD-MCNC: 12.5 G/DL (ref 12–15.9)
IMM GRANULOCYTES # BLD AUTO: 0.02 10*3/MM3 (ref 0–0.05)
IMM GRANULOCYTES NFR BLD AUTO: 0.4 % (ref 0–0.5)
LYMPHOCYTES # BLD AUTO: 0.71 10*3/MM3 (ref 0.7–3.1)
LYMPHOCYTES NFR BLD AUTO: 12.7 % (ref 19.6–45.3)
MAGNESIUM SERPL-MCNC: 2.2 MG/DL (ref 1.6–2.4)
MCH RBC QN AUTO: 30.2 PG (ref 26.6–33)
MCHC RBC AUTO-ENTMCNC: 31.8 G/DL (ref 31.5–35.7)
MCV RBC AUTO: 94.9 FL (ref 79–97)
MONOCYTES # BLD AUTO: 0.43 10*3/MM3 (ref 0.1–0.9)
MONOCYTES NFR BLD AUTO: 7.7 % (ref 5–12)
NEUTROPHILS # BLD AUTO: 4.28 10*3/MM3 (ref 1.7–7)
NEUTROPHILS NFR BLD AUTO: 76.8 % (ref 42.7–76)
NRBC BLD AUTO-RTO: 0 /100 WBC (ref 0–0.2)
PHOSPHATE SERPL-MCNC: 2.8 MG/DL (ref 2.5–4.5)
PLATELET # BLD AUTO: 143 10*3/MM3 (ref 140–450)
PMV BLD AUTO: 12 FL (ref 6–12)
POTASSIUM BLD-SCNC: 3.5 MMOL/L (ref 3.5–5.2)
PROT SERPL-MCNC: 7.1 G/DL (ref 6–8.5)
RBC # BLD AUTO: 4.14 10*6/MM3 (ref 3.77–5.28)
SODIUM BLD-SCNC: 136 MMOL/L (ref 136–145)
WBC NRBC COR # BLD: 5.57 10*3/MM3 (ref 3.4–10.8)

## 2019-10-08 PROCEDURE — 82565 ASSAY OF CREATININE: CPT

## 2019-10-08 PROCEDURE — 86334 IMMUNOFIX E-PHORESIS SERUM: CPT

## 2019-10-08 PROCEDURE — 83735 ASSAY OF MAGNESIUM: CPT

## 2019-10-08 PROCEDURE — 84165 PROTEIN E-PHORESIS SERUM: CPT

## 2019-10-08 PROCEDURE — 82784 ASSAY IGA/IGD/IGG/IGM EACH: CPT

## 2019-10-08 PROCEDURE — 25010000002 ZOLEDRONIC ACID PER 1 MG: Performed by: INTERNAL MEDICINE

## 2019-10-08 PROCEDURE — 83883 ASSAY NEPHELOMETRY NOT SPEC: CPT

## 2019-10-08 PROCEDURE — 85025 COMPLETE CBC W/AUTO DIFF WBC: CPT

## 2019-10-08 PROCEDURE — 96374 THER/PROPH/DIAG INJ IV PUSH: CPT

## 2019-10-08 PROCEDURE — 84100 ASSAY OF PHOSPHORUS: CPT

## 2019-10-08 PROCEDURE — 80053 COMPREHEN METABOLIC PANEL: CPT

## 2019-10-08 RX ORDER — CYCLOBENZAPRINE HCL 10 MG
TABLET ORAL
Qty: 90 TABLET | Refills: 0 | Status: SHIPPED | OUTPATIENT
Start: 2019-10-08 | End: 2019-10-10 | Stop reason: SDUPTHER

## 2019-10-08 RX ADMIN — ZOLEDRONIC ACID 3 MG: 4 INJECTION, SOLUTION, CONCENTRATE INTRAVENOUS at 13:25

## 2019-10-10 LAB
ALBUMIN SERPL-MCNC: 3.4 G/DL (ref 2.9–4.4)
ALBUMIN/GLOB SERPL: 1.2 {RATIO} (ref 0.7–1.7)
ALPHA1 GLOB FLD ELPH-MCNC: 0.3 G/DL (ref 0–0.4)
ALPHA2 GLOB SERPL ELPH-MCNC: 0.9 G/DL (ref 0.4–1)
B-GLOBULIN SERPL ELPH-MCNC: 1.1 G/DL (ref 0.7–1.3)
GAMMA GLOB SERPL ELPH-MCNC: 0.6 G/DL (ref 0.4–1.8)
GLOBULIN SER CALC-MCNC: 2.9 G/DL (ref 2.2–3.9)
IGA SERPL-MCNC: 156 MG/DL (ref 87–352)
IGG SERPL-MCNC: 687 MG/DL (ref 700–1600)
IGM SERPL-MCNC: 39 MG/DL (ref 26–217)
INTERPRETATION SERPL IEP-IMP: ABNORMAL
KAPPA LC SERPL-MCNC: 28.8 MG/L (ref 3.3–19.4)
KAPPA LC/LAMBDA SER: 1.76 {RATIO} (ref 0.26–1.65)
LAMBDA LC FREE SERPL-MCNC: 16.4 MG/L (ref 5.7–26.3)
Lab: ABNORMAL
M-SPIKE: ABNORMAL G/DL
PROT SERPL-MCNC: 6.3 G/DL (ref 6–8.5)

## 2019-10-10 RX ORDER — CYCLOBENZAPRINE HCL 10 MG
10 TABLET ORAL 3 TIMES DAILY PRN
Qty: 90 TABLET | Refills: 0 | Status: SHIPPED | OUTPATIENT
Start: 2019-10-10 | End: 2019-11-25 | Stop reason: SDUPTHER

## 2019-10-16 ENCOUNTER — OFFICE VISIT (OUTPATIENT)
Dept: ONCOLOGY | Facility: CLINIC | Age: 69
End: 2019-10-16

## 2019-10-16 VITALS
HEIGHT: 61 IN | BODY MASS INDEX: 33.61 KG/M2 | WEIGHT: 178 LBS | SYSTOLIC BLOOD PRESSURE: 146 MMHG | HEART RATE: 97 BPM | OXYGEN SATURATION: 95 % | RESPIRATION RATE: 20 BRPM | TEMPERATURE: 97 F | DIASTOLIC BLOOD PRESSURE: 77 MMHG

## 2019-10-16 DIAGNOSIS — C90.01 MULTIPLE MYELOMA IN REMISSION (HCC): Primary | ICD-10-CM

## 2019-10-16 PROCEDURE — 99214 OFFICE O/P EST MOD 30 MIN: CPT | Performed by: INTERNAL MEDICINE

## 2019-10-16 NOTE — PROGRESS NOTES
PROBLEM LIST:  Oncology/Hematology History     PROBLEM LIST:   1. Stage III IgA kappa clonal multiple myeloma. Diagnosed 9/2015  2. FISH panel reports multiple abnormalities including gain of 1q21 monosomy.  3. Monosomy 13 and gain of chromosomes 9, 15 and CCND1.  4. Initial M-spike of 7.1 g/dL at time of diagnosis and after 3 cycles, had  undetectable M-spike currently on Velcade, Revlimid and dexamethasone cycle #6  this week.  5. S/p Autologous SCT at [facility] with Dr. Venu Spicer in March 11,2016  6. Revlimid on hold due to low platelets  7.Right leg pain - on lyrica       Multiple myeloma in remission     7/11/2016 Initial Diagnosis     Multiple myeloma         REASON FOR VISIT: Multiple myeloma       Subjective     HISTORY OF PRESENT ILLNESS:   Ms. Miranda is here for follow up evaluation of myeloma management.  She is now over 3 years out from her transplant  Clinically doing well.  Denies any major issues with infections.  She underwent a right hip replacement couple of months ago.  She is done remarkably well since then.  She is scheduled to have the other hip done in January.  Otherwise no recent infections.    Past Medical History, Past Surgical History, Social History, Family History have been reviewed and are without significant changes except as mentioned.    Review of Systems   Constitutional: Negative.    HENT: Negative.    Eyes: Negative.    Respiratory: Negative.    Cardiovascular: Negative.    Gastrointestinal: Negative.    Endocrine: Negative.    Genitourinary: Negative.    Musculoskeletal: Positive for arthralgias and gait problem.   Skin: Negative.    Allergic/Immunologic: Negative.    Hematological: Negative.    Psychiatric/Behavioral: Negative.       A comprehensive 14 point review of systems was performed and was negative except as mentioned.    Medications:  The current medication list was reviewed in the EMR    ALLERGIES:  No Known Allergies    Objective      /77 Comment: LUE  Pulse  "97   Temp 97 °F (36.1 °C) (Temporal)   Resp 20   Ht 154.9 cm (61\")   Wt 80.7 kg (178 lb)   SpO2 95% Comment: RA  BMI 33.63 kg/m²      Performance Status: 1    General: well appearing, in no acute distress  HEENT: sclera anicteric, oropharynx clear  Lymphatics: no cervical, supraclavicular, or axillary adenopathy  Cardiovascular: regular rate and rhythm, no murmurs  Lungs: clear to auscultation bilaterally  Abdomen: soft, nontender, nondistended.  No palpable organomegaly  Extremeties: no lower extremity edema  Skin: no rashes, lesions, bruising, or petechiae    RECENT LABS:    Lab Results   Component Value Date    MSPIKE Not Observed 10/08/2019    MSPIKE Not Observed 07/15/2019    MSPIKE Not Observed 04/23/2019     Lab Results   Component Value Date    FREEKAPPAL 28.8 (H) 10/08/2019    FREEKAPPAL 19.9 (H) 07/15/2019    FREEKAPPAL 14.3 04/23/2019     Lab Results   Component Value Date    IGLFLC 16.4 10/08/2019    IGLFLC 15.8 07/15/2019    IGLFLC 11.8 04/23/2019     Lab Results   Component Value Date    WBC 5.57 10/08/2019    HGB 12.5 10/08/2019    HCT 39.3 10/08/2019    MCV 94.9 10/08/2019     10/08/2019       Lab Results   Component Value Date    GLUCOSE 89 10/08/2019    BUN 22 10/08/2019    CREATININE 1.40 (H) 10/08/2019    EGFRIFNONA 41 (L) 10/08/2019    BCR 17.2 10/08/2019    K 3.5 10/08/2019    CO2 29.0 10/08/2019    CALCIUM 8.9 10/08/2019    PROTENTOTREF 6.3 10/08/2019    ALBUMIN 3.90 10/08/2019    ALBUMIN 3.4 10/08/2019    LABIL2 1.2 10/08/2019    AST 34 (H) 10/08/2019    ALT 23 10/08/2019         Assessment/Plan       1. multiple myeloma in remission  status post autologous stem cell transplant.  We had initially placed her on Revlimid and maintenance.  However due to severe thrombocytopenia she was taken off of that.   her platelets have improved in the last 3 months. We will continue having her off revlimid.  Continue every 3 months of Zometa at this point may consider every 4 months..  M-spike " still undetectable.    2.  Hypogammaglobulinemia.  I was planning to start her on IVIG.  Unfortunately we have a national shortage of IVIG and it becomes increasingly difficult to get the supply.  Going to defer on this for about 3 months.    2.  Back pain/ hip pain: The hip replacement has done wonders for her.  She will have the other side done in January.  Hopefully this will make her completely pain-free.    rtc in 3 months    I spent a total of 25 minutes in direct patient care, greater than 15 minutes (greater than 50%) were spent in coordination of care, and counseling the patient regarding  Myeloma . Answered any questions patient had with medication and plan.      Joseph Mckinley MD  [facility] Hematology and Oncology    10/16/2019      Return on: 01/10/20  Return in (Approximately): 3 months        CC:

## 2019-10-17 DIAGNOSIS — N95.1 SWEATS, MENOPAUSAL: ICD-10-CM

## 2019-10-17 RX ORDER — PAROXETINE 10 MG/1
10 TABLET, FILM COATED ORAL
Qty: 30 TABLET | Refills: 2 | Status: SHIPPED | OUTPATIENT
Start: 2019-10-17 | End: 2020-01-21 | Stop reason: SDUPTHER

## 2019-10-25 DIAGNOSIS — I10 ESSENTIAL HYPERTENSION: Chronic | ICD-10-CM

## 2019-10-25 DIAGNOSIS — R60.0 LOCALIZED EDEMA: ICD-10-CM

## 2019-10-25 RX ORDER — TRIAMTERENE AND HYDROCHLOROTHIAZIDE 37.5; 25 MG/1; MG/1
1 TABLET ORAL DAILY PRN
Qty: 30 TABLET | Refills: 1 | Status: SHIPPED | OUTPATIENT
Start: 2019-10-25 | End: 2020-01-03 | Stop reason: SDUPTHER

## 2019-11-25 RX ORDER — CYCLOBENZAPRINE HCL 10 MG
10 TABLET ORAL 3 TIMES DAILY PRN
Qty: 90 TABLET | Refills: 0 | Status: SHIPPED | OUTPATIENT
Start: 2019-11-25 | End: 2020-01-06 | Stop reason: SDUPTHER

## 2019-12-03 DIAGNOSIS — J44.1 CHRONIC OBSTRUCTIVE PULMONARY DISEASE WITH ACUTE EXACERBATION (HCC): ICD-10-CM

## 2019-12-03 RX ORDER — ALBUTEROL SULFATE 90 UG/1
2 AEROSOL, METERED RESPIRATORY (INHALATION) EVERY 6 HOURS PRN
Qty: 1 INHALER | Refills: 5 | Status: SHIPPED | OUTPATIENT
Start: 2019-12-03 | End: 2020-11-12

## 2019-12-27 ENCOUNTER — TELEPHONE (OUTPATIENT)
Dept: INTERNAL MEDICINE | Facility: CLINIC | Age: 69
End: 2019-12-27

## 2019-12-27 RX ORDER — METHYLPREDNISOLONE 4 MG/1
TABLET ORAL
Qty: 1 EACH | Refills: 0 | Status: SHIPPED | OUTPATIENT
Start: 2019-12-27 | End: 2020-01-10

## 2019-12-27 NOTE — TELEPHONE ENCOUNTER
PT IS HAVING SCIATIC PAIN AGAIN AND WANTS TO KNOW IF DR VALLE WILL CALL SOMETHING IN WITHOUT HER BEING SEEN.

## 2019-12-30 DIAGNOSIS — C90.01 MULTIPLE MYELOMA IN REMISSION (HCC): ICD-10-CM

## 2019-12-30 RX ORDER — SODIUM CHLORIDE 9 MG/ML
250 INJECTION, SOLUTION INTRAVENOUS ONCE
Status: CANCELLED | OUTPATIENT
Start: 2019-12-31

## 2019-12-31 ENCOUNTER — INFUSION (OUTPATIENT)
Dept: ONCOLOGY | Facility: HOSPITAL | Age: 69
End: 2019-12-31

## 2019-12-31 VITALS
RESPIRATION RATE: 18 BRPM | BODY MASS INDEX: 34.2 KG/M2 | TEMPERATURE: 97.8 F | WEIGHT: 181 LBS | SYSTOLIC BLOOD PRESSURE: 147 MMHG | DIASTOLIC BLOOD PRESSURE: 72 MMHG | HEART RATE: 87 BPM

## 2019-12-31 DIAGNOSIS — C90.01 MULTIPLE MYELOMA IN REMISSION (HCC): Primary | ICD-10-CM

## 2019-12-31 LAB
ALBUMIN SERPL-MCNC: 4.5 G/DL (ref 3.5–5.2)
ALBUMIN/GLOB SERPL: 1.6 G/DL
ALP SERPL-CCNC: 109 U/L (ref 39–117)
ALT SERPL W P-5'-P-CCNC: 16 U/L (ref 1–33)
ANION GAP SERPL CALCULATED.3IONS-SCNC: 17 MMOL/L (ref 5–15)
AST SERPL-CCNC: 19 U/L (ref 1–32)
BASOPHILS # BLD AUTO: 0.03 10*3/MM3 (ref 0–0.2)
BASOPHILS NFR BLD AUTO: 0.4 % (ref 0–1.5)
BILIRUB SERPL-MCNC: 0.3 MG/DL (ref 0.2–1.2)
BUN BLD-MCNC: 27 MG/DL (ref 8–23)
BUN/CREAT SERPL: 19 (ref 7–25)
CALCIUM SPEC-SCNC: 9.3 MG/DL (ref 8.6–10.5)
CHLORIDE SERPL-SCNC: 96 MMOL/L (ref 98–107)
CO2 SERPL-SCNC: 25 MMOL/L (ref 22–29)
CREAT BLD-MCNC: 1.42 MG/DL (ref 0.57–1)
CREAT BLDA-MCNC: 1.5 MG/DL (ref 0.6–1.3)
DEPRECATED RDW RBC AUTO: 46.1 FL (ref 37–54)
EOSINOPHIL # BLD AUTO: 0.03 10*3/MM3 (ref 0–0.4)
EOSINOPHIL NFR BLD AUTO: 0.4 % (ref 0.3–6.2)
ERYTHROCYTE [DISTWIDTH] IN BLOOD BY AUTOMATED COUNT: 13.7 % (ref 12.3–15.4)
GFR SERPL CREATININE-BSD FRML MDRD: 37 ML/MIN/1.73
GLOBULIN UR ELPH-MCNC: 2.8 GM/DL
GLUCOSE BLD-MCNC: 59 MG/DL (ref 65–99)
HCT VFR BLD AUTO: 39.1 % (ref 34–46.6)
HGB BLD-MCNC: 12.2 G/DL (ref 12–15.9)
IMM GRANULOCYTES # BLD AUTO: 0.03 10*3/MM3 (ref 0–0.05)
IMM GRANULOCYTES NFR BLD AUTO: 0.4 % (ref 0–0.5)
LYMPHOCYTES # BLD AUTO: 0.91 10*3/MM3 (ref 0.7–3.1)
LYMPHOCYTES NFR BLD AUTO: 11.4 % (ref 19.6–45.3)
MAGNESIUM SERPL-MCNC: 2.4 MG/DL (ref 1.6–2.4)
MCH RBC QN AUTO: 28.6 PG (ref 26.6–33)
MCHC RBC AUTO-ENTMCNC: 31.2 G/DL (ref 31.5–35.7)
MCV RBC AUTO: 91.8 FL (ref 79–97)
MONOCYTES # BLD AUTO: 0.51 10*3/MM3 (ref 0.1–0.9)
MONOCYTES NFR BLD AUTO: 6.4 % (ref 5–12)
NEUTROPHILS # BLD AUTO: 6.49 10*3/MM3 (ref 1.7–7)
NEUTROPHILS NFR BLD AUTO: 81 % (ref 42.7–76)
NRBC BLD AUTO-RTO: 0 /100 WBC (ref 0–0.2)
PHOSPHATE SERPL-MCNC: 3.1 MG/DL (ref 2.5–4.5)
PLATELET # BLD AUTO: 125 10*3/MM3 (ref 140–450)
PMV BLD AUTO: 11.3 FL (ref 6–12)
POTASSIUM BLD-SCNC: 4.1 MMOL/L (ref 3.5–5.2)
PROT SERPL-MCNC: 7.3 G/DL (ref 6–8.5)
RBC # BLD AUTO: 4.26 10*6/MM3 (ref 3.77–5.28)
SODIUM BLD-SCNC: 138 MMOL/L (ref 136–145)
WBC NRBC COR # BLD: 8 10*3/MM3 (ref 3.4–10.8)

## 2019-12-31 PROCEDURE — 96374 THER/PROPH/DIAG INJ IV PUSH: CPT

## 2019-12-31 PROCEDURE — 84165 PROTEIN E-PHORESIS SERUM: CPT

## 2019-12-31 PROCEDURE — 85025 COMPLETE CBC W/AUTO DIFF WBC: CPT

## 2019-12-31 PROCEDURE — 86334 IMMUNOFIX E-PHORESIS SERUM: CPT

## 2019-12-31 PROCEDURE — 82784 ASSAY IGA/IGD/IGG/IGM EACH: CPT

## 2019-12-31 PROCEDURE — 82565 ASSAY OF CREATININE: CPT

## 2019-12-31 PROCEDURE — 80053 COMPREHEN METABOLIC PANEL: CPT

## 2019-12-31 PROCEDURE — 25010000002 ZOLEDRONIC ACID PER 1 MG: Performed by: INTERNAL MEDICINE

## 2019-12-31 PROCEDURE — 83883 ASSAY NEPHELOMETRY NOT SPEC: CPT

## 2019-12-31 PROCEDURE — 84100 ASSAY OF PHOSPHORUS: CPT

## 2019-12-31 PROCEDURE — 83735 ASSAY OF MAGNESIUM: CPT

## 2019-12-31 PROCEDURE — 36416 COLLJ CAPILLARY BLOOD SPEC: CPT

## 2019-12-31 RX ADMIN — ZOLEDRONIC ACID 3 MG: 4 INJECTION, SOLUTION, CONCENTRATE INTRAVENOUS at 10:45

## 2019-12-31 NOTE — PROGRESS NOTES
Madiha from [facility] called to check on labs on her 'received list' but the labs were drawn at [facility] per nurse TAMI Moise RN. I spoke with Abbey at the Main lab and she said they would get the packing list from [facility] and would see the labs to be processed.

## 2020-01-02 LAB
ALBUMIN SERPL-MCNC: 3.6 G/DL (ref 2.9–4.4)
ALBUMIN/GLOB SERPL: 1.2 {RATIO} (ref 0.7–1.7)
ALPHA1 GLOB FLD ELPH-MCNC: 0.3 G/DL (ref 0–0.4)
ALPHA2 GLOB SERPL ELPH-MCNC: 0.9 G/DL (ref 0.4–1)
B-GLOBULIN SERPL ELPH-MCNC: 1.3 G/DL (ref 0.7–1.3)
GAMMA GLOB SERPL ELPH-MCNC: 0.7 G/DL (ref 0.4–1.8)
GLOBULIN SER CALC-MCNC: 3.2 G/DL (ref 2.2–3.9)
IGA SERPL-MCNC: 158 MG/DL (ref 87–352)
IGG SERPL-MCNC: 796 MG/DL (ref 700–1600)
IGM SERPL-MCNC: 47 MG/DL (ref 26–217)
INTERPRETATION SERPL IEP-IMP: ABNORMAL
KAPPA LC SERPL-MCNC: 80.7 MG/L (ref 3.3–19.4)
KAPPA LC/LAMBDA SER: 5.76 {RATIO} (ref 0.26–1.65)
LAMBDA LC FREE SERPL-MCNC: 14 MG/L (ref 5.7–26.3)
Lab: ABNORMAL
M-SPIKE: ABNORMAL G/DL
PROT SERPL-MCNC: 6.8 G/DL (ref 6–8.5)

## 2020-01-03 DIAGNOSIS — R60.0 LOCALIZED EDEMA: ICD-10-CM

## 2020-01-03 DIAGNOSIS — I10 ESSENTIAL HYPERTENSION: Chronic | ICD-10-CM

## 2020-01-03 RX ORDER — TRIAMTERENE AND HYDROCHLOROTHIAZIDE 37.5; 25 MG/1; MG/1
1 TABLET ORAL DAILY PRN
Qty: 30 TABLET | Refills: 1 | Status: SHIPPED | OUTPATIENT
Start: 2020-01-03 | End: 2020-01-30

## 2020-01-06 RX ORDER — CYCLOBENZAPRINE HCL 10 MG
10 TABLET ORAL 3 TIMES DAILY PRN
Qty: 90 TABLET | Refills: 0 | Status: SHIPPED | OUTPATIENT
Start: 2020-01-06 | End: 2020-01-30

## 2020-01-06 RX ORDER — ACYCLOVIR 400 MG/1
400 TABLET ORAL 2 TIMES DAILY WITH MEALS
Qty: 60 TABLET | Refills: 5 | Status: SHIPPED | OUTPATIENT
Start: 2020-01-06 | End: 2020-07-09 | Stop reason: SDUPTHER

## 2020-01-06 RX ORDER — GABAPENTIN 400 MG/1
400 CAPSULE ORAL 3 TIMES DAILY
Qty: 90 CAPSULE | Refills: 2 | Status: SHIPPED | OUTPATIENT
Start: 2020-01-06 | End: 2020-01-30

## 2020-01-10 ENCOUNTER — OFFICE VISIT (OUTPATIENT)
Dept: ONCOLOGY | Facility: CLINIC | Age: 70
End: 2020-01-10

## 2020-01-10 VITALS
HEIGHT: 61 IN | HEART RATE: 99 BPM | TEMPERATURE: 97.1 F | DIASTOLIC BLOOD PRESSURE: 67 MMHG | RESPIRATION RATE: 24 BRPM | BODY MASS INDEX: 34.93 KG/M2 | OXYGEN SATURATION: 95 % | SYSTOLIC BLOOD PRESSURE: 145 MMHG | WEIGHT: 185 LBS

## 2020-01-10 DIAGNOSIS — C90.01 MULTIPLE MYELOMA IN REMISSION (HCC): Primary | ICD-10-CM

## 2020-01-10 PROCEDURE — 99213 OFFICE O/P EST LOW 20 MIN: CPT | Performed by: NURSE PRACTITIONER

## 2020-01-10 NOTE — PROGRESS NOTES
PROBLEM LIST:  Oncology/Hematology History     PROBLEM LIST:   1. Stage III IgA kappa clonal multiple myeloma. Diagnosed 9/2015  2. FISH panel reports multiple abnormalities including gain of 1q21 monosomy.  3. Monosomy 13 and gain of chromosomes 9, 15 and CCND1.  4. Initial M-spike of 7.1 g/dL at time of diagnosis and after 3 cycles, had  undetectable M-spike currently on Velcade, Revlimid and dexamethasone cycle #6  this week.  5. S/p Autologous SCT at [facility] with Dr. Venu Spicer in March 11,2016  6. Revlimid on hold due to low platelets  7.Right leg pain - on lyrica       Multiple myeloma in remission     7/11/2016 Initial Diagnosis     Multiple myeloma         REASON FOR VISIT: Multiple myeloma       Subjective     HISTORY OF PRESENT ILLNESS:   Ms. Miranda is here for follow up evaluation of myeloma management.  She had autologous SCT 3/2016.  She is doing well clinically.  She underwent right rip replacement last fall and planning for left hip replacement on th 15th.  She denies any fevers or infections.  She completed a steroid dose pack last week for sciatica.      Past Medical History, Past Surgical History, Social History, Family History have been reviewed and are without significant changes except as mentioned.    Review of Systems   Constitutional: Negative.    HENT: Negative.    Eyes: Negative.    Respiratory: Negative.    Cardiovascular: Negative.    Gastrointestinal: Negative.    Endocrine: Negative.    Genitourinary: Negative.    Musculoskeletal: Positive for arthralgias and gait problem.   Skin: Negative.    Allergic/Immunologic: Negative.    Hematological: Negative.    Psychiatric/Behavioral: Negative.       A comprehensive 14 point review of systems was performed and was negative except as mentioned.    Medications:  The current medication list was reviewed in the EMR    ALLERGIES:  No Known Allergies    Objective      /67 Comment: LUE  Pulse 99   Temp 97.1 °F (36.2 °C) (Temporal)   Resp  "24   Ht 154.9 cm (61\")   Wt 83.9 kg (185 lb)   SpO2 95% Comment: RA  BMI 34.96 kg/m²      Performance Status: 1    General: well appearing, in no acute distress, ambulating with cane  HEENT: sclera anicteric, oropharynx clear  Lymphatics: no cervical, supraclavicular, or axillary adenopathy  Cardiovascular: regular rate and rhythm, no murmurs  Lungs: clear to auscultation bilaterally  Abdomen: soft, nontender, nondistended.  No palpable organomegaly  Extremeties: no lower extremity edema  Skin: no rashes, lesions, bruising, or petechiae    RECENT LABS:    Lab Results   Component Value Date    MSPIKE Not Observed 12/31/2019    MSPIKE Not Observed 10/08/2019    MSPIKE Not Observed 07/15/2019     Lab Results   Component Value Date    FREEKAPPAL 80.7 (H) 12/31/2019    FREEKAPPAL 28.8 (H) 10/08/2019    FREEKAPPAL 19.9 (H) 07/15/2019     Lab Results   Component Value Date    IGLFLC 14.0 12/31/2019    IGLFLC 16.4 10/08/2019    IGLFLC 15.8 07/15/2019     Lab Results   Component Value Date    WBC 8.00 12/31/2019    HGB 12.2 12/31/2019    HCT 39.1 12/31/2019    MCV 91.8 12/31/2019     (L) 12/31/2019       Lab Results   Component Value Date    GLUCOSE 59 (L) 12/31/2019    BUN 27 (H) 12/31/2019    CREATININE 1.50 (H) 12/31/2019    EGFRIFNONA 37 (L) 12/31/2019    BCR 19.0 12/31/2019    K 4.1 12/31/2019    CO2 25.0 12/31/2019    CALCIUM 9.3 12/31/2019    PROTENTOTREF 6.8 12/31/2019    ALBUMIN 3.6 12/31/2019    ALBUMIN 4.50 12/31/2019    LABIL2 1.2 12/31/2019    AST 19 12/31/2019    ALT 16 12/31/2019         Assessment/Plan       1. Multiple myeloma in remission.  Status post autologous stem cell transplant 3/2016.  We had initially placed her on Revlimid and maintenance.  However due to severe thrombocytopenia she was taken off of that.   Her platelets have improved.  We will continue having her off Revlimid.  Continue every 3 months of Zometa at this point may consider every 4 months.  M-spike still " undetectable.    2.  Hypogammaglobulinemia.  Planned to start her on IVIG.  Unfortunately we have a national shortage of IVIG and it becomes increasingly difficult to get the supply.      2.  Back pain / hip pain.  Status post right hip replacement last year and has done remarkably well.  Planning for left hip replacement on the 15th at [facility].    RTC in 3 months.      I spent a total of 15 minutes in direct patient care, greater than 10 minutes (greater than 50%) were spent in coordination of care, and counseling the patient regarding Myeloma . Answered any questions patient had with medication and plan.      DAYTON Garibay  [facility] Hematology and Oncology    1/10/2020

## 2020-01-17 ENCOUNTER — TRANSITIONAL CARE MANAGEMENT TELEPHONE ENCOUNTER (OUTPATIENT)
Dept: INTERNAL MEDICINE | Facility: CLINIC | Age: 70
End: 2020-01-17

## 2020-01-17 NOTE — OUTREACH NOTE
[place] call completed. Please see flow sheet for additional details.  Pt was told to schedule nephrology f/up, but [facility] didn't schedule it. Pt asks PCP to make referral/clarify if nephrology needs oncology specialty. States her pain controlled by medication. Confirms holding dyazide & NSAIDs.  Taking apixaban, started docusate this AM. No BM since Tues, is burping but not passing gas - denies bloating, abd pain, N/V.  Post-op bowel regimen reviewed as per [facility] DC instructions - pt verb understanding.  Pt states drsg D/I. No c/o F/C/S, chest pain, palps, dizziness. Ambulating well w/ walker. OP PT scheduled 1/23.  [place] 1/30, Surgery f/u 2/4.  Eating fair, drinking well.  Denies immediate needs/concerns. Aware of sx requiring immediate medical care.

## 2020-01-21 DIAGNOSIS — N95.1 SWEATS, MENOPAUSAL: ICD-10-CM

## 2020-01-21 RX ORDER — PAROXETINE 10 MG/1
10 TABLET, FILM COATED ORAL
Qty: 30 TABLET | Refills: 2 | Status: SHIPPED | OUTPATIENT
Start: 2020-01-21 | End: 2020-02-14

## 2020-01-30 ENCOUNTER — OFFICE VISIT (OUTPATIENT)
Dept: INTERNAL MEDICINE | Facility: CLINIC | Age: 70
End: 2020-01-30

## 2020-01-30 VITALS
WEIGHT: 174 LBS | HEART RATE: 96 BPM | HEIGHT: 61 IN | DIASTOLIC BLOOD PRESSURE: 80 MMHG | RESPIRATION RATE: 18 BRPM | BODY MASS INDEX: 32.85 KG/M2 | TEMPERATURE: 97.5 F | SYSTOLIC BLOOD PRESSURE: 168 MMHG | OXYGEN SATURATION: 91 %

## 2020-01-30 DIAGNOSIS — J96.11 CHRONIC RESPIRATORY FAILURE WITH HYPOXIA (HCC): ICD-10-CM

## 2020-01-30 DIAGNOSIS — K52.9 GASTROENTERITIS: ICD-10-CM

## 2020-01-30 DIAGNOSIS — R68.89 FLU-LIKE SYMPTOMS: ICD-10-CM

## 2020-01-30 DIAGNOSIS — N18.30 CKD (CHRONIC KIDNEY DISEASE), STAGE III (HCC): ICD-10-CM

## 2020-01-30 DIAGNOSIS — I10 ESSENTIAL HYPERTENSION: Primary | ICD-10-CM

## 2020-01-30 DIAGNOSIS — Z96.642 HISTORY OF LEFT HIP REPLACEMENT: ICD-10-CM

## 2020-01-30 LAB
EXPIRATION DATE: NORMAL
FLUAV AG NPH QL: NEGATIVE
FLUBV AG NPH QL: NEGATIVE
INTERNAL CONTROL: NORMAL
Lab: NORMAL

## 2020-01-30 PROCEDURE — 85025 COMPLETE CBC W/AUTO DIFF WBC: CPT | Performed by: NURSE PRACTITIONER

## 2020-01-30 PROCEDURE — 87804 INFLUENZA ASSAY W/OPTIC: CPT | Performed by: NURSE PRACTITIONER

## 2020-01-30 PROCEDURE — 99495 TRANSJ CARE MGMT MOD F2F 14D: CPT | Performed by: NURSE PRACTITIONER

## 2020-01-30 PROCEDURE — 80053 COMPREHEN METABOLIC PANEL: CPT | Performed by: NURSE PRACTITIONER

## 2020-01-30 RX ORDER — CIPROFLOXACIN 250 MG/1
250 TABLET, FILM COATED ORAL 2 TIMES DAILY
Qty: 14 TABLET | Refills: 0 | Status: CANCELLED | OUTPATIENT
Start: 2020-01-30

## 2020-01-30 RX ORDER — ONDANSETRON 4 MG/1
4 TABLET, FILM COATED ORAL EVERY 8 HOURS PRN
Qty: 30 TABLET | Refills: 0 | Status: SHIPPED | OUTPATIENT
Start: 2020-01-30 | End: 2020-06-15

## 2020-01-30 NOTE — PROGRESS NOTES
Transitional Care Follow Up Visit  Subjective     Doris Miranda is a 69 y.o. female who presents for a transitional care management visit.    Within 48 business hours after discharge our office contacted her via telephone to coordinate her care and needs.      I reviewed and discussed the details of that call along with the discharge summary, hospital problems, inpatient lab results, inpatient diagnostic studies, and consultation reports with Doris.     Current outpatient and discharge medications have been reconciled for the patient.  Reviewed by: DAYTON Pérez      Date of TCM Phone Call 1/17/2020   [facility]   Date of Admission 1/15/2020   Date of Discharge 1/16/2020   Discharge Disposition Home or Self Care     Risk for Readmission (LACE) No data recorded    She does reports nausea, sweating, vomiting, lack of appetite.  She did get her flu shot.  She denies SOB.  She does wear O2 at night- 3lnc.  She also denies abd pain.  She is unsure if the nausea is from stopping her gabapentin and tramadol abruptly. She is able to keep fluids down.  She sees her surgeon on the 4th.  Has had PT X1 but could not go to other visit due to illness.  Her incision is not red and has no drainage.  She denies cough or SOA.       Course During Hospital Stay:  Doris was admitted to [facility] on January 15 and discharged January 16 for degenerative joint disease and left hip replacement.  She tolerated the procedure without complication.  She was advanced to regular diet and discharged home with PT and OT.  She was also placed on Eliquis.  She did have hypertension during her stay which was held due to kidney injury.  It was advised she repeat a BMP in 5 days and seek a nephrology referral.  The Eliquis was to be continued for 4 weeks.     The following portions of the patient's history were reviewed and updated as appropriate: allergies, current medications, past family history, past medical history,  "past social history, past surgical history and problem list.  Vitals:    01/30/20 1337   BP: 168/80   Pulse: 96   Resp: 18   Temp: 97.5 °F (36.4 °C)   TempSrc: Temporal   SpO2: 91%   Weight: 78.9 kg (174 lb)   Height: 154.9 cm (61\")       Review of Systems   Constitutional: Positive for chills, diaphoresis and fatigue. Negative for activity change, appetite change and unexpected weight change.   HENT: Negative for congestion, ear pain, rhinorrhea, sinus pressure, sore throat and trouble swallowing.    Eyes: Negative for pain and visual disturbance.   Respiratory: Positive for shortness of breath. Negative for cough, chest tightness and wheezing.         Baseline   Cardiovascular: Negative for chest pain, palpitations and leg swelling.   Gastrointestinal: Positive for nausea. Negative for abdominal distention, abdominal pain, blood in stool, constipation, diarrhea and vomiting.   Endocrine: Negative for cold intolerance, heat intolerance, polydipsia and polyphagia.   Genitourinary: Negative for difficulty urinating, dyspareunia, dysuria, frequency, hematuria, menstrual problem, urgency and vaginal discharge.   Musculoskeletal: Positive for arthralgias. Negative for back pain, joint swelling and myalgias.   Skin: Negative for color change, pallor, rash and wound.        Incision clean dry and intact   Allergic/Immunologic: Negative for environmental allergies, food allergies and immunocompromised state.   Neurological: Negative for dizziness, tremors, seizures, syncope, facial asymmetry, speech difficulty, weakness, numbness and headaches.   Hematological: Negative for adenopathy. Does not bruise/bleed easily.   Psychiatric/Behavioral: Negative for decreased concentration, dysphoric mood, sleep disturbance and suicidal ideas. The patient is not nervous/anxious.        Objective   Physical Exam   Constitutional: She is oriented to person, place, and time. She has a sickly appearance. No distress.   HENT:   Right Ear: " Tympanic membrane and external ear normal.   Left Ear: Tympanic membrane and external ear normal.   Nose: No mucosal edema or rhinorrhea. Right sinus exhibits no maxillary sinus tenderness and no frontal sinus tenderness. Left sinus exhibits no maxillary sinus tenderness and no frontal sinus tenderness.   Mouth/Throat: Oropharynx is clear and moist and mucous membranes are normal. No oropharyngeal exudate, posterior oropharyngeal edema, posterior oropharyngeal erythema or tonsillar abscesses. No tonsillar exudate.   Eyes: Right eye exhibits no discharge. Left eye exhibits no discharge. Right conjunctiva is not injected. Left conjunctiva is not injected. No scleral icterus.   Neck: No neck rigidity. No thyromegaly present.   Cardiovascular: Normal rate and regular rhythm.   Pulmonary/Chest: Effort normal and breath sounds normal. No respiratory distress. She has no wheezes. She has no rales. She exhibits no tenderness.   Abdominal: Soft. Bowel sounds are normal. She exhibits no distension and no mass. There is no tenderness. There is no rebound and no guarding. No hernia.   Musculoskeletal:   Mild edema of left extremity, nonpitting   Lymphadenopathy:        Head (right side): No submental, no submandibular, no tonsillar, no preauricular, no posterior auricular and no occipital adenopathy present.        Head (left side): No submental, no submandibular, no tonsillar, no preauricular, no posterior auricular and no occipital adenopathy present.     She has no cervical adenopathy.        Right cervical: No superficial cervical, no deep cervical and no posterior cervical adenopathy present.       Left cervical: No superficial cervical, no deep cervical and no posterior cervical adenopathy present.   Neurological: She is alert and oriented to person, place, and time.   Skin: Skin is warm, dry and intact. Capillary refill takes 2 to 3 seconds. She is not diaphoretic. There is pallor.   Psychiatric: She has a normal mood  and affect. Her speech is normal and behavior is normal. Cognition and memory are normal.   Nursing note and vitals reviewed.      Assessment/Plan   Doris was seen today for post-op problem.    Diagnoses and all orders for this visit:    Essential hypertension  -     CBC & Differential  -     Comprehensive Metabolic Panel  -     metoprolol tartrate (LOPRESSOR) 25 MG tablet; Take 1 tablet by mouth 2 (Two) Times a Day.  -     CBC Auto Differential  Patient off HCTZ due to acute kidney injury, BP elevated today, will start metoprolol and have her follow-up in 2 weeks  History of left hip replacement  Normal postop course, patient off all pain medications.  Flu-like symptoms  -     POCT Influenza A/B    Gastroenteritis  -     CBC & Differential  -     Comprehensive Metabolic Panel  -     ondansetron (ZOFRAN) 4 MG tablet; Take 1 tablet by mouth Every 8 (Eight) Hours As Needed for Nausea or Vomiting.  -     CBC Auto Differential  Likely viral gastroenteritis.  Will get labs today and recommend Zofran, clear liquid diet and advancing with bland food as tolerated.  Call or return to clinic with any new/concerning symptoms  Chronic respiratory failure with hypoxia (been noncompliant with outpatient oxygen in past)  -     CBC & Differential  -     Comprehensive Metabolic Panel  -     CBC Auto Differential  O2 sats at 90%.  Patient does have chronic respiratory failure and wears oxygen at home.  Reports baseline symptoms with breathing  CKD (chronic kidney disease), stage III (CMS/HCC)  -     CBC & Differential  -     Comprehensive Metabolic Panel  -     CBC Auto Differential  Acute kidney injury at hospitalization, recheck today      FU in 2 weeks or sooner if needed for BP recheck    DAYTON Davis

## 2020-01-31 LAB
ALBUMIN SERPL-MCNC: 4.5 G/DL (ref 3.5–5.2)
ALBUMIN/GLOB SERPL: 1.5 G/DL
ALP SERPL-CCNC: 146 U/L (ref 39–117)
ALT SERPL W P-5'-P-CCNC: 22 U/L (ref 1–33)
ANION GAP SERPL CALCULATED.3IONS-SCNC: 19.5 MMOL/L (ref 5–15)
AST SERPL-CCNC: 38 U/L (ref 1–32)
BASOPHILS # BLD AUTO: 0.05 10*3/MM3 (ref 0–0.2)
BASOPHILS NFR BLD AUTO: 0.7 % (ref 0–1.5)
BILIRUB SERPL-MCNC: 0.9 MG/DL (ref 0.2–1.2)
BUN BLD-MCNC: 17 MG/DL (ref 8–23)
BUN/CREAT SERPL: 13.3 (ref 7–25)
CALCIUM SPEC-SCNC: 9.4 MG/DL (ref 8.6–10.5)
CHLORIDE SERPL-SCNC: 95 MMOL/L (ref 98–107)
CO2 SERPL-SCNC: 25.5 MMOL/L (ref 22–29)
CREAT BLD-MCNC: 1.28 MG/DL (ref 0.57–1)
DEPRECATED RDW RBC AUTO: 45.7 FL (ref 37–54)
EOSINOPHIL # BLD AUTO: 0.02 10*3/MM3 (ref 0–0.4)
EOSINOPHIL NFR BLD AUTO: 0.3 % (ref 0.3–6.2)
ERYTHROCYTE [DISTWIDTH] IN BLOOD BY AUTOMATED COUNT: 14.4 % (ref 12.3–15.4)
GFR SERPL CREATININE-BSD FRML MDRD: 41 ML/MIN/1.73
GLOBULIN UR ELPH-MCNC: 3.1 GM/DL
GLUCOSE BLD-MCNC: 97 MG/DL (ref 65–99)
HCT VFR BLD AUTO: 33.6 % (ref 34–46.6)
HGB BLD-MCNC: 11.1 G/DL (ref 12–15.9)
IMM GRANULOCYTES # BLD AUTO: 0.03 10*3/MM3 (ref 0–0.05)
IMM GRANULOCYTES NFR BLD AUTO: 0.4 % (ref 0–0.5)
LYMPHOCYTES # BLD AUTO: 0.45 10*3/MM3 (ref 0.7–3.1)
LYMPHOCYTES NFR BLD AUTO: 6 % (ref 19.6–45.3)
MCH RBC QN AUTO: 29 PG (ref 26.6–33)
MCHC RBC AUTO-ENTMCNC: 33 G/DL (ref 31.5–35.7)
MCV RBC AUTO: 87.7 FL (ref 79–97)
MONOCYTES # BLD AUTO: 0.46 10*3/MM3 (ref 0.1–0.9)
MONOCYTES NFR BLD AUTO: 6.1 % (ref 5–12)
NEUTROPHILS # BLD AUTO: 6.52 10*3/MM3 (ref 1.7–7)
NEUTROPHILS NFR BLD AUTO: 86.5 % (ref 42.7–76)
NRBC BLD AUTO-RTO: 0 /100 WBC (ref 0–0.2)
PLATELET # BLD AUTO: 199 10*3/MM3 (ref 140–450)
PMV BLD AUTO: 11.5 FL (ref 6–12)
POTASSIUM BLD-SCNC: 4.3 MMOL/L (ref 3.5–5.2)
PROT SERPL-MCNC: 7.6 G/DL (ref 6–8.5)
RBC # BLD AUTO: 3.83 10*6/MM3 (ref 3.77–5.28)
SODIUM BLD-SCNC: 140 MMOL/L (ref 136–145)
WBC NRBC COR # BLD: 7.53 10*3/MM3 (ref 3.4–10.8)

## 2020-02-14 ENCOUNTER — OFFICE VISIT (OUTPATIENT)
Dept: INTERNAL MEDICINE | Facility: CLINIC | Age: 70
End: 2020-02-14

## 2020-02-14 VITALS
DIASTOLIC BLOOD PRESSURE: 62 MMHG | HEART RATE: 68 BPM | HEIGHT: 61 IN | BODY MASS INDEX: 32.28 KG/M2 | SYSTOLIC BLOOD PRESSURE: 120 MMHG | WEIGHT: 171 LBS

## 2020-02-14 DIAGNOSIS — R26.81 GAIT INSTABILITY: ICD-10-CM

## 2020-02-14 DIAGNOSIS — K57.30 DIVERTICULOSIS OF LARGE INTESTINE WITHOUT HEMORRHAGE: ICD-10-CM

## 2020-02-14 DIAGNOSIS — K21.9 GASTROESOPHAGEAL REFLUX DISEASE WITHOUT ESOPHAGITIS: Chronic | ICD-10-CM

## 2020-02-14 DIAGNOSIS — F43.23 SITUATIONAL MIXED ANXIETY AND DEPRESSIVE DISORDER: ICD-10-CM

## 2020-02-14 DIAGNOSIS — J42 CHRONIC BRONCHITIS, UNSPECIFIED CHRONIC BRONCHITIS TYPE (HCC): ICD-10-CM

## 2020-02-14 DIAGNOSIS — I10 ESSENTIAL HYPERTENSION: Primary | Chronic | ICD-10-CM

## 2020-02-14 DIAGNOSIS — M19.90 ARTHRITIS: ICD-10-CM

## 2020-02-14 DIAGNOSIS — N18.30 CKD (CHRONIC KIDNEY DISEASE), STAGE III (HCC): Chronic | ICD-10-CM

## 2020-02-14 LAB
ALBUMIN SERPL-MCNC: 4.2 G/DL (ref 3.5–5.2)
ALBUMIN/GLOB SERPL: 1.7 G/DL
ALP SERPL-CCNC: 123 U/L (ref 39–117)
ALT SERPL W P-5'-P-CCNC: 22 U/L (ref 1–33)
ANION GAP SERPL CALCULATED.3IONS-SCNC: 15.4 MMOL/L (ref 5–15)
AST SERPL-CCNC: 26 U/L (ref 1–32)
BILIRUB SERPL-MCNC: 0.5 MG/DL (ref 0.2–1.2)
BUN BLD-MCNC: 13 MG/DL (ref 8–23)
BUN/CREAT SERPL: 9.4 (ref 7–25)
CALCIUM SPEC-SCNC: 8.7 MG/DL (ref 8.6–10.5)
CHLORIDE SERPL-SCNC: 101 MMOL/L (ref 98–107)
CO2 SERPL-SCNC: 25.6 MMOL/L (ref 22–29)
CREAT BLD-MCNC: 1.39 MG/DL (ref 0.57–1)
DEPRECATED RDW RBC AUTO: 43.4 FL (ref 37–54)
ERYTHROCYTE [DISTWIDTH] IN BLOOD BY AUTOMATED COUNT: 13.9 % (ref 12.3–15.4)
GFR SERPL CREATININE-BSD FRML MDRD: 38 ML/MIN/1.73
GLOBULIN UR ELPH-MCNC: 2.5 GM/DL
GLUCOSE BLD-MCNC: 104 MG/DL (ref 65–99)
HCT VFR BLD AUTO: 35.4 % (ref 34–46.6)
HGB BLD-MCNC: 11.5 G/DL (ref 12–15.9)
MCH RBC QN AUTO: 28.2 PG (ref 26.6–33)
MCHC RBC AUTO-ENTMCNC: 32.5 G/DL (ref 31.5–35.7)
MCV RBC AUTO: 86.8 FL (ref 79–97)
PLATELET # BLD AUTO: 144 10*3/MM3 (ref 140–450)
PMV BLD AUTO: 12.9 FL (ref 6–12)
POTASSIUM BLD-SCNC: 3.9 MMOL/L (ref 3.5–5.2)
PROT SERPL-MCNC: 6.7 G/DL (ref 6–8.5)
RBC # BLD AUTO: 4.08 10*6/MM3 (ref 3.77–5.28)
SODIUM BLD-SCNC: 142 MMOL/L (ref 136–145)
WBC NRBC COR # BLD: 6.39 10*3/MM3 (ref 3.4–10.8)

## 2020-02-14 PROCEDURE — 85027 COMPLETE CBC AUTOMATED: CPT | Performed by: INTERNAL MEDICINE

## 2020-02-14 PROCEDURE — 80053 COMPREHEN METABOLIC PANEL: CPT | Performed by: INTERNAL MEDICINE

## 2020-02-14 PROCEDURE — 99214 OFFICE O/P EST MOD 30 MIN: CPT | Performed by: INTERNAL MEDICINE

## 2020-02-14 RX ORDER — VENLAFAXINE HYDROCHLORIDE 37.5 MG/1
37.5 CAPSULE, EXTENDED RELEASE ORAL EVERY MORNING
Qty: 30 CAPSULE | Refills: 5 | Status: SHIPPED | OUTPATIENT
Start: 2020-02-14 | End: 2020-08-07 | Stop reason: SDUPTHER

## 2020-02-14 NOTE — PROGRESS NOTES
Patient is a 69 y.o. female who is here for a follow up of hypertension.  Chief Complaint   Patient presents with   • Hypertension         HPI:    Here for mgmt of HTN/COPD and GERD.  Onset years.  Had recent THR on left.  Was taken of Maxzide sec to increase in Cr post op.  No dizziness or lightheadedness.  No HAs.  Appetite is not the best.  Off the gabapentin.  No cough.  GERD is controlled on prn PPI.      History:     Patient Active Problem List   Diagnosis   • Multiple myeloma in remission (CMS/HCC)   • Diverticulosis of large intestine without hemorrhage   • Tubular adenoma of colon   • Essential hypertension   • COPD (chronic obstructive pulmonary disease) (CMS/HCC)   • Chronic bronchitis (CMS/HCC)   • Arthritis   • Gastroesophageal reflux disease without esophagitis   • Chronic left-sided low back pain with sciatica   • Thrombocytopenia since stem cell transplant March 2016   • Hx of autologous stem cell transplant (March 2016)   • CKD (chronic kidney disease), stage III (CMS/HCC)   • Former smoker   • Non-rheumatic tricuspid valve insufficiency   • Pulmonary hypertension (CMS/HCC)   • Chronic respiratory failure with hypoxia (been noncompliant with outpatient oxygen in past)   • Leg edema, right   • Gait instability   • Chronic pain   • Debility   • Thrombocytopenia (CMS/HCC)   • On home oxygen therapy   • Hypogammaglobulinemia, acquired (CMS/HCC)       Past Medical History:   Diagnosis Date   • Arthritis    • Chronic bronchitis (CMS/HCC)    • Chronic kidney disease    • COPD (chronic obstructive pulmonary disease) (CMS/HCC)    • History of total right hip replacement 09/15/2019   • Hypertension    • Multiple myeloma (CMS/HCC)    • Multiple myeloma, failed remission (CMS/HCC)    • Osteoporosis        Past Surgical History:   Procedure Laterality Date   • LIMBAL STEM CELL TRANSPLANT  03/2016    @ Dr. Josiah Spicer   • MOUTH SURGERY     • PARTIAL HIP ARTHROPLASTY Right 08/2019   • TOTAL HIP ARTHROPLASTY  Left 2020    Dr Santa       Current Outpatient Medications on File Prior to Visit   Medication Sig   • acyclovir (ZOVIRAX) 400 MG tablet Take 1 tablet by mouth 2 (Two) Times a Day With Meals.   • albuterol sulfate  (90 Base) MCG/ACT inhaler Inhale 2 puffs Every 6 (Six) Hours As Needed for Wheezing or Shortness of Air.   • esomeprazole (nexIUM) 20 MG capsule Take 20 mg by mouth Daily As Needed.   • Melatonin 5 MG chewable tablet Chew 5 mg Every Night.   • ondansetron (ZOFRAN) 4 MG tablet Take 1 tablet by mouth Every 8 (Eight) Hours As Needed for Nausea or Vomiting.   • PHARMACY MEDS TO BED CONSULT Daily.   • [DISCONTINUED] PARoxetine (PAXIL) 10 MG tablet Take 1 tablet by mouth every night at bedtime.   • metoprolol tartrate (LOPRESSOR) 25 MG tablet Take 1 tablet by mouth 2 (Two) Times a Day.     No current facility-administered medications on file prior to visit.        Family History   Problem Relation Age of Onset   • Breast cancer Other    • Lung cancer Other    • Liver cancer Other    • Bone cancer Other        Social History     Socioeconomic History   • Marital status:      Spouse name: Not on file   • Number of children: Not on file   • Years of education: Not on file   • Highest education level: Not on file   Tobacco Use   • Smoking status: Former Smoker     Last attempt to quit: 2015     Years since quittin.5   • Smokeless tobacco: Never Used   Substance and Sexual Activity   • Alcohol use: No   • Drug use: No   • Sexual activity: Defer   Social History Narrative    Lives in [facility] with . Uses cane or walker for ambulation         Review of Systems   Constitutional: Positive for fatigue. Negative for activity change, appetite change and unexpected weight change.   HENT: Negative for congestion, ear pain, rhinorrhea, sinus pressure, sore throat and trouble swallowing.    Eyes: Negative for pain and visual disturbance.   Respiratory: Positive for shortness of breath.  "Negative for cough, chest tightness and wheezing.         Baseline   Cardiovascular: Negative for chest pain, palpitations and leg swelling.   Gastrointestinal: Negative for abdominal distention, abdominal pain, blood in stool, constipation, diarrhea and vomiting.        12/16 colonoscopy , repeat 12/21   Endocrine: Negative for cold intolerance, heat intolerance, polydipsia and polyphagia.   Genitourinary: Negative for difficulty urinating, dyspareunia, dysuria, frequency, hematuria, menstrual problem, urgency and vaginal discharge.        Refusing   Musculoskeletal: Positive for arthralgias and gait problem. Negative for back pain, joint swelling and myalgias.   Skin: Negative for color change, pallor, rash and wound.        Incision clean dry and intact   Allergic/Immunologic: Negative for environmental allergies, food allergies and immunocompromised state.   Neurological: Negative for dizziness, tremors, seizures, syncope, facial asymmetry, speech difficulty, weakness, numbness and headaches.   Hematological: Negative for adenopathy. Does not bruise/bleed easily.   Psychiatric/Behavioral: Negative for decreased concentration, dysphoric mood, sleep disturbance and suicidal ideas. The patient is nervous/anxious (situational).        /62 (BP Location: Left arm, Patient Position: Sitting)   Pulse 68   Ht 154.9 cm (60.98\")   Wt 77.6 kg (171 lb)   BMI 32.33 kg/m²       Physical Exam   Constitutional: She is oriented to person, place, and time. She appears well-developed and well-nourished.   HENT:   Head: Normocephalic and atraumatic.   Right Ear: External ear normal.   Left Ear: External ear normal.   Mouth/Throat: Oropharynx is clear and moist.   Eyes: Conjunctivae and EOM are normal.   Neck: Normal range of motion. Neck supple.   Cardiovascular: Normal rate, regular rhythm and normal heart sounds.   Pulmonary/Chest: Effort normal. She has rales (mild bibasilar, R>L).   Abdominal: Soft. Bowel sounds are " normal.   Musculoskeletal: She exhibits edema (trace on left).   Using rolling walker    MOTOR-LE 4/5, UE 4/5    Unstable gait   Lymphadenopathy:     She has no cervical adenopathy.   Neurological: She is alert and oriented to person, place, and time.   Skin: Skin is warm and dry.   Psychiatric: She has a normal mood and affect. Her behavior is normal. Thought content normal.       Procedure:      Discussion/Summary:    Htn/edema-improved with reduced dose of gabapentin, cont BB  copd-not an issue since stopping tob, proventil prn, stable on prn albuterol  gerd/gastritis-cont ppi prn, stable  MM/back pain with radiculopathy-f/u Dr Epps and  ortho  insomnia-cont melatonin  Abnormal lung sounds-CT from [facility] noted, stable  Gait instability/chronic back pain-improved, will be having hip surgery soon then PT  Situational anxiety/depression-will rx effexor xr  High risk meds-labs today noted     2/14 Labs noted and dw patient    Current Outpatient Medications:   •  acyclovir (ZOVIRAX) 400 MG tablet, Take 1 tablet by mouth 2 (Two) Times a Day With Meals., Disp: 60 tablet, Rfl: 5  •  albuterol sulfate  (90 Base) MCG/ACT inhaler, Inhale 2 puffs Every 6 (Six) Hours As Needed for Wheezing or Shortness of Air., Disp: 1 inhaler, Rfl: 5  •  esomeprazole (nexIUM) 20 MG capsule, Take 20 mg by mouth Daily As Needed., Disp: , Rfl:   •  Melatonin 5 MG chewable tablet, Chew 5 mg Every Night., Disp: , Rfl:   •  ondansetron (ZOFRAN) 4 MG tablet, Take 1 tablet by mouth Every 8 (Eight) Hours As Needed for Nausea or Vomiting., Disp: 30 tablet, Rfl: 0  •  PHARMACY MEDS TO BED CONSULT, Daily., Disp: , Rfl:   •  metoprolol tartrate (LOPRESSOR) 25 MG tablet, Take 1 tablet by mouth 2 (Two) Times a Day., Disp: 60 tablet, Rfl: 2  •  venlafaxine XR (EFFEXOR XR) 37.5 MG 24 hr capsule, Take 1 capsule by mouth Every Morning., Disp: 30 capsule, Rfl: 5        Doris was seen today for hypertension.    Diagnoses and all orders for this  visit:    Essential hypertension  -     CBC (No Diff)  -     Comprehensive Metabolic Panel    Chronic bronchitis, unspecified chronic bronchitis type (CMS/HCC)    Gastroesophageal reflux disease without esophagitis    Diverticulosis of large intestine without hemorrhage    Arthritis    CKD (chronic kidney disease), stage III (CMS/HCC)    Gait instability    Situational mixed anxiety and depressive disorder  -     venlafaxine XR (EFFEXOR XR) 37.5 MG 24 hr capsule; Take 1 capsule by mouth Every Morning.

## 2020-02-15 ENCOUNTER — TELEPHONE (OUTPATIENT)
Dept: INTERNAL MEDICINE | Facility: CLINIC | Age: 70
End: 2020-02-15

## 2020-03-23 DIAGNOSIS — C90.01 MULTIPLE MYELOMA IN REMISSION (HCC): ICD-10-CM

## 2020-03-23 DIAGNOSIS — I27.20 PULMONARY HYPERTENSION (HCC): ICD-10-CM

## 2020-03-23 DIAGNOSIS — D69.6 THROMBOCYTOPENIA, UNSPECIFIED (HCC): ICD-10-CM

## 2020-03-23 RX ORDER — SODIUM CHLORIDE 9 MG/ML
250 INJECTION, SOLUTION INTRAVENOUS ONCE
Status: CANCELLED | OUTPATIENT
Start: 2020-06-16

## 2020-03-23 RX ORDER — SODIUM CHLORIDE 9 MG/ML
250 INJECTION, SOLUTION INTRAVENOUS ONCE
Status: CANCELLED | OUTPATIENT
Start: 2020-03-24

## 2020-03-24 ENCOUNTER — INFUSION (OUTPATIENT)
Dept: ONCOLOGY | Facility: HOSPITAL | Age: 70
End: 2020-03-24

## 2020-03-24 VITALS
DIASTOLIC BLOOD PRESSURE: 72 MMHG | BODY MASS INDEX: 32.89 KG/M2 | HEART RATE: 87 BPM | TEMPERATURE: 99 F | WEIGHT: 174 LBS | RESPIRATION RATE: 16 BRPM | SYSTOLIC BLOOD PRESSURE: 130 MMHG

## 2020-03-24 DIAGNOSIS — C90.01 MULTIPLE MYELOMA IN REMISSION (HCC): Primary | ICD-10-CM

## 2020-03-24 LAB
ALBUMIN SERPL-MCNC: 4.5 G/DL (ref 3.5–5.2)
ALBUMIN/GLOB SERPL: 1.5 G/DL
ALP SERPL-CCNC: 150 U/L (ref 39–117)
ALT SERPL W P-5'-P-CCNC: 16 U/L (ref 1–33)
ANION GAP SERPL CALCULATED.3IONS-SCNC: 13 MMOL/L (ref 5–15)
AST SERPL-CCNC: 25 U/L (ref 1–32)
BASOPHILS # BLD AUTO: 0.04 10*3/MM3 (ref 0–0.2)
BASOPHILS NFR BLD AUTO: 0.7 % (ref 0–1.5)
BILIRUB SERPL-MCNC: 0.3 MG/DL (ref 0.2–1.2)
BUN BLD-MCNC: 13 MG/DL (ref 8–23)
BUN/CREAT SERPL: 10.5 (ref 7–25)
CALCIUM SPEC-SCNC: 9.2 MG/DL (ref 8.6–10.5)
CHLORIDE SERPL-SCNC: 105 MMOL/L (ref 98–107)
CO2 SERPL-SCNC: 23 MMOL/L (ref 22–29)
CREAT BLD-MCNC: 1.24 MG/DL (ref 0.57–1)
CREAT BLDA-MCNC: 1.2 MG/DL (ref 0.6–1.3)
DEPRECATED RDW RBC AUTO: 42.5 FL (ref 37–54)
EOSINOPHIL # BLD AUTO: 0.04 10*3/MM3 (ref 0–0.4)
EOSINOPHIL NFR BLD AUTO: 0.7 % (ref 0.3–6.2)
ERYTHROCYTE [DISTWIDTH] IN BLOOD BY AUTOMATED COUNT: 13.2 % (ref 12.3–15.4)
GFR SERPL CREATININE-BSD FRML MDRD: 43 ML/MIN/1.73
GLOBULIN UR ELPH-MCNC: 3 GM/DL
GLUCOSE BLD-MCNC: 88 MG/DL (ref 65–99)
HCT VFR BLD AUTO: 37.9 % (ref 34–46.6)
HGB BLD-MCNC: 11.5 G/DL (ref 12–15.9)
IMM GRANULOCYTES # BLD AUTO: 0.02 10*3/MM3 (ref 0–0.05)
IMM GRANULOCYTES NFR BLD AUTO: 0.4 % (ref 0–0.5)
LYMPHOCYTES # BLD AUTO: 0.79 10*3/MM3 (ref 0.7–3.1)
LYMPHOCYTES NFR BLD AUTO: 14.7 % (ref 19.6–45.3)
MAGNESIUM SERPL-MCNC: 2.1 MG/DL (ref 1.6–2.4)
MCH RBC QN AUTO: 26.4 PG (ref 26.6–33)
MCHC RBC AUTO-ENTMCNC: 30.3 G/DL (ref 31.5–35.7)
MCV RBC AUTO: 87.1 FL (ref 79–97)
MONOCYTES # BLD AUTO: 0.42 10*3/MM3 (ref 0.1–0.9)
MONOCYTES NFR BLD AUTO: 7.8 % (ref 5–12)
NEUTROPHILS # BLD AUTO: 4.05 10*3/MM3 (ref 1.7–7)
NEUTROPHILS NFR BLD AUTO: 75.7 % (ref 42.7–76)
NRBC BLD AUTO-RTO: 0 /100 WBC (ref 0–0.2)
PHOSPHATE SERPL-MCNC: 3 MG/DL (ref 2.5–4.5)
PLATELET # BLD AUTO: 162 10*3/MM3 (ref 140–450)
PMV BLD AUTO: 12.4 FL (ref 6–12)
POTASSIUM BLD-SCNC: 4.3 MMOL/L (ref 3.5–5.2)
PROT SERPL-MCNC: 7.5 G/DL (ref 6–8.5)
RBC # BLD AUTO: 4.35 10*6/MM3 (ref 3.77–5.28)
SODIUM BLD-SCNC: 141 MMOL/L (ref 136–145)
WBC NRBC COR # BLD: 5.36 10*3/MM3 (ref 3.4–10.8)

## 2020-03-24 PROCEDURE — 85025 COMPLETE CBC W/AUTO DIFF WBC: CPT

## 2020-03-24 PROCEDURE — 80053 COMPREHEN METABOLIC PANEL: CPT

## 2020-03-24 PROCEDURE — 83735 ASSAY OF MAGNESIUM: CPT

## 2020-03-24 PROCEDURE — 83883 ASSAY NEPHELOMETRY NOT SPEC: CPT

## 2020-03-24 PROCEDURE — 82565 ASSAY OF CREATININE: CPT

## 2020-03-24 PROCEDURE — 84165 PROTEIN E-PHORESIS SERUM: CPT

## 2020-03-24 PROCEDURE — 82784 ASSAY IGA/IGD/IGG/IGM EACH: CPT

## 2020-03-24 PROCEDURE — 25010000002 ZOLEDRONIC ACID PER 1 MG: Performed by: INTERNAL MEDICINE

## 2020-03-24 PROCEDURE — 86334 IMMUNOFIX E-PHORESIS SERUM: CPT

## 2020-03-24 PROCEDURE — 84100 ASSAY OF PHOSPHORUS: CPT

## 2020-03-24 PROCEDURE — 96374 THER/PROPH/DIAG INJ IV PUSH: CPT

## 2020-03-24 RX ORDER — SODIUM CHLORIDE 9 MG/ML
250 INJECTION, SOLUTION INTRAVENOUS ONCE
Status: DISCONTINUED | OUTPATIENT
Start: 2020-03-24 | End: 2020-03-24 | Stop reason: HOSPADM

## 2020-03-24 RX ADMIN — ZOLEDRONIC ACID 3 MG: 4 INJECTION, SOLUTION, CONCENTRATE INTRAVENOUS at 13:47

## 2020-03-27 LAB
ALBUMIN SERPL-MCNC: 3.7 G/DL (ref 2.9–4.4)
ALBUMIN/GLOB SERPL: 1.2 {RATIO} (ref 0.7–1.7)
ALPHA1 GLOB FLD ELPH-MCNC: 0.3 G/DL (ref 0–0.4)
ALPHA2 GLOB SERPL ELPH-MCNC: 1 G/DL (ref 0.4–1)
B-GLOBULIN SERPL ELPH-MCNC: 1.3 G/DL (ref 0.7–1.3)
GAMMA GLOB SERPL ELPH-MCNC: 0.6 G/DL (ref 0.4–1.8)
GLOBULIN SER CALC-MCNC: 3.2 G/DL (ref 2.2–3.9)
IGA SERPL-MCNC: 155 MG/DL (ref 87–352)
IGG SERPL-MCNC: 703 MG/DL (ref 700–1600)
IGM SERPL-MCNC: 41 MG/DL (ref 26–217)
INTERPRETATION SERPL IEP-IMP: ABNORMAL
KAPPA LC SERPL-MCNC: 200.9 MG/L (ref 3.3–19.4)
KAPPA LC/LAMBDA SER: 20.29 {RATIO} (ref 0.26–1.65)
LAMBDA LC FREE SERPL-MCNC: 9.9 MG/L (ref 5.7–26.3)
Lab: ABNORMAL
M-SPIKE: ABNORMAL G/DL
PROT SERPL-MCNC: 6.9 G/DL (ref 6–8.5)

## 2020-04-27 DIAGNOSIS — I10 ESSENTIAL HYPERTENSION: ICD-10-CM

## 2020-05-15 ENCOUNTER — TELEPHONE (OUTPATIENT)
Dept: INTERNAL MEDICINE | Facility: CLINIC | Age: 70
End: 2020-05-15

## 2020-05-15 RX ORDER — CYCLOBENZAPRINE HCL 10 MG
10 TABLET ORAL 3 TIMES DAILY PRN
Qty: 30 TABLET | Refills: 2 | Status: SHIPPED | OUTPATIENT
Start: 2020-05-15 | End: 2020-07-25 | Stop reason: SDUPTHER

## 2020-05-15 RX ORDER — CYCLOBENZAPRINE HCL 10 MG
1 TABLET ORAL 3 TIMES DAILY PRN
COMMUNITY
Start: 2020-04-27 | End: 2020-05-15 | Stop reason: SDUPTHER

## 2020-05-15 NOTE — TELEPHONE ENCOUNTER
Patient called and stated that refills are needed for the following prescription(s):    Cyclobenzaprine    Pharmacy: [facility] on [address]-confirmed  PH: [phone]  FX: [phone]    Pt stated that she is not currently on the medication but she needs it for her back and restless legs.    Patient callback: [phone]    Please advise.

## 2020-06-15 ENCOUNTER — OFFICE VISIT (OUTPATIENT)
Dept: INTERNAL MEDICINE | Facility: CLINIC | Age: 70
End: 2020-06-15

## 2020-06-15 VITALS
WEIGHT: 165 LBS | HEIGHT: 61 IN | TEMPERATURE: 97.7 F | SYSTOLIC BLOOD PRESSURE: 120 MMHG | BODY MASS INDEX: 31.15 KG/M2 | DIASTOLIC BLOOD PRESSURE: 64 MMHG | HEART RATE: 68 BPM

## 2020-06-15 DIAGNOSIS — R26.81 GAIT INSTABILITY: ICD-10-CM

## 2020-06-15 DIAGNOSIS — N18.30 CKD (CHRONIC KIDNEY DISEASE), STAGE III (HCC): Chronic | ICD-10-CM

## 2020-06-15 DIAGNOSIS — J42 CHRONIC BRONCHITIS, UNSPECIFIED CHRONIC BRONCHITIS TYPE (HCC): ICD-10-CM

## 2020-06-15 DIAGNOSIS — K57.30 DIVERTICULOSIS OF LARGE INTESTINE WITHOUT HEMORRHAGE: ICD-10-CM

## 2020-06-15 DIAGNOSIS — I10 ESSENTIAL HYPERTENSION: Primary | Chronic | ICD-10-CM

## 2020-06-15 DIAGNOSIS — K21.9 GASTROESOPHAGEAL REFLUX DISEASE WITHOUT ESOPHAGITIS: Chronic | ICD-10-CM

## 2020-06-15 DIAGNOSIS — M19.90 ARTHRITIS: ICD-10-CM

## 2020-06-15 DIAGNOSIS — D12.6 TUBULAR ADENOMA OF COLON: ICD-10-CM

## 2020-06-15 DIAGNOSIS — D69.6 THROMBOCYTOPENIA (HCC): ICD-10-CM

## 2020-06-15 PROBLEM — R60.0 LEG EDEMA, RIGHT: Status: RESOLVED | Noted: 2018-03-14 | Resolved: 2020-06-15

## 2020-06-15 PROCEDURE — 99214 OFFICE O/P EST MOD 30 MIN: CPT | Performed by: INTERNAL MEDICINE

## 2020-06-15 NOTE — PROGRESS NOTES
Patient is a 69 y.o. female who is here for a follow up of hypertension and pain.  Chief Complaint   Patient presents with   • Hypertension   • Pain         HPI:    Here for mgmt of HTN and GERD and anxiety/depression.  Onset years.  Doing fair.   passed in April secondary to diabetic complications.  Energy level is not the best.  No fever or chills or night sweats.  Has moved in with her daughter.  GERD is controlled.  BP has been good.    History:     Patient Active Problem List   Diagnosis   • Multiple myeloma in remission (CMS/HCC)   • Diverticulosis of large intestine without hemorrhage   • Tubular adenoma of colon   • Essential hypertension   • COPD (chronic obstructive pulmonary disease) (CMS/HCC)   • Arthritis   • Gastroesophageal reflux disease without esophagitis   • Chronic left-sided low back pain with sciatica   • Thrombocytopenia since stem cell transplant March 2016   • Hx of autologous stem cell transplant (March 2016)   • CKD (chronic kidney disease), stage III (CMS/HCC)   • Former smoker   • Non-rheumatic tricuspid valve insufficiency   • Pulmonary hypertension (CMS/HCC)   • Chronic respiratory failure with hypoxia (been noncompliant with outpatient oxygen in past)   • Gait instability   • Chronic pain   • Debility   • Thrombocytopenia (CMS/HCC)   • On home oxygen therapy   • Hypogammaglobulinemia, acquired (CMS/HCC)       Past Medical History:   Diagnosis Date   • Arthritis    • Chronic bronchitis (CMS/HCC)    • Chronic kidney disease    • COPD (chronic obstructive pulmonary disease) (CMS/HCC)    • History of total right hip replacement 09/15/2019   • Hypertension    • Multiple myeloma (CMS/HCC)    • Multiple myeloma, failed remission (CMS/HCC)    • Osteoporosis        Past Surgical History:   Procedure Laterality Date   • LIMBAL STEM CELL TRANSPLANT  03/2016    @[facility] Dr. Josiah Spicer   • MOUTH SURGERY     • PARTIAL HIP ARTHROPLASTY Right 08/2019   • TOTAL HIP ARTHROPLASTY Left 01/2020     Dr Santa       Current Outpatient Medications on File Prior to Visit   Medication Sig   • acyclovir (ZOVIRAX) 400 MG tablet Take 1 tablet by mouth 2 (Two) Times a Day With Meals.   • albuterol sulfate  (90 Base) MCG/ACT inhaler Inhale 2 puffs Every 6 (Six) Hours As Needed for Wheezing or Shortness of Air.   • cyclobenzaprine (FLEXERIL) 10 MG tablet Take 1 tablet by mouth 3 (Three) Times a Day As Needed for Muscle Spasms.   • esomeprazole (nexIUM) 20 MG capsule Take 20 mg by mouth Daily As Needed.   • Melatonin 5 MG chewable tablet Chew 5 mg Every Night.   • metoprolol tartrate (LOPRESSOR) 25 MG tablet Take 1 tablet by mouth 2 (Two) Times a Day.   • venlafaxine XR (EFFEXOR XR) 37.5 MG 24 hr capsule Take 1 capsule by mouth Every Morning.   • [DISCONTINUED] ondansetron (ZOFRAN) 4 MG tablet Take 1 tablet by mouth Every 8 (Eight) Hours As Needed for Nausea or Vomiting.   • [DISCONTINUED] PHARMACY MEDS TO BED CONSULT Daily.     No current facility-administered medications on file prior to visit.        Family History   Problem Relation Age of Onset   • Breast cancer Other    • Lung cancer Other    • Liver cancer Other    • Bone cancer Other        Social History     Socioeconomic History   • Marital status:      Spouse name: Not on file   • Number of children: Not on file   • Years of education: Not on file   • Highest education level: Not on file   Tobacco Use   • Smoking status: Former Smoker     Last attempt to quit: 2015     Years since quittin.8   • Smokeless tobacco: Never Used   Substance and Sexual Activity   • Alcohol use: No   • Drug use: No   • Sexual activity: Defer   Social History Narrative    Lives in [facility] with . Uses cane or walker for ambulation         Review of Systems   Constitutional: Positive for fatigue. Negative for activity change, appetite change and unexpected weight change.   HENT: Negative for congestion, ear pain, rhinorrhea, sinus pressure, sore  "throat and trouble swallowing.    Eyes: Negative for pain and visual disturbance.   Respiratory: Positive for shortness of breath. Negative for cough, chest tightness and wheezing.         Baseline   Cardiovascular: Negative for chest pain, palpitations and leg swelling.   Gastrointestinal: Negative for abdominal distention, abdominal pain, blood in stool, constipation, diarrhea and vomiting.        12/16 colonoscopy , repeat 12/21   Endocrine: Negative for cold intolerance, heat intolerance, polydipsia and polyphagia.   Genitourinary: Negative for difficulty urinating, dyspareunia, dysuria, frequency, hematuria, menstrual problem, urgency and vaginal discharge.        Refusing   Musculoskeletal: Positive for arthralgias and gait problem. Negative for back pain, joint swelling and myalgias.   Skin: Negative for color change, pallor, rash and wound.        Incision clean dry and intact   Allergic/Immunologic: Negative for environmental allergies, food allergies and immunocompromised state.   Neurological: Negative for dizziness, tremors, seizures, syncope, facial asymmetry, speech difficulty, weakness, numbness and headaches.   Hematological: Negative for adenopathy. Does not bruise/bleed easily.   Psychiatric/Behavioral: Negative for decreased concentration, dysphoric mood, sleep disturbance and suicidal ideas. The patient is nervous/anxious (situational).        /64   Pulse 68   Temp 97.7 °F (36.5 °C) (Temporal)   Ht 154.9 cm (60.98\")   Wt 74.8 kg (165 lb)   BMI 31.19 kg/m²       Physical Exam   Constitutional: She is oriented to person, place, and time. She appears well-developed and well-nourished.   HENT:   Head: Normocephalic and atraumatic.   Right Ear: External ear normal.   Left Ear: External ear normal.   Mouth/Throat: Oropharynx is clear and moist.   Eyes: Conjunctivae and EOM are normal.   Neck: Normal range of motion. Neck supple.   Cardiovascular: Normal rate, regular rhythm and normal heart " sounds.   Pulmonary/Chest: Effort normal. She has rales (mild bibasilar, R>L).   Abdominal: Soft. Bowel sounds are normal.   Musculoskeletal:   Using cane    MOTOR-LE 4/5, UE 4/5    Unstable gait   Lymphadenopathy:     She has no cervical adenopathy.   Neurological: She is alert and oriented to person, place, and time.   Skin: Skin is warm and dry.   Psychiatric: She has a normal mood and affect. Her behavior is normal. Thought content normal.       Procedure:      Discussion/Summary:    Htn/edema-controlled with reduced dose of gabapentin, cont metoprolol  copd-not an issue since stopping tob, proventil prn, controlled on prn albuterol  gerd/gastritis-cont ppi prn, controlled  MM/back pain with radiculopathy-f/u Dr Epps and  ortho, stable  insomnia-cont melatonin, stable  Abnormal lung sounds-CT from [facility] noted, no change  Gait instability/chronic back pain-improved with surgery  Situational anxiety/depression-cont effexor xr     2/14 Labs noted and dw patient    Current Outpatient Medications:   •  acyclovir (ZOVIRAX) 400 MG tablet, Take 1 tablet by mouth 2 (Two) Times a Day With Meals., Disp: 60 tablet, Rfl: 5  •  albuterol sulfate  (90 Base) MCG/ACT inhaler, Inhale 2 puffs Every 6 (Six) Hours As Needed for Wheezing or Shortness of Air., Disp: 1 inhaler, Rfl: 5  •  cyclobenzaprine (FLEXERIL) 10 MG tablet, Take 1 tablet by mouth 3 (Three) Times a Day As Needed for Muscle Spasms., Disp: 30 tablet, Rfl: 2  •  esomeprazole (nexIUM) 20 MG capsule, Take 20 mg by mouth Daily As Needed., Disp: , Rfl:   •  Melatonin 5 MG chewable tablet, Chew 5 mg Every Night., Disp: , Rfl:   •  metoprolol tartrate (LOPRESSOR) 25 MG tablet, Take 1 tablet by mouth 2 (Two) Times a Day., Disp: 60 tablet, Rfl: 2  •  venlafaxine XR (EFFEXOR XR) 37.5 MG 24 hr capsule, Take 1 capsule by mouth Every Morning., Disp: 30 capsule, Rfl: 5        Doris was seen today for hypertension and pain.    Diagnoses and all orders for this  visit:    Essential hypertension    Chronic bronchitis, unspecified chronic bronchitis type (CMS/HCC)    Tubular adenoma of colon    Gastroesophageal reflux disease without esophagitis    Diverticulosis of large intestine without hemorrhage    Arthritis    CKD (chronic kidney disease), stage III (CMS/HCC)    Thrombocytopenia (CMS/HCC)    Gait instability

## 2020-06-16 ENCOUNTER — HOSPITAL ENCOUNTER (OUTPATIENT)
Dept: ONCOLOGY | Facility: HOSPITAL | Age: 70
Setting detail: INFUSION SERIES
Discharge: HOME | End: 2020-06-16

## 2020-06-16 VITALS
HEART RATE: 77 BPM | DIASTOLIC BLOOD PRESSURE: 54 MMHG | HEIGHT: 60 IN | WEIGHT: 166 LBS | BODY MASS INDEX: 32.59 KG/M2 | TEMPERATURE: 97.9 F | RESPIRATION RATE: 20 BRPM | SYSTOLIC BLOOD PRESSURE: 111 MMHG

## 2020-06-16 DIAGNOSIS — C90.01 MULTIPLE MYELOMA IN REMISSION (HCC): Primary | ICD-10-CM

## 2020-06-16 LAB
ALBUMIN SERPL-MCNC: 4.1 G/DL (ref 3.5–5.2)
ALBUMIN/GLOB SERPL: 1.7 G/DL
ALP SERPL-CCNC: 110 U/L (ref 39–117)
ALT SERPL W P-5'-P-CCNC: 18 U/L (ref 1–33)
ANION GAP SERPL CALCULATED.3IONS-SCNC: 12 MMOL/L (ref 5–15)
AST SERPL-CCNC: 25 U/L (ref 1–32)
BILIRUB SERPL-MCNC: 0.3 MG/DL (ref 0.2–1.2)
BUN BLD-MCNC: 21 MG/DL (ref 8–23)
BUN/CREAT SERPL: 15.8 (ref 7–25)
CALCIUM SPEC-SCNC: 8.7 MG/DL (ref 8.6–10.5)
CHLORIDE SERPL-SCNC: 107 MMOL/L (ref 98–107)
CO2 SERPL-SCNC: 20 MMOL/L (ref 22–29)
CREAT BLD-MCNC: 1.33 MG/DL (ref 0.57–1)
CREAT SERPL-MCNC: 1.4 MG/DL
ERYTHROCYTE [DISTWIDTH] IN BLOOD BY AUTOMATED COUNT: 17.8 % (ref 12.3–15.4)
GFR SERPL CREATININE-BSD FRML MDRD: 40 ML/MIN/1.73
GLOBULIN UR ELPH-MCNC: 2.4 GM/DL
GLUCOSE BLD-MCNC: 102 MG/DL (ref 65–99)
HCT VFR BLD AUTO: 36.2 % (ref 34–46.6)
HGB BLD-MCNC: 11.3 G/DL (ref 12–15.9)
LYMPHOCYTES # BLD AUTO: 0.8 10*3/MM3 (ref 0.7–3.1)
LYMPHOCYTES NFR BLD AUTO: 19.1 % (ref 19.6–45.3)
MAGNESIUM SERPL-MCNC: 2.3 MG/DL (ref 1.6–2.4)
MCH RBC QN AUTO: 25.6 PG (ref 26.6–33)
MCHC RBC AUTO-ENTMCNC: 31.3 G/DL (ref 31.5–35.7)
MCV RBC AUTO: 81.6 FL (ref 79–97)
MONOCYTES # BLD AUTO: 0.2 10*3/MM3 (ref 0.1–0.9)
MONOCYTES NFR BLD AUTO: 4.5 % (ref 5–12)
NEUTROPHILS # BLD AUTO: 3.1 10*3/MM3 (ref 1.7–7)
NEUTROPHILS NFR BLD AUTO: 76.4 % (ref 42.7–76)
PHOSPHATE SERPL-MCNC: 3.5 MG/DL (ref 2.5–4.5)
PLATELET # BLD AUTO: 125 10*3/MM3 (ref 140–450)
PMV BLD AUTO: 9.2 FL (ref 6–12)
POTASSIUM BLD-SCNC: 4.5 MMOL/L (ref 3.5–5.2)
PROT SERPL-MCNC: 6.5 G/DL (ref 6–8.5)
RBC # BLD AUTO: 4.44 10*6/MM3 (ref 3.77–5.28)
SODIUM BLD-SCNC: 139 MMOL/L (ref 136–145)
WBC NRBC COR # BLD: 4.1 10*3/MM3 (ref 3.4–10.8)

## 2020-06-16 PROCEDURE — 84100 ASSAY OF PHOSPHORUS: CPT | Performed by: INTERNAL MEDICINE

## 2020-06-16 PROCEDURE — 83883 ASSAY NEPHELOMETRY NOT SPEC: CPT | Performed by: INTERNAL MEDICINE

## 2020-06-16 PROCEDURE — 36415 COLL VENOUS BLD VENIPUNCTURE: CPT

## 2020-06-16 PROCEDURE — 86334 IMMUNOFIX E-PHORESIS SERUM: CPT | Performed by: INTERNAL MEDICINE

## 2020-06-16 PROCEDURE — 83735 ASSAY OF MAGNESIUM: CPT | Performed by: INTERNAL MEDICINE

## 2020-06-16 PROCEDURE — 85025 COMPLETE CBC W/AUTO DIFF WBC: CPT | Performed by: INTERNAL MEDICINE

## 2020-06-16 PROCEDURE — 96365 THER/PROPH/DIAG IV INF INIT: CPT

## 2020-06-16 PROCEDURE — 80053 COMPREHEN METABOLIC PANEL: CPT | Performed by: INTERNAL MEDICINE

## 2020-06-16 PROCEDURE — 84165 PROTEIN E-PHORESIS SERUM: CPT | Performed by: INTERNAL MEDICINE

## 2020-06-16 PROCEDURE — 82784 ASSAY IGA/IGD/IGG/IGM EACH: CPT | Performed by: INTERNAL MEDICINE

## 2020-06-16 PROCEDURE — 25010000002 ZOLEDRONIC ACID PER 1 MG: Performed by: INTERNAL MEDICINE

## 2020-06-16 RX ORDER — SODIUM CHLORIDE 9 MG/ML
250 INJECTION, SOLUTION INTRAVENOUS ONCE
Status: DISCONTINUED | OUTPATIENT
Start: 2020-06-16 | End: 2020-06-18 | Stop reason: HOSPADM

## 2020-06-16 RX ADMIN — ZOLEDRONIC ACID 3.3 MG: 4 INJECTION, SOLUTION, CONCENTRATE INTRAVENOUS at 14:33

## 2020-06-17 LAB
ALBUMIN SERPL-MCNC: 3.8 G/DL (ref 2.9–4.4)
ALBUMIN/GLOB SERPL: 1.5 {RATIO} (ref 0.7–1.7)
ALPHA1 GLOB FLD ELPH-MCNC: 0.2 G/DL (ref 0–0.4)
ALPHA2 GLOB SERPL ELPH-MCNC: 0.7 G/DL (ref 0.4–1)
B-GLOBULIN SERPL ELPH-MCNC: 1.1 G/DL (ref 0.7–1.3)
GAMMA GLOB SERPL ELPH-MCNC: 0.6 G/DL (ref 0.4–1.8)
GLOBULIN SER CALC-MCNC: 2.6 G/DL (ref 2.2–3.9)
IGA SERPL-MCNC: 123 MG/DL (ref 87–352)
IGG SERPL-MCNC: 559 MG/DL (ref 586–1602)
IGM SERPL-MCNC: 38 MG/DL (ref 26–217)
INTERPRETATION SERPL IEP-IMP: ABNORMAL
KAPPA LC SERPL-MCNC: 542.1 MG/L (ref 3.3–19.4)
KAPPA LC/LAMBDA SER: 73.26 {RATIO} (ref 0.26–1.65)
LAMBDA LC FREE SERPL-MCNC: 7.4 MG/L (ref 5.7–26.3)
Lab: ABNORMAL
M-SPIKE: ABNORMAL G/DL
PROT SERPL-MCNC: 6.4 G/DL (ref 6–8.5)

## 2020-06-23 ENCOUNTER — TELEPHONE (OUTPATIENT)
Dept: ONCOLOGY | Facility: CLINIC | Age: 70
End: 2020-06-23

## 2020-06-23 NOTE — TELEPHONE ENCOUNTER
Called pt to confirm her 06/25 appt has been changed to a telephone visit.  Pt voiced understanding.

## 2020-06-25 ENCOUNTER — OFFICE VISIT (OUTPATIENT)
Dept: ONCOLOGY | Facility: CLINIC | Age: 70
End: 2020-06-25

## 2020-06-25 DIAGNOSIS — C90.01 MULTIPLE MYELOMA IN REMISSION (HCC): Primary | ICD-10-CM

## 2020-06-25 PROCEDURE — 99442 PR PHYS/QHP TELEPHONE EVALUATION 11-20 MIN: CPT | Performed by: NURSE PRACTITIONER

## 2020-06-25 NOTE — PROGRESS NOTES
PROBLEM LIST:  Oncology/Hematology History     PROBLEM LIST:   1. Stage III IgA kappa clonal multiple myeloma. Diagnosed 9/2015  2. FISH panel reports multiple abnormalities including gain of 1q21 monosomy.  3. Monosomy 13 and gain of chromosomes 9, 15 and CCND1.  4. Initial M-spike of 7.1 g/dL at time of diagnosis and after 3 cycles, had  undetectable M-spike currently on Velcade, Revlimid and dexamethasone cycle #6  this week.  5. S/p Autologous SCT at [facility] with Dr. Venu Spicer in March 11,2016  6. Revlimid on hold due to low platelets  7.Right leg pain - on lyrica       Multiple myeloma in remission     7/11/2016 Initial Diagnosis     Multiple myeloma         REASON FOR VISIT: Multiple myeloma   You have chosen to receive care through a telephone visit. Do you consent to use a telephone visit for your medical care today? Yes    Subjective     HISTORY OF PRESENT ILLNESS:   Ms. Miranda is following up on myeloma management.  She had autologous SCT 3/2016.  She is doing well clinically.  She underwent right rip replacement last fall and left total hip replacement earlier this year.  She has had a rough year with illnesses in the family and her  passed away in April.  She has moved in with her daughter.  She is doing better emotionally and is trying to lose weight.  She denies any fevers or infections.      Past Medical History, Past Surgical History, Social History, Family History have been reviewed and are without significant changes except as mentioned.    Review of Systems   Constitutional: Negative.    HENT: Negative.    Eyes: Negative.    Respiratory: Negative.    Cardiovascular: Negative.    Gastrointestinal: Negative.    Endocrine: Negative.    Genitourinary: Negative.    Musculoskeletal: Positive for arthralgias and gait problem.   Skin: Negative.    Allergic/Immunologic: Negative.    Hematological: Negative.    Psychiatric/Behavioral: Negative.       A comprehensive 14 point review of systems was  performed and was negative except as mentioned.    Medications:  The current medication list was reviewed in the EMR    ALLERGIES:  No Known Allergies    Objective      There were no vitals taken for this visit.       RECENT LABS:    Lab Results   Component Value Date    MSPIKE Not Observed 06/16/2020    MSPIKE Not Observed 03/24/2020    MSPIKE Not Observed 12/31/2019     Lab Results   Component Value Date    FREEKAPPAL 542.1 (H) 06/16/2020    FREEKAPPAL 200.9 (H) 03/24/2020    FREEKAPPAL 80.7 (H) 12/31/2019     Lab Results   Component Value Date    IGLFLC 7.4 06/16/2020    IGLFLC 9.9 03/24/2020    IGLFLC 14.0 12/31/2019     Lab Results   Component Value Date    WBC 4.10 06/16/2020    HGB 11.3 (L) 06/16/2020    HCT 36.2 06/16/2020    MCV 81.6 06/16/2020     (L) 06/16/2020       Lab Results   Component Value Date    GLUCOSE 102 (H) 06/16/2020    BUN 21 06/16/2020    CREATININE 1.33 (H) 06/16/2020    EGFRIFNONA 40 (L) 06/16/2020    BCR 15.8 06/16/2020    K 4.5 06/16/2020    CO2 20.0 (L) 06/16/2020    CALCIUM 8.7 06/16/2020    PROTENTOTREF 6.4 06/16/2020    ALBUMIN 3.8 06/16/2020    ALBUMIN 4.10 06/16/2020    LABIL2 1.5 06/16/2020    AST 25 06/16/2020    ALT 18 06/16/2020         Assessment/Plan       1. Multiple myeloma in remission.  Status post autologous stem cell transplant 3/2016.  We had initially placed her on Revlimid for maintenance.  However due to severe thrombocytopenia she was taken off of that.   Her platelets have improved.  We will continue having her off Revlimid.  Continue every 3 months of Zometa at this point may consider every 4 months.  M-spike still undetectable.    2.  Hypogammaglobulinemia.  Planned to start her on IVIG.  Unfortunately we have a national shortage of IVIG and it becomes increasingly difficult to get the supply.  Continue to monitor    2.  Back pain / hip pain.  Status post right hip replacement last year and total left hip replacement this year.  She is doing remarkably  well.    RTC with next Zometa dose in September.      I spent a total of 13 minutes in direct patient care, greater than 10 minutes (greater than 50%) were spent in coordination of care, and counseling the patient regarding Myeloma. Answered any questions patient had with medication and plan.      DAYTON Garibay  [facility]    6/25/2020

## 2020-07-08 ENCOUNTER — TELEPHONE (OUTPATIENT)
Dept: ONCOLOGY | Facility: CLINIC | Age: 70
End: 2020-07-08

## 2020-07-08 NOTE — TELEPHONE ENCOUNTER
Called pt back, she requested to have Zometa then approximately 2 weeks later see Dr. Epps instead of having both appts on same day.  She states Dr. Epps wants to do it this way so that he can have all labs done and in hand for her appt.  Move pt's Zometa back to 09/08 at 1:30pm and scheduled her to see Dr. Epps on Mon, 09/21 at 8:45am.  Mailing appt reminders.

## 2020-07-08 NOTE — TELEPHONE ENCOUNTER
PT IS RETURNING IZA'S CALL ABOUT SCHEDULING HER APPT. GAVE HER APPT DETAILS. SHE STATED THAT THIS WAS INCORRECT. DR BALLARD LIKE TO HAVE ALL HER LABS BACK A COUPLE OF WEEKS PRIOR TO SEEING HIM.    PLEASE GIVE PT A CALL BACK [phone].

## 2020-07-09 RX ORDER — ACYCLOVIR 400 MG/1
400 TABLET ORAL 2 TIMES DAILY WITH MEALS
Qty: 60 TABLET | Refills: 5 | Status: SHIPPED | OUTPATIENT
Start: 2020-07-09 | End: 2020-12-28 | Stop reason: SDUPTHER

## 2020-07-25 DIAGNOSIS — I10 ESSENTIAL HYPERTENSION: ICD-10-CM

## 2020-07-27 RX ORDER — CYCLOBENZAPRINE HCL 10 MG
10 TABLET ORAL 3 TIMES DAILY PRN
Qty: 30 TABLET | Refills: 2 | Status: SHIPPED | OUTPATIENT
Start: 2020-07-27 | End: 2020-10-29 | Stop reason: SDUPTHER

## 2020-08-07 DIAGNOSIS — F43.23 SITUATIONAL MIXED ANXIETY AND DEPRESSIVE DISORDER: ICD-10-CM

## 2020-08-07 RX ORDER — VENLAFAXINE HYDROCHLORIDE 37.5 MG/1
37.5 CAPSULE, EXTENDED RELEASE ORAL EVERY MORNING
Qty: 30 CAPSULE | Refills: 5 | Status: SHIPPED | OUTPATIENT
Start: 2020-08-07 | End: 2021-01-29 | Stop reason: SDUPTHER

## 2020-09-04 DIAGNOSIS — C90.01 MULTIPLE MYELOMA IN REMISSION (HCC): ICD-10-CM

## 2020-09-04 RX ORDER — SODIUM CHLORIDE 9 MG/ML
250 INJECTION, SOLUTION INTRAVENOUS ONCE
Status: CANCELLED | OUTPATIENT
Start: 2020-09-08

## 2020-09-08 ENCOUNTER — HOSPITAL ENCOUNTER (OUTPATIENT)
Dept: ONCOLOGY | Facility: HOSPITAL | Age: 70
Setting detail: INFUSION SERIES
Discharge: HOME | End: 2020-09-08

## 2020-09-08 ENCOUNTER — APPOINTMENT (OUTPATIENT)
Dept: ONCOLOGY | Facility: HOSPITAL | Age: 70
End: 2020-09-08

## 2020-09-08 VITALS
BODY MASS INDEX: 31.41 KG/M2 | WEIGHT: 160 LBS | HEART RATE: 75 BPM | HEIGHT: 60 IN | DIASTOLIC BLOOD PRESSURE: 53 MMHG | RESPIRATION RATE: 20 BRPM | SYSTOLIC BLOOD PRESSURE: 109 MMHG | TEMPERATURE: 98.2 F

## 2020-09-08 DIAGNOSIS — C90.01 MULTIPLE MYELOMA IN REMISSION (HCC): Primary | ICD-10-CM

## 2020-09-08 LAB
ALBUMIN SERPL-MCNC: 4.2 G/DL (ref 3.5–5.2)
ALBUMIN/GLOB SERPL: 1.8 G/DL
ALP SERPL-CCNC: 108 U/L (ref 39–117)
ALT SERPL W P-5'-P-CCNC: 20 U/L (ref 1–33)
ANION GAP SERPL CALCULATED.3IONS-SCNC: 10 MMOL/L (ref 5–15)
AST SERPL-CCNC: 27 U/L (ref 1–32)
BILIRUB SERPL-MCNC: 0.3 MG/DL (ref 0–1.2)
BUN SERPL-MCNC: 15 MG/DL (ref 8–23)
BUN/CREAT SERPL: 12.5 (ref 7–25)
CALCIUM SPEC-SCNC: 8.6 MG/DL (ref 8.6–10.5)
CHLORIDE SERPL-SCNC: 107 MMOL/L (ref 98–107)
CO2 SERPL-SCNC: 22 MMOL/L (ref 22–29)
CREAT BLDA-MCNC: 1.2 MG/DL (ref 0.6–1.3)
CREAT SERPL-MCNC: 1.2 MG/DL (ref 0.57–1)
ERYTHROCYTE [DISTWIDTH] IN BLOOD BY AUTOMATED COUNT: 15.7 % (ref 12.3–15.4)
GFR SERPL CREATININE-BSD FRML MDRD: 44 ML/MIN/1.73
GLOBULIN UR ELPH-MCNC: 2.3 GM/DL
GLUCOSE SERPL-MCNC: 67 MG/DL (ref 65–99)
HCT VFR BLD AUTO: 41.9 % (ref 34–46.6)
HGB BLD-MCNC: 13.5 G/DL (ref 12–15.9)
LYMPHOCYTES # BLD AUTO: 1.1 10*3/MM3 (ref 0.7–3.1)
LYMPHOCYTES NFR BLD AUTO: 20.5 % (ref 19.6–45.3)
MAGNESIUM SERPL-MCNC: 2.4 MG/DL (ref 1.6–2.4)
MCH RBC QN AUTO: 27.5 PG (ref 26.6–33)
MCHC RBC AUTO-ENTMCNC: 32.3 G/DL (ref 31.5–35.7)
MCV RBC AUTO: 85.1 FL (ref 79–97)
MONOCYTES # BLD AUTO: 0.3 10*3/MM3 (ref 0.1–0.9)
MONOCYTES NFR BLD AUTO: 5.7 % (ref 5–12)
NEUTROPHILS NFR BLD AUTO: 4.1 10*3/MM3 (ref 1.7–7)
NEUTROPHILS NFR BLD AUTO: 73.8 % (ref 42.7–76)
PHOSPHATE SERPL-MCNC: 2.8 MG/DL (ref 2.5–4.5)
PLATELET # BLD AUTO: 128 10*3/MM3 (ref 140–450)
PMV BLD AUTO: 8.9 FL (ref 6–12)
POTASSIUM SERPL-SCNC: 4.5 MMOL/L (ref 3.5–5.2)
PROT SERPL-MCNC: 6.5 G/DL (ref 6–8.5)
RBC # BLD AUTO: 4.92 10*6/MM3 (ref 3.77–5.28)
SODIUM SERPL-SCNC: 139 MMOL/L (ref 136–145)
WBC # BLD AUTO: 5.5 10*3/MM3 (ref 3.4–10.8)

## 2020-09-08 PROCEDURE — 83883 ASSAY NEPHELOMETRY NOT SPEC: CPT | Performed by: INTERNAL MEDICINE

## 2020-09-08 PROCEDURE — 84100 ASSAY OF PHOSPHORUS: CPT | Performed by: INTERNAL MEDICINE

## 2020-09-08 PROCEDURE — 85025 COMPLETE CBC W/AUTO DIFF WBC: CPT | Performed by: INTERNAL MEDICINE

## 2020-09-08 PROCEDURE — 96374 THER/PROPH/DIAG INJ IV PUSH: CPT

## 2020-09-08 PROCEDURE — 83735 ASSAY OF MAGNESIUM: CPT | Performed by: INTERNAL MEDICINE

## 2020-09-08 PROCEDURE — 82784 ASSAY IGA/IGD/IGG/IGM EACH: CPT | Performed by: INTERNAL MEDICINE

## 2020-09-08 PROCEDURE — 82565 ASSAY OF CREATININE: CPT

## 2020-09-08 PROCEDURE — 25010000002 ZOLEDRONIC ACID PER 1 MG: Performed by: INTERNAL MEDICINE

## 2020-09-08 PROCEDURE — 84165 PROTEIN E-PHORESIS SERUM: CPT | Performed by: INTERNAL MEDICINE

## 2020-09-08 PROCEDURE — 86334 IMMUNOFIX E-PHORESIS SERUM: CPT | Performed by: INTERNAL MEDICINE

## 2020-09-08 PROCEDURE — 80053 COMPREHEN METABOLIC PANEL: CPT | Performed by: INTERNAL MEDICINE

## 2020-09-08 RX ORDER — SODIUM CHLORIDE 9 MG/ML
250 INJECTION, SOLUTION INTRAVENOUS ONCE
Status: DISCONTINUED | OUTPATIENT
Start: 2020-09-08 | End: 2020-09-09 | Stop reason: HOSPADM

## 2020-09-08 RX ADMIN — ZOLEDRONIC ACID 3.3 MG: 4 INJECTION, SOLUTION, CONCENTRATE INTRAVENOUS at 14:31

## 2020-09-10 PROBLEM — C90.02 MULTIPLE MYELOMA IN RELAPSE: Status: ACTIVE | Noted: 2020-09-10

## 2020-09-10 LAB
ALBUMIN SERPL-MCNC: 3.7 G/DL (ref 2.9–4.4)
ALBUMIN/GLOB SERPL: 1.5 {RATIO} (ref 0.7–1.7)
ALPHA1 GLOB FLD ELPH-MCNC: 0.3 G/DL (ref 0–0.4)
ALPHA2 GLOB SERPL ELPH-MCNC: 0.8 G/DL (ref 0.4–1)
B-GLOBULIN SERPL ELPH-MCNC: 1.1 G/DL (ref 0.7–1.3)
GAMMA GLOB SERPL ELPH-MCNC: 0.4 G/DL (ref 0.4–1.8)
GLOBULIN SER CALC-MCNC: 2.5 G/DL (ref 2.2–3.9)
IGA SERPL-MCNC: 94 MG/DL (ref 87–352)
IGG SERPL-MCNC: 473 MG/DL (ref 586–1602)
IGM SERPL-MCNC: 20 MG/DL (ref 26–217)
INTERPRETATION SERPL IEP-IMP: ABNORMAL
KAPPA LC SERPL-MCNC: 1035.8 MG/L (ref 3.3–19.4)
KAPPA LC/LAMBDA SER: 134.52 {RATIO} (ref 0.26–1.65)
LAMBDA LC FREE SERPL-MCNC: 7.7 MG/L (ref 5.7–26.3)
Lab: ABNORMAL
M-SPIKE: ABNORMAL G/DL
PROT SERPL-MCNC: 6.2 G/DL (ref 6–8.5)

## 2020-09-11 ENCOUNTER — OFFICE VISIT (OUTPATIENT)
Dept: ONCOLOGY | Facility: CLINIC | Age: 70
End: 2020-09-11

## 2020-09-11 VITALS
WEIGHT: 162 LBS | SYSTOLIC BLOOD PRESSURE: 124 MMHG | HEIGHT: 60 IN | BODY MASS INDEX: 31.8 KG/M2 | HEART RATE: 71 BPM | OXYGEN SATURATION: 92 % | DIASTOLIC BLOOD PRESSURE: 60 MMHG | RESPIRATION RATE: 18 BRPM | TEMPERATURE: 97.7 F

## 2020-09-11 DIAGNOSIS — C90.02 MULTIPLE MYELOMA IN RELAPSE (HCC): Primary | ICD-10-CM

## 2020-09-11 PROCEDURE — 99214 OFFICE O/P EST MOD 30 MIN: CPT | Performed by: INTERNAL MEDICINE

## 2020-09-11 RX ORDER — MEPERIDINE HYDROCHLORIDE 50 MG/ML
25 INJECTION INTRAMUSCULAR; INTRAVENOUS; SUBCUTANEOUS
Status: CANCELLED | OUTPATIENT
Start: 2020-10-14

## 2020-09-11 RX ORDER — FAMOTIDINE 10 MG/ML
20 INJECTION, SOLUTION INTRAVENOUS AS NEEDED
Status: CANCELLED | OUTPATIENT
Start: 2020-10-07

## 2020-09-11 RX ORDER — SODIUM CHLORIDE 9 MG/ML
250 INJECTION, SOLUTION INTRAVENOUS ONCE
Status: CANCELLED | OUTPATIENT
Start: 2020-10-14

## 2020-09-11 RX ORDER — FAMOTIDINE 10 MG/ML
20 INJECTION, SOLUTION INTRAVENOUS AS NEEDED
Status: CANCELLED | OUTPATIENT
Start: 2020-09-30

## 2020-09-11 RX ORDER — DIPHENHYDRAMINE HYDROCHLORIDE 50 MG/ML
50 INJECTION INTRAMUSCULAR; INTRAVENOUS AS NEEDED
Status: CANCELLED | OUTPATIENT
Start: 2020-10-07

## 2020-09-11 RX ORDER — METHYLPREDNISOLONE SODIUM SUCCINATE 125 MG/2ML
60 INJECTION, POWDER, LYOPHILIZED, FOR SOLUTION INTRAMUSCULAR; INTRAVENOUS ONCE
Status: CANCELLED | OUTPATIENT
Start: 2020-10-14

## 2020-09-11 RX ORDER — FAMOTIDINE 10 MG/ML
20 INJECTION, SOLUTION INTRAVENOUS AS NEEDED
Status: CANCELLED | OUTPATIENT
Start: 2020-09-23

## 2020-09-11 RX ORDER — DIPHENHYDRAMINE HYDROCHLORIDE 50 MG/ML
50 INJECTION INTRAMUSCULAR; INTRAVENOUS AS NEEDED
Status: CANCELLED | OUTPATIENT
Start: 2020-10-14

## 2020-09-11 RX ORDER — ACETAMINOPHEN 500 MG
1000 TABLET ORAL ONCE
Status: CANCELLED | OUTPATIENT
Start: 2020-09-23

## 2020-09-11 RX ORDER — SODIUM CHLORIDE 9 MG/ML
250 INJECTION, SOLUTION INTRAVENOUS ONCE
Status: CANCELLED | OUTPATIENT
Start: 2020-09-30

## 2020-09-11 RX ORDER — MEPERIDINE HYDROCHLORIDE 50 MG/ML
25 INJECTION INTRAMUSCULAR; INTRAVENOUS; SUBCUTANEOUS
Status: CANCELLED | OUTPATIENT
Start: 2020-10-07

## 2020-09-11 RX ORDER — METHYLPREDNISOLONE SODIUM SUCCINATE 125 MG/2ML
100 INJECTION, POWDER, LYOPHILIZED, FOR SOLUTION INTRAMUSCULAR; INTRAVENOUS ONCE
Status: CANCELLED | OUTPATIENT
Start: 2020-09-23

## 2020-09-11 RX ORDER — DIPHENHYDRAMINE HYDROCHLORIDE 50 MG/ML
50 INJECTION INTRAMUSCULAR; INTRAVENOUS AS NEEDED
Status: CANCELLED | OUTPATIENT
Start: 2020-09-30

## 2020-09-11 RX ORDER — ACETAMINOPHEN 500 MG
1000 TABLET ORAL ONCE
Status: CANCELLED | OUTPATIENT
Start: 2020-10-07

## 2020-09-11 RX ORDER — MEPERIDINE HYDROCHLORIDE 50 MG/ML
25 INJECTION INTRAMUSCULAR; INTRAVENOUS; SUBCUTANEOUS
Status: CANCELLED | OUTPATIENT
Start: 2020-09-30

## 2020-09-11 RX ORDER — MEPERIDINE HYDROCHLORIDE 50 MG/ML
25 INJECTION INTRAMUSCULAR; INTRAVENOUS; SUBCUTANEOUS
Status: CANCELLED | OUTPATIENT
Start: 2020-09-23

## 2020-09-11 RX ORDER — METHYLPREDNISOLONE SODIUM SUCCINATE 125 MG/2ML
60 INJECTION, POWDER, LYOPHILIZED, FOR SOLUTION INTRAMUSCULAR; INTRAVENOUS ONCE
Status: CANCELLED | OUTPATIENT
Start: 2020-10-07

## 2020-09-11 RX ORDER — DIPHENHYDRAMINE HYDROCHLORIDE 50 MG/ML
50 INJECTION INTRAMUSCULAR; INTRAVENOUS AS NEEDED
Status: CANCELLED | OUTPATIENT
Start: 2020-09-23

## 2020-09-11 RX ORDER — ACETAMINOPHEN 500 MG
1000 TABLET ORAL ONCE
Status: CANCELLED | OUTPATIENT
Start: 2020-09-30

## 2020-09-11 RX ORDER — FAMOTIDINE 10 MG/ML
20 INJECTION, SOLUTION INTRAVENOUS AS NEEDED
Status: CANCELLED | OUTPATIENT
Start: 2020-10-14

## 2020-09-11 RX ORDER — SODIUM CHLORIDE 9 MG/ML
250 INJECTION, SOLUTION INTRAVENOUS ONCE
Status: CANCELLED | OUTPATIENT
Start: 2020-09-23

## 2020-09-11 RX ORDER — MONTELUKAST SODIUM 10 MG/1
10 TABLET ORAL ONCE
Status: CANCELLED | OUTPATIENT
Start: 2020-09-23

## 2020-09-11 RX ORDER — METHYLPREDNISOLONE SODIUM SUCCINATE 125 MG/2ML
100 INJECTION, POWDER, LYOPHILIZED, FOR SOLUTION INTRAMUSCULAR; INTRAVENOUS ONCE
Status: CANCELLED | OUTPATIENT
Start: 2020-09-30

## 2020-09-11 RX ORDER — SODIUM CHLORIDE 9 MG/ML
250 INJECTION, SOLUTION INTRAVENOUS ONCE
Status: CANCELLED | OUTPATIENT
Start: 2020-10-07

## 2020-09-11 RX ORDER — ACETAMINOPHEN 500 MG
1000 TABLET ORAL ONCE
Status: CANCELLED | OUTPATIENT
Start: 2020-10-14

## 2020-09-11 NOTE — H&P (VIEW-ONLY)
Oncology/Hematology History    PROBLEM LIST:   1. Stage III IgA kappa clonal multiple myeloma. Diagnosed 9/20152. FISH panel reports multiple abnormalities including gain of 1q21 monosomy.  3. Monosomy 13 and gain of chromosomes 9, 15 and CCND1.  4. Initial M-spike of 7.1 g/dL at time of diagnosis and after 3 cycles, had  undetectable M-spike currently on Velcade, Revlimid and dexamethasone cycle #6  this week.  5. S/p Autologous SCT at [facility] with Dr. Venu Spicer in March 11,2016  6. Revlimid on hold due to low platelets  7.Right leg pain - on lyrica        Multiple myeloma in remission (CMS/[facility])    7/11/2016 Initial Diagnosis     Multiple myeloma        Multiple myeloma in relapse (CMS/[facility])    9/10/2020 Initial Diagnosis     Multiple myeloma in relapse (CMS/[facility])      9/23/2020 -  Chemotherapy     OP MULTIPLE MYELOMA Daratumumab / Pomalidomide / Dexamethasone            REASON FOR VISIT: Multiple myeloma       Subjective     HISTORY OF PRESENT ILLNESS:   Ms. Miranda is here for follow up evaluation of myeloma management.  Patient is doing reasonably well.  Since having both her hips done she is able to walk without difficulty.  Pain is fairly controlled.  She has no infections recently.    Past Medical History, Past Surgical History, Social History, Family History have been reviewed and are without significant changes except as mentioned.    Review of Systems   Constitutional: Negative.    HENT: Negative.    Eyes: Negative.    Respiratory: Negative.    Cardiovascular: Negative.    Gastrointestinal: Negative.    Endocrine: Negative.    Genitourinary: Negative.    Musculoskeletal: Positive for arthralgias and gait problem.   Skin: Negative.    Allergic/Immunologic: Negative.    Hematological: Negative.    Psychiatric/Behavioral: Negative.       A comprehensive 14 point review of systems was performed and was negative except as mentioned.    Medications:  The current medication list was reviewed in the EMR    ALLERGIES:   No Known Allergies    Objective      There were no vitals taken for this visit.     Performance Status: 1    General: well appearing, in no acute distress  HEENT: sclera anicteric, oropharynx clear  Lymphatics: no cervical, supraclavicular, or axillary adenopathy  Cardiovascular: regular rate and rhythm, no murmurs  Lungs: clear to auscultation bilaterally  Abdomen: soft, nontender, nondistended.  No palpable organomegaly  Extremeties: no lower extremity edema  Skin: no rashes, lesions, bruising, or petechiae    RECENT LABS:    Lab Results   Component Value Date    MSPIKE Comment: 09/08/2020    MSPIKE Not Observed 06/16/2020    MSPIKE Not Observed 03/24/2020     Lab Results   Component Value Date    FREEKAPPAL 1035.8 (H) 09/08/2020    FREEKAPPAL 542.1 (H) 06/16/2020    FREEKAPPAL 200.9 (H) 03/24/2020     Lab Results   Component Value Date    IGLFLC 7.7 09/08/2020    IGLFLC 7.4 06/16/2020    IGLFLC 9.9 03/24/2020     Lab Results   Component Value Date    WBC 5.50 09/08/2020    HGB 13.5 09/08/2020    HCT 41.9 09/08/2020    MCV 85.1 09/08/2020     (L) 09/08/2020       Lab Results   Component Value Date    GLUCOSE 67 09/08/2020    BUN 15 09/08/2020    CREATININE 1.20 09/08/2020    EGFRIFNONA 44 (L) 09/08/2020    BCR 12.5 09/08/2020    K 4.5 09/08/2020    CO2 22.0 09/08/2020    CALCIUM 8.6 09/08/2020    PROTENTOTREF 6.2 09/08/2020    ALBUMIN 3.7 09/08/2020    ALBUMIN 4.20 09/08/2020    LABIL2 1.5 09/08/2020    AST 27 09/08/2020    ALT 20 09/08/2020         Assessment/Plan       1. multiple myeloma in remission  status post autologous stem cell transplant.  She is now 5 years out from her diagnosis.  She does have progressive disease with mostly light chain disease.  I am going to repeat her bone marrow biopsy next week.  I am going to get a bone survey on her.  I am going to switch her treatment to Pomalyst, daratumumab and dexamethasone.  I am going to reduce her Pomalyst dose to 2 mg/day.  I do not think she  will tolerate the 4 mg dosing due to cytopenias.  We will see how she does going forward.  She is not interested in undergoing a repeat autologous stem cell transplantation.  The goal is to get her disease under control at this time.    2.  Hypogammaglobulinemia.  She has not had issues with infections so far.  We will hold off on IVIG for now    3.  Back pain/ hip pain: The hip replacements has done wonders for her.           I spent a total of 40 minutes in direct patient care, greater than 25 minutes (greater than 50%) were spent in coordination of care, and counseling the patient regarding  Myeloma . Answered any questions patient had with medication and plan.      Joseph Mckinley MD  [facility] Hematology and Oncology    9/11/2020               CC:

## 2020-09-11 NOTE — PROGRESS NOTES
Oncology/Hematology History    PROBLEM LIST:   1. Stage III IgA kappa clonal multiple myeloma. Diagnosed 9/20152. FISH panel reports multiple abnormalities including gain of 1q21 monosomy.  3. Monosomy 13 and gain of chromosomes 9, 15 and CCND1.  4. Initial M-spike of 7.1 g/dL at time of diagnosis and after 3 cycles, had  undetectable M-spike currently on Velcade, Revlimid and dexamethasone cycle #6  this week.  5. S/p Autologous SCT at [facility] with Dr. Venu Spicer in March 11,2016  6. Revlimid on hold due to low platelets  7.Right leg pain - on lyrica        Multiple myeloma in remission (CMS/[facility])    7/11/2016 Initial Diagnosis     Multiple myeloma        Multiple myeloma in relapse (CMS/[facility])    9/10/2020 Initial Diagnosis     Multiple myeloma in relapse (CMS/[facility])      9/23/2020 -  Chemotherapy     OP MULTIPLE MYELOMA Daratumumab / Pomalidomide / Dexamethasone            REASON FOR VISIT: Multiple myeloma       Subjective     HISTORY OF PRESENT ILLNESS:   Ms. Miranda is here for follow up evaluation of myeloma management.  Patient is doing reasonably well.  Since having both her hips done she is able to walk without difficulty.  Pain is fairly controlled.  She has no infections recently.    Past Medical History, Past Surgical History, Social History, Family History have been reviewed and are without significant changes except as mentioned.    Review of Systems   Constitutional: Negative.    HENT: Negative.    Eyes: Negative.    Respiratory: Negative.    Cardiovascular: Negative.    Gastrointestinal: Negative.    Endocrine: Negative.    Genitourinary: Negative.    Musculoskeletal: Positive for arthralgias and gait problem.   Skin: Negative.    Allergic/Immunologic: Negative.    Hematological: Negative.    Psychiatric/Behavioral: Negative.       A comprehensive 14 point review of systems was performed and was negative except as mentioned.    Medications:  The current medication list was reviewed in the EMR    ALLERGIES:   No Known Allergies    Objective      There were no vitals taken for this visit.     Performance Status: 1    General: well appearing, in no acute distress  HEENT: sclera anicteric, oropharynx clear  Lymphatics: no cervical, supraclavicular, or axillary adenopathy  Cardiovascular: regular rate and rhythm, no murmurs  Lungs: clear to auscultation bilaterally  Abdomen: soft, nontender, nondistended.  No palpable organomegaly  Extremeties: no lower extremity edema  Skin: no rashes, lesions, bruising, or petechiae    RECENT LABS:    Lab Results   Component Value Date    MSPIKE Comment: 09/08/2020    MSPIKE Not Observed 06/16/2020    MSPIKE Not Observed 03/24/2020     Lab Results   Component Value Date    FREEKAPPAL 1035.8 (H) 09/08/2020    FREEKAPPAL 542.1 (H) 06/16/2020    FREEKAPPAL 200.9 (H) 03/24/2020     Lab Results   Component Value Date    IGLFLC 7.7 09/08/2020    IGLFLC 7.4 06/16/2020    IGLFLC 9.9 03/24/2020     Lab Results   Component Value Date    WBC 5.50 09/08/2020    HGB 13.5 09/08/2020    HCT 41.9 09/08/2020    MCV 85.1 09/08/2020     (L) 09/08/2020       Lab Results   Component Value Date    GLUCOSE 67 09/08/2020    BUN 15 09/08/2020    CREATININE 1.20 09/08/2020    EGFRIFNONA 44 (L) 09/08/2020    BCR 12.5 09/08/2020    K 4.5 09/08/2020    CO2 22.0 09/08/2020    CALCIUM 8.6 09/08/2020    PROTENTOTREF 6.2 09/08/2020    ALBUMIN 3.7 09/08/2020    ALBUMIN 4.20 09/08/2020    LABIL2 1.5 09/08/2020    AST 27 09/08/2020    ALT 20 09/08/2020         Assessment/Plan       1. multiple myeloma in remission  status post autologous stem cell transplant.  She is now 5 years out from her diagnosis.  She does have progressive disease with mostly light chain disease.  I am going to repeat her bone marrow biopsy next week.  I am going to get a bone survey on her.  I am going to switch her treatment to Pomalyst, daratumumab and dexamethasone.  I am going to reduce her Pomalyst dose to 2 mg/day.  I do not think she  will tolerate the 4 mg dosing due to cytopenias.  We will see how she does going forward.  She is not interested in undergoing a repeat autologous stem cell transplantation.  The goal is to get her disease under control at this time.    2.  Hypogammaglobulinemia.  She has not had issues with infections so far.  We will hold off on IVIG for now    3.  Back pain/ hip pain: The hip replacements has done wonders for her.           I spent a total of 40 minutes in direct patient care, greater than 25 minutes (greater than 50%) were spent in coordination of care, and counseling the patient regarding  Myeloma . Answered any questions patient had with medication and plan.      Joseph Mckinley MD  [facility] Hematology and Oncology    9/11/2020               CC:

## 2020-09-14 ENCOUNTER — HOSPITAL ENCOUNTER (OUTPATIENT)
Dept: GENERAL RADIOLOGY | Facility: HOSPITAL | Age: 70
Discharge: HOME | End: 2020-09-14
Admitting: INTERNAL MEDICINE

## 2020-09-14 DIAGNOSIS — C90.02 MULTIPLE MYELOMA IN RELAPSE (HCC): ICD-10-CM

## 2020-09-14 PROCEDURE — 77075 RADEX OSSEOUS SURVEY COMPL: CPT

## 2020-09-15 ENCOUNTER — SPECIALTY PHARMACY (OUTPATIENT)
Dept: ONCOLOGY | Facility: HOSPITAL | Age: 70
End: 2020-09-15

## 2020-09-15 DIAGNOSIS — C90.02 MULTIPLE MYELOMA IN RELAPSE (HCC): Primary | ICD-10-CM

## 2020-09-15 NOTE — PROGRESS NOTES
Drug: pomalidomide  Strength: 2 mg capsule  Directions: 21  QTY: Take 1 capsule PO daily Days 1-21 then off 7 days  RF:0    Released to pharmacy: [facility]    Completed independent double check on medication order/RX.

## 2020-09-15 NOTE — PROGRESS NOTES
Oral Chemotherapy Teaching      Patient Name/:  Doris Miranda   1950  Oral Chemotherapy Regimen:  Pomalyst 2mg PO once daily x 21 days, followed by 7 days off + Dexamethasone 20mg PO once weekly + Daratumumab IV infusion    Date Started Medication:   Cycle 1: anticipate 2020      Additional Notes:  Oral chemotherapy clinic received referral from Dr. Epps regarding the initiation of Pomalyst (dose reduced) + Dex + Daratumumab . Reviewed patient chart. Released script for Pomalyst 2mg PO once daily x 21 days, followed by 7 days off #21 with 0 RF  to [facility] to begin processing. Pt scheduled for oral chemotherapy education and financial navigation appt on 2020.  Will obtain consent and CCA at that time.

## 2020-09-16 ENCOUNTER — SPECIALTY PHARMACY (OUTPATIENT)
Dept: ONCOLOGY | Facility: HOSPITAL | Age: 70
End: 2020-09-16

## 2020-09-16 DIAGNOSIS — C90.02 MULTIPLE MYELOMA IN RELAPSE (HCC): Primary | ICD-10-CM

## 2020-09-16 RX ORDER — DEXAMETHASONE 4 MG/1
20 TABLET ORAL WEEKLY
Qty: 30 TABLET | Refills: 11 | Status: SHIPPED | OUTPATIENT
Start: 2020-09-16 | End: 2020-09-16 | Stop reason: SDUPTHER

## 2020-09-16 RX ORDER — ONDANSETRON HYDROCHLORIDE 8 MG/1
8 TABLET, FILM COATED ORAL 3 TIMES DAILY PRN
Qty: 30 TABLET | Refills: 5 | Status: SHIPPED | OUTPATIENT
Start: 2020-09-16 | End: 2022-04-14

## 2020-09-16 RX ORDER — DEXAMETHASONE 4 MG/1
20 TABLET ORAL WEEKLY
Qty: 30 TABLET | Refills: 11 | Status: SHIPPED | OUTPATIENT
Start: 2020-09-16 | End: 2020-10-29 | Stop reason: SDUPTHER

## 2020-09-16 RX ORDER — SULFAMETHOXAZOLE AND TRIMETHOPRIM 800; 160 MG/1; MG/1
1 TABLET ORAL 3 TIMES WEEKLY
Qty: 12 TABLET | Refills: 11 | Status: ON HOLD | OUTPATIENT
Start: 2020-09-16 | End: 2021-07-31

## 2020-09-16 NOTE — PROGRESS NOTES
Oral Chemotherapy Teaching      Patient Name/:  Doris Miranda   1950  Oral Chemotherapy Regimen:  Pomalyst 2mg PO once daily x 21 days, followed by 7 days off + Dexamethasone 20mg PO once weekly + Daratumumab IV infusion    Date Started Medication:   Cycle 1: anticipate 2020      Additional Notes:  Patient called to confirm plan for start date of Pomalyst. Pt reports pharmacy called and scheduled delivery of pomalyst for 2020. Discussed plan to hold medication until 1 day of treatment scheduled 2020. Discussed with patient will meet with pharmacy for education on new products and discuss calendar for administration. Supportive care medications submitted to [facility] per patient request.

## 2020-09-18 ENCOUNTER — APPOINTMENT (OUTPATIENT)
Dept: PREADMISSION TESTING | Facility: HOSPITAL | Age: 70
End: 2020-09-18

## 2020-09-18 PROCEDURE — C9803 HOPD COVID-19 SPEC COLLECT: HCPCS

## 2020-09-18 PROCEDURE — U0004 COV-19 TEST NON-CDC HGH THRU: HCPCS

## 2020-09-19 LAB — SARS-COV-2 RNA NOSE QL NAA+PROBE: NOT DETECTED

## 2020-09-21 ENCOUNTER — HOSPITAL ENCOUNTER (OUTPATIENT)
Dept: CT IMAGING | Facility: HOSPITAL | Age: 70
Discharge: HOME | End: 2020-09-21
Admitting: INTERNAL MEDICINE

## 2020-09-21 VITALS
RESPIRATION RATE: 18 BRPM | HEIGHT: 61 IN | TEMPERATURE: 97.3 F | WEIGHT: 159.8 LBS | BODY MASS INDEX: 30.17 KG/M2 | HEART RATE: 77 BPM | DIASTOLIC BLOOD PRESSURE: 70 MMHG | OXYGEN SATURATION: 96 % | SYSTOLIC BLOOD PRESSURE: 115 MMHG

## 2020-09-21 DIAGNOSIS — C90.02 MULTIPLE MYELOMA IN RELAPSE (HCC): ICD-10-CM

## 2020-09-21 LAB
APTT PPP: 29.5 SECONDS (ref 24–37)
BASOPHILS # BLD AUTO: 0.05 10*3/MM3 (ref 0–0.2)
BASOPHILS NFR BLD AUTO: 1 % (ref 0–1.5)
DEPRECATED RDW RBC AUTO: 46.8 FL (ref 37–54)
EOSINOPHIL # BLD AUTO: 0.1 10*3/MM3 (ref 0–0.4)
EOSINOPHIL NFR BLD AUTO: 2 % (ref 0.3–6.2)
ERYTHROCYTE [DISTWIDTH] IN BLOOD BY AUTOMATED COUNT: 14.6 % (ref 12.3–15.4)
HCT VFR BLD AUTO: 40 % (ref 34–46.6)
HGB BLD-MCNC: 12.4 G/DL (ref 12–15.9)
IMM GRANULOCYTES # BLD AUTO: 0.01 10*3/MM3 (ref 0–0.05)
IMM GRANULOCYTES NFR BLD AUTO: 0.2 % (ref 0–0.5)
INR PPP: 0.96 (ref 0.85–1.16)
LYMPHOCYTES # BLD AUTO: 0.95 10*3/MM3 (ref 0.7–3.1)
LYMPHOCYTES NFR BLD AUTO: 19.1 % (ref 19.6–45.3)
MCH RBC QN AUTO: 27.1 PG (ref 26.6–33)
MCHC RBC AUTO-ENTMCNC: 31 G/DL (ref 31.5–35.7)
MCV RBC AUTO: 87.3 FL (ref 79–97)
MONOCYTES # BLD AUTO: 0.51 10*3/MM3 (ref 0.1–0.9)
MONOCYTES NFR BLD AUTO: 10.3 % (ref 5–12)
NEUTROPHILS NFR BLD AUTO: 3.35 10*3/MM3 (ref 1.7–7)
NEUTROPHILS NFR BLD AUTO: 67.4 % (ref 42.7–76)
NRBC BLD AUTO-RTO: 0 /100 WBC (ref 0–0.2)
PLATELET # BLD AUTO: 116 10*3/MM3 (ref 140–450)
PMV BLD AUTO: 11.8 FL (ref 6–12)
PROTHROMBIN TIME: 12.4 SECONDS (ref 11.5–14)
RBC # BLD AUTO: 4.58 10*6/MM3 (ref 3.77–5.28)
WBC # BLD AUTO: 4.97 10*3/MM3 (ref 3.4–10.8)

## 2020-09-21 PROCEDURE — 25010000002 MIDAZOLAM PER 1 MG: Performed by: RADIOLOGY

## 2020-09-21 PROCEDURE — 25010000003 LIDOCAINE 1 % SOLUTION: Performed by: RADIOLOGY

## 2020-09-21 PROCEDURE — 88185 FLOWCYTOMETRY/TC ADD-ON: CPT

## 2020-09-21 PROCEDURE — 85610 PROTHROMBIN TIME: CPT | Performed by: RADIOLOGY

## 2020-09-21 PROCEDURE — 85730 THROMBOPLASTIN TIME PARTIAL: CPT | Performed by: RADIOLOGY

## 2020-09-21 PROCEDURE — 88264 CHROMOSOME ANALYSIS 20-25: CPT

## 2020-09-21 PROCEDURE — 88365 INSITU HYBRIDIZATION (FISH): CPT | Performed by: INTERNAL MEDICINE

## 2020-09-21 PROCEDURE — 25010000002 FENTANYL CITRATE (PF) 100 MCG/2ML SOLUTION: Performed by: RADIOLOGY

## 2020-09-21 PROCEDURE — 88237 TISSUE CULTURE BONE MARROW: CPT

## 2020-09-21 PROCEDURE — 85025 COMPLETE CBC W/AUTO DIFF WBC: CPT | Performed by: RADIOLOGY

## 2020-09-21 PROCEDURE — 88184 FLOWCYTOMETRY/ TC 1 MARKER: CPT

## 2020-09-21 PROCEDURE — 88305 TISSUE EXAM BY PATHOLOGIST: CPT | Performed by: INTERNAL MEDICINE

## 2020-09-21 PROCEDURE — 88311 DECALCIFY TISSUE: CPT | Performed by: INTERNAL MEDICINE

## 2020-09-21 PROCEDURE — 88342 IMHCHEM/IMCYTCHM 1ST ANTB: CPT | Performed by: INTERNAL MEDICINE

## 2020-09-21 PROCEDURE — 88364 INSITU HYBRIDIZATION (FISH): CPT | Performed by: INTERNAL MEDICINE

## 2020-09-21 PROCEDURE — 85097 BONE MARROW INTERPRETATION: CPT | Performed by: INTERNAL MEDICINE

## 2020-09-21 PROCEDURE — 77012 CT SCAN FOR NEEDLE BIOPSY: CPT

## 2020-09-21 RX ORDER — SODIUM CHLORIDE 0.9 % (FLUSH) 0.9 %
3 SYRINGE (ML) INJECTION EVERY 12 HOURS SCHEDULED
Status: DISCONTINUED | OUTPATIENT
Start: 2020-09-21 | End: 2020-09-22 | Stop reason: HOSPADM

## 2020-09-21 RX ORDER — FENTANYL CITRATE 50 UG/ML
INJECTION, SOLUTION INTRAMUSCULAR; INTRAVENOUS
Status: COMPLETED | OUTPATIENT
Start: 2020-09-21 | End: 2020-09-21

## 2020-09-21 RX ORDER — SODIUM CHLORIDE 0.9 % (FLUSH) 0.9 %
10 SYRINGE (ML) INJECTION AS NEEDED
Status: DISCONTINUED | OUTPATIENT
Start: 2020-09-21 | End: 2020-09-22 | Stop reason: HOSPADM

## 2020-09-21 RX ORDER — FENTANYL CITRATE 50 UG/ML
INJECTION, SOLUTION INTRAMUSCULAR; INTRAVENOUS
Status: DISPENSED
Start: 2020-09-21 | End: 2020-09-21

## 2020-09-21 RX ORDER — LIDOCAINE HYDROCHLORIDE 10 MG/ML
20 INJECTION, SOLUTION INFILTRATION; PERINEURAL ONCE
Status: COMPLETED | OUTPATIENT
Start: 2020-09-21 | End: 2020-09-21

## 2020-09-21 RX ORDER — MIDAZOLAM HYDROCHLORIDE 1 MG/ML
INJECTION INTRAMUSCULAR; INTRAVENOUS
Status: DISPENSED
Start: 2020-09-21 | End: 2020-09-21

## 2020-09-21 RX ORDER — MIDAZOLAM HYDROCHLORIDE 1 MG/ML
INJECTION INTRAMUSCULAR; INTRAVENOUS
Status: COMPLETED | OUTPATIENT
Start: 2020-09-21 | End: 2020-09-21

## 2020-09-21 RX ADMIN — MIDAZOLAM 1 MG: 1 INJECTION INTRAMUSCULAR; INTRAVENOUS at 11:03

## 2020-09-21 RX ADMIN — FENTANYL CITRATE 50 MCG: 50 INJECTION, SOLUTION INTRAMUSCULAR; INTRAVENOUS at 10:58

## 2020-09-21 RX ADMIN — LIDOCAINE HYDROCHLORIDE 20 ML: 10 INJECTION, SOLUTION INFILTRATION; PERINEURAL at 10:51

## 2020-09-21 RX ADMIN — FENTANYL CITRATE 50 MCG: 50 INJECTION, SOLUTION INTRAMUSCULAR; INTRAVENOUS at 11:03

## 2020-09-21 RX ADMIN — MIDAZOLAM 1 MG: 1 INJECTION INTRAMUSCULAR; INTRAVENOUS at 10:57

## 2020-09-21 NOTE — NURSING NOTE
Pt discharged from ir dept s/p bone marrow biopsy.  Pt tolerated procedure without complications. Discharge instructions reviewed with patient and sister both verbalized understanding. Pt transported to exit via wheelchair per CNA.

## 2020-09-21 NOTE — POST-PROCEDURE NOTE
An immediate patient assessment was done prior to the administration of moderate and/or deep conscious sedation.      Progress Note    Doris Miranda      Pre-op Diagnosis:   Multiple myeloma in remission  status post autologous stem cell transplant.  She is now 5 years out from her diagnosis.  She does have progressive disease with mostly light chain disease. Thrombocytopenia.   Post-op diagnosis:  Same     Anesthesia: Moderate sedation    ASA SCALE ASSESSMENT:  2-Mild to moderate systemic disease, medically well controlled, with no functional limitation    MALLAMPATI CLASSIFICATION:  3-Only the soft palate and base of uvula are visible    Staff:   Fantasma Vasquez MD      Estimated Blood Loss: Trace    Urine Voided: * No values recorded between 9/21/2020 12:00 AM and 9/21/2020  3:32 PM *    Specimens:                11 gauge core. 12 mL bone marrow.       Drains:  None    Findings: Intact posterior right iliac spine    Complications: No immediate      Fantasma Vasquez MD     Date: 9/21/2020  Time: 15:32 EDT

## 2020-09-22 ENCOUNTER — TELEPHONE (OUTPATIENT)
Dept: INFUSION THERAPY | Facility: HOSPITAL | Age: 70
End: 2020-09-22

## 2020-09-22 ENCOUNTER — TELEPHONE (OUTPATIENT)
Dept: NUTRITION | Facility: HOSPITAL | Age: 70
End: 2020-09-22

## 2020-09-22 NOTE — PROGRESS NOTES
Oncology Nutrition Screening    Patient Name:  Doris Miranda  YOB: 1950  MRN: [id number]  Date:  09/22/20  Physician:  Dr. Epps    Type of Cancer Treatment:   Chemotherapy:  Pomalyst - po daily days 1-21 / Dexamethasone - po weekly / Darzalex - IV - 28 day cycle (start date 9/23/20)    Patient Active Problem List   Diagnosis   • Multiple myeloma in remission (CMS/HCC)   • Diverticulosis of large intestine without hemorrhage   • Tubular adenoma of colon   • Essential hypertension   • COPD (chronic obstructive pulmonary disease) (CMS/HCC)   • Arthritis   • Gastroesophageal reflux disease without esophagitis   • Chronic left-sided low back pain with sciatica   • Thrombocytopenia since stem cell transplant March 2016   • Hx of autologous stem cell transplant (March 2016)   • CKD (chronic kidney disease), stage III (CMS/HCC)   • Former smoker   • Non-rheumatic tricuspid valve insufficiency   • Pulmonary hypertension (CMS/HCC)   • Chronic respiratory failure with hypoxia (been noncompliant with outpatient oxygen in past)   • Gait instability   • Chronic pain   • Debility   • Thrombocytopenia (CMS/HCC)   • On home oxygen therapy   • Hypogammaglobulinemia, acquired (CMS/HCC)   • Multiple myeloma in relapse (CMS/HCC)       Current Outpatient Medications   Medication Sig Dispense Refill   • acyclovir (ZOVIRAX) 400 MG tablet Take 1 tablet by mouth 2 (Two) Times a Day With Meals. 60 tablet 5   • albuterol sulfate  (90 Base) MCG/ACT inhaler Inhale 2 puffs Every 6 (Six) Hours As Needed for Wheezing or Shortness of Air. 1 inhaler 5   • cyclobenzaprine (FLEXERIL) 10 MG tablet Take 1 tablet by mouth 3 (Three) Times a Day As Needed for Muscle Spasms. 30 tablet 2   • dexamethasone (DECADRON) 4 MG tablet Take 5 tablets by mouth 1 (One) Time Per Week. 30 tablet 11   • esomeprazole (nexIUM) 20 MG capsule Take 20 mg by mouth Daily As Needed.     • Melatonin 5 MG chewable tablet Chew 5 mg Every Night.     • metoprolol  tartrate (LOPRESSOR) 25 MG tablet Take 1 tablet by mouth 2 (Two) Times a Day. 60 tablet 2   • O2 (OXYGEN) Inhale 4 L/min 1 (One) Time.     • ondansetron (ZOFRAN) 8 MG tablet Take 1 tablet by mouth 3 (Three) Times a Day As Needed for Nausea or Vomiting. 30 tablet 5   • pomalidomide (POMALYST) 2 MG chemo capsule Take 1 capsule by mouth Daily. on D1-21, then off 7 days. Adult Female [id number] 21 capsule 0   • sulfamethoxazole-trimethoprim (BACTRIM DS,SEPTRA DS) 800-160 MG per tablet Take 1 tablet by mouth 3 (Three) Times a Week. Take 1 tablet on Mondays, Wednesdays and Fridays. 12 tablet 11   • venlafaxine XR (Effexor XR) 37.5 MG 24 hr capsule Take 1 capsule by mouth Every Morning. 30 capsule 5     No current facility-administered medications for this visit.        Glycemic Risk:   Moisés    Weight:   Height: 61 inches  Weight: 159 lbs.   BMI: 30.2  Obese  Weight has decreased ~25 pounds over last ~8 months / 13.6% weight loss    Oral Food Intake:  Regular Diet - No Restrictions  Compared to normal intake, current food intake is the same    Hydration Status:   How many 8 ounce glass of water of fluid do you drink per day?  3    Enteral Feeding:   n/a    Nutrition Symptoms:   No Problems with Eating    Activity:   Not my normal self, but able to be up and about with fairly normal activities     reports that she quit smoking about 5 years ago. She has never used smokeless tobacco. She reports that she does not drink alcohol or use drugs.    Evaluation of Nutritional Risk:   Patient is not at nutritional risk at time of screening but will be contacted for nutrition education.   Spoke with patient via phone call for nutritional assessment / education.  She reports her appetite has been very good.  She denies nutritional complaints at this time.  She endorses weight loss as above and reports she has been trying to lose weight.  She had her left hip replaced in January which has improved her mobility.  She also states  she switched from drinking 4-5 regular Pepsi's a day to drinking 3 Pepsi Zero's daily.  Note patient's  passed away in April and she is now living with her daughter and son in law.    Discussed the importance of good nutrition during her treatment course focusing on adequate calorie, protein, nutrient and fluid intake.  Advised her to be consuming smaller more frequent meals/snacks throughout the day to aid with potential nausea management.  Emphasized the importance of protein and its role in the diet; reviewed high protein foods; and recommended she have a protein source at each meal/snack.  Also emphasized the importance of hydration and recommended she increase her intake of water with a goal of 64 ounces daily.      She denies nutritional questions or concerns at this time.  RD's contact information provided and encouraged her to call if questions arise.  Will follow up as indicated.    Electronically signed by:  Farnaz Hernandez RD  13:44 EDT

## 2020-09-23 ENCOUNTER — TELEPHONE (OUTPATIENT)
Dept: ONCOLOGY | Facility: CLINIC | Age: 70
End: 2020-09-23

## 2020-09-23 ENCOUNTER — HOSPITAL ENCOUNTER (OUTPATIENT)
Dept: ONCOLOGY | Facility: HOSPITAL | Age: 70
Discharge: HOME | End: 2020-09-23

## 2020-09-23 ENCOUNTER — SPECIALTY PHARMACY (OUTPATIENT)
Dept: ONCOLOGY | Facility: HOSPITAL | Age: 70
End: 2020-09-23

## 2020-09-23 ENCOUNTER — HOSPITAL ENCOUNTER (OUTPATIENT)
Dept: ONCOLOGY | Facility: HOSPITAL | Age: 70
Setting detail: INFUSION SERIES
Discharge: HOME | End: 2020-09-23

## 2020-09-23 VITALS
HEART RATE: 83 BPM | BODY MASS INDEX: 31.41 KG/M2 | DIASTOLIC BLOOD PRESSURE: 71 MMHG | RESPIRATION RATE: 20 BRPM | WEIGHT: 160 LBS | HEIGHT: 60 IN | SYSTOLIC BLOOD PRESSURE: 107 MMHG | TEMPERATURE: 97.5 F

## 2020-09-23 DIAGNOSIS — C90.02 MULTIPLE MYELOMA IN RELAPSE (HCC): Primary | ICD-10-CM

## 2020-09-23 DIAGNOSIS — D80.1 HYPOGAMMAGLOBULINEMIA, ACQUIRED (HCC): ICD-10-CM

## 2020-09-23 LAB
ALBUMIN SERPL-MCNC: 4.6 G/DL (ref 3.5–5.2)
ALBUMIN/GLOB SERPL: 2.4 G/DL
ALP SERPL-CCNC: 127 U/L (ref 39–117)
ALT SERPL W P-5'-P-CCNC: 18 U/L (ref 1–33)
ANION GAP SERPL CALCULATED.3IONS-SCNC: 12 MMOL/L (ref 5–15)
AST SERPL-CCNC: 25 U/L (ref 1–32)
BILIRUB SERPL-MCNC: 0.4 MG/DL (ref 0–1.2)
BUN SERPL-MCNC: 22 MG/DL (ref 8–23)
BUN/CREAT SERPL: 19.1 (ref 7–25)
CALCIUM SPEC-SCNC: 9.4 MG/DL (ref 8.6–10.5)
CHLORIDE SERPL-SCNC: 104 MMOL/L (ref 98–107)
CO2 SERPL-SCNC: 25 MMOL/L (ref 22–29)
CREAT BLDA-MCNC: 1.1 MG/DL (ref 0.6–1.3)
CREAT SERPL-MCNC: 1.15 MG/DL (ref 0.57–1)
ERYTHROCYTE [DISTWIDTH] IN BLOOD BY AUTOMATED COUNT: 15.2 % (ref 12.3–15.4)
GFR SERPL CREATININE-BSD FRML MDRD: 47 ML/MIN/1.73
GLOBULIN UR ELPH-MCNC: 1.9 GM/DL
GLUCOSE SERPL-MCNC: 89 MG/DL (ref 65–99)
HCT VFR BLD AUTO: 40.6 % (ref 34–46.6)
HGB BLD-MCNC: 13 G/DL (ref 12–15.9)
LYMPHOCYTES # BLD AUTO: 1.3 10*3/MM3 (ref 0.7–3.1)
LYMPHOCYTES NFR BLD AUTO: 21.9 % (ref 19.6–45.3)
MCH RBC QN AUTO: 27.1 PG (ref 26.6–33)
MCHC RBC AUTO-ENTMCNC: 32 G/DL (ref 31.5–35.7)
MCV RBC AUTO: 84.8 FL (ref 79–97)
MONOCYTES # BLD AUTO: 0.3 10*3/MM3 (ref 0.1–0.9)
MONOCYTES NFR BLD AUTO: 5.4 % (ref 5–12)
NEUTROPHILS NFR BLD AUTO: 4.2 10*3/MM3 (ref 1.7–7)
NEUTROPHILS NFR BLD AUTO: 72.7 % (ref 42.7–76)
PLATELET # BLD AUTO: 148 10*3/MM3 (ref 140–450)
PMV BLD AUTO: 8.3 FL (ref 6–12)
POTASSIUM SERPL-SCNC: 4.8 MMOL/L (ref 3.5–5.2)
PROT SERPL-MCNC: 6.5 G/DL (ref 6–8.5)
RBC # BLD AUTO: 4.79 10*6/MM3 (ref 3.77–5.28)
SODIUM SERPL-SCNC: 141 MMOL/L (ref 136–145)
WBC # BLD AUTO: 5.8 10*3/MM3 (ref 3.4–10.8)

## 2020-09-23 PROCEDURE — 86900 BLOOD TYPING SEROLOGIC ABO: CPT | Performed by: INTERNAL MEDICINE

## 2020-09-23 PROCEDURE — 25010000002 DIPHENHYDRAMINE PER 50 MG: Performed by: INTERNAL MEDICINE

## 2020-09-23 PROCEDURE — 96375 TX/PRO/DX INJ NEW DRUG ADDON: CPT

## 2020-09-23 PROCEDURE — 81479 UNLISTED MOLECULAR PATHOLOGY: CPT

## 2020-09-23 PROCEDURE — 85025 COMPLETE CBC W/AUTO DIFF WBC: CPT | Performed by: INTERNAL MEDICINE

## 2020-09-23 PROCEDURE — 25010000002 DARATUMUMAB 100 MG/5ML SOLUTION 20 ML VIAL: Performed by: INTERNAL MEDICINE

## 2020-09-23 PROCEDURE — 80053 COMPREHEN METABOLIC PANEL: CPT | Performed by: INTERNAL MEDICINE

## 2020-09-23 PROCEDURE — 96413 CHEMO IV INFUSION 1 HR: CPT

## 2020-09-23 PROCEDURE — 82565 ASSAY OF CREATININE: CPT

## 2020-09-23 PROCEDURE — 25010000002 METHYLPREDNISOLONE PER 125 MG: Performed by: INTERNAL MEDICINE

## 2020-09-23 PROCEDURE — 86850 RBC ANTIBODY SCREEN: CPT | Performed by: INTERNAL MEDICINE

## 2020-09-23 PROCEDURE — 25010000002 HYDROCORTISONE SODIUM SUCCINATE 100 MG RECONSTITUTED SOLUTION: Performed by: INTERNAL MEDICINE

## 2020-09-23 PROCEDURE — 96415 CHEMO IV INFUSION ADDL HR: CPT

## 2020-09-23 PROCEDURE — 96376 TX/PRO/DX INJ SAME DRUG ADON: CPT

## 2020-09-23 PROCEDURE — 81403 MOPATH PROCEDURE LEVEL 4: CPT

## 2020-09-23 PROCEDURE — 86901 BLOOD TYPING SEROLOGIC RH(D): CPT | Performed by: INTERNAL MEDICINE

## 2020-09-23 RX ORDER — ASPIRIN 81 MG/1
81 TABLET ORAL DAILY
Qty: 30 TABLET | Refills: 3 | Status: SHIPPED | OUTPATIENT
Start: 2020-09-23

## 2020-09-23 RX ORDER — MEPERIDINE HYDROCHLORIDE 25 MG/ML
25 INJECTION INTRAMUSCULAR; INTRAVENOUS; SUBCUTANEOUS
Status: DISCONTINUED | OUTPATIENT
Start: 2020-09-23 | End: 2020-09-24 | Stop reason: HOSPADM

## 2020-09-23 RX ORDER — MONTELUKAST SODIUM 10 MG/1
10 TABLET ORAL ONCE
Status: COMPLETED | OUTPATIENT
Start: 2020-09-23 | End: 2020-09-23

## 2020-09-23 RX ORDER — SODIUM CHLORIDE 9 MG/ML
250 INJECTION, SOLUTION INTRAVENOUS ONCE
Status: COMPLETED | OUTPATIENT
Start: 2020-09-23 | End: 2020-09-23

## 2020-09-23 RX ORDER — ACETAMINOPHEN 500 MG
1000 TABLET ORAL ONCE
Status: COMPLETED | OUTPATIENT
Start: 2020-09-23 | End: 2020-09-23

## 2020-09-23 RX ORDER — DIPHENHYDRAMINE HYDROCHLORIDE 50 MG/ML
50 INJECTION INTRAMUSCULAR; INTRAVENOUS AS NEEDED
Status: DISCONTINUED | OUTPATIENT
Start: 2020-09-23 | End: 2020-09-24 | Stop reason: HOSPADM

## 2020-09-23 RX ORDER — FAMOTIDINE 10 MG/ML
20 INJECTION, SOLUTION INTRAVENOUS AS NEEDED
Status: COMPLETED | OUTPATIENT
Start: 2020-09-23 | End: 2020-09-23

## 2020-09-23 RX ORDER — METHYLPREDNISOLONE SODIUM SUCCINATE 125 MG/2ML
100 INJECTION, POWDER, LYOPHILIZED, FOR SOLUTION INTRAMUSCULAR; INTRAVENOUS ONCE
Status: COMPLETED | OUTPATIENT
Start: 2020-09-23 | End: 2020-09-23

## 2020-09-23 RX ADMIN — ACETAMINOPHEN 1000 MG: 500 TABLET, FILM COATED ORAL at 09:04

## 2020-09-23 RX ADMIN — DARATUMUMAB 1200 MG: 100 INJECTION, SOLUTION, CONCENTRATE INTRAVENOUS at 10:12

## 2020-09-23 RX ADMIN — HYDROCORTISONE SODIUM SUCCINATE 100 MG: 100 INJECTION, POWDER, FOR SOLUTION INTRAMUSCULAR; INTRAVENOUS at 11:08

## 2020-09-23 RX ADMIN — FAMOTIDINE 20 MG: 10 INJECTION INTRAVENOUS at 11:03

## 2020-09-23 RX ADMIN — SODIUM CHLORIDE 250 ML: 9 INJECTION, SOLUTION INTRAVENOUS at 09:05

## 2020-09-23 RX ADMIN — METHYLPREDNISOLONE SODIUM SUCCINATE 100 MG: 125 INJECTION, POWDER, FOR SOLUTION INTRAMUSCULAR; INTRAVENOUS at 09:05

## 2020-09-23 RX ADMIN — DIPHENHYDRAMINE HYDROCHLORIDE 50 MG: 50 INJECTION INTRAMUSCULAR; INTRAVENOUS at 09:07

## 2020-09-23 RX ADMIN — MONTELUKAST SODIUM 10 MG: 10 TABLET, COATED ORAL at 09:04

## 2020-09-23 NOTE — PLAN OF CARE
[facility] • [address]  • [address] • [place] [address] • [phone]      CHEMOTHERAPY EDUCATION SHEET    NAME:  Doris Miranda      : 1950           DATE: 20    Booklets Given: Chemotherapy and You []  Eating Hints []    Sexuality/Fertility Books []     Chemotherapy/Biotherapy Education Sheets: (list all that apply)  Daratumumab, pomalidomide, and dexamethasone                                                                                                                                                                Chemotherapy Regimen:  Daratumumab IV weekly for cycles 1-2, then every other week for cycles 3-6, then once monthly for cycles 6-12 + pomalidomide PO on days 1-21 of each cycle + dexamethasone 20 mg PO once weekly (Day 1, Cycle 1)    TOPICS EDUCATION PROVIDED EDUCATION REINFORCED COMMENTS   ANEMIA:  role of RBC, cause, s/s, ways to manage, role of transfusion [x] [] Discussed the role of RBCs, risk of anemia, and s/sx of anemia (including fatigue). Encouraged patient to continue usual physical activity as tolerated and to rest when needed.   THROMBOCYTOPENIA:  role of platelet, cause, s/s, ways to prevent bleeding, things to avoid, when to seek help [x] [] Discussed the role of platelets and increased risk of bleeding/bruising. Educated patient on ways to prevent/stop bleeding and when to call MD.   NEUTROPENIA:  role of WBC, cause, infection precautions, s/s of infection, when to call MD [x] [] Discussed the role of WBCs and increased risk of infection. Instructed patient to notify MD immediately for temperatures >/= 100.4F. Encouraged appropriate hand hygiene and avoidance of sick contacts. Answered patient's questions regarding proximity to family members and need for wearing a mask inside the house.   NUTRITION & APPETITE CHANGES:  importance of maintaining healthy diet & weight, ways to manage to improve intake, dietary consult, exercise regimen [] []    DIARRHEA:   causes, s/s of dehydration, ways to manage, dietary changes, when to call MD [x] [] Discussed risk of diarrhea. Reviewed OTC use of loperamide.   CONSTIPATION:  causes, ways to manage, dietary changes, when to call MD [x] [] Discussed risk of constipation. Reviewed OTC use of PEG, docusate, and senna.   NAUSEA & VOMITING:  cause, use of antiemetics, dietary changes, when to call MD [x] [] Reviewed the risk of N/V, discussed antiemetic in current regimen, and encouraged PRN use when symptoms occur. Instructed patient to call if N/V is not controlled.   MOUTH SORES:  causes, oral care, ways to manage [] []    ALOPECIA:  cause, ways to manage, resources [] []    INFERTILITY & SEXUALITY:  causes, fertility preservation options, sexuality changes, ways to manage, importance of birth control [x] [] Discussed safe sex practices.   NERVOUS SYSTEM CHANGES:  causes, s/s, neuropathies, cognitive changes, ways to manage [] []    PAIN:  causes, ways to manage [] [] ????   SKIN & NAIL CHANGES:  cause, s/s, ways to manage [] []    ORGAN TOXICITIES:  cause, s/s, need for diagnostic tests, labs, when to notify MD [x] [] Discussed potential organ toxicities, including possibility for low RBCs, platelets, and WBCs. Informed patient of need for labs in order to monitor. Discussed increased risk of thromboembolism with pomalidomide, as well as s/sx of a DVT or PE.   SURVIVORSHIP:  distress, distress assessment, secondary malignancies, early/late effects, follow-up, social issues, social support [] []    HOME CARE:  use of spill kits, storing of PO chemo, how to manage bodily fluids [x] [] Instructed patient to wash soiled linens separately.   MISCELLANEOUS:  drug interactions, administration, vesicant, et [x] [] Discussed risk of hypersensitivity reaction with daratumumab, especially during the first or second infusion, as well as s/sx to be aware of (heart racing, sweating, etc.). Counseled patient on use of pre-medications prior to  her infusion.             Oral Chemotherapy Teaching  Patient Name/:  Doris Miranda   1950  Oral Chemotherapy Regimen:  Daratumumab IV weekly for cycles 1-2, then every other week for cycles 3-6, then once monthly for cycles 6-12 + pomalidomide PO on days 1-21 of each cycle + dexamethasone 20 mg PO once weekly (Day 1, Cycle 1)  Date Started Medication: 20    Initial Teaching Follow Up Comments     Safety     Storage instructions (away from children; away from heat/cold, sunlight, or moisture), handling - use of gloves (caregivers), washing hands after touching pills, managing waste     “How are you storing your medications?”, reminders on storage, proper handling (caregivers using gloves, washing hands, away from children, managing waste, etc.), disposal of medication with D/C or dosage change    Instructed patient regarding REMS program for pomalidomide. Counseled patient on importance of not sharing medication, as well as how to handle soiled linens.     Adherence      patient and/or caregiver on how to take medication, take with/without food, assess their adherence potential, stress importance of adherence, ways to manage adherence (pill boxes, phone reminders, calendars), what to do if miss a dose   “How are you taking your medication?” “How are you remembering to take your medication?”, “How many doses have you missed?”, determine reasons for non-adherence (not remembering, side effects, etc), ways to improve, overadherence? Remind patient of ways to improve/maintain adherence   Instructed patient to take pomalidomide 2 mg PO daily on days 1-21 of each cycle and dexamethasone 20 mg PO once weekly on the same day as daratumumab infusion. Provided patient with personalized treatment calendar. Counseled patient to call for a refill when she takes her last pomalidomide capsule of each cycle.     Side Effects/Adverse Reactions      patient on potential side effects, s/s, ways to manage, when  to call MD/seek help     Determine if patient experiencing side effects, ways to manage  Counseled patient on how to manage common adverse events including fatigue, low blood counts, and constipation or diarrhea. Informed patient of increased risk for thromboembolic event Will follow up with Dr. Epps regarding ASA 81 mg usage.      Miscellaneous     Food interactions, DDIs, financial issues Determine if patient started any new medications since being placed on oral chemo (analyze for DDI) Drug-drug interaction analysis performed. No clinically significant drug-drug interactions present.     Discussed aforementioned material with patient in Education Office. Educated patient regarding the need for infection prophylaxis with sulfamethoxazole/trimethoprim as well as acyclovir. Instructed patient on how to manage symptoms at home and when to notify MD. All questions and concerns addressed. Provided patient with Sandra Garcia's contact information and instructions to call should additional questions arise. Provided patient with personalized treatment calendar and written educational material.

## 2020-09-23 NOTE — TELEPHONE ENCOUNTER
Discussed with Dr. Epps will prepare a letter for her son-in-law. Dr. Epps will also add 81mg aspirin while on pomalyst. Patient states she has some at home. Sent script to [facility] pharmacy. Patient will  along with letter on Wednesday. Verbalized understanding.

## 2020-09-23 NOTE — TELEPHONE ENCOUNTER
----- Message from Sandra Garcia, Arvin sent at 9/23/2020  8:18 AM EDT -----  Regarding: Mich pt - question about forms related to FMLA  Pt seen for education today. During education pt request if a form/letter from  could be provided for her son-in law whom she lives with to work remotely during her treatment instead of in the office?

## 2020-09-29 LAB
ABO GROUP BLD: NORMAL
BLD GP AB SCN SERPL QL: NEGATIVE
RH BLD: NEGATIVE
T&S EXPIRATION DATE: NORMAL

## 2020-09-30 ENCOUNTER — HOSPITAL ENCOUNTER (OUTPATIENT)
Dept: ONCOLOGY | Facility: HOSPITAL | Age: 70
Setting detail: INFUSION SERIES
Discharge: HOME | End: 2020-09-30

## 2020-09-30 VITALS
DIASTOLIC BLOOD PRESSURE: 60 MMHG | WEIGHT: 163.3 LBS | SYSTOLIC BLOOD PRESSURE: 122 MMHG | HEART RATE: 71 BPM | BODY MASS INDEX: 31.89 KG/M2 | RESPIRATION RATE: 18 BRPM | TEMPERATURE: 97.3 F

## 2020-09-30 DIAGNOSIS — C90.02 MULTIPLE MYELOMA IN RELAPSE (HCC): Primary | ICD-10-CM

## 2020-09-30 LAB
ERYTHROCYTE [DISTWIDTH] IN BLOOD BY AUTOMATED COUNT: 16 % (ref 12.3–15.4)
HCT VFR BLD AUTO: 36.9 % (ref 34–46.6)
HGB BLD-MCNC: 12.6 G/DL (ref 12–15.9)
LYMPHOCYTES # BLD AUTO: 0.5 10*3/MM3 (ref 0.7–3.1)
LYMPHOCYTES NFR BLD AUTO: 10.6 % (ref 19.6–45.3)
MCH RBC QN AUTO: 29.2 PG (ref 26.6–33)
MCHC RBC AUTO-ENTMCNC: 34.1 G/DL (ref 31.5–35.7)
MCV RBC AUTO: 85.6 FL (ref 79–97)
MONOCYTES # BLD AUTO: 0.2 10*3/MM3 (ref 0.1–0.9)
MONOCYTES NFR BLD AUTO: 3.3 % (ref 5–12)
NEUTROPHILS NFR BLD AUTO: 4 10*3/MM3 (ref 1.7–7)
NEUTROPHILS NFR BLD AUTO: 86.1 % (ref 42.7–76)
PLATELET # BLD AUTO: 95 10*3/MM3 (ref 140–450)
PMV BLD AUTO: 8.5 FL (ref 6–12)
RBC # BLD AUTO: 4.31 10*6/MM3 (ref 3.77–5.28)
WBC # BLD AUTO: 4.7 10*3/MM3 (ref 3.4–10.8)

## 2020-09-30 PROCEDURE — 25010000002 DIPHENHYDRAMINE PER 50 MG: Performed by: INTERNAL MEDICINE

## 2020-09-30 PROCEDURE — 25010000002 DARATUMUMAB 100 MG/5ML SOLUTION 20 ML VIAL: Performed by: INTERNAL MEDICINE

## 2020-09-30 PROCEDURE — 96415 CHEMO IV INFUSION ADDL HR: CPT

## 2020-09-30 PROCEDURE — 85025 COMPLETE CBC W/AUTO DIFF WBC: CPT | Performed by: INTERNAL MEDICINE

## 2020-09-30 PROCEDURE — 96375 TX/PRO/DX INJ NEW DRUG ADDON: CPT

## 2020-09-30 PROCEDURE — 25010000002 METHYLPREDNISOLONE PER 125 MG: Performed by: INTERNAL MEDICINE

## 2020-09-30 PROCEDURE — 96413 CHEMO IV INFUSION 1 HR: CPT

## 2020-09-30 RX ORDER — ACETAMINOPHEN 500 MG
1000 TABLET ORAL ONCE
Status: COMPLETED | OUTPATIENT
Start: 2020-09-30 | End: 2020-09-30

## 2020-09-30 RX ORDER — METHYLPREDNISOLONE SODIUM SUCCINATE 125 MG/2ML
100 INJECTION, POWDER, LYOPHILIZED, FOR SOLUTION INTRAMUSCULAR; INTRAVENOUS ONCE
Status: COMPLETED | OUTPATIENT
Start: 2020-09-30 | End: 2020-09-30

## 2020-09-30 RX ADMIN — METHYLPREDNISOLONE SODIUM SUCCINATE 100 MG: 125 INJECTION, POWDER, FOR SOLUTION INTRAMUSCULAR; INTRAVENOUS at 08:20

## 2020-09-30 RX ADMIN — ACETAMINOPHEN 1000 MG: 500 TABLET, FILM COATED ORAL at 08:20

## 2020-09-30 RX ADMIN — DIPHENHYDRAMINE HYDROCHLORIDE 50 MG: 50 INJECTION INTRAMUSCULAR; INTRAVENOUS at 08:20

## 2020-09-30 RX ADMIN — DARATUMUMAB 1200 MG: 100 INJECTION, SOLUTION, CONCENTRATE INTRAVENOUS at 09:40

## 2020-10-02 LAB
CYTO UR: NORMAL
LAB AP CASE REPORT: NORMAL
LAB AP CLINICAL INFORMATION: NORMAL
LAB AP CYTOGENETICS REPORT,ADDENDUM: NORMAL
LAB AP FLOW CYTOMETRY SUMMARY: NORMAL
PATH REPORT.FINAL DX SPEC: NORMAL
PATH REPORT.GROSS SPEC: NORMAL

## 2020-10-07 ENCOUNTER — HOSPITAL ENCOUNTER (OUTPATIENT)
Dept: ONCOLOGY | Facility: HOSPITAL | Age: 70
Setting detail: INFUSION SERIES
Discharge: HOME | End: 2020-10-07

## 2020-10-07 VITALS
SYSTOLIC BLOOD PRESSURE: 108 MMHG | DIASTOLIC BLOOD PRESSURE: 48 MMHG | RESPIRATION RATE: 18 BRPM | WEIGHT: 162 LBS | BODY MASS INDEX: 31.8 KG/M2 | TEMPERATURE: 96.5 F | HEIGHT: 60 IN | HEART RATE: 64 BPM

## 2020-10-07 DIAGNOSIS — C90.02 MULTIPLE MYELOMA IN RELAPSE (HCC): Primary | ICD-10-CM

## 2020-10-07 LAB
ERYTHROCYTE [DISTWIDTH] IN BLOOD BY AUTOMATED COUNT: 15.7 % (ref 12.3–15.4)
HCT VFR BLD AUTO: 39.4 % (ref 34–46.6)
HGB BLD-MCNC: 12.8 G/DL (ref 12–15.9)
LYMPHOCYTES # BLD AUTO: 0.4 10*3/MM3 (ref 0.7–3.1)
LYMPHOCYTES NFR BLD AUTO: 10.2 % (ref 19.6–45.3)
MCH RBC QN AUTO: 27.6 PG (ref 26.6–33)
MCHC RBC AUTO-ENTMCNC: 32.4 G/DL (ref 31.5–35.7)
MCV RBC AUTO: 85.3 FL (ref 79–97)
MONOCYTES # BLD AUTO: 0.3 10*3/MM3 (ref 0.1–0.9)
MONOCYTES NFR BLD AUTO: 6.9 % (ref 5–12)
NEUTROPHILS NFR BLD AUTO: 3.2 10*3/MM3 (ref 1.7–7)
NEUTROPHILS NFR BLD AUTO: 82.9 % (ref 42.7–76)
PLATELET # BLD AUTO: 96 10*3/MM3 (ref 140–450)
PMV BLD AUTO: 7.8 FL (ref 6–12)
RBC # BLD AUTO: 4.63 10*6/MM3 (ref 3.77–5.28)
WBC # BLD AUTO: 3.9 10*3/MM3 (ref 3.4–10.8)

## 2020-10-07 PROCEDURE — 25010000002 METHYLPREDNISOLONE PER 125 MG: Performed by: INTERNAL MEDICINE

## 2020-10-07 PROCEDURE — 96415 CHEMO IV INFUSION ADDL HR: CPT

## 2020-10-07 PROCEDURE — 85025 COMPLETE CBC W/AUTO DIFF WBC: CPT | Performed by: INTERNAL MEDICINE

## 2020-10-07 PROCEDURE — 96375 TX/PRO/DX INJ NEW DRUG ADDON: CPT

## 2020-10-07 PROCEDURE — 96413 CHEMO IV INFUSION 1 HR: CPT

## 2020-10-07 PROCEDURE — C9062 DARATUMUMAB HYALURONIDASE: HCPCS | Performed by: INTERNAL MEDICINE

## 2020-10-07 PROCEDURE — 25010000002 DARATUMUMAB 100 MG/5ML SOLUTION 20 ML VIAL: Performed by: INTERNAL MEDICINE

## 2020-10-07 PROCEDURE — 25010000002 DIPHENHYDRAMINE PER 50 MG: Performed by: INTERNAL MEDICINE

## 2020-10-07 RX ORDER — SODIUM CHLORIDE 9 MG/ML
250 INJECTION, SOLUTION INTRAVENOUS ONCE
Status: COMPLETED | OUTPATIENT
Start: 2020-10-07 | End: 2020-10-07

## 2020-10-07 RX ORDER — ACETAMINOPHEN 500 MG
1000 TABLET ORAL ONCE
Status: COMPLETED | OUTPATIENT
Start: 2020-10-07 | End: 2020-10-07

## 2020-10-07 RX ORDER — METHYLPREDNISOLONE SODIUM SUCCINATE 125 MG/2ML
60 INJECTION, POWDER, LYOPHILIZED, FOR SOLUTION INTRAMUSCULAR; INTRAVENOUS ONCE
Status: COMPLETED | OUTPATIENT
Start: 2020-10-07 | End: 2020-10-07

## 2020-10-07 RX ADMIN — METHYLPREDNISOLONE SODIUM SUCCINATE 60 MG: 125 INJECTION, POWDER, FOR SOLUTION INTRAMUSCULAR; INTRAVENOUS at 09:01

## 2020-10-07 RX ADMIN — DIPHENHYDRAMINE HYDROCHLORIDE 25 MG: 50 INJECTION INTRAMUSCULAR; INTRAVENOUS at 09:05

## 2020-10-07 RX ADMIN — DARATUMUMAB 1200 MG: 100 INJECTION, SOLUTION, CONCENTRATE INTRAVENOUS at 10:41

## 2020-10-07 RX ADMIN — SODIUM CHLORIDE 250 ML: 9 INJECTION, SOLUTION INTRAVENOUS at 09:01

## 2020-10-07 RX ADMIN — ACETAMINOPHEN 1000 MG: 500 TABLET, FILM COATED ORAL at 09:01

## 2020-10-09 DIAGNOSIS — C90.02 MULTIPLE MYELOMA IN RELAPSE (HCC): ICD-10-CM

## 2020-10-14 ENCOUNTER — HOSPITAL ENCOUNTER (OUTPATIENT)
Dept: ONCOLOGY | Facility: HOSPITAL | Age: 70
Setting detail: INFUSION SERIES
Discharge: HOME | End: 2020-10-14

## 2020-10-14 ENCOUNTER — TELEPHONE (OUTPATIENT)
Dept: ONCOLOGY | Facility: CLINIC | Age: 70
End: 2020-10-14

## 2020-10-14 VITALS
DIASTOLIC BLOOD PRESSURE: 60 MMHG | TEMPERATURE: 96.4 F | WEIGHT: 164 LBS | HEIGHT: 60 IN | SYSTOLIC BLOOD PRESSURE: 141 MMHG | RESPIRATION RATE: 18 BRPM | BODY MASS INDEX: 32.2 KG/M2 | HEART RATE: 64 BPM

## 2020-10-14 DIAGNOSIS — C90.02 MULTIPLE MYELOMA IN RELAPSE (HCC): Primary | ICD-10-CM

## 2020-10-14 LAB
ERYTHROCYTE [DISTWIDTH] IN BLOOD BY AUTOMATED COUNT: 17.5 % (ref 12.3–15.4)
HCT VFR BLD AUTO: 36.6 % (ref 34–46.6)
HGB BLD-MCNC: 11.8 G/DL (ref 12–15.9)
LYMPHOCYTES # BLD AUTO: 0.3 10*3/MM3 (ref 0.7–3.1)
LYMPHOCYTES NFR BLD AUTO: 22.7 % (ref 19.6–45.3)
MCH RBC QN AUTO: 27.9 PG (ref 26.6–33)
MCHC RBC AUTO-ENTMCNC: 32.1 G/DL (ref 31.5–35.7)
MCV RBC AUTO: 86.7 FL (ref 79–97)
MONOCYTES # BLD AUTO: 0.2 10*3/MM3 (ref 0.1–0.9)
MONOCYTES NFR BLD AUTO: 20.6 % (ref 5–12)
NEUTROPHILS NFR BLD AUTO: 0.7 10*3/MM3 (ref 1.7–7)
NEUTROPHILS NFR BLD AUTO: 56.7 % (ref 42.7–76)
PLATELET # BLD AUTO: 91 10*3/MM3 (ref 140–450)
RBC # BLD AUTO: 4.22 10*6/MM3 (ref 3.77–5.28)
WBC # BLD AUTO: 1.2 10*3/MM3 (ref 3.4–10.8)

## 2020-10-14 PROCEDURE — C9062 DARATUMUMAB HYALURONIDASE: HCPCS | Performed by: INTERNAL MEDICINE

## 2020-10-14 PROCEDURE — 25010000002 DARATUMUMAB 100 MG/5ML SOLUTION 20 ML VIAL: Performed by: INTERNAL MEDICINE

## 2020-10-14 PROCEDURE — 96375 TX/PRO/DX INJ NEW DRUG ADDON: CPT

## 2020-10-14 PROCEDURE — 96415 CHEMO IV INFUSION ADDL HR: CPT

## 2020-10-14 PROCEDURE — 85025 COMPLETE CBC W/AUTO DIFF WBC: CPT | Performed by: INTERNAL MEDICINE

## 2020-10-14 PROCEDURE — 96413 CHEMO IV INFUSION 1 HR: CPT

## 2020-10-14 PROCEDURE — 25010000002 DIPHENHYDRAMINE PER 50 MG: Performed by: INTERNAL MEDICINE

## 2020-10-14 PROCEDURE — 25010000002 METHYLPREDNISOLONE PER 125 MG: Performed by: INTERNAL MEDICINE

## 2020-10-14 RX ORDER — SODIUM CHLORIDE 9 MG/ML
250 INJECTION, SOLUTION INTRAVENOUS ONCE
Status: COMPLETED | OUTPATIENT
Start: 2020-10-14 | End: 2020-10-14

## 2020-10-14 RX ORDER — METHYLPREDNISOLONE SODIUM SUCCINATE 125 MG/2ML
60 INJECTION, POWDER, LYOPHILIZED, FOR SOLUTION INTRAMUSCULAR; INTRAVENOUS ONCE
Status: COMPLETED | OUTPATIENT
Start: 2020-10-14 | End: 2020-10-14

## 2020-10-14 RX ORDER — ACETAMINOPHEN 500 MG
1000 TABLET ORAL ONCE
Status: COMPLETED | OUTPATIENT
Start: 2020-10-14 | End: 2020-10-14

## 2020-10-14 RX ADMIN — SODIUM CHLORIDE 250 ML: 9 INJECTION, SOLUTION INTRAVENOUS at 09:09

## 2020-10-14 RX ADMIN — DARATUMUMAB 1200 MG: 100 INJECTION, SOLUTION, CONCENTRATE INTRAVENOUS at 10:08

## 2020-10-14 RX ADMIN — METHYLPREDNISOLONE SODIUM SUCCINATE 60 MG: 125 INJECTION, POWDER, FOR SOLUTION INTRAMUSCULAR; INTRAVENOUS at 09:10

## 2020-10-14 RX ADMIN — DIPHENHYDRAMINE HYDROCHLORIDE 25 MG: 50 INJECTION INTRAMUSCULAR; INTRAVENOUS at 09:10

## 2020-10-14 RX ADMIN — ACETAMINOPHEN 1000 MG: 500 TABLET, FILM COATED ORAL at 09:10

## 2020-10-14 NOTE — TELEPHONE ENCOUNTER
Critical Test Results      MD: Mich    Date: 10/14/2020     Critical test result: WBC 1.2 ANC 0.7    Time results received:

## 2020-10-15 ENCOUNTER — OFFICE VISIT (OUTPATIENT)
Dept: INTERNAL MEDICINE | Facility: CLINIC | Age: 70
End: 2020-10-15

## 2020-10-15 VITALS
HEART RATE: 64 BPM | DIASTOLIC BLOOD PRESSURE: 60 MMHG | HEIGHT: 60 IN | SYSTOLIC BLOOD PRESSURE: 120 MMHG | BODY MASS INDEX: 32.08 KG/M2 | WEIGHT: 163.4 LBS | TEMPERATURE: 97.1 F

## 2020-10-15 DIAGNOSIS — I10 ESSENTIAL HYPERTENSION: Primary | Chronic | ICD-10-CM

## 2020-10-15 DIAGNOSIS — J42 CHRONIC BRONCHITIS, UNSPECIFIED CHRONIC BRONCHITIS TYPE (HCC): ICD-10-CM

## 2020-10-15 DIAGNOSIS — K21.9 GASTROESOPHAGEAL REFLUX DISEASE WITHOUT ESOPHAGITIS: Chronic | ICD-10-CM

## 2020-10-15 DIAGNOSIS — N18.30 STAGE 3 CHRONIC KIDNEY DISEASE, UNSPECIFIED WHETHER STAGE 3A OR 3B CKD (HCC): Chronic | ICD-10-CM

## 2020-10-15 DIAGNOSIS — D12.6 TUBULAR ADENOMA OF COLON: ICD-10-CM

## 2020-10-15 DIAGNOSIS — C90.00 METASTATIC MULTIPLE MYELOMA TO BONE (HCC): ICD-10-CM

## 2020-10-15 DIAGNOSIS — K57.30 DIVERTICULOSIS OF LARGE INTESTINE WITHOUT HEMORRHAGE: ICD-10-CM

## 2020-10-15 PROCEDURE — G0439 PPPS, SUBSEQ VISIT: HCPCS | Performed by: INTERNAL MEDICINE

## 2020-10-15 NOTE — PROGRESS NOTES
The ABCs of the Annual Wellness Visit  Subsequent Medicare Wellness Visit    Chief Complaint   Patient presents with   • Annual Exam       Subjective   History of Present Illness:  Doris Miranda is a 70 y.o. female who presents for a Subsequent Medicare Wellness Visit.    HEALTH RISK ASSESSMENT    Recent Hospitalizations:  No hospitalization(s) within the last year.    Current Medical Providers:  Patient Care Team:  Earl Fuentes MD as PCP - General  Earl Fuentes MD as PCP - Family Medicine  Earl Fuentes MD as PCP - [facility]  Josiah Euceda MD as Consulting Physician (Hematology and Oncology)  Víctor Mixon MD as Consulting Physician (Pain Medicine)    Smoking Status:  Social History     Tobacco Use   Smoking Status Former Smoker   • Quit date: 2015   • Years since quittin.2   Smokeless Tobacco Never Used       Alcohol Consumption:  Social History     Substance and Sexual Activity   Alcohol Use No       Depression Screen:   PHQ-2/PHQ-9 Depression Screening 10/15/2020   Little interest or pleasure in doing things 0   Feeling down, depressed, or hopeless 0   Trouble falling or staying asleep, or sleeping too much 0   Feeling tired or having little energy 3   Poor appetite or overeating 1   Feeling bad about yourself - or that you are a failure or have let yourself or your family down 0   Trouble concentrating on things, such as reading the newspaper or watching television 0   Moving or speaking so slowly that other people could have noticed. Or the opposite - being so fidgety or restless that you have been moving around a lot more than usual 0   Thoughts that you would be better off dead, or of hurting yourself in some way 0   Total Score 4   If you checked off any problems, how difficult have these problems made it for you to do your work, take care of things at home, or get along with other people? Not difficult at all       Fall Risk Screen:  STEADI Fall Risk  Assessment was completed, and patient is at MODERATE risk for falls. Assessment completed on:10/15/2020    Health Habits and Functional and Cognitive Screening:  Functional & Cognitive Status 10/15/2020   Do you have difficulty preparing food and eating? No   Do you have difficulty bathing yourself, getting dressed or grooming yourself? No   Do you have difficulty using the toilet? No   Do you have difficulty moving around from place to place? No   Do you have trouble with steps or getting out of a bed or a chair? No   Current Diet Unhealthy Diet   Dental Exam Not up to date   Eye Exam Not up to date   Exercise (times per week) 0 times per week   Current Exercise Activities Include -   Do you need help using the phone?  No   Are you deaf or do you have serious difficulty hearing?  No   Do you need help with transportation? No   Do you need help shopping? No   Do you need help preparing meals?  No   Do you need help with housework?  No   Do you need help with laundry? No   Do you need help taking your medications? No   Do you need help managing money? No   Do you ever drive or ride in a car without wearing a seat belt? No   Have you felt unusual stress, anger or loneliness in the last month? No   Who do you live with? Child   If you need help, do you have trouble finding someone available to you? No   Have you been bothered in the last four weeks by sexual problems? No   Do you have difficulty concentrating, remembering or making decisions? No         Does the patient have evidence of cognitive impairment? No    Asprin use counseling:Taking ASA appropriately as indicated    Age-appropriate Screening Schedule:  Refer to the list below for future screening recommendations based on patient's age, sex and/or medical conditions. Orders for these recommended tests are listed in the plan section. The patient has been provided with a written plan.    Health Maintenance   Topic Date Due   • TDAP/TD VACCINES (1 - Tdap)  09/07/1969   • ZOSTER VACCINE (1 of 2) 09/07/2000   • MAMMOGRAM  04/21/2017   • DXA SCAN  06/20/2019   • INFLUENZA VACCINE  08/01/2020   • COLONOSCOPY  12/29/2026            Fall Risk Assessment was completed, and patient is at moderate risk for falls.      The following portions of the patient's history were reviewed and updated as appropriate: current medications, past family history, past medical history, past social history, past surgical history and problem list.    Outpatient Medications Prior to Visit   Medication Sig Dispense Refill   • acyclovir (ZOVIRAX) 400 MG tablet Take 1 tablet by mouth 2 (Two) Times a Day With Meals. 60 tablet 5   • albuterol sulfate  (90 Base) MCG/ACT inhaler Inhale 2 puffs Every 6 (Six) Hours As Needed for Wheezing or Shortness of Air. 1 inhaler 5   • aspirin (aspirin) 81 MG EC tablet Take 1 tablet by mouth Daily. 30 tablet 3   • cyclobenzaprine (FLEXERIL) 10 MG tablet Take 1 tablet by mouth 3 (Three) Times a Day As Needed for Muscle Spasms. 30 tablet 2   • dexamethasone (DECADRON) 4 MG tablet Take 5 tablets by mouth 1 (One) Time Per Week. 30 tablet 11   • esomeprazole (nexIUM) 20 MG capsule Take 20 mg by mouth Daily As Needed.     • Melatonin 5 MG chewable tablet Chew 5 mg Every Night.     • metoprolol tartrate (LOPRESSOR) 25 MG tablet Take 1 tablet by mouth 2 (Two) Times a Day. 60 tablet 2   • O2 (OXYGEN) Inhale 4 L/min 1 (One) Time.     • ondansetron (ZOFRAN) 8 MG tablet Take 1 tablet by mouth 3 (Three) Times a Day As Needed for Nausea or Vomiting. 30 tablet 5   • pomalidomide (Pomalyst) 2 MG chemo capsule Take 1 capsule by mouth Daily. For 21 days, followed by 7 days off Adult Female REMS: [id number] 21 capsule 0   • sulfamethoxazole-trimethoprim (BACTRIM DS,SEPTRA DS) 800-160 MG per tablet Take 1 tablet by mouth 3 (Three) Times a Week. Take 1 tablet on Mondays, Wednesdays and Fridays. 12 tablet 11   • venlafaxine XR (Effexor XR) 37.5 MG 24 hr capsule Take 1 capsule by mouth  Every Morning. 30 capsule 5     No facility-administered medications prior to visit.        Patient Active Problem List   Diagnosis   • Multiple myeloma in remission (CMS/HCC)   • Diverticulosis of large intestine without hemorrhage   • Tubular adenoma of colon   • Essential hypertension   • COPD (chronic obstructive pulmonary disease) (CMS/HCC)   • Arthritis   • Gastroesophageal reflux disease without esophagitis   • Chronic left-sided low back pain with sciatica   • Thrombocytopenia since stem cell transplant March 2016   • Hx of autologous stem cell transplant (March 2016)   • CKD (chronic kidney disease), stage III (CMS/HCC)   • Former smoker   • Non-rheumatic tricuspid valve insufficiency   • Pulmonary hypertension (CMS/HCC)   • Chronic respiratory failure with hypoxia (been noncompliant with outpatient oxygen in past)   • Gait instability   • Chronic pain   • Debility   • Thrombocytopenia (CMS/HCC)   • On home oxygen therapy   • Hypogammaglobulinemia, acquired (CMS/HCC)   • Multiple myeloma in relapse (CMS/HCC)       Advanced Care Planning:  ACP discussion was held with the patient during this visit. Patient does not have an advance directive, information provided.    Review of Systems   Constitutional: Positive for fatigue. Negative for activity change, appetite change and unexpected weight change.   HENT: Negative for congestion, ear pain, rhinorrhea, sinus pressure, sore throat and trouble swallowing.    Eyes: Negative for pain and visual disturbance.   Respiratory: Positive for shortness of breath. Negative for cough, chest tightness and wheezing.         Baseline   Cardiovascular: Negative for chest pain, palpitations and leg swelling.   Gastrointestinal: Negative for abdominal distention, abdominal pain, blood in stool, constipation, diarrhea and vomiting.        12/16 colonoscopy , repeat 12/21   Endocrine: Negative for cold intolerance, heat intolerance, polydipsia and polyphagia.   Genitourinary:  "Negative for difficulty urinating, dyspareunia, dysuria, frequency, hematuria, menstrual problem, urgency and vaginal discharge.        Refusing   Musculoskeletal: Positive for arthralgias, back pain and gait problem. Negative for joint swelling and myalgias.   Skin: Negative for color change, pallor, rash and wound.        Incision clean dry and intact   Allergic/Immunologic: Negative for environmental allergies, food allergies and immunocompromised state.   Neurological: Negative for dizziness, tremors, seizures, syncope, facial asymmetry, speech difficulty, weakness, numbness and headaches.   Hematological: Negative for adenopathy. Does not bruise/bleed easily.   Psychiatric/Behavioral: Negative for decreased concentration, dysphoric mood, sleep disturbance and suicidal ideas. The patient is nervous/anxious (situational).        Compared to one year ago, the patient feels her physical health is the same.  Compared to one year ago, the patient feels her mental health is the same.    Reviewed chart for potential of high risk medication in the elderly: yes  Reviewed chart for potential of harmful drug interactions in the elderly:yes    Objective         Vitals:    10/15/20 1304 10/15/20 1319   BP: 118/68 120/60   BP Location: Left arm    Patient Position: Sitting    Pulse: 64    Temp: 97.1 °F (36.2 °C)    TempSrc: Infrared    Weight: 74.1 kg (163 lb 6.4 oz)    Height: 152.4 cm (60\")    PainSc: 0-No pain        Body mass index is 31.91 kg/m².  Discussed the patient's BMI with her. The BMI is above average; BMI management plan is completed.    Physical Exam  Constitutional:       Appearance: Normal appearance. She is well-developed.   HENT:      Head: Normocephalic and atraumatic.      Right Ear: External ear normal.      Left Ear: External ear normal.      Nose: Nose normal.      Mouth/Throat:      Mouth: Mucous membranes are moist.      Pharynx: Oropharynx is clear.   Eyes:      Extraocular Movements: Extraocular " movements intact.      Conjunctiva/sclera: Conjunctivae normal.      Pupils: Pupils are equal, round, and reactive to light.   Neck:      Musculoskeletal: Normal range of motion and neck supple.   Cardiovascular:      Rate and Rhythm: Normal rate and regular rhythm.      Heart sounds: Normal heart sounds.   Pulmonary:      Effort: Pulmonary effort is normal.      Comments: Diminished BS  Abdominal:      General: Bowel sounds are normal.      Palpations: Abdomen is soft.   Musculoskeletal:      Comments: Using cane   Lymphadenopathy:      Cervical: No cervical adenopathy.   Skin:     General: Skin is warm and dry.   Neurological:      General: No focal deficit present.      Mental Status: She is alert and oriented to person, place, and time.   Psychiatric:         Mood and Affect: Mood normal.         Behavior: Behavior normal.         Thought Content: Thought content normal.               Assessment/Plan   Medicare Risks and Personalized Health Plan  CMS Preventative Services Quick Reference  Breast Cancer/Mammogram Screening  Chronic Pain   Colon Cancer Screening  Fall Risk  Immunizations Discussed/Encouraged (specific immunizations; Shingrix )  Inactivity/Sedentary  Obesity/Overweight     The above risks/problems have been discussed with the patient.  Pertinent information has been shared with the patient in the After Visit Summary.  Follow up plans and orders are seen below in the Assessment/Plan Section.    Diagnoses and all orders for this visit:    1. Essential hypertension (Primary)    2. Chronic bronchitis, unspecified chronic bronchitis type (CMS/HCC)    3. Tubular adenoma of colon    4. Gastroesophageal reflux disease without esophagitis    5. Diverticulosis of large intestine without hemorrhage    6. Stage 3 chronic kidney disease, unspecified whether stage 3a or 3b CKD    7. Metastatic multiple myeloma to bone (CMS/HCC)      Follow Up:  Return in about 4 months (around 2/15/2021).     An After Visit  Summary and PPPS were given to the patient.     Htn- cont metoprolol  copd-not an issue since stopping tob, proventil prn, controlled on prn albuterol  gerd/gastritis-cont ppi prn, controlled  MM/back pain with radiculopathy-f/u Dr Epps and [facility], has progression  Abnormal lung sounds-CT from [facility] noted, no change  Gait instability/chronic back pain-improved with surgery  Situational anxiety/depression-cont effexor xr     Prior records Labs noted and dw patient, advised shingrx

## 2020-10-21 ENCOUNTER — HOSPITAL ENCOUNTER (OUTPATIENT)
Dept: ONCOLOGY | Facility: HOSPITAL | Age: 70
Setting detail: INFUSION SERIES
Discharge: HOME | End: 2020-10-21

## 2020-10-21 ENCOUNTER — OFFICE VISIT (OUTPATIENT)
Dept: ONCOLOGY | Facility: CLINIC | Age: 70
End: 2020-10-21

## 2020-10-21 VITALS
TEMPERATURE: 96.4 F | WEIGHT: 161 LBS | DIASTOLIC BLOOD PRESSURE: 74 MMHG | SYSTOLIC BLOOD PRESSURE: 172 MMHG | HEART RATE: 72 BPM | OXYGEN SATURATION: 88 % | RESPIRATION RATE: 28 BRPM | BODY MASS INDEX: 31.61 KG/M2 | HEIGHT: 60 IN

## 2020-10-21 VITALS — SYSTOLIC BLOOD PRESSURE: 132 MMHG | HEART RATE: 70 BPM | DIASTOLIC BLOOD PRESSURE: 67 MMHG

## 2020-10-21 DIAGNOSIS — C90.02 MULTIPLE MYELOMA IN RELAPSE (HCC): Primary | ICD-10-CM

## 2020-10-21 LAB
ALBUMIN SERPL-MCNC: 4.2 G/DL (ref 3.5–5.2)
ALBUMIN/GLOB SERPL: 2 G/DL
ALP SERPL-CCNC: 120 U/L (ref 39–117)
ALT SERPL W P-5'-P-CCNC: 25 U/L (ref 1–33)
ANION GAP SERPL CALCULATED.3IONS-SCNC: 10 MMOL/L (ref 5–15)
AST SERPL-CCNC: 16 U/L (ref 1–32)
B2 MICROGLOB SERPL-MCNC: 3 MG/L (ref 0.8–2.2)
BILIRUB SERPL-MCNC: 0.3 MG/DL (ref 0–1.2)
BUN SERPL-MCNC: 20 MG/DL (ref 8–23)
BUN/CREAT SERPL: 16.7 (ref 7–25)
CALCIUM SPEC-SCNC: 8.8 MG/DL (ref 8.6–10.5)
CHLORIDE SERPL-SCNC: 105 MMOL/L (ref 98–107)
CO2 SERPL-SCNC: 23 MMOL/L (ref 22–29)
CREAT BLDA-MCNC: 1.2 MG/DL (ref 0.6–1.3)
CREAT SERPL-MCNC: 1.2 MG/DL
CREAT SERPL-MCNC: 1.2 MG/DL (ref 0.57–1)
ERYTHROCYTE [DISTWIDTH] IN BLOOD BY AUTOMATED COUNT: 18.3 % (ref 12.3–15.4)
GFR SERPL CREATININE-BSD FRML MDRD: 44 ML/MIN/1.73
GLOBULIN UR ELPH-MCNC: 2.1 GM/DL
GLUCOSE SERPL-MCNC: 102 MG/DL (ref 65–99)
HCT VFR BLD AUTO: 37.4 % (ref 34–46.6)
HGB BLD-MCNC: 12 G/DL (ref 12–15.9)
LYMPHOCYTES # BLD AUTO: 0.6 10*3/MM3 (ref 0.7–3.1)
LYMPHOCYTES NFR BLD AUTO: 23.7 % (ref 19.6–45.3)
MCH RBC QN AUTO: 28 PG (ref 26.6–33)
MCHC RBC AUTO-ENTMCNC: 32 G/DL (ref 31.5–35.7)
MCV RBC AUTO: 87.4 FL (ref 79–97)
MONOCYTES # BLD AUTO: 0.3 10*3/MM3 (ref 0.1–0.9)
MONOCYTES NFR BLD AUTO: 10.5 % (ref 5–12)
NEUTROPHILS NFR BLD AUTO: 1.6 10*3/MM3 (ref 1.7–7)
NEUTROPHILS NFR BLD AUTO: 65.8 % (ref 42.7–76)
PLATELET # BLD AUTO: 139 10*3/MM3 (ref 140–450)
PMV BLD AUTO: 8 FL (ref 6–12)
POTASSIUM SERPL-SCNC: 4.6 MMOL/L (ref 3.5–5.2)
PROT SERPL-MCNC: 6.3 G/DL (ref 6–8.5)
RBC # BLD AUTO: 4.28 10*6/MM3 (ref 3.77–5.28)
SODIUM SERPL-SCNC: 138 MMOL/L (ref 136–145)
WBC # BLD AUTO: 2.5 10*3/MM3 (ref 3.4–10.8)

## 2020-10-21 PROCEDURE — 96415 CHEMO IV INFUSION ADDL HR: CPT

## 2020-10-21 PROCEDURE — 84165 PROTEIN E-PHORESIS SERUM: CPT | Performed by: INTERNAL MEDICINE

## 2020-10-21 PROCEDURE — 25010000002 DARATUMUMAB 100 MG/5ML SOLUTION 20 ML VIAL: Performed by: INTERNAL MEDICINE

## 2020-10-21 PROCEDURE — C9062 DARATUMUMAB HYALURONIDASE: HCPCS | Performed by: INTERNAL MEDICINE

## 2020-10-21 PROCEDURE — 96413 CHEMO IV INFUSION 1 HR: CPT

## 2020-10-21 PROCEDURE — 25010000002 METHYLPREDNISOLONE PER 125 MG: Performed by: INTERNAL MEDICINE

## 2020-10-21 PROCEDURE — 82565 ASSAY OF CREATININE: CPT

## 2020-10-21 PROCEDURE — 86334 IMMUNOFIX E-PHORESIS SERUM: CPT | Performed by: INTERNAL MEDICINE

## 2020-10-21 PROCEDURE — 99214 OFFICE O/P EST MOD 30 MIN: CPT | Performed by: INTERNAL MEDICINE

## 2020-10-21 PROCEDURE — 82232 ASSAY OF BETA-2 PROTEIN: CPT | Performed by: INTERNAL MEDICINE

## 2020-10-21 PROCEDURE — 83883 ASSAY NEPHELOMETRY NOT SPEC: CPT | Performed by: INTERNAL MEDICINE

## 2020-10-21 PROCEDURE — 85025 COMPLETE CBC W/AUTO DIFF WBC: CPT | Performed by: INTERNAL MEDICINE

## 2020-10-21 PROCEDURE — 25010000002 DIPHENHYDRAMINE PER 50 MG: Performed by: INTERNAL MEDICINE

## 2020-10-21 PROCEDURE — 96375 TX/PRO/DX INJ NEW DRUG ADDON: CPT

## 2020-10-21 PROCEDURE — 82784 ASSAY IGA/IGD/IGG/IGM EACH: CPT | Performed by: INTERNAL MEDICINE

## 2020-10-21 PROCEDURE — 80053 COMPREHEN METABOLIC PANEL: CPT | Performed by: INTERNAL MEDICINE

## 2020-10-21 RX ORDER — DIPHENHYDRAMINE HYDROCHLORIDE 50 MG/ML
50 INJECTION INTRAMUSCULAR; INTRAVENOUS AS NEEDED
Status: CANCELLED | OUTPATIENT
Start: 2020-10-21

## 2020-10-21 RX ORDER — SODIUM CHLORIDE 9 MG/ML
250 INJECTION, SOLUTION INTRAVENOUS ONCE
Status: COMPLETED | OUTPATIENT
Start: 2020-10-21 | End: 2020-10-21

## 2020-10-21 RX ORDER — DIPHENHYDRAMINE HYDROCHLORIDE 50 MG/ML
50 INJECTION INTRAMUSCULAR; INTRAVENOUS AS NEEDED
Status: CANCELLED | OUTPATIENT
Start: 2020-10-28

## 2020-10-21 RX ORDER — FAMOTIDINE 10 MG/ML
20 INJECTION, SOLUTION INTRAVENOUS AS NEEDED
Status: CANCELLED | OUTPATIENT
Start: 2020-11-11

## 2020-10-21 RX ORDER — METHYLPREDNISOLONE SODIUM SUCCINATE 125 MG/2ML
60 INJECTION, POWDER, LYOPHILIZED, FOR SOLUTION INTRAMUSCULAR; INTRAVENOUS ONCE
Status: CANCELLED | OUTPATIENT
Start: 2020-10-21

## 2020-10-21 RX ORDER — SODIUM CHLORIDE 9 MG/ML
250 INJECTION, SOLUTION INTRAVENOUS ONCE
Status: CANCELLED | OUTPATIENT
Start: 2020-10-21

## 2020-10-21 RX ORDER — ACETAMINOPHEN 500 MG
1000 TABLET ORAL ONCE
Status: CANCELLED | OUTPATIENT
Start: 2020-10-28

## 2020-10-21 RX ORDER — MEPERIDINE HYDROCHLORIDE 50 MG/ML
25 INJECTION INTRAMUSCULAR; INTRAVENOUS; SUBCUTANEOUS
Status: CANCELLED | OUTPATIENT
Start: 2020-11-11

## 2020-10-21 RX ORDER — METHYLPREDNISOLONE SODIUM SUCCINATE 125 MG/2ML
60 INJECTION, POWDER, LYOPHILIZED, FOR SOLUTION INTRAMUSCULAR; INTRAVENOUS ONCE
Status: CANCELLED | OUTPATIENT
Start: 2020-10-28

## 2020-10-21 RX ORDER — ACETAMINOPHEN 500 MG
1000 TABLET ORAL ONCE
Status: CANCELLED | OUTPATIENT
Start: 2020-10-21

## 2020-10-21 RX ORDER — FAMOTIDINE 10 MG/ML
20 INJECTION, SOLUTION INTRAVENOUS AS NEEDED
Status: CANCELLED | OUTPATIENT
Start: 2020-11-04

## 2020-10-21 RX ORDER — METHYLPREDNISOLONE SODIUM SUCCINATE 125 MG/2ML
60 INJECTION, POWDER, LYOPHILIZED, FOR SOLUTION INTRAMUSCULAR; INTRAVENOUS ONCE
Status: CANCELLED | OUTPATIENT
Start: 2020-11-11

## 2020-10-21 RX ORDER — SODIUM CHLORIDE 9 MG/ML
250 INJECTION, SOLUTION INTRAVENOUS ONCE
Status: CANCELLED | OUTPATIENT
Start: 2020-11-04

## 2020-10-21 RX ORDER — SODIUM CHLORIDE 9 MG/ML
250 INJECTION, SOLUTION INTRAVENOUS ONCE
Status: CANCELLED | OUTPATIENT
Start: 2020-10-28

## 2020-10-21 RX ORDER — FAMOTIDINE 10 MG/ML
20 INJECTION, SOLUTION INTRAVENOUS AS NEEDED
Status: CANCELLED | OUTPATIENT
Start: 2020-10-21

## 2020-10-21 RX ORDER — ACETAMINOPHEN 500 MG
1000 TABLET ORAL ONCE
Status: CANCELLED | OUTPATIENT
Start: 2020-11-11

## 2020-10-21 RX ORDER — ACETAMINOPHEN 500 MG
1000 TABLET ORAL ONCE
Status: CANCELLED | OUTPATIENT
Start: 2020-11-04

## 2020-10-21 RX ORDER — MEPERIDINE HYDROCHLORIDE 50 MG/ML
25 INJECTION INTRAMUSCULAR; INTRAVENOUS; SUBCUTANEOUS
Status: CANCELLED | OUTPATIENT
Start: 2020-10-28

## 2020-10-21 RX ORDER — MEPERIDINE HYDROCHLORIDE 50 MG/ML
25 INJECTION INTRAMUSCULAR; INTRAVENOUS; SUBCUTANEOUS
Status: CANCELLED | OUTPATIENT
Start: 2020-11-04

## 2020-10-21 RX ORDER — MEPERIDINE HYDROCHLORIDE 50 MG/ML
25 INJECTION INTRAMUSCULAR; INTRAVENOUS; SUBCUTANEOUS
Status: CANCELLED | OUTPATIENT
Start: 2020-10-21

## 2020-10-21 RX ORDER — FAMOTIDINE 10 MG/ML
20 INJECTION, SOLUTION INTRAVENOUS AS NEEDED
Status: CANCELLED | OUTPATIENT
Start: 2020-10-28

## 2020-10-21 RX ORDER — ACETAMINOPHEN 500 MG
1000 TABLET ORAL ONCE
Status: COMPLETED | OUTPATIENT
Start: 2020-10-21 | End: 2020-10-21

## 2020-10-21 RX ORDER — SODIUM CHLORIDE 9 MG/ML
250 INJECTION, SOLUTION INTRAVENOUS ONCE
Status: CANCELLED | OUTPATIENT
Start: 2020-11-11

## 2020-10-21 RX ORDER — DIPHENHYDRAMINE HYDROCHLORIDE 50 MG/ML
50 INJECTION INTRAMUSCULAR; INTRAVENOUS AS NEEDED
Status: CANCELLED | OUTPATIENT
Start: 2020-11-04

## 2020-10-21 RX ORDER — METHYLPREDNISOLONE SODIUM SUCCINATE 125 MG/2ML
60 INJECTION, POWDER, LYOPHILIZED, FOR SOLUTION INTRAMUSCULAR; INTRAVENOUS ONCE
Status: COMPLETED | OUTPATIENT
Start: 2020-10-21 | End: 2020-10-21

## 2020-10-21 RX ORDER — METHYLPREDNISOLONE SODIUM SUCCINATE 125 MG/2ML
60 INJECTION, POWDER, LYOPHILIZED, FOR SOLUTION INTRAMUSCULAR; INTRAVENOUS ONCE
Status: CANCELLED | OUTPATIENT
Start: 2020-11-04

## 2020-10-21 RX ORDER — DIPHENHYDRAMINE HYDROCHLORIDE 50 MG/ML
50 INJECTION INTRAMUSCULAR; INTRAVENOUS AS NEEDED
Status: CANCELLED | OUTPATIENT
Start: 2020-11-11

## 2020-10-21 RX ADMIN — SODIUM CHLORIDE 250 ML: 9 INJECTION, SOLUTION INTRAVENOUS at 09:30

## 2020-10-21 RX ADMIN — METHYLPREDNISOLONE SODIUM SUCCINATE 60 MG: 125 INJECTION, POWDER, FOR SOLUTION INTRAMUSCULAR; INTRAVENOUS at 09:30

## 2020-10-21 RX ADMIN — ACETAMINOPHEN 1000 MG: 500 TABLET, FILM COATED ORAL at 09:32

## 2020-10-21 RX ADMIN — DARATUMUMAB 1200 MG: 100 INJECTION, SOLUTION, CONCENTRATE INTRAVENOUS at 10:50

## 2020-10-21 RX ADMIN — DIPHENHYDRAMINE HYDROCHLORIDE 25 MG: 50 INJECTION INTRAMUSCULAR; INTRAVENOUS at 09:33

## 2020-10-21 NOTE — PROGRESS NOTES
Oncology/Hematology History Overview Note   PROBLEM LIST:   1. Stage III IgA kappa clonal multiple myeloma. Diagnosed 9/20152. FISH panel reports multiple abnormalities including gain of 1q21 monosomy.  3. Monosomy 13 and gain of chromosomes 9, 15 and CCND1.  4. Initial M-spike of 7.1 g/dL at time of diagnosis and after 3 cycles, had  undetectable M-spike currently on Velcade, Revlimid and dexamethasone cycle #6  this week.  5. S/p Autologous SCT at [facility] with Dr. Venu Spicer in March 11,2016  6.Right leg pain - on lyrica  7. Bilateral hip replacements by Dr. Lopez     Multiple myeloma in remission (CMS/[facility])   7/11/2016 Initial Diagnosis    Multiple myeloma     Multiple myeloma in relapse (CMS/[facility])   9/10/2020 Initial Diagnosis    Multiple myeloma in relapse (CMS/[facility])     9/23/2020 -  Chemotherapy    OP MULTIPLE MYELOMA Daratumumab / Pomalidomide / Dexamethasone            REASON FOR VISIT: Multiple myeloma       Subjective     HISTORY OF PRESENT ILLNESS:   Ms. Miranda is here for follow up evaluation of myeloma management.  She is currently on second line therapy with Darzalex, Pomalyst and dexamethasone.  She is completed 1 cycle.  She is having some shortness of breath with activity.  But no infections.  Did not have to watch for this.  No major issues with pain.  She continues to ambulate reasonably well.      Past Medical History, Past Surgical History, Social History, Family History have been reviewed and are without significant changes except as mentioned.    Review of Systems   Constitutional: Negative.    HENT: Negative.    Eyes: Negative.    Respiratory: Negative.    Cardiovascular: Negative.    Gastrointestinal: Negative.    Endocrine: Negative.    Genitourinary: Negative.    Musculoskeletal: Positive for arthralgias and gait problem.   Skin: Negative.    Allergic/Immunologic: Negative.    Hematological: Negative.    Psychiatric/Behavioral: Negative.       A comprehensive 14 point review of systems was  "performed and was negative except as mentioned.    Medications:  The current medication list was reviewed in the EMR    ALLERGIES:  No Known Allergies    Objective      /74 Comment: LUE  Pulse 72   Temp 96.4 °F (35.8 °C) (Temporal)   Resp 28   Ht 152.4 cm (60\")   Wt 73 kg (161 lb)   SpO2 (!) 88% Comment: RA- Rest  BMI 31.44 kg/m²      Performance Status: 1    General: well appearing, in no acute distress  HEENT: sclera anicteric, oropharynx clear  Lymphatics: no cervical, supraclavicular, or axillary adenopathy  Cardiovascular: regular rate and rhythm, no murmurs  Lungs: clear to auscultation bilaterally  Abdomen: soft, nontender, nondistended.  No palpable organomegaly  Extremeties: no lower extremity edema  Skin: no rashes, lesions, bruising, or petechiae    RECENT LABS:    Lab Results   Component Value Date    MSPIKE Comment: 09/08/2020    MSPIKE Not Observed 06/16/2020    MSPIKE Not Observed 03/24/2020     Lab Results   Component Value Date    FREEKAPPAL 1035.8 (H) 09/08/2020    FREEKAPPAL 542.1 (H) 06/16/2020    FREEKAPPAL 200.9 (H) 03/24/2020     Lab Results   Component Value Date    IGLFLC 7.7 09/08/2020    IGLFLC 7.4 06/16/2020    IGLFLC 9.9 03/24/2020     Lab Results   Component Value Date    WBC 1.20 (C) 10/14/2020    HGB 11.8 (L) 10/14/2020    HCT 36.6 10/14/2020    MCV 86.7 10/14/2020    PLT 91 (L) 10/14/2020       Lab Results   Component Value Date    GLUCOSE 89 09/23/2020    BUN 22 09/23/2020    CREATININE 1.10 09/23/2020    EGFRIFNONA 47 (L) 09/23/2020    BCR 19.1 09/23/2020    K 4.8 09/23/2020    CO2 25.0 09/23/2020    CALCIUM 9.4 09/23/2020    PROTENTOTREF 6.2 09/08/2020    ALBUMIN 4.60 09/23/2020    LABIL2 1.5 09/08/2020    AST 25 09/23/2020    ALT 18 09/23/2020         Assessment/Plan       1. multiple myeloma in remission  status post autologous stem cell transplant. I am going to continue her treatment to Pomalyst, daratumumab and dexamethasone.  continue her Pomalyst dose to 2 " mg/day.  I do not think she will tolerate the 4 mg dosing due to cytopenias.  We will see how she does going forward.  She is not interested in undergoing a repeat autologous stem cell transplantation.  The goal is to get her disease under control at this time.    2.  Hypogammaglobulinemia.  She has not had issues with infections so far.  We will hold off on IVIG for now    3.  Back pain/ hip pain: The hip replacements has done wonders for her.       4.  Bilateral hip replacement secondary to myeloma by Dr. Lopez    5.  Shortness of breath.  We will have to watch for this.  May consider PFTs if does not improve.    Joseph Mckinley MD  [facility] Hematology and Oncology    10/21/2020      Return on: 11/18/20  Return in (Approximately): Schedule with next infusion, 1 month        CC:

## 2020-10-22 LAB
ALBUMIN SERPL ELPH-MCNC: 3.6 G/DL (ref 2.9–4.4)
ALBUMIN/GLOB SERPL: 1.5 {RATIO} (ref 0.7–1.7)
ALPHA1 GLOB SERPL ELPH-MCNC: 0.3 G/DL (ref 0–0.4)
ALPHA2 GLOB SERPL ELPH-MCNC: 0.9 G/DL (ref 0.4–1)
B-GLOBULIN SERPL ELPH-MCNC: 0.9 G/DL (ref 0.7–1.3)
GAMMA GLOB SERPL ELPH-MCNC: 0.5 G/DL (ref 0.4–1.8)
GLOBULIN SER-MCNC: 2.5 G/DL (ref 2.2–3.9)
IGA SERPL-MCNC: 43 MG/DL (ref 87–352)
IGG SERPL-MCNC: 464 MG/DL (ref 586–1602)
IGM SERPL-MCNC: 18 MG/DL (ref 26–217)
INTERPRETATION SERPL IEP-IMP: ABNORMAL
KAPPA LC FREE SER-MCNC: 28 MG/L (ref 3.3–19.4)
KAPPA LC FREE/LAMBDA FREE SER: 3.46 {RATIO} (ref 0.26–1.65)
LABORATORY COMMENT REPORT: ABNORMAL
LAMBDA LC FREE SERPL-MCNC: 8.1 MG/L (ref 5.7–26.3)
M PROTEIN SERPL ELPH-MCNC: 0.2 G/DL
PROT SERPL-MCNC: 6.1 G/DL (ref 6–8.5)

## 2020-10-28 ENCOUNTER — HOSPITAL ENCOUNTER (OUTPATIENT)
Dept: ONCOLOGY | Facility: HOSPITAL | Age: 70
Setting detail: INFUSION SERIES
Discharge: HOME | End: 2020-10-28

## 2020-10-28 VITALS
WEIGHT: 164 LBS | HEART RATE: 71 BPM | TEMPERATURE: 96.2 F | BODY MASS INDEX: 32.03 KG/M2 | SYSTOLIC BLOOD PRESSURE: 134 MMHG | DIASTOLIC BLOOD PRESSURE: 53 MMHG | RESPIRATION RATE: 16 BRPM

## 2020-10-28 DIAGNOSIS — C90.02 MULTIPLE MYELOMA IN RELAPSE (HCC): Primary | ICD-10-CM

## 2020-10-28 LAB
ERYTHROCYTE [DISTWIDTH] IN BLOOD BY AUTOMATED COUNT: 19 % (ref 12.3–15.4)
HCT VFR BLD AUTO: 38.2 % (ref 34–46.6)
HGB BLD-MCNC: 12.4 G/DL (ref 12–15.9)
LYMPHOCYTES # BLD AUTO: 0.3 10*3/MM3 (ref 0.7–3.1)
LYMPHOCYTES NFR BLD AUTO: 7.4 % (ref 19.6–45.3)
MCH RBC QN AUTO: 28.3 PG (ref 26.6–33)
MCHC RBC AUTO-ENTMCNC: 32.5 G/DL (ref 31.5–35.7)
MCV RBC AUTO: 87.2 FL (ref 79–97)
MONOCYTES # BLD AUTO: 0.3 10*3/MM3 (ref 0.1–0.9)
MONOCYTES NFR BLD AUTO: 7.1 % (ref 5–12)
NEUTROPHILS NFR BLD AUTO: 3.4 10*3/MM3 (ref 1.7–7)
NEUTROPHILS NFR BLD AUTO: 85.5 % (ref 42.7–76)
PLATELET # BLD AUTO: 133 10*3/MM3 (ref 140–450)
PMV BLD AUTO: 8.1 FL (ref 6–12)
RBC # BLD AUTO: 4.38 10*6/MM3 (ref 3.77–5.28)
WBC # BLD AUTO: 4 10*3/MM3 (ref 3.4–10.8)

## 2020-10-28 PROCEDURE — 96413 CHEMO IV INFUSION 1 HR: CPT

## 2020-10-28 PROCEDURE — C9062 DARATUMUMAB HYALURONIDASE: HCPCS | Performed by: INTERNAL MEDICINE

## 2020-10-28 PROCEDURE — 85025 COMPLETE CBC W/AUTO DIFF WBC: CPT | Performed by: INTERNAL MEDICINE

## 2020-10-28 PROCEDURE — 25010000002 DARATUMUMAB 100 MG/5ML SOLUTION 20 ML VIAL: Performed by: INTERNAL MEDICINE

## 2020-10-28 PROCEDURE — 96415 CHEMO IV INFUSION ADDL HR: CPT

## 2020-10-28 PROCEDURE — 96375 TX/PRO/DX INJ NEW DRUG ADDON: CPT

## 2020-10-28 PROCEDURE — 25010000002 METHYLPREDNISOLONE PER 125 MG: Performed by: INTERNAL MEDICINE

## 2020-10-28 PROCEDURE — 25010000002 DIPHENHYDRAMINE PER 50 MG: Performed by: INTERNAL MEDICINE

## 2020-10-28 RX ORDER — METHYLPREDNISOLONE SODIUM SUCCINATE 125 MG/2ML
60 INJECTION, POWDER, LYOPHILIZED, FOR SOLUTION INTRAMUSCULAR; INTRAVENOUS ONCE
Status: COMPLETED | OUTPATIENT
Start: 2020-10-28 | End: 2020-10-28

## 2020-10-28 RX ORDER — SODIUM CHLORIDE 9 MG/ML
250 INJECTION, SOLUTION INTRAVENOUS ONCE
Status: COMPLETED | OUTPATIENT
Start: 2020-10-28 | End: 2020-10-28

## 2020-10-28 RX ORDER — ACETAMINOPHEN 500 MG
1000 TABLET ORAL ONCE
Status: COMPLETED | OUTPATIENT
Start: 2020-10-28 | End: 2020-10-28

## 2020-10-28 RX ADMIN — DARATUMUMAB 1200 MG: 100 INJECTION, SOLUTION, CONCENTRATE INTRAVENOUS at 10:00

## 2020-10-28 RX ADMIN — METHYLPREDNISOLONE SODIUM SUCCINATE 60 MG: 125 INJECTION, POWDER, FOR SOLUTION INTRAMUSCULAR; INTRAVENOUS at 08:59

## 2020-10-28 RX ADMIN — SODIUM CHLORIDE 250 ML: 9 INJECTION, SOLUTION INTRAVENOUS at 08:58

## 2020-10-28 RX ADMIN — DIPHENHYDRAMINE HYDROCHLORIDE 25 MG: 50 INJECTION INTRAMUSCULAR; INTRAVENOUS at 08:58

## 2020-10-28 RX ADMIN — ACETAMINOPHEN 1000 MG: 500 TABLET, FILM COATED ORAL at 08:59

## 2020-10-29 DIAGNOSIS — I10 ESSENTIAL HYPERTENSION: ICD-10-CM

## 2020-10-29 DIAGNOSIS — C90.02 MULTIPLE MYELOMA IN RELAPSE (HCC): ICD-10-CM

## 2020-10-29 RX ORDER — CYCLOBENZAPRINE HCL 10 MG
10 TABLET ORAL 3 TIMES DAILY PRN
Qty: 30 TABLET | Refills: 11 | Status: SHIPPED | OUTPATIENT
Start: 2020-10-29 | End: 2021-09-06 | Stop reason: SDUPTHER

## 2020-11-02 RX ORDER — DEXAMETHASONE 4 MG/1
20 TABLET ORAL WEEKLY
Qty: 30 TABLET | Refills: 11 | Status: SHIPPED | OUTPATIENT
Start: 2020-11-02 | End: 2021-08-11 | Stop reason: HOSPADM

## 2020-11-04 ENCOUNTER — HOSPITAL ENCOUNTER (OUTPATIENT)
Dept: ONCOLOGY | Facility: HOSPITAL | Age: 70
Setting detail: INFUSION SERIES
Discharge: HOME | End: 2020-11-04

## 2020-11-04 VITALS — HEART RATE: 73 BPM | DIASTOLIC BLOOD PRESSURE: 47 MMHG | SYSTOLIC BLOOD PRESSURE: 114 MMHG

## 2020-11-04 DIAGNOSIS — C90.02 MULTIPLE MYELOMA IN RELAPSE (HCC): Primary | ICD-10-CM

## 2020-11-04 LAB
ERYTHROCYTE [DISTWIDTH] IN BLOOD BY AUTOMATED COUNT: 18.7 % (ref 12.3–15.4)
HCT VFR BLD AUTO: 38.6 % (ref 34–46.6)
HGB BLD-MCNC: 12.2 G/DL (ref 12–15.9)
LYMPHOCYTES # BLD AUTO: 0.5 10*3/MM3 (ref 0.7–3.1)
LYMPHOCYTES NFR BLD AUTO: 11.7 % (ref 19.6–45.3)
MCH RBC QN AUTO: 27.8 PG (ref 26.6–33)
MCHC RBC AUTO-ENTMCNC: 31.7 G/DL (ref 31.5–35.7)
MCV RBC AUTO: 87.6 FL (ref 79–97)
MONOCYTES # BLD AUTO: 0.6 10*3/MM3 (ref 0.1–0.9)
MONOCYTES NFR BLD AUTO: 13.3 % (ref 5–12)
NEUTROPHILS NFR BLD AUTO: 3.1 10*3/MM3 (ref 1.7–7)
NEUTROPHILS NFR BLD AUTO: 75 % (ref 42.7–76)
PLATELET # BLD AUTO: 115 10*3/MM3 (ref 140–450)
PMV BLD AUTO: 8 FL (ref 6–12)
RBC # BLD AUTO: 4.41 10*6/MM3 (ref 3.77–5.28)
WBC # BLD AUTO: 4.2 10*3/MM3 (ref 3.4–10.8)

## 2020-11-04 PROCEDURE — 96413 CHEMO IV INFUSION 1 HR: CPT

## 2020-11-04 PROCEDURE — 25010000002 DIPHENHYDRAMINE PER 50 MG: Performed by: INTERNAL MEDICINE

## 2020-11-04 PROCEDURE — 96415 CHEMO IV INFUSION ADDL HR: CPT

## 2020-11-04 PROCEDURE — 25010000002 METHYLPREDNISOLONE PER 125 MG: Performed by: INTERNAL MEDICINE

## 2020-11-04 PROCEDURE — 96375 TX/PRO/DX INJ NEW DRUG ADDON: CPT

## 2020-11-04 PROCEDURE — 85025 COMPLETE CBC W/AUTO DIFF WBC: CPT | Performed by: INTERNAL MEDICINE

## 2020-11-04 PROCEDURE — 25010000002 DARATUMUMAB 100 MG/5ML SOLUTION 20 ML VIAL: Performed by: INTERNAL MEDICINE

## 2020-11-04 PROCEDURE — 96367 TX/PROPH/DG ADDL SEQ IV INF: CPT

## 2020-11-04 PROCEDURE — C9062 DARATUMUMAB HYALURONIDASE: HCPCS | Performed by: INTERNAL MEDICINE

## 2020-11-04 RX ORDER — ACETAMINOPHEN 500 MG
1000 TABLET ORAL ONCE
Status: COMPLETED | OUTPATIENT
Start: 2020-11-04 | End: 2020-11-04

## 2020-11-04 RX ORDER — SODIUM CHLORIDE 9 MG/ML
250 INJECTION, SOLUTION INTRAVENOUS ONCE
Status: DISCONTINUED | OUTPATIENT
Start: 2020-11-04 | End: 2020-11-05 | Stop reason: HOSPADM

## 2020-11-04 RX ORDER — METHYLPREDNISOLONE SODIUM SUCCINATE 125 MG/2ML
60 INJECTION, POWDER, LYOPHILIZED, FOR SOLUTION INTRAMUSCULAR; INTRAVENOUS ONCE
Status: COMPLETED | OUTPATIENT
Start: 2020-11-04 | End: 2020-11-04

## 2020-11-04 RX ADMIN — DARATUMUMAB 1200 MG: 100 INJECTION, SOLUTION, CONCENTRATE INTRAVENOUS at 10:30

## 2020-11-04 RX ADMIN — METHYLPREDNISOLONE SODIUM SUCCINATE 60 MG: 125 INJECTION, POWDER, FOR SOLUTION INTRAMUSCULAR; INTRAVENOUS at 09:02

## 2020-11-04 RX ADMIN — ACETAMINOPHEN 1000 MG: 500 TABLET, FILM COATED ORAL at 09:02

## 2020-11-04 RX ADMIN — DIPHENHYDRAMINE HYDROCHLORIDE 25 MG: 50 INJECTION INTRAMUSCULAR; INTRAVENOUS at 09:05

## 2020-11-05 DIAGNOSIS — C90.02 MULTIPLE MYELOMA IN RELAPSE (HCC): ICD-10-CM

## 2020-11-11 ENCOUNTER — HOSPITAL ENCOUNTER (OUTPATIENT)
Dept: ONCOLOGY | Facility: HOSPITAL | Age: 70
Setting detail: INFUSION SERIES
Discharge: HOME | End: 2020-11-11

## 2020-11-11 VITALS
SYSTOLIC BLOOD PRESSURE: 125 MMHG | HEART RATE: 66 BPM | BODY MASS INDEX: 32.81 KG/M2 | WEIGHT: 168 LBS | RESPIRATION RATE: 16 BRPM | TEMPERATURE: 96.2 F | DIASTOLIC BLOOD PRESSURE: 52 MMHG

## 2020-11-11 DIAGNOSIS — C90.02 MULTIPLE MYELOMA IN RELAPSE (HCC): Primary | ICD-10-CM

## 2020-11-11 LAB
ERYTHROCYTE [DISTWIDTH] IN BLOOD BY AUTOMATED COUNT: 20.6 % (ref 12.3–15.4)
HCT VFR BLD AUTO: 37.6 % (ref 34–46.6)
HGB BLD-MCNC: 12.1 G/DL (ref 12–15.9)
LYMPHOCYTES # BLD AUTO: 0.4 10*3/MM3 (ref 0.7–3.1)
LYMPHOCYTES NFR BLD AUTO: 20 % (ref 19.6–45.3)
MCH RBC QN AUTO: 28.7 PG (ref 26.6–33)
MCHC RBC AUTO-ENTMCNC: 32.1 G/DL (ref 31.5–35.7)
MCV RBC AUTO: 89.6 FL (ref 79–97)
MONOCYTES # BLD AUTO: 0.3 10*3/MM3 (ref 0.1–0.9)
MONOCYTES NFR BLD AUTO: 12.6 % (ref 5–12)
NEUTROPHILS NFR BLD AUTO: 1.3 10*3/MM3 (ref 1.7–7)
NEUTROPHILS NFR BLD AUTO: 67.4 % (ref 42.7–76)
PLATELET # BLD AUTO: 89 10*3/MM3 (ref 140–450)
PMV BLD AUTO: 7.6 FL (ref 6–12)
RBC # BLD AUTO: 4.2 10*6/MM3 (ref 3.77–5.28)
WBC # BLD AUTO: 2 10*3/MM3 (ref 3.4–10.8)

## 2020-11-11 PROCEDURE — 96375 TX/PRO/DX INJ NEW DRUG ADDON: CPT

## 2020-11-11 PROCEDURE — 25010000002 DIPHENHYDRAMINE PER 50 MG: Performed by: INTERNAL MEDICINE

## 2020-11-11 PROCEDURE — 96413 CHEMO IV INFUSION 1 HR: CPT

## 2020-11-11 PROCEDURE — 25010000002 DARATUMUMAB 100 MG/5ML SOLUTION 20 ML VIAL: Performed by: INTERNAL MEDICINE

## 2020-11-11 PROCEDURE — 96415 CHEMO IV INFUSION ADDL HR: CPT

## 2020-11-11 PROCEDURE — C9062 DARATUMUMAB HYALURONIDASE: HCPCS | Performed by: INTERNAL MEDICINE

## 2020-11-11 PROCEDURE — 85025 COMPLETE CBC W/AUTO DIFF WBC: CPT | Performed by: INTERNAL MEDICINE

## 2020-11-11 PROCEDURE — 25010000002 METHYLPREDNISOLONE PER 125 MG: Performed by: INTERNAL MEDICINE

## 2020-11-11 RX ORDER — METHYLPREDNISOLONE SODIUM SUCCINATE 125 MG/2ML
60 INJECTION, POWDER, LYOPHILIZED, FOR SOLUTION INTRAMUSCULAR; INTRAVENOUS ONCE
Status: COMPLETED | OUTPATIENT
Start: 2020-11-11 | End: 2020-11-11

## 2020-11-11 RX ORDER — SODIUM CHLORIDE 9 MG/ML
250 INJECTION, SOLUTION INTRAVENOUS ONCE
Status: COMPLETED | OUTPATIENT
Start: 2020-11-11 | End: 2020-11-11

## 2020-11-11 RX ORDER — ACETAMINOPHEN 500 MG
1000 TABLET ORAL ONCE
Status: COMPLETED | OUTPATIENT
Start: 2020-11-11 | End: 2020-11-11

## 2020-11-11 RX ADMIN — DIPHENHYDRAMINE HYDROCHLORIDE 25 MG: 50 INJECTION INTRAMUSCULAR; INTRAVENOUS at 09:02

## 2020-11-11 RX ADMIN — DARATUMUMAB 1200 MG: 100 INJECTION, SOLUTION, CONCENTRATE INTRAVENOUS at 10:08

## 2020-11-11 RX ADMIN — ACETAMINOPHEN 1000 MG: 500 TABLET, FILM COATED ORAL at 09:00

## 2020-11-11 RX ADMIN — SODIUM CHLORIDE 250 ML: 9 INJECTION, SOLUTION INTRAVENOUS at 09:00

## 2020-11-11 RX ADMIN — METHYLPREDNISOLONE SODIUM SUCCINATE 60 MG: 125 INJECTION, POWDER, FOR SOLUTION INTRAMUSCULAR; INTRAVENOUS at 09:00

## 2020-11-12 DIAGNOSIS — J44.1 CHRONIC OBSTRUCTIVE PULMONARY DISEASE WITH ACUTE EXACERBATION (HCC): ICD-10-CM

## 2020-11-12 RX ORDER — ALBUTEROL SULFATE 90 UG/1
AEROSOL, METERED RESPIRATORY (INHALATION)
Qty: 18 G | Refills: 11 | Status: SHIPPED | OUTPATIENT
Start: 2020-11-12 | End: 2022-04-14

## 2020-11-18 ENCOUNTER — HOSPITAL ENCOUNTER (OUTPATIENT)
Dept: ONCOLOGY | Facility: HOSPITAL | Age: 70
Setting detail: INFUSION SERIES
Discharge: HOME | End: 2020-11-18

## 2020-11-18 ENCOUNTER — OFFICE VISIT (OUTPATIENT)
Dept: ONCOLOGY | Facility: CLINIC | Age: 70
End: 2020-11-18

## 2020-11-18 VITALS
HEIGHT: 62 IN | OXYGEN SATURATION: 92 % | RESPIRATION RATE: 24 BRPM | BODY MASS INDEX: 29.81 KG/M2 | DIASTOLIC BLOOD PRESSURE: 65 MMHG | SYSTOLIC BLOOD PRESSURE: 144 MMHG | TEMPERATURE: 96.2 F | HEART RATE: 72 BPM | WEIGHT: 162 LBS

## 2020-11-18 DIAGNOSIS — C90.02 MULTIPLE MYELOMA IN RELAPSE (HCC): Primary | ICD-10-CM

## 2020-11-18 LAB
ALBUMIN SERPL-MCNC: 4.6 G/DL (ref 3.5–5.2)
ALBUMIN/GLOB SERPL: 2.2 G/DL
ALP SERPL-CCNC: 105 U/L (ref 39–117)
ALT SERPL W P-5'-P-CCNC: 15 U/L (ref 1–33)
ANION GAP SERPL CALCULATED.3IONS-SCNC: 11 MMOL/L (ref 5–15)
AST SERPL-CCNC: 15 U/L (ref 1–32)
BILIRUB SERPL-MCNC: 0.4 MG/DL (ref 0–1.2)
BUN SERPL-MCNC: 15 MG/DL (ref 8–23)
BUN/CREAT SERPL: 12 (ref 7–25)
CALCIUM SPEC-SCNC: 9.1 MG/DL (ref 8.6–10.5)
CHLORIDE SERPL-SCNC: 107 MMOL/L (ref 98–107)
CO2 SERPL-SCNC: 22 MMOL/L (ref 22–29)
CREAT BLDA-MCNC: 1.3 MG/DL (ref 0.6–1.3)
CREAT SERPL-MCNC: 1.25 MG/DL (ref 0.57–1)
ERYTHROCYTE [DISTWIDTH] IN BLOOD BY AUTOMATED COUNT: 20.2 % (ref 12.3–15.4)
GFR SERPL CREATININE-BSD FRML MDRD: 42 ML/MIN/1.73
GLOBULIN UR ELPH-MCNC: 2.1 GM/DL
GLUCOSE SERPL-MCNC: 90 MG/DL (ref 65–99)
HCT VFR BLD AUTO: 38.9 % (ref 34–46.6)
HGB BLD-MCNC: 12.6 G/DL (ref 12–15.9)
LYMPHOCYTES # BLD AUTO: 0.7 10*3/MM3 (ref 0.7–3.1)
LYMPHOCYTES NFR BLD AUTO: 21.3 % (ref 19.6–45.3)
MCH RBC QN AUTO: 28.9 PG (ref 26.6–33)
MCHC RBC AUTO-ENTMCNC: 32.4 G/DL (ref 31.5–35.7)
MCV RBC AUTO: 89.1 FL (ref 79–97)
MONOCYTES # BLD AUTO: 0.5 10*3/MM3 (ref 0.1–0.9)
MONOCYTES NFR BLD AUTO: 15.6 % (ref 5–12)
NEUTROPHILS NFR BLD AUTO: 2 10*3/MM3 (ref 1.7–7)
NEUTROPHILS NFR BLD AUTO: 63.1 % (ref 42.7–76)
PLATELET # BLD AUTO: 153 10*3/MM3 (ref 140–450)
PMV BLD AUTO: 7.5 FL (ref 6–12)
POTASSIUM SERPL-SCNC: 4.8 MMOL/L (ref 3.5–5.2)
PROT SERPL-MCNC: 6.7 G/DL (ref 6–8.5)
RBC # BLD AUTO: 4.37 10*6/MM3 (ref 3.77–5.28)
SODIUM SERPL-SCNC: 140 MMOL/L (ref 136–145)
WBC # BLD AUTO: 3.1 10*3/MM3 (ref 3.4–10.8)

## 2020-11-18 PROCEDURE — 86334 IMMUNOFIX E-PHORESIS SERUM: CPT | Performed by: INTERNAL MEDICINE

## 2020-11-18 PROCEDURE — 80053 COMPREHEN METABOLIC PANEL: CPT | Performed by: INTERNAL MEDICINE

## 2020-11-18 PROCEDURE — 25010000002 DIPHENHYDRAMINE PER 50 MG: Performed by: INTERNAL MEDICINE

## 2020-11-18 PROCEDURE — 96374 THER/PROPH/DIAG INJ IV PUSH: CPT

## 2020-11-18 PROCEDURE — C9062 DARATUMUMAB HYALURONIDASE: HCPCS | Performed by: INTERNAL MEDICINE

## 2020-11-18 PROCEDURE — 83883 ASSAY NEPHELOMETRY NOT SPEC: CPT | Performed by: INTERNAL MEDICINE

## 2020-11-18 PROCEDURE — 96401 CHEMO ANTI-NEOPL SQ/IM: CPT

## 2020-11-18 PROCEDURE — 82565 ASSAY OF CREATININE: CPT

## 2020-11-18 PROCEDURE — 99214 OFFICE O/P EST MOD 30 MIN: CPT | Performed by: INTERNAL MEDICINE

## 2020-11-18 PROCEDURE — 84165 PROTEIN E-PHORESIS SERUM: CPT | Performed by: INTERNAL MEDICINE

## 2020-11-18 PROCEDURE — 25010000002 METHYLPREDNISOLONE PER 125 MG: Performed by: INTERNAL MEDICINE

## 2020-11-18 PROCEDURE — 82784 ASSAY IGA/IGD/IGG/IGM EACH: CPT | Performed by: INTERNAL MEDICINE

## 2020-11-18 PROCEDURE — 96375 TX/PRO/DX INJ NEW DRUG ADDON: CPT

## 2020-11-18 PROCEDURE — 85025 COMPLETE CBC W/AUTO DIFF WBC: CPT | Performed by: INTERNAL MEDICINE

## 2020-11-18 PROCEDURE — 25010000002 DARATUMUMAB-HYALURONIDASE-FIHJ 1800-30000 MG-UT/15ML SOLUTION: Performed by: INTERNAL MEDICINE

## 2020-11-18 RX ORDER — FAMOTIDINE 10 MG/ML
20 INJECTION, SOLUTION INTRAVENOUS AS NEEDED
Status: CANCELLED | OUTPATIENT
Start: 2020-11-18

## 2020-11-18 RX ORDER — FAMOTIDINE 10 MG/ML
20 INJECTION, SOLUTION INTRAVENOUS AS NEEDED
Status: CANCELLED | OUTPATIENT
Start: 2020-12-02

## 2020-11-18 RX ORDER — METHYLPREDNISOLONE SODIUM SUCCINATE 125 MG/2ML
60 INJECTION, POWDER, LYOPHILIZED, FOR SOLUTION INTRAMUSCULAR; INTRAVENOUS ONCE
Status: CANCELLED | OUTPATIENT
Start: 2020-12-02

## 2020-11-18 RX ORDER — DIPHENHYDRAMINE HYDROCHLORIDE 50 MG/ML
50 INJECTION INTRAMUSCULAR; INTRAVENOUS AS NEEDED
Status: CANCELLED | OUTPATIENT
Start: 2020-12-02

## 2020-11-18 RX ORDER — SODIUM CHLORIDE 9 MG/ML
250 INJECTION, SOLUTION INTRAVENOUS ONCE
Status: CANCELLED | OUTPATIENT
Start: 2020-11-18

## 2020-11-18 RX ORDER — SODIUM CHLORIDE 9 MG/ML
250 INJECTION, SOLUTION INTRAVENOUS ONCE
Status: CANCELLED | OUTPATIENT
Start: 2020-12-02

## 2020-11-18 RX ORDER — ACETAMINOPHEN 500 MG
1000 TABLET ORAL ONCE
Status: CANCELLED | OUTPATIENT
Start: 2020-12-02

## 2020-11-18 RX ORDER — ACETAMINOPHEN 500 MG
1000 TABLET ORAL ONCE
Status: COMPLETED | OUTPATIENT
Start: 2020-11-18 | End: 2020-11-18

## 2020-11-18 RX ORDER — MEPERIDINE HYDROCHLORIDE 50 MG/ML
25 INJECTION INTRAMUSCULAR; INTRAVENOUS; SUBCUTANEOUS
Status: CANCELLED | OUTPATIENT
Start: 2020-11-18

## 2020-11-18 RX ORDER — ACETAMINOPHEN 500 MG
1000 TABLET ORAL ONCE
Status: CANCELLED | OUTPATIENT
Start: 2020-11-18

## 2020-11-18 RX ORDER — MEPERIDINE HYDROCHLORIDE 50 MG/ML
25 INJECTION INTRAMUSCULAR; INTRAVENOUS; SUBCUTANEOUS
Status: CANCELLED | OUTPATIENT
Start: 2020-12-02

## 2020-11-18 RX ORDER — DIPHENHYDRAMINE HYDROCHLORIDE 50 MG/ML
50 INJECTION INTRAMUSCULAR; INTRAVENOUS AS NEEDED
Status: CANCELLED | OUTPATIENT
Start: 2020-11-18

## 2020-11-18 RX ORDER — METHYLPREDNISOLONE SODIUM SUCCINATE 125 MG/2ML
60 INJECTION, POWDER, LYOPHILIZED, FOR SOLUTION INTRAMUSCULAR; INTRAVENOUS ONCE
Status: COMPLETED | OUTPATIENT
Start: 2020-11-18 | End: 2020-11-18

## 2020-11-18 RX ORDER — METHYLPREDNISOLONE SODIUM SUCCINATE 125 MG/2ML
60 INJECTION, POWDER, LYOPHILIZED, FOR SOLUTION INTRAMUSCULAR; INTRAVENOUS ONCE
Status: CANCELLED | OUTPATIENT
Start: 2020-11-18

## 2020-11-18 RX ADMIN — METHYLPREDNISOLONE SODIUM SUCCINATE 60 MG: 125 INJECTION, POWDER, FOR SOLUTION INTRAMUSCULAR; INTRAVENOUS at 09:08

## 2020-11-18 RX ADMIN — DARATUMUMAB AND HYALURONIDASE-FIHJ (HUMAN RECOMBINANT) 1800 MG: 1800; 30000 INJECTION SUBCUTANEOUS at 10:28

## 2020-11-18 RX ADMIN — ACETAMINOPHEN 1000 MG: 500 TABLET, FILM COATED ORAL at 09:07

## 2020-11-18 RX ADMIN — DIPHENHYDRAMINE HYDROCHLORIDE 25 MG: 50 INJECTION INTRAMUSCULAR; INTRAVENOUS at 09:05

## 2020-11-18 NOTE — PROGRESS NOTES
Oncology/Hematology History Overview Note   PROBLEM LIST:   1. Stage III IgA kappa clonal multiple myeloma. Diagnosed 9/20152. FISH panel reports multiple abnormalities including gain of 1q21 monosomy.  3. Monosomy 13 and gain of chromosomes 9, 15 and CCND1.  4. Initial M-spike of 7.1 g/dL at time of diagnosis and after 3 cycles, had  undetectable M-spike currently on Velcade, Revlimid and dexamethasone cycle #6  this week.  5. S/p Autologous SCT at [facility] with Dr. Venu Spicer in March 11,2016  6.Right leg pain - on lyrica  7. Bilateral hip replacements by Dr. Lopez     Multiple myeloma in relapse (CMS/[facility])   12/4/2017 -  Chemotherapy    OP Zoledronic Acid Q12W     9/10/2020 Initial Diagnosis    Multiple myeloma in relapse (CMS/[facility])     9/23/2020 -  Chemotherapy    OP MULTIPLE MYELOMA Daratumumab / Pomalidomide / Dexamethasone            REASON FOR VISIT: Multiple myeloma       Subjective     HISTORY OF PRESENT ILLNESS:   Ms. Miranda is here for follow up evaluation of myeloma management.  She is currently on second line therapy with Darzalex, Pomalyst and dexamethasone.  She is completed 2 cycles.  She is having some shortness of breath with activity.  But no infections.  Did not have to watch for this.  No major issues with pain.  She continues to ambulate reasonably well.      Past Medical History, Past Surgical History, Social History, Family History have been reviewed and are without significant changes except as mentioned.    Review of Systems   Constitutional: Negative.    HENT: Negative.    Eyes: Negative.    Respiratory: Negative.    Cardiovascular: Negative.    Gastrointestinal: Negative.    Endocrine: Negative.    Genitourinary: Negative.    Musculoskeletal: Positive for arthralgias and gait problem.   Skin: Negative.    Allergic/Immunologic: Negative.    Hematological: Negative.    Psychiatric/Behavioral: Negative.       A comprehensive 14 point review of systems was performed and was negative except as  "mentioned.    Medications:  The current medication list was reviewed in the EMR    ALLERGIES:  No Known Allergies    Objective      /65 Comment: LUE  Pulse 72   Temp 96.2 °F (35.7 °C) (Temporal)   Resp 24   Ht 156.2 cm (61.5\")   Wt 73.5 kg (162 lb)   SpO2 92% Comment: RA  BMI 30.11 kg/m²      Performance Status: 1    General: well appearing, in no acute distress  HEENT: sclera anicteric, oropharynx clear  Lymphatics: no cervical, supraclavicular, or axillary adenopathy  Cardiovascular: regular rate and rhythm, no murmurs  Lungs: clear to auscultation bilaterally  Abdomen: soft, nontender, nondistended.  No palpable organomegaly  Extremeties: no lower extremity edema  Skin: no rashes, lesions, bruising, or petechiae    RECENT LABS:    Lab Results   Component Value Date    MSPIKE 0.2 (H) 10/21/2020    MSPIKE Comment: 09/08/2020    MSPIKE Not Observed 06/16/2020     Lab Results   Component Value Date    FREEKAPPAL 28.0 (H) 10/21/2020    FREEKAPPAL 1035.8 (H) 09/08/2020    FREEKAPPAL 542.1 (H) 06/16/2020     Lab Results   Component Value Date    IGLFLC 8.1 10/21/2020    IGLFLC 7.7 09/08/2020    IGLFLC 7.4 06/16/2020     Lab Results   Component Value Date    WBC 2.00 (L) 11/11/2020    HGB 12.1 11/11/2020    HCT 37.6 11/11/2020    MCV 89.6 11/11/2020    PLT 89 (L) 11/11/2020       Lab Results   Component Value Date    GLUCOSE 102 (H) 10/21/2020    BUN 20 10/21/2020    CREATININE 1.20 10/21/2020    EGFRIFNONA 44 (L) 10/21/2020    BCR 16.7 10/21/2020    K 4.6 10/21/2020    CO2 23.0 10/21/2020    CALCIUM 8.8 10/21/2020    PROTENTOTREF 6.1 10/21/2020    ALBUMIN 3.6 10/21/2020    ALBUMIN 4.20 10/21/2020    LABIL2 1.5 10/21/2020    AST 16 10/21/2020    ALT 25 10/21/2020         Assessment/Plan       1. multiple myeloma in relapse  status post autologous stem cell transplant. I am going to continue her treatment to Pomalyst, daratumumab and dexamethasone.  continue her Pomalyst dose to 2 mg/day.  Her numbers are " nicely improved.  We will see how she does going forward.  She is not interested in undergoing a repeat autologous stem cell transplantation.  The goal is to get her disease under control at this time.    2.  Hypogammaglobulinemia.  She has not had issues with infections so far.  We will hold off on IVIG for now    3.  Back pain/ hip pain: The hip replacements has done wonders for her.       4.  Bilateral hip replacement secondary to myeloma by Dr. Lopez    5.  Shortness of breath.  We will have to watch for this.  May consider PFTs if does not improve.    6.  Bone mets.  She is on Zometa every 3 months.    7.  Covid-19 risk.  I have recommended she get the Covid vaccine when it is available.    Joseph Mckinley MD  [facility] Hematology and Oncology    11/18/2020               CC:

## 2020-11-19 LAB
ALBUMIN SERPL ELPH-MCNC: 3.5 G/DL (ref 2.9–4.4)
ALBUMIN/GLOB SERPL: 1.3 {RATIO} (ref 0.7–1.7)
ALPHA1 GLOB SERPL ELPH-MCNC: 0.3 G/DL (ref 0–0.4)
ALPHA2 GLOB SERPL ELPH-MCNC: 1 G/DL (ref 0.4–1)
B-GLOBULIN SERPL ELPH-MCNC: 1 G/DL (ref 0.7–1.3)
GAMMA GLOB SERPL ELPH-MCNC: 0.5 G/DL (ref 0.4–1.8)
GLOBULIN SER-MCNC: 2.8 G/DL (ref 2.2–3.9)
IGA SERPL-MCNC: 40 MG/DL (ref 87–352)
IGG SERPL-MCNC: 480 MG/DL (ref 586–1602)
IGM SERPL-MCNC: 16 MG/DL (ref 26–217)
INTERPRETATION SERPL IEP-IMP: ABNORMAL
KAPPA LC FREE SER-MCNC: 16.1 MG/L (ref 3.3–19.4)
KAPPA LC FREE/LAMBDA FREE SER: 1.99 {RATIO} (ref 0.26–1.65)
LABORATORY COMMENT REPORT: ABNORMAL
LAMBDA LC FREE SERPL-MCNC: 8.1 MG/L (ref 5.7–26.3)
M PROTEIN SERPL ELPH-MCNC: 0.2 G/DL
PROT SERPL-MCNC: 6.3 G/DL (ref 6–8.5)

## 2020-11-25 ENCOUNTER — TELEPHONE (OUTPATIENT)
Dept: ONCOLOGY | Facility: CLINIC | Age: 70
End: 2020-11-25

## 2020-11-25 NOTE — TELEPHONE ENCOUNTER
----- Message from Doris Miranda sent at 11/24/2020 10:35 AM EST -----  Regarding: Prescription Question  Contact: [phone]  Since I am now on every other week with infusion do I take the steroids on the Wednesday's that I don't have infusion?      Reach me at Doris miranda [phone] or [email or web address]

## 2020-12-02 ENCOUNTER — TELEPHONE (OUTPATIENT)
Dept: ONCOLOGY | Facility: CLINIC | Age: 70
End: 2020-12-02

## 2020-12-02 ENCOUNTER — HOSPITAL ENCOUNTER (OUTPATIENT)
Dept: ONCOLOGY | Facility: HOSPITAL | Age: 70
Setting detail: INFUSION SERIES
Discharge: HOME | End: 2020-12-02

## 2020-12-02 VITALS
WEIGHT: 169 LBS | HEART RATE: 67 BPM | RESPIRATION RATE: 20 BRPM | TEMPERATURE: 96 F | DIASTOLIC BLOOD PRESSURE: 75 MMHG | BODY MASS INDEX: 31.42 KG/M2 | SYSTOLIC BLOOD PRESSURE: 131 MMHG

## 2020-12-02 DIAGNOSIS — C90.01 MULTIPLE MYELOMA IN REMISSION (HCC): ICD-10-CM

## 2020-12-02 DIAGNOSIS — C90.02 MULTIPLE MYELOMA IN RELAPSE (HCC): Primary | ICD-10-CM

## 2020-12-02 LAB
ALBUMIN SERPL-MCNC: 4.3 G/DL (ref 3.5–5.2)
ALBUMIN/GLOB SERPL: 1.7 G/DL
ALP SERPL-CCNC: 93 U/L (ref 39–117)
ALT SERPL W P-5'-P-CCNC: 15 U/L (ref 1–33)
ANION GAP SERPL CALCULATED.3IONS-SCNC: 10 MMOL/L (ref 5–15)
AST SERPL-CCNC: 17 U/L (ref 1–32)
BILIRUB SERPL-MCNC: 0.4 MG/DL (ref 0–1.2)
BUN SERPL-MCNC: 20 MG/DL (ref 8–23)
BUN/CREAT SERPL: 15.9 (ref 7–25)
CALCIUM SPEC-SCNC: 9 MG/DL (ref 8.6–10.5)
CHLORIDE SERPL-SCNC: 106 MMOL/L (ref 98–107)
CO2 SERPL-SCNC: 25 MMOL/L (ref 22–29)
CREAT SERPL-MCNC: 1.26 MG/DL (ref 0.57–1)
ERYTHROCYTE [DISTWIDTH] IN BLOOD BY AUTOMATED COUNT: 20.7 % (ref 12.3–15.4)
GFR SERPL CREATININE-BSD FRML MDRD: 42 ML/MIN/1.73
GLOBULIN UR ELPH-MCNC: 2.5 GM/DL
GLUCOSE SERPL-MCNC: 84 MG/DL (ref 65–99)
HCT VFR BLD AUTO: 39.3 % (ref 34–46.6)
HGB BLD-MCNC: 12.7 G/DL (ref 12–15.9)
LYMPHOCYTES # BLD AUTO: 0.4 10*3/MM3 (ref 0.7–3.1)
LYMPHOCYTES NFR BLD AUTO: 10.3 % (ref 19.6–45.3)
MAGNESIUM SERPL-MCNC: 2.1 MG/DL (ref 1.6–2.4)
MCH RBC QN AUTO: 29.2 PG (ref 26.6–33)
MCHC RBC AUTO-ENTMCNC: 32.2 G/DL (ref 31.5–35.7)
MCV RBC AUTO: 90.5 FL (ref 79–97)
MONOCYTES # BLD AUTO: 0.2 10*3/MM3 (ref 0.1–0.9)
MONOCYTES NFR BLD AUTO: 4.2 % (ref 5–12)
NEUTROPHILS NFR BLD AUTO: 3.7 10*3/MM3 (ref 1.7–7)
NEUTROPHILS NFR BLD AUTO: 85.5 % (ref 42.7–76)
PHOSPHATE SERPL-MCNC: 3.3 MG/DL (ref 2.5–4.5)
PLATELET # BLD AUTO: 107 10*3/MM3 (ref 140–450)
PMV BLD AUTO: 8.3 FL (ref 6–12)
POTASSIUM SERPL-SCNC: 5.6 MMOL/L (ref 3.5–5.2)
PROT SERPL-MCNC: 6.8 G/DL (ref 6–8.5)
RBC # BLD AUTO: 4.35 10*6/MM3 (ref 3.77–5.28)
SODIUM SERPL-SCNC: 141 MMOL/L (ref 136–145)
WBC # BLD AUTO: 4.3 10*3/MM3 (ref 3.4–10.8)

## 2020-12-02 PROCEDURE — 96413 CHEMO IV INFUSION 1 HR: CPT

## 2020-12-02 PROCEDURE — 85025 COMPLETE CBC W/AUTO DIFF WBC: CPT | Performed by: INTERNAL MEDICINE

## 2020-12-02 PROCEDURE — 36416 COLLJ CAPILLARY BLOOD SPEC: CPT

## 2020-12-02 PROCEDURE — 25010000002 ZOLEDRONIC ACID PER 1 MG: Performed by: INTERNAL MEDICINE

## 2020-12-02 PROCEDURE — 84165 PROTEIN E-PHORESIS SERUM: CPT | Performed by: INTERNAL MEDICINE

## 2020-12-02 PROCEDURE — 80053 COMPREHEN METABOLIC PANEL: CPT | Performed by: INTERNAL MEDICINE

## 2020-12-02 PROCEDURE — 83735 ASSAY OF MAGNESIUM: CPT | Performed by: INTERNAL MEDICINE

## 2020-12-02 PROCEDURE — 96417 CHEMO IV INFUS EACH ADDL SEQ: CPT

## 2020-12-02 PROCEDURE — 86334 IMMUNOFIX E-PHORESIS SERUM: CPT | Performed by: INTERNAL MEDICINE

## 2020-12-02 PROCEDURE — C9062 DARATUMUMAB HYALURONIDASE: HCPCS | Performed by: INTERNAL MEDICINE

## 2020-12-02 PROCEDURE — 82784 ASSAY IGA/IGD/IGG/IGM EACH: CPT | Performed by: INTERNAL MEDICINE

## 2020-12-02 PROCEDURE — 96374 THER/PROPH/DIAG INJ IV PUSH: CPT

## 2020-12-02 PROCEDURE — 25010000002 DARATUMUMAB-HYALURONIDASE-FIHJ 1800-30000 MG-UT/15ML SOLUTION: Performed by: INTERNAL MEDICINE

## 2020-12-02 PROCEDURE — 96401 CHEMO ANTI-NEOPL SQ/IM: CPT

## 2020-12-02 PROCEDURE — 83883 ASSAY NEPHELOMETRY NOT SPEC: CPT | Performed by: INTERNAL MEDICINE

## 2020-12-02 PROCEDURE — 96375 TX/PRO/DX INJ NEW DRUG ADDON: CPT

## 2020-12-02 PROCEDURE — 25010000002 DIPHENHYDRAMINE PER 50 MG: Performed by: INTERNAL MEDICINE

## 2020-12-02 PROCEDURE — 84100 ASSAY OF PHOSPHORUS: CPT | Performed by: INTERNAL MEDICINE

## 2020-12-02 PROCEDURE — 25010000002 METHYLPREDNISOLONE PER 125 MG: Performed by: INTERNAL MEDICINE

## 2020-12-02 RX ORDER — SODIUM CHLORIDE 9 MG/ML
250 INJECTION, SOLUTION INTRAVENOUS ONCE
Status: COMPLETED | OUTPATIENT
Start: 2020-12-02 | End: 2020-12-02

## 2020-12-02 RX ORDER — ACETAMINOPHEN 500 MG
1000 TABLET ORAL ONCE
Status: COMPLETED | OUTPATIENT
Start: 2020-12-02 | End: 2020-12-02

## 2020-12-02 RX ORDER — METHYLPREDNISOLONE SODIUM SUCCINATE 125 MG/2ML
60 INJECTION, POWDER, LYOPHILIZED, FOR SOLUTION INTRAMUSCULAR; INTRAVENOUS ONCE
Status: COMPLETED | OUTPATIENT
Start: 2020-12-02 | End: 2020-12-02

## 2020-12-02 RX ADMIN — DARATUMUMAB AND HYALURONIDASE-FIHJ (HUMAN RECOMBINANT) 1800 MG: 1800; 30000 INJECTION SUBCUTANEOUS at 11:14

## 2020-12-02 RX ADMIN — ACETAMINOPHEN 1000 MG: 500 TABLET, FILM COATED ORAL at 09:27

## 2020-12-02 RX ADMIN — METHYLPREDNISOLONE SODIUM SUCCINATE 60 MG: 125 INJECTION, POWDER, FOR SOLUTION INTRAMUSCULAR; INTRAVENOUS at 09:44

## 2020-12-02 RX ADMIN — DIPHENHYDRAMINE HYDROCHLORIDE 25 MG: 50 INJECTION INTRAMUSCULAR; INTRAVENOUS at 09:28

## 2020-12-02 RX ADMIN — ZOLEDRONIC ACID 3.3 MG: 4 INJECTION, SOLUTION, CONCENTRATE INTRAVENOUS at 10:40

## 2020-12-02 RX ADMIN — SODIUM CHLORIDE 250 ML: 9 INJECTION, SOLUTION INTRAVENOUS at 09:27

## 2020-12-02 NOTE — TELEPHONE ENCOUNTER
----- Message from Milagro Haley sent at 12/2/2020 11:34 AM EST -----  Regarding: LETTER  Pt brought letter to the front stating she needs a letter again for 2021 stating her son in law needs to work from home     Placed in [facility] box

## 2020-12-03 ENCOUNTER — SPECIALTY PHARMACY (OUTPATIENT)
Dept: ONCOLOGY | Facility: HOSPITAL | Age: 70
End: 2020-12-03

## 2020-12-03 DIAGNOSIS — C90.02 MULTIPLE MYELOMA IN RELAPSE (HCC): ICD-10-CM

## 2020-12-03 LAB
ALBUMIN SERPL ELPH-MCNC: 3.3 G/DL (ref 2.9–4.4)
ALBUMIN/GLOB SERPL: 1.3 {RATIO} (ref 0.7–1.7)
ALPHA1 GLOB SERPL ELPH-MCNC: 0.3 G/DL (ref 0–0.4)
ALPHA2 GLOB SERPL ELPH-MCNC: 0.8 G/DL (ref 0.4–1)
B-GLOBULIN SERPL ELPH-MCNC: 1.1 G/DL (ref 0.7–1.3)
GAMMA GLOB SERPL ELPH-MCNC: 0.5 G/DL (ref 0.4–1.8)
GLOBULIN SER-MCNC: 2.7 G/DL (ref 2.2–3.9)
IGA SERPL-MCNC: 41 MG/DL (ref 87–352)
IGG SERPL-MCNC: 437 MG/DL (ref 586–1602)
IGM SERPL-MCNC: 15 MG/DL (ref 26–217)
INTERPRETATION SERPL IEP-IMP: ABNORMAL
KAPPA LC FREE SER-MCNC: 21.3 MG/L (ref 3.3–19.4)
KAPPA LC FREE/LAMBDA FREE SER: 2.07 {RATIO} (ref 0.26–1.65)
LABORATORY COMMENT REPORT: ABNORMAL
LAMBDA LC FREE SERPL-MCNC: 10.3 MG/L (ref 5.7–26.3)
M PROTEIN SERPL ELPH-MCNC: 0.2 G/DL
PROT SERPL-MCNC: 6 G/DL (ref 6–8.5)

## 2020-12-03 NOTE — PROGRESS NOTES
Reviewed most recent oncology office visit note dated 11/18/20. Plan for continuation of pomalidomide with no dose adjustments. Released script for pomalidomide 2mg PO daily Days 1-21 then 7 days off, #21 with 0 RF to [facility] pharmacy for continuation of treatment.

## 2020-12-03 NOTE — TELEPHONE ENCOUNTER
Rec'd letter with MD signature.  LMOM notifying pt that the letter she requested is ready if she wants it mailed or to  please call me back at my direct ext.

## 2020-12-16 ENCOUNTER — OFFICE VISIT (OUTPATIENT)
Dept: ONCOLOGY | Facility: CLINIC | Age: 70
End: 2020-12-16

## 2020-12-16 ENCOUNTER — HOSPITAL ENCOUNTER (OUTPATIENT)
Dept: ONCOLOGY | Facility: HOSPITAL | Age: 70
Setting detail: INFUSION SERIES
Discharge: HOME | End: 2020-12-16

## 2020-12-16 VITALS
HEIGHT: 62 IN | RESPIRATION RATE: 24 BRPM | SYSTOLIC BLOOD PRESSURE: 194 MMHG | OXYGEN SATURATION: 90 % | BODY MASS INDEX: 30.91 KG/M2 | TEMPERATURE: 96.6 F | DIASTOLIC BLOOD PRESSURE: 76 MMHG | WEIGHT: 168 LBS | HEART RATE: 72 BPM

## 2020-12-16 DIAGNOSIS — C90.02 MULTIPLE MYELOMA IN RELAPSE (HCC): Primary | ICD-10-CM

## 2020-12-16 LAB
ALBUMIN SERPL-MCNC: 4.3 G/DL (ref 3.5–5.2)
ALBUMIN/GLOB SERPL: 1.8 G/DL
ALP SERPL-CCNC: 92 U/L (ref 39–117)
ALT SERPL W P-5'-P-CCNC: 23 U/L (ref 1–33)
ANION GAP SERPL CALCULATED.3IONS-SCNC: 10 MMOL/L (ref 5–15)
AST SERPL-CCNC: 21 U/L (ref 1–32)
BILIRUB SERPL-MCNC: 0.2 MG/DL (ref 0–1.2)
BUN SERPL-MCNC: 19 MG/DL (ref 8–23)
BUN/CREAT SERPL: 16.7 (ref 7–25)
CALCIUM SPEC-SCNC: 9.2 MG/DL (ref 8.6–10.5)
CHLORIDE SERPL-SCNC: 105 MMOL/L (ref 98–107)
CO2 SERPL-SCNC: 22 MMOL/L (ref 22–29)
CREAT BLDA-MCNC: 1.2 MG/DL (ref 0.6–1.3)
CREAT SERPL-MCNC: 1.14 MG/DL (ref 0.57–1)
ERYTHROCYTE [DISTWIDTH] IN BLOOD BY AUTOMATED COUNT: 21.4 % (ref 12.3–15.4)
GFR SERPL CREATININE-BSD FRML MDRD: 47 ML/MIN/1.73
GLOBULIN UR ELPH-MCNC: 2.4 GM/DL
GLUCOSE SERPL-MCNC: 89 MG/DL (ref 65–99)
HCT VFR BLD AUTO: 41.5 % (ref 34–46.6)
HGB BLD-MCNC: 13.1 G/DL (ref 12–15.9)
LYMPHOCYTES # BLD AUTO: 0.5 10*3/MM3 (ref 0.7–3.1)
LYMPHOCYTES NFR BLD AUTO: 22.7 % (ref 19.6–45.3)
MCH RBC QN AUTO: 29 PG (ref 26.6–33)
MCHC RBC AUTO-ENTMCNC: 31.6 G/DL (ref 31.5–35.7)
MCV RBC AUTO: 92 FL (ref 79–97)
MONOCYTES # BLD AUTO: 0.3 10*3/MM3 (ref 0.1–0.9)
MONOCYTES NFR BLD AUTO: 10.6 % (ref 5–12)
NEUTROPHILS NFR BLD AUTO: 1.6 10*3/MM3 (ref 1.7–7)
NEUTROPHILS NFR BLD AUTO: 66.7 % (ref 42.7–76)
PLATELET # BLD AUTO: 161 10*3/MM3 (ref 140–450)
PMV BLD AUTO: 7.6 FL (ref 6–12)
POTASSIUM SERPL-SCNC: 4.6 MMOL/L (ref 3.5–5.2)
PROT SERPL-MCNC: 6.7 G/DL (ref 6–8.5)
RBC # BLD AUTO: 4.51 10*6/MM3 (ref 3.77–5.28)
SODIUM SERPL-SCNC: 137 MMOL/L (ref 136–145)
WBC # BLD AUTO: 2.4 10*3/MM3 (ref 3.4–10.8)

## 2020-12-16 PROCEDURE — 96375 TX/PRO/DX INJ NEW DRUG ADDON: CPT

## 2020-12-16 PROCEDURE — 96401 CHEMO ANTI-NEOPL SQ/IM: CPT

## 2020-12-16 PROCEDURE — 83883 ASSAY NEPHELOMETRY NOT SPEC: CPT | Performed by: INTERNAL MEDICINE

## 2020-12-16 PROCEDURE — 80053 COMPREHEN METABOLIC PANEL: CPT | Performed by: INTERNAL MEDICINE

## 2020-12-16 PROCEDURE — 99214 OFFICE O/P EST MOD 30 MIN: CPT | Performed by: INTERNAL MEDICINE

## 2020-12-16 PROCEDURE — 85025 COMPLETE CBC W/AUTO DIFF WBC: CPT | Performed by: INTERNAL MEDICINE

## 2020-12-16 PROCEDURE — 82565 ASSAY OF CREATININE: CPT

## 2020-12-16 PROCEDURE — 25010000002 DARATUMUMAB-HYALURONIDASE-FIHJ 1800-30000 MG-UT/15ML SOLUTION: Performed by: INTERNAL MEDICINE

## 2020-12-16 PROCEDURE — 84165 PROTEIN E-PHORESIS SERUM: CPT | Performed by: INTERNAL MEDICINE

## 2020-12-16 PROCEDURE — 25010000002 METHYLPREDNISOLONE PER 125 MG: Performed by: INTERNAL MEDICINE

## 2020-12-16 PROCEDURE — 82784 ASSAY IGA/IGD/IGG/IGM EACH: CPT | Performed by: INTERNAL MEDICINE

## 2020-12-16 PROCEDURE — 25010000002 DIPHENHYDRAMINE PER 50 MG: Performed by: INTERNAL MEDICINE

## 2020-12-16 PROCEDURE — 36416 COLLJ CAPILLARY BLOOD SPEC: CPT

## 2020-12-16 PROCEDURE — 96372 THER/PROPH/DIAG INJ SC/IM: CPT

## 2020-12-16 PROCEDURE — 86334 IMMUNOFIX E-PHORESIS SERUM: CPT | Performed by: INTERNAL MEDICINE

## 2020-12-16 PROCEDURE — C9062 DARATUMUMAB HYALURONIDASE: HCPCS | Performed by: INTERNAL MEDICINE

## 2020-12-16 PROCEDURE — 96374 THER/PROPH/DIAG INJ IV PUSH: CPT

## 2020-12-16 RX ORDER — FAMOTIDINE 10 MG/ML
20 INJECTION, SOLUTION INTRAVENOUS AS NEEDED
Status: CANCELLED | OUTPATIENT
Start: 2021-01-27

## 2020-12-16 RX ORDER — SODIUM CHLORIDE 9 MG/ML
250 INJECTION, SOLUTION INTRAVENOUS ONCE
Status: CANCELLED | OUTPATIENT
Start: 2020-12-30

## 2020-12-16 RX ORDER — ACETAMINOPHEN 500 MG
1000 TABLET ORAL ONCE
Status: COMPLETED | OUTPATIENT
Start: 2020-12-16 | End: 2020-12-16

## 2020-12-16 RX ORDER — SODIUM CHLORIDE 9 MG/ML
250 INJECTION, SOLUTION INTRAVENOUS ONCE
Status: CANCELLED | OUTPATIENT
Start: 2021-01-13

## 2020-12-16 RX ORDER — MEPERIDINE HYDROCHLORIDE 50 MG/ML
25 INJECTION INTRAMUSCULAR; INTRAVENOUS; SUBCUTANEOUS
Status: CANCELLED | OUTPATIENT
Start: 2021-01-27

## 2020-12-16 RX ORDER — MEPERIDINE HYDROCHLORIDE 50 MG/ML
25 INJECTION INTRAMUSCULAR; INTRAVENOUS; SUBCUTANEOUS
Status: CANCELLED | OUTPATIENT
Start: 2021-01-13

## 2020-12-16 RX ORDER — DIPHENHYDRAMINE HYDROCHLORIDE 50 MG/ML
50 INJECTION INTRAMUSCULAR; INTRAVENOUS AS NEEDED
Status: CANCELLED | OUTPATIENT
Start: 2020-12-30

## 2020-12-16 RX ORDER — SODIUM CHLORIDE 9 MG/ML
250 INJECTION, SOLUTION INTRAVENOUS ONCE
Status: COMPLETED | OUTPATIENT
Start: 2020-12-16 | End: 2020-12-16

## 2020-12-16 RX ORDER — METHYLPREDNISOLONE SODIUM SUCCINATE 125 MG/2ML
60 INJECTION, POWDER, LYOPHILIZED, FOR SOLUTION INTRAMUSCULAR; INTRAVENOUS ONCE
Status: CANCELLED | OUTPATIENT
Start: 2021-01-27

## 2020-12-16 RX ORDER — MEPERIDINE HYDROCHLORIDE 50 MG/ML
25 INJECTION INTRAMUSCULAR; INTRAVENOUS; SUBCUTANEOUS
Status: CANCELLED | OUTPATIENT
Start: 2020-12-30

## 2020-12-16 RX ORDER — ACETAMINOPHEN 500 MG
1000 TABLET ORAL ONCE
Status: CANCELLED | OUTPATIENT
Start: 2020-12-16

## 2020-12-16 RX ORDER — DIPHENHYDRAMINE HYDROCHLORIDE 50 MG/ML
50 INJECTION INTRAMUSCULAR; INTRAVENOUS AS NEEDED
Status: CANCELLED | OUTPATIENT
Start: 2021-01-13

## 2020-12-16 RX ORDER — METHYLPREDNISOLONE SODIUM SUCCINATE 125 MG/2ML
60 INJECTION, POWDER, LYOPHILIZED, FOR SOLUTION INTRAMUSCULAR; INTRAVENOUS ONCE
Status: COMPLETED | OUTPATIENT
Start: 2020-12-16 | End: 2020-12-16

## 2020-12-16 RX ORDER — ACETAMINOPHEN 500 MG
1000 TABLET ORAL ONCE
Status: CANCELLED | OUTPATIENT
Start: 2020-12-30

## 2020-12-16 RX ORDER — SODIUM CHLORIDE 9 MG/ML
250 INJECTION, SOLUTION INTRAVENOUS ONCE
Status: CANCELLED | OUTPATIENT
Start: 2021-03-15

## 2020-12-16 RX ORDER — ACETAMINOPHEN 500 MG
1000 TABLET ORAL ONCE
Status: CANCELLED | OUTPATIENT
Start: 2021-01-27

## 2020-12-16 RX ORDER — MEPERIDINE HYDROCHLORIDE 50 MG/ML
25 INJECTION INTRAMUSCULAR; INTRAVENOUS; SUBCUTANEOUS
Status: CANCELLED | OUTPATIENT
Start: 2020-12-16

## 2020-12-16 RX ORDER — METHYLPREDNISOLONE SODIUM SUCCINATE 125 MG/2ML
60 INJECTION, POWDER, LYOPHILIZED, FOR SOLUTION INTRAMUSCULAR; INTRAVENOUS ONCE
Status: CANCELLED | OUTPATIENT
Start: 2020-12-16

## 2020-12-16 RX ORDER — FAMOTIDINE 10 MG/ML
20 INJECTION, SOLUTION INTRAVENOUS AS NEEDED
Status: CANCELLED | OUTPATIENT
Start: 2020-12-16

## 2020-12-16 RX ORDER — DIPHENHYDRAMINE HYDROCHLORIDE 50 MG/ML
50 INJECTION INTRAMUSCULAR; INTRAVENOUS AS NEEDED
Status: CANCELLED | OUTPATIENT
Start: 2020-12-16

## 2020-12-16 RX ORDER — SODIUM CHLORIDE 9 MG/ML
250 INJECTION, SOLUTION INTRAVENOUS ONCE
Status: CANCELLED | OUTPATIENT
Start: 2020-12-16

## 2020-12-16 RX ORDER — METHYLPREDNISOLONE SODIUM SUCCINATE 125 MG/2ML
60 INJECTION, POWDER, LYOPHILIZED, FOR SOLUTION INTRAMUSCULAR; INTRAVENOUS ONCE
Status: CANCELLED | OUTPATIENT
Start: 2021-01-13

## 2020-12-16 RX ORDER — SODIUM CHLORIDE 9 MG/ML
250 INJECTION, SOLUTION INTRAVENOUS ONCE
Status: CANCELLED | OUTPATIENT
Start: 2021-01-27

## 2020-12-16 RX ORDER — FAMOTIDINE 10 MG/ML
20 INJECTION, SOLUTION INTRAVENOUS AS NEEDED
Status: CANCELLED | OUTPATIENT
Start: 2021-01-13

## 2020-12-16 RX ORDER — FAMOTIDINE 10 MG/ML
20 INJECTION, SOLUTION INTRAVENOUS AS NEEDED
Status: CANCELLED | OUTPATIENT
Start: 2020-12-30

## 2020-12-16 RX ORDER — METHYLPREDNISOLONE SODIUM SUCCINATE 125 MG/2ML
60 INJECTION, POWDER, LYOPHILIZED, FOR SOLUTION INTRAMUSCULAR; INTRAVENOUS ONCE
Status: CANCELLED | OUTPATIENT
Start: 2020-12-30

## 2020-12-16 RX ORDER — DIPHENHYDRAMINE HYDROCHLORIDE 50 MG/ML
50 INJECTION INTRAMUSCULAR; INTRAVENOUS AS NEEDED
Status: CANCELLED | OUTPATIENT
Start: 2021-01-27

## 2020-12-16 RX ORDER — ACETAMINOPHEN 500 MG
1000 TABLET ORAL ONCE
Status: CANCELLED | OUTPATIENT
Start: 2021-01-13

## 2020-12-16 RX ADMIN — METHYLPREDNISOLONE SODIUM SUCCINATE 60 MG: 125 INJECTION, POWDER, FOR SOLUTION INTRAMUSCULAR; INTRAVENOUS at 11:44

## 2020-12-16 RX ADMIN — DARATUMUMAB AND HYALURONIDASE-FIHJ (HUMAN RECOMBINANT) 1800 MG: 1800; 30000 INJECTION SUBCUTANEOUS at 13:02

## 2020-12-16 RX ADMIN — SODIUM CHLORIDE 250 ML: 9 INJECTION, SOLUTION INTRAVENOUS at 11:13

## 2020-12-16 RX ADMIN — DIPHENHYDRAMINE HYDROCHLORIDE 25 MG: 50 INJECTION INTRAMUSCULAR; INTRAVENOUS at 11:15

## 2020-12-16 RX ADMIN — ACETAMINOPHEN 1000 MG: 500 TABLET, FILM COATED ORAL at 11:14

## 2020-12-16 NOTE — PROGRESS NOTES
Oncology/Hematology History Overview Note   PROBLEM LIST:   1. Stage III IgA kappa clonal multiple myeloma. Diagnosed 9/20152. FISH panel reports multiple abnormalities including gain of 1q21 monosomy.  3. Monosomy 13 and gain of chromosomes 9, 15 and CCND1.  4. Initial M-spike of 7.1 g/dL at time of diagnosis and after 3 cycles, had  undetectable M-spike currently on Velcade, Revlimid and dexamethasone cycle #6  this week.  5. S/p Autologous SCT at [facility] with Dr. Venu Spicer in March 11,2016  6.Right leg pain - on lyrica  7. Bilateral hip replacements by Dr. Lopez     Multiple myeloma in relapse (CMS/[facility])   12/4/2017 -  Chemotherapy    OP Zoledronic Acid Q12W     9/10/2020 Initial Diagnosis    Multiple myeloma in relapse (CMS/[facility])     9/23/2020 -  Chemotherapy    OP MULTIPLE MYELOMA Daratumumab / Pomalidomide / Dexamethasone            REASON FOR VISIT: Multiple myeloma       Subjective     HISTORY OF PRESENT ILLNESS:   Ms. Miranda is here for follow up evaluation of myeloma management.  She is currently on second line therapy with Darzalex, Pomalyst and dexamethasone.  She is completed 3 cycles.    But no infections.  Did not have to watch for this.  No major issues with pain.  She continues to ambulate reasonably well.      Past Medical History, Past Surgical History, Social History, Family History have been reviewed and are without significant changes except as mentioned.    Review of Systems   Constitutional: Negative.    HENT: Negative.    Eyes: Negative.    Respiratory: Negative.    Cardiovascular: Negative.    Gastrointestinal: Negative.    Endocrine: Negative.    Genitourinary: Negative.    Musculoskeletal: Positive for arthralgias and gait problem.   Skin: Negative.    Allergic/Immunologic: Negative.    Hematological: Negative.    Psychiatric/Behavioral: Negative.       A comprehensive 14 point review of systems was performed and was negative except as mentioned.    Medications:  The current medication  "list was reviewed in the EMR    ALLERGIES:  No Known Allergies    Objective      BP (!) 194/76 Comment: LUE  Pulse 72   Temp 96.6 °F (35.9 °C) (Temporal)   Resp 24   Ht 156.2 cm (61.5\")   Wt 76.2 kg (168 lb)   SpO2 90% Comment: RA  BMI 31.23 kg/m²      Performance Status: 1    General: well appearing, in no acute distress  HEENT: sclera anicteric, oropharynx clear  Lymphatics: no cervical, supraclavicular, or axillary adenopathy  Cardiovascular: regular rate and rhythm, no murmurs  Lungs: clear to auscultation bilaterally  Abdomen: soft, nontender, nondistended.  No palpable organomegaly  Extremeties: no lower extremity edema  Skin: no rashes, lesions, bruising, or petechiae    RECENT LABS:    Lab Results   Component Value Date    MSPIKE 0.2 (H) 12/02/2020    MSPIKE 0.2 (H) 11/18/2020    MSPIKE 0.2 (H) 10/21/2020     Lab Results   Component Value Date    FREEKAPPAL 21.3 (H) 12/02/2020    FREEKAPPAL 16.1 11/18/2020    FREEKAPPAL 28.0 (H) 10/21/2020     Lab Results   Component Value Date    IGLFLC 10.3 12/02/2020    IGLFLC 8.1 11/18/2020    IGLFLC 8.1 10/21/2020     Lab Results   Component Value Date    WBC 2.40 (L) 12/16/2020    HGB 13.1 12/16/2020    HCT 41.5 12/16/2020    MCV 92.0 12/16/2020     12/16/2020       Lab Results   Component Value Date    GLUCOSE 84 12/02/2020    BUN 20 12/02/2020    CREATININE 1.20 12/16/2020    EGFRIFNONA 42 (L) 12/02/2020    BCR 15.9 12/02/2020    K 5.6 (H) 12/02/2020    CO2 25.0 12/02/2020    CALCIUM 9.0 12/02/2020    PROTENTOTREF 6.0 12/02/2020    ALBUMIN 3.3 12/02/2020    ALBUMIN 4.30 12/02/2020    LABIL2 1.3 12/02/2020    AST 17 12/02/2020    ALT 15 12/02/2020         Assessment/Plan       1. multiple myeloma in relapse  status post autologous stem cell transplant. Continue Pomalyst, daratumumab and dexamethasone.  continue her Pomalyst dose at 2 mg/day.  Her numbers are now stable.    She is not interested in undergoing a repeat autologous stem cell transplantation. "  The goal is to get her disease under control at this time.    2.  Hypogammaglobulinemia.  She has not had issues with infections so far.  We will hold off on IVIG for now    3.  Back pain/ hip pain: The hip replacements has done wonders for her.       4.  Bilateral hip replacement secondary to myeloma by Dr. Lopez    5.  Shortness of breath.  We will have to watch for this.  May consider PFTs if does not improve.    6.  Bone mets.  She is on Zometa every 3 months.    7.  Covid-19 risk.  I have recommended she get the Covid vaccine when it is available.    Joseph Mckinley MD  [facility] Hematology and Oncology    12/16/2020      Return on: 02/10/21  Return in (Approximately): Schedule with next infusion, 2 months        CC:

## 2020-12-17 LAB
ALBUMIN SERPL ELPH-MCNC: 3.6 G/DL (ref 2.9–4.4)
ALBUMIN/GLOB SERPL: 1.3 {RATIO} (ref 0.7–1.7)
ALPHA1 GLOB SERPL ELPH-MCNC: 0.3 G/DL (ref 0–0.4)
ALPHA2 GLOB SERPL ELPH-MCNC: 0.9 G/DL (ref 0.4–1)
B-GLOBULIN SERPL ELPH-MCNC: 1.3 G/DL (ref 0.7–1.3)
GAMMA GLOB SERPL ELPH-MCNC: 0.5 G/DL (ref 0.4–1.8)
GLOBULIN SER-MCNC: 2.9 G/DL (ref 2.2–3.9)
IGA SERPL-MCNC: 36 MG/DL (ref 87–352)
IGG SERPL-MCNC: 439 MG/DL (ref 586–1602)
IGM SERPL-MCNC: 13 MG/DL (ref 26–217)
INTERPRETATION SERPL IEP-IMP: ABNORMAL
KAPPA LC FREE SER-MCNC: 14.8 MG/L (ref 3.3–19.4)
KAPPA LC FREE/LAMBDA FREE SER: 1.85 {RATIO} (ref 0.26–1.65)
LABORATORY COMMENT REPORT: ABNORMAL
LAMBDA LC FREE SERPL-MCNC: 8 MG/L (ref 5.7–26.3)
M PROTEIN SERPL ELPH-MCNC: 0.2 G/DL
PROT SERPL-MCNC: 6.5 G/DL (ref 6–8.5)

## 2020-12-28 RX ORDER — ACYCLOVIR 400 MG/1
400 TABLET ORAL 2 TIMES DAILY WITH MEALS
Qty: 60 TABLET | Refills: 5 | Status: SHIPPED | OUTPATIENT
Start: 2020-12-28 | End: 2021-06-30 | Stop reason: SDUPTHER

## 2020-12-29 ENCOUNTER — SPECIALTY PHARMACY (OUTPATIENT)
Dept: ONCOLOGY | Facility: HOSPITAL | Age: 70
End: 2020-12-29

## 2020-12-29 DIAGNOSIS — C90.02 MULTIPLE MYELOMA IN RELAPSE (HCC): ICD-10-CM

## 2020-12-29 NOTE — PROGRESS NOTES
Reviewed most recent oncology office visit note and  Labs from 12/16/20. Plan for continuation of pomalidomide with no dose adjustments. Released script for pomalidomide 2mg PO daily Days 1-21 then 7 days off, #21 with 0 RF to [facility] for continuation of treatment.

## 2020-12-30 ENCOUNTER — HOSPITAL ENCOUNTER (OUTPATIENT)
Dept: ONCOLOGY | Facility: HOSPITAL | Age: 70
Setting detail: INFUSION SERIES
Discharge: HOME | End: 2020-12-30

## 2020-12-30 DIAGNOSIS — C90.02 MULTIPLE MYELOMA IN RELAPSE (HCC): Primary | ICD-10-CM

## 2020-12-30 LAB
ERYTHROCYTE [DISTWIDTH] IN BLOOD BY AUTOMATED COUNT: 20 % (ref 12.3–15.4)
HCT VFR BLD AUTO: 39.3 % (ref 34–46.6)
HGB BLD-MCNC: 12.7 G/DL (ref 12–15.9)
LYMPHOCYTES # BLD AUTO: 0.5 10*3/MM3 (ref 0.7–3.1)
LYMPHOCYTES NFR BLD AUTO: 10.9 % (ref 19.6–45.3)
MCH RBC QN AUTO: 29.9 PG (ref 26.6–33)
MCHC RBC AUTO-ENTMCNC: 32.2 G/DL (ref 31.5–35.7)
MCV RBC AUTO: 93 FL (ref 79–97)
MONOCYTES # BLD AUTO: 0.3 10*3/MM3 (ref 0.1–0.9)
MONOCYTES NFR BLD AUTO: 7.7 % (ref 5–12)
NEUTROPHILS NFR BLD AUTO: 3.5 10*3/MM3 (ref 1.7–7)
NEUTROPHILS NFR BLD AUTO: 81.4 % (ref 42.7–76)
PLATELET # BLD AUTO: 98 10*3/MM3 (ref 140–450)
PMV BLD AUTO: 7.9 FL (ref 6–12)
RBC # BLD AUTO: 4.23 10*6/MM3 (ref 3.77–5.28)
WBC # BLD AUTO: 4.3 10*3/MM3 (ref 3.4–10.8)

## 2020-12-30 PROCEDURE — 85025 COMPLETE CBC W/AUTO DIFF WBC: CPT | Performed by: INTERNAL MEDICINE

## 2020-12-30 PROCEDURE — 96375 TX/PRO/DX INJ NEW DRUG ADDON: CPT

## 2020-12-30 PROCEDURE — 96401 CHEMO ANTI-NEOPL SQ/IM: CPT

## 2020-12-30 PROCEDURE — C9062 DARATUMUMAB HYALURONIDASE: HCPCS | Performed by: INTERNAL MEDICINE

## 2020-12-30 PROCEDURE — 25010000002 DARATUMUMAB-HYALURONIDASE-FIHJ 1800-30000 MG-UT/15ML SOLUTION: Performed by: INTERNAL MEDICINE

## 2020-12-30 PROCEDURE — 25010000002 DIPHENHYDRAMINE PER 50 MG: Performed by: INTERNAL MEDICINE

## 2020-12-30 PROCEDURE — 25010000002 METHYLPREDNISOLONE PER 125 MG: Performed by: INTERNAL MEDICINE

## 2020-12-30 RX ORDER — METHYLPREDNISOLONE SODIUM SUCCINATE 125 MG/2ML
60 INJECTION, POWDER, LYOPHILIZED, FOR SOLUTION INTRAMUSCULAR; INTRAVENOUS ONCE
Status: COMPLETED | OUTPATIENT
Start: 2020-12-30 | End: 2020-12-30

## 2020-12-30 RX ORDER — ACETAMINOPHEN 500 MG
1000 TABLET ORAL ONCE
Status: COMPLETED | OUTPATIENT
Start: 2020-12-30 | End: 2020-12-30

## 2020-12-30 RX ADMIN — METHYLPREDNISOLONE SODIUM SUCCINATE 60 MG: 125 INJECTION, POWDER, FOR SOLUTION INTRAMUSCULAR; INTRAVENOUS at 08:39

## 2020-12-30 RX ADMIN — ACETAMINOPHEN 1000 MG: 500 TABLET ORAL at 08:38

## 2020-12-30 RX ADMIN — DIPHENHYDRAMINE HYDROCHLORIDE 25 MG: 50 INJECTION INTRAMUSCULAR; INTRAVENOUS at 08:39

## 2020-12-30 RX ADMIN — DARATUMUMAB AND HYALURONIDASE-FIHJ (HUMAN RECOMBINANT) 1800 MG: 1800; 30000 INJECTION SUBCUTANEOUS at 10:16

## 2021-01-13 ENCOUNTER — HOSPITAL ENCOUNTER (OUTPATIENT)
Dept: ONCOLOGY | Facility: HOSPITAL | Age: 71
Setting detail: INFUSION SERIES
Discharge: HOME | End: 2021-01-13

## 2021-01-13 VITALS
SYSTOLIC BLOOD PRESSURE: 138 MMHG | HEIGHT: 62 IN | TEMPERATURE: 96 F | RESPIRATION RATE: 20 BRPM | WEIGHT: 168 LBS | BODY MASS INDEX: 30.91 KG/M2 | DIASTOLIC BLOOD PRESSURE: 64 MMHG | HEART RATE: 70 BPM

## 2021-01-13 DIAGNOSIS — C90.02 MULTIPLE MYELOMA IN RELAPSE (HCC): Primary | ICD-10-CM

## 2021-01-13 LAB
ALBUMIN SERPL-MCNC: 4 G/DL (ref 3.5–5.2)
ALBUMIN/GLOB SERPL: 1.6 G/DL
ALP SERPL-CCNC: 76 U/L (ref 39–117)
ALT SERPL W P-5'-P-CCNC: 20 U/L (ref 1–33)
ANION GAP SERPL CALCULATED.3IONS-SCNC: 9 MMOL/L (ref 5–15)
AST SERPL-CCNC: 19 U/L (ref 1–32)
BILIRUB SERPL-MCNC: 0.3 MG/DL (ref 0–1.2)
BUN SERPL-MCNC: 19 MG/DL (ref 8–23)
BUN/CREAT SERPL: 17.1 (ref 7–25)
CALCIUM SPEC-SCNC: 8.7 MG/DL (ref 8.6–10.5)
CHLORIDE SERPL-SCNC: 104 MMOL/L (ref 98–107)
CO2 SERPL-SCNC: 22 MMOL/L (ref 22–29)
CREAT BLDA-MCNC: 1.2 MG/DL (ref 0.6–1.3)
CREAT SERPL-MCNC: 1.11 MG/DL (ref 0.57–1)
ERYTHROCYTE [DISTWIDTH] IN BLOOD BY AUTOMATED COUNT: 19.6 % (ref 12.3–15.4)
GFR SERPL CREATININE-BSD FRML MDRD: 49 ML/MIN/1.73
GLOBULIN UR ELPH-MCNC: 2.5 GM/DL
GLUCOSE SERPL-MCNC: 101 MG/DL (ref 65–99)
HCT VFR BLD AUTO: 39.4 % (ref 34–46.6)
HGB BLD-MCNC: 12.6 G/DL (ref 12–15.9)
LYMPHOCYTES # BLD AUTO: 0.5 10*3/MM3 (ref 0.7–3.1)
LYMPHOCYTES NFR BLD AUTO: 20.1 % (ref 19.6–45.3)
MCH RBC QN AUTO: 30.2 PG (ref 26.6–33)
MCHC RBC AUTO-ENTMCNC: 32.1 G/DL (ref 31.5–35.7)
MCV RBC AUTO: 93.9 FL (ref 79–97)
MONOCYTES # BLD AUTO: 0.1 10*3/MM3 (ref 0.1–0.9)
MONOCYTES NFR BLD AUTO: 3.8 % (ref 5–12)
NEUTROPHILS NFR BLD AUTO: 1.8 10*3/MM3 (ref 1.7–7)
NEUTROPHILS NFR BLD AUTO: 76.1 % (ref 42.7–76)
PLATELET # BLD AUTO: 154 10*3/MM3 (ref 140–450)
PMV BLD AUTO: 8.4 FL (ref 6–12)
POTASSIUM SERPL-SCNC: 4.4 MMOL/L (ref 3.5–5.2)
PROT SERPL-MCNC: 6.5 G/DL (ref 6–8.5)
RBC # BLD AUTO: 4.19 10*6/MM3 (ref 3.77–5.28)
SODIUM SERPL-SCNC: 135 MMOL/L (ref 136–145)
WBC # BLD AUTO: 2.3 10*3/MM3 (ref 3.4–10.8)

## 2021-01-13 PROCEDURE — 83883 ASSAY NEPHELOMETRY NOT SPEC: CPT | Performed by: INTERNAL MEDICINE

## 2021-01-13 PROCEDURE — 82784 ASSAY IGA/IGD/IGG/IGM EACH: CPT | Performed by: INTERNAL MEDICINE

## 2021-01-13 PROCEDURE — 86334 IMMUNOFIX E-PHORESIS SERUM: CPT | Performed by: INTERNAL MEDICINE

## 2021-01-13 PROCEDURE — 96374 THER/PROPH/DIAG INJ IV PUSH: CPT

## 2021-01-13 PROCEDURE — 25010000002 DIPHENHYDRAMINE PER 50 MG: Performed by: INTERNAL MEDICINE

## 2021-01-13 PROCEDURE — 84165 PROTEIN E-PHORESIS SERUM: CPT | Performed by: INTERNAL MEDICINE

## 2021-01-13 PROCEDURE — 80053 COMPREHEN METABOLIC PANEL: CPT | Performed by: INTERNAL MEDICINE

## 2021-01-13 PROCEDURE — 82565 ASSAY OF CREATININE: CPT

## 2021-01-13 PROCEDURE — 96401 CHEMO ANTI-NEOPL SQ/IM: CPT

## 2021-01-13 PROCEDURE — 25010000002 METHYLPREDNISOLONE PER 125 MG: Performed by: INTERNAL MEDICINE

## 2021-01-13 PROCEDURE — 96376 TX/PRO/DX INJ SAME DRUG ADON: CPT

## 2021-01-13 PROCEDURE — 25010000002 DARATUMUMAB-HYALURONIDASE-FIHJ 1800-30000 MG-UT/15ML SOLUTION: Performed by: INTERNAL MEDICINE

## 2021-01-13 PROCEDURE — 85025 COMPLETE CBC W/AUTO DIFF WBC: CPT | Performed by: INTERNAL MEDICINE

## 2021-01-13 PROCEDURE — 96375 TX/PRO/DX INJ NEW DRUG ADDON: CPT

## 2021-01-13 RX ORDER — ACETAMINOPHEN 500 MG
1000 TABLET ORAL ONCE
Status: COMPLETED | OUTPATIENT
Start: 2021-01-13 | End: 2021-01-13

## 2021-01-13 RX ORDER — METHYLPREDNISOLONE SODIUM SUCCINATE 125 MG/2ML
60 INJECTION, POWDER, LYOPHILIZED, FOR SOLUTION INTRAMUSCULAR; INTRAVENOUS ONCE
Status: COMPLETED | OUTPATIENT
Start: 2021-01-13 | End: 2021-01-13

## 2021-01-13 RX ORDER — SODIUM CHLORIDE 9 MG/ML
250 INJECTION, SOLUTION INTRAVENOUS ONCE
Status: COMPLETED | OUTPATIENT
Start: 2021-01-13 | End: 2021-01-13

## 2021-01-13 RX ADMIN — METHYLPREDNISOLONE SODIUM SUCCINATE 60 MG: 125 INJECTION, POWDER, FOR SOLUTION INTRAMUSCULAR; INTRAVENOUS at 09:22

## 2021-01-13 RX ADMIN — DIPHENHYDRAMINE HYDROCHLORIDE 25 MG: 50 INJECTION INTRAMUSCULAR; INTRAVENOUS at 09:25

## 2021-01-13 RX ADMIN — ACETAMINOPHEN 1000 MG: 500 TABLET ORAL at 09:20

## 2021-01-13 RX ADMIN — DARATUMUMAB AND HYALURONIDASE-FIHJ (HUMAN RECOMBINANT) 1800 MG: 1800; 30000 INJECTION SUBCUTANEOUS at 10:34

## 2021-01-13 RX ADMIN — SODIUM CHLORIDE 250 ML: 9 INJECTION, SOLUTION INTRAVENOUS at 09:19

## 2021-01-14 LAB
ALBUMIN SERPL ELPH-MCNC: 3.8 G/DL (ref 2.9–4.4)
ALBUMIN/GLOB SERPL: 1.8 {RATIO} (ref 0.7–1.7)
ALPHA1 GLOB SERPL ELPH-MCNC: 0.3 G/DL (ref 0–0.4)
ALPHA2 GLOB SERPL ELPH-MCNC: 0.7 G/DL (ref 0.4–1)
B-GLOBULIN SERPL ELPH-MCNC: 0.9 G/DL (ref 0.7–1.3)
GAMMA GLOB SERPL ELPH-MCNC: 0.4 G/DL (ref 0.4–1.8)
GLOBULIN SER-MCNC: 2.2 G/DL (ref 2.2–3.9)
IGA SERPL-MCNC: 34 MG/DL (ref 87–352)
IGG SERPL-MCNC: 390 MG/DL (ref 586–1602)
IGM SERPL-MCNC: 14 MG/DL (ref 26–217)
INTERPRETATION SERPL IEP-IMP: ABNORMAL
KAPPA LC FREE SER-MCNC: 13.3 MG/L (ref 3.3–19.4)
KAPPA LC FREE/LAMBDA FREE SER: 1.85 {RATIO} (ref 0.26–1.65)
LABORATORY COMMENT REPORT: ABNORMAL
LAMBDA LC FREE SERPL-MCNC: 7.2 MG/L (ref 5.7–26.3)
M PROTEIN SERPL ELPH-MCNC: 0.1 G/DL
PROT SERPL-MCNC: 6 G/DL (ref 6–8.5)

## 2021-01-27 ENCOUNTER — SPECIALTY PHARMACY (OUTPATIENT)
Dept: ONCOLOGY | Facility: HOSPITAL | Age: 71
End: 2021-01-27

## 2021-01-27 ENCOUNTER — HOSPITAL ENCOUNTER (OUTPATIENT)
Dept: ONCOLOGY | Facility: HOSPITAL | Age: 71
Setting detail: INFUSION SERIES
Discharge: HOME | End: 2021-01-27

## 2021-01-27 VITALS
BODY MASS INDEX: 30.78 KG/M2 | HEIGHT: 61 IN | TEMPERATURE: 96.3 F | SYSTOLIC BLOOD PRESSURE: 122 MMHG | WEIGHT: 163 LBS | RESPIRATION RATE: 20 BRPM | HEART RATE: 67 BPM | DIASTOLIC BLOOD PRESSURE: 58 MMHG | OXYGEN SATURATION: 97 %

## 2021-01-27 DIAGNOSIS — C90.02 MULTIPLE MYELOMA IN RELAPSE (HCC): ICD-10-CM

## 2021-01-27 DIAGNOSIS — C90.02 MULTIPLE MYELOMA IN RELAPSE (HCC): Primary | ICD-10-CM

## 2021-01-27 LAB
ERYTHROCYTE [DISTWIDTH] IN BLOOD BY AUTOMATED COUNT: 17.6 % (ref 12.3–15.4)
HCT VFR BLD AUTO: 37 % (ref 34–46.6)
HGB BLD-MCNC: 12 G/DL (ref 12–15.9)
LYMPHOCYTES # BLD AUTO: 0.6 10*3/MM3 (ref 0.7–3.1)
LYMPHOCYTES NFR BLD AUTO: 12.9 % (ref 19.6–45.3)
MCH RBC QN AUTO: 30.4 PG (ref 26.6–33)
MCHC RBC AUTO-ENTMCNC: 32.3 G/DL (ref 31.5–35.7)
MCV RBC AUTO: 94.1 FL (ref 79–97)
MONOCYTES # BLD AUTO: 0.2 10*3/MM3 (ref 0.1–0.9)
MONOCYTES NFR BLD AUTO: 5.6 % (ref 5–12)
NEUTROPHILS NFR BLD AUTO: 3.5 10*3/MM3 (ref 1.7–7)
NEUTROPHILS NFR BLD AUTO: 81.5 % (ref 42.7–76)
PLATELET # BLD AUTO: 106 10*3/MM3 (ref 140–450)
PMV BLD AUTO: 8.2 FL (ref 6–12)
RBC # BLD AUTO: 3.94 10*6/MM3 (ref 3.77–5.28)
WBC # BLD AUTO: 4.3 10*3/MM3 (ref 3.4–10.8)

## 2021-01-27 PROCEDURE — 96375 TX/PRO/DX INJ NEW DRUG ADDON: CPT

## 2021-01-27 PROCEDURE — 96374 THER/PROPH/DIAG INJ IV PUSH: CPT

## 2021-01-27 PROCEDURE — 25010000002 DIPHENHYDRAMINE PER 50 MG: Performed by: INTERNAL MEDICINE

## 2021-01-27 PROCEDURE — 96401 CHEMO ANTI-NEOPL SQ/IM: CPT

## 2021-01-27 PROCEDURE — 25010000002 METHYLPREDNISOLONE PER 125 MG: Performed by: INTERNAL MEDICINE

## 2021-01-27 PROCEDURE — 85025 COMPLETE CBC W/AUTO DIFF WBC: CPT | Performed by: INTERNAL MEDICINE

## 2021-01-27 PROCEDURE — 25010000002 DARATUMUMAB-HYALURONIDASE-FIHJ 1800-30000 MG-UT/15ML SOLUTION: Performed by: INTERNAL MEDICINE

## 2021-01-27 RX ORDER — SODIUM CHLORIDE 9 MG/ML
250 INJECTION, SOLUTION INTRAVENOUS ONCE
Status: COMPLETED | OUTPATIENT
Start: 2021-01-27 | End: 2021-01-27

## 2021-01-27 RX ORDER — METHYLPREDNISOLONE SODIUM SUCCINATE 125 MG/2ML
60 INJECTION, POWDER, LYOPHILIZED, FOR SOLUTION INTRAMUSCULAR; INTRAVENOUS ONCE
Status: COMPLETED | OUTPATIENT
Start: 2021-01-27 | End: 2021-01-27

## 2021-01-27 RX ORDER — ACETAMINOPHEN 500 MG
1000 TABLET ORAL ONCE
Status: COMPLETED | OUTPATIENT
Start: 2021-01-27 | End: 2021-01-27

## 2021-01-27 RX ADMIN — SODIUM CHLORIDE 250 ML: 9 INJECTION, SOLUTION INTRAVENOUS at 09:22

## 2021-01-27 RX ADMIN — ACETAMINOPHEN 1000 MG: 500 TABLET ORAL at 09:21

## 2021-01-27 RX ADMIN — DARATUMUMAB AND HYALURONIDASE-FIHJ (HUMAN RECOMBINANT) 1800 MG: 1800; 30000 INJECTION SUBCUTANEOUS at 10:52

## 2021-01-27 RX ADMIN — DIPHENHYDRAMINE HYDROCHLORIDE 25 MG: 50 INJECTION INTRAMUSCULAR; INTRAVENOUS at 09:30

## 2021-01-27 RX ADMIN — METHYLPREDNISOLONE SODIUM SUCCINATE 60 MG: 125 INJECTION, POWDER, FOR SOLUTION INTRAMUSCULAR; INTRAVENOUS at 09:24

## 2021-01-29 DIAGNOSIS — F43.23 SITUATIONAL MIXED ANXIETY AND DEPRESSIVE DISORDER: ICD-10-CM

## 2021-01-29 RX ORDER — VENLAFAXINE HYDROCHLORIDE 37.5 MG/1
37.5 CAPSULE, EXTENDED RELEASE ORAL EVERY MORNING
Qty: 90 CAPSULE | Refills: 3 | Status: SHIPPED | OUTPATIENT
Start: 2021-01-29 | End: 2022-02-12 | Stop reason: SDUPTHER

## 2021-02-10 ENCOUNTER — APPOINTMENT (OUTPATIENT)
Dept: ONCOLOGY | Facility: HOSPITAL | Age: 71
End: 2021-02-10

## 2021-02-15 ENCOUNTER — HOSPITAL ENCOUNTER (OUTPATIENT)
Dept: ONCOLOGY | Facility: HOSPITAL | Age: 71
Setting detail: INFUSION SERIES
Discharge: HOME | End: 2021-02-15

## 2021-02-15 ENCOUNTER — OFFICE VISIT (OUTPATIENT)
Dept: ONCOLOGY | Facility: CLINIC | Age: 71
End: 2021-02-15

## 2021-02-15 VITALS
WEIGHT: 170 LBS | TEMPERATURE: 96.2 F | HEART RATE: 60 BPM | RESPIRATION RATE: 16 BRPM | OXYGEN SATURATION: 90 % | DIASTOLIC BLOOD PRESSURE: 80 MMHG | BODY MASS INDEX: 32.1 KG/M2 | HEIGHT: 61 IN | SYSTOLIC BLOOD PRESSURE: 137 MMHG

## 2021-02-15 DIAGNOSIS — C90.02 MULTIPLE MYELOMA IN RELAPSE (HCC): Primary | ICD-10-CM

## 2021-02-15 LAB
ALBUMIN SERPL-MCNC: 4.2 G/DL (ref 3.5–5.2)
ALBUMIN/GLOB SERPL: 2 G/DL
ALP SERPL-CCNC: 78 U/L (ref 39–117)
ALT SERPL W P-5'-P-CCNC: 13 U/L (ref 1–33)
ANION GAP SERPL CALCULATED.3IONS-SCNC: 7 MMOL/L (ref 5–15)
AST SERPL-CCNC: 15 U/L (ref 1–32)
BILIRUB SERPL-MCNC: 0.3 MG/DL (ref 0–1.2)
BUN SERPL-MCNC: 16 MG/DL (ref 8–23)
BUN/CREAT SERPL: 13.8 (ref 7–25)
CALCIUM SPEC-SCNC: 8.9 MG/DL (ref 8.6–10.5)
CHLORIDE SERPL-SCNC: 108 MMOL/L (ref 98–107)
CO2 SERPL-SCNC: 24 MMOL/L (ref 22–29)
CREAT BLDA-MCNC: 1.2 MG/DL
CREAT BLDA-MCNC: 1.2 MG/DL (ref 0.6–1.3)
CREAT SERPL-MCNC: 1.16 MG/DL (ref 0.57–1)
ERYTHROCYTE [DISTWIDTH] IN BLOOD BY AUTOMATED COUNT: 15.6 % (ref 12.3–15.4)
GFR SERPL CREATININE-BSD FRML MDRD: 46 ML/MIN/1.73
GLOBULIN UR ELPH-MCNC: 2.1 GM/DL
GLUCOSE SERPL-MCNC: 75 MG/DL (ref 65–99)
HCT VFR BLD AUTO: 38.3 % (ref 34–46.6)
HGB BLD-MCNC: 12.7 G/DL (ref 12–15.9)
LYMPHOCYTES # BLD AUTO: 0.8 10*3/MM3 (ref 0.7–3.1)
LYMPHOCYTES NFR BLD AUTO: 28.2 % (ref 19.6–45.3)
MCH RBC QN AUTO: 31 PG (ref 26.6–33)
MCHC RBC AUTO-ENTMCNC: 33.2 G/DL (ref 31.5–35.7)
MCV RBC AUTO: 93.2 FL (ref 79–97)
MONOCYTES # BLD AUTO: 0.3 10*3/MM3 (ref 0.1–0.9)
MONOCYTES NFR BLD AUTO: 11 % (ref 5–12)
NEUTROPHILS NFR BLD AUTO: 1.6 10*3/MM3 (ref 1.7–7)
NEUTROPHILS NFR BLD AUTO: 60.8 % (ref 42.7–76)
PLATELET # BLD AUTO: 134 10*3/MM3 (ref 140–450)
PMV BLD AUTO: 8.4 FL (ref 6–12)
POTASSIUM SERPL-SCNC: 4.9 MMOL/L (ref 3.5–5.2)
PROT SERPL-MCNC: 6.3 G/DL (ref 6–8.5)
RBC # BLD AUTO: 4.11 10*6/MM3 (ref 3.77–5.28)
SODIUM SERPL-SCNC: 139 MMOL/L (ref 136–145)
WBC # BLD AUTO: 2.7 10*3/MM3 (ref 3.4–10.8)

## 2021-02-15 PROCEDURE — 86334 IMMUNOFIX E-PHORESIS SERUM: CPT | Performed by: INTERNAL MEDICINE

## 2021-02-15 PROCEDURE — 25010000002 DIPHENHYDRAMINE PER 50 MG: Performed by: INTERNAL MEDICINE

## 2021-02-15 PROCEDURE — 84165 PROTEIN E-PHORESIS SERUM: CPT | Performed by: INTERNAL MEDICINE

## 2021-02-15 PROCEDURE — 96375 TX/PRO/DX INJ NEW DRUG ADDON: CPT

## 2021-02-15 PROCEDURE — 83883 ASSAY NEPHELOMETRY NOT SPEC: CPT | Performed by: INTERNAL MEDICINE

## 2021-02-15 PROCEDURE — 99215 OFFICE O/P EST HI 40 MIN: CPT | Performed by: INTERNAL MEDICINE

## 2021-02-15 PROCEDURE — 80053 COMPREHEN METABOLIC PANEL: CPT | Performed by: INTERNAL MEDICINE

## 2021-02-15 PROCEDURE — 82565 ASSAY OF CREATININE: CPT

## 2021-02-15 PROCEDURE — 96401 CHEMO ANTI-NEOPL SQ/IM: CPT

## 2021-02-15 PROCEDURE — 25010000002 DARATUMUMAB-HYALURONIDASE-FIHJ 1800-30000 MG-UT/15ML SOLUTION: Performed by: INTERNAL MEDICINE

## 2021-02-15 PROCEDURE — 82784 ASSAY IGA/IGD/IGG/IGM EACH: CPT | Performed by: INTERNAL MEDICINE

## 2021-02-15 PROCEDURE — 85025 COMPLETE CBC W/AUTO DIFF WBC: CPT | Performed by: INTERNAL MEDICINE

## 2021-02-15 PROCEDURE — 25010000002 METHYLPREDNISOLONE PER 125 MG: Performed by: INTERNAL MEDICINE

## 2021-02-15 PROCEDURE — 96374 THER/PROPH/DIAG INJ IV PUSH: CPT

## 2021-02-15 RX ORDER — MEPERIDINE HYDROCHLORIDE 50 MG/ML
25 INJECTION INTRAMUSCULAR; INTRAVENOUS; SUBCUTANEOUS
Status: CANCELLED | OUTPATIENT
Start: 2021-03-01

## 2021-02-15 RX ORDER — FAMOTIDINE 10 MG/ML
20 INJECTION, SOLUTION INTRAVENOUS AS NEEDED
Status: CANCELLED | OUTPATIENT
Start: 2021-02-15

## 2021-02-15 RX ORDER — SODIUM CHLORIDE 9 MG/ML
250 INJECTION, SOLUTION INTRAVENOUS ONCE
Status: CANCELLED | OUTPATIENT
Start: 2021-02-15

## 2021-02-15 RX ORDER — METHYLPREDNISOLONE SODIUM SUCCINATE 125 MG/2ML
60 INJECTION, POWDER, LYOPHILIZED, FOR SOLUTION INTRAMUSCULAR; INTRAVENOUS ONCE
Status: CANCELLED | OUTPATIENT
Start: 2021-03-01

## 2021-02-15 RX ORDER — SODIUM CHLORIDE 9 MG/ML
250 INJECTION, SOLUTION INTRAVENOUS ONCE
Status: COMPLETED | OUTPATIENT
Start: 2021-02-15 | End: 2021-02-15

## 2021-02-15 RX ORDER — METHYLPREDNISOLONE SODIUM SUCCINATE 125 MG/2ML
60 INJECTION, POWDER, LYOPHILIZED, FOR SOLUTION INTRAMUSCULAR; INTRAVENOUS ONCE
Status: CANCELLED | OUTPATIENT
Start: 2021-02-15

## 2021-02-15 RX ORDER — ACETAMINOPHEN 500 MG
1000 TABLET ORAL ONCE
Status: CANCELLED | OUTPATIENT
Start: 2021-03-01

## 2021-02-15 RX ORDER — ACETAMINOPHEN 500 MG
1000 TABLET ORAL ONCE
Status: CANCELLED | OUTPATIENT
Start: 2021-02-15

## 2021-02-15 RX ORDER — DIPHENHYDRAMINE HYDROCHLORIDE 50 MG/ML
50 INJECTION INTRAMUSCULAR; INTRAVENOUS AS NEEDED
Status: CANCELLED | OUTPATIENT
Start: 2021-02-15

## 2021-02-15 RX ORDER — METHYLPREDNISOLONE SODIUM SUCCINATE 125 MG/2ML
60 INJECTION, POWDER, LYOPHILIZED, FOR SOLUTION INTRAMUSCULAR; INTRAVENOUS ONCE
Status: COMPLETED | OUTPATIENT
Start: 2021-02-15 | End: 2021-02-15

## 2021-02-15 RX ORDER — SODIUM CHLORIDE 9 MG/ML
250 INJECTION, SOLUTION INTRAVENOUS ONCE
Status: CANCELLED | OUTPATIENT
Start: 2021-03-01

## 2021-02-15 RX ORDER — FAMOTIDINE 10 MG/ML
20 INJECTION, SOLUTION INTRAVENOUS AS NEEDED
Status: CANCELLED | OUTPATIENT
Start: 2021-03-01

## 2021-02-15 RX ORDER — DIPHENHYDRAMINE HYDROCHLORIDE 50 MG/ML
50 INJECTION INTRAMUSCULAR; INTRAVENOUS AS NEEDED
Status: CANCELLED | OUTPATIENT
Start: 2021-03-01

## 2021-02-15 RX ORDER — ACETAMINOPHEN 500 MG
1000 TABLET ORAL ONCE
Status: COMPLETED | OUTPATIENT
Start: 2021-02-15 | End: 2021-02-15

## 2021-02-15 RX ORDER — MEPERIDINE HYDROCHLORIDE 50 MG/ML
25 INJECTION INTRAMUSCULAR; INTRAVENOUS; SUBCUTANEOUS
Status: CANCELLED | OUTPATIENT
Start: 2021-02-15

## 2021-02-15 RX ADMIN — METHYLPREDNISOLONE SODIUM SUCCINATE 60 MG: 125 INJECTION, POWDER, FOR SOLUTION INTRAMUSCULAR; INTRAVENOUS at 10:11

## 2021-02-15 RX ADMIN — SODIUM CHLORIDE 250 ML: 9 INJECTION, SOLUTION INTRAVENOUS at 10:10

## 2021-02-15 RX ADMIN — DARATUMUMAB AND HYALURONIDASE-FIHJ (HUMAN RECOMBINANT) 1800 MG: 1800; 30000 INJECTION SUBCUTANEOUS at 11:35

## 2021-02-15 RX ADMIN — DIPHENHYDRAMINE HYDROCHLORIDE 25 MG: 50 INJECTION INTRAMUSCULAR; INTRAVENOUS at 10:16

## 2021-02-15 RX ADMIN — ACETAMINOPHEN 1000 MG: 500 TABLET ORAL at 10:09

## 2021-02-15 NOTE — PROGRESS NOTES
Oncology/Hematology History Overview Note   PROBLEM LIST:   1. Stage III IgA kappa clonal multiple myeloma. Diagnosed 9/20152. FISH panel reports multiple abnormalities including gain of 1q21 monosomy.  3. Monosomy 13 and gain of chromosomes 9, 15 and CCND1.  4. Initial M-spike of 7.1 g/dL at time of diagnosis and after 3 cycles, had  undetectable M-spike currently on Velcade, Revlimid and dexamethasone cycle #6  this week.  5. S/p Autologous SCT at [facility] with Dr. Venu Spicer in March 11,2016  6.Right leg pain - on lyrica  7. Bilateral hip replacements by Dr. Lopez     Multiple myeloma in relapse (CMS/[facility])   12/4/2017 -  Chemotherapy    OP Zoledronic Acid Q12W     9/10/2020 Initial Diagnosis    Multiple myeloma in relapse (CMS/[facility])     9/23/2020 -  Chemotherapy    OP MULTIPLE MYELOMA Daratumumab / Pomalidomide / Dexamethasone            REASON FOR VISIT: Multiple myeloma       Subjective     HISTORY OF PRESENT ILLNESS:   Ms. Miranda is here for follow up evaluation of myeloma management.  She is currently on second line therapy with Darzalex, Pomalyst and dexamethasone.  She is completed 4 cycles.    But no infections.  Did not have to watch for this.  No major issues with pain.  She continues to ambulate reasonably well.  Does have some issues with blurred vision at times.      Past Medical History, Past Surgical History, Social History, Family History have been reviewed and are without significant changes except as mentioned.    Review of Systems   Constitutional: Negative.    HENT: Negative.    Eyes: Positive for visual disturbance.   Respiratory: Negative.    Cardiovascular: Negative.    Gastrointestinal: Negative.    Endocrine: Negative.    Genitourinary: Negative.    Musculoskeletal: Positive for arthralgias and gait problem.   Skin: Negative.    Allergic/Immunologic: Negative.    Hematological: Negative.    Psychiatric/Behavioral: Negative.       A comprehensive 14 point review of systems was performed and  "was negative except as mentioned.    Medications:  The current medication list was reviewed in the EMR    ALLERGIES:  No Known Allergies    Objective      /80   Pulse 60   Temp 96.2 °F (35.7 °C)   Resp 16   Ht 154.9 cm (60.98\")   Wt 77.1 kg (170 lb)   SpO2 90%   BMI 32.14 kg/m²      Performance Status: 1    General: well appearing, in no acute distress  HEENT: sclera anicteric, oropharynx clear  Lymphatics: no cervical, supraclavicular, or axillary adenopathy  Cardiovascular: regular rate and rhythm, no murmurs  Lungs: clear to auscultation bilaterally  Abdomen: soft, nontender, nondistended.  No palpable organomegaly  Extremeties: no lower extremity edema  Skin: no rashes, lesions, bruising, or petechiae      RECENT LABS:    Lab Results   Component Value Date    MSPIKE 0.1 (H) 01/13/2021    MSPIKE 0.2 (H) 12/16/2020    MSPIKE 0.2 (H) 12/02/2020       Lab Results   Component Value Date    FREEKAPPAL 13.3 01/13/2021    FREEKAPPAL 14.8 12/16/2020    FREEKAPPAL 21.3 (H) 12/02/2020    FREEKAPPAL 16.1 11/18/2020    FREEKAPPAL 28.0 (H) 10/21/2020    FREEKAPPAL 1035.8 (H) 09/08/2020    FREEKAPPAL 542.1 (H) 06/16/2020    FREEKAPPAL 200.9 (H) 03/24/2020    FREEKAPPAL 80.7 (H) 12/31/2019    FREEKAPPAL 28.8 (H) 10/08/2019         Lab Results   Component Value Date    IGLFLC 7.2 01/13/2021    IGLFLC 8.0 12/16/2020    IGLFLC 10.3 12/02/2020     Lab Results   Component Value Date    WBC 4.30 01/27/2021    HGB 12.0 01/27/2021    HCT 37.0 01/27/2021    MCV 94.1 01/27/2021     (L) 01/27/2021       Lab Results   Component Value Date    GLUCOSE 101 (H) 01/13/2021    BUN 19 01/13/2021    CREATININE 1.11 (H) 01/13/2021    EGFRIFNONA 49 (L) 01/13/2021    BCR 17.1 01/13/2021    K 4.4 01/13/2021    CO2 22.0 01/13/2021    CALCIUM 8.7 01/13/2021    PROTENTOTREF 6.0 01/13/2021    ALBUMIN 3.8 01/13/2021    ALBUMIN 4.00 01/13/2021    LABIL2 1.8 (H) 01/13/2021    AST 19 01/13/2021    ALT 20 01/13/2021         Assessment/Plan "       1. multiple myeloma in relapse  status post autologous stem cell transplant.  She has mostly light chain only disease.  Her disease is nicely controlled.  Continue Pomalyst, daratumumab and dexamethasone.  continue her Pomalyst dose at 2 mg/day.  Her numbers are now stable.    She is not interested in undergoing a repeat autologous stem cell transplantation.  The goal is to get her disease under control at this time.    2.  Hypogammaglobulinemia.  She has not had issues with infections so far.  We will hold off on IVIG for now    3.  Back pain/ hip pain: The hip replacements has done wonders for her.       4.  Bilateral hip replacement secondary to myeloma by Dr. Lopez    5.  Shortness of breath.  We will have to watch for this.  May consider PFTs if does not improve.    6.  Bone mets.  She is on Zometa every 3 months.    7.  Covid-19 risk.  She received her first dose on 2/5/2021 and  she is awaiting her second dose    This visit addresses a chronic illness that poses a threat to life on drug therapy requiring intensive monitoring for toxicity and warrants a level 5 MDM.    Joseph Mckinley MD  [facility] Hematology and Oncology    2/15/2021      Return on: 04/12/21  Return in (Approximately): Schedule with next infusion, 2 months        CC:

## 2021-02-17 ENCOUNTER — OFFICE VISIT (OUTPATIENT)
Dept: INTERNAL MEDICINE | Facility: CLINIC | Age: 71
End: 2021-02-17

## 2021-02-17 VITALS — BODY MASS INDEX: 32.14 KG/M2 | HEIGHT: 61 IN

## 2021-02-17 DIAGNOSIS — K57.30 DIVERTICULOSIS OF LARGE INTESTINE WITHOUT HEMORRHAGE: ICD-10-CM

## 2021-02-17 DIAGNOSIS — I10 ESSENTIAL HYPERTENSION: Primary | Chronic | ICD-10-CM

## 2021-02-17 DIAGNOSIS — J42 CHRONIC BRONCHITIS, UNSPECIFIED CHRONIC BRONCHITIS TYPE (HCC): ICD-10-CM

## 2021-02-17 DIAGNOSIS — D12.6 TUBULAR ADENOMA OF COLON: ICD-10-CM

## 2021-02-17 DIAGNOSIS — K21.9 GASTROESOPHAGEAL REFLUX DISEASE WITHOUT ESOPHAGITIS: Chronic | ICD-10-CM

## 2021-02-17 LAB
ALBUMIN SERPL ELPH-MCNC: 3.6 G/DL (ref 2.9–4.4)
ALBUMIN/GLOB SERPL: 1.7 {RATIO} (ref 0.7–1.7)
ALPHA1 GLOB SERPL ELPH-MCNC: 0.2 G/DL (ref 0–0.4)
ALPHA2 GLOB SERPL ELPH-MCNC: 0.7 G/DL (ref 0.4–1)
B-GLOBULIN SERPL ELPH-MCNC: 0.9 G/DL (ref 0.7–1.3)
GAMMA GLOB SERPL ELPH-MCNC: 0.3 G/DL (ref 0.4–1.8)
GLOBULIN SER-MCNC: 2.2 G/DL (ref 2.2–3.9)
IGA SERPL-MCNC: 32 MG/DL (ref 87–352)
IGG SERPL-MCNC: 353 MG/DL (ref 586–1602)
IGM SERPL-MCNC: 13 MG/DL (ref 26–217)
INTERPRETATION SERPL IEP-IMP: ABNORMAL
KAPPA LC FREE SER-MCNC: 13 MG/L (ref 3.3–19.4)
KAPPA LC FREE/LAMBDA FREE SER: 2.13 {RATIO} (ref 0.26–1.65)
LABORATORY COMMENT REPORT: ABNORMAL
LAMBDA LC FREE SERPL-MCNC: 6.1 MG/L (ref 5.7–26.3)
M PROTEIN SERPL ELPH-MCNC: 0.2 G/DL
PROT SERPL-MCNC: 5.8 G/DL (ref 6–8.5)

## 2021-02-17 PROCEDURE — 99442 PR PHYS/QHP TELEPHONE EVALUATION 11-20 MIN: CPT | Performed by: INTERNAL MEDICINE

## 2021-02-17 NOTE — PROGRESS NOTES
Patient is a 70 y.o. female who is here for a follow up of hypertension.  Chief Complaint   Patient presents with   • Hypertension     You have chosen to receive care through a telephone visit. Do you consent to use a telephone visit for your medical care today? Yes      HPI:    Patient called for evaluation of HTN and GERD and COPD.  Doing ok.  Has had a recurrence of MM.  Getting treatment.  No nausea or emesis.  Energy level is low.  No fever or chills.  No abdominal pains.  Appetite is fair.  Breathing is ok.  Using O2 on prn basis.    History:     Patient Active Problem List   Diagnosis   • Diverticulosis of large intestine without hemorrhage   • Tubular adenoma of colon   • Essential hypertension   • COPD (chronic obstructive pulmonary disease) (CMS/HCC)   • Arthritis   • Gastroesophageal reflux disease without esophagitis   • Chronic left-sided low back pain with sciatica   • Thrombocytopenia since stem cell transplant March 2016   • Hx of autologous stem cell transplant (March 2016)   • CKD (chronic kidney disease), stage III (CMS/HCC)   • Former smoker   • Non-rheumatic tricuspid valve insufficiency   • Pulmonary hypertension (CMS/HCC)   • Chronic respiratory failure with hypoxia (been noncompliant with outpatient oxygen in past)   • Gait instability   • Chronic pain   • Debility   • Thrombocytopenia (CMS/HCC)   • On home oxygen therapy   • Hypogammaglobulinemia, acquired (CMS/HCC)   • Multiple myeloma in relapse (CMS/HCC)       Past Medical History:   Diagnosis Date   • Arthritis     hands   • Chronic bronchitis (CMS/HCC)    • Chronic kidney disease     stage 3   • COPD (chronic obstructive pulmonary disease) (CMS/HCC)    • DVT, lower extremity (CMS/HCC)     in 30's   • GERD (gastroesophageal reflux disease)    • History of total right hip replacement 09/15/2019   • Hypertension    • Multiple myeloma (CMS/HCC)    • Multiple myeloma, failed remission (CMS/HCC)    • On home oxygen therapy     4liters a night    • Osteoporosis        Past Surgical History:   Procedure Laterality Date   • LIMBAL STEM CELL TRANSPLANT  03/2016    @[facility] Dr. Josiah Spicer   • MOUTH SURGERY     • PARTIAL HIP ARTHROPLASTY Right 08/2019   • TOTAL HIP ARTHROPLASTY Left 01/2020    Dr Santa       Current Outpatient Medications on File Prior to Visit   Medication Sig   • acyclovir (ZOVIRAX) 400 MG tablet Take 1 tablet by mouth 2 (Two) Times a Day With Meals.   • albuterol sulfate  (90 Base) MCG/ACT inhaler INHALE 2 PUFFS BY MOUTH EVERY 6 HOURS AS NEEDED FOR WHEEZING OR  SHORTNESS  OF  AIR   • aspirin (aspirin) 81 MG EC tablet Take 1 tablet by mouth Daily.   • cyclobenzaprine (FLEXERIL) 10 MG tablet Take 1 tablet by mouth 3 (Three) Times a Day As Needed for Muscle Spasms.   • dexamethasone (DECADRON) 4 MG tablet Take 5 tablets by mouth 1 (One) Time Per Week.   • esomeprazole (nexIUM) 20 MG capsule Take 20 mg by mouth Daily As Needed.   • Melatonin 5 MG chewable tablet Chew 5 mg Every Night.   • metoprolol tartrate (LOPRESSOR) 25 MG tablet Take 1 tablet by mouth 2 (Two) Times a Day.   • ondansetron (ZOFRAN) 8 MG tablet Take 1 tablet by mouth 3 (Three) Times a Day As Needed for Nausea or Vomiting.   • pomalidomide (POMALYST) 2 MG chemo capsule Take 1 capsule by mouth Daily. Take on empty stomach, 2 hrs before or 2 hrs after a meal, on D1-21, then off 7 days. Swallow whole, DO NOT crush, break or chew.   • pomalidomide (POMALYST) 2 MG chemo capsule Take 1 capsule by mouth Daily. Days 1-21 then 7 days off, [facility] female [id number]   • sulfamethoxazole-trimethoprim (BACTRIM DS,SEPTRA DS) 800-160 MG per tablet Take 1 tablet by mouth 3 (Three) Times a Week. Take 1 tablet on Mondays, Wednesdays and Fridays.   • venlafaxine XR (Effexor XR) 37.5 MG 24 hr capsule Take 1 capsule by mouth Every Morning.   • O2 (OXYGEN) Inhale 4 L/min 1 (One) Time.     No current facility-administered medications on file prior to visit.        Family History   Problem Relation  Age of Onset   • Breast cancer Other    • Lung cancer Other    • Liver cancer Other    • Bone cancer Other        Social History     Socioeconomic History   • Marital status:      Spouse name: Not on file   • Number of children: Not on file   • Years of education: Not on file   • Highest education level: Not on file   Tobacco Use   • Smoking status: Former Smoker     Quit date: 2015     Years since quittin.5   • Smokeless tobacco: Never Used   Substance and Sexual Activity   • Alcohol use: No   • Drug use: No   • Sexual activity: Defer   Social History Narrative    Lives in [facility] with . Uses cane or walker for ambulation         Review of Systems   Constitutional: Positive for fatigue. Negative for activity change, appetite change and unexpected weight change.   HENT: Negative for congestion, ear pain, rhinorrhea, sinus pressure, sore throat and trouble swallowing.    Eyes: Negative for pain and visual disturbance.   Respiratory: Positive for shortness of breath. Negative for cough, chest tightness and wheezing.         Baseline   Cardiovascular: Negative for chest pain, palpitations and leg swelling.   Gastrointestinal: Negative for abdominal distention, abdominal pain, blood in stool, constipation, diarrhea and vomiting.         colonoscopy , repeat    Endocrine: Negative for cold intolerance, heat intolerance, polydipsia and polyphagia.   Genitourinary: Negative for difficulty urinating, dyspareunia, dysuria, frequency, hematuria, menstrual problem, urgency and vaginal discharge.        Refusing   Musculoskeletal: Positive for arthralgias, back pain and gait problem. Negative for joint swelling and myalgias.   Skin: Negative for color change, pallor, rash and wound.        Incision clean dry and intact   Allergic/Immunologic: Negative for environmental allergies, food allergies and immunocompromised state.   Neurological: Negative for dizziness, tremors, seizures,  "syncope, facial asymmetry, speech difficulty, weakness, numbness and headaches.   Hematological: Negative for adenopathy. Does not bruise/bleed easily.   Psychiatric/Behavioral: Negative for decreased concentration, dysphoric mood, sleep disturbance and suicidal ideas. The patient is nervous/anxious (situational).        Ht 154.9 cm (60.98\")   BMI 32.14 kg/m²       Physical Exam  Constitutional:       General: She is not in acute distress.  Pulmonary:      Effort: No respiratory distress.   Neurological:      General: No focal deficit present.      Mental Status: She is alert and oriented to person, place, and time. Mental status is at baseline.   Psychiatric:         Mood and Affect: Mood normal.         Behavior: Behavior normal.         Thought Content: Thought content normal.         Judgment: Judgment normal.         Procedure:      Discussion/Summary:    Htn- cont metoprolol, advised goal of 130/80  copd-not an issue since stopping tob, proventil prn, controlled on prn albuterol and prn O2  gerd/gastritis-cont ppi prn, controlled  MM/back pain with radiculopathy-f/u Dr Epps and  ortho, has recurrence  Hx of TA-resope 12/21 if health allows  Gait instability/chronic back pain-improved with surgery  Situational anxiety/depression-cont effexor xr  Diverticulosis-increase fiber     Prior records Labs noted and dw patient     I spent 14 minutes in total including but not limited to the 12 minutes spent in direct conversation with this patient.       Current Outpatient Medications:   •  acyclovir (ZOVIRAX) 400 MG tablet, Take 1 tablet by mouth 2 (Two) Times a Day With Meals., Disp: 60 tablet, Rfl: 5  •  albuterol sulfate  (90 Base) MCG/ACT inhaler, INHALE 2 PUFFS BY MOUTH EVERY 6 HOURS AS NEEDED FOR WHEEZING OR  SHORTNESS  OF  AIR, Disp: 18 g, Rfl: 11  •  aspirin (aspirin) 81 MG EC tablet, Take 1 tablet by mouth Daily., Disp: 30 tablet, Rfl: 3  •  cyclobenzaprine (FLEXERIL) 10 MG tablet, Take 1 tablet by " mouth 3 (Three) Times a Day As Needed for Muscle Spasms., Disp: 30 tablet, Rfl: 11  •  dexamethasone (DECADRON) 4 MG tablet, Take 5 tablets by mouth 1 (One) Time Per Week., Disp: 30 tablet, Rfl: 11  •  esomeprazole (nexIUM) 20 MG capsule, Take 20 mg by mouth Daily As Needed., Disp: , Rfl:   •  Melatonin 5 MG chewable tablet, Chew 5 mg Every Night., Disp: , Rfl:   •  metoprolol tartrate (LOPRESSOR) 25 MG tablet, Take 1 tablet by mouth 2 (Two) Times a Day., Disp: 180 tablet, Rfl: 3  •  ondansetron (ZOFRAN) 8 MG tablet, Take 1 tablet by mouth 3 (Three) Times a Day As Needed for Nausea or Vomiting., Disp: 30 tablet, Rfl: 5  •  pomalidomide (POMALYST) 2 MG chemo capsule, Take 1 capsule by mouth Daily. Take on empty stomach, 2 hrs before or 2 hrs after a meal, on D1-21, then off 7 days. Swallow whole, DO NOT crush, break or chew., Disp: 21 capsule, Rfl: 5  •  pomalidomide (POMALYST) 2 MG chemo capsule, Take 1 capsule by mouth Daily. Days 1-21 then 7 days off, [facility] female [id number], Disp: 21 capsule, Rfl: 0  •  sulfamethoxazole-trimethoprim (BACTRIM DS,SEPTRA DS) 800-160 MG per tablet, Take 1 tablet by mouth 3 (Three) Times a Week. Take 1 tablet on Mondays, Wednesdays and Fridays., Disp: 12 tablet, Rfl: 11  •  venlafaxine XR (Effexor XR) 37.5 MG 24 hr capsule, Take 1 capsule by mouth Every Morning., Disp: 90 capsule, Rfl: 3  •  O2 (OXYGEN), Inhale 4 L/min 1 (One) Time., Disp: , Rfl:         Diagnoses and all orders for this visit:    1. Essential hypertension (Primary)    2. Tubular adenoma of colon    3. Gastroesophageal reflux disease without esophagitis    4. Diverticulosis of large intestine without hemorrhage    5. Chronic bronchitis, unspecified chronic bronchitis type (CMS/HCC)        Answers for HPI/ROS submitted by the patient on 2/15/2021   What is the primary reason for your visit?: Physical

## 2021-02-24 ENCOUNTER — SPECIALTY PHARMACY (OUTPATIENT)
Dept: ONCOLOGY | Facility: HOSPITAL | Age: 71
End: 2021-02-24

## 2021-02-24 DIAGNOSIS — C90.02 MULTIPLE MYELOMA IN RELAPSE (HCC): ICD-10-CM

## 2021-02-24 NOTE — PROGRESS NOTES
Reviewed most recent oncology office visit note and  Labs from 2/15/21. Plan for continuation of pomalidomide with no dose adjustments. Released script for pomalidomide 2mg PO daily Days 1-21 then 7 days off, #21 with 0 RF to [facility] for continuation of treatment.

## 2021-03-01 ENCOUNTER — HOSPITAL ENCOUNTER (OUTPATIENT)
Dept: ONCOLOGY | Facility: HOSPITAL | Age: 71
Setting detail: INFUSION SERIES
Discharge: HOME | End: 2021-03-01

## 2021-03-01 VITALS
HEIGHT: 60 IN | BODY MASS INDEX: 33.77 KG/M2 | RESPIRATION RATE: 20 BRPM | SYSTOLIC BLOOD PRESSURE: 121 MMHG | WEIGHT: 172 LBS | HEART RATE: 63 BPM | DIASTOLIC BLOOD PRESSURE: 58 MMHG | TEMPERATURE: 96.7 F

## 2021-03-01 DIAGNOSIS — C90.02 MULTIPLE MYELOMA IN RELAPSE (HCC): Primary | ICD-10-CM

## 2021-03-01 LAB
ERYTHROCYTE [DISTWIDTH] IN BLOOD BY AUTOMATED COUNT: 15.2 % (ref 12.3–15.4)
HCT VFR BLD AUTO: 37.4 % (ref 34–46.6)
HGB BLD-MCNC: 11.9 G/DL (ref 12–15.9)
LYMPHOCYTES # BLD AUTO: 0.4 10*3/MM3 (ref 0.7–3.1)
LYMPHOCYTES NFR BLD AUTO: 20.9 % (ref 19.6–45.3)
MCH RBC QN AUTO: 29.8 PG (ref 26.6–33)
MCHC RBC AUTO-ENTMCNC: 31.8 G/DL (ref 31.5–35.7)
MCV RBC AUTO: 93.6 FL (ref 79–97)
MONOCYTES # BLD AUTO: 0.3 10*3/MM3 (ref 0.1–0.9)
MONOCYTES NFR BLD AUTO: 13.1 % (ref 5–12)
NEUTROPHILS NFR BLD AUTO: 1.4 10*3/MM3 (ref 1.7–7)
NEUTROPHILS NFR BLD AUTO: 66 % (ref 42.7–76)
PLATELET # BLD AUTO: 76 10*3/MM3 (ref 140–450)
PMV BLD AUTO: 8.2 FL (ref 6–12)
RBC # BLD AUTO: 4 10*6/MM3 (ref 3.77–5.28)
WBC # BLD AUTO: 2.1 10*3/MM3 (ref 3.4–10.8)

## 2021-03-01 PROCEDURE — 25010000002 DIPHENHYDRAMINE PER 50 MG: Performed by: INTERNAL MEDICINE

## 2021-03-01 PROCEDURE — 96375 TX/PRO/DX INJ NEW DRUG ADDON: CPT

## 2021-03-01 PROCEDURE — 25010000002 DARATUMUMAB-HYALURONIDASE-FIHJ 1800-30000 MG-UT/15ML SOLUTION: Performed by: INTERNAL MEDICINE

## 2021-03-01 PROCEDURE — 25010000002 METHYLPREDNISOLONE PER 125 MG: Performed by: INTERNAL MEDICINE

## 2021-03-01 PROCEDURE — 96401 CHEMO ANTI-NEOPL SQ/IM: CPT

## 2021-03-01 PROCEDURE — 85025 COMPLETE CBC W/AUTO DIFF WBC: CPT | Performed by: INTERNAL MEDICINE

## 2021-03-01 RX ORDER — ACETAMINOPHEN 500 MG
1000 TABLET ORAL ONCE
Status: COMPLETED | OUTPATIENT
Start: 2021-03-01 | End: 2021-03-01

## 2021-03-01 RX ORDER — METHYLPREDNISOLONE SODIUM SUCCINATE 125 MG/2ML
60 INJECTION, POWDER, LYOPHILIZED, FOR SOLUTION INTRAMUSCULAR; INTRAVENOUS ONCE
Status: COMPLETED | OUTPATIENT
Start: 2021-03-01 | End: 2021-03-01

## 2021-03-01 RX ORDER — SODIUM CHLORIDE 9 MG/ML
250 INJECTION, SOLUTION INTRAVENOUS ONCE
Status: COMPLETED | OUTPATIENT
Start: 2021-03-01 | End: 2021-03-01

## 2021-03-01 RX ADMIN — DARATUMUMAB AND HYALURONIDASE-FIHJ (HUMAN RECOMBINANT) 1800 MG: 1800; 30000 INJECTION SUBCUTANEOUS at 10:21

## 2021-03-01 RX ADMIN — METHYLPREDNISOLONE SODIUM SUCCINATE 60 MG: 125 INJECTION, POWDER, FOR SOLUTION INTRAMUSCULAR; INTRAVENOUS at 08:55

## 2021-03-01 RX ADMIN — SODIUM CHLORIDE 250 ML: 9 INJECTION, SOLUTION INTRAVENOUS at 08:54

## 2021-03-01 RX ADMIN — DIPHENHYDRAMINE HYDROCHLORIDE 25 MG: 50 INJECTION INTRAMUSCULAR; INTRAVENOUS at 08:58

## 2021-03-01 RX ADMIN — ACETAMINOPHEN 1000 MG: 500 TABLET ORAL at 08:55

## 2021-03-15 ENCOUNTER — OFFICE VISIT (OUTPATIENT)
Dept: ONCOLOGY | Facility: CLINIC | Age: 71
End: 2021-03-15

## 2021-03-15 ENCOUNTER — HOSPITAL ENCOUNTER (OUTPATIENT)
Dept: ONCOLOGY | Facility: HOSPITAL | Age: 71
Setting detail: INFUSION SERIES
Discharge: HOME | End: 2021-03-15

## 2021-03-15 VITALS
DIASTOLIC BLOOD PRESSURE: 75 MMHG | SYSTOLIC BLOOD PRESSURE: 133 MMHG | TEMPERATURE: 97.7 F | BODY MASS INDEX: 33.83 KG/M2 | HEART RATE: 62 BPM | RESPIRATION RATE: 20 BRPM | OXYGEN SATURATION: 91 % | HEIGHT: 60 IN | WEIGHT: 172.3 LBS

## 2021-03-15 DIAGNOSIS — C90.02 MULTIPLE MYELOMA IN RELAPSE (HCC): Primary | ICD-10-CM

## 2021-03-15 DIAGNOSIS — C90.01 MULTIPLE MYELOMA IN REMISSION (HCC): ICD-10-CM

## 2021-03-15 LAB
ALBUMIN SERPL-MCNC: 4.4 G/DL (ref 3.5–5.2)
ALBUMIN/GLOB SERPL: 2.8 G/DL
ALP SERPL-CCNC: 80 U/L (ref 39–117)
ALT SERPL W P-5'-P-CCNC: 18 U/L (ref 1–33)
ANION GAP SERPL CALCULATED.3IONS-SCNC: 11 MMOL/L (ref 5–15)
AST SERPL-CCNC: 19 U/L (ref 1–32)
BILIRUB SERPL-MCNC: 0.3 MG/DL (ref 0–1.2)
BUN SERPL-MCNC: 19 MG/DL (ref 8–23)
BUN/CREAT SERPL: 16 (ref 7–25)
CALCIUM SPEC-SCNC: 9.2 MG/DL (ref 8.6–10.5)
CHLORIDE SERPL-SCNC: 106 MMOL/L (ref 98–107)
CO2 SERPL-SCNC: 24 MMOL/L (ref 22–29)
CREAT BLDA-MCNC: 1.4 MG/DL
CREAT BLDA-MCNC: 1.4 MG/DL (ref 0.6–1.3)
CREAT SERPL-MCNC: 1.19 MG/DL (ref 0.57–1)
ERYTHROCYTE [DISTWIDTH] IN BLOOD BY AUTOMATED COUNT: 15.9 % (ref 12.3–15.4)
GFR SERPL CREATININE-BSD FRML MDRD: 45 ML/MIN/1.73
GLOBULIN UR ELPH-MCNC: 1.6 GM/DL
GLUCOSE SERPL-MCNC: 90 MG/DL (ref 65–99)
HCT VFR BLD AUTO: 38.8 % (ref 34–46.6)
HGB BLD-MCNC: 12.9 G/DL (ref 12–15.9)
LYMPHOCYTES # BLD AUTO: 0.4 10*3/MM3 (ref 0.7–3.1)
LYMPHOCYTES NFR BLD AUTO: 14.9 % (ref 19.6–45.3)
MAGNESIUM SERPL-MCNC: 2.1 MG/DL (ref 1.6–2.4)
MCH RBC QN AUTO: 30.9 PG (ref 26.6–33)
MCHC RBC AUTO-ENTMCNC: 33.3 G/DL (ref 31.5–35.7)
MCV RBC AUTO: 92.8 FL (ref 79–97)
MONOCYTES # BLD AUTO: 0.2 10*3/MM3 (ref 0.1–0.9)
MONOCYTES NFR BLD AUTO: 7 % (ref 5–12)
NEUTROPHILS NFR BLD AUTO: 2.2 10*3/MM3 (ref 1.7–7)
NEUTROPHILS NFR BLD AUTO: 78.1 % (ref 42.7–76)
PHOSPHATE SERPL-MCNC: 3.8 MG/DL (ref 2.5–4.5)
PLATELET # BLD AUTO: 130 10*3/MM3 (ref 140–450)
PMV BLD AUTO: 8.4 FL (ref 6–12)
POTASSIUM SERPL-SCNC: 5.1 MMOL/L (ref 3.5–5.2)
PROT SERPL-MCNC: 6 G/DL (ref 6–8.5)
RBC # BLD AUTO: 4.18 10*6/MM3 (ref 3.77–5.28)
SODIUM SERPL-SCNC: 141 MMOL/L (ref 136–145)
WBC # BLD AUTO: 2.8 10*3/MM3 (ref 3.4–10.8)

## 2021-03-15 PROCEDURE — 86334 IMMUNOFIX E-PHORESIS SERUM: CPT | Performed by: INTERNAL MEDICINE

## 2021-03-15 PROCEDURE — 85025 COMPLETE CBC W/AUTO DIFF WBC: CPT | Performed by: INTERNAL MEDICINE

## 2021-03-15 PROCEDURE — 82565 ASSAY OF CREATININE: CPT

## 2021-03-15 PROCEDURE — 83883 ASSAY NEPHELOMETRY NOT SPEC: CPT | Performed by: INTERNAL MEDICINE

## 2021-03-15 PROCEDURE — 96401 CHEMO ANTI-NEOPL SQ/IM: CPT

## 2021-03-15 PROCEDURE — 25010000002 METHYLPREDNISOLONE PER 125 MG: Performed by: INTERNAL MEDICINE

## 2021-03-15 PROCEDURE — 80053 COMPREHEN METABOLIC PANEL: CPT | Performed by: INTERNAL MEDICINE

## 2021-03-15 PROCEDURE — 25010000002 ZOLEDRONIC ACID PER 1 MG: Performed by: INTERNAL MEDICINE

## 2021-03-15 PROCEDURE — 83735 ASSAY OF MAGNESIUM: CPT | Performed by: INTERNAL MEDICINE

## 2021-03-15 PROCEDURE — 84100 ASSAY OF PHOSPHORUS: CPT | Performed by: INTERNAL MEDICINE

## 2021-03-15 PROCEDURE — 25010000002 DIPHENHYDRAMINE PER 50 MG: Performed by: INTERNAL MEDICINE

## 2021-03-15 PROCEDURE — 25010000002 DARATUMUMAB-HYALURONIDASE-FIHJ 1800-30000 MG-UT/15ML SOLUTION: Performed by: INTERNAL MEDICINE

## 2021-03-15 PROCEDURE — 84165 PROTEIN E-PHORESIS SERUM: CPT | Performed by: INTERNAL MEDICINE

## 2021-03-15 PROCEDURE — 96375 TX/PRO/DX INJ NEW DRUG ADDON: CPT

## 2021-03-15 PROCEDURE — 99215 OFFICE O/P EST HI 40 MIN: CPT | Performed by: INTERNAL MEDICINE

## 2021-03-15 PROCEDURE — 82784 ASSAY IGA/IGD/IGG/IGM EACH: CPT | Performed by: INTERNAL MEDICINE

## 2021-03-15 RX ORDER — ACETAMINOPHEN 500 MG
1000 TABLET ORAL ONCE
Status: COMPLETED | OUTPATIENT
Start: 2021-03-15 | End: 2021-03-15

## 2021-03-15 RX ORDER — MEPERIDINE HYDROCHLORIDE 50 MG/ML
25 INJECTION INTRAMUSCULAR; INTRAVENOUS; SUBCUTANEOUS
Status: CANCELLED | OUTPATIENT
Start: 2021-03-15 | End: 2021-03-16

## 2021-03-15 RX ORDER — SODIUM CHLORIDE 9 MG/ML
250 INJECTION, SOLUTION INTRAVENOUS ONCE
Status: CANCELLED | OUTPATIENT
Start: 2021-09-09

## 2021-03-15 RX ORDER — SODIUM CHLORIDE 9 MG/ML
250 INJECTION, SOLUTION INTRAVENOUS ONCE
Status: CANCELLED | OUTPATIENT
Start: 2021-03-15

## 2021-03-15 RX ORDER — SODIUM CHLORIDE 9 MG/ML
250 INJECTION, SOLUTION INTRAVENOUS ONCE
Status: COMPLETED | OUTPATIENT
Start: 2021-03-15 | End: 2021-03-15

## 2021-03-15 RX ORDER — METHYLPREDNISOLONE SODIUM SUCCINATE 125 MG/2ML
60 INJECTION, POWDER, LYOPHILIZED, FOR SOLUTION INTRAMUSCULAR; INTRAVENOUS ONCE
Status: CANCELLED | OUTPATIENT
Start: 2021-03-15

## 2021-03-15 RX ORDER — ACETAMINOPHEN 500 MG
1000 TABLET ORAL ONCE
Status: CANCELLED | OUTPATIENT
Start: 2021-03-15

## 2021-03-15 RX ORDER — DIPHENHYDRAMINE HYDROCHLORIDE 50 MG/ML
50 INJECTION INTRAMUSCULAR; INTRAVENOUS AS NEEDED
Status: CANCELLED | OUTPATIENT
Start: 2021-03-15

## 2021-03-15 RX ORDER — FAMOTIDINE 10 MG/ML
20 INJECTION, SOLUTION INTRAVENOUS AS NEEDED
Status: CANCELLED | OUTPATIENT
Start: 2021-03-15

## 2021-03-15 RX ORDER — METHYLPREDNISOLONE SODIUM SUCCINATE 125 MG/2ML
60 INJECTION, POWDER, LYOPHILIZED, FOR SOLUTION INTRAMUSCULAR; INTRAVENOUS ONCE
Status: COMPLETED | OUTPATIENT
Start: 2021-03-15 | End: 2021-03-15

## 2021-03-15 RX ORDER — SODIUM CHLORIDE 9 MG/ML
250 INJECTION, SOLUTION INTRAVENOUS ONCE
Status: CANCELLED | OUTPATIENT
Start: 2021-04-12

## 2021-03-15 RX ORDER — FAMOTIDINE 10 MG/ML
20 INJECTION, SOLUTION INTRAVENOUS AS NEEDED
Status: CANCELLED | OUTPATIENT
Start: 2021-04-12

## 2021-03-15 RX ORDER — METHYLPREDNISOLONE SODIUM SUCCINATE 125 MG/2ML
60 INJECTION, POWDER, LYOPHILIZED, FOR SOLUTION INTRAMUSCULAR; INTRAVENOUS ONCE
Status: CANCELLED | OUTPATIENT
Start: 2021-04-12

## 2021-03-15 RX ORDER — DIPHENHYDRAMINE HYDROCHLORIDE 50 MG/ML
50 INJECTION INTRAMUSCULAR; INTRAVENOUS AS NEEDED
Status: CANCELLED | OUTPATIENT
Start: 2021-04-12

## 2021-03-15 RX ORDER — ACETAMINOPHEN 500 MG
1000 TABLET ORAL ONCE
Status: CANCELLED | OUTPATIENT
Start: 2021-04-12

## 2021-03-15 RX ORDER — SODIUM CHLORIDE 9 MG/ML
250 INJECTION, SOLUTION INTRAVENOUS ONCE
Status: DISCONTINUED | OUTPATIENT
Start: 2021-03-15 | End: 2021-03-16 | Stop reason: HOSPADM

## 2021-03-15 RX ORDER — MEPERIDINE HYDROCHLORIDE 50 MG/ML
25 INJECTION INTRAMUSCULAR; INTRAVENOUS; SUBCUTANEOUS
Status: CANCELLED | OUTPATIENT
Start: 2021-04-12 | End: 2021-04-13

## 2021-03-15 RX ADMIN — ACETAMINOPHEN 1000 MG: 500 TABLET ORAL at 09:46

## 2021-03-15 RX ADMIN — METHYLPREDNISOLONE SODIUM SUCCINATE 60 MG: 125 INJECTION, POWDER, FOR SOLUTION INTRAMUSCULAR; INTRAVENOUS at 09:46

## 2021-03-15 RX ADMIN — SODIUM CHLORIDE 250 ML: 9 INJECTION, SOLUTION INTRAVENOUS at 09:44

## 2021-03-15 RX ADMIN — DARATUMUMAB AND HYALURONIDASE-FIHJ (HUMAN RECOMBINANT) 1800 MG: 1800; 30000 INJECTION SUBCUTANEOUS at 11:00

## 2021-03-15 RX ADMIN — ZOLEDRONIC ACID 3.3 MG: 4 INJECTION, SOLUTION, CONCENTRATE INTRAVENOUS at 10:21

## 2021-03-15 RX ADMIN — DIPHENHYDRAMINE HYDROCHLORIDE 25 MG: 50 INJECTION INTRAMUSCULAR; INTRAVENOUS at 09:47

## 2021-03-15 NOTE — PROGRESS NOTES
Oncology/Hematology History Overview Note   PROBLEM LIST:   1. Stage III IgA kappa clonal multiple myeloma. Diagnosed 9/20152. FISH panel reports multiple abnormalities including gain of 1q21 monosomy.  3. Monosomy 13 and gain of chromosomes 9, 15 and CCND1.  4. Initial M-spike of 7.1 g/dL at time of diagnosis and after 3 cycles, had  undetectable M-spike currently on Velcade, Revlimid and dexamethasone cycle #6  this week.  5. S/p Autologous SCT at [facility] with Dr. Venu Spicer in March 11,2016  6.Right leg pain - on lyrica  7. Bilateral hip replacements by Dr. Lopez     Multiple myeloma in relapse (CMS/[facility])   12/4/2017 -  Chemotherapy    OP Zoledronic Acid Q12W     9/10/2020 Initial Diagnosis    Multiple myeloma in relapse (CMS/[facility])     9/23/2020 -  Chemotherapy    OP MULTIPLE MYELOMA Daratumumab / Pomalidomide / Dexamethasone            REASON FOR VISIT: Multiple myeloma       Subjective     HISTORY OF PRESENT ILLNESS:   Ms. Miranda is here for follow up evaluation of myeloma management.  She is currently on second line therapy with Darzalex, Pomalyst and dexamethasone.  She has been on it since September 2020.  So far doing well.  Starting to have bilateral shoulder pain.  Along with burning sensation down her right arm. She continues to ambulate reasonably well.  Does have some issues with blurred vision at times.  No recent infections.      Past Medical History, Past Surgical History, Social History, Family History have been reviewed and are without significant changes except as mentioned.    Review of Systems   Constitutional: Negative.    HENT: Negative.    Eyes: Positive for visual disturbance.   Respiratory: Negative.    Cardiovascular: Negative.    Gastrointestinal: Negative.    Endocrine: Negative.    Genitourinary: Negative.    Musculoskeletal: Positive for arthralgias and gait problem.   Skin: Negative.    Allergic/Immunologic: Negative.    Hematological: Negative.    Psychiatric/Behavioral: Negative.   "     A comprehensive 14 point review of systems was performed and was negative except as mentioned.    Medications:  The current medication list was reviewed in the EMR    ALLERGIES:  No Known Allergies    Objective      /75   Pulse 62   Temp 97.7 °F (36.5 °C) (Temporal)   Resp 20   Ht 152.4 cm (60\")   Wt 78.2 kg (172 lb 4.8 oz)   SpO2 91%   BMI 33.65 kg/m²      Performance Status: 1    General: well appearing, in no acute distress  HEENT: sclera anicteric, oropharynx clear  Lymphatics: no cervical, supraclavicular, or axillary adenopathy  Cardiovascular: regular rate and rhythm, no murmurs  Lungs: clear to auscultation bilaterally  Abdomen: soft, nontender, nondistended.  No palpable organomegaly  Extremeties: no lower extremity edema  Skin: no rashes, lesions, bruising, or petechiae      RECENT LABS:    Lab Results   Component Value Date    MSPIKE 0.2 (H) 02/15/2021    MSPIKE 0.1 (H) 01/13/2021    MSPIKE 0.2 (H) 12/16/2020       Lab Results   Component Value Date    FREEKAPPAL 13.0 02/15/2021    FREEKAPPAL 13.3 01/13/2021    FREEKAPPAL 14.8 12/16/2020    FREEKAPPAL 21.3 (H) 12/02/2020    FREEKAPPAL 16.1 11/18/2020    FREEKAPPAL 28.0 (H) 10/21/2020    FREEKAPPAL 1035.8 (H) 09/08/2020    FREEKAPPAL 542.1 (H) 06/16/2020    FREEKAPPAL 200.9 (H) 03/24/2020    FREEKAPPAL 80.7 (H) 12/31/2019         Lab Results   Component Value Date    IGLFLC 6.1 02/15/2021    IGLFLC 7.2 01/13/2021    IGLFLC 8.0 12/16/2020     Lab Results   Component Value Date    WBC 2.10 (L) 03/01/2021    HGB 11.9 (L) 03/01/2021    HCT 37.4 03/01/2021    MCV 93.6 03/01/2021    PLT 76 (L) 03/01/2021       Lab Results   Component Value Date    GLUCOSE 75 02/15/2021    BUN 16 02/15/2021    CREATININE 1.20 02/15/2021    EGFRIFNONA 46 (L) 02/15/2021    BCR 13.8 02/15/2021    K 4.9 02/15/2021    CO2 24.0 02/15/2021    CALCIUM 8.9 02/15/2021    PROTENTOTREF 5.8 (L) 02/15/2021    ALBUMIN 3.6 02/15/2021    ALBUMIN 4.20 02/15/2021    LABIL2 1.7 " 02/15/2021    AST 15 02/15/2021    ALT 13 02/15/2021         Assessment/Plan       1. multiple myeloma in relapse  status post autologous stem cell transplant.  She has mostly light chain only disease.  Her disease is nicely controlled.  Continue Pomalyst, daratumumab and dexamethasone.  continue her Pomalyst dose at 2 mg/day.  Her numbers are now stable.  Her counts are tolerating it well.   She is not interested in undergoing a repeat autologous stem cell transplantation.  The goal is to get her disease under control at this time.    2.  Hypogammaglobulinemia.  She has not had issues with infections so far.  We will hold off on IVIG for now    3.  Back pain/ hip pain: The hip replacements has done wonders for her.       4.  Bilateral hip replacement secondary to myeloma by Dr. Lopez    5.  Shortness of breath.  We will have to watch for this.  May consider PFTs if does not improve.    6.  Bone mets.  She is on Zometa every 3 months.    7.  Covid-19 risk.  She received her first dose on 2/5/2021 and  she is awaiting her second dose    8.  Bilateral shoulder pain.  We will send her to Dr. Forman for evaluation.    Spoke to the patient regarding her life-threatening illness of myeloma.  I reviewed the toxicity of the drugs that we are giving her.  Reviewed the expectations of her disease.  This visit addresses a chronic illness that poses a threat to life on drug therapy requiring intensive monitoring for toxicity and warrants a level 5 MDM.    Joseph Mckinley MD  [facility] Hematology and Oncology    3/15/2021               CC:

## 2021-03-16 LAB
ALBUMIN SERPL ELPH-MCNC: 3.8 G/DL (ref 2.9–4.4)
ALBUMIN/GLOB SERPL: 1.6 {RATIO} (ref 0.7–1.7)
ALPHA1 GLOB SERPL ELPH-MCNC: 0.3 G/DL (ref 0–0.4)
ALPHA2 GLOB SERPL ELPH-MCNC: 0.7 G/DL (ref 0.4–1)
B-GLOBULIN SERPL ELPH-MCNC: 1.2 G/DL (ref 0.7–1.3)
GAMMA GLOB SERPL ELPH-MCNC: 0.4 G/DL (ref 0.4–1.8)
GLOBULIN SER-MCNC: 2.5 G/DL (ref 2.2–3.9)
IGA SERPL-MCNC: 34 MG/DL (ref 87–352)
IGG SERPL-MCNC: 368 MG/DL (ref 586–1602)
IGM SERPL-MCNC: 15 MG/DL (ref 26–217)
INTERPRETATION SERPL IEP-IMP: ABNORMAL
KAPPA LC FREE SER-MCNC: 14.1 MG/L (ref 3.3–19.4)
KAPPA LC FREE/LAMBDA FREE SER: 1.86 {RATIO} (ref 0.26–1.65)
LABORATORY COMMENT REPORT: ABNORMAL
LAMBDA LC FREE SERPL-MCNC: 7.6 MG/L (ref 5.7–26.3)
M PROTEIN SERPL ELPH-MCNC: 0.2 G/DL
PROT SERPL-MCNC: 6.3 G/DL (ref 6–8.5)

## 2021-03-23 DIAGNOSIS — C90.02 MULTIPLE MYELOMA IN RELAPSE (HCC): ICD-10-CM

## 2021-04-12 ENCOUNTER — HOSPITAL ENCOUNTER (OUTPATIENT)
Dept: ONCOLOGY | Facility: HOSPITAL | Age: 71
Setting detail: INFUSION SERIES
Discharge: HOME | End: 2021-04-12

## 2021-04-12 VITALS
DIASTOLIC BLOOD PRESSURE: 56 MMHG | RESPIRATION RATE: 20 BRPM | WEIGHT: 171 LBS | TEMPERATURE: 96.2 F | SYSTOLIC BLOOD PRESSURE: 128 MMHG | HEIGHT: 60 IN | BODY MASS INDEX: 33.57 KG/M2 | HEART RATE: 57 BPM

## 2021-04-12 DIAGNOSIS — C90.02 MULTIPLE MYELOMA IN RELAPSE (HCC): Primary | ICD-10-CM

## 2021-04-12 LAB
ALBUMIN SERPL-MCNC: 4.2 G/DL (ref 3.5–5.2)
ALBUMIN/GLOB SERPL: 2.8 G/DL
ALP SERPL-CCNC: 72 U/L (ref 39–117)
ALT SERPL W P-5'-P-CCNC: 15 U/L (ref 1–33)
ANION GAP SERPL CALCULATED.3IONS-SCNC: 9 MMOL/L (ref 5–15)
AST SERPL-CCNC: 17 U/L (ref 1–32)
BILIRUB SERPL-MCNC: 0.3 MG/DL (ref 0–1.2)
BUN SERPL-MCNC: 16 MG/DL (ref 8–23)
BUN/CREAT SERPL: 13.4 (ref 7–25)
CALCIUM SPEC-SCNC: 9 MG/DL (ref 8.6–10.5)
CHLORIDE SERPL-SCNC: 108 MMOL/L (ref 98–107)
CO2 SERPL-SCNC: 23 MMOL/L (ref 22–29)
CREAT BLDA-MCNC: 1.3 MG/DL (ref 0.6–1.3)
CREAT SERPL-MCNC: 1.19 MG/DL (ref 0.57–1)
ERYTHROCYTE [DISTWIDTH] IN BLOOD BY AUTOMATED COUNT: 16.6 % (ref 12.3–15.4)
GFR SERPL CREATININE-BSD FRML MDRD: 45 ML/MIN/1.73
GLOBULIN UR ELPH-MCNC: 1.5 GM/DL
GLUCOSE SERPL-MCNC: 124 MG/DL (ref 65–99)
HCT VFR BLD AUTO: 39.1 % (ref 34–46.6)
HGB BLD-MCNC: 12.6 G/DL (ref 12–15.9)
LYMPHOCYTES # BLD AUTO: 0.4 10*3/MM3 (ref 0.7–3.1)
LYMPHOCYTES NFR BLD AUTO: 14.8 % (ref 19.6–45.3)
MCH RBC QN AUTO: 29.8 PG (ref 26.6–33)
MCHC RBC AUTO-ENTMCNC: 32.1 G/DL (ref 31.5–35.7)
MCV RBC AUTO: 92.7 FL (ref 79–97)
MONOCYTES # BLD AUTO: 0.1 10*3/MM3 (ref 0.1–0.9)
MONOCYTES NFR BLD AUTO: 5.6 % (ref 5–12)
NEUTROPHILS NFR BLD AUTO: 2.1 10*3/MM3 (ref 1.7–7)
NEUTROPHILS NFR BLD AUTO: 79.6 % (ref 42.7–76)
PLATELET # BLD AUTO: 150 10*3/MM3 (ref 140–450)
PMV BLD AUTO: 8.3 FL (ref 6–12)
POTASSIUM SERPL-SCNC: 4.4 MMOL/L (ref 3.5–5.2)
PROT SERPL-MCNC: 5.7 G/DL (ref 6–8.5)
RBC # BLD AUTO: 4.21 10*6/MM3 (ref 3.77–5.28)
SODIUM SERPL-SCNC: 140 MMOL/L (ref 136–145)
WBC # BLD AUTO: 2.6 10*3/MM3 (ref 3.4–10.8)

## 2021-04-12 PROCEDURE — 83883 ASSAY NEPHELOMETRY NOT SPEC: CPT | Performed by: INTERNAL MEDICINE

## 2021-04-12 PROCEDURE — 82565 ASSAY OF CREATININE: CPT

## 2021-04-12 PROCEDURE — 25010000002 DARATUMUMAB-HYALURONIDASE-FIHJ 1800-30000 MG-UT/15ML SOLUTION: Performed by: INTERNAL MEDICINE

## 2021-04-12 PROCEDURE — 80053 COMPREHEN METABOLIC PANEL: CPT | Performed by: INTERNAL MEDICINE

## 2021-04-12 PROCEDURE — 84165 PROTEIN E-PHORESIS SERUM: CPT | Performed by: INTERNAL MEDICINE

## 2021-04-12 PROCEDURE — 82784 ASSAY IGA/IGD/IGG/IGM EACH: CPT | Performed by: INTERNAL MEDICINE

## 2021-04-12 PROCEDURE — 96375 TX/PRO/DX INJ NEW DRUG ADDON: CPT

## 2021-04-12 PROCEDURE — 25010000002 METHYLPREDNISOLONE PER 125 MG: Performed by: INTERNAL MEDICINE

## 2021-04-12 PROCEDURE — 96401 CHEMO ANTI-NEOPL SQ/IM: CPT

## 2021-04-12 PROCEDURE — 96374 THER/PROPH/DIAG INJ IV PUSH: CPT

## 2021-04-12 PROCEDURE — 85025 COMPLETE CBC W/AUTO DIFF WBC: CPT | Performed by: INTERNAL MEDICINE

## 2021-04-12 PROCEDURE — 25010000002 DIPHENHYDRAMINE PER 50 MG: Performed by: INTERNAL MEDICINE

## 2021-04-12 PROCEDURE — 86334 IMMUNOFIX E-PHORESIS SERUM: CPT | Performed by: INTERNAL MEDICINE

## 2021-04-12 RX ORDER — METHYLPREDNISOLONE SODIUM SUCCINATE 125 MG/2ML
60 INJECTION, POWDER, LYOPHILIZED, FOR SOLUTION INTRAMUSCULAR; INTRAVENOUS ONCE
Status: COMPLETED | OUTPATIENT
Start: 2021-04-12 | End: 2021-04-12

## 2021-04-12 RX ORDER — SODIUM CHLORIDE 9 MG/ML
250 INJECTION, SOLUTION INTRAVENOUS ONCE
Status: COMPLETED | OUTPATIENT
Start: 2021-04-12 | End: 2021-04-12

## 2021-04-12 RX ORDER — ACETAMINOPHEN 500 MG
1000 TABLET ORAL ONCE
Status: COMPLETED | OUTPATIENT
Start: 2021-04-12 | End: 2021-04-12

## 2021-04-12 RX ADMIN — DIPHENHYDRAMINE HYDROCHLORIDE 25 MG: 50 INJECTION INTRAMUSCULAR; INTRAVENOUS at 10:47

## 2021-04-12 RX ADMIN — SODIUM CHLORIDE 250 ML: 9 INJECTION, SOLUTION INTRAVENOUS at 10:42

## 2021-04-12 RX ADMIN — ACETAMINOPHEN 1000 MG: 500 TABLET, FILM COATED ORAL at 10:41

## 2021-04-12 RX ADMIN — DARATUMUMAB AND HYALURONIDASE-FIHJ (HUMAN RECOMBINANT) 1800 MG: 1800; 30000 INJECTION SUBCUTANEOUS at 12:22

## 2021-04-12 RX ADMIN — METHYLPREDNISOLONE SODIUM SUCCINATE 60 MG: 125 INJECTION, POWDER, FOR SOLUTION INTRAMUSCULAR; INTRAVENOUS at 10:44

## 2021-04-13 PROCEDURE — 96374 THER/PROPH/DIAG INJ IV PUSH: CPT

## 2021-04-14 LAB
ALBUMIN SERPL ELPH-MCNC: 3.3 G/DL (ref 2.9–4.4)
ALBUMIN/GLOB SERPL: 1.4 {RATIO} (ref 0.7–1.7)
ALPHA1 GLOB SERPL ELPH-MCNC: 0.3 G/DL (ref 0–0.4)
ALPHA2 GLOB SERPL ELPH-MCNC: 0.8 G/DL (ref 0.4–1)
B-GLOBULIN SERPL ELPH-MCNC: 1 G/DL (ref 0.7–1.3)
GAMMA GLOB SERPL ELPH-MCNC: 0.4 G/DL (ref 0.4–1.8)
GLOBULIN SER-MCNC: 2.4 G/DL (ref 2.2–3.9)
IGA SERPL-MCNC: 33 MG/DL (ref 87–352)
IGG SERPL-MCNC: 317 MG/DL (ref 586–1602)
IGM SERPL-MCNC: 13 MG/DL (ref 26–217)
INTERPRETATION SERPL IEP-IMP: ABNORMAL
KAPPA LC FREE SER-MCNC: 13.8 MG/L (ref 3.3–19.4)
KAPPA LC FREE/LAMBDA FREE SER: 1.89 {RATIO} (ref 0.26–1.65)
LABORATORY COMMENT REPORT: ABNORMAL
LAMBDA LC FREE SERPL-MCNC: 7.3 MG/L (ref 5.7–26.3)
M PROTEIN SERPL ELPH-MCNC: 0.1 G/DL
PROT SERPL-MCNC: 5.7 G/DL (ref 6–8.5)

## 2021-04-20 NOTE — PROGRESS NOTES
"      HOSPITALIST DAILY PROGRESS NOTE    Chief Complaint: cough and fever    Subjective   SUBJECTIVE/OVERNIGHT EVENTS   No acute events overnight.  Patient endorses she is much less short of breath today.  She is no longer dyspneic with conversation.  Patient notes she has been \"wobbly on her feet \"for up to a week prior to this admission, has not been out of the bed much since being here.  Encouraged patient to get up to chair today now that respiratory status is improved, will have PT evaluate patient as well.      Review of Systems:  Gen-no fevers, no chills  CV-no chest pain, no palpitations  Resp-+ cough, + dyspnea  GI-no N/V/D, no abd pain    Otherwise complete ROS is negative except as mentioned in the HPI.    Objective   OBJECTIVE   I have reviewed the vital signs.  /69 (BP Location: Right arm, Patient Position: Lying)  Pulse 101  Temp 97.8 °F (36.6 °C) (Oral)   Resp 12  Ht 61\" (154.9 cm)  Wt 164 lb (74.4 kg)  SpO2 91%  BMI 30.99 kg/m2    Physical Exam:  Gen-this lady chronically ill appearing inno acute distress  CV-tachy RR, S1 S2 normal, no m/r/g  Resp-Nonlabored respirations at rest, no longer has dyspnea with conversation, no wheeze, posterior and lateral right mid and base rales very slight and much improved from previous  Abd-soft, NT, ND, +BS  Ext-no edema  Neuro-A&Ox3, no focal deficits  Psych-appropriate mood    Results:  I have reviewed the labs, culture data, radiology results, and diagnostic studies.      Results from last 7 days  Lab Units 09/15/17  0429 09/13/17  0458 09/12/17  1019   WBC 10*3/mm3 4.48 6.90 7.00   HEMOGLOBIN g/dL 11.8 11.7 12.4   HEMATOCRIT % 35.6 37.0 36.0   PLATELETS 10*3/mm3 20* 24* 22*       Results from last 7 days  Lab Units 09/15/17  0429   SODIUM mmol/L 143   POTASSIUM mmol/L 3.5   CHLORIDE mmol/L 111*   CO2 mmol/L 20.0   BUN mg/dL 25*   CREATININE mg/dL 1.30   GLUCOSE mg/dL 120*   CALCIUM mg/dL 8.8       Culture Data:  Cultures:    Blood Culture   Date " Value Ref Range Status   09/12/2017 No growth at less than 24 hours  Preliminary   09/12/2017 No growth at less than 24 hours  Preliminary       Radiology Results:  Imaging Results (last 24 hours)     Procedure Component Value Units Date/Time    CT Chest Without Contrast [[id number]] Collected:  09/14/17 1044     Updated:  09/14/17 2308    Narrative:       EXAMINATION: CT CHEST WO CONTRAST-09/14/2017:      INDICATION: Hypoxia, pneumonia; J18.9-Pneumonia, unspecified organism;  R09.02-Hypoxemia; D69.6-Thrombocytopenia, unspecified.         TECHNIQUE: Spiral acquisition 5 mm axial images through the chest and  upper abdomen.     The radiation dose reduction device was turned on for each scan per the  ALARA (As Low as Reasonably Achievable) protocol.     COMPARISON: Portable chest radiograph 09/13/2017.     FINDINGS: Previous exam report indicates chronic appearing lung changes  similar to prior studies. History indicates pneumonia and hypoxia.     Today's exam shows an extensive right lower lobe pneumonia,  significantly greater than would be expected from the appearance on  yesterday's chest radiograph. There is mild subpleural interstitial  disease of the medial left upper lobe which is favored to be chronic.  There is a trace amount of disease in the lingula which is nonspecific,  and similar milder right middle lobe interstitial changes favored to  represent pneumonia, perhaps via transpleural spread. There is no  effusion. Mediastinal window images show no evidence of significant  adenopathy and no pericardial effusion.     Included images of the upper abdomen show no significant abnormalities  of the visualized portions of the liver, spleen, pancreas, adrenal  glands, or upper renal poles. A debris layer or layer of noncalcified  small gallstones is noted in the gallbladder. No inflammatory changes  are seen.       Impression:       1. Extensive right lower lobe pneumonia. Mild right middle lobe and  lingular  disease. No evidence of effusion.  2. No other evidence of active chest disease elsewhere.  3. Gallbladder sludge versus noncalcified gallstones.     D:  09/14/2017  E:  09/14/2017           This report was finalized on 9/14/2017 11:06 PM by DR. Manjit Schofield MD.           I have reviewed the medications.    Assessment/Plan   ASSESSMENT/PLAN    Principal Problem:    Pneumonia of right lower lobe due to infectious organism  Active Problems:    Multiple myeloma in remission    Hypertension    Chronic obstructive pulmonary disease with acute exacerbation    Gastroesophageal reflux disease without esophagitis    Thrombocytopenia since stem cell transplant March 2016    Hx of autologous stem cell transplant (March 2016)    CKD (chronic kidney disease), stage III    Former smoker    Non-rheumatic tricuspid valve insufficiency    Pulmonary hypertension    Chronic respiratory failure with hypoxia (been noncompliant with outpatient oxygen in past)      67 yof with hx of COPD (supposed to be on chronic oxygen but is noncompliant), HTN and MM s/p autologous stem cell transplant 3/2016 with subsequent chronic thrombocytopenia limiting her ability to be on immunosuppressive therapy, p/w fever and cough, admitted for suspected RLL pneumonia    Plan  - ID follows for abx therapy.  Initially it was thought that patient was immunocompromised due to questionable history of taking Revlimid therapy however upon further history since determined that patient was never on immunosuppressants status post her stem cell transplant due to chronic thrombocytopenia, therefore she is not immunocompromised.  - Teflaro d/c'd and remains on Zosyn and doxy.  Tentative plan is d/c ~Monday on PO cefuroxime and doxy.  - More aggressive treatment of COPD component of her respiratory status, 9/14/17 added Pulmicort nebs and Solu-Medrol with good improvement.  Will transition to PO prednisone.    - Patient with elevated BNP, ECHO with no evidence of CHF but  does confirm pulmonary hypertension with tricuspid regurgitation (RVSP 48mmHg)  - continue to monitor platelets, currently lower than her previous bradford, appears her baseline is ~40. Likely secondary to acute illness.  Followed by Dr. Epps, per d/w with 9/15/17 he added immunoglobin panel and may consider giving her IVIG outpatient infusion if indicated.  At d/c she will need close follow-up with him for lab surveillance.  - Will need home O2 arranged as patient previously was told she needed chronic O2 but was noncompliant.  CM orders complete.         DVT ppx- mechanical due to thrombocytopenia    Dispo -  tentative plan is home ~Monday      Flavia Bender MD  09/15/17  4:52 PM           No

## 2021-04-21 ENCOUNTER — SPECIALTY PHARMACY (OUTPATIENT)
Dept: ONCOLOGY | Facility: HOSPITAL | Age: 71
End: 2021-04-21

## 2021-04-21 DIAGNOSIS — C90.02 MULTIPLE MYELOMA IN RELAPSE (HCC): ICD-10-CM

## 2021-05-10 ENCOUNTER — OFFICE VISIT (OUTPATIENT)
Dept: ONCOLOGY | Facility: CLINIC | Age: 71
End: 2021-05-10

## 2021-05-10 ENCOUNTER — HOSPITAL ENCOUNTER (OUTPATIENT)
Dept: ONCOLOGY | Facility: HOSPITAL | Age: 71
Setting detail: INFUSION SERIES
Discharge: HOME | End: 2021-05-10

## 2021-05-10 ENCOUNTER — LAB (OUTPATIENT)
Dept: LAB | Facility: HOSPITAL | Age: 71
End: 2021-05-10

## 2021-05-10 VITALS
RESPIRATION RATE: 20 BRPM | BODY MASS INDEX: 34.2 KG/M2 | TEMPERATURE: 96.9 F | HEART RATE: 62 BPM | SYSTOLIC BLOOD PRESSURE: 148 MMHG | OXYGEN SATURATION: 89 % | HEIGHT: 60 IN | WEIGHT: 174.2 LBS | DIASTOLIC BLOOD PRESSURE: 75 MMHG

## 2021-05-10 DIAGNOSIS — C90.02 MULTIPLE MYELOMA IN RELAPSE (HCC): Primary | ICD-10-CM

## 2021-05-10 DIAGNOSIS — C90.02 MULTIPLE MYELOMA IN RELAPSE (HCC): ICD-10-CM

## 2021-05-10 LAB
ALBUMIN SERPL-MCNC: 4.6 G/DL (ref 3.5–5.2)
ALBUMIN/GLOB SERPL: 2.1 G/DL
ALP SERPL-CCNC: 72 U/L (ref 39–117)
ALT SERPL W P-5'-P-CCNC: 13 U/L (ref 1–33)
ANION GAP SERPL CALCULATED.3IONS-SCNC: 12 MMOL/L (ref 5–15)
AST SERPL-CCNC: 18 U/L (ref 1–32)
BILIRUB SERPL-MCNC: 0.4 MG/DL (ref 0–1.2)
BUN SERPL-MCNC: 19 MG/DL (ref 8–23)
BUN/CREAT SERPL: 15.4 (ref 7–25)
CALCIUM SPEC-SCNC: 9.5 MG/DL (ref 8.6–10.5)
CHLORIDE SERPL-SCNC: 104 MMOL/L (ref 98–107)
CO2 SERPL-SCNC: 25 MMOL/L (ref 22–29)
CREAT BLDA-MCNC: 1.3 MG/DL
CREAT BLDA-MCNC: 1.3 MG/DL (ref 0.6–1.3)
CREAT SERPL-MCNC: 1.23 MG/DL (ref 0.57–1)
ERYTHROCYTE [DISTWIDTH] IN BLOOD BY AUTOMATED COUNT: 17.2 % (ref 12.3–15.4)
GFR SERPL CREATININE-BSD FRML MDRD: 43 ML/MIN/1.73
GLOBULIN UR ELPH-MCNC: 2.2 GM/DL
GLUCOSE SERPL-MCNC: 88 MG/DL (ref 65–99)
HCT VFR BLD AUTO: 43.2 % (ref 34–46.6)
HGB BLD-MCNC: 13.9 G/DL (ref 12–15.9)
LYMPHOCYTES # BLD AUTO: 0.6 10*3/MM3 (ref 0.7–3.1)
LYMPHOCYTES NFR BLD AUTO: 22.2 % (ref 19.6–45.3)
MCH RBC QN AUTO: 29.5 PG (ref 26.6–33)
MCHC RBC AUTO-ENTMCNC: 32.1 G/DL (ref 31.5–35.7)
MCV RBC AUTO: 91.9 FL (ref 79–97)
MONOCYTES # BLD AUTO: 0.3 10*3/MM3 (ref 0.1–0.9)
MONOCYTES NFR BLD AUTO: 11 % (ref 5–12)
NEUTROPHILS NFR BLD AUTO: 1.9 10*3/MM3 (ref 1.7–7)
NEUTROPHILS NFR BLD AUTO: 66.8 % (ref 42.7–76)
PLATELET # BLD AUTO: 162 10*3/MM3 (ref 140–450)
PMV BLD AUTO: 8.5 FL (ref 6–12)
POTASSIUM SERPL-SCNC: 4.7 MMOL/L (ref 3.5–5.2)
PROT SERPL-MCNC: 6.8 G/DL (ref 6–8.5)
RBC # BLD AUTO: 4.7 10*6/MM3 (ref 3.77–5.28)
SODIUM SERPL-SCNC: 141 MMOL/L (ref 136–145)
WBC # BLD AUTO: 2.9 10*3/MM3 (ref 3.4–10.8)

## 2021-05-10 PROCEDURE — 83883 ASSAY NEPHELOMETRY NOT SPEC: CPT

## 2021-05-10 PROCEDURE — 82784 ASSAY IGA/IGD/IGG/IGM EACH: CPT

## 2021-05-10 PROCEDURE — 80053 COMPREHEN METABOLIC PANEL: CPT

## 2021-05-10 PROCEDURE — 96402 CHEMO HORMON ANTINEOPL SQ/IM: CPT

## 2021-05-10 PROCEDURE — 25010000002 METHYLPREDNISOLONE PER 125 MG: Performed by: INTERNAL MEDICINE

## 2021-05-10 PROCEDURE — 99215 OFFICE O/P EST HI 40 MIN: CPT | Performed by: INTERNAL MEDICINE

## 2021-05-10 PROCEDURE — 25010000002 DIPHENHYDRAMINE PER 50 MG: Performed by: INTERNAL MEDICINE

## 2021-05-10 PROCEDURE — 86334 IMMUNOFIX E-PHORESIS SERUM: CPT

## 2021-05-10 PROCEDURE — 96401 CHEMO ANTI-NEOPL SQ/IM: CPT

## 2021-05-10 PROCEDURE — 82565 ASSAY OF CREATININE: CPT

## 2021-05-10 PROCEDURE — 25010000002 DARATUMUMAB-HYALURONIDASE-FIHJ 1800-30000 MG-UT/15ML SOLUTION: Performed by: INTERNAL MEDICINE

## 2021-05-10 PROCEDURE — 85025 COMPLETE CBC W/AUTO DIFF WBC: CPT

## 2021-05-10 PROCEDURE — 36415 COLL VENOUS BLD VENIPUNCTURE: CPT

## 2021-05-10 PROCEDURE — 96375 TX/PRO/DX INJ NEW DRUG ADDON: CPT

## 2021-05-10 PROCEDURE — 84165 PROTEIN E-PHORESIS SERUM: CPT

## 2021-05-10 RX ORDER — DIPHENHYDRAMINE HYDROCHLORIDE 50 MG/ML
50 INJECTION INTRAMUSCULAR; INTRAVENOUS AS NEEDED
Status: CANCELLED | OUTPATIENT
Start: 2021-05-10

## 2021-05-10 RX ORDER — METHYLPREDNISOLONE SODIUM SUCCINATE 125 MG/2ML
60 INJECTION, POWDER, LYOPHILIZED, FOR SOLUTION INTRAMUSCULAR; INTRAVENOUS ONCE
Status: CANCELLED | OUTPATIENT
Start: 2021-05-10

## 2021-05-10 RX ORDER — SODIUM CHLORIDE 9 MG/ML
250 INJECTION, SOLUTION INTRAVENOUS ONCE
Status: COMPLETED | OUTPATIENT
Start: 2021-05-10 | End: 2021-05-10

## 2021-05-10 RX ORDER — SODIUM CHLORIDE 9 MG/ML
250 INJECTION, SOLUTION INTRAVENOUS ONCE
Status: CANCELLED | OUTPATIENT
Start: 2021-05-10

## 2021-05-10 RX ORDER — FAMOTIDINE 10 MG/ML
20 INJECTION, SOLUTION INTRAVENOUS AS NEEDED
Status: CANCELLED | OUTPATIENT
Start: 2021-07-06

## 2021-05-10 RX ORDER — ACETAMINOPHEN 500 MG
1000 TABLET ORAL ONCE
Status: CANCELLED | OUTPATIENT
Start: 2021-05-10

## 2021-05-10 RX ORDER — ACETAMINOPHEN 500 MG
1000 TABLET ORAL ONCE
Status: CANCELLED | OUTPATIENT
Start: 2021-09-09

## 2021-05-10 RX ORDER — SODIUM CHLORIDE 9 MG/ML
250 INJECTION, SOLUTION INTRAVENOUS ONCE
Status: CANCELLED | OUTPATIENT
Start: 2021-09-09

## 2021-05-10 RX ORDER — SODIUM CHLORIDE 9 MG/ML
250 INJECTION, SOLUTION INTRAVENOUS ONCE
Status: CANCELLED | OUTPATIENT
Start: 2021-06-07

## 2021-05-10 RX ORDER — ACETAMINOPHEN 500 MG
1000 TABLET ORAL ONCE
Status: CANCELLED | OUTPATIENT
Start: 2021-06-07

## 2021-05-10 RX ORDER — DIPHENHYDRAMINE HYDROCHLORIDE 50 MG/ML
50 INJECTION INTRAMUSCULAR; INTRAVENOUS AS NEEDED
Status: CANCELLED | OUTPATIENT
Start: 2021-07-06

## 2021-05-10 RX ORDER — FAMOTIDINE 10 MG/ML
20 INJECTION, SOLUTION INTRAVENOUS AS NEEDED
Status: CANCELLED | OUTPATIENT
Start: 2021-05-10

## 2021-05-10 RX ORDER — METHYLPREDNISOLONE SODIUM SUCCINATE 125 MG/2ML
60 INJECTION, POWDER, LYOPHILIZED, FOR SOLUTION INTRAMUSCULAR; INTRAVENOUS ONCE
Status: CANCELLED | OUTPATIENT
Start: 2021-09-09

## 2021-05-10 RX ORDER — ACETAMINOPHEN 500 MG
1000 TABLET ORAL ONCE
Status: CANCELLED | OUTPATIENT
Start: 2021-07-06

## 2021-05-10 RX ORDER — METHYLPREDNISOLONE SODIUM SUCCINATE 125 MG/2ML
60 INJECTION, POWDER, LYOPHILIZED, FOR SOLUTION INTRAMUSCULAR; INTRAVENOUS ONCE
Status: CANCELLED | OUTPATIENT
Start: 2021-06-07

## 2021-05-10 RX ORDER — METHYLPREDNISOLONE SODIUM SUCCINATE 125 MG/2ML
60 INJECTION, POWDER, LYOPHILIZED, FOR SOLUTION INTRAMUSCULAR; INTRAVENOUS ONCE
Status: CANCELLED | OUTPATIENT
Start: 2021-07-06

## 2021-05-10 RX ORDER — MEPERIDINE HYDROCHLORIDE 50 MG/ML
25 INJECTION INTRAMUSCULAR; INTRAVENOUS; SUBCUTANEOUS
Status: CANCELLED | OUTPATIENT
Start: 2021-09-09 | End: 2021-08-03

## 2021-05-10 RX ORDER — DIPHENHYDRAMINE HYDROCHLORIDE 50 MG/ML
50 INJECTION INTRAMUSCULAR; INTRAVENOUS AS NEEDED
Status: CANCELLED | OUTPATIENT
Start: 2021-09-09

## 2021-05-10 RX ORDER — METHYLPREDNISOLONE SODIUM SUCCINATE 125 MG/2ML
60 INJECTION, POWDER, LYOPHILIZED, FOR SOLUTION INTRAMUSCULAR; INTRAVENOUS ONCE
Status: COMPLETED | OUTPATIENT
Start: 2021-05-10 | End: 2021-05-10

## 2021-05-10 RX ORDER — MEPERIDINE HYDROCHLORIDE 50 MG/ML
25 INJECTION INTRAMUSCULAR; INTRAVENOUS; SUBCUTANEOUS
Status: CANCELLED | OUTPATIENT
Start: 2021-05-10 | End: 2021-05-11

## 2021-05-10 RX ORDER — MEPERIDINE HYDROCHLORIDE 50 MG/ML
25 INJECTION INTRAMUSCULAR; INTRAVENOUS; SUBCUTANEOUS
Status: CANCELLED | OUTPATIENT
Start: 2021-07-06 | End: 2021-07-06

## 2021-05-10 RX ORDER — MEPERIDINE HYDROCHLORIDE 50 MG/ML
25 INJECTION INTRAMUSCULAR; INTRAVENOUS; SUBCUTANEOUS
Status: CANCELLED | OUTPATIENT
Start: 2021-06-07 | End: 2021-06-08

## 2021-05-10 RX ORDER — ACETAMINOPHEN 500 MG
1000 TABLET ORAL ONCE
Status: COMPLETED | OUTPATIENT
Start: 2021-05-10 | End: 2021-05-10

## 2021-05-10 RX ORDER — FAMOTIDINE 10 MG/ML
20 INJECTION, SOLUTION INTRAVENOUS AS NEEDED
Status: CANCELLED | OUTPATIENT
Start: 2021-09-09

## 2021-05-10 RX ORDER — SODIUM CHLORIDE 9 MG/ML
250 INJECTION, SOLUTION INTRAVENOUS ONCE
Status: CANCELLED | OUTPATIENT
Start: 2021-07-06

## 2021-05-10 RX ORDER — DIPHENHYDRAMINE HYDROCHLORIDE 50 MG/ML
50 INJECTION INTRAMUSCULAR; INTRAVENOUS AS NEEDED
Status: CANCELLED | OUTPATIENT
Start: 2021-06-07

## 2021-05-10 RX ORDER — FAMOTIDINE 10 MG/ML
20 INJECTION, SOLUTION INTRAVENOUS AS NEEDED
Status: CANCELLED | OUTPATIENT
Start: 2021-06-07

## 2021-05-10 RX ADMIN — SODIUM CHLORIDE 250 ML: 9 INJECTION, SOLUTION INTRAVENOUS at 09:43

## 2021-05-10 RX ADMIN — METHYLPREDNISOLONE SODIUM SUCCINATE 60 MG: 125 INJECTION, POWDER, FOR SOLUTION INTRAMUSCULAR; INTRAVENOUS at 09:43

## 2021-05-10 RX ADMIN — ACETAMINOPHEN 1000 MG: 500 TABLET ORAL at 09:43

## 2021-05-10 RX ADMIN — DARATUMUMAB AND HYALURONIDASE-FIHJ (HUMAN RECOMBINANT) 1800 MG: 1800; 30000 INJECTION SUBCUTANEOUS at 11:00

## 2021-05-10 RX ADMIN — DIPHENHYDRAMINE HYDROCHLORIDE 25 MG: 50 INJECTION INTRAMUSCULAR; INTRAVENOUS at 09:45

## 2021-05-10 NOTE — PROGRESS NOTES
Oncology/Hematology History Overview Note   PROBLEM LIST:   1. Stage III IgA kappa clonal multiple myeloma. Diagnosed 9/20152. FISH panel reports multiple abnormalities including gain of 1q21 monosomy.  3. Monosomy 13 and gain of chromosomes 9, 15 and CCND1.  4. Initial M-spike of 7.1 g/dL at time of diagnosis and after 3 cycles, had  undetectable M-spike currently on Velcade, Revlimid and dexamethasone cycle #6  this week.  5. S/p Autologous SCT at [facility] with Dr. Venu Spicer in March 11,2016  6.Right leg pain - on lyrica  7. Bilateral hip replacements by Dr. Lopez     Multiple myeloma in relapse (CMS/[facility])   12/4/2017 -  Chemotherapy    OP Zoledronic Acid Q12W     9/10/2020 Initial Diagnosis    Multiple myeloma in relapse (CMS/[facility])     9/23/2020 -  Chemotherapy    OP MULTIPLE MYELOMA Daratumumab / Pomalidomide / Dexamethasone            REASON FOR VISIT: Multiple myeloma       Subjective     HISTORY OF PRESENT ILLNESS:   Ms. Miranda is here for follow up evaluation of myeloma management.  She is currently on second line therapy with Darzalex, Pomalyst and dexamethasone.  She has been on it since September 2020.  She is still doing reasonably well.  She does get more fatigued towards the third week and having a lot of shortness of breath with activity.  Denies any issues with infections.  No headaches.  No issues with nausea vomiting.    Past Medical History, Past Surgical History, Social History, Family History have been reviewed and are without significant changes except as mentioned.    Review of Systems   Constitutional: Positive for fatigue.   HENT: Negative.    Eyes: Positive for visual disturbance.   Respiratory: Positive for shortness of breath.    Cardiovascular: Negative.    Gastrointestinal: Negative.    Endocrine: Negative.    Genitourinary: Negative.    Musculoskeletal: Positive for arthralgias and gait problem.   Skin: Negative.    Allergic/Immunologic: Negative.    Hematological: Negative.     Psychiatric/Behavioral: Negative.       A comprehensive 14 point review of systems was performed on 05/10/21  and was negative except as mentioned.    Medications:  The current medication list was reviewed in the EMR    ALLERGIES:  No Known Allergies    Objective      There were no vitals taken for this visit.     Performance Status: 1    General: well appearing, in no acute distress  HEENT: sclera anicteric, oropharynx clear  Lymphatics: no cervical, supraclavicular, or axillary adenopathy  Cardiovascular: regular rate and rhythm, no murmurs  Lungs: clear to auscultation bilaterally  Abdomen: soft, nontender, nondistended.  No palpable organomegaly  Extremeties: no lower extremity edema  Skin: no rashes, lesions, bruising, or petechiae      RECENT LABS:    Lab Results   Component Value Date    MSPIKE 0.1 (H) 04/12/2021    MSPIKE 0.2 (H) 03/15/2021    MSPIKE 0.2 (H) 02/15/2021       Lab Results   Component Value Date    FREEKAPPAL 13.8 04/12/2021    FREEKAPPAL 14.1 03/15/2021    FREEKAPPAL 13.0 02/15/2021    FREEKAPPAL 13.3 01/13/2021    FREEKAPPAL 14.8 12/16/2020    FREEKAPPAL 21.3 (H) 12/02/2020    FREEKAPPAL 16.1 11/18/2020    FREEKAPPAL 28.0 (H) 10/21/2020    FREEKAPPAL 1035.8 (H) 09/08/2020    FREEKAPPAL 542.1 (H) 06/16/2020         Lab Results   Component Value Date    IGLFLC 7.3 04/12/2021    IGLFLC 7.6 03/15/2021    IGLFLC 6.1 02/15/2021     Lab Results   Component Value Date    WBC 2.60 (L) 04/12/2021    HGB 12.6 04/12/2021    HCT 39.1 04/12/2021    MCV 92.7 04/12/2021     04/12/2021       Lab Results   Component Value Date    GLUCOSE 124 (H) 04/12/2021    BUN 16 04/12/2021    CREATININE 1.30 04/12/2021    EGFRIFNONA 45 (L) 04/12/2021    BCR 13.4 04/12/2021    K 4.4 04/12/2021    CO2 23.0 04/12/2021    CALCIUM 9.0 04/12/2021    PROTENTOTREF 5.7 (L) 04/12/2021    ALBUMIN 3.3 04/12/2021    ALBUMIN 4.20 04/12/2021    LABIL2 1.4 04/12/2021    AST 17 04/12/2021    ALT 15 04/12/2021          Assessment/Plan       1. multiple myeloma in relapse  status post autologous stem cell transplant.  She has mostly light chain only disease.  Her disease is nicely controlled.  Continue Pomalyst, daratumumab and dexamethasone.  continue her Pomalyst dose at 2 mg/day.  I am going to reduce her dosing to 2 weeks on and 1 week off.  Her numbers are now stable.  Her counts are tolerating it well.   She is not interested in undergoing a repeat autologous stem cell transplantation.  The goal is to get her disease under control at this time.  So far she is doing well otherwise.    2.  Hypogammaglobulinemia.  She has not had issues with infections so far.  We will hold off on IVIG for now    3.  Back pain/ hip pain: The hip replacements has done wonders for her.       4.  Bilateral hip replacement secondary to myeloma by Dr. Lopez    5.  Shortness of breath.  I want to see if reducing her Pomalyst to 2 weeks on 1 week off will help resolve some of this issue.    6.  Bone mets.  She has been on Zometa every 3 months since 2017.  I am going to reduce that dose to every 6 months.    7.  Covid-19 risk.  She received her first dose on 2/5/2021 and  she is awaiting her second dose    8.  Bilateral shoulder pain.  Sees Dr. Saini for this.      Total time of patient care on day of service including time prior to, face to face with patient, and following visit spent in reviewing records, lab results,  discussion with patient, and documentation/charting was > 40 minutes.        Joseph Mckinley MD  [facility] Hematology and Oncology    5/10/2021               CC:

## 2021-05-11 LAB
ALBUMIN SERPL ELPH-MCNC: 3.9 G/DL (ref 2.9–4.4)
ALBUMIN/GLOB SERPL: 1.5 {RATIO} (ref 0.7–1.7)
ALPHA1 GLOB SERPL ELPH-MCNC: 0.3 G/DL (ref 0–0.4)
ALPHA2 GLOB SERPL ELPH-MCNC: 0.8 G/DL (ref 0.4–1)
B-GLOBULIN SERPL ELPH-MCNC: 1.1 G/DL (ref 0.7–1.3)
GAMMA GLOB SERPL ELPH-MCNC: 0.4 G/DL (ref 0.4–1.8)
GLOBULIN SER-MCNC: 2.7 G/DL (ref 2.2–3.9)
IGA SERPL-MCNC: 39 MG/DL (ref 87–352)
IGG SERPL-MCNC: 366 MG/DL (ref 586–1602)
IGM SERPL-MCNC: 14 MG/DL (ref 26–217)
INTERPRETATION SERPL IEP-IMP: ABNORMAL
KAPPA LC FREE SER-MCNC: 16.7 MG/L (ref 3.3–19.4)
KAPPA LC FREE/LAMBDA FREE SER: 2.09 {RATIO} (ref 0.26–1.65)
LABORATORY COMMENT REPORT: ABNORMAL
LAMBDA LC FREE SERPL-MCNC: 8 MG/L (ref 5.7–26.3)
M PROTEIN SERPL ELPH-MCNC: 0.1 G/DL
PROT SERPL-MCNC: 6.6 G/DL (ref 6–8.5)

## 2021-05-19 ENCOUNTER — SPECIALTY PHARMACY (OUTPATIENT)
Dept: ONCOLOGY | Facility: HOSPITAL | Age: 71
End: 2021-05-19

## 2021-05-19 DIAGNOSIS — C90.02 MULTIPLE MYELOMA IN RELAPSE (HCC): ICD-10-CM

## 2021-05-19 NOTE — PROGRESS NOTES
Reviewed most recent oncology office visit note and  Labs from 5/10/21. Plan for continuation of pomalidomide with REDUCTION in FREQUENCY to Pomalyst 2mg PO once daily for 14 days, followed by 7 days off. Released script for pomalidomide 2mg PO daily Days 1-14 then 7 days off, #14 with 0 RF to [facility] specialty pharmacy for continuation of treatment.

## 2021-05-21 NOTE — PROGRESS NOTES
Drug: pomalidomide  Strength: 2mg capsule  Directions: Take 1 capsule PO daily Days 1-14 then 7 days off  QTY: 14  RF:0    Released to pharmacy: [facility]    Completed independent double check on medication order/RX.

## 2021-06-04 DIAGNOSIS — C90.02 MULTIPLE MYELOMA IN RELAPSE (HCC): ICD-10-CM

## 2021-06-07 ENCOUNTER — HOSPITAL ENCOUNTER (OUTPATIENT)
Dept: ONCOLOGY | Facility: HOSPITAL | Age: 71
Setting detail: INFUSION SERIES
Discharge: HOME | End: 2021-06-07

## 2021-06-07 VITALS
SYSTOLIC BLOOD PRESSURE: 111 MMHG | WEIGHT: 174 LBS | RESPIRATION RATE: 20 BRPM | TEMPERATURE: 96.9 F | HEART RATE: 67 BPM | BODY MASS INDEX: 34.16 KG/M2 | DIASTOLIC BLOOD PRESSURE: 52 MMHG | HEIGHT: 60 IN

## 2021-06-07 DIAGNOSIS — C90.02 MULTIPLE MYELOMA IN RELAPSE (HCC): Primary | ICD-10-CM

## 2021-06-07 LAB
ALBUMIN SERPL-MCNC: 4.3 G/DL (ref 3.5–5.2)
ALBUMIN/GLOB SERPL: 2 G/DL
ALP SERPL-CCNC: 68 U/L (ref 39–117)
ALT SERPL W P-5'-P-CCNC: 14 U/L (ref 1–33)
ANION GAP SERPL CALCULATED.3IONS-SCNC: 10 MMOL/L (ref 5–15)
AST SERPL-CCNC: 16 U/L (ref 1–32)
BILIRUB SERPL-MCNC: 0.3 MG/DL (ref 0–1.2)
BUN SERPL-MCNC: 21 MG/DL (ref 8–23)
BUN/CREAT SERPL: 15.4 (ref 7–25)
CALCIUM SPEC-SCNC: 9.9 MG/DL (ref 8.6–10.5)
CHLORIDE SERPL-SCNC: 103 MMOL/L (ref 98–107)
CO2 SERPL-SCNC: 26 MMOL/L (ref 22–29)
CREAT BLDA-MCNC: 1.4 MG/DL (ref 0.6–1.3)
CREAT SERPL-MCNC: 1.36 MG/DL (ref 0.57–1)
ERYTHROCYTE [DISTWIDTH] IN BLOOD BY AUTOMATED COUNT: 16 % (ref 12.3–15.4)
GFR SERPL CREATININE-BSD FRML MDRD: 38 ML/MIN/1.73
GLOBULIN UR ELPH-MCNC: 2.1 GM/DL
GLUCOSE SERPL-MCNC: 94 MG/DL (ref 65–99)
HCT VFR BLD AUTO: 41.4 % (ref 34–46.6)
HGB BLD-MCNC: 13.3 G/DL (ref 12–15.9)
LYMPHOCYTES # BLD AUTO: 0.6 10*3/MM3 (ref 0.7–3.1)
LYMPHOCYTES NFR BLD AUTO: 15.6 % (ref 19.6–45.3)
MCH RBC QN AUTO: 29.8 PG (ref 26.6–33)
MCHC RBC AUTO-ENTMCNC: 32.2 G/DL (ref 31.5–35.7)
MCV RBC AUTO: 92.7 FL (ref 79–97)
MONOCYTES # BLD AUTO: 0.2 10*3/MM3 (ref 0.1–0.9)
MONOCYTES NFR BLD AUTO: 6.2 % (ref 5–12)
NEUTROPHILS NFR BLD AUTO: 3 10*3/MM3 (ref 1.7–7)
NEUTROPHILS NFR BLD AUTO: 78.2 % (ref 42.7–76)
PLATELET # BLD AUTO: 111 10*3/MM3 (ref 140–450)
PMV BLD AUTO: 8.3 FL (ref 6–12)
POTASSIUM SERPL-SCNC: 4.5 MMOL/L (ref 3.5–5.2)
PROT SERPL-MCNC: 6.4 G/DL (ref 6–8.5)
RBC # BLD AUTO: 4.46 10*6/MM3 (ref 3.77–5.28)
SODIUM SERPL-SCNC: 139 MMOL/L (ref 136–145)
WBC # BLD AUTO: 3.9 10*3/MM3 (ref 3.4–10.8)

## 2021-06-07 PROCEDURE — 82784 ASSAY IGA/IGD/IGG/IGM EACH: CPT | Performed by: INTERNAL MEDICINE

## 2021-06-07 PROCEDURE — 83883 ASSAY NEPHELOMETRY NOT SPEC: CPT | Performed by: INTERNAL MEDICINE

## 2021-06-07 PROCEDURE — 25010000002 METHYLPREDNISOLONE PER 125 MG: Performed by: INTERNAL MEDICINE

## 2021-06-07 PROCEDURE — 25010000002 DARATUMUMAB-HYALURONIDASE-FIHJ 1800-30000 MG-UT/15ML SOLUTION: Performed by: INTERNAL MEDICINE

## 2021-06-07 PROCEDURE — 96361 HYDRATE IV INFUSION ADD-ON: CPT

## 2021-06-07 PROCEDURE — 82565 ASSAY OF CREATININE: CPT

## 2021-06-07 PROCEDURE — 96374 THER/PROPH/DIAG INJ IV PUSH: CPT

## 2021-06-07 PROCEDURE — 96375 TX/PRO/DX INJ NEW DRUG ADDON: CPT

## 2021-06-07 PROCEDURE — 86334 IMMUNOFIX E-PHORESIS SERUM: CPT | Performed by: INTERNAL MEDICINE

## 2021-06-07 PROCEDURE — 85025 COMPLETE CBC W/AUTO DIFF WBC: CPT | Performed by: INTERNAL MEDICINE

## 2021-06-07 PROCEDURE — 25010000002 DIPHENHYDRAMINE PER 50 MG: Performed by: INTERNAL MEDICINE

## 2021-06-07 PROCEDURE — 80053 COMPREHEN METABOLIC PANEL: CPT | Performed by: INTERNAL MEDICINE

## 2021-06-07 PROCEDURE — 84165 PROTEIN E-PHORESIS SERUM: CPT | Performed by: INTERNAL MEDICINE

## 2021-06-07 PROCEDURE — 96401 CHEMO ANTI-NEOPL SQ/IM: CPT

## 2021-06-07 RX ORDER — METHYLPREDNISOLONE SODIUM SUCCINATE 125 MG/2ML
60 INJECTION, POWDER, LYOPHILIZED, FOR SOLUTION INTRAMUSCULAR; INTRAVENOUS ONCE
Status: COMPLETED | OUTPATIENT
Start: 2021-06-07 | End: 2021-06-07

## 2021-06-07 RX ORDER — ACETAMINOPHEN 500 MG
1000 TABLET ORAL ONCE
Status: COMPLETED | OUTPATIENT
Start: 2021-06-07 | End: 2021-06-07

## 2021-06-07 RX ORDER — SODIUM CHLORIDE 9 MG/ML
250 INJECTION, SOLUTION INTRAVENOUS ONCE
Status: COMPLETED | OUTPATIENT
Start: 2021-06-07 | End: 2021-06-07

## 2021-06-07 RX ADMIN — DARATUMUMAB AND HYALURONIDASE-FIHJ (HUMAN RECOMBINANT) 1800 MG: 1800; 30000 INJECTION SUBCUTANEOUS at 10:35

## 2021-06-07 RX ADMIN — SODIUM CHLORIDE 250 ML: 9 INJECTION, SOLUTION INTRAVENOUS at 09:06

## 2021-06-07 RX ADMIN — METHYLPREDNISOLONE SODIUM SUCCINATE 60 MG: 125 INJECTION, POWDER, FOR SOLUTION INTRAMUSCULAR; INTRAVENOUS at 09:06

## 2021-06-07 RX ADMIN — DIPHENHYDRAMINE HYDROCHLORIDE 25 MG: 50 INJECTION INTRAMUSCULAR; INTRAVENOUS at 09:08

## 2021-06-07 RX ADMIN — ACETAMINOPHEN 1000 MG: 500 TABLET, FILM COATED ORAL at 09:05

## 2021-06-08 LAB
ALBUMIN SERPL ELPH-MCNC: 3.8 G/DL (ref 2.9–4.4)
ALBUMIN/GLOB SERPL: 1.5 {RATIO} (ref 0.7–1.7)
ALPHA1 GLOB SERPL ELPH-MCNC: 0.3 G/DL (ref 0–0.4)
ALPHA2 GLOB SERPL ELPH-MCNC: 0.8 G/DL (ref 0.4–1)
B-GLOBULIN SERPL ELPH-MCNC: 1.2 G/DL (ref 0.7–1.3)
GAMMA GLOB SERPL ELPH-MCNC: 0.4 G/DL (ref 0.4–1.8)
GLOBULIN SER-MCNC: 2.7 G/DL (ref 2.2–3.9)
IGA SERPL-MCNC: 35 MG/DL (ref 87–352)
IGG SERPL-MCNC: 323 MG/DL (ref 586–1602)
IGM SERPL-MCNC: 14 MG/DL (ref 26–217)
INTERPRETATION SERPL IEP-IMP: ABNORMAL
KAPPA LC FREE SER-MCNC: 19.9 MG/L (ref 3.3–19.4)
KAPPA LC FREE/LAMBDA FREE SER: 2.84 {RATIO} (ref 0.26–1.65)
LABORATORY COMMENT REPORT: ABNORMAL
LAMBDA LC FREE SERPL-MCNC: 7 MG/L (ref 5.7–26.3)
M PROTEIN SERPL ELPH-MCNC: 0.1 G/DL
PROT SERPL-MCNC: 6.5 G/DL (ref 6–8.5)

## 2021-06-23 DIAGNOSIS — C90.02 MULTIPLE MYELOMA IN RELAPSE (HCC): ICD-10-CM

## 2021-06-30 RX ORDER — ACYCLOVIR 400 MG/1
400 TABLET ORAL 2 TIMES DAILY WITH MEALS
Qty: 60 TABLET | Refills: 11 | Status: SHIPPED | OUTPATIENT
Start: 2021-06-30 | End: 2022-07-15 | Stop reason: SDUPTHER

## 2021-07-06 ENCOUNTER — HOSPITAL ENCOUNTER (OUTPATIENT)
Dept: ONCOLOGY | Facility: HOSPITAL | Age: 71
Setting detail: INFUSION SERIES
Discharge: HOME | End: 2021-07-06

## 2021-07-06 VITALS
HEART RATE: 60 BPM | SYSTOLIC BLOOD PRESSURE: 116 MMHG | BODY MASS INDEX: 34.16 KG/M2 | DIASTOLIC BLOOD PRESSURE: 68 MMHG | TEMPERATURE: 96 F | RESPIRATION RATE: 20 BRPM | HEIGHT: 60 IN | WEIGHT: 174 LBS

## 2021-07-06 DIAGNOSIS — C90.02 MULTIPLE MYELOMA IN RELAPSE (HCC): Primary | ICD-10-CM

## 2021-07-06 LAB
ALBUMIN SERPL-MCNC: 4.2 G/DL (ref 3.5–5.2)
ALBUMIN/GLOB SERPL: 2.8 G/DL
ALP SERPL-CCNC: 64 U/L (ref 39–117)
ALT SERPL W P-5'-P-CCNC: 13 U/L (ref 1–33)
ANION GAP SERPL CALCULATED.3IONS-SCNC: 13 MMOL/L (ref 5–15)
AST SERPL-CCNC: 15 U/L (ref 1–32)
BILIRUB SERPL-MCNC: 0.3 MG/DL (ref 0–1.2)
BUN SERPL-MCNC: 19 MG/DL (ref 8–23)
BUN/CREAT SERPL: 13 (ref 7–25)
CALCIUM SPEC-SCNC: 9 MG/DL (ref 8.6–10.5)
CHLORIDE SERPL-SCNC: 104 MMOL/L (ref 98–107)
CO2 SERPL-SCNC: 21 MMOL/L (ref 22–29)
CREAT BLDA-MCNC: 1.6 MG/DL (ref 0.6–1.3)
CREAT SERPL-MCNC: 1.46 MG/DL (ref 0.57–1)
ERYTHROCYTE [DISTWIDTH] IN BLOOD BY AUTOMATED COUNT: 16.2 % (ref 12.3–15.4)
GFR SERPL CREATININE-BSD FRML MDRD: 35 ML/MIN/1.73
GLOBULIN UR ELPH-MCNC: 1.5 GM/DL
GLUCOSE SERPL-MCNC: 94 MG/DL (ref 65–99)
HCT VFR BLD AUTO: 38.3 % (ref 34–46.6)
HGB BLD-MCNC: 12.4 G/DL (ref 12–15.9)
LYMPHOCYTES # BLD AUTO: 0.7 10*3/MM3 (ref 0.7–3.1)
LYMPHOCYTES NFR BLD AUTO: 23.6 % (ref 19.6–45.3)
MCH RBC QN AUTO: 29.9 PG (ref 26.6–33)
MCHC RBC AUTO-ENTMCNC: 32.3 G/DL (ref 31.5–35.7)
MCV RBC AUTO: 92.5 FL (ref 79–97)
MONOCYTES # BLD AUTO: 0.3 10*3/MM3 (ref 0.1–0.9)
MONOCYTES NFR BLD AUTO: 10.5 % (ref 5–12)
NEUTROPHILS NFR BLD AUTO: 1.8 10*3/MM3 (ref 1.7–7)
NEUTROPHILS NFR BLD AUTO: 65.9 % (ref 42.7–76)
PLATELET # BLD AUTO: 82 10*3/MM3 (ref 140–450)
PMV BLD AUTO: 8.5 FL (ref 6–12)
POTASSIUM SERPL-SCNC: 4.7 MMOL/L (ref 3.5–5.2)
PROT SERPL-MCNC: 5.7 G/DL (ref 6–8.5)
RBC # BLD AUTO: 4.14 10*6/MM3 (ref 3.77–5.28)
SODIUM SERPL-SCNC: 138 MMOL/L (ref 136–145)
WBC # BLD AUTO: 2.8 10*3/MM3 (ref 3.4–10.8)

## 2021-07-06 PROCEDURE — 25010000002 DARATUMUMAB-HYALURONIDASE-FIHJ 1800-30000 MG-UT/15ML SOLUTION: Performed by: INTERNAL MEDICINE

## 2021-07-06 PROCEDURE — 84165 PROTEIN E-PHORESIS SERUM: CPT | Performed by: INTERNAL MEDICINE

## 2021-07-06 PROCEDURE — 96401 CHEMO ANTI-NEOPL SQ/IM: CPT

## 2021-07-06 PROCEDURE — 80053 COMPREHEN METABOLIC PANEL: CPT | Performed by: INTERNAL MEDICINE

## 2021-07-06 PROCEDURE — 96374 THER/PROPH/DIAG INJ IV PUSH: CPT

## 2021-07-06 PROCEDURE — 82784 ASSAY IGA/IGD/IGG/IGM EACH: CPT | Performed by: INTERNAL MEDICINE

## 2021-07-06 PROCEDURE — 86334 IMMUNOFIX E-PHORESIS SERUM: CPT | Performed by: INTERNAL MEDICINE

## 2021-07-06 PROCEDURE — 83883 ASSAY NEPHELOMETRY NOT SPEC: CPT | Performed by: INTERNAL MEDICINE

## 2021-07-06 PROCEDURE — 25010000002 DIPHENHYDRAMINE PER 50 MG: Performed by: INTERNAL MEDICINE

## 2021-07-06 PROCEDURE — 85025 COMPLETE CBC W/AUTO DIFF WBC: CPT | Performed by: INTERNAL MEDICINE

## 2021-07-06 PROCEDURE — 96375 TX/PRO/DX INJ NEW DRUG ADDON: CPT

## 2021-07-06 PROCEDURE — 25010000002 METHYLPREDNISOLONE PER 125 MG: Performed by: INTERNAL MEDICINE

## 2021-07-06 PROCEDURE — 82565 ASSAY OF CREATININE: CPT

## 2021-07-06 RX ORDER — SODIUM CHLORIDE 9 MG/ML
250 INJECTION, SOLUTION INTRAVENOUS ONCE
Status: COMPLETED | OUTPATIENT
Start: 2021-07-06 | End: 2021-07-06

## 2021-07-06 RX ORDER — ACETAMINOPHEN 500 MG
1000 TABLET ORAL ONCE
Status: COMPLETED | OUTPATIENT
Start: 2021-07-06 | End: 2021-07-06

## 2021-07-06 RX ORDER — METHYLPREDNISOLONE SODIUM SUCCINATE 125 MG/2ML
60 INJECTION, POWDER, LYOPHILIZED, FOR SOLUTION INTRAMUSCULAR; INTRAVENOUS ONCE
Status: COMPLETED | OUTPATIENT
Start: 2021-07-06 | End: 2021-07-06

## 2021-07-06 RX ADMIN — SODIUM CHLORIDE 250 ML: 9 INJECTION, SOLUTION INTRAVENOUS at 09:49

## 2021-07-06 RX ADMIN — ACETAMINOPHEN 1000 MG: 500 TABLET ORAL at 09:49

## 2021-07-06 RX ADMIN — DARATUMUMAB AND HYALURONIDASE-FIHJ (HUMAN RECOMBINANT) 1800 MG: 1800; 30000 INJECTION SUBCUTANEOUS at 10:43

## 2021-07-06 RX ADMIN — DIPHENHYDRAMINE HYDROCHLORIDE 25 MG: 50 INJECTION INTRAMUSCULAR; INTRAVENOUS at 09:49

## 2021-07-06 RX ADMIN — METHYLPREDNISOLONE SODIUM SUCCINATE 60 MG: 125 INJECTION, POWDER, FOR SOLUTION INTRAMUSCULAR; INTRAVENOUS at 09:50

## 2021-07-07 LAB
ALBUMIN SERPL ELPH-MCNC: 3.5 G/DL (ref 2.9–4.4)
ALBUMIN/GLOB SERPL: 1.5 {RATIO} (ref 0.7–1.7)
ALPHA1 GLOB SERPL ELPH-MCNC: 0.3 G/DL (ref 0–0.4)
ALPHA2 GLOB SERPL ELPH-MCNC: 0.7 G/DL (ref 0.4–1)
B-GLOBULIN SERPL ELPH-MCNC: 1.2 G/DL (ref 0.7–1.3)
GAMMA GLOB SERPL ELPH-MCNC: 0.3 G/DL (ref 0.4–1.8)
GLOBULIN SER-MCNC: 2.4 G/DL (ref 2.2–3.9)
IGA SERPL-MCNC: 29 MG/DL (ref 87–352)
IGG SERPL-MCNC: 303 MG/DL (ref 586–1602)
IGM SERPL-MCNC: 11 MG/DL (ref 26–217)
INTERPRETATION SERPL IEP-IMP: ABNORMAL
KAPPA LC FREE SER-MCNC: 14.1 MG/L (ref 3.3–19.4)
KAPPA LC FREE/LAMBDA FREE SER: 2.35 {RATIO} (ref 0.26–1.65)
LABORATORY COMMENT REPORT: ABNORMAL
LAMBDA LC FREE SERPL-MCNC: 6 MG/L (ref 5.7–26.3)
M PROTEIN SERPL ELPH-MCNC: 0.1 G/DL
PROT SERPL-MCNC: 5.9 G/DL (ref 6–8.5)

## 2021-07-14 ENCOUNTER — SPECIALTY PHARMACY (OUTPATIENT)
Dept: ONCOLOGY | Facility: HOSPITAL | Age: 71
End: 2021-07-14

## 2021-07-14 DIAGNOSIS — C90.02 MULTIPLE MYELOMA IN RELAPSE (HCC): ICD-10-CM

## 2021-07-28 ENCOUNTER — TELEPHONE (OUTPATIENT)
Dept: INTERNAL MEDICINE | Facility: CLINIC | Age: 71
End: 2021-07-28

## 2021-07-28 ENCOUNTER — TELEPHONE (OUTPATIENT)
Dept: ONCOLOGY | Facility: CLINIC | Age: 71
End: 2021-07-28

## 2021-07-28 NOTE — TELEPHONE ENCOUNTER
PATIENT WANTED PCP AWARE THAT SHE TESTED COVID POSITIVE 07/27/21. SHE IS EXPERIENCING COUGH, RUNNY NOSE, SHORTNESS OF BREATH BUT SHE HAS COPD. PATIENT STATED SHE DOESN'T NEED ANYTHING AT THIS TIME. PATIENT CALL BACK NUMBER VERIFIED [phone]     Called and left message to call is any issues

## 2021-07-28 NOTE — TELEPHONE ENCOUNTER
Caller: LUISA     Relationship to patient: SELF     Best call back number: [phone]    PATIENT WOULD LIKE TO KNOW IF SHE CAN CHANGE HER 8-3 FOLLOW UP WITH SHIRA EVANS TO A VIDEO VISIT. SHE WILL NEED TO ALSO CANCEL HER INFUSION THAT DAY.   SHE HAS BEEN DX WITH COVID.   PLEASE CALL AND ADVISE   THANK YOU

## 2021-07-31 ENCOUNTER — APPOINTMENT (OUTPATIENT)
Dept: GENERAL RADIOLOGY | Facility: HOSPITAL | Age: 71
End: 2021-07-31

## 2021-07-31 ENCOUNTER — HOSPITAL ENCOUNTER (INPATIENT)
Facility: HOSPITAL | Age: 71
LOS: 11 days | Discharge: HOME | End: 2021-08-11
Attending: EMERGENCY MEDICINE | Admitting: INTERNAL MEDICINE

## 2021-07-31 DIAGNOSIS — J44.1 CHRONIC OBSTRUCTIVE PULMONARY DISEASE WITH ACUTE EXACERBATION (HCC): ICD-10-CM

## 2021-07-31 DIAGNOSIS — N17.9 ACUTE RENAL FAILURE, UNSPECIFIED ACUTE RENAL FAILURE TYPE (HCC): ICD-10-CM

## 2021-07-31 DIAGNOSIS — J96.21 ACUTE ON CHRONIC RESPIRATORY FAILURE WITH HYPOXIA (HCC): ICD-10-CM

## 2021-07-31 DIAGNOSIS — C90.02 MULTIPLE MYELOMA IN RELAPSE (HCC): ICD-10-CM

## 2021-07-31 DIAGNOSIS — U07.1 COVID-19: Primary | ICD-10-CM

## 2021-07-31 DIAGNOSIS — Z23 COVID-19 VACCINE ADMINISTERED: ICD-10-CM

## 2021-07-31 DIAGNOSIS — R79.82 CRP ELEVATED: ICD-10-CM

## 2021-07-31 LAB
ALBUMIN SERPL-MCNC: 3.8 G/DL (ref 3.5–5.2)
ALBUMIN/GLOB SERPL: 1.6 G/DL
ALP SERPL-CCNC: 83 U/L (ref 39–117)
ALT SERPL W P-5'-P-CCNC: 15 U/L (ref 1–33)
ANION GAP SERPL CALCULATED.3IONS-SCNC: 15 MMOL/L (ref 5–15)
AST SERPL-CCNC: 17 U/L (ref 1–32)
BASOPHILS # BLD AUTO: 0.02 10*3/MM3 (ref 0–0.2)
BASOPHILS NFR BLD AUTO: 0.8 % (ref 0–1.5)
BILIRUB SERPL-MCNC: 0.4 MG/DL (ref 0–1.2)
BUN SERPL-MCNC: 18 MG/DL (ref 8–23)
BUN/CREAT SERPL: 13.4 (ref 7–25)
CALCIUM SPEC-SCNC: 8.9 MG/DL (ref 8.6–10.5)
CHLORIDE SERPL-SCNC: 102 MMOL/L (ref 98–107)
CO2 SERPL-SCNC: 20 MMOL/L (ref 22–29)
CREAT SERPL-MCNC: 1.34 MG/DL (ref 0.57–1)
CRP SERPL-MCNC: 17 MG/DL (ref 0–0.5)
D DIMER PPP FEU-MCNC: 0.52 MCGFEU/ML (ref 0–0.56)
D-LACTATE SERPL-SCNC: 0.9 MMOL/L (ref 0.5–2)
D-LACTATE SERPL-SCNC: 1.2 MMOL/L (ref 0.5–2)
DEPRECATED RDW RBC AUTO: 51.3 FL (ref 37–54)
EOSINOPHIL # BLD AUTO: 0.03 10*3/MM3 (ref 0–0.4)
EOSINOPHIL NFR BLD AUTO: 1.2 % (ref 0.3–6.2)
ERYTHROCYTE [DISTWIDTH] IN BLOOD BY AUTOMATED COUNT: 14.7 % (ref 12.3–15.4)
FERRITIN SERPL-MCNC: 128.6 NG/ML (ref 13–150)
GFR SERPL CREATININE-BSD FRML MDRD: 39 ML/MIN/1.73
GLOBULIN UR ELPH-MCNC: 2.4 GM/DL
GLUCOSE SERPL-MCNC: 104 MG/DL (ref 65–99)
HCT VFR BLD AUTO: 42.8 % (ref 34–46.6)
HGB BLD-MCNC: 14 G/DL (ref 12–15.9)
HOLD SPECIMEN: NORMAL
IMM GRANULOCYTES # BLD AUTO: 0.02 10*3/MM3 (ref 0–0.05)
IMM GRANULOCYTES NFR BLD AUTO: 0.8 % (ref 0–0.5)
LIPASE SERPL-CCNC: 27 U/L (ref 13–60)
LYMPHOCYTES # BLD AUTO: 0.13 10*3/MM3 (ref 0.7–3.1)
LYMPHOCYTES NFR BLD AUTO: 5.2 % (ref 19.6–45.3)
MCH RBC QN AUTO: 30.9 PG (ref 26.6–33)
MCHC RBC AUTO-ENTMCNC: 32.7 G/DL (ref 31.5–35.7)
MCV RBC AUTO: 94.5 FL (ref 79–97)
MONOCYTES # BLD AUTO: 0.21 10*3/MM3 (ref 0.1–0.9)
MONOCYTES NFR BLD AUTO: 8.4 % (ref 5–12)
NEUTROPHILS NFR BLD AUTO: 2.09 10*3/MM3 (ref 1.7–7)
NEUTROPHILS NFR BLD AUTO: 83.6 % (ref 42.7–76)
NRBC BLD AUTO-RTO: 0 /100 WBC (ref 0–0.2)
NT-PROBNP SERPL-MCNC: 183.9 PG/ML (ref 0–900)
PLAT MORPH BLD: NORMAL
PLATELET # BLD AUTO: 90 10*3/MM3 (ref 140–450)
PMV BLD AUTO: 12.2 FL (ref 6–12)
POTASSIUM SERPL-SCNC: 4.1 MMOL/L (ref 3.5–5.2)
PROCALCITONIN SERPL-MCNC: 0.22 NG/ML (ref 0–0.25)
PROT SERPL-MCNC: 6.2 G/DL (ref 6–8.5)
QT INTERVAL: 294 MS
QT INTERVAL: 312 MS
QTC INTERVAL: 415 MS
QTC INTERVAL: 425 MS
RBC # BLD AUTO: 4.53 10*6/MM3 (ref 3.77–5.28)
RBC MORPH BLD: NORMAL
SODIUM SERPL-SCNC: 137 MMOL/L (ref 136–145)
TROPONIN T SERPL-MCNC: <0.01 NG/ML (ref 0–0.03)
TROPONIN T SERPL-MCNC: <0.01 NG/ML (ref 0–0.03)
WBC # BLD AUTO: 2.5 10*3/MM3 (ref 3.4–10.8)
WBC MORPH BLD: NORMAL
WHOLE BLOOD HOLD SPECIMEN: NORMAL

## 2021-07-31 PROCEDURE — 99223 1ST HOSP IP/OBS HIGH 75: CPT | Performed by: INTERNAL MEDICINE

## 2021-07-31 PROCEDURE — 80053 COMPREHEN METABOLIC PANEL: CPT | Performed by: EMERGENCY MEDICINE

## 2021-07-31 PROCEDURE — 82728 ASSAY OF FERRITIN: CPT | Performed by: EMERGENCY MEDICINE

## 2021-07-31 PROCEDURE — 86140 C-REACTIVE PROTEIN: CPT | Performed by: EMERGENCY MEDICINE

## 2021-07-31 PROCEDURE — 93010 ELECTROCARDIOGRAM REPORT: CPT | Performed by: INTERNAL MEDICINE

## 2021-07-31 PROCEDURE — 93005 ELECTROCARDIOGRAM TRACING: CPT | Performed by: INTERNAL MEDICINE

## 2021-07-31 PROCEDURE — 83605 ASSAY OF LACTIC ACID: CPT | Performed by: EMERGENCY MEDICINE

## 2021-07-31 PROCEDURE — 25010000002 CEFTRIAXONE PER 250 MG: Performed by: EMERGENCY MEDICINE

## 2021-07-31 PROCEDURE — 85379 FIBRIN DEGRADATION QUANT: CPT | Performed by: EMERGENCY MEDICINE

## 2021-07-31 PROCEDURE — 99285 EMERGENCY DEPT VISIT HI MDM: CPT

## 2021-07-31 PROCEDURE — 85007 BL SMEAR W/DIFF WBC COUNT: CPT | Performed by: EMERGENCY MEDICINE

## 2021-07-31 PROCEDURE — 83690 ASSAY OF LIPASE: CPT | Performed by: EMERGENCY MEDICINE

## 2021-07-31 PROCEDURE — 25010000002 DEXAMETHASONE PER 1 MG: Performed by: EMERGENCY MEDICINE

## 2021-07-31 PROCEDURE — 25010000002 ENOXAPARIN PER 10 MG: Performed by: INTERNAL MEDICINE

## 2021-07-31 PROCEDURE — 93005 ELECTROCARDIOGRAM TRACING: CPT | Performed by: EMERGENCY MEDICINE

## 2021-07-31 PROCEDURE — 94640 AIRWAY INHALATION TREATMENT: CPT

## 2021-07-31 PROCEDURE — 84145 PROCALCITONIN (PCT): CPT | Performed by: EMERGENCY MEDICINE

## 2021-07-31 PROCEDURE — 83880 ASSAY OF NATRIURETIC PEPTIDE: CPT | Performed by: EMERGENCY MEDICINE

## 2021-07-31 PROCEDURE — 71045 X-RAY EXAM CHEST 1 VIEW: CPT

## 2021-07-31 PROCEDURE — 87040 BLOOD CULTURE FOR BACTERIA: CPT | Performed by: EMERGENCY MEDICINE

## 2021-07-31 PROCEDURE — XW033E5 INTRODUCTION OF REMDESIVIR ANTI-INFECTIVE INTO PERIPHERAL VEIN, PERCUTANEOUS APPROACH, NEW TECHNOLOGY GROUP 5: ICD-10-PCS | Performed by: EMERGENCY MEDICINE

## 2021-07-31 PROCEDURE — 84484 ASSAY OF TROPONIN QUANT: CPT | Performed by: EMERGENCY MEDICINE

## 2021-07-31 PROCEDURE — 94799 UNLISTED PULMONARY SVC/PX: CPT

## 2021-07-31 PROCEDURE — 85025 COMPLETE CBC W/AUTO DIFF WBC: CPT | Performed by: EMERGENCY MEDICINE

## 2021-07-31 RX ORDER — DOXYCYCLINE 100 MG/1
100 CAPSULE ORAL EVERY 12 HOURS SCHEDULED
Status: COMPLETED | OUTPATIENT
Start: 2021-07-31 | End: 2021-08-05

## 2021-07-31 RX ORDER — DEXAMETHASONE SODIUM PHOSPHATE 4 MG/ML
6 INJECTION, SOLUTION INTRA-ARTICULAR; INTRALESIONAL; INTRAMUSCULAR; INTRAVENOUS; SOFT TISSUE ONCE
Status: COMPLETED | OUTPATIENT
Start: 2021-07-31 | End: 2021-07-31

## 2021-07-31 RX ORDER — PANTOPRAZOLE SODIUM 40 MG/1
40 TABLET, DELAYED RELEASE ORAL EVERY MORNING
Status: DISCONTINUED | OUTPATIENT
Start: 2021-08-01 | End: 2021-08-11 | Stop reason: HOSPADM

## 2021-07-31 RX ORDER — DEXAMETHASONE SODIUM PHOSPHATE 4 MG/ML
6 INJECTION, SOLUTION INTRA-ARTICULAR; INTRALESIONAL; INTRAMUSCULAR; INTRAVENOUS; SOFT TISSUE DAILY
Status: DISCONTINUED | OUTPATIENT
Start: 2021-07-31 | End: 2021-07-31

## 2021-07-31 RX ORDER — METOPROLOL TARTRATE 5 MG/5ML
5 INJECTION INTRAVENOUS ONCE
Status: COMPLETED | OUTPATIENT
Start: 2021-07-31 | End: 2021-07-31

## 2021-07-31 RX ORDER — VENLAFAXINE HYDROCHLORIDE 37.5 MG/1
37.5 CAPSULE, EXTENDED RELEASE ORAL EVERY MORNING
Status: DISCONTINUED | OUTPATIENT
Start: 2021-08-01 | End: 2021-08-11 | Stop reason: HOSPADM

## 2021-07-31 RX ORDER — SODIUM CHLORIDE 0.9 % (FLUSH) 0.9 %
10 SYRINGE (ML) INJECTION AS NEEDED
Status: DISCONTINUED | OUTPATIENT
Start: 2021-07-31 | End: 2021-08-11 | Stop reason: HOSPADM

## 2021-07-31 RX ORDER — SULFAMETHOXAZOLE AND TRIMETHOPRIM 800; 160 MG/1; MG/1
1 TABLET ORAL 3 TIMES WEEKLY
Status: DISCONTINUED | OUTPATIENT
Start: 2021-08-02 | End: 2021-08-11 | Stop reason: HOSPADM

## 2021-07-31 RX ORDER — ACYCLOVIR 200 MG/1
400 CAPSULE ORAL 2 TIMES DAILY WITH MEALS
Status: DISCONTINUED | OUTPATIENT
Start: 2021-07-31 | End: 2021-08-11 | Stop reason: HOSPADM

## 2021-07-31 RX ORDER — CHOLECALCIFEROL (VITAMIN D3) 125 MCG
5 CAPSULE ORAL NIGHTLY
Status: DISCONTINUED | OUTPATIENT
Start: 2021-07-31 | End: 2021-08-11 | Stop reason: HOSPADM

## 2021-07-31 RX ORDER — METOPROLOL TARTRATE 50 MG/1
50 TABLET, FILM COATED ORAL 2 TIMES DAILY
Status: DISCONTINUED | OUTPATIENT
Start: 2021-07-31 | End: 2021-08-11 | Stop reason: HOSPADM

## 2021-07-31 RX ORDER — ALBUTEROL SULFATE 90 UG/1
2 AEROSOL, METERED RESPIRATORY (INHALATION) EVERY 6 HOURS PRN
Status: DISCONTINUED | OUTPATIENT
Start: 2021-07-31 | End: 2021-08-11 | Stop reason: HOSPADM

## 2021-07-31 RX ORDER — CEFTRIAXONE SODIUM 1 G/50ML
1 INJECTION, SOLUTION INTRAVENOUS ONCE
Status: COMPLETED | OUTPATIENT
Start: 2021-07-31 | End: 2021-07-31

## 2021-07-31 RX ORDER — ALBUTEROL SULFATE 90 UG/1
2 AEROSOL, METERED RESPIRATORY (INHALATION) ONCE
Status: COMPLETED | OUTPATIENT
Start: 2021-07-31 | End: 2021-07-31

## 2021-07-31 RX ORDER — DEXAMETHASONE SODIUM PHOSPHATE 4 MG/ML
6 INJECTION, SOLUTION INTRA-ARTICULAR; INTRALESIONAL; INTRAMUSCULAR; INTRAVENOUS; SOFT TISSUE DAILY
Status: DISCONTINUED | OUTPATIENT
Start: 2021-08-01 | End: 2021-08-05

## 2021-07-31 RX ORDER — ASPIRIN 81 MG/1
81 TABLET ORAL DAILY
Status: DISCONTINUED | OUTPATIENT
Start: 2021-07-31 | End: 2021-08-11 | Stop reason: HOSPADM

## 2021-07-31 RX ORDER — CYCLOBENZAPRINE HCL 10 MG
10 TABLET ORAL 3 TIMES DAILY PRN
Status: DISCONTINUED | OUTPATIENT
Start: 2021-07-31 | End: 2021-08-11 | Stop reason: HOSPADM

## 2021-07-31 RX ADMIN — Medication 5 MG: at 20:10

## 2021-07-31 RX ADMIN — ASPIRIN 81 MG: 81 TABLET, COATED ORAL at 17:34

## 2021-07-31 RX ADMIN — HYDROCODONE POLISTIREX AND CHLORPHENIRAMINE POLISTIREX 5 ML: 10; 8 SUSPENSION, EXTENDED RELEASE ORAL at 20:11

## 2021-07-31 RX ADMIN — DOXYCYCLINE 100 MG: 100 CAPSULE ORAL at 20:10

## 2021-07-31 RX ADMIN — METOPROLOL TARTRATE 5 MG: 5 INJECTION INTRAVENOUS at 17:59

## 2021-07-31 RX ADMIN — METOPROLOL TARTRATE 25 MG: 25 TABLET, FILM COATED ORAL at 17:35

## 2021-07-31 RX ADMIN — ACYCLOVIR 400 MG: 200 CAPSULE ORAL at 17:35

## 2021-07-31 RX ADMIN — METOPROLOL TARTRATE 50 MG: 50 TABLET, FILM COATED ORAL at 20:10

## 2021-07-31 RX ADMIN — REMDESIVIR 200 MG: 100 INJECTION, POWDER, LYOPHILIZED, FOR SOLUTION INTRAVENOUS at 17:34

## 2021-07-31 RX ADMIN — SODIUM CHLORIDE 1365 ML: 9 INJECTION, SOLUTION INTRAVENOUS at 15:08

## 2021-07-31 RX ADMIN — ALBUTEROL SULFATE 2 PUFF: 108 AEROSOL, METERED RESPIRATORY (INHALATION) at 13:10

## 2021-07-31 RX ADMIN — ENOXAPARIN SODIUM 40 MG: 40 INJECTION SUBCUTANEOUS at 17:34

## 2021-07-31 RX ADMIN — DEXAMETHASONE SODIUM PHOSPHATE 6 MG: 4 INJECTION, SOLUTION INTRA-ARTICULAR; INTRALESIONAL; INTRAMUSCULAR; INTRAVENOUS; SOFT TISSUE at 15:09

## 2021-07-31 RX ADMIN — CEFTRIAXONE SODIUM 1 G: 1 INJECTION, SOLUTION INTRAVENOUS at 15:08

## 2021-08-01 LAB
ALBUMIN SERPL-MCNC: 3 G/DL (ref 3.5–5.2)
ALBUMIN/GLOB SERPL: 1.1 G/DL
ALP SERPL-CCNC: 74 U/L (ref 39–117)
ALT SERPL W P-5'-P-CCNC: 12 U/L (ref 1–33)
ANION GAP SERPL CALCULATED.3IONS-SCNC: 11 MMOL/L (ref 5–15)
AST SERPL-CCNC: 14 U/L (ref 1–32)
BASOPHILS # BLD MANUAL: 0.02 10*3/MM3 (ref 0–0.2)
BASOPHILS NFR BLD AUTO: 1 % (ref 0–1.5)
BILIRUB SERPL-MCNC: 0.3 MG/DL (ref 0–1.2)
BUN SERPL-MCNC: 19 MG/DL (ref 8–23)
BUN/CREAT SERPL: 18.1 (ref 7–25)
CALCIUM SPEC-SCNC: 8.2 MG/DL (ref 8.6–10.5)
CHLORIDE SERPL-SCNC: 106 MMOL/L (ref 98–107)
CO2 SERPL-SCNC: 20 MMOL/L (ref 22–29)
CREAT SERPL-MCNC: 1.05 MG/DL (ref 0.57–1)
DEPRECATED RDW RBC AUTO: 57.2 FL (ref 37–54)
EOSINOPHIL # BLD MANUAL: 0 10*3/MM3 (ref 0–0.4)
EOSINOPHIL NFR BLD MANUAL: 0 % (ref 0.3–6.2)
ERYTHROCYTE [DISTWIDTH] IN BLOOD BY AUTOMATED COUNT: 15 % (ref 12.3–15.4)
GFR SERPL CREATININE-BSD FRML MDRD: 52 ML/MIN/1.73
GLOBULIN UR ELPH-MCNC: 2.8 GM/DL
GLUCOSE SERPL-MCNC: 116 MG/DL (ref 65–99)
HCT VFR BLD AUTO: 51.2 % (ref 34–46.6)
HGB BLD-MCNC: 15.3 G/DL (ref 12–15.9)
LYMPHOCYTES # BLD MANUAL: 0.13 10*3/MM3 (ref 0.7–3.1)
LYMPHOCYTES NFR BLD MANUAL: 1 % (ref 5–12)
LYMPHOCYTES NFR BLD MANUAL: 6 % (ref 19.6–45.3)
MCH RBC QN AUTO: 30.8 PG (ref 26.6–33)
MCHC RBC AUTO-ENTMCNC: 29.9 G/DL (ref 31.5–35.7)
MCV RBC AUTO: 103.2 FL (ref 79–97)
MONOCYTES # BLD AUTO: 0.02 10*3/MM3 (ref 0.1–0.9)
NEUTROPHILS # BLD AUTO: 1.96 10*3/MM3 (ref 1.7–7)
NEUTROPHILS NFR BLD MANUAL: 86 % (ref 42.7–76)
NEUTS BAND NFR BLD MANUAL: 6 % (ref 0–5)
PLAT MORPH BLD: NORMAL
PLATELET # BLD AUTO: 90 10*3/MM3 (ref 140–450)
PMV BLD AUTO: 12.6 FL (ref 6–12)
POTASSIUM SERPL-SCNC: 5 MMOL/L (ref 3.5–5.2)
PROT SERPL-MCNC: 5.8 G/DL (ref 6–8.5)
QT INTERVAL: 0 MS
QT INTERVAL: 268 MS
QTC INTERVAL: 0 MS
QTC INTERVAL: 430 MS
RBC # BLD AUTO: 4.96 10*6/MM3 (ref 3.77–5.28)
SCHISTOCYTES BLD QL SMEAR: ABNORMAL
SODIUM SERPL-SCNC: 137 MMOL/L (ref 136–145)
WBC # BLD AUTO: 2.13 10*3/MM3 (ref 3.4–10.8)
WBC MORPH BLD: NORMAL

## 2021-08-01 PROCEDURE — 80053 COMPREHEN METABOLIC PANEL: CPT | Performed by: INTERNAL MEDICINE

## 2021-08-01 PROCEDURE — 25010000002 ENOXAPARIN PER 10 MG: Performed by: INTERNAL MEDICINE

## 2021-08-01 PROCEDURE — 85007 BL SMEAR W/DIFF WBC COUNT: CPT | Performed by: INTERNAL MEDICINE

## 2021-08-01 PROCEDURE — 63710000001 DEXAMETHASONE PER 0.25 MG

## 2021-08-01 PROCEDURE — 85025 COMPLETE CBC W/AUTO DIFF WBC: CPT | Performed by: INTERNAL MEDICINE

## 2021-08-01 PROCEDURE — 99233 SBSQ HOSP IP/OBS HIGH 50: CPT | Performed by: HOSPITALIST

## 2021-08-01 RX ORDER — SULFAMETHOXAZOLE AND TRIMETHOPRIM 800; 160 MG/1; MG/1
1 TABLET ORAL 3 TIMES WEEKLY
COMMUNITY
End: 2021-09-10 | Stop reason: SDUPTHER

## 2021-08-01 RX ADMIN — ACYCLOVIR 400 MG: 200 CAPSULE ORAL at 17:07

## 2021-08-01 RX ADMIN — Medication 5 MG: at 22:21

## 2021-08-01 RX ADMIN — DOXYCYCLINE 100 MG: 100 CAPSULE ORAL at 22:21

## 2021-08-01 RX ADMIN — METOPROLOL TARTRATE 50 MG: 50 TABLET, FILM COATED ORAL at 22:20

## 2021-08-01 RX ADMIN — DOXYCYCLINE 100 MG: 100 CAPSULE ORAL at 09:58

## 2021-08-01 RX ADMIN — POMALIDOMIDE 2 MG: 2 CAPSULE ORAL at 17:07

## 2021-08-01 RX ADMIN — VENLAFAXINE HYDROCHLORIDE 37.5 MG: 37.5 CAPSULE, EXTENDED RELEASE ORAL at 09:58

## 2021-08-01 RX ADMIN — REMDESIVIR 100 MG: 100 INJECTION, POWDER, LYOPHILIZED, FOR SOLUTION INTRAVENOUS at 17:07

## 2021-08-01 RX ADMIN — PANTOPRAZOLE SODIUM 40 MG: 40 TABLET, DELAYED RELEASE ORAL at 09:58

## 2021-08-01 RX ADMIN — ACYCLOVIR 400 MG: 200 CAPSULE ORAL at 09:58

## 2021-08-01 RX ADMIN — ASPIRIN 81 MG: 81 TABLET, COATED ORAL at 09:58

## 2021-08-01 RX ADMIN — HYDROCODONE POLISTIREX AND CHLORPHENIRAMINE POLISTIREX 5 ML: 10; 8 SUSPENSION, EXTENDED RELEASE ORAL at 22:22

## 2021-08-01 RX ADMIN — SODIUM CHLORIDE, PRESERVATIVE FREE 10 ML: 5 INJECTION INTRAVENOUS at 12:38

## 2021-08-01 RX ADMIN — ENOXAPARIN SODIUM 40 MG: 40 INJECTION SUBCUTANEOUS at 17:07

## 2021-08-01 RX ADMIN — DEXAMETHASONE 6 MG: 2 TABLET ORAL at 09:58

## 2021-08-02 ENCOUNTER — APPOINTMENT (OUTPATIENT)
Dept: ONCOLOGY | Facility: HOSPITAL | Age: 71
End: 2021-08-02

## 2021-08-02 LAB
ALBUMIN SERPL-MCNC: 3.1 G/DL (ref 3.5–5.2)
ALBUMIN/GLOB SERPL: 1.2 G/DL
ALP SERPL-CCNC: 69 U/L (ref 39–117)
ALT SERPL W P-5'-P-CCNC: 12 U/L (ref 1–33)
ANION GAP SERPL CALCULATED.3IONS-SCNC: 12 MMOL/L (ref 5–15)
AST SERPL-CCNC: 15 U/L (ref 1–32)
BASOPHILS # BLD MANUAL: 0 10*3/MM3 (ref 0–0.2)
BASOPHILS NFR BLD AUTO: 0 % (ref 0–1.5)
BILIRUB SERPL-MCNC: 0.2 MG/DL (ref 0–1.2)
BUN SERPL-MCNC: 27 MG/DL (ref 8–23)
BUN/CREAT SERPL: 26.7 (ref 7–25)
CALCIUM SPEC-SCNC: 8.1 MG/DL (ref 8.6–10.5)
CHLORIDE SERPL-SCNC: 107 MMOL/L (ref 98–107)
CO2 SERPL-SCNC: 19 MMOL/L (ref 22–29)
CREAT SERPL-MCNC: 1.01 MG/DL (ref 0.57–1)
CRP SERPL-MCNC: 7.79 MG/DL (ref 0–0.5)
DEPRECATED RDW RBC AUTO: 50.9 FL (ref 37–54)
EOSINOPHIL # BLD MANUAL: 0.04 10*3/MM3 (ref 0–0.4)
EOSINOPHIL NFR BLD MANUAL: 1 % (ref 0.3–6.2)
ERYTHROCYTE [DISTWIDTH] IN BLOOD BY AUTOMATED COUNT: 14.7 % (ref 12.3–15.4)
GFR SERPL CREATININE-BSD FRML MDRD: 54 ML/MIN/1.73
GLOBULIN UR ELPH-MCNC: 2.6 GM/DL
GLUCOSE SERPL-MCNC: 100 MG/DL (ref 65–99)
HCT VFR BLD AUTO: 40.9 % (ref 34–46.6)
HGB BLD-MCNC: 13.3 G/DL (ref 12–15.9)
LYMPHOCYTES # BLD MANUAL: 0.19 10*3/MM3 (ref 0.7–3.1)
LYMPHOCYTES NFR BLD MANUAL: 4 % (ref 5–12)
LYMPHOCYTES NFR BLD MANUAL: 5 % (ref 19.6–45.3)
MCH RBC QN AUTO: 30.6 PG (ref 26.6–33)
MCHC RBC AUTO-ENTMCNC: 32.5 G/DL (ref 31.5–35.7)
MCV RBC AUTO: 94.2 FL (ref 79–97)
MONOCYTES # BLD AUTO: 0.15 10*3/MM3 (ref 0.1–0.9)
MYELOCYTES NFR BLD MANUAL: 1 % (ref 0–0)
NEUTROPHILS # BLD AUTO: 3.44 10*3/MM3 (ref 1.7–7)
NEUTROPHILS NFR BLD MANUAL: 81 % (ref 42.7–76)
NEUTS BAND NFR BLD MANUAL: 8 % (ref 0–5)
PLAT MORPH BLD: NORMAL
PLATELET # BLD AUTO: 109 10*3/MM3 (ref 140–450)
PMV BLD AUTO: 12.4 FL (ref 6–12)
POTASSIUM SERPL-SCNC: 4.6 MMOL/L (ref 3.5–5.2)
PROT SERPL-MCNC: 5.7 G/DL (ref 6–8.5)
QT INTERVAL: 214 MS
QTC INTERVAL: 365 MS
RBC # BLD AUTO: 4.34 10*6/MM3 (ref 3.77–5.28)
RBC MORPH BLD: NORMAL
SODIUM SERPL-SCNC: 138 MMOL/L (ref 136–145)
WBC # BLD AUTO: 3.86 10*3/MM3 (ref 3.4–10.8)
WBC MORPH BLD: NORMAL

## 2021-08-02 PROCEDURE — 97530 THERAPEUTIC ACTIVITIES: CPT

## 2021-08-02 PROCEDURE — 85007 BL SMEAR W/DIFF WBC COUNT: CPT | Performed by: INTERNAL MEDICINE

## 2021-08-02 PROCEDURE — 63710000001 ONDANSETRON PER 8 MG: Performed by: INTERNAL MEDICINE

## 2021-08-02 PROCEDURE — 97162 PT EVAL MOD COMPLEX 30 MIN: CPT

## 2021-08-02 PROCEDURE — 80053 COMPREHEN METABOLIC PANEL: CPT | Performed by: INTERNAL MEDICINE

## 2021-08-02 PROCEDURE — 97165 OT EVAL LOW COMPLEX 30 MIN: CPT

## 2021-08-02 PROCEDURE — 86140 C-REACTIVE PROTEIN: CPT | Performed by: HOSPITALIST

## 2021-08-02 PROCEDURE — 63710000001 DEXAMETHASONE PER 0.25 MG

## 2021-08-02 PROCEDURE — 85025 COMPLETE CBC W/AUTO DIFF WBC: CPT | Performed by: INTERNAL MEDICINE

## 2021-08-02 PROCEDURE — 99233 SBSQ HOSP IP/OBS HIGH 50: CPT | Performed by: INTERNAL MEDICINE

## 2021-08-02 PROCEDURE — 25010000002 ENOXAPARIN PER 10 MG: Performed by: INTERNAL MEDICINE

## 2021-08-02 RX ORDER — ONDANSETRON 4 MG/1
4 TABLET, FILM COATED ORAL EVERY 6 HOURS PRN
Status: DISCONTINUED | OUTPATIENT
Start: 2021-08-02 | End: 2021-08-11 | Stop reason: HOSPADM

## 2021-08-02 RX ADMIN — Medication 5 MG: at 22:21

## 2021-08-02 RX ADMIN — DOXYCYCLINE 100 MG: 100 CAPSULE ORAL at 22:21

## 2021-08-02 RX ADMIN — METOPROLOL TARTRATE 50 MG: 50 TABLET, FILM COATED ORAL at 09:25

## 2021-08-02 RX ADMIN — DEXAMETHASONE 6 MG: 2 TABLET ORAL at 09:25

## 2021-08-02 RX ADMIN — PANTOPRAZOLE SODIUM 40 MG: 40 TABLET, DELAYED RELEASE ORAL at 06:22

## 2021-08-02 RX ADMIN — POMALIDOMIDE 2 MG: 2 CAPSULE ORAL at 09:26

## 2021-08-02 RX ADMIN — ENOXAPARIN SODIUM 40 MG: 40 INJECTION SUBCUTANEOUS at 18:02

## 2021-08-02 RX ADMIN — ONDANSETRON HYDROCHLORIDE 4 MG: 4 TABLET, FILM COATED ORAL at 15:09

## 2021-08-02 RX ADMIN — DOXYCYCLINE 100 MG: 100 CAPSULE ORAL at 09:25

## 2021-08-02 RX ADMIN — HYDROCODONE POLISTIREX AND CHLORPHENIRAMINE POLISTIREX 5 ML: 10; 8 SUSPENSION, EXTENDED RELEASE ORAL at 22:22

## 2021-08-02 RX ADMIN — METOPROLOL TARTRATE 50 MG: 50 TABLET, FILM COATED ORAL at 22:21

## 2021-08-02 RX ADMIN — SULFAMETHOXAZOLE AND TRIMETHOPRIM 160 MG: 800; 160 TABLET ORAL at 09:25

## 2021-08-02 RX ADMIN — ACYCLOVIR 400 MG: 200 CAPSULE ORAL at 09:25

## 2021-08-02 RX ADMIN — ACYCLOVIR 400 MG: 200 CAPSULE ORAL at 18:02

## 2021-08-02 RX ADMIN — VENLAFAXINE HYDROCHLORIDE 37.5 MG: 37.5 CAPSULE, EXTENDED RELEASE ORAL at 06:22

## 2021-08-02 RX ADMIN — ASPIRIN 81 MG: 81 TABLET, COATED ORAL at 09:25

## 2021-08-02 RX ADMIN — SODIUM CHLORIDE, PRESERVATIVE FREE 10 ML: 5 INJECTION INTRAVENOUS at 09:25

## 2021-08-03 LAB
ALBUMIN SERPL-MCNC: 3.1 G/DL (ref 3.5–5.2)
ALBUMIN/GLOB SERPL: 1.2 G/DL
ALP SERPL-CCNC: 74 U/L (ref 39–117)
ALT SERPL W P-5'-P-CCNC: 12 U/L (ref 1–33)
ANION GAP SERPL CALCULATED.3IONS-SCNC: 12 MMOL/L (ref 5–15)
AST SERPL-CCNC: 13 U/L (ref 1–32)
BASOPHILS # BLD MANUAL: 0.05 10*3/MM3 (ref 0–0.2)
BASOPHILS NFR BLD AUTO: 1 % (ref 0–1.5)
BILIRUB SERPL-MCNC: 0.2 MG/DL (ref 0–1.2)
BUN SERPL-MCNC: 26 MG/DL (ref 8–23)
BUN/CREAT SERPL: 26.5 (ref 7–25)
CALCIUM SPEC-SCNC: 8.2 MG/DL (ref 8.6–10.5)
CHLORIDE SERPL-SCNC: 106 MMOL/L (ref 98–107)
CO2 SERPL-SCNC: 20 MMOL/L (ref 22–29)
CREAT SERPL-MCNC: 0.98 MG/DL (ref 0.57–1)
CRP SERPL-MCNC: 4.54 MG/DL (ref 0–0.5)
DEPRECATED RDW RBC AUTO: 50.7 FL (ref 37–54)
EOSINOPHIL # BLD MANUAL: 0 10*3/MM3 (ref 0–0.4)
EOSINOPHIL NFR BLD MANUAL: 0 % (ref 0.3–6.2)
ERYTHROCYTE [DISTWIDTH] IN BLOOD BY AUTOMATED COUNT: 14.6 % (ref 12.3–15.4)
GFR SERPL CREATININE-BSD FRML MDRD: 56 ML/MIN/1.73
GLOBULIN UR ELPH-MCNC: 2.6 GM/DL
GLUCOSE SERPL-MCNC: 74 MG/DL (ref 65–99)
HCT VFR BLD AUTO: 41.7 % (ref 34–46.6)
HGB BLD-MCNC: 13.4 G/DL (ref 12–15.9)
LYMPHOCYTES # BLD MANUAL: 0.38 10*3/MM3 (ref 0.7–3.1)
LYMPHOCYTES NFR BLD MANUAL: 2 % (ref 5–12)
LYMPHOCYTES NFR BLD MANUAL: 8 % (ref 19.6–45.3)
MCH RBC QN AUTO: 30.4 PG (ref 26.6–33)
MCHC RBC AUTO-ENTMCNC: 32.1 G/DL (ref 31.5–35.7)
MCV RBC AUTO: 94.6 FL (ref 79–97)
MONOCYTES # BLD AUTO: 0.1 10*3/MM3 (ref 0.1–0.9)
MYELOCYTES NFR BLD MANUAL: 1 % (ref 0–0)
NEUTROPHILS # BLD AUTO: 4.18 10*3/MM3 (ref 1.7–7)
NEUTROPHILS NFR BLD MANUAL: 80 % (ref 42.7–76)
NEUTS BAND NFR BLD MANUAL: 8 % (ref 0–5)
PLAT MORPH BLD: NORMAL
PLATELET # BLD AUTO: 134 10*3/MM3 (ref 140–450)
PMV BLD AUTO: 12.3 FL (ref 6–12)
POTASSIUM SERPL-SCNC: 4.8 MMOL/L (ref 3.5–5.2)
PROT SERPL-MCNC: 5.7 G/DL (ref 6–8.5)
RBC # BLD AUTO: 4.41 10*6/MM3 (ref 3.77–5.28)
RBC MORPH BLD: NORMAL
SODIUM SERPL-SCNC: 138 MMOL/L (ref 136–145)
WBC # BLD AUTO: 4.75 10*3/MM3 (ref 3.4–10.8)
WBC MORPH BLD: NORMAL

## 2021-08-03 PROCEDURE — 97110 THERAPEUTIC EXERCISES: CPT

## 2021-08-03 PROCEDURE — 02HV33Z INSERTION OF INFUSION DEVICE INTO SUPERIOR VENA CAVA, PERCUTANEOUS APPROACH: ICD-10-PCS | Performed by: INTERNAL MEDICINE

## 2021-08-03 PROCEDURE — 85007 BL SMEAR W/DIFF WBC COUNT: CPT | Performed by: INTERNAL MEDICINE

## 2021-08-03 PROCEDURE — 25010000002 ENOXAPARIN PER 10 MG: Performed by: INTERNAL MEDICINE

## 2021-08-03 PROCEDURE — 97530 THERAPEUTIC ACTIVITIES: CPT

## 2021-08-03 PROCEDURE — 63710000001 DEXAMETHASONE PER 0.25 MG

## 2021-08-03 PROCEDURE — 25010000002 FUROSEMIDE PER 20 MG: Performed by: INTERNAL MEDICINE

## 2021-08-03 PROCEDURE — 86140 C-REACTIVE PROTEIN: CPT | Performed by: HOSPITALIST

## 2021-08-03 PROCEDURE — C1751 CATH, INF, PER/CENT/MIDLINE: HCPCS

## 2021-08-03 PROCEDURE — C1894 INTRO/SHEATH, NON-LASER: HCPCS

## 2021-08-03 PROCEDURE — 80053 COMPREHEN METABOLIC PANEL: CPT | Performed by: INTERNAL MEDICINE

## 2021-08-03 PROCEDURE — 85025 COMPLETE CBC W/AUTO DIFF WBC: CPT | Performed by: INTERNAL MEDICINE

## 2021-08-03 PROCEDURE — 99233 SBSQ HOSP IP/OBS HIGH 50: CPT | Performed by: INTERNAL MEDICINE

## 2021-08-03 PROCEDURE — 63710000001 ONDANSETRON PER 8 MG: Performed by: INTERNAL MEDICINE

## 2021-08-03 RX ORDER — SODIUM CHLORIDE 0.9 % (FLUSH) 0.9 %
20 SYRINGE (ML) INJECTION AS NEEDED
Status: DISCONTINUED | OUTPATIENT
Start: 2021-08-03 | End: 2021-08-11 | Stop reason: HOSPADM

## 2021-08-03 RX ORDER — FUROSEMIDE 10 MG/ML
40 INJECTION INTRAMUSCULAR; INTRAVENOUS ONCE
Status: COMPLETED | OUTPATIENT
Start: 2021-08-03 | End: 2021-08-03

## 2021-08-03 RX ORDER — SODIUM CHLORIDE 0.9 % (FLUSH) 0.9 %
10 SYRINGE (ML) INJECTION EVERY 12 HOURS SCHEDULED
Status: DISCONTINUED | OUTPATIENT
Start: 2021-08-03 | End: 2021-08-11 | Stop reason: HOSPADM

## 2021-08-03 RX ORDER — SODIUM CHLORIDE 0.9 % (FLUSH) 0.9 %
10 SYRINGE (ML) INJECTION AS NEEDED
Status: DISCONTINUED | OUTPATIENT
Start: 2021-08-03 | End: 2021-08-11 | Stop reason: HOSPADM

## 2021-08-03 RX ADMIN — DOXYCYCLINE 100 MG: 100 CAPSULE ORAL at 09:05

## 2021-08-03 RX ADMIN — VENLAFAXINE HYDROCHLORIDE 37.5 MG: 37.5 CAPSULE, EXTENDED RELEASE ORAL at 06:24

## 2021-08-03 RX ADMIN — Medication 5 MG: at 22:14

## 2021-08-03 RX ADMIN — ASPIRIN 81 MG: 81 TABLET, COATED ORAL at 09:05

## 2021-08-03 RX ADMIN — FUROSEMIDE 40 MG: 10 INJECTION INTRAMUSCULAR; INTRAVENOUS at 12:18

## 2021-08-03 RX ADMIN — ONDANSETRON HYDROCHLORIDE 4 MG: 4 TABLET, FILM COATED ORAL at 09:05

## 2021-08-03 RX ADMIN — DEXAMETHASONE 6 MG: 2 TABLET ORAL at 09:05

## 2021-08-03 RX ADMIN — ENOXAPARIN SODIUM 40 MG: 40 INJECTION SUBCUTANEOUS at 17:35

## 2021-08-03 RX ADMIN — SODIUM CHLORIDE, PRESERVATIVE FREE 10 ML: 5 INJECTION INTRAVENOUS at 12:22

## 2021-08-03 RX ADMIN — REMDESIVIR 100 MG: 100 INJECTION, POWDER, LYOPHILIZED, FOR SOLUTION INTRAVENOUS at 12:18

## 2021-08-03 RX ADMIN — HYDROCODONE POLISTIREX AND CHLORPHENIRAMINE POLISTIREX 5 ML: 10; 8 SUSPENSION, EXTENDED RELEASE ORAL at 22:15

## 2021-08-03 RX ADMIN — SODIUM CHLORIDE, PRESERVATIVE FREE 10 ML: 5 INJECTION INTRAVENOUS at 22:15

## 2021-08-03 RX ADMIN — METOPROLOL TARTRATE 50 MG: 50 TABLET, FILM COATED ORAL at 22:15

## 2021-08-03 RX ADMIN — PANTOPRAZOLE SODIUM 40 MG: 40 TABLET, DELAYED RELEASE ORAL at 06:24

## 2021-08-03 RX ADMIN — POMALIDOMIDE 2 MG: 2 CAPSULE ORAL at 09:09

## 2021-08-03 RX ADMIN — DOXYCYCLINE 100 MG: 100 CAPSULE ORAL at 22:14

## 2021-08-03 RX ADMIN — ACYCLOVIR 400 MG: 200 CAPSULE ORAL at 17:35

## 2021-08-03 RX ADMIN — ACYCLOVIR 400 MG: 200 CAPSULE ORAL at 09:05

## 2021-08-04 ENCOUNTER — SPECIALTY PHARMACY (OUTPATIENT)
Dept: ONCOLOGY | Facility: HOSPITAL | Age: 71
End: 2021-08-04

## 2021-08-04 DIAGNOSIS — C90.02 MULTIPLE MYELOMA IN RELAPSE (HCC): ICD-10-CM

## 2021-08-04 LAB
ALBUMIN SERPL-MCNC: 3.2 G/DL (ref 3.5–5.2)
ALBUMIN/GLOB SERPL: 1.2 G/DL
ALP SERPL-CCNC: 80 U/L (ref 39–117)
ALT SERPL W P-5'-P-CCNC: 12 U/L (ref 1–33)
ANION GAP SERPL CALCULATED.3IONS-SCNC: 10 MMOL/L (ref 5–15)
AST SERPL-CCNC: 12 U/L (ref 1–32)
BASOPHILS # BLD MANUAL: 0 10*3/MM3 (ref 0–0.2)
BASOPHILS NFR BLD AUTO: 0 % (ref 0–1.5)
BILIRUB SERPL-MCNC: 0.3 MG/DL (ref 0–1.2)
BUN SERPL-MCNC: 32 MG/DL (ref 8–23)
BUN/CREAT SERPL: 26.7 (ref 7–25)
CALCIUM SPEC-SCNC: 8.4 MG/DL (ref 8.6–10.5)
CHLORIDE SERPL-SCNC: 105 MMOL/L (ref 98–107)
CO2 SERPL-SCNC: 26 MMOL/L (ref 22–29)
CREAT SERPL-MCNC: 1.2 MG/DL (ref 0.57–1)
CRP SERPL-MCNC: 4.16 MG/DL (ref 0–0.5)
DEPRECATED RDW RBC AUTO: 50.2 FL (ref 37–54)
EOSINOPHIL # BLD MANUAL: 0 10*3/MM3 (ref 0–0.4)
EOSINOPHIL NFR BLD MANUAL: 0 % (ref 0.3–6.2)
ERYTHROCYTE [DISTWIDTH] IN BLOOD BY AUTOMATED COUNT: 14.7 % (ref 12.3–15.4)
GFR SERPL CREATININE-BSD FRML MDRD: 44 ML/MIN/1.73
GLOBULIN UR ELPH-MCNC: 2.7 GM/DL
GLUCOSE SERPL-MCNC: 88 MG/DL (ref 65–99)
HCT VFR BLD AUTO: 43.5 % (ref 34–46.6)
HGB BLD-MCNC: 14.4 G/DL (ref 12–15.9)
LYMPHOCYTES # BLD MANUAL: 0.46 10*3/MM3 (ref 0.7–3.1)
LYMPHOCYTES NFR BLD MANUAL: 5 % (ref 5–12)
LYMPHOCYTES NFR BLD MANUAL: 8 % (ref 19.6–45.3)
MCH RBC QN AUTO: 30.6 PG (ref 26.6–33)
MCHC RBC AUTO-ENTMCNC: 33.1 G/DL (ref 31.5–35.7)
MCV RBC AUTO: 92.4 FL (ref 79–97)
METAMYELOCYTES NFR BLD MANUAL: 2 % (ref 0–0)
MONOCYTES # BLD AUTO: 0.28 10*3/MM3 (ref 0.1–0.9)
NEUTROPHILS # BLD AUTO: 4.84 10*3/MM3 (ref 1.7–7)
NEUTROPHILS NFR BLD MANUAL: 81 % (ref 42.7–76)
NEUTS BAND NFR BLD MANUAL: 4 % (ref 0–5)
PLAT MORPH BLD: NORMAL
PLATELET # BLD AUTO: 133 10*3/MM3 (ref 140–450)
PMV BLD AUTO: 12 FL (ref 6–12)
POTASSIUM SERPL-SCNC: 4.3 MMOL/L (ref 3.5–5.2)
PROT SERPL-MCNC: 5.9 G/DL (ref 6–8.5)
RBC # BLD AUTO: 4.71 10*6/MM3 (ref 3.77–5.28)
RBC MORPH BLD: NORMAL
SODIUM SERPL-SCNC: 141 MMOL/L (ref 136–145)
WBC # BLD AUTO: 5.69 10*3/MM3 (ref 3.4–10.8)
WBC MORPH BLD: NORMAL

## 2021-08-04 PROCEDURE — 86140 C-REACTIVE PROTEIN: CPT | Performed by: HOSPITALIST

## 2021-08-04 PROCEDURE — 85007 BL SMEAR W/DIFF WBC COUNT: CPT | Performed by: INTERNAL MEDICINE

## 2021-08-04 PROCEDURE — 99233 SBSQ HOSP IP/OBS HIGH 50: CPT | Performed by: INTERNAL MEDICINE

## 2021-08-04 PROCEDURE — 63710000001 DEXAMETHASONE PER 0.25 MG

## 2021-08-04 PROCEDURE — 63710000001 ONDANSETRON PER 8 MG: Performed by: INTERNAL MEDICINE

## 2021-08-04 PROCEDURE — 25010000002 ENOXAPARIN PER 10 MG: Performed by: INTERNAL MEDICINE

## 2021-08-04 PROCEDURE — 80053 COMPREHEN METABOLIC PANEL: CPT | Performed by: INTERNAL MEDICINE

## 2021-08-04 PROCEDURE — 85025 COMPLETE CBC W/AUTO DIFF WBC: CPT | Performed by: INTERNAL MEDICINE

## 2021-08-04 RX ADMIN — METOPROLOL TARTRATE 50 MG: 50 TABLET, FILM COATED ORAL at 22:13

## 2021-08-04 RX ADMIN — SULFAMETHOXAZOLE AND TRIMETHOPRIM 160 MG: 800; 160 TABLET ORAL at 09:14

## 2021-08-04 RX ADMIN — SODIUM CHLORIDE, PRESERVATIVE FREE 10 ML: 5 INJECTION INTRAVENOUS at 09:14

## 2021-08-04 RX ADMIN — DOXYCYCLINE 100 MG: 100 CAPSULE ORAL at 22:13

## 2021-08-04 RX ADMIN — HYDROCODONE POLISTIREX AND CHLORPHENIRAMINE POLISTIREX 5 ML: 10; 8 SUSPENSION, EXTENDED RELEASE ORAL at 22:14

## 2021-08-04 RX ADMIN — DEXAMETHASONE 6 MG: 2 TABLET ORAL at 09:14

## 2021-08-04 RX ADMIN — ACYCLOVIR 400 MG: 200 CAPSULE ORAL at 17:46

## 2021-08-04 RX ADMIN — POMALIDOMIDE 2 MG: 2 CAPSULE ORAL at 09:15

## 2021-08-04 RX ADMIN — ASPIRIN 81 MG: 81 TABLET, COATED ORAL at 09:14

## 2021-08-04 RX ADMIN — ACYCLOVIR 400 MG: 200 CAPSULE ORAL at 09:14

## 2021-08-04 RX ADMIN — REMDESIVIR 100 MG: 100 INJECTION, POWDER, LYOPHILIZED, FOR SOLUTION INTRAVENOUS at 13:03

## 2021-08-04 RX ADMIN — DOXYCYCLINE 100 MG: 100 CAPSULE ORAL at 09:14

## 2021-08-04 RX ADMIN — PANTOPRAZOLE SODIUM 40 MG: 40 TABLET, DELAYED RELEASE ORAL at 06:17

## 2021-08-04 RX ADMIN — ONDANSETRON HYDROCHLORIDE 4 MG: 4 TABLET, FILM COATED ORAL at 01:35

## 2021-08-04 RX ADMIN — ENOXAPARIN SODIUM 40 MG: 40 INJECTION SUBCUTANEOUS at 17:46

## 2021-08-04 RX ADMIN — VENLAFAXINE HYDROCHLORIDE 37.5 MG: 37.5 CAPSULE, EXTENDED RELEASE ORAL at 06:17

## 2021-08-04 RX ADMIN — SODIUM CHLORIDE, PRESERVATIVE FREE 10 ML: 5 INJECTION INTRAVENOUS at 22:14

## 2021-08-04 RX ADMIN — Medication 5 MG: at 22:13

## 2021-08-05 ENCOUNTER — APPOINTMENT (OUTPATIENT)
Dept: CT IMAGING | Facility: HOSPITAL | Age: 71
End: 2021-08-05

## 2021-08-05 LAB
ALBUMIN SERPL-MCNC: 3.2 G/DL (ref 3.5–5.2)
ALBUMIN/GLOB SERPL: 1.2 G/DL
ALP SERPL-CCNC: 83 U/L (ref 39–117)
ALT SERPL W P-5'-P-CCNC: 11 U/L (ref 1–33)
ANION GAP SERPL CALCULATED.3IONS-SCNC: 12 MMOL/L (ref 5–15)
AST SERPL-CCNC: 10 U/L (ref 1–32)
BACTERIA SPEC AEROBE CULT: NORMAL
BACTERIA SPEC AEROBE CULT: NORMAL
BASOPHILS # BLD AUTO: 0.04 10*3/MM3 (ref 0–0.2)
BASOPHILS NFR BLD AUTO: 0.8 % (ref 0–1.5)
BILIRUB SERPL-MCNC: 0.4 MG/DL (ref 0–1.2)
BUN SERPL-MCNC: 32 MG/DL (ref 8–23)
BUN/CREAT SERPL: 24.1 (ref 7–25)
CALCIUM SPEC-SCNC: 8.4 MG/DL (ref 8.6–10.5)
CHLORIDE SERPL-SCNC: 104 MMOL/L (ref 98–107)
CO2 SERPL-SCNC: 22 MMOL/L (ref 22–29)
CREAT SERPL-MCNC: 1.33 MG/DL (ref 0.57–1)
CRP SERPL-MCNC: 4.35 MG/DL (ref 0–0.5)
DEPRECATED RDW RBC AUTO: 50.4 FL (ref 37–54)
EOSINOPHIL # BLD AUTO: 0.07 10*3/MM3 (ref 0–0.4)
EOSINOPHIL NFR BLD AUTO: 1.4 % (ref 0.3–6.2)
ERYTHROCYTE [DISTWIDTH] IN BLOOD BY AUTOMATED COUNT: 14.8 % (ref 12.3–15.4)
GFR SERPL CREATININE-BSD FRML MDRD: 39 ML/MIN/1.73
GLOBULIN UR ELPH-MCNC: 2.6 GM/DL
GLUCOSE SERPL-MCNC: 89 MG/DL (ref 65–99)
HCT VFR BLD AUTO: 44.7 % (ref 34–46.6)
HGB BLD-MCNC: 14.8 G/DL (ref 12–15.9)
IMM GRANULOCYTES # BLD AUTO: 0.09 10*3/MM3 (ref 0–0.05)
IMM GRANULOCYTES NFR BLD AUTO: 1.7 % (ref 0–0.5)
LYMPHOCYTES # BLD AUTO: 0.32 10*3/MM3 (ref 0.7–3.1)
LYMPHOCYTES NFR BLD AUTO: 6.2 % (ref 19.6–45.3)
MCH RBC QN AUTO: 30.6 PG (ref 26.6–33)
MCHC RBC AUTO-ENTMCNC: 33.1 G/DL (ref 31.5–35.7)
MCV RBC AUTO: 92.4 FL (ref 79–97)
MONOCYTES # BLD AUTO: 0.68 10*3/MM3 (ref 0.1–0.9)
MONOCYTES NFR BLD AUTO: 13.2 % (ref 5–12)
NEUTROPHILS NFR BLD AUTO: 3.96 10*3/MM3 (ref 1.7–7)
NEUTROPHILS NFR BLD AUTO: 76.7 % (ref 42.7–76)
NRBC BLD AUTO-RTO: 0 /100 WBC (ref 0–0.2)
PLATELET # BLD AUTO: 164 10*3/MM3 (ref 140–450)
PMV BLD AUTO: 11.8 FL (ref 6–12)
POTASSIUM SERPL-SCNC: 3.9 MMOL/L (ref 3.5–5.2)
PROT SERPL-MCNC: 5.8 G/DL (ref 6–8.5)
RBC # BLD AUTO: 4.84 10*6/MM3 (ref 3.77–5.28)
SODIUM SERPL-SCNC: 138 MMOL/L (ref 136–145)
WBC # BLD AUTO: 5.16 10*3/MM3 (ref 3.4–10.8)

## 2021-08-05 PROCEDURE — 94799 UNLISTED PULMONARY SVC/PX: CPT

## 2021-08-05 PROCEDURE — 99233 SBSQ HOSP IP/OBS HIGH 50: CPT | Performed by: INTERNAL MEDICINE

## 2021-08-05 PROCEDURE — 80053 COMPREHEN METABOLIC PANEL: CPT | Performed by: INTERNAL MEDICINE

## 2021-08-05 PROCEDURE — 97116 GAIT TRAINING THERAPY: CPT

## 2021-08-05 PROCEDURE — 25010000002 CEFTRIAXONE PER 250 MG: Performed by: INTERNAL MEDICINE

## 2021-08-05 PROCEDURE — 63710000001 ONDANSETRON PER 8 MG: Performed by: INTERNAL MEDICINE

## 2021-08-05 PROCEDURE — 25010000002 FUROSEMIDE PER 20 MG: Performed by: INTERNAL MEDICINE

## 2021-08-05 PROCEDURE — 25010000002 IOPAMIDOL 61 % SOLUTION: Performed by: INTERNAL MEDICINE

## 2021-08-05 PROCEDURE — 63710000001 DEXAMETHASONE PER 0.25 MG

## 2021-08-05 PROCEDURE — 25010000002 ENOXAPARIN PER 10 MG: Performed by: INTERNAL MEDICINE

## 2021-08-05 PROCEDURE — 86140 C-REACTIVE PROTEIN: CPT | Performed by: HOSPITALIST

## 2021-08-05 PROCEDURE — 97530 THERAPEUTIC ACTIVITIES: CPT

## 2021-08-05 PROCEDURE — 71275 CT ANGIOGRAPHY CHEST: CPT

## 2021-08-05 PROCEDURE — 85025 COMPLETE CBC W/AUTO DIFF WBC: CPT | Performed by: INTERNAL MEDICINE

## 2021-08-05 RX ORDER — CEFTRIAXONE SODIUM 1 G/50ML
1 INJECTION, SOLUTION INTRAVENOUS EVERY 24 HOURS
Status: DISCONTINUED | OUTPATIENT
Start: 2021-08-05 | End: 2021-08-11

## 2021-08-05 RX ORDER — FUROSEMIDE 10 MG/ML
40 INJECTION INTRAMUSCULAR; INTRAVENOUS ONCE
Status: COMPLETED | OUTPATIENT
Start: 2021-08-05 | End: 2021-08-05

## 2021-08-05 RX ADMIN — ONDANSETRON HYDROCHLORIDE 4 MG: 4 TABLET, FILM COATED ORAL at 13:06

## 2021-08-05 RX ADMIN — PANTOPRAZOLE SODIUM 40 MG: 40 TABLET, DELAYED RELEASE ORAL at 05:53

## 2021-08-05 RX ADMIN — FUROSEMIDE 40 MG: 10 INJECTION INTRAMUSCULAR; INTRAVENOUS at 09:39

## 2021-08-05 RX ADMIN — SODIUM CHLORIDE, PRESERVATIVE FREE 10 ML: 5 INJECTION INTRAVENOUS at 09:40

## 2021-08-05 RX ADMIN — CYCLOBENZAPRINE HYDROCHLORIDE 10 MG: 10 TABLET, FILM COATED ORAL at 23:34

## 2021-08-05 RX ADMIN — ASPIRIN 81 MG: 81 TABLET, COATED ORAL at 09:39

## 2021-08-05 RX ADMIN — VENLAFAXINE HYDROCHLORIDE 37.5 MG: 37.5 CAPSULE, EXTENDED RELEASE ORAL at 05:53

## 2021-08-05 RX ADMIN — ONDANSETRON HYDROCHLORIDE 4 MG: 4 TABLET, FILM COATED ORAL at 23:34

## 2021-08-05 RX ADMIN — DEXAMETHASONE 6 MG: 2 TABLET ORAL at 09:39

## 2021-08-05 RX ADMIN — METOPROLOL TARTRATE 50 MG: 50 TABLET, FILM COATED ORAL at 09:39

## 2021-08-05 RX ADMIN — DOXYCYCLINE 100 MG: 100 CAPSULE ORAL at 09:39

## 2021-08-05 RX ADMIN — ENOXAPARIN SODIUM 40 MG: 40 INJECTION SUBCUTANEOUS at 17:34

## 2021-08-05 RX ADMIN — IOPAMIDOL 80 ML: 612 INJECTION, SOLUTION INTRAVENOUS at 11:25

## 2021-08-05 RX ADMIN — CEFTRIAXONE SODIUM 1 G: 1 INJECTION, SOLUTION INTRAVENOUS at 15:04

## 2021-08-05 RX ADMIN — ACYCLOVIR 400 MG: 200 CAPSULE ORAL at 17:34

## 2021-08-05 RX ADMIN — Medication 5 MG: at 21:17

## 2021-08-05 RX ADMIN — SODIUM CHLORIDE, PRESERVATIVE FREE 10 ML: 5 INJECTION INTRAVENOUS at 21:17

## 2021-08-05 RX ADMIN — POMALIDOMIDE 2 MG: 2 CAPSULE ORAL at 10:00

## 2021-08-05 RX ADMIN — HYDROCODONE POLISTIREX AND CHLORPHENIRAMINE POLISTIREX 5 ML: 10; 8 SUSPENSION, EXTENDED RELEASE ORAL at 21:17

## 2021-08-05 RX ADMIN — ACYCLOVIR 400 MG: 200 CAPSULE ORAL at 09:39

## 2021-08-06 LAB
ALBUMIN SERPL-MCNC: 3.2 G/DL (ref 3.5–5.2)
ALBUMIN/GLOB SERPL: 1.2 G/DL
ALP SERPL-CCNC: 89 U/L (ref 39–117)
ALT SERPL W P-5'-P-CCNC: 11 U/L (ref 1–33)
ANION GAP SERPL CALCULATED.3IONS-SCNC: 12 MMOL/L (ref 5–15)
AST SERPL-CCNC: 11 U/L (ref 1–32)
BASOPHILS # BLD AUTO: 0.06 10*3/MM3 (ref 0–0.2)
BASOPHILS NFR BLD AUTO: 1.1 % (ref 0–1.5)
BILIRUB SERPL-MCNC: 0.4 MG/DL (ref 0–1.2)
BUN SERPL-MCNC: 39 MG/DL (ref 8–23)
BUN/CREAT SERPL: 28.9 (ref 7–25)
CALCIUM SPEC-SCNC: 9 MG/DL (ref 8.6–10.5)
CHLORIDE SERPL-SCNC: 102 MMOL/L (ref 98–107)
CO2 SERPL-SCNC: 23 MMOL/L (ref 22–29)
CREAT SERPL-MCNC: 1.35 MG/DL (ref 0.57–1)
CRP SERPL-MCNC: 4.63 MG/DL (ref 0–0.5)
DEPRECATED RDW RBC AUTO: 49.9 FL (ref 37–54)
EOSINOPHIL # BLD AUTO: 0.06 10*3/MM3 (ref 0–0.4)
EOSINOPHIL NFR BLD AUTO: 1.1 % (ref 0.3–6.2)
ERYTHROCYTE [DISTWIDTH] IN BLOOD BY AUTOMATED COUNT: 14.6 % (ref 12.3–15.4)
GFR SERPL CREATININE-BSD FRML MDRD: 39 ML/MIN/1.73
GLOBULIN UR ELPH-MCNC: 2.6 GM/DL
GLUCOSE SERPL-MCNC: 72 MG/DL (ref 65–99)
HCT VFR BLD AUTO: 46.4 % (ref 34–46.6)
HGB BLD-MCNC: 15.3 G/DL (ref 12–15.9)
IMM GRANULOCYTES # BLD AUTO: 0.1 10*3/MM3 (ref 0–0.05)
IMM GRANULOCYTES NFR BLD AUTO: 1.8 % (ref 0–0.5)
LYMPHOCYTES # BLD AUTO: 0.3 10*3/MM3 (ref 0.7–3.1)
LYMPHOCYTES NFR BLD AUTO: 5.3 % (ref 19.6–45.3)
MCH RBC QN AUTO: 30.6 PG (ref 26.6–33)
MCHC RBC AUTO-ENTMCNC: 33 G/DL (ref 31.5–35.7)
MCV RBC AUTO: 92.8 FL (ref 79–97)
MONOCYTES # BLD AUTO: 1.04 10*3/MM3 (ref 0.1–0.9)
MONOCYTES NFR BLD AUTO: 18.5 % (ref 5–12)
NEUTROPHILS NFR BLD AUTO: 4.06 10*3/MM3 (ref 1.7–7)
NEUTROPHILS NFR BLD AUTO: 72.2 % (ref 42.7–76)
NRBC BLD AUTO-RTO: 0 /100 WBC (ref 0–0.2)
PLATELET # BLD AUTO: 145 10*3/MM3 (ref 140–450)
PMV BLD AUTO: 11.6 FL (ref 6–12)
POTASSIUM SERPL-SCNC: 4 MMOL/L (ref 3.5–5.2)
PROCALCITONIN SERPL-MCNC: 0.1 NG/ML (ref 0–0.25)
PROT SERPL-MCNC: 5.8 G/DL (ref 6–8.5)
RBC # BLD AUTO: 5 10*6/MM3 (ref 3.77–5.28)
S PNEUM AG SPEC QL LA: NEGATIVE
SODIUM SERPL-SCNC: 137 MMOL/L (ref 136–145)
WBC # BLD AUTO: 5.62 10*3/MM3 (ref 3.4–10.8)

## 2021-08-06 PROCEDURE — 63710000001 DEXAMETHASONE PER 0.25 MG: Performed by: INTERNAL MEDICINE

## 2021-08-06 PROCEDURE — 87449 NOS EACH ORGANISM AG IA: CPT | Performed by: INTERNAL MEDICINE

## 2021-08-06 PROCEDURE — 84145 PROCALCITONIN (PCT): CPT | Performed by: INTERNAL MEDICINE

## 2021-08-06 PROCEDURE — 99233 SBSQ HOSP IP/OBS HIGH 50: CPT | Performed by: INTERNAL MEDICINE

## 2021-08-06 PROCEDURE — 85025 COMPLETE CBC W/AUTO DIFF WBC: CPT | Performed by: INTERNAL MEDICINE

## 2021-08-06 PROCEDURE — 86140 C-REACTIVE PROTEIN: CPT | Performed by: HOSPITALIST

## 2021-08-06 PROCEDURE — 80053 COMPREHEN METABOLIC PANEL: CPT | Performed by: INTERNAL MEDICINE

## 2021-08-06 PROCEDURE — 25010000002 ENOXAPARIN PER 10 MG: Performed by: INTERNAL MEDICINE

## 2021-08-06 PROCEDURE — 63710000001 ONDANSETRON PER 8 MG: Performed by: INTERNAL MEDICINE

## 2021-08-06 PROCEDURE — 87899 AGENT NOS ASSAY W/OPTIC: CPT | Performed by: INTERNAL MEDICINE

## 2021-08-06 PROCEDURE — 25010000002 CEFTRIAXONE PER 250 MG: Performed by: INTERNAL MEDICINE

## 2021-08-06 PROCEDURE — 83520 IMMUNOASSAY QUANT NOS NONAB: CPT | Performed by: INTERNAL MEDICINE

## 2021-08-06 RX ORDER — CETIRIZINE HYDROCHLORIDE 10 MG/1
5 TABLET ORAL DAILY
Status: DISCONTINUED | OUTPATIENT
Start: 2021-08-06 | End: 2021-08-11 | Stop reason: HOSPADM

## 2021-08-06 RX ORDER — GUAIFENESIN 600 MG/1
600 TABLET, EXTENDED RELEASE ORAL EVERY 12 HOURS SCHEDULED
Status: DISCONTINUED | OUTPATIENT
Start: 2021-08-06 | End: 2021-08-11 | Stop reason: HOSPADM

## 2021-08-06 RX ORDER — DOXYCYCLINE 100 MG/1
100 CAPSULE ORAL EVERY 12 HOURS SCHEDULED
Status: COMPLETED | OUTPATIENT
Start: 2021-08-06 | End: 2021-08-10

## 2021-08-06 RX ADMIN — ENOXAPARIN SODIUM 40 MG: 40 INJECTION SUBCUTANEOUS at 17:59

## 2021-08-06 RX ADMIN — Medication 5 MG: at 21:49

## 2021-08-06 RX ADMIN — SODIUM CHLORIDE, PRESERVATIVE FREE 10 ML: 5 INJECTION INTRAVENOUS at 21:49

## 2021-08-06 RX ADMIN — SODIUM CHLORIDE, PRESERVATIVE FREE 10 ML: 5 INJECTION INTRAVENOUS at 09:22

## 2021-08-06 RX ADMIN — ACYCLOVIR 400 MG: 200 CAPSULE ORAL at 17:59

## 2021-08-06 RX ADMIN — PANTOPRAZOLE SODIUM 40 MG: 40 TABLET, DELAYED RELEASE ORAL at 06:13

## 2021-08-06 RX ADMIN — ONDANSETRON HYDROCHLORIDE 4 MG: 4 TABLET, FILM COATED ORAL at 09:28

## 2021-08-06 RX ADMIN — CEFTRIAXONE SODIUM 1 G: 1 INJECTION, SOLUTION INTRAVENOUS at 16:07

## 2021-08-06 RX ADMIN — VENLAFAXINE HYDROCHLORIDE 37.5 MG: 37.5 CAPSULE, EXTENDED RELEASE ORAL at 06:13

## 2021-08-06 RX ADMIN — HYDROCODONE POLISTIREX AND CHLORPHENIRAMINE POLISTIREX 5 ML: 10; 8 SUSPENSION, EXTENDED RELEASE ORAL at 21:49

## 2021-08-06 RX ADMIN — METOPROLOL TARTRATE 50 MG: 50 TABLET, FILM COATED ORAL at 21:49

## 2021-08-06 RX ADMIN — DOXYCYCLINE 100 MG: 100 CAPSULE ORAL at 12:01

## 2021-08-06 RX ADMIN — ASPIRIN 81 MG: 81 TABLET, COATED ORAL at 09:22

## 2021-08-06 RX ADMIN — GUAIFENESIN 600 MG: 600 TABLET, EXTENDED RELEASE ORAL at 23:39

## 2021-08-06 RX ADMIN — METOPROLOL TARTRATE 50 MG: 50 TABLET, FILM COATED ORAL at 09:22

## 2021-08-06 RX ADMIN — SULFAMETHOXAZOLE AND TRIMETHOPRIM 160 MG: 800; 160 TABLET ORAL at 09:22

## 2021-08-06 RX ADMIN — DOXYCYCLINE 100 MG: 100 CAPSULE ORAL at 21:49

## 2021-08-06 RX ADMIN — POMALIDOMIDE 2 MG: 2 CAPSULE ORAL at 10:00

## 2021-08-06 RX ADMIN — DEXAMETHASONE 10 MG: 2 TABLET ORAL at 09:21

## 2021-08-06 RX ADMIN — CETIRIZINE HYDROCHLORIDE 5 MG: 10 TABLET, FILM COATED ORAL at 09:57

## 2021-08-06 RX ADMIN — ACYCLOVIR 400 MG: 200 CAPSULE ORAL at 09:22

## 2021-08-07 LAB
CRP SERPL-MCNC: 3.32 MG/DL (ref 0–0.5)
IL6 SERPL-MCNC: 8.5 PG/ML (ref 0–13)

## 2021-08-07 PROCEDURE — 99233 SBSQ HOSP IP/OBS HIGH 50: CPT | Performed by: INTERNAL MEDICINE

## 2021-08-07 PROCEDURE — 25010000002 ENOXAPARIN PER 10 MG: Performed by: INTERNAL MEDICINE

## 2021-08-07 PROCEDURE — 94799 UNLISTED PULMONARY SVC/PX: CPT

## 2021-08-07 PROCEDURE — 86682 HELMINTH ANTIBODY: CPT | Performed by: INTERNAL MEDICINE

## 2021-08-07 PROCEDURE — 86140 C-REACTIVE PROTEIN: CPT | Performed by: HOSPITALIST

## 2021-08-07 PROCEDURE — 25010000002 CEFTRIAXONE PER 250 MG: Performed by: INTERNAL MEDICINE

## 2021-08-07 PROCEDURE — 63710000001 DEXAMETHASONE PER 0.25 MG: Performed by: INTERNAL MEDICINE

## 2021-08-07 RX ADMIN — DOXYCYCLINE 100 MG: 100 CAPSULE ORAL at 21:33

## 2021-08-07 RX ADMIN — GUAIFENESIN 600 MG: 600 TABLET, EXTENDED RELEASE ORAL at 21:33

## 2021-08-07 RX ADMIN — ENOXAPARIN SODIUM 40 MG: 40 INJECTION SUBCUTANEOUS at 17:49

## 2021-08-07 RX ADMIN — GUAIFENESIN 600 MG: 600 TABLET, EXTENDED RELEASE ORAL at 09:26

## 2021-08-07 RX ADMIN — ALBUTEROL SULFATE 2 PUFF: 108 AEROSOL, METERED RESPIRATORY (INHALATION) at 21:59

## 2021-08-07 RX ADMIN — VENLAFAXINE HYDROCHLORIDE 37.5 MG: 37.5 CAPSULE, EXTENDED RELEASE ORAL at 09:24

## 2021-08-07 RX ADMIN — METOPROLOL TARTRATE 50 MG: 50 TABLET, FILM COATED ORAL at 21:32

## 2021-08-07 RX ADMIN — ACYCLOVIR 400 MG: 200 CAPSULE ORAL at 17:49

## 2021-08-07 RX ADMIN — PANTOPRAZOLE SODIUM 40 MG: 40 TABLET, DELAYED RELEASE ORAL at 09:26

## 2021-08-07 RX ADMIN — CEFTRIAXONE SODIUM 1 G: 1 INJECTION, SOLUTION INTRAVENOUS at 15:20

## 2021-08-07 RX ADMIN — SODIUM CHLORIDE, PRESERVATIVE FREE 10 ML: 5 INJECTION INTRAVENOUS at 09:26

## 2021-08-07 RX ADMIN — DEXAMETHASONE 10 MG: 2 TABLET ORAL at 09:25

## 2021-08-07 RX ADMIN — CETIRIZINE HYDROCHLORIDE 5 MG: 10 TABLET, FILM COATED ORAL at 09:24

## 2021-08-07 RX ADMIN — ACYCLOVIR 400 MG: 200 CAPSULE ORAL at 09:25

## 2021-08-07 RX ADMIN — SODIUM CHLORIDE, PRESERVATIVE FREE 10 ML: 5 INJECTION INTRAVENOUS at 21:33

## 2021-08-07 RX ADMIN — DOXYCYCLINE 100 MG: 100 CAPSULE ORAL at 09:26

## 2021-08-07 RX ADMIN — POMALIDOMIDE 2 MG: 2 CAPSULE ORAL at 09:41

## 2021-08-07 RX ADMIN — Medication 5 MG: at 21:33

## 2021-08-07 RX ADMIN — ASPIRIN 81 MG: 81 TABLET, COATED ORAL at 09:26

## 2021-08-08 LAB — CRP SERPL-MCNC: 2.08 MG/DL (ref 0–0.5)

## 2021-08-08 PROCEDURE — 99233 SBSQ HOSP IP/OBS HIGH 50: CPT | Performed by: INTERNAL MEDICINE

## 2021-08-08 PROCEDURE — 25010000002 CEFTRIAXONE PER 250 MG: Performed by: INTERNAL MEDICINE

## 2021-08-08 PROCEDURE — 86140 C-REACTIVE PROTEIN: CPT | Performed by: HOSPITALIST

## 2021-08-08 PROCEDURE — 63710000001 DEXAMETHASONE PER 0.25 MG: Performed by: INTERNAL MEDICINE

## 2021-08-08 PROCEDURE — 25010000002 ENOXAPARIN PER 10 MG: Performed by: INTERNAL MEDICINE

## 2021-08-08 RX ADMIN — VENLAFAXINE HYDROCHLORIDE 37.5 MG: 37.5 CAPSULE, EXTENDED RELEASE ORAL at 06:25

## 2021-08-08 RX ADMIN — Medication 5 MG: at 21:38

## 2021-08-08 RX ADMIN — DEXAMETHASONE 10 MG: 2 TABLET ORAL at 08:29

## 2021-08-08 RX ADMIN — CEFTRIAXONE SODIUM 1 G: 1 INJECTION, SOLUTION INTRAVENOUS at 13:15

## 2021-08-08 RX ADMIN — DOXYCYCLINE 100 MG: 100 CAPSULE ORAL at 08:30

## 2021-08-08 RX ADMIN — ENOXAPARIN SODIUM 40 MG: 40 INJECTION SUBCUTANEOUS at 17:56

## 2021-08-08 RX ADMIN — POMALIDOMIDE 2 MG: 2 CAPSULE ORAL at 08:31

## 2021-08-08 RX ADMIN — ASPIRIN 81 MG: 81 TABLET, COATED ORAL at 08:30

## 2021-08-08 RX ADMIN — DOXYCYCLINE 100 MG: 100 CAPSULE ORAL at 21:38

## 2021-08-08 RX ADMIN — GUAIFENESIN 600 MG: 600 TABLET, EXTENDED RELEASE ORAL at 08:29

## 2021-08-08 RX ADMIN — ACYCLOVIR 400 MG: 200 CAPSULE ORAL at 17:56

## 2021-08-08 RX ADMIN — GUAIFENESIN 600 MG: 600 TABLET, EXTENDED RELEASE ORAL at 21:38

## 2021-08-08 RX ADMIN — PANTOPRAZOLE SODIUM 40 MG: 40 TABLET, DELAYED RELEASE ORAL at 06:25

## 2021-08-08 RX ADMIN — CETIRIZINE HYDROCHLORIDE 5 MG: 10 TABLET, FILM COATED ORAL at 08:29

## 2021-08-08 RX ADMIN — METOPROLOL TARTRATE 50 MG: 50 TABLET, FILM COATED ORAL at 08:30

## 2021-08-08 RX ADMIN — SODIUM CHLORIDE, PRESERVATIVE FREE 10 ML: 5 INJECTION INTRAVENOUS at 21:37

## 2021-08-08 RX ADMIN — SODIUM CHLORIDE, PRESERVATIVE FREE 10 ML: 5 INJECTION INTRAVENOUS at 08:29

## 2021-08-08 RX ADMIN — METOPROLOL TARTRATE 50 MG: 50 TABLET, FILM COATED ORAL at 21:37

## 2021-08-08 RX ADMIN — ACYCLOVIR 400 MG: 200 CAPSULE ORAL at 08:30

## 2021-08-09 LAB
ALBUMIN SERPL-MCNC: 2.8 G/DL (ref 3.5–5.2)
ALBUMIN/GLOB SERPL: 1.4 G/DL
ALP SERPL-CCNC: 74 U/L (ref 39–117)
ALT SERPL W P-5'-P-CCNC: 8 U/L (ref 1–33)
ANION GAP SERPL CALCULATED.3IONS-SCNC: 7 MMOL/L (ref 5–15)
AST SERPL-CCNC: 9 U/L (ref 1–32)
BASOPHILS # BLD AUTO: 0.02 10*3/MM3 (ref 0–0.2)
BASOPHILS NFR BLD AUTO: 0.6 % (ref 0–1.5)
BILIRUB SERPL-MCNC: 0.5 MG/DL (ref 0–1.2)
BUN SERPL-MCNC: 38 MG/DL (ref 8–23)
BUN/CREAT SERPL: 29.7 (ref 7–25)
CALCIUM SPEC-SCNC: 8.5 MG/DL (ref 8.6–10.5)
CHLORIDE SERPL-SCNC: 104 MMOL/L (ref 98–107)
CO2 SERPL-SCNC: 26 MMOL/L (ref 22–29)
CREAT SERPL-MCNC: 1.28 MG/DL (ref 0.57–1)
CRP SERPL-MCNC: 1.76 MG/DL (ref 0–0.5)
DEPRECATED RDW RBC AUTO: 49.7 FL (ref 37–54)
EOSINOPHIL # BLD AUTO: 0.08 10*3/MM3 (ref 0–0.4)
EOSINOPHIL NFR BLD AUTO: 2.3 % (ref 0.3–6.2)
ERYTHROCYTE [DISTWIDTH] IN BLOOD BY AUTOMATED COUNT: 14.8 % (ref 12.3–15.4)
GFR SERPL CREATININE-BSD FRML MDRD: 41 ML/MIN/1.73
GLOBULIN UR ELPH-MCNC: 2 GM/DL
GLUCOSE SERPL-MCNC: 76 MG/DL (ref 65–99)
HCT VFR BLD AUTO: 39.8 % (ref 34–46.6)
HGB BLD-MCNC: 13.5 G/DL (ref 12–15.9)
IMM GRANULOCYTES # BLD AUTO: 0.1 10*3/MM3 (ref 0–0.05)
IMM GRANULOCYTES NFR BLD AUTO: 2.8 % (ref 0–0.5)
LYMPHOCYTES # BLD AUTO: 0.22 10*3/MM3 (ref 0.7–3.1)
LYMPHOCYTES NFR BLD AUTO: 6.2 % (ref 19.6–45.3)
MCH RBC QN AUTO: 30.8 PG (ref 26.6–33)
MCHC RBC AUTO-ENTMCNC: 33.9 G/DL (ref 31.5–35.7)
MCV RBC AUTO: 90.9 FL (ref 79–97)
MONOCYTES # BLD AUTO: 0.49 10*3/MM3 (ref 0.1–0.9)
MONOCYTES NFR BLD AUTO: 13.8 % (ref 5–12)
NEUTROPHILS NFR BLD AUTO: 2.63 10*3/MM3 (ref 1.7–7)
NEUTROPHILS NFR BLD AUTO: 74.3 % (ref 42.7–76)
NRBC BLD AUTO-RTO: 0 /100 WBC (ref 0–0.2)
OVALOCYTES BLD QL SMEAR: NORMAL
PLATELET # BLD AUTO: 97 10*3/MM3 (ref 140–450)
PMV BLD AUTO: 12.2 FL (ref 6–12)
POTASSIUM SERPL-SCNC: 4.9 MMOL/L (ref 3.5–5.2)
PROT SERPL-MCNC: 4.8 G/DL (ref 6–8.5)
RBC # BLD AUTO: 4.38 10*6/MM3 (ref 3.77–5.28)
SMALL PLATELETS BLD QL SMEAR: NORMAL
SODIUM SERPL-SCNC: 137 MMOL/L (ref 136–145)
WBC # BLD AUTO: 3.54 10*3/MM3 (ref 3.4–10.8)
WBC MORPH BLD: NORMAL

## 2021-08-09 PROCEDURE — 86140 C-REACTIVE PROTEIN: CPT | Performed by: HOSPITALIST

## 2021-08-09 PROCEDURE — 99233 SBSQ HOSP IP/OBS HIGH 50: CPT | Performed by: INTERNAL MEDICINE

## 2021-08-09 PROCEDURE — 25010000002 CEFTRIAXONE PER 250 MG: Performed by: INTERNAL MEDICINE

## 2021-08-09 PROCEDURE — 85007 BL SMEAR W/DIFF WBC COUNT: CPT | Performed by: INTERNAL MEDICINE

## 2021-08-09 PROCEDURE — 97530 THERAPEUTIC ACTIVITIES: CPT

## 2021-08-09 PROCEDURE — 80053 COMPREHEN METABOLIC PANEL: CPT | Performed by: INTERNAL MEDICINE

## 2021-08-09 PROCEDURE — 25010000002 ENOXAPARIN PER 10 MG: Performed by: INTERNAL MEDICINE

## 2021-08-09 PROCEDURE — 63710000001 DEXAMETHASONE PER 0.25 MG: Performed by: INTERNAL MEDICINE

## 2021-08-09 PROCEDURE — 85025 COMPLETE CBC W/AUTO DIFF WBC: CPT | Performed by: INTERNAL MEDICINE

## 2021-08-09 PROCEDURE — 97110 THERAPEUTIC EXERCISES: CPT

## 2021-08-09 RX ADMIN — CETIRIZINE HYDROCHLORIDE 5 MG: 10 TABLET, FILM COATED ORAL at 09:12

## 2021-08-09 RX ADMIN — CEFTRIAXONE SODIUM 1 G: 1 INJECTION, SOLUTION INTRAVENOUS at 15:03

## 2021-08-09 RX ADMIN — PANTOPRAZOLE SODIUM 40 MG: 40 TABLET, DELAYED RELEASE ORAL at 06:25

## 2021-08-09 RX ADMIN — DOXYCYCLINE 100 MG: 100 CAPSULE ORAL at 21:47

## 2021-08-09 RX ADMIN — METOPROLOL TARTRATE 50 MG: 50 TABLET, FILM COATED ORAL at 21:46

## 2021-08-09 RX ADMIN — Medication 5 MG: at 21:46

## 2021-08-09 RX ADMIN — VENLAFAXINE HYDROCHLORIDE 37.5 MG: 37.5 CAPSULE, EXTENDED RELEASE ORAL at 06:25

## 2021-08-09 RX ADMIN — ACYCLOVIR 400 MG: 200 CAPSULE ORAL at 18:06

## 2021-08-09 RX ADMIN — GUAIFENESIN 600 MG: 600 TABLET, EXTENDED RELEASE ORAL at 09:12

## 2021-08-09 RX ADMIN — SODIUM CHLORIDE, PRESERVATIVE FREE 10 ML: 5 INJECTION INTRAVENOUS at 09:11

## 2021-08-09 RX ADMIN — ACYCLOVIR 400 MG: 200 CAPSULE ORAL at 09:11

## 2021-08-09 RX ADMIN — SULFAMETHOXAZOLE AND TRIMETHOPRIM 160 MG: 800; 160 TABLET ORAL at 09:12

## 2021-08-09 RX ADMIN — GUAIFENESIN 600 MG: 600 TABLET, EXTENDED RELEASE ORAL at 21:47

## 2021-08-09 RX ADMIN — DOXYCYCLINE 100 MG: 100 CAPSULE ORAL at 09:12

## 2021-08-09 RX ADMIN — POMALIDOMIDE 2 MG: 2 CAPSULE ORAL at 09:13

## 2021-08-09 RX ADMIN — SODIUM CHLORIDE, PRESERVATIVE FREE 10 ML: 5 INJECTION INTRAVENOUS at 21:49

## 2021-08-09 RX ADMIN — ENOXAPARIN SODIUM 40 MG: 40 INJECTION SUBCUTANEOUS at 18:07

## 2021-08-09 RX ADMIN — DEXAMETHASONE 10 MG: 2 TABLET ORAL at 09:11

## 2021-08-09 RX ADMIN — ASPIRIN 81 MG: 81 TABLET, COATED ORAL at 09:12

## 2021-08-10 LAB
CRP SERPL-MCNC: 2.29 MG/DL (ref 0–0.5)
STRONGYLOIDES IGG SER QL IA: NEGATIVE

## 2021-08-10 PROCEDURE — 86140 C-REACTIVE PROTEIN: CPT | Performed by: HOSPITALIST

## 2021-08-10 PROCEDURE — 63710000001 DEXAMETHASONE PER 0.25 MG: Performed by: INTERNAL MEDICINE

## 2021-08-10 PROCEDURE — 25010000002 ENOXAPARIN PER 10 MG: Performed by: INTERNAL MEDICINE

## 2021-08-10 PROCEDURE — 99233 SBSQ HOSP IP/OBS HIGH 50: CPT | Performed by: INTERNAL MEDICINE

## 2021-08-10 PROCEDURE — 97535 SELF CARE MNGMENT TRAINING: CPT

## 2021-08-10 PROCEDURE — 25010000002 CEFTRIAXONE PER 250 MG: Performed by: INTERNAL MEDICINE

## 2021-08-10 PROCEDURE — 97110 THERAPEUTIC EXERCISES: CPT

## 2021-08-10 PROCEDURE — 97530 THERAPEUTIC ACTIVITIES: CPT

## 2021-08-10 RX ORDER — PREDNISONE 20 MG/1
60 TABLET ORAL
Status: DISCONTINUED | OUTPATIENT
Start: 2021-08-11 | End: 2021-08-11 | Stop reason: HOSPADM

## 2021-08-10 RX ADMIN — DOXYCYCLINE 100 MG: 100 CAPSULE ORAL at 21:08

## 2021-08-10 RX ADMIN — SODIUM CHLORIDE, PRESERVATIVE FREE 10 ML: 5 INJECTION INTRAVENOUS at 10:11

## 2021-08-10 RX ADMIN — ACYCLOVIR 400 MG: 200 CAPSULE ORAL at 10:09

## 2021-08-10 RX ADMIN — DEXAMETHASONE 10 MG: 2 TABLET ORAL at 10:10

## 2021-08-10 RX ADMIN — GUAIFENESIN 600 MG: 600 TABLET, EXTENDED RELEASE ORAL at 10:10

## 2021-08-10 RX ADMIN — ASPIRIN 81 MG: 81 TABLET, COATED ORAL at 10:11

## 2021-08-10 RX ADMIN — PANTOPRAZOLE SODIUM 40 MG: 40 TABLET, DELAYED RELEASE ORAL at 10:11

## 2021-08-10 RX ADMIN — Medication 5 MG: at 21:08

## 2021-08-10 RX ADMIN — CETIRIZINE HYDROCHLORIDE 5 MG: 10 TABLET, FILM COATED ORAL at 10:11

## 2021-08-10 RX ADMIN — VENLAFAXINE HYDROCHLORIDE 37.5 MG: 37.5 CAPSULE, EXTENDED RELEASE ORAL at 06:36

## 2021-08-10 RX ADMIN — ACYCLOVIR 400 MG: 200 CAPSULE ORAL at 17:39

## 2021-08-10 RX ADMIN — METOPROLOL TARTRATE 50 MG: 50 TABLET, FILM COATED ORAL at 21:08

## 2021-08-10 RX ADMIN — CEFTRIAXONE SODIUM 1 G: 1 INJECTION, SOLUTION INTRAVENOUS at 15:20

## 2021-08-10 RX ADMIN — GUAIFENESIN 600 MG: 600 TABLET, EXTENDED RELEASE ORAL at 21:08

## 2021-08-10 RX ADMIN — ENOXAPARIN SODIUM 40 MG: 40 INJECTION SUBCUTANEOUS at 17:39

## 2021-08-10 RX ADMIN — METOPROLOL TARTRATE 50 MG: 50 TABLET, FILM COATED ORAL at 10:09

## 2021-08-10 RX ADMIN — DOXYCYCLINE 100 MG: 100 CAPSULE ORAL at 10:09

## 2021-08-10 RX ADMIN — POMALIDOMIDE 2 MG: 2 CAPSULE ORAL at 10:12

## 2021-08-11 ENCOUNTER — READMISSION MANAGEMENT (OUTPATIENT)
Dept: CALL CENTER | Facility: HOSPITAL | Age: 71
End: 2021-08-11

## 2021-08-11 ENCOUNTER — TELEPHONE (OUTPATIENT)
Dept: INTERNAL MEDICINE | Facility: CLINIC | Age: 71
End: 2021-08-11

## 2021-08-11 VITALS
TEMPERATURE: 97.6 F | HEART RATE: 59 BPM | DIASTOLIC BLOOD PRESSURE: 67 MMHG | BODY MASS INDEX: 33.96 KG/M2 | SYSTOLIC BLOOD PRESSURE: 113 MMHG | RESPIRATION RATE: 22 BRPM | WEIGHT: 173 LBS | OXYGEN SATURATION: 89 % | HEIGHT: 60 IN

## 2021-08-11 LAB
1,3 BETA GLUCAN SER-MCNC: 49 PG/ML
CRP SERPL-MCNC: 1.67 MG/DL (ref 0–0.5)

## 2021-08-11 PROCEDURE — 99239 HOSP IP/OBS DSCHRG MGMT >30: CPT | Performed by: NURSE PRACTITIONER

## 2021-08-11 PROCEDURE — 86140 C-REACTIVE PROTEIN: CPT | Performed by: HOSPITALIST

## 2021-08-11 PROCEDURE — 63710000001 ONDANSETRON PER 8 MG: Performed by: INTERNAL MEDICINE

## 2021-08-11 PROCEDURE — 63710000001 PREDNISONE PER 1 MG: Performed by: INTERNAL MEDICINE

## 2021-08-11 RX ORDER — DOXYCYCLINE 100 MG/1
100 CAPSULE ORAL EVERY 12 HOURS SCHEDULED
Status: DISCONTINUED | OUTPATIENT
Start: 2021-08-11 | End: 2021-08-11 | Stop reason: HOSPADM

## 2021-08-11 RX ORDER — PREDNISONE 20 MG/1
TABLET ORAL
Qty: 18 TABLET | Refills: 0 | Status: SHIPPED | OUTPATIENT
Start: 2021-08-11 | End: 2021-08-26

## 2021-08-11 RX ORDER — DOXYCYCLINE 100 MG/1
100 CAPSULE ORAL EVERY 12 HOURS SCHEDULED
Qty: 6 CAPSULE | Refills: 0 | Status: SHIPPED | OUTPATIENT
Start: 2021-08-11 | End: 2021-09-09

## 2021-08-11 RX ORDER — GUAIFENESIN 600 MG/1
600 TABLET, EXTENDED RELEASE ORAL EVERY 12 HOURS SCHEDULED
Qty: 60 TABLET | Refills: 0 | Status: SHIPPED | OUTPATIENT
Start: 2021-08-11 | End: 2021-08-18

## 2021-08-11 RX ORDER — DOXYCYCLINE 100 MG/1
100 CAPSULE ORAL EVERY 12 HOURS SCHEDULED
Qty: 6 CAPSULE | Refills: 0 | Status: SHIPPED | OUTPATIENT
Start: 2021-08-11 | End: 2021-08-11

## 2021-08-11 RX ORDER — PREDNISONE 20 MG/1
TABLET ORAL
Qty: 18 TABLET | Refills: 0 | Status: SHIPPED | OUTPATIENT
Start: 2021-08-11 | End: 2021-08-11 | Stop reason: SDUPTHER

## 2021-08-11 RX ORDER — GUAIFENESIN 600 MG/1
600 TABLET, EXTENDED RELEASE ORAL EVERY 12 HOURS SCHEDULED
Qty: 60 TABLET | Refills: 0 | Status: SHIPPED | OUTPATIENT
Start: 2021-08-11 | End: 2021-08-11

## 2021-08-11 RX ADMIN — CETIRIZINE HYDROCHLORIDE 5 MG: 10 TABLET, FILM COATED ORAL at 09:34

## 2021-08-11 RX ADMIN — PANTOPRAZOLE SODIUM 40 MG: 40 TABLET, DELAYED RELEASE ORAL at 06:01

## 2021-08-11 RX ADMIN — DOXYCYCLINE 100 MG: 100 CAPSULE ORAL at 09:33

## 2021-08-11 RX ADMIN — PREDNISONE 60 MG: 20 TABLET ORAL at 09:33

## 2021-08-11 RX ADMIN — ACYCLOVIR 400 MG: 200 CAPSULE ORAL at 09:32

## 2021-08-11 RX ADMIN — CYCLOBENZAPRINE HYDROCHLORIDE 10 MG: 10 TABLET, FILM COATED ORAL at 01:03

## 2021-08-11 RX ADMIN — SULFAMETHOXAZOLE AND TRIMETHOPRIM 160 MG: 800; 160 TABLET ORAL at 09:33

## 2021-08-11 RX ADMIN — SODIUM CHLORIDE, PRESERVATIVE FREE 10 ML: 5 INJECTION INTRAVENOUS at 09:33

## 2021-08-11 RX ADMIN — VENLAFAXINE HYDROCHLORIDE 37.5 MG: 37.5 CAPSULE, EXTENDED RELEASE ORAL at 06:01

## 2021-08-11 RX ADMIN — ASPIRIN 81 MG: 81 TABLET, COATED ORAL at 09:33

## 2021-08-11 RX ADMIN — GUAIFENESIN 600 MG: 600 TABLET, EXTENDED RELEASE ORAL at 09:33

## 2021-08-11 RX ADMIN — METOPROLOL TARTRATE 50 MG: 50 TABLET, FILM COATED ORAL at 09:33

## 2021-08-11 RX ADMIN — ONDANSETRON HYDROCHLORIDE 4 MG: 4 TABLET, FILM COATED ORAL at 01:03

## 2021-08-11 NOTE — TELEPHONE ENCOUNTER
Caller: stuart    Relationship to patient: [facility]    Best call back number: [phone]    New or established patient?  [] New  [x] Established    Date of discharge: 08/11/21    Facility discharged from:     Diagnosis/Symptoms:covid    Length of stay (If applicable): 11 days    Specialty Only: Did you see a [facility] provider?    [] Yes  [] No  If so, who? Patient's provider unavailable scheduled with DAYTON schmidt

## 2021-08-11 NOTE — OUTREACH NOTE
Prep Survey      Responses   [facility] facility patient discharged from?  [place]   Is LACE score < 7 ?  No   Emergency Room discharge w/ pulse ox?  No   Eligibility  [facility]   Date of Admission  07/31/21   Date of Discharge  08/11/21   Discharge Disposition  Home or Self Care   Discharge diagnosis  COPD Covid, Multiple myeloma in relapse    Does the patient have one of the following disease processes/diagnoses(primary or secondary)?  COVID-19   Does the patient have Home health ordered?  No [refused HH]   Is there a DME ordered?  -- [Has O2 @ home]   Comments regarding appointments  Please see AVS   Prep survey completed?  Yes          Madelin López RN

## 2021-08-12 ENCOUNTER — TELEPHONE (OUTPATIENT)
Dept: ONCOLOGY | Facility: CLINIC | Age: 71
End: 2021-08-12

## 2021-08-12 ENCOUNTER — TRANSITIONAL CARE MANAGEMENT TELEPHONE ENCOUNTER (OUTPATIENT)
Dept: CALL CENTER | Facility: HOSPITAL | Age: 71
End: 2021-08-12

## 2021-08-12 NOTE — OUTREACH NOTE
Call Center TCM Note      Responses   [facility] patient discharged from?  [place]   Does the patient have one of the following disease processes/diagnoses(primary or secondary)?  COVID-19   COVID-19 underlying condition?  None   TCM attempt successful?  No   Unsuccessful attempts  Attempt 2   Discharge diagnosis  COPD Covid, Multiple myeloma in relapse           Hodan Jenkins RN    8/12/2021, 13:18 EDT

## 2021-08-12 NOTE — TELEPHONE ENCOUNTER
Caller: Doris Miranda    Relationship to patient: Self    Best call back number: [phone]    Chief complaint: PATIENT CALLED STATING THAT SHE HAS JUST BEEN RELEASED FROM THE HOSPITAL AFTER A BOUT WITH COVID FROM 7/31/21 THROUGH 8/11/21.  SHE WOULD LIKE TO GET RESCHEDULE FOR HER INFUSION AND GET RESTARTED ON HER MEDICATIONS.      PATIENT ALSO NEEDS TO KNOW WHAT DR. ALLEN WANTS TO DO ABOUT GETTING HER RESTARTED ON HER CHEMO PILL AS THIS IS A MAIL ORDER RX.     Type of visit: FU/INFUSION     Requested date: ASAP    Additional notes: PLEASE CONTACT PATIENT TO DISCUSS THESE ISSUES.  LEAVE VM IF NO ANSWER.

## 2021-08-12 NOTE — OUTREACH NOTE
Call Center TCM Note      Responses   [facility] patient discharged from?  [place]   Does the patient have one of the following disease processes/diagnoses(primary or secondary)?  COVID-19   COVID-19 underlying condition?  None   TCM attempt successful?  No   Unsuccessful attempts  Attempt 1   Discharge diagnosis  COPD Covid, Multiple myeloma in relapse           Hodan Jenkins RN    8/12/2021, 12:58 EDT

## 2021-08-13 ENCOUNTER — TRANSITIONAL CARE MANAGEMENT TELEPHONE ENCOUNTER (OUTPATIENT)
Dept: CALL CENTER | Facility: HOSPITAL | Age: 71
End: 2021-08-13

## 2021-08-13 NOTE — OUTREACH NOTE
Call Center TCM Note      Responses   [facility] patient discharged from?  [place]   Does the patient have one of the following disease processes/diagnoses(primary or secondary)?  COVID-19   COVID-19 underlying condition?  None   TCM attempt successful?  Yes   Call start time  1137   Call end time  1148   Discharge diagnosis  COPD Covid, Multiple myeloma in relapse    Meds reviewed with patient/caregiver?  Yes   Is the patient having any side effects they believe may be caused by any medication additions or changes?  No   Does the patient have all medications ordered at discharge?  Yes   Prescription comments  Pt will  prescriptions today   Is the patient taking all medications as directed (includes completed medication regime)?  Yes   Medication comments  Encouraged to complete antibiotics/steroids as ordered   Does the patient have a primary care provider?   Yes   Comments regarding PCP  Hospital D/C follow-up scheduled for 8/18/21@10:30am   Does the patient have an appointment with their PCP or specialist within 7 days of discharge?  Yes   Has the patient kept scheduled appointments due by today?  N/A   Has home health visited the patient within 72 hours of discharge?  N/A   What DME was ordered?  Patient already had oxygen set up at home   Psychosocial issues?  No   Did the patient receive a copy of their discharge instructions?  Yes   Did the patient receive a copy of COVID-19 specific instructions?  Yes   Nursing interventions  Reviewed instructions with patient   What is the patient's perception of their health status since discharge?  Improving [Pt reports she is doing well and seems confident with care--she understands actions to take for any respiratory issues and is increasing her activity gradually, doing some PT exercises demonstrated while hospitalized.]   Does the patient have any of the following symptoms?  Cough [NP cough]   Nursing Interventions  Nurse provided patient education  [Encouraged to utilize oxygen, inhaler and respiratory care interventions as ordered.  Dis'd s/s of worsening condition which require MD notification such as increased SOA, change in amount/color of mucus, chest pain, fever, increased cough-v/u.]   Pulse Ox monitoring  Intermittent   Pulse Ox device source  Patient   O2 Sat comments  Pt is chronically on O2 at 4lpm continously and it is not unsual for her to experience decreases in oxygen to 85%.  Her oxygen sats since discharge have been 85%-95%.  At time of call 88% while talking with no SOA or distress noted.    O2 Sat: education provided  Sat levels, Monitoring frequency, When to seek care [Rev'd sat readings and to monitor for suddened lower readings, increased effort of breathing and bluish discoloration to fingers/nails and mouth...]   Is the patient/caregiver able to teach back steps to recovery at home?  Set small, achievable goals for return to baseline health, Rest and rebuild strength, gradually increase activity, Eat a well-balance diet   If the patient is a current smoker, are they able to teach back resources for cessation?  Not a smoker   TCM call completed?  Yes          Heide Flores, RN    8/13/2021, 11:49 EDT

## 2021-08-16 ENCOUNTER — READMISSION MANAGEMENT (OUTPATIENT)
Dept: CALL CENTER | Facility: HOSPITAL | Age: 71
End: 2021-08-16

## 2021-08-16 NOTE — OUTREACH NOTE
COVID-19 Week 1 Survey      Responses   [facility] patient discharged from?  [place]   Does the patient have one of the following disease processes/diagnoses(primary or secondary)?  COVID-19   COVID-19 underlying condition?  None   Call Number  Call 3   Week 1 Call successful?  No   Discharge diagnosis  COPD Covid, Multiple myeloma in relapse           Tiffanie Julio RN

## 2021-08-18 ENCOUNTER — OFFICE VISIT (OUTPATIENT)
Dept: INTERNAL MEDICINE | Facility: CLINIC | Age: 71
End: 2021-08-18

## 2021-08-18 VITALS
WEIGHT: 173 LBS | HEART RATE: 61 BPM | DIASTOLIC BLOOD PRESSURE: 68 MMHG | BODY MASS INDEX: 33.96 KG/M2 | OXYGEN SATURATION: 91 % | HEIGHT: 60 IN | SYSTOLIC BLOOD PRESSURE: 118 MMHG | TEMPERATURE: 96.8 F

## 2021-08-18 DIAGNOSIS — Z09 HOSPITAL DISCHARGE FOLLOW-UP: Primary | ICD-10-CM

## 2021-08-18 DIAGNOSIS — U07.1 PNEUMONIA DUE TO COVID-19 VIRUS: ICD-10-CM

## 2021-08-18 DIAGNOSIS — R60.0 PEDAL EDEMA: ICD-10-CM

## 2021-08-18 DIAGNOSIS — J12.82 PNEUMONIA DUE TO COVID-19 VIRUS: ICD-10-CM

## 2021-08-18 PROCEDURE — 1111F DSCHRG MED/CURRENT MED MERGE: CPT | Performed by: NURSE PRACTITIONER

## 2021-08-18 PROCEDURE — 99496 TRANSJ CARE MGMT HIGH F2F 7D: CPT | Performed by: NURSE PRACTITIONER

## 2021-08-18 NOTE — PROGRESS NOTES
"Chief Complaint   Patient presents with   • Follow-up     hospital followup       HPI  Doris Miranda is a 70 y.o. female presents for a hospital follow-up.  Pt was admitted to [facility] 7/31-8/11/21 for COVID-19 with pneumonia..  Pt has a history of COPD and chronic respiratory failure.  She went to the ER for worsening SOB, cough, and chills and found to be COVID positive.  Pt was treated with Remdesivitr.  Pt has been keeping track of her O2 at home.  The lowest it has been is 83% at rest.  She has oxygen at home and she uses it only at [facility] (this is chronic).  She has some SOB with exertion but she states that is normal for her. She denies any fever.  Using her inhaler on a prn basis.  She has noticed her feet swelling since she was in the hospital    The following portions of the patient's history were reviewed and updated as appropriate: allergies, current medications, past family history, past medical history, past social history, past surgical history and problem list.    Subjective   Review of Systems   Constitutional: Negative for activity change, appetite change and fatigue.   HENT: Negative for congestion.    Respiratory: Positive for cough and shortness of breath.    Cardiovascular: Negative for chest pain and leg swelling.   Gastrointestinal: Negative for abdominal pain.   Neurological: Negative for dizziness, weakness and confusion.   Psychiatric/Behavioral: Negative for behavioral problems and decreased concentration.       Objective  Visit Vitals  /68 (BP Location: Left arm, Patient Position: Sitting)   Pulse 61   Temp 96.8 °F (36 °C) (Infrared)   Ht 152.4 cm (60\")   Wt 78.5 kg (173 lb)   SpO2 91%   BMI 33.79 kg/m²        Physical Exam  Vitals and nursing note reviewed.   HENT:      Head: Normocephalic.   Eyes:      Pupils: Pupils are equal, round, and reactive to light.   Cardiovascular:      Rate and Rhythm: Normal rate and regular rhythm.      Pulses: Normal pulses.      Heart " sounds: Normal heart sounds.      Comments: Trace BLE edema  Pulmonary:      Effort: Pulmonary effort is normal.      Breath sounds: Normal breath sounds.   Skin:     General: Skin is warm and dry.      Capillary Refill: Capillary refill takes less than 2 seconds.   Neurological:      General: No focal deficit present.      Mental Status: She is alert and oriented to person, place, and time.      Gait: Gait is intact.   Psychiatric:         Attention and Perception: Attention normal.         Mood and Affect: Mood normal.         Behavior: Behavior normal.          Procedures     Assessment and Plan  Diagnoses and all orders for this visit:    1. Hospital discharge follow-up (Primary)    2. Pneumonia due to COVID-19 virus    3. Pedal edema    D/C summary reviewed  Pt has been contacted by TCM nurse  Pt reports doing well  Keep legs elevated  Cont prn oxygen use    Return in about 2 months (around 10/18/2021) for Recheck.      DAYTON Mason

## 2021-08-20 ENCOUNTER — READMISSION MANAGEMENT (OUTPATIENT)
Dept: CALL CENTER | Facility: HOSPITAL | Age: 71
End: 2021-08-20

## 2021-08-20 NOTE — OUTREACH NOTE
COVID-19 Week 2 Survey      Responses   [facility] patient discharged from?  [place]   Does the patient have one of the following disease processes/diagnoses(primary or secondary)?  COVID-19   COVID-19 underlying condition?  None   Call Number  Call 1   COVID-19 Week 2: Call 1 attempt successful?  No   Discharge diagnosis  COPD Covid, Multiple myeloma in relapse           Nancy Johnson RN

## 2021-08-23 ENCOUNTER — READMISSION MANAGEMENT (OUTPATIENT)
Dept: CALL CENTER | Facility: HOSPITAL | Age: 71
End: 2021-08-23

## 2021-08-23 NOTE — OUTREACH NOTE
COVID-19 Week 2 Survey      Responses   [facility] patient discharged from?  [place]   Does the patient have one of the following disease processes/diagnoses(primary or secondary)?  COVID-19   COVID-19 underlying condition?  None   Call Number  Call 2   COVID-19 Week 2: Call 1 attempt successful?  No   Revoke  Decline to participate   Discharge diagnosis  COPD Covid, Multiple myeloma in relapse           Linh Barragan RN

## 2021-08-30 DIAGNOSIS — C90.02 MULTIPLE MYELOMA IN RELAPSE (HCC): ICD-10-CM

## 2021-08-31 RX ORDER — POMALIDOMIDE 2 MG/1
CAPSULE ORAL
Refills: 0 | OUTPATIENT
Start: 2021-08-31

## 2021-09-03 ENCOUNTER — TELEPHONE (OUTPATIENT)
Dept: ONCOLOGY | Facility: CLINIC | Age: 71
End: 2021-09-03

## 2021-09-03 ENCOUNTER — SPECIALTY PHARMACY (OUTPATIENT)
Dept: ONCOLOGY | Facility: HOSPITAL | Age: 71
End: 2021-09-03

## 2021-09-03 DIAGNOSIS — C90.02 MULTIPLE MYELOMA IN RELAPSE (HCC): ICD-10-CM

## 2021-09-03 NOTE — TELEPHONE ENCOUNTER
Caller: [facility] - [place], [place] [address] [facility] - [phone] [place] [phone] FX    Relationship: Pharmacy    Best call back number: [phone]    Medication needed:   Requested Prescriptions     Pending Prescriptions Disp Refills   • pomalidomide (POMALYST) 2 MG chemo capsule 14 capsule 0     Sig: Take 1 capsule by mouth Daily. For 14 days, followed by 7 days off. Adult Female [id number]       When do you need the refill by: 8/3/21    What additional details did the patient provide when requesting the medication:     Does the patient have less than a 3 day supply:  [x] Yes  [] No    What is the patient's preferred pharmacy:      [facility] [place], [place] [address] [facility] [phone] [place] [phone] FX

## 2021-09-07 RX ORDER — CYCLOBENZAPRINE HCL 10 MG
10 TABLET ORAL 3 TIMES DAILY PRN
Qty: 30 TABLET | Refills: 11 | Status: SHIPPED | OUTPATIENT
Start: 2021-09-07 | End: 2022-10-06

## 2021-09-09 ENCOUNTER — HOSPITAL ENCOUNTER (OUTPATIENT)
Dept: ONCOLOGY | Facility: HOSPITAL | Age: 71
Setting detail: INFUSION SERIES
Discharge: HOME | End: 2021-09-09

## 2021-09-09 ENCOUNTER — OFFICE VISIT (OUTPATIENT)
Dept: ONCOLOGY | Facility: CLINIC | Age: 71
End: 2021-09-09

## 2021-09-09 VITALS
SYSTOLIC BLOOD PRESSURE: 155 MMHG | WEIGHT: 173.7 LBS | HEIGHT: 60 IN | HEART RATE: 86 BPM | BODY MASS INDEX: 34.1 KG/M2 | DIASTOLIC BLOOD PRESSURE: 86 MMHG | TEMPERATURE: 97.3 F | RESPIRATION RATE: 24 BRPM | OXYGEN SATURATION: 90 %

## 2021-09-09 DIAGNOSIS — C90.02 MULTIPLE MYELOMA IN RELAPSE (HCC): Primary | ICD-10-CM

## 2021-09-09 DIAGNOSIS — C90.01 MULTIPLE MYELOMA IN REMISSION (HCC): ICD-10-CM

## 2021-09-09 LAB
ALBUMIN SERPL-MCNC: 4.2 G/DL (ref 3.5–5.2)
ALBUMIN/GLOB SERPL: 1.8 G/DL
ALP SERPL-CCNC: 96 U/L (ref 39–117)
ALT SERPL W P-5'-P-CCNC: 22 U/L (ref 1–33)
ANION GAP SERPL CALCULATED.3IONS-SCNC: 13 MMOL/L (ref 5–15)
AST SERPL-CCNC: 16 U/L (ref 1–32)
BILIRUB SERPL-MCNC: 0.3 MG/DL (ref 0–1.2)
BUN SERPL-MCNC: 22 MG/DL (ref 8–23)
BUN/CREAT SERPL: 16.1 (ref 7–25)
CALCIUM SPEC-SCNC: 9.5 MG/DL (ref 8.6–10.5)
CHLORIDE SERPL-SCNC: 110 MMOL/L (ref 98–107)
CO2 SERPL-SCNC: 22 MMOL/L (ref 22–29)
CREAT BLDA-MCNC: 1.5 MG/DL (ref 0.6–1.3)
CREAT SERPL-MCNC: 1.37 MG/DL (ref 0.57–1)
ERYTHROCYTE [DISTWIDTH] IN BLOOD BY AUTOMATED COUNT: 18.4 % (ref 12.3–15.4)
GFR SERPL CREATININE-BSD FRML MDRD: 38 ML/MIN/1.73
GLOBULIN UR ELPH-MCNC: 2.3 GM/DL
GLUCOSE SERPL-MCNC: 153 MG/DL (ref 65–99)
HCT VFR BLD AUTO: 41.9 % (ref 34–46.6)
HGB BLD-MCNC: 13.5 G/DL (ref 12–15.9)
LYMPHOCYTES # BLD AUTO: 0.3 10*3/MM3 (ref 0.7–3.1)
LYMPHOCYTES NFR BLD AUTO: 10.2 % (ref 19.6–45.3)
MAGNESIUM SERPL-MCNC: 2 MG/DL (ref 1.6–2.4)
MCH RBC QN AUTO: 30.7 PG (ref 26.6–33)
MCHC RBC AUTO-ENTMCNC: 32.3 G/DL (ref 31.5–35.7)
MCV RBC AUTO: 95.1 FL (ref 79–97)
MONOCYTES # BLD AUTO: 0.2 10*3/MM3 (ref 0.1–0.9)
MONOCYTES NFR BLD AUTO: 6.2 % (ref 5–12)
NEUTROPHILS NFR BLD AUTO: 2.3 10*3/MM3 (ref 1.7–7)
NEUTROPHILS NFR BLD AUTO: 83.6 % (ref 42.7–76)
PLATELET # BLD AUTO: 127 10*3/MM3 (ref 140–450)
PMV BLD AUTO: 8.4 FL (ref 6–12)
POTASSIUM SERPL-SCNC: 5 MMOL/L (ref 3.5–5.2)
PROT SERPL-MCNC: 6.5 G/DL (ref 6–8.5)
RBC # BLD AUTO: 4.41 10*6/MM3 (ref 3.77–5.28)
SODIUM SERPL-SCNC: 145 MMOL/L (ref 136–145)
WBC # BLD AUTO: 2.8 10*3/MM3 (ref 3.4–10.8)

## 2021-09-09 PROCEDURE — 83883 ASSAY NEPHELOMETRY NOT SPEC: CPT | Performed by: INTERNAL MEDICINE

## 2021-09-09 PROCEDURE — 84165 PROTEIN E-PHORESIS SERUM: CPT | Performed by: INTERNAL MEDICINE

## 2021-09-09 PROCEDURE — 25010000002 DIPHENHYDRAMINE PER 50 MG: Performed by: INTERNAL MEDICINE

## 2021-09-09 PROCEDURE — 85025 COMPLETE CBC W/AUTO DIFF WBC: CPT | Performed by: INTERNAL MEDICINE

## 2021-09-09 PROCEDURE — 96401 CHEMO ANTI-NEOPL SQ/IM: CPT

## 2021-09-09 PROCEDURE — 25010000002 DARATUMUMAB-HYALURONIDASE-FIHJ 1800-30000 MG-UT/15ML SOLUTION: Performed by: INTERNAL MEDICINE

## 2021-09-09 PROCEDURE — 25010000002 ZOLEDRONIC ACID PER 1 MG: Performed by: INTERNAL MEDICINE

## 2021-09-09 PROCEDURE — 99214 OFFICE O/P EST MOD 30 MIN: CPT | Performed by: INTERNAL MEDICINE

## 2021-09-09 PROCEDURE — 25010000002 METHYLPREDNISOLONE PER 125 MG: Performed by: INTERNAL MEDICINE

## 2021-09-09 PROCEDURE — 96375 TX/PRO/DX INJ NEW DRUG ADDON: CPT

## 2021-09-09 PROCEDURE — 82565 ASSAY OF CREATININE: CPT

## 2021-09-09 PROCEDURE — 80053 COMPREHEN METABOLIC PANEL: CPT | Performed by: INTERNAL MEDICINE

## 2021-09-09 PROCEDURE — 82784 ASSAY IGA/IGD/IGG/IGM EACH: CPT | Performed by: INTERNAL MEDICINE

## 2021-09-09 PROCEDURE — 86334 IMMUNOFIX E-PHORESIS SERUM: CPT | Performed by: INTERNAL MEDICINE

## 2021-09-09 PROCEDURE — 83735 ASSAY OF MAGNESIUM: CPT | Performed by: INTERNAL MEDICINE

## 2021-09-09 PROCEDURE — 96365 THER/PROPH/DIAG IV INF INIT: CPT

## 2021-09-09 RX ORDER — SODIUM CHLORIDE 9 MG/ML
250 INJECTION, SOLUTION INTRAVENOUS ONCE
Status: DISCONTINUED | OUTPATIENT
Start: 2021-09-09 | End: 2021-09-10 | Stop reason: HOSPADM

## 2021-09-09 RX ORDER — DIPHENHYDRAMINE HYDROCHLORIDE 50 MG/ML
50 INJECTION INTRAMUSCULAR; INTRAVENOUS AS NEEDED
Status: CANCELLED | OUTPATIENT
Start: 2021-11-04

## 2021-09-09 RX ORDER — FAMOTIDINE 10 MG/ML
20 INJECTION, SOLUTION INTRAVENOUS AS NEEDED
Status: CANCELLED | OUTPATIENT
Start: 2021-11-04

## 2021-09-09 RX ORDER — ACETAMINOPHEN 500 MG
1000 TABLET ORAL ONCE
Status: COMPLETED | OUTPATIENT
Start: 2021-09-09 | End: 2021-09-09

## 2021-09-09 RX ORDER — METHYLPREDNISOLONE SODIUM SUCCINATE 125 MG/2ML
60 INJECTION, POWDER, LYOPHILIZED, FOR SOLUTION INTRAMUSCULAR; INTRAVENOUS ONCE
Status: CANCELLED | OUTPATIENT
Start: 2021-10-07

## 2021-09-09 RX ORDER — ACETAMINOPHEN 500 MG
1000 TABLET ORAL ONCE
Status: CANCELLED | OUTPATIENT
Start: 2021-10-07

## 2021-09-09 RX ORDER — MEPERIDINE HYDROCHLORIDE 50 MG/ML
25 INJECTION INTRAMUSCULAR; INTRAVENOUS; SUBCUTANEOUS
Status: CANCELLED | OUTPATIENT
Start: 2021-10-07 | End: 2021-10-08

## 2021-09-09 RX ORDER — SODIUM CHLORIDE 9 MG/ML
250 INJECTION, SOLUTION INTRAVENOUS ONCE
Status: CANCELLED | OUTPATIENT
Start: 2021-11-04

## 2021-09-09 RX ORDER — DIPHENHYDRAMINE HYDROCHLORIDE 50 MG/ML
50 INJECTION INTRAMUSCULAR; INTRAVENOUS AS NEEDED
Status: CANCELLED | OUTPATIENT
Start: 2021-10-07

## 2021-09-09 RX ORDER — METHYLPREDNISOLONE SODIUM SUCCINATE 125 MG/2ML
60 INJECTION, POWDER, LYOPHILIZED, FOR SOLUTION INTRAMUSCULAR; INTRAVENOUS ONCE
Status: CANCELLED | OUTPATIENT
Start: 2022-02-24

## 2021-09-09 RX ORDER — METHYLPREDNISOLONE SODIUM SUCCINATE 125 MG/2ML
60 INJECTION, POWDER, LYOPHILIZED, FOR SOLUTION INTRAMUSCULAR; INTRAVENOUS ONCE
Status: COMPLETED | OUTPATIENT
Start: 2021-09-09 | End: 2021-09-09

## 2021-09-09 RX ORDER — FAMOTIDINE 10 MG/ML
20 INJECTION, SOLUTION INTRAVENOUS AS NEEDED
Status: CANCELLED | OUTPATIENT
Start: 2022-02-24

## 2021-09-09 RX ORDER — SODIUM CHLORIDE 9 MG/ML
250 INJECTION, SOLUTION INTRAVENOUS ONCE
Status: CANCELLED | OUTPATIENT
Start: 2021-10-07

## 2021-09-09 RX ORDER — SODIUM CHLORIDE 9 MG/ML
250 INJECTION, SOLUTION INTRAVENOUS ONCE
Status: CANCELLED | OUTPATIENT
Start: 2022-02-24

## 2021-09-09 RX ORDER — MEPERIDINE HYDROCHLORIDE 50 MG/ML
25 INJECTION INTRAMUSCULAR; INTRAVENOUS; SUBCUTANEOUS
Status: CANCELLED | OUTPATIENT
Start: 2022-02-24 | End: 2021-12-03

## 2021-09-09 RX ORDER — ACETAMINOPHEN 500 MG
1000 TABLET ORAL ONCE
Status: CANCELLED | OUTPATIENT
Start: 2021-11-04

## 2021-09-09 RX ORDER — DIPHENHYDRAMINE HYDROCHLORIDE 50 MG/ML
50 INJECTION INTRAMUSCULAR; INTRAVENOUS AS NEEDED
Status: CANCELLED | OUTPATIENT
Start: 2022-02-24

## 2021-09-09 RX ORDER — MEPERIDINE HYDROCHLORIDE 50 MG/ML
25 INJECTION INTRAMUSCULAR; INTRAVENOUS; SUBCUTANEOUS
Status: CANCELLED | OUTPATIENT
Start: 2021-11-04 | End: 2021-11-05

## 2021-09-09 RX ORDER — ACETAMINOPHEN 500 MG
1000 TABLET ORAL ONCE
Status: CANCELLED | OUTPATIENT
Start: 2022-02-24

## 2021-09-09 RX ORDER — FAMOTIDINE 10 MG/ML
20 INJECTION, SOLUTION INTRAVENOUS AS NEEDED
Status: CANCELLED | OUTPATIENT
Start: 2021-10-07

## 2021-09-09 RX ORDER — METHYLPREDNISOLONE SODIUM SUCCINATE 125 MG/2ML
60 INJECTION, POWDER, LYOPHILIZED, FOR SOLUTION INTRAMUSCULAR; INTRAVENOUS ONCE
Status: CANCELLED | OUTPATIENT
Start: 2021-11-04

## 2021-09-09 RX ADMIN — ZOLEDRONIC ACID 3.3 MG: 4 INJECTION, SOLUTION, CONCENTRATE INTRAVENOUS at 15:18

## 2021-09-09 RX ADMIN — METHYLPREDNISOLONE SODIUM SUCCINATE 60 MG: 125 INJECTION, POWDER, FOR SOLUTION INTRAMUSCULAR; INTRAVENOUS at 14:58

## 2021-09-09 RX ADMIN — ACETAMINOPHEN 1000 MG: 500 TABLET, FILM COATED ORAL at 14:55

## 2021-09-09 RX ADMIN — DARATUMUMAB AND HYALURONIDASE-FIHJ (HUMAN RECOMBINANT) 1800 MG: 1800; 30000 INJECTION SUBCUTANEOUS at 16:09

## 2021-09-09 RX ADMIN — DIPHENHYDRAMINE HYDROCHLORIDE 25 MG: 50 INJECTION INTRAMUSCULAR; INTRAVENOUS at 15:00

## 2021-09-09 NOTE — PROGRESS NOTES
Oncology/Hematology History Overview Note   PROBLEM LIST:   1. Stage III IgA kappa clonal multiple myeloma. Diagnosed 9/20152. FISH panel reports multiple abnormalities including gain of 1q21 monosomy.  3. Monosomy 13 and gain of chromosomes 9, 15 and CCND1.  4. Initial M-spike of 7.1 g/dL at time of diagnosis and after 3 cycles, had  undetectable M-spike currently on Velcade, Revlimid and dexamethasone cycle #6  this week.  5. S/p Autologous SCT at [facility] with Dr. Venu Spicer in March 11,2016  6.Right leg pain - on lyrica  7. Bilateral hip replacements by Dr. Lopez     Multiple myeloma in relapse (CMS/[facility])   12/4/2017 -  Chemotherapy    OP Zoledronic Acid Q12W     9/10/2020 Initial Diagnosis    Multiple myeloma in relapse (CMS/[facility])     9/23/2020 -  Chemotherapy    OP MULTIPLE MYELOMA Daratumumab / Pomalidomide / Dexamethasone            REASON FOR VISIT: Multiple myeloma       Subjective     HISTORY OF PRESENT ILLNESS:   Ms. Miranda is here for follow up evaluation of myeloma management.  She is currently on second line therapy with Darzalex, Pomalyst and dexamethasone.  She has been on it since September 2020.  She was admitted to the hospital about a month ago with Covid infection.  This was a breakthrough infection in spite of vaccinations.  She is recovered nicely from it and doing well clinically.  She still has some back issues and fatigue.        Past Medical History, Past Surgical History, Social History, Family History have been reviewed and are without significant changes except as mentioned.    Review of Systems   Constitutional: Positive for fatigue.   HENT: Negative.    Eyes: Positive for visual disturbance.   Respiratory: Positive for shortness of breath.    Cardiovascular: Negative.    Gastrointestinal: Negative.    Endocrine: Negative.    Genitourinary: Negative.    Musculoskeletal: Positive for arthralgias and gait problem.   Skin: Negative.    Allergic/Immunologic: Negative.    Hematological:  "Negative.    Psychiatric/Behavioral: Negative.       A comprehensive 14 point review of systems was performed on 09/09/21  and was negative except as mentioned.    Medications:  The current medication list was reviewed in the EMR    ALLERGIES:  No Known Allergies    Objective      /86   Pulse 86   Temp 97.3 °F (36.3 °C) (Temporal)   Resp 24   Ht 152.4 cm (60\")   Wt 78.8 kg (173 lb 11.2 oz)   SpO2 90%   BMI 33.92 kg/m²      Performance Status: 1    General: well appearing, in no acute distress  HEENT: sclera anicteric, oropharynx clear  Lymphatics: no cervical, supraclavicular, or axillary adenopathy  Cardiovascular: regular rate and rhythm, no murmurs  Lungs: clear to auscultation bilaterally  Abdomen: soft, nontender, nondistended.  No palpable organomegaly  Extremeties: no lower extremity edema  Skin: no rashes, lesions, bruising, or petechiae      RECENT LABS:    Lab Results   Component Value Date    MSPIKE 0.1 (H) 07/06/2021    MSPIKE 0.1 (H) 06/07/2021    MSPIKE 0.1 (H) 05/10/2021       Lab Results   Component Value Date    FREEKAPPAL 14.1 07/06/2021    FREEKAPPAL 19.9 (H) 06/07/2021    FREEKAPPAL 16.7 05/10/2021    FREEKAPPAL 13.8 04/12/2021    FREEKAPPAL 14.1 03/15/2021    FREEKAPPAL 13.0 02/15/2021    FREEKAPPAL 13.3 01/13/2021    FREEKAPPAL 14.8 12/16/2020    FREEKAPPAL 21.3 (H) 12/02/2020    FREEKAPPAL 16.1 11/18/2020         Lab Results   Component Value Date    IGLFLC 6.0 07/06/2021    IGLFLC 7.0 06/07/2021    IGLFLC 8.0 05/10/2021     Lab Results   Component Value Date    WBC 3.54 08/09/2021    HGB 13.5 08/09/2021    HCT 39.8 08/09/2021    MCV 90.9 08/09/2021    PLT 97 (L) 08/09/2021       Lab Results   Component Value Date    GLUCOSE 76 08/09/2021    BUN 38 (H) 08/09/2021    CREATININE 1.28 (H) 08/09/2021    EGFRIFNONA 41 (L) 08/09/2021    BCR 29.7 (H) 08/09/2021    K 4.9 08/09/2021    CO2 26.0 08/09/2021    CALCIUM 8.5 (L) 08/09/2021    PROTENTOTREF 5.9 (L) 07/06/2021    ALBUMIN 2.80 (L) " 08/09/2021    LABIL2 1.5 07/06/2021    AST 9 08/09/2021    ALT 8 08/09/2021         Assessment/Plan       1. multiple myeloma in relapse  status post autologous stem cell transplant.  She has mostly light chain only disease.  Her disease is nicely controlled.  Continue Pomalyst, daratumumab and dexamethasone.  continue her Pomalyst dose at 2 mg/day. her dosing is 2 weeks on and 1 week off.  She wants to consider stopping chemotherapy and seeing how she does.  She wants to take 3 months to decide on that.  We will see how she does going forward.  I am going to recheck her myeloma labs today.    2.  Hypogammaglobulinemia.  She has not had issues with infections so far.  We will hold off on IVIG for now    3.  Back pain/ hip pain: The hip replacements has done wonders for her.       4.  Bilateral hip replacement secondary to myeloma by Dr. Lopez    5.  Shortness of breath.  I want to see if reducing her Pomalyst to 2 weeks on 1 week off will help resolve some of this issue.    6.  Bone mets.  She has been on Zometa every 3 months since 2017.  I am going to reduce that dose to every 6 months.    7.  Covid-19 risk.  She received her first dose on 2/5/2021 and  she is awaiting her second dose    8.  Bilateral shoulder pain.  Sees Dr. Forman for this.      Total time of patient care on day of service including time prior to, face to face with patient, and following visit spent in reviewing records, lab results,  discussion with patient, and documentation/charting was > 25 minutes.        Joseph Mckinley MD  [facility] Hematology and Oncology    9/9/2021      Return on: 12/02/21  Return in (Approximately): 3 months, Schedule with next infusion        CC:

## 2021-09-10 ENCOUNTER — TELEPHONE (OUTPATIENT)
Dept: ONCOLOGY | Facility: CLINIC | Age: 71
End: 2021-09-10

## 2021-09-10 LAB
ALBUMIN SERPL ELPH-MCNC: 3.6 G/DL (ref 2.9–4.4)
ALBUMIN/GLOB SERPL: 1.6 {RATIO} (ref 0.7–1.7)
ALPHA1 GLOB SERPL ELPH-MCNC: 0.3 G/DL (ref 0–0.4)
ALPHA2 GLOB SERPL ELPH-MCNC: 0.9 G/DL (ref 0.4–1)
B-GLOBULIN SERPL ELPH-MCNC: 1 G/DL (ref 0.7–1.3)
GAMMA GLOB SERPL ELPH-MCNC: 0.2 G/DL (ref 0.4–1.8)
GLOBULIN SER-MCNC: 2.4 G/DL (ref 2.2–3.9)
IGA SERPL-MCNC: 24 MG/DL (ref 64–422)
IGG SERPL-MCNC: 252 MG/DL (ref 586–1602)
IGM SERPL-MCNC: 27 MG/DL (ref 26–217)
INTERPRETATION SERPL IEP-IMP: ABNORMAL
KAPPA LC FREE SER-MCNC: 11.6 MG/L (ref 3.3–19.4)
KAPPA LC FREE/LAMBDA FREE SER: 1.53 {RATIO} (ref 0.26–1.65)
LABORATORY COMMENT REPORT: ABNORMAL
LAMBDA LC FREE SERPL-MCNC: 7.6 MG/L (ref 5.7–26.3)
M PROTEIN SERPL ELPH-MCNC: ABNORMAL G/DL
PROT SERPL-MCNC: 6 G/DL (ref 6–8.5)

## 2021-09-10 RX ORDER — SULFAMETHOXAZOLE AND TRIMETHOPRIM 800; 160 MG/1; MG/1
1 TABLET ORAL 3 TIMES WEEKLY
Qty: 48 TABLET | Refills: 5 | Status: SHIPPED | OUTPATIENT
Start: 2021-09-10 | End: 2022-04-14

## 2021-09-10 NOTE — TELEPHONE ENCOUNTER
Caller: Doris Miranda    Relationship: Self    Best call back number: [phone]    Medication needed:   Requested Prescriptions     Pending Prescriptions Disp Refills   • sulfamethoxazole-trimethoprim (BACTRIM DS,SEPTRA DS) 800-160 MG per tablet       Sig: Take 1 tablet by mouth 3 (Three) Times a Week. Takes on Mon-Wed- Fri for a Long term dose, most recent fill on 07-30-21 for 30 days with refills       When do you need the refill by: ASAP   What additional details did the patient provide when requesting the medication: COMPLETELY OUT_ MISSING TODAY   Does the patient have less than a 3 day supply:  [x] Yes  [] No    What is the patient's preferred pharmacy: [facility] [address] [place] [address] [phone] [facility] [phone] FX

## 2021-09-21 ENCOUNTER — SPECIALTY PHARMACY (OUTPATIENT)
Dept: ONCOLOGY | Facility: HOSPITAL | Age: 71
End: 2021-09-21

## 2021-09-21 DIAGNOSIS — C90.02 MULTIPLE MYELOMA IN RELAPSE (HCC): ICD-10-CM

## 2021-09-21 NOTE — PROGRESS NOTES
Specialty Pharmacy Assessment    Pomalidomide (Pomalyst)  Dose: 2 mg  Frequency: Once a day for 14 days, followed by 7 days off  Indication: Multiple myeloma  Goals of therapy: Palliative  Follow-Up: Yes  Dispensing pharmacy: Other  Other pharmacy: [facility] specialty  Refill status: Refills submitted  Dispense status: Mail  Additional notes: I reviewed the most recent notes and labs and everything is good to continue pomalidomide.  I sent a prescription to [facility] pharmacy for 2 mg PO daily - on for 14 days, off for 7 days, #14, 0 refills.       Adult female REMS: [id number]

## 2021-10-07 ENCOUNTER — HOSPITAL ENCOUNTER (OUTPATIENT)
Dept: ONCOLOGY | Facility: HOSPITAL | Age: 71
Setting detail: INFUSION SERIES
Discharge: HOME | End: 2021-10-07

## 2021-10-07 VITALS
BODY MASS INDEX: 35.34 KG/M2 | TEMPERATURE: 96.6 F | RESPIRATION RATE: 18 BRPM | SYSTOLIC BLOOD PRESSURE: 122 MMHG | HEIGHT: 60 IN | HEART RATE: 65 BPM | DIASTOLIC BLOOD PRESSURE: 65 MMHG | WEIGHT: 180 LBS

## 2021-10-07 DIAGNOSIS — C90.02 MULTIPLE MYELOMA IN RELAPSE (HCC): Primary | ICD-10-CM

## 2021-10-07 LAB
ALBUMIN SERPL-MCNC: 4.1 G/DL (ref 3.5–5.2)
ALBUMIN/GLOB SERPL: 2 G/DL
ALP SERPL-CCNC: 69 U/L (ref 39–117)
ALT SERPL W P-5'-P-CCNC: 16 U/L (ref 1–33)
ANION GAP SERPL CALCULATED.3IONS-SCNC: 12 MMOL/L (ref 5–15)
AST SERPL-CCNC: 16 U/L (ref 1–32)
BILIRUB SERPL-MCNC: 0.3 MG/DL (ref 0–1.2)
BUN SERPL-MCNC: 16 MG/DL (ref 8–23)
BUN/CREAT SERPL: 10.7 (ref 7–25)
CALCIUM SPEC-SCNC: 9 MG/DL (ref 8.6–10.5)
CHLORIDE SERPL-SCNC: 107 MMOL/L (ref 98–107)
CO2 SERPL-SCNC: 21 MMOL/L (ref 22–29)
CREAT BLDA-MCNC: 1.4 MG/DL (ref 0.6–1.3)
CREAT SERPL-MCNC: 1.49 MG/DL (ref 0.57–1)
ERYTHROCYTE [DISTWIDTH] IN BLOOD BY AUTOMATED COUNT: 17.2 % (ref 12.3–15.4)
GFR SERPL CREATININE-BSD FRML MDRD: 34 ML/MIN/1.73
GLOBULIN UR ELPH-MCNC: 2.1 GM/DL
GLUCOSE SERPL-MCNC: 95 MG/DL (ref 65–99)
HCT VFR BLD AUTO: 40 % (ref 34–46.6)
HGB BLD-MCNC: 12.8 G/DL (ref 12–15.9)
LYMPHOCYTES # BLD AUTO: 0.4 10*3/MM3 (ref 0.7–3.1)
LYMPHOCYTES NFR BLD AUTO: 10.3 % (ref 19.6–45.3)
MCH RBC QN AUTO: 31.1 PG (ref 26.6–33)
MCHC RBC AUTO-ENTMCNC: 32 G/DL (ref 31.5–35.7)
MCV RBC AUTO: 97.2 FL (ref 79–97)
MONOCYTES # BLD AUTO: 0.2 10*3/MM3 (ref 0.1–0.9)
MONOCYTES NFR BLD AUTO: 4.4 % (ref 5–12)
NEUTROPHILS NFR BLD AUTO: 3.7 10*3/MM3 (ref 1.7–7)
NEUTROPHILS NFR BLD AUTO: 85.3 % (ref 42.7–76)
PLATELET # BLD AUTO: 159 10*3/MM3 (ref 140–450)
PMV BLD AUTO: 7.8 FL (ref 6–12)
POTASSIUM SERPL-SCNC: 4.5 MMOL/L (ref 3.5–5.2)
PROT SERPL-MCNC: 6.2 G/DL (ref 6–8.5)
RBC # BLD AUTO: 4.12 10*6/MM3 (ref 3.77–5.28)
SODIUM SERPL-SCNC: 140 MMOL/L (ref 136–145)
WBC # BLD AUTO: 4.3 10*3/MM3 (ref 3.4–10.8)

## 2021-10-07 PROCEDURE — 25010000002 METHYLPREDNISOLONE PER 125 MG: Performed by: INTERNAL MEDICINE

## 2021-10-07 PROCEDURE — 96375 TX/PRO/DX INJ NEW DRUG ADDON: CPT

## 2021-10-07 PROCEDURE — 80053 COMPREHEN METABOLIC PANEL: CPT | Performed by: INTERNAL MEDICINE

## 2021-10-07 PROCEDURE — 25010000002 DIPHENHYDRAMINE PER 50 MG: Performed by: INTERNAL MEDICINE

## 2021-10-07 PROCEDURE — 96401 CHEMO ANTI-NEOPL SQ/IM: CPT

## 2021-10-07 PROCEDURE — 82565 ASSAY OF CREATININE: CPT

## 2021-10-07 PROCEDURE — 85025 COMPLETE CBC W/AUTO DIFF WBC: CPT | Performed by: INTERNAL MEDICINE

## 2021-10-07 PROCEDURE — 25010000002 DARATUMUMAB-HYALURONIDASE-FIHJ 1800-30000 MG-UT/15ML SOLUTION: Performed by: INTERNAL MEDICINE

## 2021-10-07 PROCEDURE — 96374 THER/PROPH/DIAG INJ IV PUSH: CPT

## 2021-10-07 RX ORDER — SODIUM CHLORIDE 9 MG/ML
250 INJECTION, SOLUTION INTRAVENOUS ONCE
Status: COMPLETED | OUTPATIENT
Start: 2021-10-07 | End: 2021-10-07

## 2021-10-07 RX ORDER — ACETAMINOPHEN 500 MG
1000 TABLET ORAL ONCE
Status: COMPLETED | OUTPATIENT
Start: 2021-10-07 | End: 2021-10-07

## 2021-10-07 RX ORDER — METHYLPREDNISOLONE SODIUM SUCCINATE 125 MG/2ML
60 INJECTION, POWDER, LYOPHILIZED, FOR SOLUTION INTRAMUSCULAR; INTRAVENOUS ONCE
Status: COMPLETED | OUTPATIENT
Start: 2021-10-07 | End: 2021-10-07

## 2021-10-07 RX ADMIN — DIPHENHYDRAMINE HYDROCHLORIDE 25 MG: 50 INJECTION INTRAMUSCULAR; INTRAVENOUS at 08:53

## 2021-10-07 RX ADMIN — METHYLPREDNISOLONE SODIUM SUCCINATE 60 MG: 125 INJECTION, POWDER, FOR SOLUTION INTRAMUSCULAR; INTRAVENOUS at 08:51

## 2021-10-07 RX ADMIN — DARATUMUMAB AND HYALURONIDASE-FIHJ (HUMAN RECOMBINANT) 1800 MG: 1800; 30000 INJECTION SUBCUTANEOUS at 10:12

## 2021-10-07 RX ADMIN — ACETAMINOPHEN 1000 MG: 500 TABLET, FILM COATED ORAL at 08:50

## 2021-10-07 RX ADMIN — SODIUM CHLORIDE 250 ML: 9 INJECTION, SOLUTION INTRAVENOUS at 08:49

## 2021-10-12 ENCOUNTER — SPECIALTY PHARMACY (OUTPATIENT)
Dept: ONCOLOGY | Facility: HOSPITAL | Age: 71
End: 2021-10-12

## 2021-10-12 DIAGNOSIS — C90.02 MULTIPLE MYELOMA IN RELAPSE (HCC): ICD-10-CM

## 2021-10-12 RX ORDER — POMALIDOMIDE 2 MG/1
CAPSULE ORAL
Refills: 0 | OUTPATIENT
Start: 2021-10-12

## 2021-10-12 NOTE — PROGRESS NOTES
Specialty Pharmacy Assessment    Pomalidomide (Pomalyst)  Dose: 2 mg  Frequency: Once a day for 14 days, followed by 7 days off  Indication: Multiple myeloma  Goals of therapy: Palliative  Follow-Up: Yes  Dispensing pharmacy: Other  Other pharmacy: [facility]  Refill status: Refills submitted  Dispense status: Mail  Additional notes: I reviewed the most recent notes and labs and the patient is continuing on treatment without any changes.  She will see Dr. Epps again in December 2021.    I sent a refill to [facility] for pomalidomide 2 mg PO daily, on days 1-14, off for 7 day, #14, 0 refills, adult female REMS: [id number]

## 2021-10-26 ENCOUNTER — SPECIALTY PHARMACY (OUTPATIENT)
Dept: ONCOLOGY | Facility: HOSPITAL | Age: 71
End: 2021-10-26

## 2021-10-26 DIAGNOSIS — C90.02 MULTIPLE MYELOMA IN RELAPSE (HCC): ICD-10-CM

## 2021-10-26 NOTE — PROGRESS NOTES
Specialty Pharmacy Assessment    Pomalidomide (Pomalyst)  Dose: 2 mg  Frequency: Once a day for 14 days, followed by 7 days off  Indication: Multiple myeloma  Goals of therapy: Palliative  Follow-Up: Yes  Dispensing pharmacy: Other  Other pharmacy: [facility]  Refill status: Refills submitted  Dispense status: Mail  Additional notes: I reviewed the most recent notes and labs and the patient is continuing on treatment without any changes.  She will see Dr. Epps again in December 2021.     I sent a refill to [facility] for pomalidomide 2 mg PO daily, on days 1-14, off for 7 day, #14, 0 refills, adult female REMS: [id number]

## 2021-11-04 ENCOUNTER — HOSPITAL ENCOUNTER (OUTPATIENT)
Dept: ONCOLOGY | Facility: HOSPITAL | Age: 71
Setting detail: INFUSION SERIES
Discharge: HOME | End: 2021-11-04

## 2021-11-04 ENCOUNTER — SPECIALTY PHARMACY (OUTPATIENT)
Dept: ONCOLOGY | Facility: HOSPITAL | Age: 71
End: 2021-11-04

## 2021-11-04 VITALS
DIASTOLIC BLOOD PRESSURE: 55 MMHG | RESPIRATION RATE: 18 BRPM | BODY MASS INDEX: 35.73 KG/M2 | TEMPERATURE: 96.7 F | HEIGHT: 60 IN | HEART RATE: 65 BPM | WEIGHT: 182 LBS | SYSTOLIC BLOOD PRESSURE: 111 MMHG

## 2021-11-04 DIAGNOSIS — C90.02 MULTIPLE MYELOMA IN RELAPSE (HCC): Primary | ICD-10-CM

## 2021-11-04 LAB
ALBUMIN SERPL-MCNC: 3.9 G/DL (ref 3.5–5.2)
ALBUMIN/GLOB SERPL: 2.2 G/DL
ALP SERPL-CCNC: 54 U/L (ref 39–117)
ALT SERPL W P-5'-P-CCNC: 11 U/L (ref 1–33)
ANION GAP SERPL CALCULATED.3IONS-SCNC: 13 MMOL/L (ref 5–15)
AST SERPL-CCNC: 13 U/L (ref 1–32)
BILIRUB SERPL-MCNC: 0.3 MG/DL (ref 0–1.2)
BUN SERPL-MCNC: 21 MG/DL (ref 8–23)
BUN/CREAT SERPL: 14.8 (ref 7–25)
CALCIUM SPEC-SCNC: 8.4 MG/DL (ref 8.6–10.5)
CHLORIDE SERPL-SCNC: 106 MMOL/L (ref 98–107)
CO2 SERPL-SCNC: 21 MMOL/L (ref 22–29)
CREAT BLDA-MCNC: 1.7 MG/DL (ref 0.6–1.3)
CREAT SERPL-MCNC: 1.42 MG/DL (ref 0.57–1)
ERYTHROCYTE [DISTWIDTH] IN BLOOD BY AUTOMATED COUNT: 15.9 % (ref 12.3–15.4)
GFR SERPL CREATININE-BSD FRML MDRD: 36 ML/MIN/1.73
GLOBULIN UR ELPH-MCNC: 1.8 GM/DL
GLUCOSE SERPL-MCNC: 94 MG/DL (ref 65–99)
HCT VFR BLD AUTO: 39.1 % (ref 34–46.6)
HGB BLD-MCNC: 12.5 G/DL (ref 12–15.9)
LYMPHOCYTES # BLD AUTO: 0.4 10*3/MM3 (ref 0.7–3.1)
LYMPHOCYTES NFR BLD AUTO: 9.7 % (ref 19.6–45.3)
MCH RBC QN AUTO: 30.5 PG (ref 26.6–33)
MCHC RBC AUTO-ENTMCNC: 32.1 G/DL (ref 31.5–35.7)
MCV RBC AUTO: 94.9 FL (ref 79–97)
MONOCYTES # BLD AUTO: 0.3 10*3/MM3 (ref 0.1–0.9)
MONOCYTES NFR BLD AUTO: 8.2 % (ref 5–12)
NEUTROPHILS NFR BLD AUTO: 3.2 10*3/MM3 (ref 1.7–7)
NEUTROPHILS NFR BLD AUTO: 82.1 % (ref 42.7–76)
PLATELET # BLD AUTO: 112 10*3/MM3 (ref 140–450)
PMV BLD AUTO: 7.7 FL (ref 6–12)
POTASSIUM SERPL-SCNC: 4.5 MMOL/L (ref 3.5–5.2)
PROT SERPL-MCNC: 5.7 G/DL (ref 6–8.5)
RBC # BLD AUTO: 4.11 10*6/MM3 (ref 3.77–5.28)
SODIUM SERPL-SCNC: 140 MMOL/L (ref 136–145)
WBC # BLD AUTO: 3.9 10*3/MM3 (ref 3.4–10.8)

## 2021-11-04 PROCEDURE — 82565 ASSAY OF CREATININE: CPT

## 2021-11-04 PROCEDURE — 25010000002 DIPHENHYDRAMINE PER 50 MG: Performed by: INTERNAL MEDICINE

## 2021-11-04 PROCEDURE — 25010000002 METHYLPREDNISOLONE PER 125 MG: Performed by: INTERNAL MEDICINE

## 2021-11-04 PROCEDURE — 85025 COMPLETE CBC W/AUTO DIFF WBC: CPT | Performed by: INTERNAL MEDICINE

## 2021-11-04 PROCEDURE — 80053 COMPREHEN METABOLIC PANEL: CPT | Performed by: INTERNAL MEDICINE

## 2021-11-04 PROCEDURE — 96375 TX/PRO/DX INJ NEW DRUG ADDON: CPT

## 2021-11-04 PROCEDURE — 36416 COLLJ CAPILLARY BLOOD SPEC: CPT

## 2021-11-04 PROCEDURE — 96401 CHEMO ANTI-NEOPL SQ/IM: CPT

## 2021-11-04 PROCEDURE — 96374 THER/PROPH/DIAG INJ IV PUSH: CPT

## 2021-11-04 PROCEDURE — 25010000002 DARATUMUMAB-HYALURONIDASE-FIHJ 1800-30000 MG-UT/15ML SOLUTION: Performed by: INTERNAL MEDICINE

## 2021-11-04 RX ORDER — SODIUM CHLORIDE 9 MG/ML
250 INJECTION, SOLUTION INTRAVENOUS ONCE
Status: COMPLETED | OUTPATIENT
Start: 2021-11-04 | End: 2021-11-04

## 2021-11-04 RX ORDER — ACETAMINOPHEN 500 MG
1000 TABLET ORAL ONCE
Status: COMPLETED | OUTPATIENT
Start: 2021-11-04 | End: 2021-11-04

## 2021-11-04 RX ORDER — METHYLPREDNISOLONE SODIUM SUCCINATE 125 MG/2ML
60 INJECTION, POWDER, LYOPHILIZED, FOR SOLUTION INTRAMUSCULAR; INTRAVENOUS ONCE
Status: COMPLETED | OUTPATIENT
Start: 2021-11-04 | End: 2021-11-04

## 2021-11-04 RX ADMIN — ACETAMINOPHEN 1000 MG: 500 TABLET, FILM COATED ORAL at 09:30

## 2021-11-04 RX ADMIN — METHYLPREDNISOLONE SODIUM SUCCINATE 60 MG: 125 INJECTION, POWDER, FOR SOLUTION INTRAMUSCULAR; INTRAVENOUS at 09:45

## 2021-11-04 RX ADMIN — SODIUM CHLORIDE 250 ML: 9 INJECTION, SOLUTION INTRAVENOUS at 09:30

## 2021-11-04 RX ADMIN — DIPHENHYDRAMINE HYDROCHLORIDE 25 MG: 50 INJECTION INTRAMUSCULAR; INTRAVENOUS at 09:31

## 2021-11-04 RX ADMIN — DARATUMUMAB AND HYALURONIDASE-FIHJ (HUMAN RECOMBINANT) 1800 MG: 1800; 30000 INJECTION SUBCUTANEOUS at 10:51

## 2021-11-09 DIAGNOSIS — I10 ESSENTIAL HYPERTENSION: ICD-10-CM

## 2021-11-11 ENCOUNTER — SPECIALTY PHARMACY (OUTPATIENT)
Dept: ONCOLOGY | Facility: HOSPITAL | Age: 71
End: 2021-11-11

## 2021-11-11 DIAGNOSIS — C90.02 MULTIPLE MYELOMA IN RELAPSE (HCC): ICD-10-CM

## 2021-11-11 NOTE — PROGRESS NOTES
Drug: Pomalidomide (Pomalyst)  Strength: 2 mg  Directions: Take 1 capsule by mouth daily on days 1-14, off 7 days, adult female REMS [id number]  Quantity: #14  Refills: 0  Pharmacy prescription sent to: [facility]    Notes: I reviewed the most recent notes and labs and the patient will continue without any dose changes.  Although her serum creatinine is elevated, pomalidomide doesn't need to be dose-adjusted for that.  Her liver function is normal and her platelets and ANC are good to continue with the current dose.    LIVER FUNCTION TESTS:      Lab 11/04/21  0904   TOTAL PROTEIN 5.7*   ALBUMIN 3.90   GLOBULIN 1.8   ALT (SGPT) 11   AST (SGOT) 13   BILIRUBIN 0.3   ALK PHOS 54     Lab Results   Component Value Date     (L) 11/04/2021     Lab Results   Component Value Date    NEUTROABS 3.20 11/04/2021

## 2021-11-29 ENCOUNTER — SPECIALTY PHARMACY (OUTPATIENT)
Dept: ONCOLOGY | Facility: HOSPITAL | Age: 71
End: 2021-11-29

## 2021-11-29 DIAGNOSIS — C90.02 MULTIPLE MYELOMA IN RELAPSE (HCC): ICD-10-CM

## 2021-11-29 RX ORDER — DEXAMETHASONE 4 MG/1
TABLET ORAL
Qty: 30 TABLET | Refills: 6 | Status: SHIPPED | OUTPATIENT
Start: 2021-11-29 | End: 2022-04-14

## 2021-11-29 RX ORDER — DEXAMETHASONE 4 MG/1
TABLET ORAL
Qty: 30 TABLET | Refills: 6 | Status: SHIPPED | OUTPATIENT
Start: 2021-11-29 | End: 2021-11-29 | Stop reason: SDUPTHER

## 2021-11-29 NOTE — PROGRESS NOTES
Drug: Pomalidomide (Pomalyst)  Strength: 2 mg  Directions: Take 1 capsule by mouth daily on days 1-14, off 7 days, adult female REMS [id number]  Quantity: #14  Refills: 0  Pharmacy prescription sent to: [facility]     Notes: I reviewed the most recent notes and labs and the patient will continue without any dose changes.  Although her serum creatinine is elevated, pomalidomide doesn't need to be dose-adjusted for that.  Her liver function is normal and her platelets and ANC are good to continue with the current dose. She will see Dr. Epps again soon, on 12/2/21.

## 2021-11-29 NOTE — TELEPHONE ENCOUNTER
Caller: Doris Miranda    Relationship: Self    Best call back number: [phone]    Requested Prescriptions:   Requested Prescriptions     Pending Prescriptions Disp Refills   • dexamethasone (DECADRON) 4 MG tablet 30 tablet 6        Pharmacy where request should be sent: [facility] [address] [place] [address] [phone] [facility] [phone] FX         Does the patient have less than a 3 day supply:  [x] Yes  [] No

## 2021-12-02 ENCOUNTER — APPOINTMENT (OUTPATIENT)
Dept: ONCOLOGY | Facility: HOSPITAL | Age: 71
End: 2021-12-02

## 2021-12-02 ENCOUNTER — OFFICE VISIT (OUTPATIENT)
Dept: ONCOLOGY | Facility: CLINIC | Age: 71
End: 2021-12-02

## 2021-12-02 VITALS
RESPIRATION RATE: 20 BRPM | DIASTOLIC BLOOD PRESSURE: 73 MMHG | OXYGEN SATURATION: 90 % | BODY MASS INDEX: 35.61 KG/M2 | HEART RATE: 71 BPM | HEIGHT: 60 IN | WEIGHT: 181.4 LBS | SYSTOLIC BLOOD PRESSURE: 152 MMHG | TEMPERATURE: 97.7 F

## 2021-12-02 DIAGNOSIS — C90.02 MULTIPLE MYELOMA IN RELAPSE (HCC): Primary | ICD-10-CM

## 2021-12-02 PROCEDURE — 99215 OFFICE O/P EST HI 40 MIN: CPT | Performed by: INTERNAL MEDICINE

## 2021-12-02 RX ORDER — METHYLPREDNISOLONE SODIUM SUCCINATE 125 MG/2ML
60 INJECTION, POWDER, LYOPHILIZED, FOR SOLUTION INTRAMUSCULAR; INTRAVENOUS ONCE
Status: CANCELLED | OUTPATIENT
Start: 2022-04-21

## 2021-12-02 RX ORDER — DIPHENHYDRAMINE HYDROCHLORIDE 50 MG/ML
50 INJECTION INTRAMUSCULAR; INTRAVENOUS AS NEEDED
Status: CANCELLED | OUTPATIENT
Start: 2022-04-21

## 2021-12-02 RX ORDER — FAMOTIDINE 10 MG/ML
20 INJECTION, SOLUTION INTRAVENOUS AS NEEDED
Status: CANCELLED | OUTPATIENT
Start: 2022-03-24

## 2021-12-02 RX ORDER — FAMOTIDINE 10 MG/ML
20 INJECTION, SOLUTION INTRAVENOUS AS NEEDED
Status: CANCELLED | OUTPATIENT
Start: 2022-04-21

## 2021-12-02 RX ORDER — ACETAMINOPHEN 500 MG
1000 TABLET ORAL ONCE
Status: CANCELLED | OUTPATIENT
Start: 2022-04-21

## 2021-12-02 RX ORDER — SODIUM CHLORIDE 9 MG/ML
250 INJECTION, SOLUTION INTRAVENOUS ONCE
Status: CANCELLED | OUTPATIENT
Start: 2022-03-24

## 2021-12-02 RX ORDER — SODIUM CHLORIDE 9 MG/ML
250 INJECTION, SOLUTION INTRAVENOUS ONCE
Status: CANCELLED | OUTPATIENT
Start: 2021-12-02

## 2021-12-02 RX ORDER — ACETAMINOPHEN 500 MG
1000 TABLET ORAL ONCE
Status: CANCELLED | OUTPATIENT
Start: 2022-03-24

## 2021-12-02 RX ORDER — SODIUM CHLORIDE 9 MG/ML
250 INJECTION, SOLUTION INTRAVENOUS ONCE
Status: CANCELLED | OUTPATIENT
Start: 2022-04-21

## 2021-12-02 RX ORDER — DIPHENHYDRAMINE HYDROCHLORIDE 50 MG/ML
50 INJECTION INTRAMUSCULAR; INTRAVENOUS AS NEEDED
Status: CANCELLED | OUTPATIENT
Start: 2022-03-24

## 2021-12-02 RX ORDER — METHYLPREDNISOLONE SODIUM SUCCINATE 125 MG/2ML
60 INJECTION, POWDER, LYOPHILIZED, FOR SOLUTION INTRAMUSCULAR; INTRAVENOUS ONCE
Status: CANCELLED | OUTPATIENT
Start: 2022-03-24

## 2021-12-02 RX ORDER — MEPERIDINE HYDROCHLORIDE 50 MG/ML
25 INJECTION INTRAMUSCULAR; INTRAVENOUS; SUBCUTANEOUS
Status: CANCELLED | OUTPATIENT
Start: 2022-04-21 | End: 2022-01-28

## 2021-12-02 RX ORDER — MEPERIDINE HYDROCHLORIDE 50 MG/ML
25 INJECTION INTRAMUSCULAR; INTRAVENOUS; SUBCUTANEOUS
Status: CANCELLED | OUTPATIENT
Start: 2022-03-24 | End: 2021-12-31

## 2021-12-02 NOTE — PROGRESS NOTES
Oncology/Hematology History Overview Note   PROBLEM LIST:   1. Stage III IgA kappa clonal multiple myeloma. Diagnosed 9/20152. FISH panel reports multiple abnormalities including gain of 1q21 monosomy.  3. Monosomy 13 and gain of chromosomes 9, 15 and CCND1.  4. Initial M-spike of 7.1 g/dL at time of diagnosis and after 3 cycles, had  undetectable M-spike currently on Velcade, Revlimid and dexamethasone cycle #6  this week.  5. S/p Autologous SCT at [facility] with Dr. Venu Spicer in March 11,2016  6.Right leg pain - on lyrica  7. Bilateral hip replacements by Dr. Lopez     Multiple myeloma in relapse (HCC)   12/4/2017 -  Chemotherapy    OP Zoledronic Acid Q12W     9/10/2020 Initial Diagnosis    Multiple myeloma in relapse (CMS/HCC)     9/23/2020 -  Chemotherapy    OP MULTIPLE MYELOMA Daratumumab / Pomalidomide / Dexamethasone            REASON FOR VISIT: Multiple myeloma       Subjective     HISTORY OF PRESENT ILLNESS:   Ms. Miranda is here for follow up evaluation of myeloma management.  She is currently on second line therapy with Darzalex, Pomalyst and dexamethasone.  She has been on it since September 2020.  She wants to consider stopping treatment.  She is fairly tired of treatment.  She reports that her breathing has actually improved.  She has a history of Covid infection.  Denies any issues with nausea vomiting.  Pain seems relatively stable.      Past Medical History, Past Surgical History, Social History, Family History have been reviewed and are without significant changes except as mentioned.    Review of Systems   Constitutional: Positive for fatigue.   HENT: Negative.    Eyes: Positive for visual disturbance.   Respiratory: Positive for shortness of breath.    Cardiovascular: Negative.    Gastrointestinal: Negative.    Endocrine: Negative.    Genitourinary: Negative.    Musculoskeletal: Positive for arthralgias and gait problem.   Skin: Negative.    Allergic/Immunologic: Negative.    Hematological:  "Negative.    Psychiatric/Behavioral: Negative.       A comprehensive 14 point review of systems was performed on 12/02/21  and was negative except as mentioned.    Medications:  The current medication list was reviewed in the EMR    ALLERGIES:  No Known Allergies    Objective      /73   Pulse 71   Temp 97.7 °F (36.5 °C) (Temporal)   Resp 20   Ht 152.4 cm (60\")   Wt 82.3 kg (181 lb 6.4 oz)   SpO2 90%   BMI 35.43 kg/m²      Performance Status: 1    General: well appearing, in no acute distress  HEENT: sclera anicteric, oropharynx clear  Lymphatics: no cervical, supraclavicular, or axillary adenopathy  Cardiovascular: regular rate and rhythm, no murmurs  Lungs: clear to auscultation bilaterally  Abdomen: soft, nontender, nondistended.  No palpable organomegaly  Extremeties: no lower extremity edema  Skin: no rashes, lesions, bruising, or petechiae      RECENT LABS:    Lab Results   Component Value Date    MSPIKE Comment: 09/09/2021    MSPIKE 0.1 (H) 07/06/2021    MSPIKE 0.1 (H) 06/07/2021       Lab Results   Component Value Date    FREEKAPPAL 11.6 09/09/2021    FREEKAPPAL 14.1 07/06/2021    FREEKAPPAL 19.9 (H) 06/07/2021    FREEKAPPAL 16.7 05/10/2021    FREEKAPPAL 13.8 04/12/2021    FREEKAPPAL 14.1 03/15/2021    FREEKAPPAL 13.0 02/15/2021    FREEKAPPAL 13.3 01/13/2021    FREEKAPPAL 14.8 12/16/2020    FREEKAPPAL 21.3 (H) 12/02/2020         Lab Results   Component Value Date    IGLFLC 7.6 09/09/2021    IGLFLC 6.0 07/06/2021    IGLFLC 7.0 06/07/2021     Lab Results   Component Value Date    WBC 3.90 11/04/2021    HGB 12.5 11/04/2021    HCT 39.1 11/04/2021    MCV 94.9 11/04/2021     (L) 11/04/2021       Lab Results   Component Value Date    GLUCOSE 94 11/04/2021    BUN 21 11/04/2021    CREATININE 1.42 (H) 11/04/2021    EGFRIFNONA 36 (L) 11/04/2021    BCR 14.8 11/04/2021    K 4.5 11/04/2021    CO2 21.0 (L) 11/04/2021    CALCIUM 8.4 (L) 11/04/2021    PROTENTOTREF 6.0 09/09/2021    ALBUMIN 3.90 11/04/2021 "    LABIL2 1.6 09/09/2021    AST 13 11/04/2021    ALT 11 11/04/2021         Assessment/Plan       1. multiple myeloma in relapse  status post autologous stem cell transplant.  She has mostly light chain only disease.  Her disease is nicely controlled.  She is on Pomalyst, daratumumab and dexamethasone.  However she wants to discontinue therapy for a while to see how she does.  We discussed that her concerns that I have with progression of disease.  She is just tired of treatment.  I am going to give her off until end of February.  She will take Pomalyst for the next couple of weeks and then stop.  We will revisit our plan at towards the end of February and see where we are with everything.  Obviously the significant risk with progression of disease.  But her quality of life is being affected and I think this is a reasonable approach for now.    2.  Hypogammaglobulinemia.  She has not had issues with infections so far.  We will hold off on IVIG for now    3.  Back pain/ hip pain: The hip replacements has done wonders for her.       4.  Bilateral hip replacement secondary to myeloma by Dr. Lopez    5.  Shortness of breath.  I want to see if reducing her Pomalyst to 2 weeks on 1 week off will help resolve some of this issue.    6.  Bone mets.  She has been on Zometa every 3 months since 2017.  I am going to discontinue her treatment since she has been on it for several years.    7.  Covid-19 risk.  She received her first dose on 2/5/2021 and  she is awaiting her second dose    8.  Bilateral shoulder pain.  Sees Dr. Forman for this.      Total time of patient care on day of service including time prior to, face to face with patient, and following visit spent in reviewing records, lab results,  discussion with patient, and documentation/charting was > 46 minutes.        Joseph Mckinley MD  [facility]    12/2/2021               CC:

## 2021-12-23 ENCOUNTER — TELEPHONE (OUTPATIENT)
Dept: ONCOLOGY | Facility: CLINIC | Age: 71
End: 2021-12-23

## 2021-12-23 NOTE — TELEPHONE ENCOUNTER
Called patient and informed her that her labs orders are in and that she can come in a few days before infusion to get labs drawn.

## 2021-12-23 NOTE — TELEPHONE ENCOUNTER
Caller: Doris Miranda    Relationship: Self    Best call back number: [phone]    What is the best time to reach you: ASAP    Who are you requesting to speak with (clinical staff, provider,  specific staff member): NURSE    Do you know the name of the person who called:     What was the call regarding: PT HAS QUESTIONS ABOUT NEEDED LABS    Do you require a callback: YES

## 2022-02-12 DIAGNOSIS — F43.23 SITUATIONAL MIXED ANXIETY AND DEPRESSIVE DISORDER: ICD-10-CM

## 2022-02-14 RX ORDER — VENLAFAXINE HYDROCHLORIDE 37.5 MG/1
37.5 CAPSULE, EXTENDED RELEASE ORAL EVERY MORNING
Qty: 90 CAPSULE | Refills: 3 | Status: SHIPPED | OUTPATIENT
Start: 2022-02-14 | End: 2023-01-24

## 2022-02-24 ENCOUNTER — SPECIALTY PHARMACY (OUTPATIENT)
Dept: ONCOLOGY | Facility: HOSPITAL | Age: 72
End: 2022-02-24

## 2022-04-14 ENCOUNTER — OFFICE VISIT (OUTPATIENT)
Dept: INTERNAL MEDICINE | Facility: CLINIC | Age: 72
End: 2022-04-14

## 2022-04-14 VITALS
SYSTOLIC BLOOD PRESSURE: 120 MMHG | TEMPERATURE: 96.9 F | OXYGEN SATURATION: 90 % | WEIGHT: 171 LBS | DIASTOLIC BLOOD PRESSURE: 64 MMHG | BODY MASS INDEX: 33.57 KG/M2 | HEIGHT: 60 IN | HEART RATE: 79 BPM

## 2022-04-14 DIAGNOSIS — Z00.00 ENCOUNTER FOR MEDICARE ANNUAL WELLNESS EXAM: ICD-10-CM

## 2022-04-14 DIAGNOSIS — C90.02 MULTIPLE MYELOMA IN RELAPSE: ICD-10-CM

## 2022-04-14 DIAGNOSIS — I10 ESSENTIAL HYPERTENSION: Primary | Chronic | ICD-10-CM

## 2022-04-14 DIAGNOSIS — D12.6 TUBULAR ADENOMA OF COLON: ICD-10-CM

## 2022-04-14 DIAGNOSIS — K21.9 GASTROESOPHAGEAL REFLUX DISEASE WITHOUT ESOPHAGITIS: Chronic | ICD-10-CM

## 2022-04-14 PROCEDURE — 1170F FXNL STATUS ASSESSED: CPT | Performed by: INTERNAL MEDICINE

## 2022-04-14 PROCEDURE — 1160F RVW MEDS BY RX/DR IN RCRD: CPT | Performed by: INTERNAL MEDICINE

## 2022-04-14 PROCEDURE — G0439 PPPS, SUBSEQ VISIT: HCPCS | Performed by: INTERNAL MEDICINE

## 2022-04-14 PROCEDURE — 1126F AMNT PAIN NOTED NONE PRSNT: CPT | Performed by: INTERNAL MEDICINE

## 2022-04-14 NOTE — PROGRESS NOTES
The ABCs of the Annual Wellness Visit  Subsequent Medicare Wellness Visit    Chief Complaint   Patient presents with   • Annual Exam      Subjective       History of Present Illness:  Doris Miranda is a 71 y.o. female who presents for a Subsequent Medicare Wellness Visit.    The following portions of the patient's history were reviewed and   updated as appropriate: current medications, past family history, past medical history, past social history, past surgical history and problem list.       Compared to one year ago, the patient feels her physical   health is the same.    Compared to one year ago, the patient feels her mental   health is the same.    Recent Hospitalizations:  This patient has had a [facility] admission record on file within the last 365 days.    Current Medical Providers:  Patient Care Team:  Earl Fuentes MD as PCP - General  Earl Fuentes MD as PCP - Family Medicine  Josiah Euceda MD as Consulting Physician (Hematology and Oncology)  Víctor Mixon MD as Consulting Physician (Pain Medicine)    Outpatient Medications Prior to Visit   Medication Sig Dispense Refill   • acyclovir (ZOVIRAX) 400 MG tablet Take 1 tablet by mouth 2 (Two) Times a Day With Meals. 60 tablet 11   • aspirin (aspirin) 81 MG EC tablet Take 1 tablet by mouth Daily. 30 tablet 3   • cyclobenzaprine (FLEXERIL) 10 MG tablet Take 1 tablet by mouth 3 (Three) Times a Day As Needed for Muscle Spasms. 30 tablet 11   • esomeprazole (nexIUM) 20 MG capsule Take 20 mg by mouth Daily As Needed.     • melatonin 5 MG tablet tablet Take 5 mg by mouth At Night As Needed (Sleep).     • metoprolol tartrate (LOPRESSOR) 25 MG tablet Take 1 tablet by mouth 2 (Two) Times a Day. 180 tablet 3   • venlafaxine XR (Effexor XR) 37.5 MG 24 hr capsule Take 1 capsule by mouth Every Morning. 90 capsule 3   • albuterol sulfate  (90 Base) MCG/ACT inhaler INHALE 2 PUFFS BY MOUTH EVERY 6 HOURS AS NEEDED FOR WHEEZING OR   SHORTNESS  OF  AIR 18 g 11   • dexamethasone (DECADRON) 4 MG tablet 5 pills once a weeks  Indications: myeloma 30 tablet 6   • ondansetron (ZOFRAN) 8 MG tablet Take 1 tablet by mouth 3 (Three) Times a Day As Needed for Nausea or Vomiting. 30 tablet 5   • pomalidomide (POMALYST) 2 MG chemo capsule Take 1 capsule by mouth Daily. For 14 days, followed by 7 days off. Adult Female REMS: [id number] 14 capsule 0   • sulfamethoxazole-trimethoprim (BACTRIM DS,SEPTRA DS) 800-160 MG per tablet Take 1 tablet by mouth 3 (Three) Times a Week. Takes on Mon-Wed- Fri for a Long term dose, most recent fill on 07-30-21 for 30 days with refills 48 tablet 5     No facility-administered medications prior to visit.       No opioid medication identified on active medication list. I have reviewed chart for other potential  high risk medication/s and harmful drug interactions in the elderly.          Aspirin is on active medication list. Aspirin use is indicated based on review of current medical condition/s. Pros and cons of this therapy have been discussed today. Benefits of this medication outweigh potential harm.  Patient has been encouraged to continue taking this medication.  .      Fall Risk Assessment was completed, and patient is at low risk for falls.      Patient Active Problem List   Diagnosis   • Diverticulosis of large intestine without hemorrhage   • Tubular adenoma of colon   • Essential hypertension   • COPD (chronic obstructive pulmonary disease) (HCC)   • Arthritis   • Gastroesophageal reflux disease without esophagitis   • Chronic left-sided low back pain with sciatica   • Thrombocytopenia since stem cell transplant March 2016   • Hx of autologous stem cell transplant (March 2016)   • CKD (chronic kidney disease), stage III (CMS/HCC)   • Former smoker   • Non-rheumatic tricuspid valve insufficiency   • Pulmonary hypertension (CMS/HCC)   • Chronic respiratory failure with hypoxia (been noncompliant with outpatient oxygen in  past)   • Gait instability   • Chronic pain   • Debility   • Thrombocytopenia (HCC)   • On home oxygen therapy   • Hypogammaglobulinemia, acquired (HCC)   • Multiple myeloma in relapse (HCC)   • COVID-19     Advance Care Planning     Advance Directive is not on file.  ACP discussion was held with the patient during this visit. Patient does not have an advance directive, information provided.    Review of Systems   Constitutional: Positive for fatigue. Negative for activity change, appetite change and unexpected weight change.   HENT: Negative for congestion, ear pain, rhinorrhea, sinus pressure, sore throat and trouble swallowing.    Eyes: Negative for pain and visual disturbance.   Respiratory: Positive for shortness of breath. Negative for cough, chest tightness and wheezing.         Baseline   Cardiovascular: Negative for chest pain, palpitations and leg swelling.   Gastrointestinal: Negative for abdominal distention, abdominal pain, blood in stool, constipation, diarrhea and vomiting.        12/16 colonoscopy , repeat 12/21   Endocrine: Negative for cold intolerance, heat intolerance, polydipsia and polyphagia.   Genitourinary: Negative for difficulty urinating, dyspareunia, dysuria, frequency, hematuria, menstrual problem, urgency and vaginal discharge.        Refusing   Musculoskeletal: Positive for arthralgias, back pain and gait problem. Negative for joint swelling and myalgias.   Skin: Negative for color change, pallor, rash and wound.        Incision clean dry and intact   Allergic/Immunologic: Negative for environmental allergies, food allergies and immunocompromised state.   Neurological: Negative for dizziness, tremors, seizures, syncope, facial asymmetry, speech difficulty, weakness, numbness and headaches.   Hematological: Negative for adenopathy. Does not bruise/bleed easily.   Psychiatric/Behavioral: Negative for decreased concentration, dysphoric mood, sleep disturbance and suicidal ideas. The  "patient is nervous/anxious (situational).          Objective       Vitals:    04/14/22 1409 04/14/22 1428   BP: 120/70 120/64   BP Location: Left arm    Patient Position: Sitting    Pulse: 79    Temp: 96.9 °F (36.1 °C)    TempSrc: Infrared    SpO2: 90%    Weight: 77.6 kg (171 lb)    Height: 152.4 cm (60\")    PainSc: 0-No pain      BMI Readings from Last 1 Encounters:   04/14/22 33.40 kg/m²   BMI is above normal parameters. Recommendations include: exercise counseling and nutrition counseling    Does the patient have evidence of cognitive impairment? No    Physical Exam  Constitutional:       Appearance: Normal appearance. She is well-developed.   HENT:      Head: Normocephalic and atraumatic.      Right Ear: External ear normal.      Left Ear: External ear normal.      Nose: Nose normal.      Mouth/Throat:      Mouth: Mucous membranes are moist.      Pharynx: Oropharynx is clear.   Eyes:      Extraocular Movements: Extraocular movements intact.      Conjunctiva/sclera: Conjunctivae normal.      Pupils: Pupils are equal, round, and reactive to light.   Cardiovascular:      Rate and Rhythm: Normal rate and regular rhythm.      Heart sounds: Normal heart sounds.   Pulmonary:      Effort: Pulmonary effort is normal.      Comments: Diminished BS  Abdominal:      General: Bowel sounds are normal.      Palpations: Abdomen is soft.   Musculoskeletal:         General: Deformity (kyphosis) present.      Cervical back: Normal range of motion and neck supple.   Lymphadenopathy:      Cervical: No cervical adenopathy.   Skin:     General: Skin is warm and dry.   Neurological:      General: No focal deficit present.      Mental Status: She is alert and oriented to person, place, and time.   Psychiatric:         Mood and Affect: Mood normal.         Behavior: Behavior normal.         Thought Content: Thought content normal.                 HEALTH RISK ASSESSMENT    Smoking Status:  Social History     Tobacco Use   Smoking Status " Former Smoker   • Quit date: 2015   • Years since quittin.7   Smokeless Tobacco Never Used     Alcohol Consumption:  Social History     Substance and Sexual Activity   Alcohol Use No     Fall Risk Screen:    NICOLE Fall Risk Assessment has not been completed.    Depression Screening:  PHQ-2/PHQ-9 Depression Screening 2022   Retired PHQ-9 Total Score -   Retired Total Score -   Little Interest or Pleasure in Doing Things 0-->not at all   Feeling Down, Depressed or Hopeless 0-->not at all   PHQ-9: Brief Depression Severity Measure Score 0       Health Habits and Functional and Cognitive Screening:  Functional & Cognitive Status 2022   Do you have difficulty preparing food and eating? No   Do you have difficulty bathing yourself, getting dressed or grooming yourself? No   Do you have difficulty using the toilet? No   Do you have difficulty moving around from place to place? No   Do you have trouble with steps or getting out of a bed or a chair? No   Current Diet Unhealthy Diet   Dental Exam Not up to date   Eye Exam Not up to date   Exercise (times per week) 0 times per week   Current Exercise Activities Include -   Do you need help using the phone?  No   Are you deaf or do you have serious difficulty hearing?  No   Do you need help with transportation? No   Do you need help shopping? No   Do you need help preparing meals?  No   Do you need help with housework?  No   Do you need help with laundry? No   Do you need help taking your medications? No   Do you need help managing money? No   Do you ever drive or ride in a car without wearing a seat belt? No   Have you felt unusual stress, anger or loneliness in the last month? No   Who do you live with? Child   If you need help, do you have trouble finding someone available to you? No   Have you been bothered in the last four weeks by sexual problems? No   Do you have difficulty concentrating, remembering or making decisions? No       Age-appropriate  Screening Schedule:  Refer to the list below for future screening recommendations based on patient's age, sex and/or medical conditions. Orders for these recommended tests are listed in the plan section. The patient has been provided with a written plan.    Health Maintenance   Topic Date Due   • DXA SCAN  Never done   • ZOSTER VACCINE (1 of 2) Never done   • MAMMOGRAM  04/14/2023 (Originally 4/21/2017)   • INFLUENZA VACCINE  08/01/2022   • TDAP/TD VACCINES (2 - Td or Tdap) 09/26/2022              Assessment/Plan     CMS Preventative Services Quick Reference  Risk Factors Identified During Encounter  Chronic Pain   Immunizations Discussed/Encouraged (specific Immunizations; utd  Inactivity/Sedentary  Obesity/Overweight   The above risks/problems have been discussed with the patient.  Follow up actions/plans if indicated are seen below in the Assessment/Plan Section.  Pertinent information has been shared with the patient in the After Visit Summary.    Diagnoses and all orders for this visit:    1. Essential hypertension (Primary)    2. Tubular adenoma of colon    3. Gastroesophageal reflux disease without esophagitis    4. Multiple myeloma in relapse (HCC)    5. Encounter for Medicare annual wellness exam        Follow Up:   No follow-ups on file.     An After Visit Summary and PPPS were given to the patient.               Htn- cont metoprolol, advised goal of 130/80  copd-not an issue since stopping tob, proventil prn, controlled on prn albuterol and prn O2  gerd/gastritis-cont ppi prn, controlled  MM/back pain with radiculopathy-f/u Dr Epps and  ortho, off chemo  Hx of TA-resope 12/21 if health allows, wants to delay  Gait instability/chronic back pain-improved with surgery  Situational anxiety/depression-cont effexor xr, stable  Diverticulosis-increase fiber     Prior records Labs noted and dw patient

## 2022-07-15 RX ORDER — ACYCLOVIR 400 MG/1
400 TABLET ORAL 2 TIMES DAILY WITH MEALS
Qty: 60 TABLET | Refills: 11 | Status: SHIPPED | OUTPATIENT
Start: 2022-07-15

## 2022-10-02 ENCOUNTER — HOSPITAL ENCOUNTER (EMERGENCY)
Facility: HOSPITAL | Age: 72
Discharge: HOME | End: 2022-10-02
Attending: EMERGENCY MEDICINE | Admitting: EMERGENCY MEDICINE

## 2022-10-02 ENCOUNTER — APPOINTMENT (OUTPATIENT)
Dept: GENERAL RADIOLOGY | Facility: HOSPITAL | Age: 72
End: 2022-10-02

## 2022-10-02 VITALS
WEIGHT: 171 LBS | SYSTOLIC BLOOD PRESSURE: 147 MMHG | HEIGHT: 61 IN | HEART RATE: 84 BPM | OXYGEN SATURATION: 100 % | RESPIRATION RATE: 18 BRPM | BODY MASS INDEX: 32.28 KG/M2 | DIASTOLIC BLOOD PRESSURE: 68 MMHG | TEMPERATURE: 98.2 F

## 2022-10-02 DIAGNOSIS — S82.851A CLOSED TRIMALLEOLAR FRACTURE OF RIGHT ANKLE, INITIAL ENCOUNTER: Primary | ICD-10-CM

## 2022-10-02 PROCEDURE — 73610 X-RAY EXAM OF ANKLE: CPT

## 2022-10-02 PROCEDURE — 99152 MOD SED SAME PHYS/QHP 5/>YRS: CPT

## 2022-10-02 PROCEDURE — 25010000002 PROPOFOL 10 MG/ML EMULSION: Performed by: EMERGENCY MEDICINE

## 2022-10-02 PROCEDURE — 99283 EMERGENCY DEPT VISIT LOW MDM: CPT

## 2022-10-02 RX ORDER — PROPOFOL 10 MG/ML
100 VIAL (ML) INTRAVENOUS ONCE
Status: COMPLETED | OUTPATIENT
Start: 2022-10-02 | End: 2022-10-02

## 2022-10-02 RX ORDER — HYDROCODONE BITARTRATE AND ACETAMINOPHEN 5; 325 MG/1; MG/1
1 TABLET ORAL EVERY 6 HOURS PRN
Qty: 15 TABLET | Refills: 0 | Status: SHIPPED | OUTPATIENT
Start: 2022-10-02 | End: 2022-11-02

## 2022-10-02 RX ORDER — PROPOFOL 10 MG/ML
VIAL (ML) INTRAVENOUS
Status: COMPLETED | OUTPATIENT
Start: 2022-10-02 | End: 2022-10-02

## 2022-10-02 RX ORDER — ONDANSETRON 4 MG/1
4 TABLET, ORALLY DISINTEGRATING ORAL EVERY 6 HOURS PRN
Qty: 15 TABLET | Refills: 0 | Status: SHIPPED | OUTPATIENT
Start: 2022-10-02 | End: 2022-10-19 | Stop reason: HOSPADM

## 2022-10-02 RX ADMIN — PROPOFOL 50 MG: 10 INJECTION, EMULSION INTRAVENOUS at 15:51

## 2022-10-02 RX ADMIN — PROPOFOL 100 MG: 10 INJECTION, EMULSION INTRAVENOUS at 16:20

## 2022-10-02 RX ADMIN — PROPOFOL 50 MG: 10 INJECTION, EMULSION INTRAVENOUS at 15:54

## 2022-10-02 RX ADMIN — PROPOFOL 30 MG: 10 INJECTION, EMULSION INTRAVENOUS at 15:58

## 2022-10-02 NOTE — ED PROVIDER NOTES
Subjective   History of Present Illness  Pt is a 71 yo female presenting to ED with complaints of right ankle pain. PMHx significant for Multiple myeloma, CKD, COPD, DVT, GERD, HTN and Home O2 at night. Pt reports this morning was out in her yard and walking on uneven ground and accidentally fell twisting right ankle. She has swelling and pain to lateral ankle. She denies prior hx of ankle injury / surgery. She denies weakness or numbness to LE. She denies dizziness, CP or SOB prior to fall. She did not hit her head. She denies headache, neck or back pain. She lives with family and typically gets around [employer].     History provided by:  Medical records, patient and relative      Review of Systems   Constitutional: Negative for fever.   HENT: Negative for congestion.    Eyes: Negative for visual disturbance.   Respiratory: Negative for cough and shortness of breath.    Cardiovascular: Negative for chest pain.   Musculoskeletal: Positive for arthralgias (right ankle) and joint swelling. Negative for back pain and neck pain.   Skin: Negative for wound.   Neurological: Negative for dizziness, weakness and headaches.       Past Medical History:   Diagnosis Date   • Arthritis     hands   • Chronic bronchitis (HCC)    • Chronic kidney disease     stage 3   • COPD (chronic obstructive pulmonary disease) (HCC)    • DVT, lower extremity (HCC)     in 30's   • GERD (gastroesophageal reflux disease)    • History of total right hip replacement 09/15/2019   • Hypertension    • Multiple myeloma (HCC)    • Multiple myeloma, failed remission (HCC)    • On home oxygen therapy     4liters a night   • Osteoporosis        No Known Allergies    Past Surgical History:   Procedure Laterality Date   • LIMBAL STEM CELL TRANSPLANT  03/2016    @[facility] Dr. Josiah Spicer   • MOUTH SURGERY     • PARTIAL HIP ARTHROPLASTY Right 08/2019   • TOTAL HIP ARTHROPLASTY Left 01/2020    Dr Santa       Family History   Problem Relation Age of  Onset   • Breast cancer Other    • Lung cancer Other    • Liver cancer Other    • Bone cancer Other        Social History     Socioeconomic History   • Marital status:    Tobacco Use   • Smoking status: Former Smoker     Quit date: 2015     Years since quittin.1   • Smokeless tobacco: Never Used   Substance and Sexual Activity   • Alcohol use: No   • Drug use: No   • Sexual activity: Defer           Objective   Physical Exam  Vitals and nursing note reviewed.   Constitutional:       General: She is not in acute distress.  HENT:      Head: Atraumatic.   Eyes:      Extraocular Movements: Extraocular movements intact.      Conjunctiva/sclera: Conjunctivae normal.   Cardiovascular:      Rate and Rhythm: Normal rate.   Pulmonary:      Effort: Pulmonary effort is normal. No respiratory distress.   Musculoskeletal:      Cervical back: Normal range of motion and neck supple.      Right knee: Normal range of motion. No tenderness.      Right ankle: Swelling and ecchymosis present. No deformity. Tenderness present over the lateral malleolus. Decreased range of motion. Normal pulse.      Right foot: Normal range of motion. No swelling or tenderness.   Skin:     General: Skin is warm.      Capillary Refill: Capillary refill takes less than 2 seconds.   Neurological:      General: No focal deficit present.      Mental Status: She is alert and oriented to person, place, and time.   Psychiatric:         Mood and Affect: Mood normal.         Behavior: Behavior normal.         Splint - Cast - Strapping    Date/Time: 10/2/2022 4:07 PM  Performed by: Gail Franklin PA  Authorized by: Lebron Cuevas MD     Consent:     Consent obtained:  Verbal    Consent given by:  Patient    Risks, benefits, and alternatives were discussed: yes      Risks discussed:  Discoloration    Alternatives discussed:  No treatment  Universal protocol:     Procedure explained and questions answered to patient or proxy's satisfaction: yes       Imaging studies available: yes      Patient identity confirmed:  Verbally with patient  Pre-procedure details:     Distal neurologic exam:  Normal    Distal perfusion: distal pulses strong    Procedure details:     Location:  Ankle    Ankle location:  R ankle    Splint type:  Ankle stirrup and short leg (posterior)    Supplies:  Fiberglass  Post-procedure details:     Distal neurologic exam:  Normal    Distal perfusion: distal pulses strong      Procedure completion:  Tolerated    Post-procedure imaging: reviewed      Procedural Sedation    Date/Time: 10/2/2022 7:03 PM  Performed by: Lebron Cuevas MD  Authorized by: Lebron Cuevas MD     Consent:     Consent obtained:  Written and verbal    Consent given by:  Patient    Risks discussed:  Allergic reaction, dysrhythmia, inadequate sedation, nausea, prolonged hypoxia resulting in organ damage, respiratory compromise necessitating ventilatory assistance and intubation and vomiting  Universal protocol:     Procedure explained and questions answered to patient or proxy's satisfaction: yes      Imaging studies available: yes      Site/side marked: yes      Immediately prior to procedure, a time out was called: yes      Patient identity confirmed:  Verbally with patient and arm band  Indications:     Procedure performed:  Fracture reduction    Procedure necessitating sedation performed by:  Physician performing sedation    Intended level of sedation:  Moderate  Pre-sedation assessment:     ASA classification: class 2 - patient with mild systemic disease      Mallampati score:  II - soft palate, uvula, fauces visible    Neck mobility: normal      Pre-sedation assessments completed and reviewed: airway patency, anesthesia/sedation history, cardiovascular function, hydration status, mental status, nausea/vomiting, pain level, respiratory function and temperature      Pre-sedation assessment completed:  10/2/2022 3:45 PM  Immediate pre-procedure details:      Reassessment: Patient reassessed immediately prior to procedure      Reviewed: vital signs, relevant labs/tests and NPO status      Verified: bag valve mask available, emergency equipment available, intubation equipment available, IV patency confirmed, oxygen available and suction available    Procedure details (see MAR for exact dosages):     Sedation start time:  10/2/2022 3:51 PM    Preoxygenation:  Nonrebreather mask    Sedation:  Propofol    Intra-procedure monitoring:  Blood pressure monitoring, cardiac monitor, continuous pulse oximetry, continuous capnometry, frequent LOC assessments and frequent vital sign checks    Intra-procedure events: none      Sedation end time:  10/2/2022 4:07 PM    Total sedation time (minutes):  16  Post-procedure details:     Post-sedation assessment completed:  10/2/2022 4:19 PM    Attendance: Constant attendance by certified staff until patient recovered      Recovery: Patient returned to pre-procedure baseline      Estimated blood loss (see I/O flowsheets): no      Complications:  N/a    Post-sedation assessments completed and reviewed: airway patency, cardiovascular function, hydration status, mental status, nausea/vomiting, pain level and respiratory function      Specimens recovered:  None    Patient is stable for discharge or admission: yes      Procedure completion:  Tolerated well, no immediate complications  FX Dislocation    Date/Time: 10/2/2022 7:05 PM  Performed by: Lebron Cuevas MD  Authorized by: Lebron Cuevas MD     Consent:     Consent obtained:  Verbal and written    Consent given by:  Patient    Risks, benefits, and alternatives were discussed: yes      Risks discussed:  Nerve damage, pain and vascular damage  Universal protocol:     Procedure explained and questions answered to patient or proxy's satisfaction: yes      Relevant documents present and verified: yes      Imaging studies available: yes      Site/side marked: yes      Immediately prior  to procedure, a time out was called: yes      Patient identity confirmed:  Verbally with patient and arm band  Injury:     Injury location:  Ankle    Ankle injury location:  R ankle    Ankle fracture type: trimalleolar    Pre-procedure details:     Distal neurologic exam:  Normal    Distal perfusion: distal pulses strong      Range of motion: reduced    Sedation:     Sedation type:  Moderate sedation  Procedure details:     Manipulation performed: yes      Reduction successful: yes      X-ray confirmed reduction: yes      Immobilization:  Splint    Splint type:  Short leg and ankle stirrup    Supplies used:  Cotton padding and fiberglass    Attestation: Splint applied and adjusted personally by me    Post-procedure details:     Distal neurologic exam:  Normal    Distal perfusion: distal pulses strong      Range of motion: unchanged      Procedure completion:  Tolerated well, no immediate complications      No results found for this or any previous visit (from the past 24 hour(s)).  Note: In addition to lab results from this visit, the labs listed above may include labs taken at another facility or during a different encounter within the last 24 hours. Please correlate lab times with ED admission and discharge times for further clarification of the services performed during this visit.    XR Ankle 3+ View Right   Final Result   Interval reduction and splinting of the previously noted trimalleolar   ankle fracture. On AP view, the medial and lateral clear spaces now   appear symmetric, with improved alignment of the lateral malleolus   fracture. Lateral view reveal some persistent tibiotalar subluxation and   displacement/malalignment of the posterior malleolus fracture. No   additional acute fracture is identified.        This report was finalized on 10/2/2022 4:44 PM by Bj Stacy.          XR Ankle 3+ View Right   Final Result   Trimalleolar right ankle fracture including disruption of the medial   clear  space and tibiotalar subluxation as above.       This report was finalized on 10/2/2022 1:01 PM by Bj Stacy.            Vitals:    10/02/22 1605 10/02/22 1606 10/02/22 1609 10/02/22 1609   BP: 134/73   147/68   BP Location:       Patient Position:       Pulse:  80 76 84   Resp:    18   Temp:       SpO2:  100% 100% 100%   Weight:       Height:         Medications   Propofol (DIPRIVAN) injection 100 mg (100 mg Intravenous Given by Other 10/2/22 1620)   Propofol (DIPRIVAN) injection (30 mg Intravenous Given 10/2/22 1558)     ECG/EMG Results (last 24 hours)     ** No results found for the last 24 hours. **        No orders to display                ED Course  ED Course as of 10/02/22 2055   Sun Oct 02, 2022   1326 Discussed patient with Dr. Cuevas. Fracture will need reduction and sedation.   Discussed with charge nurse and patient will be moved to regular ED bed.  [RT]   1330 Updated patient on fracture and need for reduction. She again declined pain meds.  [RT]   2054 After reviewing the patient's plain films, the patient was consented for procedural sedation and reduction of her fracture/dislocation.  Using propofol for sedation, she was sedated and her fracture dislocation was reduced without complication.  She tolerated the procedure well.  Neurovascularly intact following splint placement.  She was referred to Dr. Ramirez and will contact his office tomorrow morning for earliest available appointment.  Agreeable with plan and given appropriate strict return precautions. [DD]      ED Course User Index  [DD] Lebron Cuevas MD  [RT] Gail Franklin PA      No results found for this or any previous visit (from the past 24 hour(s)).  Note: In addition to lab results from this visit, the labs listed above may include labs taken at another facility or during a different encounter within the last 24 hours. Please correlate lab times with ED admission and discharge times for further clarification of the  services performed during this visit.    XR Ankle 3+ View Right   Final Result   Interval reduction and splinting of the previously noted trimalleolar   ankle fracture. On AP view, the medial and lateral clear spaces now   appear symmetric, with improved alignment of the lateral malleolus   fracture. Lateral view reveal some persistent tibiotalar subluxation and   displacement/malalignment of the posterior malleolus fracture. No   additional acute fracture is identified.        This report was finalized on 10/2/2022 4:44 PM by Bj Stacy.          XR Ankle 3+ View Right   Final Result   Trimalleolar right ankle fracture including disruption of the medial   clear space and tibiotalar subluxation as above.       This report was finalized on 10/2/2022 1:01 PM by Bj Stacy.            Vitals:    10/02/22 1605 10/02/22 1606 10/02/22 1609 10/02/22 1609   BP: 134/73   147/68   BP Location:       Patient Position:       Pulse:  80 76 84   Resp:    18   Temp:       SpO2:  100% 100% 100%   Weight:       Height:         Medications   Propofol (DIPRIVAN) injection 100 mg (100 mg Intravenous Given by Other 10/2/22 1620)   Propofol (DIPRIVAN) injection (30 mg Intravenous Given 10/2/22 1558)     ECG/EMG Results (last 24 hours)     ** No results found for the last 24 hours. **        No orders to display             DISCHARGE    Patient discharged in stable condition.    Reviewed implications of results, diagnosis, meds, responsibility to follow up, warning signs and symptoms of possible worsening, potential complications and reasons to return to ER.    Patient/Family voiced understanding of above instructions.    Discussed plan for discharge, as there is no emergent indication for admission.  Pt/family is agreeable and understands need for follow up and possible repeat testing.  Pt/family is aware that discharge does not mean that nothing is wrong but that it indicates no emergency is currently present that requires  admission and they must continue care with follow-up as given below or with a physician of their choice.     FOLLOW-UP  Momo Cee MD  [address] [address]  [phone]    Schedule an appointment as soon as possible for a visit       Earl Fuentes MD [address]   [place] [address]  [phone]    Schedule an appointment as soon as possible for a visit       [facility] Emergency Department  [address] [id number] [phone]    If symptoms worsen         Medication List      New Prescriptions    HYDROcodone-acetaminophen 5-325 MG per tablet  Commonly known as: NORCO  Take 1 tablet by mouth Every 6 (Six) Hours As Needed for Moderate Pain for up to 15 doses.     ondansetron ODT 4 MG disintegrating tablet  Commonly known as: ZOFRAN-ODT  Place 1 tablet on the tongue Every 6 (Six) Hours As Needed for Nausea or Vomiting for up to 15 doses.           Where to Get Your Medications      These medications were sent to [facility] [address] [place] [address] - [facility] - [phone]  - [phone] [place] [id number]    Phone: [phone]   · HYDROcodone-acetaminophen 5-325 MG per tablet  · ondansetron ODT 4 MG disintegrating tablet                               [facility] reviewed by Lebron Cuevas MD       [facility]    Final diagnoses:   Closed trimalleolar fracture of right ankle, initial encounter       ED Disposition  ED Disposition     ED Disposition   Discharge    Condition   Stable    Comment   --             Momo Cee MD  [address] [address]  [phone]    Schedule an appointment as soon as possible for a visit       Earl Fuentes MD  [address]  [place] [address]  [phone]    Schedule an appointment as soon as possible for a visit       [facility] Emergency Department  [address] [id number] [phone]    If symptoms worsen         Medication List      New Prescriptions    HYDROcodone-acetaminophen 5-325 MG per tablet  Commonly known as: NORCO  Take 1 tablet by mouth Every 6 (Six) Hours As Needed for Moderate Pain for up to 15 doses.     ondansetron ODT 4 MG disintegrating tablet  Commonly known as: ZOFRAN-ODT  Place 1 tablet on the tongue Every 6 (Six) Hours As Needed for Nausea or Vomiting for up to 15 doses.           Where to Get Your Medications      These medications were sent to [facility] [address] [place] [address] - [address] [place] [address] - [phone]  - [phone] [place] [address]    Phone: [phone]   · HYDROcodone-acetaminophen 5-325 MG per tablet  · ondansetron ODT 4 MG disintegrating tablet          Lebron Cuevas MD  10/02/22 1752

## 2022-10-06 ENCOUNTER — HOSPITAL ENCOUNTER (OUTPATIENT)
Dept: CT IMAGING | Facility: HOSPITAL | Age: 72
Discharge: HOME | End: 2022-10-06
Admitting: ORTHOPAEDIC SURGERY

## 2022-10-06 ENCOUNTER — OFFICE VISIT (OUTPATIENT)
Dept: ORTHOPEDIC SURGERY | Facility: CLINIC | Age: 72
End: 2022-10-06

## 2022-10-06 VITALS
DIASTOLIC BLOOD PRESSURE: 70 MMHG | HEIGHT: 61 IN | SYSTOLIC BLOOD PRESSURE: 105 MMHG | BODY MASS INDEX: 32.3 KG/M2 | WEIGHT: 171.08 LBS

## 2022-10-06 DIAGNOSIS — S82.851A CLOSED TRIMALLEOLAR FRACTURE OF RIGHT ANKLE, INITIAL ENCOUNTER: ICD-10-CM

## 2022-10-06 DIAGNOSIS — M25.571 ACUTE RIGHT ANKLE PAIN: ICD-10-CM

## 2022-10-06 DIAGNOSIS — M25.571 ACUTE RIGHT ANKLE PAIN: Primary | ICD-10-CM

## 2022-10-06 PROCEDURE — 99204 OFFICE O/P NEW MOD 45 MIN: CPT | Performed by: ORTHOPAEDIC SURGERY

## 2022-10-06 PROCEDURE — 73700 CT LOWER EXTREMITY W/O DYE: CPT

## 2022-10-06 RX ORDER — CYCLOBENZAPRINE HCL 10 MG
TABLET ORAL
Qty: 30 TABLET | Refills: 0 | Status: SHIPPED | OUTPATIENT
Start: 2022-10-06 | End: 2022-11-05 | Stop reason: SDUPTHER

## 2022-10-06 NOTE — PROGRESS NOTES
[facility] [place]        Subjective     Pain of the Right Ankle (Fall/twisting injury 10/02/2022)      RICHMOND Miranda is a 72 y.o. female who presents with new problem of: right ankle pain.  Onset: fall with twisting injury. The issue has been ongoing for 4 day(s). Pain is a 6/10 on the pain scale. Pain is described as dull, aching and burning. Associated symptoms include pain, swelling and giving way/buckling. The pain is worse with walking, standing, rising from seated position and any movement of the joint; resting and pain medication and/or NSAID improve the pain. Previous treatments have included: bracing.    I have reviewed the following portions of the patient's history:History of Present Illness and review of systems.      Patient is here today for ER follow-up.  She tripped and fell in her yard 4 days ago.  Was seen in the ER and diagnosed with a trimalleolar fracture dislocation that was treated with closesd reduction and splinting.  Patient denies chest pain or shortness of breath.  She has significant medical comorbidities including COPD chronic kidney disease, multiple myeloma, and on home O2.  She is here with her son-in-law today.      Past Medical History:   Diagnosis Date   • Arthritis     hands   • Chronic bronchitis (HCC)    • Chronic kidney disease     stage 3   • COPD (chronic obstructive pulmonary disease) (HCC)    • DVT, lower extremity (HCC)     in 30's   • GERD (gastroesophageal reflux disease)    • History of total right hip replacement 09/15/2019   • Hypertension    • Multiple myeloma (HCC)    • Multiple myeloma, failed remission (HCC)    • On home oxygen therapy     4liters a night   • Osteoporosis       Past Surgical History:   Procedure Laterality Date   • LIMBAL STEM CELL TRANSPLANT  03/2016    @[facility] Dr. Josiah Spicer   • MOUTH SURGERY     • PARTIAL HIP ARTHROPLASTY Right 08/2019   • TOTAL HIP ARTHROPLASTY Left 01/2020    Dr Santa      Family History    Problem Relation Age of Onset   • Breast cancer Other    • Lung cancer Other    • Liver cancer Other    • Bone cancer Other      Social History     Socioeconomic History   • Marital status:    Tobacco Use   • Smoking status: Former Smoker     Quit date: 2015     Years since quittin.1   • Smokeless tobacco: Never Used   Substance and Sexual Activity   • Alcohol use: No   • Drug use: No   • Sexual activity: Defer      Current Outpatient Medications on File Prior to Visit   Medication Sig Dispense Refill   • acyclovir (ZOVIRAX) 400 MG tablet Take 1 tablet by mouth 2 (Two) Times a Day With Meals. 60 tablet 11   • aspirin (aspirin) 81 MG EC tablet Take 1 tablet by mouth Daily. 30 tablet 3   • cyclobenzaprine (FLEXERIL) 10 MG tablet Take 1 tablet by mouth three times daily as needed for muscle spasm 30 tablet 0   • esomeprazole (nexIUM) 20 MG capsule Take 20 mg by mouth Daily As Needed.     • HYDROcodone-acetaminophen (NORCO) 5-325 MG per tablet Take 1 tablet by mouth Every 6 (Six) Hours As Needed for Moderate Pain for up to 15 doses. 15 tablet 0   • melatonin 5 MG tablet tablet Take 5 mg by mouth At Night As Needed (Sleep).     • metoprolol tartrate (LOPRESSOR) 25 MG tablet Take 1 tablet by mouth 2 (Two) Times a Day. 180 tablet 3   • ondansetron ODT (ZOFRAN-ODT) 4 MG disintegrating tablet Place 1 tablet on the tongue Every 6 (Six) Hours As Needed for Nausea or Vomiting for up to 15 doses. 15 tablet 0   • venlafaxine XR (Effexor XR) 37.5 MG 24 hr capsule Take 1 capsule by mouth Every Morning. 90 capsule 3   • [DISCONTINUED] cyclobenzaprine (FLEXERIL) 10 MG tablet Take 1 tablet by mouth 3 (Three) Times a Day As Needed for Muscle Spasms. 30 tablet 11     No current facility-administered medications on file prior to visit.      No Known Allergies       Review of Systems     I reviewed the patient's chief complaint, history of present illness, review of systems, past medical history, surgical history, family  "history, social history, medications and allergy list.        Objective      Physical Exam  /70   Ht 154.9 cm (60.98\")   Wt 77.6 kg (171 lb 1.2 oz)   BMI 32.34 kg/m²     Body mass index is 32.34 kg/m².    General  Mental Status - alert  General Appearance - cooperative, well groomed, not in acute distress  Orientation - Oriented X3  Build & Nutrition - well developed and well nourished  Posture - normal posture  Gait -wheelchair       Ortho Exam  Right ankle splint is removed.  Patient has large hemorrhagic appearing fracture blisters laterally measuring approximately 5 cm x 15 cm.  There is a small posterior fracture blister in the middle aspect of the leg.  Patient has grossly intact sensation of the dorsal and plantar aspects of the ankle to light touch.  EHL and FHL are intact.    Imaging/Studies  Imaging Results (Last 24 Hours)     Procedure Component Value Units Date/Time    XR Ankle 3+ View Right [[id number]] Resulted: 10/06/22 1602     Updated: 10/06/22 1616    Narrative:      Right Ankle X-Ray    Indication: Pain  Views: AP, Lateral, Mortise    Comparison: Right ankle 10/2/2022    Findings:   There is a fracture of the medial, lateral, and posterior malleolus.    Splinting material obscures fine detail.  Compared to the prior study,   there has been interval reduction of the talus beneath the distal tibia.  No bony lesion  Soft tissues normal  Mortise: Relatively well aligned  Syndesmosis:  no evidence of syndesmosis widening    Impression: Trimalleolar ankle fracture dislocation status post reduction   with improved alignment overall.        We have reviewed images of the right ankle including an injury film and a postreduction film from the ER from 10/2/2022.  There is persistent posterior subluxation of the talus on the distal tibia on the postreduction film.  There is a displaced lateral malleolus fracture and posterior malleolus fracture.    Assessment    Assessment:  1. Acute right ankle pain  "   2. Closed trimalleolar fracture of right ankle, initial encounter        Plan:  1. Continue over-the-counter medication as needed for discomfort  2. Closed right trimalleolar ankle fracture dislocation--we have shared the radiographic findings with the patient and her son-in-law this afternoon.  Under sterile conditions, we popped her fracture blisters and placed sterile dressings over them.  These were then sterilely wrapped.  We then used longitudinal traction and performed a closed reduction maneuver using slight inversion and internal rotation to add length to the fibula.  The ankle was then placed in a well-padded AO medial and lateral short leg fiberglass splint.  Post reduction radiographs showed improved alignment of the tibiotalar articulation with reduction of the subluxation.  Patient and her son did meet Dr. Cook during the visit and the plan will be for medical clearance, CT scan, skin check next week and likely surgery the following week.        Momo Cee MD  10/06/22  17:58 EDT      Dictated Utilizing Dragon Dictation.

## 2022-10-07 ENCOUNTER — PATIENT OUTREACH (OUTPATIENT)
Dept: CASE MANAGEMENT | Facility: OTHER | Age: 72
End: 2022-10-07

## 2022-10-07 NOTE — OUTREACH NOTE
AMBULATORY CASE MANAGEMENT NOTE    Name and Relationship of Patient/Support Person: Doris Miranda - Self    Care Coordination    Unable to reach patient after ED visit 10/2/22, after a Fall in the yard, w/ Right Ankle Pain.  Noted patient has seen Ortho., Dr. Ramirez, on 10/6; dx of Closed Trimalleolar Fracture Right Ankle.  Have left patient voicemail with RN- ACM contact information.  This note will be routed to the PCP.    TAO COTA  Ambulatory Case Management    10/7/2022, 12:27 EDT

## 2022-10-12 ENCOUNTER — OFFICE VISIT (OUTPATIENT)
Dept: ORTHOPEDIC SURGERY | Facility: CLINIC | Age: 72
End: 2022-10-12

## 2022-10-12 VITALS
HEIGHT: 61 IN | DIASTOLIC BLOOD PRESSURE: 82 MMHG | WEIGHT: 171.08 LBS | SYSTOLIC BLOOD PRESSURE: 104 MMHG | BODY MASS INDEX: 32.3 KG/M2

## 2022-10-12 DIAGNOSIS — S82.851D CLOSED TRIMALLEOLAR FRACTURE OF RIGHT ANKLE WITH ROUTINE HEALING, SUBSEQUENT ENCOUNTER: Primary | ICD-10-CM

## 2022-10-12 PROBLEM — S82.851A CLOSED TRIMALLEOLAR FRACTURE OF RIGHT ANKLE: Status: ACTIVE | Noted: 2022-10-12

## 2022-10-12 PROCEDURE — 99213 OFFICE O/P EST LOW 20 MIN: CPT | Performed by: ORTHOPAEDIC SURGERY

## 2022-10-12 NOTE — PROGRESS NOTES
[facility] [place] Orthopaedic Surgery Office Visit     Office Visit       Date: 10/12/2022   Patient Name: Doris Miranda  MRN: [id number]  YOB: 1950    Chief Complaint:   Chief Complaint   Patient presents with   • Right Ankle - Pain       Referring Physician: No ref. provider found     History of Present Illness: Doris Miranda is a 72 y.o. female who is here today for skin check.  Patient with trimalleolar ankle fracture where surgical management is recommended.  Seen in Dr. Cee's clinic approximately 1 week ago where fracture blisters were sterilely popped and patient was placed in a splint after reduction maneuver was done to get the talus under the plafond.  Here today for skin check.  Reports that she has been elevating diligently over the last week.  Plan to see primary care surgeon for clearance on Friday.  Of note patient former smoker quit 2015.  Patient reports history of blood clot about age 30 resulting from wearing high-heeled shoes.      Subjective     Review of Systems   Constitutional: Negative.  Negative for chills, fatigue and fever.   HENT: Negative.  Negative for congestion and dental problem.    Eyes: Negative.  Negative for blurred vision.   Respiratory: Negative.  Negative for shortness of breath.    Cardiovascular: Negative.  Negative for leg swelling.   Gastrointestinal: Negative.  Negative for abdominal pain.   Endocrine: Negative.  Negative for polyuria.   Genitourinary: Negative.  Negative for difficulty urinating.   Musculoskeletal: Positive for arthralgias.   Skin: Negative.    Allergic/Immunologic: Negative.    Neurological: Negative.    Hematological: Negative.  Negative for adenopathy.   Psychiatric/Behavioral: Negative.  Negative for behavioral problems.        Past Medical History:   Past Medical History:   Diagnosis Date   • Arthritis     hands   • Chronic bronchitis (HCC)    • Chronic kidney disease      stage 3   • COPD (chronic obstructive pulmonary disease) (HCC)    • DVT, lower extremity (HCC)     in 30's   • GERD (gastroesophageal reflux disease)    • History of total right hip replacement 09/15/2019   • Hypertension    • Multiple myeloma (HCC)    • Multiple myeloma, failed remission (HCC)    • On home oxygen therapy     4liters a night   • Osteoporosis        Past Surgical History:   Past Surgical History:   Procedure Laterality Date   • LIMBAL STEM CELL TRANSPLANT  2016    @[facility] Dr. Josiah Spicer   • MOUTH SURGERY     • PARTIAL HIP ARTHROPLASTY Right 2019   • TOTAL HIP ARTHROPLASTY Left 2020    Dr Santa       Family History:   Family History   Problem Relation Age of Onset   • Breast cancer Other    • Lung cancer Other    • Liver cancer Other    • Bone cancer Other        Social History:   Social History     Socioeconomic History   • Marital status:    Tobacco Use   • Smoking status: Former     Types: Cigarettes     Quit date: 2015     Years since quittin.2   • Smokeless tobacco: Never   Substance and Sexual Activity   • Alcohol use: No   • Drug use: No   • Sexual activity: Defer       Medications:   Current Outpatient Medications:   •  acyclovir (ZOVIRAX) 400 MG tablet, Take 1 tablet by mouth 2 (Two) Times a Day With Meals., Disp: 60 tablet, Rfl: 11  •  aspirin (aspirin) 81 MG EC tablet, Take 1 tablet by mouth Daily., Disp: 30 tablet, Rfl: 3  •  cyclobenzaprine (FLEXERIL) 10 MG tablet, Take 1 tablet by mouth three times daily as needed for muscle spasm, Disp: 30 tablet, Rfl: 0  •  esomeprazole (nexIUM) 20 MG capsule, Take 20 mg by mouth Daily As Needed., Disp: , Rfl:   •  HYDROcodone-acetaminophen (NORCO) 5-325 MG per tablet, Take 1 tablet by mouth Every 6 (Six) Hours As Needed for Moderate Pain for up to 15 doses., Disp: 15 tablet, Rfl: 0  •  melatonin 5 MG tablet tablet, Take 5 mg by mouth At Night As Needed (Sleep)., Disp: , Rfl:   •  metoprolol tartrate (LOPRESSOR) 25 MG tablet,  "Take 1 tablet by mouth 2 (Two) Times a Day., Disp: 180 tablet, Rfl: 3  •  ondansetron ODT (ZOFRAN-ODT) 4 MG disintegrating tablet, Place 1 tablet on the tongue Every 6 (Six) Hours As Needed for Nausea or Vomiting for up to 15 doses., Disp: 15 tablet, Rfl: 0  •  venlafaxine XR (Effexor XR) 37.5 MG 24 hr capsule, Take 1 capsule by mouth Every Morning., Disp: 90 capsule, Rfl: 3    Allergies: No Known Allergies    I reviewed the patient's chief complaint, history of present illness, review of systems, past medical history, surgical history, family history, social history, medications and allergy list     Objective      Vital Signs:   Vitals:    10/12/22 1354   BP: 104/82   Weight: 77.6 kg (171 lb 1.2 oz)   Height: 154.9 cm (60.98\")       Ortho Exam:  Right ankle splint removed for exam.  Swelling and ecchymosis about ankle.  Redemonstration of large hemorrhagic fracture blister laterally measuring approximately 5 x 15 cm.    Small posterior fracture blister middle of posterior leg.    5/10 sites felt on monofilament testing of foot; did not feel monofilament third toe, third metatarsal head, lateral midfoot, medial arch, and heel,  Peripheral pulses including posterior tibial artery and deep peroneal artery are intact and palpable. There is good perfusion to the toes.  Neurological exam of the superficial peroneal, deep peroneal, sural and saphenous nerves demonstrates intact sensation.      Results Review:   CT Lower Extremity Right Without Contrast  Narrative: DATE OF EXAM: 10/6/2022 3:36 PM     PROCEDURE: CT LOWER EXTREMITY RIGHT WO CONTRAST-     INDICATIONS: Ankle trauma, dislocation/ligament injury suspected (Age >=  5y)     COMPARISON: Right ankle radiographs 10/2/2022, 10/6/2022     TECHNIQUE: CT of the right ankle was obtained without the administration  of contrast. Coronal and sagittal reformats were obtained. Automated  exposure control and alternative reconstruction methods were used.     The radiation dose " reduction device was turned on for each scan per the  ALARA (As Low as Reasonably Achievable) protocol.     FINDINGS:   There is a comminuted obliquely oriented fracture of the distal fibula  at the level of the syndesmosis with approximately one quarter shaft  width posterior displacement of the distal fragment (series 7 image 38).  There is a comminuted sagittally-oriented fracture traversing the medial  malleolus and posterior malleolus (series 7 image 16-32 and series 3  image 65); there is mild posterior and superior displacement of the  posterior malleolar fragment relative to the distal tibia, with  approximately 3 mm of articular-surface step-off and diastases along the  posterior tibia (series 7 image 27). There are vague tiny densities  adjacent to the tip of the medial malleolus which may reflect small  avulsion fracture fragments from deltoid ligament injury (series 5 image  33). There is some abnormal widening of the anterior tibiotalar joint  (series 7 image 29). No additional acute fractures are identified. The  talonavicular and subtalar joints are congruent. There is a small  plantar calcaneal enthesophyte. There is diffuse soft tissue swelling  and subcutaneous edema about the ankle and extending along the dorsum of  the foot, without evidence of soft tissue gas or definite  discrete/drainable fluid collection. There is no CT evidence to suggest  ankle tendon entrapment. There is a small tibiotalar joint effusion.      Impression: Redemonstration of fractures of the lateral malleolus, medial malleolus,  and posterior malleolus, as detailed above.     This report was finalized on 10/6/2022 4:18 PM by Armando Murillo MD.     CT scan from October 6 reviewed, redemonstration of trimalleolar ankle fracture    Assessment / Plan      Assessment/Plan:   Diagnoses and all orders for this visit:    1. Closed trimalleolar fracture of right ankle with routine healing, subsequent encounter (Primary)  -     Case  Request        Once again discussed recommendation for surgical management for ankle fracture.  Patient reports she will be seeing primary care doctor on Friday for clearance.  Plan will be to see patient back in 1 week for another skin check.  Tentative plan for surgery at the [facility] on October 20.  That could change based on her skin check next Wednesday.  We will plan on placing patient on Eliquis or Xarelto for 30 days after surgery due to history of blood clot.  Planning for ORIF right trimalleolar ankle fracture.    The risks and benefits of the procedure were discussed with the patient and or appropriate guardian, which include but are not limited to the risk of bleeding, infection, neurovascular damage, post-operative stiffness, tendon and/or ligament retears, recurrent instability, continued pain, arthritic pain, need for further revision surgeries in the future, deep venous thrombosis, and general risks from anesthesia. We also discussed the post-operative rehabilitation, the need for physical therapy, and the overall expected outcomes from the procedure. We allowed proper time and answered the patient's questions regarding the procedure. Knowing what the risks are and what the conservative treatment is, the patient elected to forgo any further conservative treatment options and proceed with the surgical intervention.      Follow Up:   Return in about 1 week (around 10/19/2022) for Recheck.        Pedro Cook MD  [facility] [place] Orth[facility] Surgeon

## 2022-10-12 NOTE — H&P (VIEW-ONLY)
[facility] [place] Orthopa[facility] Office Visit     Office Visit       Date: 10/12/2022   Patient Name: Doris Miranda  MRN: [id number]  YOB: 1950    Chief Complaint:   Chief Complaint   Patient presents with   • Right Ankle - Pain       Referring Physician: No ref. provider found     History of Present Illness: Doris Miranda is a 72 y.o. female who is here today for skin check.  Patient with trimalleolar ankle fracture where surgical management is recommended.  Seen in Dr. Cee's clinic approximately 1 week ago where fracture blisters were sterilely popped and patient was placed in a splint after reduction maneuver was done to get the talus under the plafond.  Here today for skin check.  Reports that she has been elevating diligently over the last week.  Plan to see primary care surgeon for clearance on Friday.  Of note patient former smoker quit 2015.  Patient reports history of blood clot about age 30 resulting from wearing high-heeled shoes.      Subjective     Review of Systems   Constitutional: Negative.  Negative for chills, fatigue and fever.   HENT: Negative.  Negative for congestion and dental problem.    Eyes: Negative.  Negative for blurred vision.   Respiratory: Negative.  Negative for shortness of breath.    Cardiovascular: Negative.  Negative for leg swelling.   Gastrointestinal: Negative.  Negative for abdominal pain.   Endocrine: Negative.  Negative for polyuria.   Genitourinary: Negative.  Negative for difficulty urinating.   Musculoskeletal: Positive for arthralgias.   Skin: Negative.    Allergic/Immunologic: Negative.    Neurological: Negative.    Hematological: Negative.  Negative for adenopathy.   Psychiatric/Behavioral: Negative.  Negative for behavioral problems.        Past Medical History:   Past Medical History:   Diagnosis Date   • Arthritis     hands   • Chronic bronchitis (HCC)    • Chronic kidney disease      stage 3   • COPD (chronic obstructive pulmonary disease) (HCC)    • DVT, lower extremity (HCC)     in 30's   • GERD (gastroesophageal reflux disease)    • History of total right hip replacement 09/15/2019   • Hypertension    • Multiple myeloma (HCC)    • Multiple myeloma, failed remission (HCC)    • On home oxygen therapy     4liters a night   • Osteoporosis        Past Surgical History:   Past Surgical History:   Procedure Laterality Date   • LIMBAL STEM CELL TRANSPLANT  2016    @[facility] Dr. Josiah Spicer   • MOUTH SURGERY     • PARTIAL HIP ARTHROPLASTY Right 2019   • TOTAL HIP ARTHROPLASTY Left 2020    Dr Santa       Family History:   Family History   Problem Relation Age of Onset   • Breast cancer Other    • Lung cancer Other    • Liver cancer Other    • Bone cancer Other        Social History:   Social History     Socioeconomic History   • Marital status:    Tobacco Use   • Smoking status: Former     Types: Cigarettes     Quit date: 2015     Years since quittin.2   • Smokeless tobacco: Never   Substance and Sexual Activity   • Alcohol use: No   • Drug use: No   • Sexual activity: Defer       Medications:   Current Outpatient Medications:   •  acyclovir (ZOVIRAX) 400 MG tablet, Take 1 tablet by mouth 2 (Two) Times a Day With Meals., Disp: 60 tablet, Rfl: 11  •  aspirin (aspirin) 81 MG EC tablet, Take 1 tablet by mouth Daily., Disp: 30 tablet, Rfl: 3  •  cyclobenzaprine (FLEXERIL) 10 MG tablet, Take 1 tablet by mouth three times daily as needed for muscle spasm, Disp: 30 tablet, Rfl: 0  •  esomeprazole (nexIUM) 20 MG capsule, Take 20 mg by mouth Daily As Needed., Disp: , Rfl:   •  HYDROcodone-acetaminophen (NORCO) 5-325 MG per tablet, Take 1 tablet by mouth Every 6 (Six) Hours As Needed for Moderate Pain for up to 15 doses., Disp: 15 tablet, Rfl: 0  •  melatonin 5 MG tablet tablet, Take 5 mg by mouth At Night As Needed (Sleep)., Disp: , Rfl:   •  metoprolol tartrate (LOPRESSOR) 25 MG tablet,  "Take 1 tablet by mouth 2 (Two) Times a Day., Disp: 180 tablet, Rfl: 3  •  ondansetron ODT (ZOFRAN-ODT) 4 MG disintegrating tablet, Place 1 tablet on the tongue Every 6 (Six) Hours As Needed for Nausea or Vomiting for up to 15 doses., Disp: 15 tablet, Rfl: 0  •  venlafaxine XR (Effexor XR) 37.5 MG 24 hr capsule, Take 1 capsule by mouth Every Morning., Disp: 90 capsule, Rfl: 3    Allergies: No Known Allergies    I reviewed the patient's chief complaint, history of present illness, review of systems, past medical history, surgical history, family history, social history, medications and allergy list     Objective      Vital Signs:   Vitals:    10/12/22 1354   BP: 104/82   Weight: 77.6 kg (171 lb 1.2 oz)   Height: 154.9 cm (60.98\")       Ortho Exam:  Right ankle splint removed for exam.  Swelling and ecchymosis about ankle.  Redemonstration of large hemorrhagic fracture blister laterally measuring approximately 5 x 15 cm.    Small posterior fracture blister middle of posterior leg.    5/10 sites felt on monofilament testing of foot; did not feel monofilament third toe, third metatarsal head, lateral midfoot, medial arch, and heel,  Peripheral pulses including posterior tibial artery and deep peroneal artery are intact and palpable. There is good perfusion to the toes.  Neurological exam of the superficial peroneal, deep peroneal, sural and saphenous nerves demonstrates intact sensation.      Results Review:   CT Lower Extremity Right Without Contrast  Narrative: DATE OF EXAM: 10/6/2022 3:36 PM     PROCEDURE: CT LOWER EXTREMITY RIGHT WO CONTRAST-     INDICATIONS: Ankle trauma, dislocation/ligament injury suspected (Age >=  5y)     COMPARISON: Right ankle radiographs 10/2/2022, 10/6/2022     TECHNIQUE: CT of the right ankle was obtained without the administration  of contrast. Coronal and sagittal reformats were obtained. Automated  exposure control and alternative reconstruction methods were used.     The radiation dose " reduction device was turned on for each scan per the  ALARA (As Low as Reasonably Achievable) protocol.     FINDINGS:   There is a comminuted obliquely oriented fracture of the distal fibula  at the level of the syndesmosis with approximately one quarter shaft  width posterior displacement of the distal fragment (series 7 image 38).  There is a comminuted sagittally-oriented fracture traversing the medial  malleolus and posterior malleolus (series 7 image 16-32 and series 3  image 65); there is mild posterior and superior displacement of the  posterior malleolar fragment relative to the distal tibia, with  approximately 3 mm of articular-surface step-off and diastases along the  posterior tibia (series 7 image 27). There are vague tiny densities  adjacent to the tip of the medial malleolus which may reflect small  avulsion fracture fragments from deltoid ligament injury (series 5 image  33). There is some abnormal widening of the anterior tibiotalar joint  (series 7 image 29). No additional acute fractures are identified. The  talonavicular and subtalar joints are congruent. There is a small  plantar calcaneal enthesophyte. There is diffuse soft tissue swelling  and subcutaneous edema about the ankle and extending along the dorsum of  the foot, without evidence of soft tissue gas or definite  discrete/drainable fluid collection. There is no CT evidence to suggest  ankle tendon entrapment. There is a small tibiotalar joint effusion.      Impression: Redemonstration of fractures of the lateral malleolus, medial malleolus,  and posterior malleolus, as detailed above.     This report was finalized on 10/6/2022 4:18 PM by Armando Murillo MD.     CT scan from October 6 reviewed, redemonstration of trimalleolar ankle fracture    Assessment / Plan      Assessment/Plan:   Diagnoses and all orders for this visit:    1. Closed trimalleolar fracture of right ankle with routine healing, subsequent encounter (Primary)  -     Case  Request        Once again discussed recommendation for surgical management for ankle fracture.  Patient reports she will be seeing primary care doctor on Friday for clearance.  Plan will be to see patient back in 1 week for another skin check.  Tentative plan for surgery at the [facility] on October 20.  That could change based on her skin check next Wednesday.  We will plan on placing patient on Eliquis or Xarelto for 30 days after surgery due to history of blood clot.  Planning for ORIF right trimalleolar ankle fracture.    The risks and benefits of the procedure were discussed with the patient and or appropriate guardian, which include but are not limited to the risk of bleeding, infection, neurovascular damage, post-operative stiffness, tendon and/or ligament retears, recurrent instability, continued pain, arthritic pain, need for further revision surgeries in the future, deep venous thrombosis, and general risks from anesthesia. We also discussed the post-operative rehabilitation, the need for physical therapy, and the overall expected outcomes from the procedure. We allowed proper time and answered the patient's questions regarding the procedure. Knowing what the risks are and what the conservative treatment is, the patient elected to forgo any further conservative treatment options and proceed with the surgical intervention.      Follow Up:   Return in about 1 week (around 10/19/2022) for Recheck.        Pedro Cook MD  [facility] [place] Orthopedic Surgeon

## 2022-10-13 ENCOUNTER — PREP FOR SURGERY (OUTPATIENT)
Dept: OTHER | Facility: HOSPITAL | Age: 72
End: 2022-10-13

## 2022-10-13 DIAGNOSIS — Z79.899 OTHER LONG TERM (CURRENT) DRUG THERAPY: ICD-10-CM

## 2022-10-13 DIAGNOSIS — S82.851A CLOSED TRIMALLEOLAR FRACTURE OF RIGHT ANKLE, INITIAL ENCOUNTER: Primary | ICD-10-CM

## 2022-10-13 RX ORDER — CEFAZOLIN SODIUM 2 G/100ML
2 INJECTION, SOLUTION INTRAVENOUS ONCE
Status: CANCELLED | OUTPATIENT
Start: 2022-10-13 | End: 2022-10-13

## 2022-10-14 ENCOUNTER — TELEPHONE (OUTPATIENT)
Dept: INTERNAL MEDICINE | Facility: CLINIC | Age: 72
End: 2022-10-14

## 2022-10-14 NOTE — TELEPHONE ENCOUNTER
Caller: Luisa Miranda    Relationship: Self    Best call back number: [phone]    What is the best time to reach you: ANY TIME    Who are you requesting to speak with (clinical staff, provider,  specific staff member): SHARON    Do you know the name of the person who called: LUISA    What was the call regarding: PATIENT WOULD LIKE A CALL BACK TO DISCUSS TESTS THAT WERE SUPPOSED TO BE DONE Monday 10/17/22. SHE REQUESTED TO HAVE HER APPOINTMENT CANCELLED AND WANTS TO DISCUSS THE DETAILS WITH SHARON.     Do you require a callback: YES

## 2022-10-17 ENCOUNTER — PRE-ADMISSION TESTING (OUTPATIENT)
Dept: PREADMISSION TESTING | Facility: HOSPITAL | Age: 72
End: 2022-10-17

## 2022-10-17 ENCOUNTER — TELEPHONE (OUTPATIENT)
Dept: INTERNAL MEDICINE | Facility: CLINIC | Age: 72
End: 2022-10-17

## 2022-10-17 ENCOUNTER — OFFICE VISIT (OUTPATIENT)
Dept: ORTHOPEDIC SURGERY | Facility: CLINIC | Age: 72
End: 2022-10-17

## 2022-10-17 ENCOUNTER — OFFICE VISIT (OUTPATIENT)
Dept: INTERNAL MEDICINE | Facility: CLINIC | Age: 72
End: 2022-10-17

## 2022-10-17 VITALS
HEART RATE: 86 BPM | HEIGHT: 60 IN | BODY MASS INDEX: 33.4 KG/M2 | TEMPERATURE: 96.9 F | OXYGEN SATURATION: 95 % | SYSTOLIC BLOOD PRESSURE: 130 MMHG | DIASTOLIC BLOOD PRESSURE: 60 MMHG

## 2022-10-17 VITALS
WEIGHT: 171.08 LBS | BODY MASS INDEX: 32.3 KG/M2 | HEIGHT: 61 IN | DIASTOLIC BLOOD PRESSURE: 90 MMHG | SYSTOLIC BLOOD PRESSURE: 142 MMHG

## 2022-10-17 VITALS — HEIGHT: 60 IN | BODY MASS INDEX: 33.57 KG/M2 | WEIGHT: 171 LBS

## 2022-10-17 DIAGNOSIS — J42 CHRONIC BRONCHITIS, UNSPECIFIED CHRONIC BRONCHITIS TYPE: ICD-10-CM

## 2022-10-17 DIAGNOSIS — I10 ESSENTIAL HYPERTENSION: Primary | Chronic | ICD-10-CM

## 2022-10-17 DIAGNOSIS — S82.851D CLOSED TRIMALLEOLAR FRACTURE OF RIGHT ANKLE WITH ROUTINE HEALING, SUBSEQUENT ENCOUNTER: Primary | ICD-10-CM

## 2022-10-17 DIAGNOSIS — Z79.899 OTHER LONG TERM (CURRENT) DRUG THERAPY: ICD-10-CM

## 2022-10-17 DIAGNOSIS — S82.851A CLOSED TRIMALLEOLAR FRACTURE OF RIGHT ANKLE, INITIAL ENCOUNTER: ICD-10-CM

## 2022-10-17 DIAGNOSIS — C90.02 MULTIPLE MYELOMA IN RELAPSE: ICD-10-CM

## 2022-10-17 DIAGNOSIS — Z01.818 PREOP EXAMINATION: ICD-10-CM

## 2022-10-17 DIAGNOSIS — K21.9 GASTROESOPHAGEAL REFLUX DISEASE WITHOUT ESOPHAGITIS: Chronic | ICD-10-CM

## 2022-10-17 DIAGNOSIS — K57.30 DIVERTICULOSIS OF LARGE INTESTINE WITHOUT HEMORRHAGE: ICD-10-CM

## 2022-10-17 LAB
ANION GAP SERPL CALCULATED.3IONS-SCNC: 10 MMOL/L (ref 5–15)
BASOPHILS # BLD AUTO: 0.05 10*3/MM3 (ref 0–0.2)
BASOPHILS NFR BLD AUTO: 0.8 % (ref 0–1.5)
BUN SERPL-MCNC: 18 MG/DL (ref 8–23)
BUN/CREAT SERPL: 14.3 (ref 7–25)
CALCIUM SPEC-SCNC: 8.8 MG/DL (ref 8.6–10.5)
CHLORIDE SERPL-SCNC: 106 MMOL/L (ref 98–107)
CO2 SERPL-SCNC: 24 MMOL/L (ref 22–29)
CREAT SERPL-MCNC: 1.26 MG/DL (ref 0.57–1)
DEPRECATED RDW RBC AUTO: 43.9 FL (ref 37–54)
EGFRCR SERPLBLD CKD-EPI 2021: 45.5 ML/MIN/1.73
EOSINOPHIL # BLD AUTO: 0.08 10*3/MM3 (ref 0–0.4)
EOSINOPHIL NFR BLD AUTO: 1.3 % (ref 0.3–6.2)
ERYTHROCYTE [DISTWIDTH] IN BLOOD BY AUTOMATED COUNT: 12.8 % (ref 12.3–15.4)
GLUCOSE SERPL-MCNC: 88 MG/DL (ref 65–99)
HBA1C MFR BLD: 5 % (ref 4.8–5.6)
HCT VFR BLD AUTO: 44.2 % (ref 34–46.6)
HGB BLD-MCNC: 14.2 G/DL (ref 12–15.9)
IMM GRANULOCYTES # BLD AUTO: 0.02 10*3/MM3 (ref 0–0.05)
IMM GRANULOCYTES NFR BLD AUTO: 0.3 % (ref 0–0.5)
LYMPHOCYTES # BLD AUTO: 0.91 10*3/MM3 (ref 0.7–3.1)
LYMPHOCYTES NFR BLD AUTO: 14.9 % (ref 19.6–45.3)
MCH RBC QN AUTO: 30.3 PG (ref 26.6–33)
MCHC RBC AUTO-ENTMCNC: 32.1 G/DL (ref 31.5–35.7)
MCV RBC AUTO: 94.4 FL (ref 79–97)
MONOCYTES # BLD AUTO: 0.44 10*3/MM3 (ref 0.1–0.9)
MONOCYTES NFR BLD AUTO: 7.2 % (ref 5–12)
NEUTROPHILS NFR BLD AUTO: 4.59 10*3/MM3 (ref 1.7–7)
NEUTROPHILS NFR BLD AUTO: 75.5 % (ref 42.7–76)
NRBC BLD AUTO-RTO: 0 /100 WBC (ref 0–0.2)
PLATELET # BLD AUTO: 161 10*3/MM3 (ref 140–450)
PMV BLD AUTO: 10.7 FL (ref 6–12)
POTASSIUM SERPL-SCNC: 5.1 MMOL/L (ref 3.5–5.2)
QT INTERVAL: 400 MS
QTC INTERVAL: 450 MS
RBC # BLD AUTO: 4.68 10*6/MM3 (ref 3.77–5.28)
SODIUM SERPL-SCNC: 140 MMOL/L (ref 136–145)
WBC NRBC COR # BLD: 6.09 10*3/MM3 (ref 3.4–10.8)

## 2022-10-17 PROCEDURE — 99213 OFFICE O/P EST LOW 20 MIN: CPT | Performed by: ORTHOPAEDIC SURGERY

## 2022-10-17 PROCEDURE — 85025 COMPLETE CBC W/AUTO DIFF WBC: CPT

## 2022-10-17 PROCEDURE — 93010 ELECTROCARDIOGRAM REPORT: CPT | Performed by: INTERNAL MEDICINE

## 2022-10-17 PROCEDURE — 80048 BASIC METABOLIC PNL TOTAL CA: CPT

## 2022-10-17 PROCEDURE — 99214 OFFICE O/P EST MOD 30 MIN: CPT | Performed by: INTERNAL MEDICINE

## 2022-10-17 PROCEDURE — 36415 COLL VENOUS BLD VENIPUNCTURE: CPT

## 2022-10-17 PROCEDURE — 83036 HEMOGLOBIN GLYCOSYLATED A1C: CPT

## 2022-10-17 PROCEDURE — 93005 ELECTROCARDIOGRAM TRACING: CPT

## 2022-10-17 RX ORDER — ACETAMINOPHEN, ASPIRIN AND CAFFEINE 250; 250; 65 MG/1; MG/1; MG/1
1 TABLET, FILM COATED ORAL EVERY 6 HOURS PRN
COMMUNITY
End: 2022-10-19 | Stop reason: HOSPADM

## 2022-10-17 NOTE — PROGRESS NOTES
"                                                                [facility] [place] Orthopaedic Surgery Office Follow Up       Office Follow Up Visit       Date: 10/17/2022   Patient Name: Doris Miranda  MRN: [id number]  YOB: 1950    Chief Complaint:   Chief Complaint   Patient presents with   • Follow-up     5 day f/u; Closed trimalleolar fracture of right ankle with routine healing       History of Present Illness:   Doris Miranda is a 72 y.o. female who is here today for follow up for right ankle fracture.  Has been icing and elevating.  Here for skin check.  States he did not see PCP on Friday.  Plan to do preadmissions testing today.      Subjective     I reviewed the patient's chief complaint, history of present illness, review of systems, past medical history, surgical history, family history, social history, medications and allergy list     Objective      Vital Signs:   Vitals:    10/17/22 0943   BP: 142/90   Weight: 77.6 kg (171 lb 1.2 oz)   Height: 154.9 cm (60.98\")       Ortho Exam:  right LE Foot and Ankle Exam:   Normal gait pattern. Hindfoot alignment is neutral. Plantigrade foot.   Neurological exam of the superficial peroneal, deep peroneal, plantar, sural and saphenous nerves demonstrates intact sensation and normal motor function.   Peripheral pulses including posterior tibial artery and deep peroneal artery are intact and palpable. There is good perfusion to the toes.   Blisters of bilateral ankle and posteriorly healing well.  No blisters present at area of planned posterior lateral incision.  Swelling much improved.  Ecchymosis about ankle.  Skin wrinkling possible about medial and lateral ankle.      Results Review:  No new imaging      Assessment / Plan      Assessment/Plan:   There are no diagnoses linked to this encounter.    Patient presented to the office today for skin check.  Skin wrinkling visible on skin envelope has recovered well during the last couple weeks.  Plan for surgery on " Wednesday.  Counseled patient to continue to ice and elevate.  Patient did not see PCP yesterday however has appointment to see PCP for preoperative clearance later today.  Patient also has appointment this afternoon for preadmissions testing.  All questions answered.  Planning for surgery on Wednesday.    Follow Up:   No follow-ups on file.        Pedro Cook MD  [facility] [facility]

## 2022-10-17 NOTE — PROGRESS NOTES
Patient is a 72 y.o. female who is here for pre op clearance ankle surgery.  Chief Complaint   Patient presents with   • Pre-op Exam         HPI:    Here for preop for right ankle surgery sec to closed trimalleolar fracture.  Has ulcer on right lateral ankle and evaluated today by surgeon.  No SOB/CP.  No palpitations.  No fever or chills.  No bruising.  No dysuria.      History:     Patient Active Problem List   Diagnosis   • Diverticulosis of large intestine without hemorrhage   • Tubular adenoma of colon   • Essential hypertension   • COPD (chronic obstructive pulmonary disease) (HCC)   • Arthritis   • Gastroesophageal reflux disease without esophagitis   • Chronic left-sided low back pain with sciatica   • Thrombocytopenia since stem cell transplant March 2016   • Hx of autologous stem cell transplant (March 2016)   • CKD (chronic kidney disease), stage III (CMS/HCC)   • Former smoker   • Non-rheumatic tricuspid valve insufficiency   • Pulmonary hypertension (CMS/HCC)   • Chronic respiratory failure with hypoxia (been noncompliant with outpatient oxygen in past)   • Gait instability   • Chronic pain   • Debility   • Thrombocytopenia (HCC)   • On home oxygen therapy   • Hypogammaglobulinemia, acquired (HCC)   • Multiple myeloma in relapse (HCC)   • COVID-19   • Closed trimalleolar fracture of right ankle   • Preop examination       Past Medical History:   Diagnosis Date   • Arthritis     hands   • Arthritis of back 2015   • Chronic bronchitis (HCC)    • Chronic kidney disease     stage 3   • COPD (chronic obstructive pulmonary disease) (HCC)    • DVT, lower extremity (HCC)     in 30's   • Fracture of ankle 2022   • GERD (gastroesophageal reflux disease)    • Hip arthrosis 2016   • History of COVID-19 08/2021    hospital no intubation   • History of total right hip replacement 09/15/2019   • Hypertension    • Multiple myeloma (HCC)    • Multiple myeloma, failed remission (HCC)    • On home oxygen therapy      4liters a night   • Osteoporosis        Past Surgical History:   Procedure Laterality Date   • CATARACT EXTRACTION Bilateral    • COLONOSCOPY     • ENDOSCOPY     • EYE SURGERY Bilateral     catatacts   • JOINT REPLACEMENT  7/19 1/20   • LIMBAL STEM CELL TRANSPLANT  03/2016    @[facility] Dr. Josiah Spicer   • MOUTH SURGERY      full mouth extraction   • PARTIAL HIP ARTHROPLASTY Right 08/2019   • TOTAL HIP ARTHROPLASTY Left 01/2020    Dr Santa       Current Outpatient Medications on File Prior to Visit   Medication Sig   • acyclovir (ZOVIRAX) 400 MG tablet Take 1 tablet by mouth 2 (Two) Times a Day With Meals. (Patient taking differently: Take 1 tablet by mouth 2 (Two) Times a Day With Meals. chronic)   • cyclobenzaprine (FLEXERIL) 10 MG tablet Take 1 tablet by mouth three times daily as needed for muscle spasm (Patient taking differently: Take 1 tablet by mouth 3 (Three) Times a Day As Needed for Muscle Spasms.)   • esomeprazole (nexIUM) 20 MG capsule Take 1 capsule by mouth 2 (Two) Times a Day.   • HYDROcodone-acetaminophen (NORCO) 5-325 MG per tablet Take 1 tablet by mouth Every 6 (Six) Hours As Needed for Moderate Pain for up to 15 doses.   • melatonin 5 MG tablet tablet Take 5 mg by mouth At Night As Needed (Sleep).   • metoprolol tartrate (LOPRESSOR) 25 MG tablet Take 1 tablet by mouth 2 (Two) Times a Day.   • ondansetron ODT (ZOFRAN-ODT) 4 MG disintegrating tablet Place 1 tablet on the tongue Every 6 (Six) Hours As Needed for Nausea or Vomiting for up to 15 doses.   • venlafaxine XR (Effexor XR) 37.5 MG 24 hr capsule Take 1 capsule by mouth Every Morning.   • aspirin (aspirin) 81 MG EC tablet Take 1 tablet by mouth Daily.   • aspirin-acetaminophen-caffeine (EXCEDRIN MIGRAINE) 250-250-65 MG per tablet Take 1 tablet by mouth Every 6 (Six) Hours As Needed for Headache.     No current facility-administered medications on file prior to visit.       Family History   Problem Relation Age of Onset   • Breast cancer  Other    • Lung cancer Other    • Liver cancer Other    • Bone cancer Other    • Cancer Sister         Breast cancer       Social History     Socioeconomic History   • Marital status:    Tobacco Use   • Smoking status: Former     Packs/day: 2.00     Years: 45.00     Pack years: 90.00     Types: Cigarettes     Quit date: 2015     Years since quittin.2   • Smokeless tobacco: Never   Substance and Sexual Activity   • Alcohol use: No   • Drug use: No   • Sexual activity: Not Currently     Partners: Male     Birth control/protection: None         Review of Systems   Constitutional: Positive for fatigue. Negative for activity change, appetite change and unexpected weight change.   HENT: Negative for congestion, ear pain, rhinorrhea, sinus pressure, sore throat and trouble swallowing.    Eyes: Negative for pain and visual disturbance.   Respiratory: Positive for shortness of breath. Negative for cough, chest tightness and wheezing.         Baseline   Cardiovascular: Negative for chest pain, palpitations and leg swelling.   Gastrointestinal: Negative for abdominal distention, abdominal pain, blood in stool, constipation, diarrhea and vomiting.         colonoscopy , repeat    Endocrine: Negative for cold intolerance, heat intolerance, polydipsia and polyphagia.   Genitourinary: Negative for difficulty urinating, dyspareunia, dysuria, frequency, hematuria, menstrual problem, urgency and vaginal discharge.        Refusing   Musculoskeletal: Positive for arthralgias, back pain and gait problem. Negative for joint swelling and myalgias.   Skin: Negative for color change, pallor, rash and wound.        Incision clean dry and intact   Allergic/Immunologic: Negative for environmental allergies, food allergies and immunocompromised state.   Neurological: Negative for dizziness, tremors, seizures, syncope, facial asymmetry, speech difficulty, weakness, numbness and headaches.   Hematological: Negative for  "adenopathy. Does not bruise/bleed easily.   Psychiatric/Behavioral: Negative for decreased concentration, dysphoric mood, sleep disturbance and suicidal ideas. The patient is nervous/anxious (situational).        /60   Pulse 86   Temp 96.9 °F (36.1 °C) (Infrared)   Ht 152.4 cm (60\")   SpO2 95%   BMI 33.40 kg/m²       Physical Exam  Constitutional:       Appearance: Normal appearance. She is well-developed.   HENT:      Head: Normocephalic and atraumatic.      Right Ear: External ear normal.      Left Ear: External ear normal.      Nose: Nose normal.      Mouth/Throat:      Mouth: Mucous membranes are moist.      Pharynx: Oropharynx is clear.   Eyes:      Extraocular Movements: Extraocular movements intact.      Conjunctiva/sclera: Conjunctivae normal.      Pupils: Pupils are equal, round, and reactive to light.   Cardiovascular:      Rate and Rhythm: Normal rate and regular rhythm.      Heart sounds: Normal heart sounds.   Pulmonary:      Effort: Pulmonary effort is normal.      Comments: Diminished BS  Abdominal:      General: Bowel sounds are normal.      Palpations: Abdomen is soft.   Musculoskeletal:         General: Deformity (kyphosis) present.      Cervical back: Normal range of motion and neck supple.      Comments: In wheelchair  RLE distal in splint   Lymphadenopathy:      Cervical: No cervical adenopathy.   Skin:     General: Skin is warm and dry.   Neurological:      General: No focal deficit present.      Mental Status: She is alert and oriented to person, place, and time.   Psychiatric:         Mood and Affect: Mood normal.         Behavior: Behavior normal.         Thought Content: Thought content normal.         Procedure:      Discussion/Summary:    Htn- cont metoprolol, advised goal of 130/80  copd-not an issue since stopping tob, proventil prn, controlled on prn albuterol and prn O2  gerd/gastritis-cont ppi prn, controlled  MM/back pain with radiculopathy-f/u Dr Epps and  ortho, off " chemo  Hx of TA-resope 12/21 if health allows, wants to delay  Gait instability/chronic back pain-improved with surgery  Situational anxiety/depression-cont effexor xr, stable  Diverticulosis-increase fiber     Labs noted and dw patient, will check UA, agree with NOAC post surgery    Current Outpatient Medications:   •  acyclovir (ZOVIRAX) 400 MG tablet, Take 1 tablet by mouth 2 (Two) Times a Day With Meals. (Patient taking differently: Take 1 tablet by mouth 2 (Two) Times a Day With Meals. chronic), Disp: 60 tablet, Rfl: 11  •  cyclobenzaprine (FLEXERIL) 10 MG tablet, Take 1 tablet by mouth three times daily as needed for muscle spasm (Patient taking differently: Take 1 tablet by mouth 3 (Three) Times a Day As Needed for Muscle Spasms.), Disp: 30 tablet, Rfl: 0  •  esomeprazole (nexIUM) 20 MG capsule, Take 1 capsule by mouth 2 (Two) Times a Day., Disp: , Rfl:   •  HYDROcodone-acetaminophen (NORCO) 5-325 MG per tablet, Take 1 tablet by mouth Every 6 (Six) Hours As Needed for Moderate Pain for up to 15 doses., Disp: 15 tablet, Rfl: 0  •  melatonin 5 MG tablet tablet, Take 5 mg by mouth At Night As Needed (Sleep)., Disp: , Rfl:   •  metoprolol tartrate (LOPRESSOR) 25 MG tablet, Take 1 tablet by mouth 2 (Two) Times a Day., Disp: 180 tablet, Rfl: 3  •  ondansetron ODT (ZOFRAN-ODT) 4 MG disintegrating tablet, Place 1 tablet on the tongue Every 6 (Six) Hours As Needed for Nausea or Vomiting for up to 15 doses., Disp: 15 tablet, Rfl: 0  •  venlafaxine XR (Effexor XR) 37.5 MG 24 hr capsule, Take 1 capsule by mouth Every Morning., Disp: 90 capsule, Rfl: 3  •  aspirin (aspirin) 81 MG EC tablet, Take 1 tablet by mouth Daily., Disp: 30 tablet, Rfl: 3  •  aspirin-acetaminophen-caffeine (EXCEDRIN MIGRAINE) 250-250-65 MG per tablet, Take 1 tablet by mouth Every 6 (Six) Hours As Needed for Headache., Disp: , Rfl:         Diagnoses and all orders for this visit:    1. Essential hypertension (Primary)    2. Diverticulosis of large  intestine without hemorrhage    3. Gastroesophageal reflux disease without esophagitis    4. Multiple myeloma in relapse (HCC)    5. Chronic bronchitis, unspecified chronic bronchitis type (HCC)    6. Preop examination

## 2022-10-17 NOTE — TELEPHONE ENCOUNTER
with [facility] called wanting to get the pt an appt this afternoon or tomorrow b/c she has an appt for surgery wih  on Wednesday 10/19/2022 and he would like clearance from  if possible, please advise,

## 2022-10-17 NOTE — PROGRESS NOTES
"                                                                [facility] Office Follow Up       Office Follow Up Visit       Date: 10/17/2022   Patient Name: Doris Miranda  MRN: [id number]  YOB: 1950    Chief Complaint:   Chief Complaint   Patient presents with   • Follow-up     5 day f/u; Closed trimalleolar fracture of right ankle with routine healing       History of Present Illness:   Doris Miranda is a 72 y.o. female who is here today for follow up for right ankle fracture.  Has been icing and elevating.  Here for skin check.  States he did not see PCP on Friday.  Plan to do preadmissions testing today.      Subjective     I reviewed the patient's chief complaint, history of present illness, review of systems, past medical history, surgical history, family history, social history, medications and allergy list     Objective      Vital Signs:   Vitals:    10/17/22 0943   BP: 142/90   Weight: 77.6 kg (171 lb 1.2 oz)   Height: 154.9 cm (60.98\")       Ortho Exam:  right LE Foot and Ankle Exam:   Normal gait pattern. Hindfoot alignment is neutral. Plantigrade foot.   Neurological exam of the superficial peroneal, deep peroneal, plantar, sural and saphenous nerves demonstrates intact sensation and normal motor function.   Peripheral pulses including posterior tibial artery and deep peroneal artery are intact and palpable. There is good perfusion to the toes.   Blisters of bilateral ankle and posteriorly healing well.  No blisters present at area of planned posterior lateral incision.  Swelling much improved.  Ecchymosis about ankle.  Skin wrinkling possible about medial and lateral ankle.      Results Review:  No new imaging      Assessment / Plan      Assessment/Plan:   Diagnoses and all orders for this visit:    1. Closed trimalleolar fracture of right ankle with routine healing, subsequent encounter (Primary)        Patient presented to the office today for skin check.  Skin wrinkling " visible on skin envelope has recovered well during the last couple weeks.  Plan for surgery on Wednesday.  Counseled patient to continue to ice and elevate.  Patient did not see PCP yesterday however has appointment to see PCP for preoperative clearance later today.  Patient also has appointment this afternoon for preadmissions testing.  All questions answered.  Planning for surgery on Wednesday.    Follow Up:   Return in about 2 weeks (around 10/31/2022) for F/U after Surgery.        Pedro Cook MD  [facility] [place] Orthopedic Surgeon

## 2022-10-17 NOTE — DISCHARGE INSTRUCTIONS
Patient instructed to drink 20 ounces of Gatorade and it needs to be completed 1 hour (for Main OR patients) or 2 hours (scheduled  section & [facility] patients) before given arrival time for procedure (NO RED Gatorade)    Patient verbalized understanding.   Patient to apply Chlorhexadine wipes  to surgical area (as instructed) the night before procedure and the AM of procedure. Wipes provided.     Patient viewed general PAT education video as instructed in their preoperative information received from their surgeon.  Patient stated the general PAT education video was viewed in its entirety and survey completed.  Copies of PAT general education handouts (Incentive Spirometry, Meds to Beds Program, Patient Belongings, Pre-op skin preparation instructions, Blood Glucose testing, Visitor policy, Surgery FAQ, Code H) distributed to patient if not printed. Education related to the PAT pass and skin preparation for surgery (if applicable) completed in PAT as a reinforcement to PAT education video. Patient instructed to return PAT pass provided today as well as completed skin preparation sheet (if applicable) on the day of procedure.     Additionally if patient had not viewed video yet but intended to view it at home or in our waiting area, then referred them to the handout with QR code/link provided during PAT visit.  Instructed patient to complete survey after viewing the video in its entirety.  Encouraged patient/family to read PAT general education handouts thoroughly and notify PAT staff with any questions or concerns. Patient verbalized understanding of all information and priority content.

## 2022-10-17 NOTE — PAT
EKG reviewed by Dr Tatum, no further orders received.     Patient denies any current skin issues.     Patient to apply Chlorhexadine wipes  to surgical area (as instructed) the night before procedure and the AM of procedure. Wipes provided.    Patient instructed to drink 20 ounces of Gatorade and it needs to be completed 1 hour (for Main OR patients) or 2 hours (scheduled  section & [facility] patients) before given arrival time for procedure (NO RED Gatorade)    Patient verbalized understanding.    Patient viewed general PAT education video as instructed in their preoperative information received from their surgeon.  Patient stated the general PAT education video was viewed in its entirety and survey completed.  Copies of PAT general education handouts (Incentive Spirometry, Meds to Beds Program, Patient Belongings, Pre-op skin preparation instructions, Blood Glucose testing, Visitor policy, Surgery FAQ, Code H) distributed to patient if not printed. Education related to the PAT pass and skin preparation for surgery (if applicable) completed in PAT as a reinforcement to PAT education video. Patient instructed to return PAT pass provided today as well as completed skin preparation sheet (if applicable) on the day of procedure.     Additionally if patient had not viewed video yet but intended to view it at home or in our waiting area, then referred them to the handout with QR code/link provided during PAT visit.  Instructed patient to complete survey after viewing the video in its entirety.  Encouraged patient/family to read PAT general education handouts thoroughly and notify PAT staff with any questions or concerns. Patient verbalized understanding of all information and priority content.

## 2022-10-19 ENCOUNTER — HOSPITAL ENCOUNTER (OUTPATIENT)
Facility: HOSPITAL | Age: 72
Setting detail: HOSPITAL OUTPATIENT SURGERY
Discharge: HOME | End: 2022-10-19
Attending: ORTHOPAEDIC SURGERY | Admitting: ORTHOPAEDIC SURGERY

## 2022-10-19 ENCOUNTER — APPOINTMENT (OUTPATIENT)
Dept: GENERAL RADIOLOGY | Facility: HOSPITAL | Age: 72
End: 2022-10-19

## 2022-10-19 ENCOUNTER — ANESTHESIA (OUTPATIENT)
Dept: PERIOP | Facility: HOSPITAL | Age: 72
End: 2022-10-19

## 2022-10-19 ENCOUNTER — ANESTHESIA EVENT (OUTPATIENT)
Dept: PERIOP | Facility: HOSPITAL | Age: 72
End: 2022-10-19

## 2022-10-19 ENCOUNTER — ANESTHESIA EVENT CONVERTED (OUTPATIENT)
Dept: ANESTHESIOLOGY | Facility: HOSPITAL | Age: 72
End: 2022-10-19

## 2022-10-19 VITALS
DIASTOLIC BLOOD PRESSURE: 70 MMHG | WEIGHT: 171 LBS | BODY MASS INDEX: 33.57 KG/M2 | RESPIRATION RATE: 16 BRPM | HEART RATE: 85 BPM | TEMPERATURE: 98.4 F | OXYGEN SATURATION: 93 % | HEIGHT: 60 IN | SYSTOLIC BLOOD PRESSURE: 142 MMHG

## 2022-10-19 DIAGNOSIS — S82.871A CLOSED DISPLACED PILON FRACTURE OF RIGHT TIBIA, INITIAL ENCOUNTER: Primary | ICD-10-CM

## 2022-10-19 DIAGNOSIS — S82.851A CLOSED TRIMALLEOLAR FRACTURE OF RIGHT ANKLE, INITIAL ENCOUNTER: ICD-10-CM

## 2022-10-19 PROBLEM — S82.871D CLOSED DISPLACED PILON FRACTURE OF RIGHT TIBIA WITH ROUTINE HEALING: Status: ACTIVE | Noted: 2022-10-19

## 2022-10-19 PROCEDURE — 25010000002 ONDANSETRON PER 1 MG: Performed by: NURSE ANESTHETIST, CERTIFIED REGISTERED

## 2022-10-19 PROCEDURE — C1713 ANCHOR/SCREW BN/BN,TIS/BN: HCPCS | Performed by: ORTHOPAEDIC SURGERY

## 2022-10-19 PROCEDURE — 0 LIDOCAINE 1 % SOLUTION: Performed by: NURSE ANESTHETIST, CERTIFIED REGISTERED

## 2022-10-19 PROCEDURE — 76942 ECHO GUIDE FOR BIOPSY: CPT | Performed by: ORTHOPAEDIC SURGERY

## 2022-10-19 PROCEDURE — 27828 TREAT LOWER LEG FRACTURE: CPT | Performed by: PHYSICIAN ASSISTANT

## 2022-10-19 PROCEDURE — 25010000002 DEXAMETHASONE SODIUM PHOSPHATE 10 MG/ML SOLUTION: Performed by: NURSE ANESTHETIST, CERTIFIED REGISTERED

## 2022-10-19 PROCEDURE — 25010000002 PROPOFOL 10 MG/ML EMULSION: Performed by: NURSE ANESTHETIST, CERTIFIED REGISTERED

## 2022-10-19 PROCEDURE — 76000 FLUOROSCOPY <1 HR PHYS/QHP: CPT

## 2022-10-19 PROCEDURE — 27828 TREAT LOWER LEG FRACTURE: CPT | Performed by: ORTHOPAEDIC SURGERY

## 2022-10-19 DEVICE — 3.5 X 16 MM R3CON LOCKING PLATE SCREW
Type: IMPLANTABLE DEVICE | Site: ANKLE | Status: FUNCTIONAL
Brand: GORILLA PLATING SYSTEM

## 2022-10-19 DEVICE — 3.5 X 24 MM R3CON NON-LOCKING PLATE SCREW
Type: IMPLANTABLE DEVICE | Site: ANKLE | Status: FUNCTIONAL
Brand: GORILLA PLATING SYSTEM

## 2022-10-19 DEVICE — 2.7 X 38 MM R3CON NON-LOCKING PLATE SCREW
Type: IMPLANTABLE DEVICE | Site: ANKLE | Status: FUNCTIONAL
Brand: GORILLA PLATING SYSTEM

## 2022-10-19 DEVICE — 2.7 X 40 MM R3CON NON-LOCKING PLATE SCREW
Type: IMPLANTABLE DEVICE | Site: ANKLE | Status: FUNCTIONAL
Brand: GORILLA PLATING SYSTEM

## 2022-10-19 DEVICE — 3.5 X 12 MM R3CON NON-LOCKING PLATE SCREW
Type: IMPLANTABLE DEVICE | Site: ANKLE | Status: FUNCTIONAL
Brand: GORILLA PLATING SYSTEM

## 2022-10-19 DEVICE — 3.5 X 14 MM R3CON LOCKING PLATE SCREW
Type: IMPLANTABLE DEVICE | Site: ANKLE | Status: FUNCTIONAL
Brand: GORILLA PLATING SYSTEM

## 2022-10-19 DEVICE — 3.5 X 32 MM R3CON LOCKING PLATE SCREW
Type: IMPLANTABLE DEVICE | Site: ANKLE | Status: FUNCTIONAL
Brand: GORILLA PLATING SYSTEM

## 2022-10-19 DEVICE — 3.5 X 22 MM R3CON LOCKING PLATE SCREW
Type: IMPLANTABLE DEVICE | Site: ANKLE | Status: FUNCTIONAL
Brand: GORILLA PLATING SYSTEM

## 2022-10-19 DEVICE — 3.5 X 34 MM R3CON NON-LOCKING PLATE SCREW
Type: IMPLANTABLE DEVICE | Site: ANKLE | Status: FUNCTIONAL
Brand: GORILLA PLATING SYSTEM

## 2022-10-19 DEVICE — GORILLA WASHER
Type: IMPLANTABLE DEVICE | Site: ANKLE | Status: FUNCTIONAL
Brand: GORILLA PLATING SYSTEM

## 2022-10-19 DEVICE — 3.5 X 14 MM R3CON NON-LOCKING PLATE SCREW
Type: IMPLANTABLE DEVICE | Site: ANKLE | Status: FUNCTIONAL
Brand: GORILLA PLATING SYSTEM

## 2022-10-19 DEVICE — GORILLA, ANKLE FX, POSTEROLATERAL TIBIA PLATE, 06-HOLE, RIGHT
Type: IMPLANTABLE DEVICE | Site: ANKLE | Status: FUNCTIONAL
Brand: BABY GORILLA/GORILLA PLATING SYSTEM

## 2022-10-19 RX ORDER — BUPIVACAINE HYDROCHLORIDE 2.5 MG/ML
INJECTION, SOLUTION EPIDURAL; INFILTRATION; INTRACAUDAL
Status: COMPLETED | OUTPATIENT
Start: 2022-10-19 | End: 2022-10-19

## 2022-10-19 RX ORDER — SODIUM CHLORIDE 9 MG/ML
9 INJECTION, SOLUTION INTRAVENOUS ONCE
Status: COMPLETED | OUTPATIENT
Start: 2022-10-19 | End: 2022-10-19

## 2022-10-19 RX ORDER — PROPOFOL 10 MG/ML
VIAL (ML) INTRAVENOUS AS NEEDED
Status: DISCONTINUED | OUTPATIENT
Start: 2022-10-19 | End: 2022-10-19 | Stop reason: SURG

## 2022-10-19 RX ORDER — DEXAMETHASONE SODIUM PHOSPHATE 10 MG/ML
INJECTION, SOLUTION INTRAMUSCULAR; INTRAVENOUS
Status: COMPLETED | OUTPATIENT
Start: 2022-10-19 | End: 2022-10-19

## 2022-10-19 RX ORDER — OXYCODONE HYDROCHLORIDE 5 MG/1
10 TABLET ORAL ONCE
Status: DISCONTINUED | OUTPATIENT
Start: 2022-10-19 | End: 2022-10-19 | Stop reason: HOSPADM

## 2022-10-19 RX ORDER — OXYCODONE HYDROCHLORIDE 5 MG/1
10 TABLET ORAL EVERY 4 HOURS PRN
Qty: 40 TABLET | Refills: 0 | Status: SHIPPED | OUTPATIENT
Start: 2022-10-19 | End: 2022-11-16

## 2022-10-19 RX ORDER — FAMOTIDINE 10 MG/ML
20 INJECTION, SOLUTION INTRAVENOUS ONCE
Status: CANCELLED | OUTPATIENT
Start: 2022-10-19 | End: 2022-10-19

## 2022-10-19 RX ORDER — BUPIVACAINE HCL/0.9 % NACL/PF 0.125 %
PLASTIC BAG, INJECTION (ML) EPIDURAL AS NEEDED
Status: DISCONTINUED | OUTPATIENT
Start: 2022-10-19 | End: 2022-10-19 | Stop reason: SURG

## 2022-10-19 RX ORDER — LABETALOL HYDROCHLORIDE 5 MG/ML
INJECTION, SOLUTION INTRAVENOUS AS NEEDED
Status: DISCONTINUED | OUTPATIENT
Start: 2022-10-19 | End: 2022-10-19 | Stop reason: SURG

## 2022-10-19 RX ORDER — FAMOTIDINE 20 MG/1
20 TABLET, FILM COATED ORAL ONCE
Status: COMPLETED | OUTPATIENT
Start: 2022-10-19 | End: 2022-10-19

## 2022-10-19 RX ORDER — DROPERIDOL 2.5 MG/ML
0.62 INJECTION, SOLUTION INTRAMUSCULAR; INTRAVENOUS ONCE AS NEEDED
Status: DISCONTINUED | OUTPATIENT
Start: 2022-10-19 | End: 2022-10-19 | Stop reason: HOSPADM

## 2022-10-19 RX ORDER — MIDAZOLAM HYDROCHLORIDE 1 MG/ML
0.5 INJECTION INTRAMUSCULAR; INTRAVENOUS
Status: DISCONTINUED | OUTPATIENT
Start: 2022-10-19 | End: 2022-10-19 | Stop reason: HOSPADM

## 2022-10-19 RX ORDER — ACETAMINOPHEN 500 MG
500 TABLET ORAL EVERY 6 HOURS PRN
Qty: 30 TABLET | Refills: 0 | Status: SHIPPED | OUTPATIENT
Start: 2022-10-19

## 2022-10-19 RX ORDER — SODIUM CHLORIDE 0.9 % (FLUSH) 0.9 %
10 SYRINGE (ML) INJECTION AS NEEDED
Status: DISCONTINUED | OUTPATIENT
Start: 2022-10-19 | End: 2022-10-19 | Stop reason: HOSPADM

## 2022-10-19 RX ORDER — FENTANYL CITRATE 50 UG/ML
50 INJECTION, SOLUTION INTRAMUSCULAR; INTRAVENOUS
Status: DISCONTINUED | OUTPATIENT
Start: 2022-10-19 | End: 2022-10-19 | Stop reason: HOSPADM

## 2022-10-19 RX ORDER — EPHEDRINE SULFATE 50 MG/ML
INJECTION, SOLUTION INTRAVENOUS AS NEEDED
Status: DISCONTINUED | OUTPATIENT
Start: 2022-10-19 | End: 2022-10-19 | Stop reason: SURG

## 2022-10-19 RX ORDER — ONDANSETRON 4 MG/1
4 TABLET, FILM COATED ORAL EVERY 8 HOURS PRN
Qty: 15 TABLET | Refills: 0 | Status: SHIPPED | OUTPATIENT
Start: 2022-10-19 | End: 2022-11-16

## 2022-10-19 RX ORDER — ONDANSETRON 2 MG/ML
INJECTION INTRAMUSCULAR; INTRAVENOUS AS NEEDED
Status: DISCONTINUED | OUTPATIENT
Start: 2022-10-19 | End: 2022-10-19 | Stop reason: SURG

## 2022-10-19 RX ORDER — CEFAZOLIN SODIUM IN 0.9 % NACL 2 G/100 ML
2 PLASTIC BAG, INJECTION (ML) INTRAVENOUS ONCE
Status: COMPLETED | OUTPATIENT
Start: 2022-10-19 | End: 2022-10-19

## 2022-10-19 RX ORDER — DOCUSATE SODIUM 100 MG/1
100 CAPSULE, LIQUID FILLED ORAL 2 TIMES DAILY
Qty: 30 CAPSULE | Refills: 0 | Status: SHIPPED | OUTPATIENT
Start: 2022-10-19 | End: 2022-11-02

## 2022-10-19 RX ORDER — SODIUM CHLORIDE 0.9 % (FLUSH) 0.9 %
10 SYRINGE (ML) INJECTION EVERY 12 HOURS SCHEDULED
Status: DISCONTINUED | OUTPATIENT
Start: 2022-10-19 | End: 2022-10-19 | Stop reason: HOSPADM

## 2022-10-19 RX ORDER — SODIUM CHLORIDE, SODIUM LACTATE, POTASSIUM CHLORIDE, CALCIUM CHLORIDE 600; 310; 30; 20 MG/100ML; MG/100ML; MG/100ML; MG/100ML
9 INJECTION, SOLUTION INTRAVENOUS CONTINUOUS
Status: DISCONTINUED | OUTPATIENT
Start: 2022-10-19 | End: 2022-10-19 | Stop reason: HOSPADM

## 2022-10-19 RX ORDER — ROCURONIUM BROMIDE 10 MG/ML
INJECTION, SOLUTION INTRAVENOUS AS NEEDED
Status: DISCONTINUED | OUTPATIENT
Start: 2022-10-19 | End: 2022-10-19 | Stop reason: SURG

## 2022-10-19 RX ORDER — LIDOCAINE HYDROCHLORIDE 10 MG/ML
0.5 INJECTION, SOLUTION EPIDURAL; INFILTRATION; INTRACAUDAL; PERINEURAL ONCE AS NEEDED
Status: COMPLETED | OUTPATIENT
Start: 2022-10-19 | End: 2022-10-19

## 2022-10-19 RX ORDER — LIDOCAINE HYDROCHLORIDE 10 MG/ML
INJECTION, SOLUTION INFILTRATION; PERINEURAL AS NEEDED
Status: DISCONTINUED | OUTPATIENT
Start: 2022-10-19 | End: 2022-10-19 | Stop reason: SURG

## 2022-10-19 RX ORDER — HYDROMORPHONE HYDROCHLORIDE 1 MG/ML
0.5 INJECTION, SOLUTION INTRAMUSCULAR; INTRAVENOUS; SUBCUTANEOUS
Status: DISCONTINUED | OUTPATIENT
Start: 2022-10-19 | End: 2022-10-19 | Stop reason: HOSPADM

## 2022-10-19 RX ADMIN — CEFAZOLIN 2 G: 10 INJECTION, POWDER, FOR SOLUTION INTRAVENOUS at 14:15

## 2022-10-19 RX ADMIN — FAMOTIDINE 20 MG: 20 TABLET ORAL at 11:03

## 2022-10-19 RX ADMIN — BUPIVACAINE HYDROCHLORIDE 30 ML: 2.5 INJECTION, SOLUTION EPIDURAL; INFILTRATION; INTRACAUDAL at 11:19

## 2022-10-19 RX ADMIN — ROCURONIUM BROMIDE 50 MG: 10 INJECTION, SOLUTION INTRAVENOUS at 14:20

## 2022-10-19 RX ADMIN — LABETALOL 20 MG/4 ML (5 MG/ML) INTRAVENOUS SYRINGE 5 MG: at 16:35

## 2022-10-19 RX ADMIN — SUGAMMADEX 200 MG: 100 INJECTION, SOLUTION INTRAVENOUS at 17:20

## 2022-10-19 RX ADMIN — ONDANSETRON 4 MG: 2 INJECTION INTRAMUSCULAR; INTRAVENOUS at 17:15

## 2022-10-19 RX ADMIN — BUPIVACAINE HYDROCHLORIDE 30 ML: 2.5 INJECTION, SOLUTION EPIDURAL; INFILTRATION; INTRACAUDAL; PERINEURAL at 11:18

## 2022-10-19 RX ADMIN — DEXAMETHASONE SODIUM PHOSPHATE 2 MG: 10 INJECTION, SOLUTION INTRAMUSCULAR; INTRAVENOUS at 11:18

## 2022-10-19 RX ADMIN — LIDOCAINE HYDROCHLORIDE 0.5 ML: 10 INJECTION, SOLUTION EPIDURAL; INFILTRATION; INTRACAUDAL; PERINEURAL at 10:48

## 2022-10-19 RX ADMIN — DEXAMETHASONE SODIUM PHOSPHATE 4 MG: 10 INJECTION, SOLUTION INTRAMUSCULAR; INTRAVENOUS at 14:23

## 2022-10-19 RX ADMIN — EPHEDRINE SULFATE 10 MG: 50 INJECTION INTRAVENOUS at 15:30

## 2022-10-19 RX ADMIN — SODIUM CHLORIDE 9 ML/HR: 9 INJECTION, SOLUTION INTRAVENOUS at 10:48

## 2022-10-19 RX ADMIN — PROPOFOL 150 MG: 10 INJECTION, EMULSION INTRAVENOUS at 14:20

## 2022-10-19 RX ADMIN — DEXAMETHASONE SODIUM PHOSPHATE 2 MG: 10 INJECTION, SOLUTION INTRAMUSCULAR; INTRAVENOUS at 11:19

## 2022-10-19 RX ADMIN — LIDOCAINE HYDROCHLORIDE 50 MG: 10 INJECTION, SOLUTION INFILTRATION; PERINEURAL at 14:20

## 2022-10-19 RX ADMIN — Medication 100 MCG: at 15:32

## 2022-10-19 RX ADMIN — SODIUM CHLORIDE, POTASSIUM CHLORIDE, SODIUM LACTATE AND CALCIUM CHLORIDE: 600; 310; 30; 20 INJECTION, SOLUTION INTRAVENOUS at 14:20

## 2022-10-19 NOTE — ANESTHESIA PROCEDURE NOTES
Airway  Urgency: elective    Airway not difficult    General Information and Staff    Patient location during procedure: OR  CRNA/CAA: Elizabeth Hooks CRNA    Indications and Patient Condition  Indications for airway management: airway protection    Preoxygenated: yes  MILS not maintained throughout  Mask difficulty assessment: 1 - vent by mask    Final Airway Details  Final airway type: endotracheal airway      Successful airway: ETT  Cuffed: yes   Successful intubation technique: direct laryngoscopy  Endotracheal tube insertion site: oral  Blade: Bernabe  Blade size: 3  ETT size (mm): 7.0  Cormack-Lehane Classification: grade I - full view of glottis  Placement verified by: chest auscultation and capnometry   Cuff volume (mL): 5  Measured from: lips  ETT/EBT  to lips (cm): 20  Number of attempts at approach: 1  Assessment: lips, teeth, and gum same as pre-op and atraumatic intubation    Additional Comments  Negative epigastric sounds, Breath sound equal bilaterally with symmetric chest rise and fall

## 2022-10-19 NOTE — ANESTHESIA PROCEDURE NOTES
SS popliteal      Patient reassessed immediately prior to procedure    Patient location during procedure: pre-op  Reason for block: at surgeon's request and post-op pain management  Performed by  CRNA/CAA: Olegario Montana CRNA  Assisted by: Jesusita Flores RN  Preanesthetic Checklist  Completed: patient identified, IV checked, site marked, risks and benefits discussed, surgical consent, monitors and equipment checked, pre-op evaluation and timeout performed  Prep:  Sterile barriers:cap, gloves, mask and washed/disinfected hands  Prep: ChloraPrep  Patient monitoring: blood pressure monitoring, continuous pulse oximetry and EKG  Procedure    Sedation: yes  Performed under: local infiltration  Guidance:ultrasound guided    ULTRASOUND INTERPRETATION.  Using ultrasound guidance a 20 G gauge needle was placed in close proximity to the nerve, at which point, under ultrasound guidance anesthetic was injected in the area of the nerve and spread of the anesthesia was seen on ultrasound in close proximity thereto.  There were no abnormalities seen on ultrasound; a digital image was taken; and the patient tolerated the procedure with no complications. Images:still images obtained, printed/placed on chart    Laterality:right  Block Type:popliteal  Injection Technique:single-shot  Needle Type:echogenic and Tuohy  Needle Gauge:18 G  Resistance on Injection: none  Catheter Size:20 G    Medications Used: dexamethasone sodium phosphate injection - Injection   2 mg - 10/19/2022 11:19:00 AM  bupivacaine PF (MARCAINE) 0.25 % injection - Injection   30 mL - 10/19/2022 11:19:00 AM      Post Assessment  Injection Assessment: negative aspiration for heme, no paresthesia on injection and incremental injection  Patient Tolerance:comfortable throughout block  Complications:no  Additional Notes  SINGLE shot    A high-frequency linear transducer, with sterile cover, was placed in the popliteal fossa to identify the popliteal artery and vein,  "Tibial nerve (TN) and Common Peroneal nerve (CP). The transducer was then moved in a cephalad fashion to observe the TN and CP nerve bifurcation to form the Sciatic Nerve. The insertion site was prepped and draped in sterile fashion. Skin and cutaneous tissue was infiltrated with 2-5 ml of 1% Lidocaine. Using ultrasound-guidance, a 20-gauge B-Anderson 4\" Ultraplex 360 non-stimulating echogenic needle was then inserted and advanced in plane from lateral to medial. Preservative-free normal saline was utilized for hydro-dissection of tissue, advancement of Touhy, and to confirm final needle placement posterior to the nerves. Local anesthetic injection spread, in incremental 3-5 ml injections, to surround both nerve structures. Aspiration every 5 ml to prevent intravascular injection. Injection was completed with negative aspiration of blood and negative intravascular injection. Injection pressures were normal with minimal resistance.    CATHETER                               A high-frequency linear transducer, with sterile cover, was placed in the popliteal fossa to identify the popliteal artery and vein, Tibial nerve (TN) and Common Peroneal nerve (CP). The transducer was then moved in a cephalad fashion to observe the TN and CP nerve bifurcation to form the Sciatic Nerve. The insertion site was prepped and draped in sterile fashion. Skin and cutaneous tissue was infiltrated with 2-5 ml of 1% Lidocaine. Using ultrasound-guidance, an 18-gauge Contiplex Ultra 360 Touhy needle was advanced in plane from lateral to medial. Preservative-free normal saline was utilized for hydro-dissection of tissue, advancement of Touhy needle, and to confirm final needle placement posterior to the nerves. Local anesthetic injection spread, in incremental 3-5 ml injections, to surround both nerve structures. Aspiration every 5 ml to prevent intravascular injection. Injection was completed with negative aspiration of blood and negative " "intravascular injection. Injection pressures were normal with minimal resistance. A 20-gauge Contiplex Echo catheter was placed through the needle and advance out the tip of the Touhy 1-3 cm. The Touhy needle was then removed, and final catheter position verified (Below/above or Anterior/Posterior) the nerve structures. The catheter was secured in the usual fashion with skin glue, benzoin, steri-strips, CHG tegaderm and Label noting \"Nerve Block Catheter\". Jerk tape applied at yellow connector and catheter connection.            "

## 2022-10-19 NOTE — ANESTHESIA PREPROCEDURE EVALUATION
Anesthesia Evaluation     Patient summary reviewed and Nursing notes reviewed                Airway   Mallampati: II  TM distance: >3 FB  Neck ROM: full  No difficulty expected  Dental - normal exam   (+) edentulous    Pulmonary - normal exam   (+) a smoker Former, COPD, home oxygen,   Cardiovascular - normal exam    (+) hypertension,     ROS comment:   Echo 9/17: normal EF, mild MR/AI/TR, RVSP 48    Neuro/Psych- negative ROS  GI/Hepatic/Renal/Endo    (+) obesity,  GERD,  renal disease CRI,     Musculoskeletal (-) negative ROS    Abdominal  - normal exam    Bowel sounds: normal.   Substance History - negative use     OB/GYN negative ob/gyn ROS         Other      history of cancer (multiple myeloma s/p stem cell txp)                  Anesthesia Plan    ASA 3     general with block     intravenous induction     Anesthetic plan, risks, benefits, and alternatives have been provided, discussed and informed consent has been obtained with: patient.    Plan discussed with CRNA.        CODE STATUS:

## 2022-10-19 NOTE — BRIEF OP NOTE
TRIMALLEOLAR FRACTURE OPEN REDUCTION INTERNAL FIXATION  Progress Note    Doris Miranda  10/19/2022    Pre-op Diagnosis:   Closed trimalleolar fracture of right ankle with routine healing, subsequent encounter [S82.857N]       Post-Op Diagnosis Codes:     * Closed trimalleolar fracture of right ankle with routine healing, subsequent encounter [S82.851D]    Procedure:  RIGHT PILON FRACTURE OPEN REDUCTION INTERNAL FIXATION    Surgeon(s):  Pedro Cook MD    Anesthesia: General with Block    Staff:   Circulator: Betty Colvin RN; Elise Marquez RN  Radiology Technologist: Gail Weir R.T.(R)  Scrub Person: Nina Ling Alexis  Nursing Assistant: Geremias Corona  Assistant: Claudio Faust PA; Naren Szymanski PA-C  Assistant: Claudio Faust PA; Naren Szymanski PA-C      Estimated Blood Loss: 75 mL    Urine Voided: * No values recorded between 10/19/2022  2:16 PM and 10/19/2022  5:38 PM *    Specimens:                None      Drains: * No LDAs found *    Complications: None    Assistant: Claudio Faust PA; Naren Szymanski PA-C  was responsible for performing the following activities: Retraction, Suturing and Closing and their skilled assistance was necessary for the success of this case.    Pedro Cook MD     Date: 10/19/2022  Time: 17:46 EDT

## 2022-10-19 NOTE — INTERVAL H&P NOTE
"  [facility] Pre-op    Full history and physical note from office is up to date.     /74 (BP Location: Right arm, Patient Position: Lying)   Pulse 68   Temp 96.9 °F (36.1 °C) (Temporal)   Resp 18   Ht 152.4 cm (60\")   Wt 77.6 kg (171 lb)   SpO2 97%   BMI 33.40 kg/m²     IMM:  Influenza:  yes  Pneumococcal:  yes  Tetanus:  no    LAB Results:  Lab Results   Component Value Date    WBC 6.09 10/17/2022    HGB 14.2 10/17/2022    HCT 44.2 10/17/2022    MCV 94.4 10/17/2022     10/17/2022    NEUTROABS 4.59 10/17/2022    GLUCOSE 88 10/17/2022    BUN 18 10/17/2022    CREATININE 1.26 (H) 10/17/2022    EGFRIFNONA 36 (L) 11/04/2021     10/17/2022    K 5.1 10/17/2022     10/17/2022    CO2 24.0 10/17/2022    MG 2.0 09/09/2021    PHOS 3.8 03/15/2021    CALCIUM 8.8 10/17/2022    ALBUMIN 3.90 11/04/2021    AST 13 11/04/2021    ALT 11 11/04/2021    BILITOT 0.3 11/04/2021    PTT 29.5 09/21/2020    INR 0.96 09/21/2020     Physical Exam:  Cardiac: RRR no murmurs, gallops                              Respiratory: bilateral lungs clear to auscultation  SUMI Rodrigues   10/19/22   10:59 AM EDT        "

## 2022-10-19 NOTE — DISCHARGE INSTRUCTIONS
Pedro Cook MD  Orthopedic Foot & Ankle Surgeon  [facility]    Diagnosis: Closed displaced pilon fracture of right tibia with routine healing  Procedure Performed: Procedure(s) (LRB):  RIGHT PILON FRACTURE OPEN REDUCTION INTERNAL FIXATION (Right)    What to Expect After Surgery  Diet after surgery:  Resume your diet gradually.  Begin with clear liquids and gradually progress as you feel ready.  Surgical Site Care:  Please remain in your post-operative dressing/splint until your first post op appointment with Dr. Cook.  Please make sure to keep your post-operative dressing clean and dry.  Please use a shower bag (e.g., [email or web address]) when showering or bathing to keep things dry. Wet padding can cause skin breakdown.  Do not stick anything down your cast or splint.  If you sustain a scratch, you are at higher risk for infection as casts and splints harbor more bacteria.  Follow Up Appointment: Please call the [facility] at [phone] or Dr. Cook's schedulers within two (2) days of your discharge to /confirm/verify your follow-up appointment date/time with Dr. Cook.  Typically, Dr. Cook will want to see you 14-21 days after surgery for a post-operative follow-up appointment - before 14 days, the sutures may have to stay in and you will need to return in the 14-21 day window again.  Please arrive ~15 minutes prior to your appointment time to take care of any paperwork.      Activity Precautions:  Elevate the leg above your heart as much as possible for the first two weeks after surgery.  If the leg is higher than your heart, gravity helps decrease swelling, decrease pain, and improve blood flow.  If the leg is below your heart, swelling increases, pain can worsen, and your wound is at greater risk of infection.  Keep all weight off of your surgical leg until cleared by your physician.  Use ice packs intermittently (20 minutes on, 20 minutes off) to  reduce pain and swelling, however, use extreme caution with icing if your block is still in effect.  Make sure to have a barrier between your skin and the ice pack to avoid frost bite.   If you are in a post-operative splint, you can wiggle your toes to help push swelling to your lymph ducts, but do not try to move your ankle.      Pain Management  Nerve Blocks:  to help control pain after surgery, you may have received a nerve block where the anesthesiologist injected numbing medication around the nerves that send pain signals from your foot or ankle to your brain.  This helps you feel little to no pain.   The time a nerve block lasts varies from person to person.  Often, they will last between 12 and 24 hours but could last days.  You may not be able to move your foot and/or ankle during this time.  As the block medication wears off, you may feel a tingling sensation as the nerves start to wake up.  Keep your foot and ankle safe while it is numb.  Avoid excessive heating,  scratching, etc. until the block has completely worn off.  If icing the ankle or foot, watch the toes closely to ensure that they do not become discolored (i.e., white, red or blue)  Even if you still feel no pain in your leg before you go to bed, take a dose of your narcotic medication before you go to sleep.  You do not want to wake up with the block having worn off and being behind on pain control - it is quite difficult to 'catch up' again.  Pain Medication:    Acetaminophen (Tylenol) 500 mg tablets are typically your baseline pain medication.  Take this tablet up to once every 4 hours. Do not exceed 3,000 mg of acetaminophen daily.  You have been provided Oxycodone immediate release tablets as your initial narcotic pain reliever. Take doses of this medication if you are having severe breakthrough pain uncontrolled by the Tylenol alone. Typically, patients are taking it every 4 hours for the first day or two after surgery.  Actively wean  down your use of this medication after the third day after surgery.  Note that it takes about 30-45 minutes for oral pain medication to take effect.  DO NOT drink alcoholic beverages, use recreational drugs, or use prescription sedative medications when taking pain medication.  DO NOT operate heavy machinery/drive while taking opioids.  To avoid the risk of nausea, please make sure that you are taking your medication with food.  You may experience light headedness while taking your pain medication.  Make sure you drink plenty of water while taking narcotic pain medication to stay well hydrated and help avoid constipation.    You have been provided a Docusate prescription to help with constipation that can be a side effect of taking narcotics.  Take that medication as needed.  You have been provided a Zofran prescription to help with nausea that you can take as needed.  Itching is a common side effect to pain medication usage.  If you have an associated rash with the itching, please contact your provider.       When to Call Your Physician  Common or Normal Post-Operative Reactions:  Low grade fever (approximately 100.5 degrees) for up to one week.  Small amount of blood or fluid leaking from the surgical site or dressing  Bruising along the surgical extremity  Swelling around the surgical site and surrounding area  The reactions listed above are NORMAL, but you want to call your surgeon if any of these reactions persist.  Symptoms to Report:  Please report any of the following signs to your surgeon:  Signs of infection:  Redness or pain around your incision (if you cannot see your incision, red streaking up your extremity should be reported).  Thick, dark yellow or foul-smelling drainage at the incision site or from your dressing.  Temperature measured over 101.5 degrees for more than 24 hours despite Tylenol.  Signs of Decreased Circulation to the Ankle and Foot:  Coldness of ankle and foot  Foot or leg turning  pale  Tingling/numbness (after the block has fully resolved)  Sustained increases in pain uncontrolled by medication  Toenail bed turns blue in color  Blood Clots:  Although rare, please be aware and utilize the recommendations below to avoid getting a blood clot.  Prevention of deep vein thrombus DVT (blood clots):  You have been given a prescription for daily Eliquis for 30 days to help prevent blood clot formation.  Get up 4-5 times during the daytime and walk/crutch/walker across the room to keep the blood circulating in your legs. (Maintain any weightbearing restrictions, of course)  Wiggle your toes frequently if you are in a splint or case  Notify your physician if you are a nicotine user, on hormone replacement therapy medications, are taking birth control, or have a history of blood clots as these can increase your risk of developing a blood clot.  Notify your provider if you develop pain or tenderness in your calf muscle    If you have any additional questions, please contact Dr. Cook's staff the [facility] at [phone]    IF YOU HAVE AN EMERGENCY, i.e., SHORTNESS OF BREATH, CHEST PAIN, OR SYMPTOMS SEVERE IN NATURE, PLEASE CALL 911 OR VISIT YOUR LOCAL EMERGENCY ROOM

## 2022-10-19 NOTE — ANESTHESIA POSTPROCEDURE EVALUATION
Patient: Doris Miranda    Procedure Summary     Date: 10/19/22 Room / Location:  [facility]    Anesthesia Start: 1415 Anesthesia Stop: 1750    Procedure: TRIMALLEOLAR FRACTURE OPEN REDUCTION INTERNAL FIXATION (Right: Ankle) Diagnosis:       Closed trimalleolar fracture of right ankle with routine healing, subsequent encounter      (Closed trimalleolar fracture of right ankle with routine healing, subsequent encounter [S82.437O])    Surgeons: Pedro Cook MD Provider: Pedro Banegas MD    Anesthesia Type: general with block ASA Status: 3          Anesthesia Type: general with block    Vitals  No vitals data found for the desired time range.          Post Anesthesia Care and Evaluation    Patient location during evaluation: PACU  Patient participation: complete - patient participated  Level of consciousness: awake and alert  Pain management: adequate    Airway patency: patent  Anesthetic complications: No anesthetic complications  PONV Status: none  Cardiovascular status: hemodynamically stable and acceptable  Respiratory status: nonlabored ventilation, acceptable and nasal cannula  Hydration status: acceptable

## 2022-10-19 NOTE — ANESTHESIA PROCEDURE NOTES
SS adductor canal      Patient reassessed immediately prior to procedure    Reason for block: at surgeon's request and post-op pain management  Performed by  CRNA/CAA: Olegario Montana, CRNA  Assisted by: Jesusita Flores RN  Preanesthetic Checklist  Completed: patient identified, IV checked, site marked, risks and benefits discussed, surgical consent, monitors and equipment checked, pre-op evaluation and timeout performed  Prep:  Pt Position: supine  Sterile barriers:cap, gloves, mask, sterile barriers and washed/disinfected hands  Prep: ChloraPrep  Patient monitoring: blood pressure monitoring, continuous pulse oximetry and EKG  Procedure  Performed under: spinal  Guidance:ultrasound guided    ULTRASOUND INTERPRETATION.  Using ultrasound guidance a 20 G gauge needle was placed in close proximity to the nerve, at which point, under ultrasound guidance anesthetic was injected in the area of the nerve and spread of the anesthesia was seen on ultrasound in close proximity thereto.  There were no abnormalities seen on ultrasound; a digital image was taken; and the patient tolerated the procedure with no complications. Images:still images obtained, printed/placed on chart    Laterality:right  Block Type:adductor canal block  Injection Technique:single-shot  Needle Type:Tuohy and echogenic  Needle Gauge:18 G  Resistance on Injection: none  Catheter Size:20 G (20g)    Medications Used: bupivacaine PF (MARCAINE) 0.25 % injection - Injection   30 mL - 10/19/2022 11:18:00 AM  dexamethasone sodium phosphate injection - Injection   2 mg - 10/19/2022 11:18:00 AM      Post Assessment  Injection Assessment: negative aspiration for heme, incremental injection and no paresthesia on injection  Patient Tolerance:comfortable throughout block  Complications:no  Additional Notes  SINGLE shot   A high-frequency linear transducer, with sterile cover, was placed on the anterior mid-thigh (between the anterior superior iliac spine and  "patella). The transducer was then moved medially to identify the Sartorius muscle (Shyla), Vastus Medialis muscle (VMM), Superficial Femoral Artery (SFA) and Vein. The transducer was then moved cephalad or caudad to position the SFA in the middle of the Shyla. The insertion site was prepped and draped in sterile fashion. Skin and cutaneous tissue was infiltrated with 2-5 ml of 1% Lidocaine. Using ultrasound-guidance, a 20-gauge B-Anderson 4\" Ultraplex 360 non-stimulating echogenic needle was advanced in plane from lateral to medial. Preservative-free normal saline was utilized for hydro-dissection of tissue, advancement of needle, and to confirm needle placement below the fascial plane of the Shyla where the Nerve to the VMM is located. Local anesthetic (LA) 5 ml deposited here. The needle continues its path lateral to the SFA at the level of the Saphenous Nerve. The remainder of the LA was deposited at the 10-11 o'clock position of the SFA. This injection created a space between the Shyla and the SFA. Aspiration every 5 ml to prevent intravascular injection. Injection was completed with negative aspiration of blood and negative intravascular injection. Injection pressures were normal with minimal resistance.     CATHETER   A high-frequency linear transducer, with sterile cover, was placed on the anterior mid-thigh (between the anterior superior iliac spine and patella). The transducer was then moved medially to identify the Sartorius muscle (Shyla), Vastus Medialis muscle (VMM), Superficial Femoral Artery (SFA) and Vein. The transducer was then moved cephalad or caudad to position the SFA in the middle of the Shyla. The insertion site was prepped and draped in sterile fashion. Skin and cutaneous tissue was infiltrated with 2-5 ml of 1% Lidocaine. Using ultrasound-guidance, an 18-gauge Contiplex Ultra 360 Touhy needle was advanced in plane from lateral to medial. Preservative-free normal saline was utilized for hydro-dissection of " "tissue, advancement of Touhy, and to confirm needle placement below the fascial plane of the Shyla where the Nerve to the VMM is located. Local anesthetic (LA) 5 ml deposited here. The Touhy needle continues its path lateral to the SFA at the level of the Saphenous Nerve. The remainder of the LA was deposited at the 10-11 o'clock position of the SFA. This injection created a space between the Shyla and the SFA. Aspiration every 5 ml to prevent intravascular injection. Injection was completed with negative aspiration of blood and negative intravascular injection. Injection pressures were normal with minimal resistance. A 20-gauge veriCARex Echo catheter was placed through the needle and advance out the tip of the Touhy 3-5 cm anterior to the SFA. The Touhy needle was then removed, and final catheter position verified at the 12 o'clock position to the SFA. The catheter was secured in the usual fashion with skin glue, benzoin, steri-strips, CHG tegaderm and label noting \"Nerve Block Catheter\". Jerk tape applied at yellow connector and catheter connection.          "

## 2022-10-20 NOTE — OP NOTE
ORTHOPAEDIC SURGERY OPERATIVE REPORT    TRIMALLEOLAR FRACTURE OPEN REDUCTION INTERNAL FIXATION     Patient Name:  Doris Miranda  YOB: 1950    Date of Surgery:  10/19/2022     Pre-op Diagnosis:   Closed right pilon fracture    Post-op Diagnosis:      Closed right pilon fracture    Procedure/CPT® Codes:  1. 66246 ORIF pilon- tib. and fib.  2. 05835 Lower Extremity Application of Splint    Staff:  Surgeon(s):  Pedro Cook MD  Assistant: Claudio Faust PA; Naren Szymanski PA-C    Anesthesia: General with Block    Estimated Blood Loss: 75 mL    Specimen:          None    Complications:   None    CLINICAL HISTORY:  Doris Miranda is a 72 y.o. female with the above condition. Discussed operative and non-operative treatment options and risks including but not limited to pain, infection, bleeding, damage to surrounding structures, and need for additional procedures.  After all questions were fully answered, Doris Miranda understood the risks and elected to proceed, and informed consent was obtained. The patient was marked with indelible marking pen after verifying correct side, site, and procedure.      DESCRIPTION OF PROCEDURE:   The patient was identified in the pre-operative area where correct procedure, site, and patient were verified. The patient was brought to the operating theater in stable condition and was transferred to the operative table where they remained in the prone position for the entirety of the case. Preoperative timeout was taken to ensure the correct identity, operative procedure, and location. General anesthesia was then administered. The patient received appropriate weight based IV antibiotics within 30 minutes of skin incision.  All bony prominences well-padded. The right leg was then prepped and draped in the standard sterile fashion. After Esmarch exsanguination, the tourniquet was inflated.     A posterolateral incision between the fibula and achilles was made. Blunt  dissection was carried through the subcutaneous tissues down to the fascia, taking care to avoid and protect the sural nerve.   At the fascia, the layers between the FHL and peroneus longus were exploited and the posterior malleolus fragment exposed.  The PITFL was kept intact and the proximal and medial periosteum dissected to reveal the fracture fragments' margins.  The fragment was gently freed and reflected laterally on the PITFL and the fracture surfaces cleaned with irrigation and curettage.  The ankle joint space was irrigated and cleared of debris in preparation for reduction.     The posterior malleolus flap was then brought back into position and reduced to the fracture edges.  K-wires secured the fragment in place.  A paragon 6 hole posterolateal plate was placed on the posterior malleolus.  The plate was K-wired in place and then an antiglide screw placed proximally. Then, multiple lag screws were placed in the distal aspect of the T-plate to reduce the articular surface, checked on fluoro. The proximal screw was tightened and a second proximal screw was added.    Working lateral to the peroneal tendons, the lateral malleolus fracture site was identified and the edges cleaned in preparation for reduction.  The fibula was reduced and held with temporary instrumentation.  The reduction was checked on fluoroscopy.  A Hixson 9 hole posterolateral fibular locking posterolateral plate was applied (taking care to be anterior and lateral to the retinaculum at the posterolateral malleolar margin) and proximal/distal fixation was applied using a combination of locking and non-locking screws.    Due to the poor bone quality, a syndesmotic screw was placed to add stability to the construct. Syndesmotic fixation was achieved with 1 quadricortical 3.5 mm syndesmotic screw with a washer.    Final fluorosopic views were used to examine the fixation and ankle. Fixation was found to be satisfactory. Hardware was properly  placed and proper screws lengths were confirmed.     The tourniquet had been released when 120 minutes had elapsed. Hemostasis was achieved with electrocautery.     At this point we irrigated all wounds with sterile saline solution.  The periosteal layers were closed with 0 Vicryl. Subcutaneous layers were closed with 2-0 Vicryl and 3-0 Monocryl.  The skin was closed with 3-0 nylon.  A sterile dressing was applied followed by well-padded 3-way splint.  The ankle was splinted in neutral.     The patient tolerated the procedure well no with acute complications identified.  All sponge & needle counts were correct x2 prior to procedure conclusion.  Patient was awoken from anesthesia and transferred back to the PACU without complication.     Counts:    Sponge: Correct    Needle: Correct    Sharp: Correct    Instrument: Correct     POSTOPERATIVE PLAN:   Patient will be discharged when they meet PACU discharge criteria. Patient is to be NWB in splint. Plan to see patient in clinic in 2-3 weeks for postop follow up. Plan for suture removal at 2-3 week follow up visit. 2 view simulated WB X-rays of ankle at follow up visit. Patient to be placed in tall CAM boot at first postop visit and remain NWB for 4-6 additional weeks. Plan for physical therapy to start at 3-4 weeks postop (ANKLE FRACTURE ORIF REHAB PROTOCOL SLOW). At 6-8 weeks that patient can begin WBAT in tall CAM boot for an additional 4 weeks. At 10-12 weeks transition to regular shoe wear.     Implants:    Implant Name Type Inv. Item Serial No.  Lot No. LRB No. Used Action   KWIRE SMOTH SGL/TROC [id number] NS - [id number] Implant KWIRE SMOTH SGL/TROC [id number] NS  PARAGON 28 . Right 1 Implanted   WR OLIVE SMOTH 1.4MM - [id number] Implant WR OLIVE SMOTH 1.4MM  PARAGON 28 . Right 1 Implanted   SCRW PLT R3CON LK 3.5X32MM - [id number] Implant SCRW PLT R3CON LK 3.5X32MM  PARAGON 28 . Right 1 Implanted   SCRW PLT R3CON NL 2.7X32MM - [id number] Implant SCRW PLT  R3CON NL 2.7X32MM  PARAGON 28 . Right 1 Implanted   SCRW PLT R3CON NL 2.7X34MM - [id number] Implant SCRW PLT R3CON NL 2.7X34MM  PARAGON 28 . Right 1 Explanted   SCRW PLT R3CON NL 2.7X38MM - [id number] Implant SCRW PLT R3CON NL 2.7X38MM  PARAGON 28 . Right 1 Implanted   SCRW PLT R3CON NL 2.7X40MM - [id number] Implant SCRW PLT R3CON NL 2.7X40MM  PARAGON 28 . Right 1 Implanted   SCRW PLT R3CON NL 3.5X24MM - [id number] Implant SCRW PLT R3CON NL 3.5X24MM  PARAGON 28 . Right 1 Implanted   SCRW PLT R3CON NL 3.5X34MM - [id number] Implant SCRW PLT R3CON NL 3.5X34MM  PARAGON 28 . Right 1 Implanted   SCRW PLT RECON NL 2.7X18MM - [id number] Implant SCRW PLT RECON NL 2.7X18MM  PARAGON 28 . Right 1 Explanted   SCRW PLT R3CON NL 3.5X18MM - [id number] Implant SCRW PLT R3CON NL 3.5X18MM  PARAGON 28 . Right 1 Implanted   SCRW PLT R3CON NL 3.5X22MM - [id number] Implant SCRW PLT R3CON NL 3.5X22MM  PARAGON 28 . Right 1 Implanted   SCRW PLT R3CON NL 3.5X14MM - [id number] Implant SCRW PLT R3CON NL 3.5X14MM  PARAGON 28 . Right 1 Implanted   PLT FIB POST ANTI/GLID SHAI W/COMP 9HL UNIV - [id number] Implant PLT FIB POST ANTI/GLID SHAI W/COMP 9HL UNIV  PARAGON 28 . Right 1 Implanted   PLT TIB POST/LAT DIST 6H RT - [id number] Implant PLT TIB POST/LAT DIST 6H RT  PARAGON 28 . Right 1 Implanted   SCRW PLT R3CON LK 3.5X22MM - [id number] Implant SCRW PLT R3CON LK 3.5X22MM  PARAGON 28 . Right 1 Implanted   SCRW PLT RECON NL 3.5X16MM - [id number] Implant SCRW PLT RECON NL 3.5X16MM  PARAGON 28 . Right 1 Explanted   SCRW PLT R3CON LK 3.5X14MM - [id number] Implant SCRW PLT R3CON LK 3.5X14MM  PARAGON 28 . Right 1 Implanted   SCRW PLT R3CON NL 3.5X12MM - [id number] Implant SCRW PLT R3CON NL 3.5X12MM  PARAGON 28 . Right 1 Implanted   SCRW PLT R3CON LK 3.5X16MM - [id number] Implant SCRW PLT R3CON LK 3.5X16MM  PARAGON 28 . Right 2 Implanted       Assistant: Claudio Faust PA; Naren Szymanski PA-C  was responsible for performing the following  activities: Retraction, Suturing and Closing and their skilled assistance was necessary for the success of this case.    Pedro Cook MD     Date: 10/19/2022  Time: 20:20 EDT

## 2022-10-21 ENCOUNTER — TELEPHONE (OUTPATIENT)
Dept: ONCOLOGY | Facility: CLINIC | Age: 72
End: 2022-10-21

## 2022-10-21 NOTE — TELEPHONE ENCOUNTER
Called patient per Dr. Epps request. Discussing with patient not returning to follow up. Discussing with her patient not coming in for follow ups. Patient reporting that she just cancelled her treatment stating she was tired before treatment and she was even tired after treatment. She said she couldn't tell any difference so she stating she just didn't return. Asking patient if she is seeing any one. Patient reports that she is not seeing anyone. Informing patient she needs to call if she is having issues or concerns, so we can get her back in. Informing her we needed to keep an eye on her labs. Patient reporting she is getting over surgery at this time.

## 2022-10-29 DIAGNOSIS — I10 ESSENTIAL HYPERTENSION: ICD-10-CM

## 2022-11-02 ENCOUNTER — OFFICE VISIT (OUTPATIENT)
Dept: ORTHOPEDIC SURGERY | Facility: CLINIC | Age: 72
End: 2022-11-02

## 2022-11-02 VITALS — TEMPERATURE: 96.2 F

## 2022-11-02 DIAGNOSIS — Z98.890 STATUS POST OPEN REDUCTION AND INTERNAL FIXATION (ORIF) OF FRACTURE: Primary | ICD-10-CM

## 2022-11-02 DIAGNOSIS — Z87.81 STATUS POST OPEN REDUCTION AND INTERNAL FIXATION (ORIF) OF FRACTURE: Primary | ICD-10-CM

## 2022-11-02 PROCEDURE — 99024 POSTOP FOLLOW-UP VISIT: CPT | Performed by: ORTHOPAEDIC SURGERY

## 2022-11-02 NOTE — PROGRESS NOTES
[facility] [place] Orthopaedic Surgery Office Follow Up     Office Follow Up Visit     Date: 11/02/2022   Patient Name: Doris Miranda  MRN: [id number]  YOB: 1950  Chief Complaint:   Chief Complaint   Patient presents with   • Post-op     2 weeks s/p Trimalleolar Fracture Open Reduction Internal Fixation Right 10/19/22     History of Present Illness:   Doris Miranda is a 72 y.o. female who is here today for 2-week follow-up following open reduction internal fixation of right ankle fracture.  Was made nonweightbearing in splint.  States has been putting some weight on ankle in splint.  No new complaints.    Subjective   I reviewed the patient's chief complaint, history of present illness, review of systems, past medical history, surgical history, family history, social history, medications and allergy list   Objective    Vital Signs:   Vitals:    11/02/22 1320   Temp: 96.2 °F (35.7 °C)       Ortho Exam:  right LE Foot and Ankle Exam:   Splint taken down for exam.  Leg compartments soft and compressible.  Incision clean dry and intact posterior laterally, sutures in place, no drainage or erythema.  Able to actively plantarflex and dorsiflex ankle and all toes.  Appropriate swelling for this stage of healing about the foot and ankle.  Toes warm and well-perfused.  Brisk cap refill in all toes.    Results Review:  XR Ankle 3+ View Right  Right Ankle X-Ray  Indication: Pain  Views: 3 simulated weight bearing , comparison to previous  Findings: xrays reviewed by me today in the office and show, hardware in   place, there does appear to be a mild shift of the distal fibular fragment   with increased lucency around fracture site of the distal fibula, ankle   mortise does to appear to be well-maintained on the mortise view     Assessment / Plan    Assessment/Plan:   Diagnoses and all orders for this visit:    1. Status post open reduction and internal fixation  (ORIF) of fracture (Primary)  -     XR Ankle 3+ View Right    Other orders  -     apixaban (Eliquis) 2.5 MG tablet tablet; Take 1 tablet by mouth 2 (Two) Times a Day for 30 days.  Dispense: 60 tablet; Refill: 0    Incisions look good however there does appear to be some interval displacement of the distal fibular fragment following surgery 2 weeks ago.  Patient does note placing weight on the ankle during the past 2 weeks.  If fragment stays in place I am hopeful that if we can get it to heal in that position she will have a reasonable outcome.  She was placed in a short leg cast today and once again made nonweightbearing.  Counseled patient and patient's son about the importance of not putting any weight on that extremity.  We will plan to see patient back on a weekly basis with repeat x-rays to reevaluate healing as well as maintenance of current position.    Follow Up:   Return in about 1 week (around 11/9/2022) for F/U with Images.      Pedro Cook MD  [facility] [place] [facility] [place] [facility]

## 2022-11-04 ENCOUNTER — TELEPHONE (OUTPATIENT)
Dept: ORTHOPEDIC SURGERY | Facility: CLINIC | Age: 72
End: 2022-11-04

## 2022-11-07 RX ORDER — CYCLOBENZAPRINE HCL 10 MG
10 TABLET ORAL 3 TIMES DAILY PRN
Qty: 30 TABLET | Refills: 0 | Status: SHIPPED | OUTPATIENT
Start: 2022-11-07 | End: 2022-12-08 | Stop reason: SDUPTHER

## 2022-11-09 ENCOUNTER — OFFICE VISIT (OUTPATIENT)
Dept: ORTHOPEDIC SURGERY | Facility: CLINIC | Age: 72
End: 2022-11-09

## 2022-11-09 DIAGNOSIS — Z87.81 STATUS POST OPEN REDUCTION AND INTERNAL FIXATION (ORIF) OF FRACTURE: Primary | ICD-10-CM

## 2022-11-09 DIAGNOSIS — Z98.890 STATUS POST OPEN REDUCTION AND INTERNAL FIXATION (ORIF) OF FRACTURE: Primary | ICD-10-CM

## 2022-11-09 PROCEDURE — 99024 POSTOP FOLLOW-UP VISIT: CPT | Performed by: ORTHOPAEDIC SURGERY

## 2022-11-09 NOTE — PROGRESS NOTES
[facility] [place] [facility] Surgery Office Follow Up     Office Follow Up Visit     Date: 11/09/2022   Patient Name: Doris Miranda  MRN: [id number]  YOB: 1950  Chief Complaint:   Chief Complaint   Patient presents with   • Post-op     1 week recheck- 3 weeks s/p Trimalleolar Fracture Open Reduction Internal Fixation Right 10/19/22     History of Present Illness:   Doris Miranda is a 72 y.o. female who is here today for 3-week follow-up following open reduction internal fixation of right ankle fracture. Seen 1 week ago in clinic.  Last week there was some displacement of distal fibular fracture fragment.  Plan is now for her to come back on a weekly basis for x-ray analysis to confirm no migration of fracture fragments.  Has been nonweightbearing in a short leg cast using a knee scooter.  States has been nonweightbearing.    Subjective   I reviewed the patient's chief complaint, history of present illness, review of systems, past medical history, surgical history, family history, social history, medications and allergy list   Objective    Vital Signs: There were no vitals filed for this visit.    Ortho Exam:  Short leg cast in place.  Cast clean dry and intact.  Compartment soft compressible around cast.  Brisk cap refill in all toes.  Able to wiggle toes.  Toes warm and well-perfused.    Results Review:  XR Ankle 3+ View Right  Views: 3 simulated weight bearing , comparison to previous  Findings: xrays reviewed by me today in the office and show, hardware in   place, distal fibular fragment continues to maintain position similar to previous, ankle   mortise continues to appear to be well-maintained on the mortise view.     Assessment / Plan    Assessment/Plan:   Diagnoses and all orders for this visit:    1. Status post open reduction and internal fixation (ORIF) of fracture (Primary)  -     XR Ankle 3+ View Right    Fracture alignment maintained.   We will continue to see patient on a weekly basis to confirm maintenance of fracture alignment as well as hardware.  Continue nonweightbearing short leg cast.  We will see patient back in clinic in 1 week repeat x-rays at that time.    Follow Up:   No follow-ups on file.      Pedro Cook MD  [facility] [place] [facility]

## 2022-11-16 ENCOUNTER — OFFICE VISIT (OUTPATIENT)
Dept: ORTHOPEDIC SURGERY | Facility: CLINIC | Age: 72
End: 2022-11-16

## 2022-11-16 DIAGNOSIS — Z98.890 STATUS POST OPEN REDUCTION AND INTERNAL FIXATION (ORIF) OF FRACTURE: Primary | ICD-10-CM

## 2022-11-16 DIAGNOSIS — Z87.81 STATUS POST OPEN REDUCTION AND INTERNAL FIXATION (ORIF) OF FRACTURE: Primary | ICD-10-CM

## 2022-11-16 PROCEDURE — 99024 POSTOP FOLLOW-UP VISIT: CPT | Performed by: ORTHOPAEDIC SURGERY

## 2022-11-16 RX ORDER — CEPHALEXIN 500 MG/1
500 CAPSULE ORAL 3 TIMES DAILY
Qty: 30 CAPSULE | Refills: 0 | Status: SHIPPED | OUTPATIENT
Start: 2022-11-16 | End: 2022-11-26

## 2022-11-16 NOTE — PROGRESS NOTES
[facility] [place] [place] Orthopaedic Surgery Office Follow Up     Office Follow Up Visit     Date: 11/16/2022   Patient Name: Doris Miranda  MRN: [id number]  YOB: 1950  Chief Complaint:   Chief Complaint   Patient presents with   • Post-op     1 week f/u, 4 weeks s/p Trimalleolar Fracture Open Reduction Internal Fixation Right 10/19/22     History of Present Illness:   Doris Miranda is a 72 y.o. female who is here today for follow up for following open reduction internal fixation of right ankle fracture.  Seen approximately 1 week ago in clinic.  Plan is for her to come back on a weekly basis for x-ray analysis.  Seen last week and there was no interval displacement distal fibular fracture fragment.  She has been nonweightbearing in a cast using a knee scooter.    Subjective   I reviewed the patient's chief complaint, history of present illness, review of systems, past medical history, surgical history, family history, social history, medications and allergy list   Objective    Vital Signs: There were no vitals filed for this visit.    Ortho Exam:  right LE Foot and Ankle Exam:   Cast taken down for exam.  Leg compartments soft and compressible.    Incision with some clear drainage Steri-Strips in place which were removed for exam, some erythema, no dehiscence, no purulence.  See clinical photo. Able to actively plantarflex and dorsiflex ankle and all toes.  Appropriate swelling for this stage of healing about the foot and ankle.  Toes warm and well-perfused.  Brisk cap refill in all toes.    Results Review:  XR Ankle 3+ View Right  Right Ankle X-Ray 11/16/22   Indication: Postop  Views: 3 simulated weight bearing , comparison to previous  Findings: xrays reviewed by me today in the office and show, hardware in   place, distal fibular fragment continues to maintain position similar to   previous, ankle   mortise continues to appear to be well-maintained on  the mortise view.     Assessment / Plan    Assessment/Plan:   Diagnoses and all orders for this visit:    1. Status post open reduction and internal fixation (ORIF) of fracture (Primary)  -     XR Ankle 3+ View Right    Other orders  -     cephalexin (KEFLEX) 500 MG capsule; Take 1 capsule by mouth 3 (Three) Times a Day for 10 days.  Dispense: 30 capsule; Refill: 0      Cast removed for exam today to inspect incision as well as lateral skin.  Lateral skin looks intact with no blanching.  Brisk cap refill.  Incision with some erythema, no purulence.  Patient placed on 10-day course of Keflex. Fracture alignment maintained on x-ray.  We will continue to see patient on a weekly basis to confirm maintenance of fracture alignment as well as hardware.  Continue nonweightbearing short leg cast.  We will see patient back in clinic in 1 week repeat x-rays at that time.    Follow Up:   Return in about 1 week (around 11/23/2022) for Recheck.      Pedro Cook MD  [facility] [place] Orthopedic Surgeon

## 2022-11-21 ENCOUNTER — TELEPHONE (OUTPATIENT)
Dept: ORTHOPEDIC SURGERY | Facility: CLINIC | Age: 72
End: 2022-11-21

## 2022-11-21 NOTE — TELEPHONE ENCOUNTER
Per Dr Cook- have her keep appointment for tomorrow at 2:30pm. He will see her and if she is running behind, he will have Gila see her for Xray and review. Called patient back and let her know to keep appointment tomorrow. She understood.    Regina

## 2022-11-21 NOTE — TELEPHONE ENCOUNTER
PATIENT CALLED BACK TO DISCUSS MOVING HER APPOINTMENT.  SHE IS UNABLE TO MAKE IT TODAY OR TOMORROW MORNING.

## 2022-11-28 ENCOUNTER — OFFICE VISIT (OUTPATIENT)
Dept: ORTHOPEDIC SURGERY | Facility: CLINIC | Age: 72
End: 2022-11-28

## 2022-11-28 DIAGNOSIS — Z87.81 STATUS POST OPEN REDUCTION AND INTERNAL FIXATION (ORIF) OF FRACTURE: Primary | ICD-10-CM

## 2022-11-28 DIAGNOSIS — Z98.890 STATUS POST OPEN REDUCTION AND INTERNAL FIXATION (ORIF) OF FRACTURE: Primary | ICD-10-CM

## 2022-11-28 PROCEDURE — 99024 POSTOP FOLLOW-UP VISIT: CPT | Performed by: ORTHOPAEDIC SURGERY

## 2022-11-28 NOTE — PROGRESS NOTES
Answers for HPI/ROS submitted by the patient on 11/26/2022  What is the primary reason for your visit?: Lower Extremity Injury  Incident occurred: more than 1 week ago  Incident location: at home  Injury mechanism: a fall  Pain location: right ankle  Pain - numeric: 3/10  Pain course: intermittent  tingling: No  inability to bear weight: Yes  loss of motion: No  loss of sensation: No  muscle weakness: No  Foreign body present: no foreign bodies  Aggravated by: weight bearing                                                                    [facility] [place] [facility] Office Follow Up     Office Follow Up Visit     Date: 11/28/2022   Patient Name: Doris Miranda  MRN: [id number]  YOB: 1950  Chief Complaint:   Chief Complaint   Patient presents with   • Post-op     Trimalleolar Fracture Open Reduction Internal Fixation Right      History of Present Illness:   Doris Miranda is a 72 y.o. female who is here today for follow up for following open reduction internal fixation of right ankle fracture on 10/19.  Seen approximately 1-2 weeks ago in clinic.  Plan is for her to come back on a 1-2 week basis for x-ray analysis.  Seen 1.5 wls ago and there was no interval displacement distal fibular fracture fragment.  She has been nonweightbearing in a cast using a knee scooter.    Subjective   I reviewed the patient's chief complaint, history of present illness, review of systems, past medical history, surgical history, family history, social history, medications and allergy list   Objective    Vital Signs: There were no vitals filed for this visit.    Ortho Exam:  right LE Foot and Ankle Exam:   Cast taken down for exam.  Leg compartments soft and compressible.    Incision with no drainage, Steri-Strips in place which were removed for exam, no erythema, no dehiscence, no purulence.  Able to actively plantarflex and dorsiflex ankle and all toes.  Appropriate swelling for this stage of healing about the foot and ankle.   Toes warm and well-perfused.  Brisk cap refill in all toes.    Results Review:  XR Ankle 3+ View Right  Right Ankle X-Ray 11/28/22   Indication: Pain  Views: 3 simulated weight bearing , comparison to previous  Findings: xrays reviewed by me today in the office and show, hardware in   place with proper alignment, alignment continues to be maintained, callus   formation visible at the distal fibular fracture site     Assessment / Plan    Assessment/Plan:   Diagnoses and all orders for this visit:    1. Status post open reduction and internal fixation (ORIF) of fracture (Primary)  -     XR Ankle 3+ View Right      Cast removed for exam today to inspect incision as well as lateral skin.  Lateral skin looks intact with no blanching.  Brisk cap refill.  Incision with no erythema today. Fracture alignment maintained on x-ray.  We will continue to see patient on a 1 to 2-week basis to confirm maintenance of fracture alignment as well as hardware.  Continue nonweightbearing in short leg cast.  We will see patient back in clinic on Dec 12 repeat x-rays at that time.    Follow Up:   Return in about 2 weeks (around 12/12/2022) for Recheck, F/U with Images.      Pedro Cook MD  [facility] [place] [facility]

## 2022-12-08 RX ORDER — CYCLOBENZAPRINE HCL 10 MG
10 TABLET ORAL 3 TIMES DAILY PRN
Qty: 30 TABLET | Refills: 0 | Status: SHIPPED | OUTPATIENT
Start: 2022-12-08 | End: 2023-01-08 | Stop reason: SDUPTHER

## 2022-12-12 ENCOUNTER — OFFICE VISIT (OUTPATIENT)
Dept: ORTHOPEDIC SURGERY | Facility: CLINIC | Age: 72
End: 2022-12-12

## 2022-12-12 DIAGNOSIS — Z98.890 STATUS POST OPEN REDUCTION AND INTERNAL FIXATION (ORIF) OF FRACTURE: Primary | ICD-10-CM

## 2022-12-12 DIAGNOSIS — Z87.81 STATUS POST OPEN REDUCTION AND INTERNAL FIXATION (ORIF) OF FRACTURE: Primary | ICD-10-CM

## 2022-12-12 PROCEDURE — 29405 APPL SHORT LEG CAST: CPT | Performed by: ORTHOPAEDIC SURGERY

## 2022-12-12 PROCEDURE — 99024 POSTOP FOLLOW-UP VISIT: CPT | Performed by: ORTHOPAEDIC SURGERY

## 2022-12-12 NOTE — PROGRESS NOTES
[facility] – [place] Orthopaedic Surgery Office Follow Up     Office Follow Up Visit     Date: 12/12/2022   Patient Name: Doris Miranda  MRN: [id number]  YOB: 1950  Chief Complaint:   Chief Complaint   Patient presents with   • Follow-up     2 week follow up; 7.5 weeks s/p Trimalleolar Fracture Open Reduction Internal Fixation Right 10/19/22     History of Present Illness:   Doris Miranda is a 72 y.o. female who is here today for follow up for right ankle ORIF on October 19.  Last seen November 28.  Has been nonweightbearing in a cast using knee scooter.  No new complaints.    Subjective   I reviewed the patient's chief complaint, history of present illness, review of systems, past medical history, surgical history, family history, social history, medications and allergy list   Objective    Vital Signs: There were no vitals filed for this visit.  There is no height or weight on file to calculate BMI.    Ortho Exam:  right LE Foot and Ankle Exam:   Cast taken down for exam.  Leg compartments soft and compressible.    Incision with no drainage continue to heal well, Steri-Strips in place which were removed for exam, no erythema.  See picture in chart.  Able to actively plantarflex and dorsiflex ankle and all toes.  Appropriate swelling for this stage of healing about the foot and ankle.  Toes warm and well-perfused.  Brisk cap refill in all toes.    Results Review:  XR Ankle 3+ View Right  Right Ankle X-Ray 12/12/22   Indication: Pain  Views: 3 simulated weight bearing , comparison to previous  Findings: xrays reviewed by me today in the office and show, hardware in   place with proper alignment, no signs of hardware failure, ankle mortise   well-maintained, interval signs of callus formation at distal fibular   fracture site with additional callus formation visible medial to the   medial malleolus       Assessment / Plan    Assessment/Plan:   Diagnoses and all orders for this visit:    1.  Status post open reduction and internal fixation (ORIF) of fracture (Primary)  -     XR Ankle 3+ View Right      Once again cast was removed today for exam.  Incision continues to look good.  Fracture alignment continues to be maintained on x-ray.  Interval signs of callus formation at fracture site.  Short leg fiberglass cast replaced in clinic today.  Continue on nonweightbearing and short leg cast.  See patient back in 4 weeks for repeat x-rays out of cast.    Follow Up:   Return in about 4 weeks (around 1/9/2023) for F/U with Images.      Pedro Cook MD  [facility] Orthopedic Surgeon

## 2023-01-09 RX ORDER — CYCLOBENZAPRINE HCL 10 MG
10 TABLET ORAL 3 TIMES DAILY PRN
Qty: 30 TABLET | Refills: 0 | Status: SHIPPED | OUTPATIENT
Start: 2023-01-09 | End: 2023-02-08

## 2023-01-11 ENCOUNTER — OFFICE VISIT (OUTPATIENT)
Dept: ORTHOPEDIC SURGERY | Facility: CLINIC | Age: 73
End: 2023-01-11
Payer: MEDICARE

## 2023-01-11 DIAGNOSIS — Z98.890 STATUS POST OPEN REDUCTION AND INTERNAL FIXATION (ORIF) OF FRACTURE: Primary | ICD-10-CM

## 2023-01-11 DIAGNOSIS — Z87.81 STATUS POST OPEN REDUCTION AND INTERNAL FIXATION (ORIF) OF FRACTURE: Primary | ICD-10-CM

## 2023-01-11 PROCEDURE — 99024 POSTOP FOLLOW-UP VISIT: CPT | Performed by: ORTHOPAEDIC SURGERY

## 2023-01-11 NOTE — PROGRESS NOTES
[facility] [place]     Office Follow Up Visit     Date: 01/11/2023   Patient Name: Doris Miranda  MRN: [id number]  YOB: 1950  Chief Complaint:   Chief Complaint   Patient presents with   • Post-op     4 week recheck- 12 weeks s/p Trimalleolar Fracture Open Reduction Internal Fixation Right 10/19/22     History of Present Illness:   Doris Miranda is a 72 y.o. female who is here today for follow up for follow-up following right ankle ORIF on October 19.  Last seen December 12.  Has been nonweightbearing in a cast using knee scooter.  No new complaints.    Subjective   I reviewed the patient's chief complaint, history of present illness, review of systems, past medical history, surgical history, family history, social history, medications and allergy list   Objective    Vital Signs: There were no vitals filed for this visit.  There is no height or weight on file to calculate BMI.    Ortho Exam:  right LE Foot and Ankle Exam:   Cast taken down for exam.  Leg compartments soft and compressible.    Incision well-healed, Steri-Strips in place which were removed for exam.  Able to actively plantarflex and dorsiflex ankle and all toes and ankle.  No swelling.  Toes warm and well-perfused.  Brisk cap refill in all toes.  No tenderness to palpation over distal fibular fracture site.    Results Review:  XR Ankle 3+ View Right  Right Ankle X-Ray 01/11/23   Indication: Pain  Views: 3 simulated weight bearing , comparison to previous  Findings: xrays reviewed by me today in the office and show, hardware in   place with proper alignment, no signs of hardware failure, ankle mortise   well-maintained, interval signs of fracture healing with additional   visible callus on x-ray       Assessment / Plan    Assessment/Plan:   Diagnoses and all orders for this visit:    1. Status post open reduction and internal fixation (ORIF) of fracture (Primary)  -     XR Ankle 3+ View  Right  -     CT ankle right wo contrast; Future      Once again cast was removed for exam.  Incision well-healed no signs of infection.  Fracture alignment continues to be maintained on x-ray and there is additional callus formation.  Patient placed in tall cam walking boot in clinic today.  Patient instructed to continue to be nonweightbearing in walking boot.  Patient can come out of the boot for range of motion exercises however must wear the boot at all other times including while sleeping.  Patient provided with order for right ankle CT scan to assess bony healing.  Will contact patient after completion of CT scan for further management as well as clinic follow-up information.    Follow Up:   Return for Following completion of CT scan.      Pedro Cook MD  [facility] [place] [facility] Sur[facility]

## 2023-01-13 ENCOUNTER — HOSPITAL ENCOUNTER (OUTPATIENT)
Dept: CT IMAGING | Facility: HOSPITAL | Age: 73
Discharge: HOME | End: 2023-01-13
Payer: MEDICARE

## 2023-01-13 DIAGNOSIS — Z87.81 STATUS POST OPEN REDUCTION AND INTERNAL FIXATION (ORIF) OF FRACTURE: ICD-10-CM

## 2023-01-13 DIAGNOSIS — Z98.890 STATUS POST OPEN REDUCTION AND INTERNAL FIXATION (ORIF) OF FRACTURE: ICD-10-CM

## 2023-01-13 PROCEDURE — 73700 CT LOWER EXTREMITY W/O DYE: CPT

## 2023-01-16 ENCOUNTER — HOSPITAL ENCOUNTER (OUTPATIENT)
Dept: CARDIOLOGY | Facility: HOSPITAL | Age: 73
Discharge: HOME | End: 2023-01-16
Payer: MEDICARE

## 2023-01-16 ENCOUNTER — OFFICE VISIT (OUTPATIENT)
Dept: ORTHOPEDIC SURGERY | Facility: CLINIC | Age: 73
End: 2023-01-16
Payer: MEDICARE

## 2023-01-16 VITALS
SYSTOLIC BLOOD PRESSURE: 126 MMHG | DIASTOLIC BLOOD PRESSURE: 76 MMHG | BODY MASS INDEX: 33.57 KG/M2 | HEIGHT: 60 IN | WEIGHT: 171 LBS

## 2023-01-16 VITALS — WEIGHT: 171.08 LBS | HEIGHT: 61 IN | BODY MASS INDEX: 32.3 KG/M2

## 2023-01-16 VITALS — BODY MASS INDEX: 33.59 KG/M2 | WEIGHT: 171.08 LBS | HEIGHT: 60 IN

## 2023-01-16 DIAGNOSIS — M79.661 RIGHT CALF PAIN: ICD-10-CM

## 2023-01-16 DIAGNOSIS — G63 NEUROPATHY DUE TO PERIPHERAL VASCULAR DISEASE: ICD-10-CM

## 2023-01-16 DIAGNOSIS — M89.9 DISORDER OF BONE, UNSPECIFIED: ICD-10-CM

## 2023-01-16 DIAGNOSIS — Z09 SURGERY FOLLOW-UP: ICD-10-CM

## 2023-01-16 DIAGNOSIS — I73.9 NEUROPATHY DUE TO PERIPHERAL VASCULAR DISEASE: ICD-10-CM

## 2023-01-16 DIAGNOSIS — S82.891K CLOSED RIGHT ANKLE FRACTURE, WITH NONUNION, SUBSEQUENT ENCOUNTER: ICD-10-CM

## 2023-01-16 DIAGNOSIS — S82.891K CLOSED RIGHT ANKLE FRACTURE, WITH NONUNION, SUBSEQUENT ENCOUNTER: Primary | ICD-10-CM

## 2023-01-16 LAB
BH CV LOWER ARTERIAL RIGHT ABI RATIO: 1.05
BH CV LOWER ARTERIAL RIGHT CALF RATIO: 1.01
BH CV LOWER ARTERIAL RIGHT DORSALIS PEDIS SYS MAX: 153
BH CV LOWER ARTERIAL RIGHT GREAT TOE SYS MAX: 53
BH CV LOWER ARTERIAL RIGHT LOW THIGH RATIO: 1.16
BH CV LOWER ARTERIAL RIGHT LOW THIGH SYS MAX: 169
BH CV LOWER ARTERIAL RIGHT POPLITEAL SYS MAX: 147
BH CV LOWER ARTERIAL RIGHT POST TIBIAL SYS MAX: 149
BH CV LOWER ARTERIAL RIGHT TBI RATIO: 0.36
BH CV LOWER VASCULAR LEFT COMMON FEMORAL AUGMENT: NORMAL
BH CV LOWER VASCULAR LEFT COMMON FEMORAL COMPRESS: NORMAL
BH CV LOWER VASCULAR LEFT COMMON FEMORAL PHASIC: NORMAL
BH CV LOWER VASCULAR LEFT COMMON FEMORAL SPONT: NORMAL
BH CV LOWER VASCULAR RIGHT COMMON FEMORAL AUGMENT: NORMAL
BH CV LOWER VASCULAR RIGHT COMMON FEMORAL COMPRESS: NORMAL
BH CV LOWER VASCULAR RIGHT COMMON FEMORAL PHASIC: NORMAL
BH CV LOWER VASCULAR RIGHT COMMON FEMORAL SPONT: NORMAL
BH CV LOWER VASCULAR RIGHT DISTAL FEMORAL AUGMENT: NORMAL
BH CV LOWER VASCULAR RIGHT DISTAL FEMORAL COMPRESS: NORMAL
BH CV LOWER VASCULAR RIGHT DISTAL FEMORAL PHASIC: NORMAL
BH CV LOWER VASCULAR RIGHT DISTAL FEMORAL SPONT: NORMAL
BH CV LOWER VASCULAR RIGHT GASTRONEMIUS COMPRESS: NORMAL
BH CV LOWER VASCULAR RIGHT GREATER SAPH AK COMPRESS: NORMAL
BH CV LOWER VASCULAR RIGHT GREATER SAPH BK COMPRESS: NORMAL
BH CV LOWER VASCULAR RIGHT LESSER SAPH COMPRESS: NORMAL
BH CV LOWER VASCULAR RIGHT MID FEMORAL AUGMENT: NORMAL
BH CV LOWER VASCULAR RIGHT MID FEMORAL COMPRESS: NORMAL
BH CV LOWER VASCULAR RIGHT MID FEMORAL PHASIC: NORMAL
BH CV LOWER VASCULAR RIGHT MID FEMORAL SPONT: NORMAL
BH CV LOWER VASCULAR RIGHT PERONEAL COMPRESS: NORMAL
BH CV LOWER VASCULAR RIGHT POPLITEAL AUGMENT: NORMAL
BH CV LOWER VASCULAR RIGHT POPLITEAL COMPRESS: NORMAL
BH CV LOWER VASCULAR RIGHT POPLITEAL PHASIC: NORMAL
BH CV LOWER VASCULAR RIGHT POPLITEAL SPONT: NORMAL
BH CV LOWER VASCULAR RIGHT POSTERIOR TIBIAL COMPRESS: NORMAL
BH CV LOWER VASCULAR RIGHT PROFUNDA FEMORAL AUGMENT: NORMAL
BH CV LOWER VASCULAR RIGHT PROFUNDA FEMORAL COMPRESS: NORMAL
BH CV LOWER VASCULAR RIGHT PROFUNDA FEMORAL PHASIC: NORMAL
BH CV LOWER VASCULAR RIGHT PROFUNDA FEMORAL SPONT: NORMAL
BH CV LOWER VASCULAR RIGHT PROXIMAL FEMORAL AUGMENT: NORMAL
BH CV LOWER VASCULAR RIGHT PROXIMAL FEMORAL COMPRESS: NORMAL
BH CV LOWER VASCULAR RIGHT PROXIMAL FEMORAL PHASIC: NORMAL
BH CV LOWER VASCULAR RIGHT PROXIMAL FEMORAL SPONT: NORMAL
BH CV LOWER VASCULAR RIGHT SAPHENOFEMORAL JUNCTION AUGMENT: NORMAL
BH CV LOWER VASCULAR RIGHT SAPHENOFEMORAL JUNCTION COMPRESS: NORMAL
BH CV LOWER VASCULAR RIGHT SAPHENOFEMORAL JUNCTION PHASIC: NORMAL
BH CV LOWER VASCULAR RIGHT SAPHENOFEMORAL JUNCTION SPONT: NORMAL
MAXIMAL PREDICTED HEART RATE: 148 BPM
MAXIMAL PREDICTED HEART RATE: 148 BPM
STRESS TARGET HR: 126 BPM
STRESS TARGET HR: 126 BPM
UPPER ARTERIAL LEFT ARM BRACHIAL SYS MAX: 145 MMHG
UPPER ARTERIAL RIGHT ARM BRACHIAL SYS MAX: 146 MMHG

## 2023-01-16 PROCEDURE — 93971 EXTREMITY STUDY: CPT | Performed by: INTERNAL MEDICINE

## 2023-01-16 PROCEDURE — 93923 UPR/LXTR ART STDY 3+ LVLS: CPT

## 2023-01-16 PROCEDURE — 93971 EXTREMITY STUDY: CPT

## 2023-01-16 PROCEDURE — 93923 UPR/LXTR ART STDY 3+ LVLS: CPT | Performed by: INTERNAL MEDICINE

## 2023-01-16 PROCEDURE — 99024 POSTOP FOLLOW-UP VISIT: CPT | Performed by: ORTHOPAEDIC SURGERY

## 2023-01-16 NOTE — PROGRESS NOTES
"                          [facility] [place] [facility] [place] Office Follow Up     Office Follow Up Visit     Date: 01/16/2023   Patient Name: Doris Miranda  MRN: [id number]  YOB: 1950  Chief Complaint:   Chief Complaint   Patient presents with   • Follow-up     5 day CT (1/13/23) f/u-- 3 months status post Trimalleolar Fracture Open Reduction Internal Fixation Right 10/19/22     History of Present Illness:   Doris Miranda is a 72 y.o. female who is here today for follow up for for follow-up following completion of CT scan.  Nothing new since visit last week.    Subjective   I reviewed the patient's chief complaint, history of present illness, review of systems, past medical history, surgical history, family history, social history, medications and allergy list   Objective    Vital Signs:   Vitals:    01/16/23 0811   BP: 126/76   Weight: 77.6 kg (171 lb)   Height: 152.4 cm (60\")     Body mass index is 33.4 kg/m².    Ortho Exam:  right LE Foot and Ankle Exam:   Boot removed for exam.  Leg compartments soft and compressible.    Incision well-healed, Able to actively plantarflex and dorsiflex ankle and all toes and ankle.  No swelling.  Toes warm and well-perfused.  Brisk cap refill in all toes.  No tenderness to palpation over distal fibular fracture site.    Results Review:  CT ankle right wo contrast  Narrative: CT ANKLE RIGHT WO CONTRAST    Date of Exam: 1/13/2023 3:52 PM EST    Indication: fracture healing postop, concern for nonunion.    Comparison: Ankle radiographs 1/11/2023    Technique: Axial CT images were obtained of the right ankle without contrast administration.  Reconstructed coronal and sagittal images were also obtained. Automated exposure control and iterative construction methods were used.    Findings:  Bones: Postoperative changes of open reduction internal fixation of a trimalleolar fracture by means of posterior tibial and posterior fibular plate and screw construct and a single syndesmotic " screw. There is articular surface irregularity along the   posterior medial tibial plafond and with fracture line still apparent. Comminuted fracture of the distal fibula with fracture line still apparent and a slightly displaced anterior fragment. Healing medial malleolus fracture with callus formation. No   additional fracture seen. Mild varus alignment of the ankle similar to prior exam.    Soft tissues: Soft tissue swelling of the anterior and lateral ankle.  Impression: Impression:  Postoperative changes of open reduction internal fixation of a trimalleolar fracture. The distal fibular comminuted fracture line is still apparent with a mildly displaced small fragment anteriorly. Additionally the posterior malleolus fracture line is   still apparent with articular surface irregularity. The medial malleolus fracture appears to be healing with callus formation.    Mild varus alignment of the tibiotalar joint similar to prior.    Electronically Signed: Mykel Lundberg    1/14/2023 12:32 PM EST    Workstation ID: [id number]     01/16/23 I have personally reviewed and interpreted the images from outside facility with the documented findings, nonunion distal fibula    Assessment / Plan    Assessment/Plan:   Diagnoses and all orders for this visit:    1. Closed right ankle fracture, with nonunion, subsequent encounter (Primary)  -     C-reactive Protein; Future  -     Sedimentation Rate; Future  -     Albumin; Future  -     CBC & Differential; Future  -     Comprehensive Metabolic Panel; Future  -     Prealbumin; Future  -     Vitamin D,25-Hydroxy; Future  -     TSH+Free T4; Future  -     PTH, Intact & Calcium; Future  -     Cancel: US Arterial Doppler Lower Extremity Right; Future  -     Doppler Arterial Multi Level Lower Extremity - Bilateral CAR; Future    2. Right calf pain  -     Duplex Venous Lower Extremity - Right CAR; Future    3. Neuropathy due to peripheral vascular disease (HCC)  -     Cancel: US Arterial  Doppler Lower Extremity Right; Future    4. Disorder of bone, unspecified  -     Vitamin D,25-Hydroxy; Future    5. Surgery follow-up  -     TSH+Free T4; Future  -     Doppler Arterial Multi Level Lower Extremity - Bilateral CAR; Future      Returns to clinic today for follow-up following completion of CT.  CT scan shows that there is still a gap at fracture site distal fibula.  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  However this labs ordered today to help assess metabolic abnormalities that could contribute to decreased bone healing.  Also ordered vascular studies which included venous Doppler.  Patient with calf tenderness on exam today and would like to rule out DVT.  Instructed patient to get vascular studies completed as soon as possible because if she does have a DVT wound clinic began treatment immediately.  Patient has palpable DP pulse on exam however PT pulse not as easy to palpate.  Plan to get arterial studies to help assess arterial flow.  Plan to see patient back in clinic versus talk to her over the telephone following completion of all the studies in order to discuss further management.  Patient is to remain nonweightbearing in cam walking boot with use of assistive devices.  Due to her nonunion I am requesting and recommending the use of a bone growth stimulator.    Follow Up:   Return for Following completion of lab studies as well as vascular studies.      Pedro Cook MD  [facility] [place] [facility]

## 2023-01-17 ENCOUNTER — TELEPHONE (OUTPATIENT)
Dept: ORTHOPEDIC SURGERY | Facility: CLINIC | Age: 73
End: 2023-01-17

## 2023-01-17 NOTE — TELEPHONE ENCOUNTER
PATIENT CALLED AND STATED THAT SHE HAD A DOPPLER PERFORMED YESTERDAY AND THAT SOMEONE TRIED TO CALL HER. COULD SOMEONE PLEASE REACH OUT TO PATIENT?    THANK YOU.

## 2023-01-19 ENCOUNTER — LAB (OUTPATIENT)
Dept: LAB | Facility: HOSPITAL | Age: 73
End: 2023-01-19
Payer: MEDICARE

## 2023-01-19 DIAGNOSIS — S82.891K CLOSED RIGHT ANKLE FRACTURE, WITH NONUNION, SUBSEQUENT ENCOUNTER: ICD-10-CM

## 2023-01-19 DIAGNOSIS — Z09 SURGERY FOLLOW-UP: ICD-10-CM

## 2023-01-19 DIAGNOSIS — M89.9 DISORDER OF BONE, UNSPECIFIED: ICD-10-CM

## 2023-01-19 LAB — PREALB SERPL-MCNC: 19 MG/DL (ref 20–40)

## 2023-01-19 PROCEDURE — 84134 ASSAY OF PREALBUMIN: CPT

## 2023-01-19 PROCEDURE — 86140 C-REACTIVE PROTEIN: CPT

## 2023-01-19 PROCEDURE — 83970 ASSAY OF PARATHORMONE: CPT

## 2023-01-19 PROCEDURE — 84439 ASSAY OF FREE THYROXINE: CPT

## 2023-01-19 PROCEDURE — 36415 COLL VENOUS BLD VENIPUNCTURE: CPT

## 2023-01-19 PROCEDURE — 82306 VITAMIN D 25 HYDROXY: CPT

## 2023-01-19 PROCEDURE — 84443 ASSAY THYROID STIM HORMONE: CPT

## 2023-01-19 PROCEDURE — 85025 COMPLETE CBC W/AUTO DIFF WBC: CPT

## 2023-01-19 PROCEDURE — 85652 RBC SED RATE AUTOMATED: CPT

## 2023-01-19 PROCEDURE — 80053 COMPREHEN METABOLIC PANEL: CPT

## 2023-01-20 LAB
25(OH)D3 SERPL-MCNC: 30 NG/ML (ref 30–100)
ALBUMIN SERPL-MCNC: 4.6 G/DL (ref 3.5–5.2)
ALBUMIN/GLOB SERPL: 2.1 G/DL
ALP SERPL-CCNC: 87 U/L (ref 39–117)
ALT SERPL W P-5'-P-CCNC: 17 U/L (ref 1–33)
ANION GAP SERPL CALCULATED.3IONS-SCNC: 11.6 MMOL/L (ref 5–15)
AST SERPL-CCNC: 22 U/L (ref 1–32)
BASOPHILS # BLD AUTO: 0.05 10*3/MM3 (ref 0–0.2)
BASOPHILS NFR BLD AUTO: 0.9 % (ref 0–1.5)
BILIRUB SERPL-MCNC: 0.3 MG/DL (ref 0–1.2)
BUN SERPL-MCNC: 18 MG/DL (ref 8–23)
BUN/CREAT SERPL: 13.3 (ref 7–25)
CALCIUM SPEC-SCNC: 10.1 MG/DL (ref 8.6–10.5)
CALCIUM SPEC-SCNC: 9.9 MG/DL (ref 8.6–10.5)
CHLORIDE SERPL-SCNC: 102 MMOL/L (ref 98–107)
CO2 SERPL-SCNC: 25.4 MMOL/L (ref 22–29)
CREAT SERPL-MCNC: 1.35 MG/DL (ref 0.57–1)
CRP SERPL-MCNC: <0.3 MG/DL (ref 0–0.5)
DEPRECATED RDW RBC AUTO: 39.4 FL (ref 37–54)
EGFRCR SERPLBLD CKD-EPI 2021: 41.8 ML/MIN/1.73
EOSINOPHIL # BLD AUTO: 0.06 10*3/MM3 (ref 0–0.4)
EOSINOPHIL NFR BLD AUTO: 1.1 % (ref 0.3–6.2)
ERYTHROCYTE [DISTWIDTH] IN BLOOD BY AUTOMATED COUNT: 12.5 % (ref 12.3–15.4)
ERYTHROCYTE [SEDIMENTATION RATE] IN BLOOD: 12 MM/HR (ref 0–30)
GLOBULIN UR ELPH-MCNC: 2.2 GM/DL
GLUCOSE SERPL-MCNC: 89 MG/DL (ref 65–99)
HCT VFR BLD AUTO: 44.1 % (ref 34–46.6)
HGB BLD-MCNC: 14.7 G/DL (ref 12–15.9)
LYMPHOCYTES # BLD AUTO: 0.73 10*3/MM3 (ref 0.7–3.1)
LYMPHOCYTES NFR BLD AUTO: 13.1 % (ref 19.6–45.3)
MCH RBC QN AUTO: 29.1 PG (ref 26.6–33)
MCHC RBC AUTO-ENTMCNC: 33.3 G/DL (ref 31.5–35.7)
MCV RBC AUTO: 87.3 FL (ref 79–97)
MONOCYTES # BLD AUTO: 0.5 10*3/MM3 (ref 0.1–0.9)
MONOCYTES NFR BLD AUTO: 9 % (ref 5–12)
NEUTROPHILS NFR BLD AUTO: 4.2 10*3/MM3 (ref 1.7–7)
NEUTROPHILS NFR BLD AUTO: 75.5 % (ref 42.7–76)
PLATELET # BLD AUTO: 125 10*3/MM3 (ref 140–450)
PMV BLD AUTO: 11.9 FL (ref 6–12)
POTASSIUM SERPL-SCNC: 5.2 MMOL/L (ref 3.5–5.2)
PROT SERPL-MCNC: 6.8 G/DL (ref 6–8.5)
PTH-INTACT SERPL-MCNC: 71.7 PG/ML (ref 15–65)
RBC # BLD AUTO: 5.05 10*6/MM3 (ref 3.77–5.28)
SODIUM SERPL-SCNC: 139 MMOL/L (ref 136–145)
T4 FREE SERPL-MCNC: 0.96 NG/DL (ref 0.93–1.7)
TSH SERPL DL<=0.05 MIU/L-ACNC: 2.67 UIU/ML (ref 0.27–4.2)
WBC NRBC COR # BLD: 5.56 10*3/MM3 (ref 3.4–10.8)

## 2023-01-21 ENCOUNTER — OFFICE VISIT (OUTPATIENT)
Dept: INTERNAL MEDICINE | Facility: CLINIC | Age: 73
End: 2023-01-21
Payer: MEDICARE

## 2023-01-21 VITALS
HEIGHT: 61 IN | DIASTOLIC BLOOD PRESSURE: 70 MMHG | SYSTOLIC BLOOD PRESSURE: 130 MMHG | HEART RATE: 74 BPM | BODY MASS INDEX: 32.3 KG/M2 | RESPIRATION RATE: 16 BRPM | WEIGHT: 171.1 LBS

## 2023-01-21 DIAGNOSIS — D69.6 THROMBOCYTOPENIA: ICD-10-CM

## 2023-01-21 DIAGNOSIS — I10 ESSENTIAL HYPERTENSION: Primary | Chronic | ICD-10-CM

## 2023-01-21 DIAGNOSIS — C90.02 MULTIPLE MYELOMA IN RELAPSE: ICD-10-CM

## 2023-01-21 DIAGNOSIS — N18.30 STAGE 3 CHRONIC KIDNEY DISEASE, UNSPECIFIED WHETHER STAGE 3A OR 3B CKD: Chronic | ICD-10-CM

## 2023-01-21 DIAGNOSIS — D12.6 TUBULAR ADENOMA OF COLON: ICD-10-CM

## 2023-01-21 PROCEDURE — 99213 OFFICE O/P EST LOW 20 MIN: CPT | Performed by: INTERNAL MEDICINE

## 2023-01-21 NOTE — PROGRESS NOTES
The ABCs of the Annual Wellness Visit  Subsequent Medicare Wellness Visit    Subjective      Doris Miranda is a 72 y.o. female who presents for a Subsequent Medicare Wellness Visit.    The following portions of the patient's history were reviewed and   updated as appropriate: allergies, current medications, past family history, past medical history, past social history, past surgical history and problem list.    Compared to one year ago, the patient feels her physical   health is better.    Compared to one year ago, the patient feels her mental   health is better.    Recent Hospitalizations:  She was not admitted to the hospital during the last year.       Current Medical Providers:  Patient Care Team:  Earl Fuentes MD as PCP - General  Earl Fuentes MD as PCP - Family Medicine  Josiah Euceda MD as Consulting Physician (Hematology and Oncology)  Víctor Mixon MD as Consulting Physician (Pain Medicine)    Outpatient Medications Prior to Visit   Medication Sig Dispense Refill   • acetaminophen (TYLENOL) 500 MG tablet Take 1 tablet by mouth Every 6 (Six) Hours As Needed for Mild Pain. 30 tablet 0   • acyclovir (ZOVIRAX) 400 MG tablet Take 1 tablet by mouth 2 (Two) Times a Day With Meals. (Patient taking differently: Take 400 mg by mouth 2 (Two) Times a Day With Meals. chronic) 60 tablet 11   • aspirin (aspirin) 81 MG EC tablet Take 1 tablet by mouth Daily. 30 tablet 3   • esomeprazole (nexIUM) 20 MG capsule Take 1 capsule by mouth 2 (Two) Times a Day.     • melatonin 5 MG tablet tablet Take 5 mg by mouth At Night As Needed (Sleep).     • metoprolol tartrate (LOPRESSOR) 25 MG tablet Take 1 tablet by mouth twice daily 180 tablet 0   • venlafaxine XR (Effexor XR) 37.5 MG 24 hr capsule Take 1 capsule by mouth Every Morning. 90 capsule 3   • cyclobenzaprine (FLEXERIL) 10 MG tablet Take 1 tablet by mouth 3 (Three) Times a Day As Needed for Muscle Spasms. for muscle spams 30 tablet 0     No  facility-administered medications prior to visit.       No opioid medication identified on active medication list. I have reviewed chart for other potential  high risk medication/s and harmful drug interactions in the elderly.          Aspirin is on active medication list. Aspirin use is not indicated based on review of current medical condition/s. Risk of harm outweighs potential benefits. Patient instructed to discontinue this medication.  .      Patient Active Problem List   Diagnosis   • Diverticulosis of large intestine without hemorrhage   • Tubular adenoma of colon   • Essential hypertension   • COPD (chronic obstructive pulmonary disease) ([facility])   • Arthritis   • Gastroesophageal reflux disease without esophagitis   • Chronic left-sided low back pain with sciatica   • Thrombocytopenia since stem cell transplant March 2016   • Hx of autologous stem cell transplant (March 2016)   • CKD (chronic kidney disease), stage III (CMS/HCC)   • Former smoker   • Non-rheumatic tricuspid valve insufficiency   • Pulmonary hypertension (CMS/[facility])   • Chronic respiratory failure with hypoxia (been noncompliant with outpatient oxygen in past)   • Gait instability   • Chronic pain   • Debility   • Thrombocytopenia ([facility])   • On home oxygen therapy   • Hypogammaglobulinemia, acquired ([facility])   • Multiple myeloma in relapse ([facility])   • COVID-19   • Closed trimalleolar fracture of right ankle   • Preop examination   • Closed displaced pilon fracture of right tibia with routine healing   • Status post open reduction and internal fixation (ORIF) of fracture   • Closed right ankle fracture, with nonunion, subsequent encounter     Advance Care Planning  Advance Directive is not on file.  ACP discussion was held with the patient during this visit. Patient does not have an advance directive, information provided.     Objective    Vitals:    01/21/23 1120 01/21/23 1123   BP:  130/70   Pulse:  74   Resp:  16   Weight: 77.6 kg (171 lb 1.6 oz)   "  Height: 154.2 cm (60.72\")    PainSc:  0-No pain     Estimated body mass index is 32.63 kg/m² as calculated from the following:    Height as of this encounter: 154.2 cm (60.72\").    Weight as of this encounter: 77.6 kg (171 lb 1.6 oz).    BMI is >= 30 and <35. (Class 1 Obesity). The following options were offered after discussion;: exercise counseling/recommendations      Does the patient have evidence of cognitive impairment?   No            HEALTH RISK ASSESSMENT    Smoking Status:  Social History     Tobacco Use   Smoking Status Former   • Packs/day: 2.00   • Years: 45.00   • Pack years: 90.00   • Types: Cigarettes   • Quit date: 2015   • Years since quittin.4   Smokeless Tobacco Never     Alcohol Consumption:  Social History     Substance and Sexual Activity   Alcohol Use No     Fall Risk Screen:    NICOLE Fall Risk Assessment was completed, and patient is at HIGH risk for falls. Assessment completed on:2023    Depression Screening:  PHQ-2/PHQ-9 Depression Screening 2023   Little Interest or Pleasure in Doing Things 0-->not at all   Feeling Down, Depressed or Hopeless 0-->not at all   PHQ-9: Brief Depression Severity Measure Score 0       Health Habits and Functional and Cognitive Screening:  Functional & Cognitive Status 2023   Do you have difficulty preparing food and eating? Yes   Do you have difficulty bathing yourself, getting dressed or grooming yourself? Yes   Do you have difficulty using the toilet? No   Do you have difficulty moving around from place to place? No   Do you have trouble with steps or getting out of a bed or a chair? No   Current Diet Unhealthy Diet   Dental Exam Not up to date   Eye Exam Up to date   Exercise (times per week) 0 times per week   Current Exercises Include No Regular Exercise   Current Exercise Activities Include -   Do you need help using the phone?  No   Are you deaf or do you have serious difficulty hearing?  No   Do you need help with " transportation? No   Do you need help shopping? No   Do you need help preparing meals?  No   Do you need help with housework?  No   Do you need help with laundry? No   Do you need help taking your medications? No   Do you need help managing money? No   Do you ever drive or ride in a car without wearing a seat belt? No   Have you felt unusual stress, anger or loneliness in the last month? No   Who do you live with? Child   If you need help, do you have trouble finding someone available to you? No   Have you been bothered in the last four weeks by sexual problems? No   Do you have difficulty concentrating, remembering or making decisions? No       Age-appropriate Screening Schedule:  Refer to the list below for future screening recommendations based on patient's age, sex and/or medical conditions. Orders for these recommended tests are listed in the plan section. The patient has been provided with a written plan.    Health Maintenance   Topic Date Due   • DXA SCAN  Never done   • ZOSTER VACCINE (1 of 2) Never done   • TDAP/TD VACCINES (2 - Td or Tdap) 09/26/2022   • MAMMOGRAM  04/14/2023 (Originally 1950)   • INFLUENZA VACCINE  Completed                CMS Preventative Services Quick Reference  Risk Factors Identified During Encounter:    Chronic Pain: Chronic Pain Educational material Discussed and Printed for Patient.   Inactivity/Sedentary: Patient was advised to exercise at least 150 minutes a week per CDC recommendations.  Polypharmacy: Medication List reviewed and Medications are appropriate for patient    The above risks/problems have been discussed with the patient.  Pertinent information has been shared with the patient in the After Visit Summary.    Diagnoses and all orders for this visit:    1. Essential hypertension (Primary)    2. Thrombocytopenia (HCC)    3. Tubular adenoma of colon    4. Stage 3 chronic kidney disease, unspecified whether stage 3a or 3b CKD (HCC)    5. Multiple myeloma in relapse  (HCC)        Follow Up:   Next Medicare Wellness visit to be scheduled in 1 year.      An After Visit Summary and PPPS were made available to the patient.      Htn- cont metoprolol, advised goal of 130/80  copd-not an issue since stopping tob, proventil prn, controlled on prn albuterol and prn O2  gerd/gastritis-cont ppi prn, controlled  MM/back pain with radiculopathy-f/u Dr Epps and  ortho, off chemo  Hx of TA-resope 12/21 if health allows, wants to delay  Gait instability/chronic back pain-improved with surgery  Situational anxiety/depression-cont effexor xr, stable  Diverticulosis-increase fiber  Low platelets-avoid NSAIDs  CKD-counseled on NSAIDs avoidance and adequate hydration  Other-advised Vit D 2000 IU qd     Labs noted and dw patient

## 2023-01-24 ENCOUNTER — OFFICE VISIT (OUTPATIENT)
Dept: ORTHOPEDIC SURGERY | Facility: CLINIC | Age: 73
End: 2023-01-24
Payer: MEDICARE

## 2023-01-24 VITALS
SYSTOLIC BLOOD PRESSURE: 128 MMHG | HEIGHT: 61 IN | WEIGHT: 171.08 LBS | BODY MASS INDEX: 32.3 KG/M2 | DIASTOLIC BLOOD PRESSURE: 72 MMHG

## 2023-01-24 DIAGNOSIS — I10 ESSENTIAL HYPERTENSION: ICD-10-CM

## 2023-01-24 DIAGNOSIS — F43.23 SITUATIONAL MIXED ANXIETY AND DEPRESSIVE DISORDER: ICD-10-CM

## 2023-01-24 DIAGNOSIS — S82.891K CLOSED RIGHT ANKLE FRACTURE, WITH NONUNION, SUBSEQUENT ENCOUNTER: Primary | ICD-10-CM

## 2023-01-24 PROCEDURE — 99214 OFFICE O/P EST MOD 30 MIN: CPT | Performed by: ORTHOPAEDIC SURGERY

## 2023-01-24 PROCEDURE — 29425 APPL SHORT LEG CAST WALKING: CPT | Performed by: ORTHOPAEDIC SURGERY

## 2023-01-24 RX ORDER — VENLAFAXINE HYDROCHLORIDE 37.5 MG/1
CAPSULE, EXTENDED RELEASE ORAL
Qty: 90 CAPSULE | Refills: 0 | Status: SHIPPED | OUTPATIENT
Start: 2023-01-24

## 2023-01-24 NOTE — PROGRESS NOTES
"                          [facility] [place] Orthopaedic Surgery Office Follow Up     Office Follow Up Visit     Date: 01/24/2023   Patient Name: Doris Miranda  MRN: [id number]  YOB: 1950  Chief Complaint:   Chief Complaint   Patient presents with   • Follow-up     1 week recheck- 3 months status post Trimalleolar Fracture Open Reduction Internal Fixation Right 10/19/22     History of Present Illness:   Doris Miranda is a 72 y.o. female who is here today for follow up for right ankle fracture with nonunion.  Has been nonweightbearing in cam walking boot using knee scooter since last visit.  Here today to discuss further management for treatment of her nonunion.    Subjective   I reviewed the patient's chief complaint, history of present illness, review of systems, past medical history, surgical history, family history, social history, medications and allergy list   Objective    Vital Signs:   Vitals:    01/24/23 1350   BP: 128/72   Weight: 77.6 kg (171 lb 1.2 oz)   Height: 154.2 cm (60.71\")     Body mass index is 32.64 kg/m².    Ortho Exam:  right LE Foot and Ankle Exam:   Boot removed for exam.  Leg compartments soft and compressible.  Incision well-healed. No tenderness to palpation over distal fibular fracture site.    Results Review:  Duplex Venous Lower Extremity - Right CAR  •  Normal right lower extremity venous duplex scan.  Doppler Arterial Multi Level Lower Extremity - Bilateral CAR  •  Multiphasic multiphasic high resistive waveforms in the Leg and calf.  •  Normal right STEPHANE 1.05.  •  There is evidence of digital ischemia with a reduced TBI of 0.36.    Findings suggest distal small vessel disease in the feet.       Assessment / Plan    Assessment/Plan:   Diagnoses and all orders for this visit:    1. Closed right ankle fracture, with nonunion, subsequent encounter (Primary)      Long discussion was had with patient regarding her options.  Patient with nonunion we discussed surgical management which would " include reoperation with placement of autograft and likely additional hardware.  We also discussed that we could try using a bone stimulator along with having her weight-bear in a cast to see if we can stimulate a healing response.  Patient and patient's son elected to try the latter.  Did  them that we could try this plan for the next month and see if we make any progress.  At that time we will likely discuss if this plan seems viable versus recommending surgical management.  Did reiterate to them that if weightbearing in cast with use of bone stimulator does not lead to union at the fracture site would likely be recommending surgical treatment in the future.  They expressed understanding .  Patient placed in a short leg weightbearing fiberglass cast in clinic today.  CT scan previous showed nonunion at distal fibular fracture site.  Arrangements have been made for patient to start using bone stimulator.  Plan is for patient to start weightbearing in short leg cast with use of bone stimulator.  Bone stimulator to be used on a daily basis, 30-minute treatment daily.  Plan to see patient back in clinic in 1 week for reevaluation and repeat x-rays.  Am optimistic that she can begin to weight-bear in short leg cast without bony displacement.  If x-rays in 1 week show that the bones have moved then we may need to adjust our plan accordingly and likely recommend surgical intervention sooner.    Follow Up:   Return in about 1 week (around 1/31/2023).      Pedro Cook MD  [facility] [place] Orthopedic Surgeon

## 2023-01-25 ENCOUNTER — TELEPHONE (OUTPATIENT)
Dept: INTERNAL MEDICINE | Facility: CLINIC | Age: 73
End: 2023-01-25
Payer: MEDICARE

## 2023-01-25 NOTE — TELEPHONE ENCOUNTER
Faxed order for overnight study bc pt is due for reneval for oxygen and she only uses at night.  Faxed to [facility] [place] [phone]

## 2023-01-31 ENCOUNTER — OFFICE VISIT (OUTPATIENT)
Dept: ORTHOPEDIC SURGERY | Facility: CLINIC | Age: 73
End: 2023-01-31
Payer: MEDICARE

## 2023-01-31 VITALS
HEIGHT: 61 IN | DIASTOLIC BLOOD PRESSURE: 80 MMHG | BODY MASS INDEX: 32.28 KG/M2 | SYSTOLIC BLOOD PRESSURE: 140 MMHG | WEIGHT: 171 LBS

## 2023-01-31 DIAGNOSIS — S82.891K CLOSED RIGHT ANKLE FRACTURE, WITH NONUNION, SUBSEQUENT ENCOUNTER: Primary | ICD-10-CM

## 2023-01-31 PROCEDURE — 99213 OFFICE O/P EST LOW 20 MIN: CPT | Performed by: ORTHOPAEDIC SURGERY

## 2023-01-31 NOTE — PROGRESS NOTES
"                          [facility] [place]     Office Follow Up Visit     Date: 01/31/2023   Patient Name: Doris Miranda  MRN: [id number]  YOB: 1950  Chief Complaint:   Chief Complaint   Patient presents with   • Follow-up     1 week follow up -- Closed right ankle fracture, with nonunion     History of Present Illness:   Doris Miranda is a 72 y.o. female who is here today for follow up.  Patient last seen 1 week ago for further treatment of right ankle nonunion.  Patient has been weightbearing as tolerated in short leg cast and using bone stimulator on a daily basis.  Ambulates at baseline with a cane.  States things have been going okay walking now in the short leg cast.  Occasional ache medial portion of ankle but she states that she believes that more than anything is a product of the cold weather.  Denies any other new complaints.  States no pain with ambulation.    Subjective   I reviewed the patient's chief complaint, history of present illness, review of systems, past medical history, surgical history, family history, social history, medications and allergy list   Objective    Vital Signs:   Vitals:    01/31/23 1435   BP: 140/80   Weight: 77.6 kg (171 lb)   Height: 154.2 cm (60.71\")     Body mass index is 32.62 kg/m².    Ortho Exam:  Short leg cast in place.  Cast clean dry and intact.  Compartment soft compressible around cast.  Brisk cap refill in all toes.  Able to wiggle toes.  Toes warm and well-perfused.    Results Review:  XR Ankle 3+ View Right  Right Ankle X-Ray 01/31/23   Indication: Pain  Views: 3 weight bearing , comparison to previous  Findings: xrays reviewed by me today in the office and show short leg cast   in place, hardware intact with alignment similar to previous, no signs of   hardware failure or of any type displacement of bones at fracture site,   ankle mortise alignment similar to previous       Assessment / Plan    Assessment/Plan:   Diagnoses " and all orders for this visit:    1. Closed right ankle fracture, with nonunion, subsequent encounter (Primary)  -     XR Ankle 3+ View Right      Fracture site and alignment all similar to previous since patient has been weightbearing in short leg cast.  We will plan to continue weightbearing as tolerated in a short leg cast with use of cast shoe and even up for another 3 weeks.  Plan for patient to continue to use bone stimulator.  We will see patient back in clinic in 3 weeks time to takedown of cast and reevaluate, plan for repeat x-rays at that time.  We will decide further management based on evaluation and x-rays at that time.  Counseled patient to return to contact clinic immediately if any concerns arise in the meantime.    Follow Up:   Return in about 3 weeks (around 2/21/2023) for F/U with Images.      Pedro Cook MD  [facility] [place] Orthopedic Surgeon

## 2023-02-08 RX ORDER — CYCLOBENZAPRINE HCL 10 MG
TABLET ORAL
Qty: 30 TABLET | Refills: 0 | Status: SHIPPED | OUTPATIENT
Start: 2023-02-08

## 2023-02-08 NOTE — TELEPHONE ENCOUNTER
Rx Refill Note  Requested Prescriptions     Pending Prescriptions Disp Refills   • cyclobenzaprine (FLEXERIL) 10 MG tablet [Pharmacy Med Name: Cyclobenzaprine HCl 10 MG Oral Tablet] 30 tablet 0     Sig: Take 1 tablet by mouth three times daily as needed for muscle spasm      Last office visit with prescribing clinician: 1/21/2023   Last telemedicine visit with prescribing clinician: 2/8/2023   Next office visit with prescribing clinician: 2/8/2023                           Valentin Salcido MA  02/08/23, 12:57 EST

## 2023-02-13 NOTE — H&P
[facility] Medicine Services  HISTORY AND PHYSICAL    Patient Name: Doris Miranda  : 1950  MRN: [id number]  Primary Care Physician: Earl Fuentes MD  Date of admission: 2019      Subjective   Subjective     Chief Complaint:  SOA    HPI:  Doris Miranda is a 68 y.o. female with past medical history significant for hypertension, arthritis, osteopenia, chronic kidney disease, COPD on nighttime oxygen at 2 L, multiple myeloma with failed remission currently followed by Dr. Epps and undergoing chemotherapy, chronic pain due to occult spinal fractures and hip lesions presented to outside facility ED today with 3 days of shortness of air, chills and sweats and increased lethargy.  Patient very lethargic and information obtained from  who is at bedside.  He reports patient was in her usual state of health until approximately 3 days ago when she began to have chills, sweats, decreased appetite and increased lethargy.  States she has been less mobile and has felt really ill/sleeping more.  She began to have shortness of breath over the last day using albuterol inhaler more frequently.  She does use nighttime oxygen which she has not adjusted nor wore during the daytime.  She presented to outside facility ED and found to be 85% on room air with WBC 11.0, lactic acid 1.3, creatinine 1.5, , and d-dimer 0.93.  Chest x-ray did reveal bibasilar pneumonia and being directly admitted for further management.    Review of Systems   Unable to perform ROS: Acuity of condition      ROS obtained from  at bedside and all pertinents included in HPI  Otherwise complete ROS reviewed and is negative except as mentioned in the HPI.    Personal History     Past Medical History:   Diagnosis Date   • Arthritis    • Chronic bronchitis (CMS/HCC)    • Chronic kidney disease    • COPD (chronic obstructive pulmonary disease) (CMS/HCC)    • Hypertension    • Multiple myeloma (CMS/HCC)    •  Multiple myeloma, failed remission (CMS/HCC)    • Osteoporosis        Past Surgical History:   Procedure Laterality Date   • LIMBAL STEM CELL TRANSPLANT  03/2016    @[facility] Dr. Josiah Spicer   • MOUTH SURGERY         Family History: family history includes Bone cancer in her other; Breast cancer in her other; Liver cancer in her other; Lung cancer in her other. Otherwise pertinent FHx was reviewed and unremarkable.     Social History:  reports that she quit smoking about 3 years ago. She has never used smokeless tobacco. She reports that she does not drink alcohol or use drugs.  Social History     Social History Narrative    Lives in [facility] with . Uses cane or walker for ambulation       Medications:    Available home medication information reviewed.  Medications Prior to Admission   Medication Sig Dispense Refill Last Dose   • acyclovir (ZOVIRAX) 400 MG tablet Take 1 tablet by mouth 2 (Two) Times a Day With Meals. 60 tablet 5 Taking   • albuterol (PROVENTIL HFA;VENTOLIN HFA) 108 (90 Base) MCG/ACT inhaler Inhale 2 puffs Every 6 (Six) Hours As Needed for Wheezing or Shortness of Air. 1 inhaler 5 Taking   • cyclobenzaprine (FLEXERIL) 10 MG tablet Take 1 tablet by mouth 3 (Three) Times a Day As Needed for Muscle Spasms. 90 tablet 2    • esomeprazole (nexIUM) 20 MG capsule Take 20 mg by mouth 2 (Two) Times a Day.   Taking   • gabapentin (NEURONTIN) 400 MG capsule Take 1 capsule by mouth 3 (Three) Times a Day. 90 capsule 2    • Melatonin 5 MG chewable tablet Chew 5 mg Every Night.   Taking   • PARoxetine (PAXIL) 10 MG tablet Take 1 tablet by mouth every night at bedtime. 30 tablet 2 Taking       No Known Allergies    Objective   Objective     Vital Signs:   Temp:  [99 °F (37.2 °C)] 99 °F (37.2 °C)  Heart Rate:  [104] 104  Resp:  [22] 22  BP: (117)/(67) 117/67        Physical Exam   Constitutional: sleeping soundly- difficulty waking, but arouses briefly  Eyes: PERRLA, sclerae anicteric, no conjunctival  injection  HENT: NCAT, mucous membranes moist  Neck: Supple, no thyromegaly, no lymphadenopathy, trachea midline  Respiratory: bilateral faint rales with bilat exp wheezes, abdominal breathing on 3L  Cardiovascular: RRR, no murmurs, rubs, or gallops, palpable pedal pulses bilaterally  Gastrointestinal: Positive bowel sounds, soft, nontender, nondistended  Musculoskeletal: No bilateral ankle edema, no clubbing or cyanosis to extremities  Psychiatric: ONESIMO  Neurologic: sleeping/ difficult to arouse- buts wakes briefly to speak and falls back to sleep  Skin: No rashes      Results Reviewed:  I have personally reviewed current lab, radiology, and data and agree.              Invalid input(s):  ALKPHOS  CrCl cannot be calculated (Patient's most recent lab result is older than the maximum 30 days allowed.).  Brief Urine Lab Results     None        Imaging Results (last 24 hours)     ** No results found for the last 24 hours. **        Results for orders placed during the hospital encounter of 09/12/17   Adult Transthoracic Echo Complete    Narrative · Left ventricular systolic function is hyperdynamic (EF > 70).  · Mild mitral valve regurgitation is present  · Mild aortic valve regurgitation is present.  · Right ventricular cavity is mildly dilated.  · Calculated right ventricular systolic pressure from tricuspid   regurgitation is 48 mmHg.          Assessment/Plan   Assessment / Plan     Active Hospital Problems    Diagnosis POA   • **Sepsis due to pneumonia (CMS/HCC) [J18.9, A41.9] Unknown   • Acute on chronic respiratory failure with hypoxia (CMS/HCC) [J96.21] Yes   • CAP (community acquired pneumonia) [J18.9] Unknown   • Chronic pain [G89.29] Unknown   • Debility [R53.81] Unknown   • Thrombocytopenia (CMS/HCC) [D69.6] Unknown   • On home oxygen therapy [Z99.81] Not Applicable   • Gait instability [R26.81] Yes   • CKD (chronic kidney disease), stage III (CMS/HCC) [N18.3] Yes     Baseline creatinine ~1.5     • Former  smoker [Z87.891] Not Applicable   • Hx of autologous stem cell transplant (March 2016) [Z94.84] Not Applicable     NOT on immunosuppressive therapy d/t chronic TCP s/p stem cell transplant      • Pulmonary hypertension (CMS/[facility]) [I27.20] Yes   • Gastroesophageal reflux disease without esophagitis [K21.9] Yes   • Essential hypertension [I10] Unknown   • COPD (chronic obstructive pulmonary disease) (CMS/[facility]) [J44.9] Unknown   • Multiple myeloma in relapse (CMS/[facility]) [C90.02] Yes            Sepsis secondary to CAP (102 temp, , RR 22, and pneumonia on CXR) complicated by immunosuppression:  -- continue respiratory support: nebs scheduled and prn, O2, IS  -- antibiotics ordered CTX/ Doxy IV  -- stat ABG, respiratory cx, bld cx, urine, troponin pending. Am labs ordered  -- labs reviewed from OSH ED: WBC 11.0. Creatinine 1.5 (unknown baseline) with CKD, PTL 90, Lactic acid 1.3, d dimer 0.93- no CTA/ scan completed  -- will check V/Q scan with renal insufficiency  -- consult Dr. Epps who follows/ treats MM  -- PT/OT/ SLP  --comfort meds      DVT prophylaxis:  Heparin sq    CODE STATUS:    Code Status and Medical Interventions:   Ordered at: 06/17/19 7835     Limited Support to NOT Include:    Intubation    Cardioversion/Defibrillation     Level Of Support Discussed With:    Health Care Surrogate     Code Status:    No CPR     Medical Interventions (Level of Support Prior to Arrest):    Limited       Admission Status:  I believe this patient meets INPATIENT status due to the need for care which can only be reasonably provided in an hospital setting due to acute hypoxic respiratory failure due to PNA requiring IV antibiotics, respiratory support and close monitoring in the settin gof immunosuppression and multiple comorbidities.  In such, I feel patient’s risk for adverse outcomes and need for care warrant INPATIENT evaluation and predict the patient’s care encounter to likely last beyond 2  midnights.        Electronically signed by DAYTON Lawton, 06/17/19, 5:47 PM.        Brief Attending Admission Attestation     I have seen and examined the patient, performing an independent face-to-face diagnostic evaluation with plan of care reviewed and developed with the advanced practice clinician (APC).      Brief Summary Statement:   Doris Miranda is a 68 y.o. female with PMH significant for PMH significant for multiple myeloma (followed by Dr. Epps and on chemo), HTN, CKD, COPD (nighttime O2 at 2L), and chronic pain due to spinal lesions and hip OA.  She comes to us in transfer from [facility].  She reports a 3 days history of cough, rigors, sweats, lethargy and increased SOA.      At the OSH ER, she was found to be 85% on room air with WBC 11.0, lactic acid 1.3, creatinine 1.5, , and d-dimer 0.93.  Chest x-ray did reveal bibasilar pneumonia.    Presently, the patient is alert and oriented and able to give a good history independently, which is an improvement from earlier today.      Remainder of detailed HPI is as noted above and has been reviewed and/or edited by me for completeness.      Attending Physical Exam:  Constitutional: Awake, alert  Eyes: PERRLA, sclerae anicteric, no conjunctival injection  HENT: NCAT, mucous membranes moist  Neck: Supple, no thyromegaly, no lymphadenopathy, trachea midline  Respiratory: Wheezing throughout and crackles noted LLL, nonlabored respirations   Cardiovascular: RRR, no murmurs, rubs, or gallops, palpable pedal pulses bilaterally  Gastrointestinal: Positive bowel sounds, soft, nontender, nondistended  Musculoskeletal: No bilateral ankle edema, no clubbing or cyanosis to extremities  Psychiatric: Appropriate affect, cooperative  Neurologic: Oriented x 3, strength symmetric in all extremities, Cranial Nerves grossly intact to confrontation, speech clear  Skin: No rashes        Brief Assessment/Plan :  See above for further detailed assessment and  plan developed with APC which I have reviewed and/or edited for completeness.      Electronically signed by Keyana Boyd MD, 06/17/19, 9:46 PM.          Detail Level: Zone Initiate Treatment: HypoCyn Dermal 0.012 %-0.002 %-0.046 % topical spray Render In Strict Bullet Format?: No

## 2023-02-22 ENCOUNTER — OFFICE VISIT (OUTPATIENT)
Dept: ORTHOPEDIC SURGERY | Facility: CLINIC | Age: 73
End: 2023-02-22
Payer: MEDICARE

## 2023-02-22 VITALS
DIASTOLIC BLOOD PRESSURE: 78 MMHG | SYSTOLIC BLOOD PRESSURE: 130 MMHG | BODY MASS INDEX: 32.3 KG/M2 | HEIGHT: 61 IN | WEIGHT: 171.08 LBS

## 2023-02-22 DIAGNOSIS — S82.891K CLOSED RIGHT ANKLE FRACTURE, WITH NONUNION, SUBSEQUENT ENCOUNTER: Primary | ICD-10-CM

## 2023-02-22 PROCEDURE — 99213 OFFICE O/P EST LOW 20 MIN: CPT | Performed by: ORTHOPAEDIC SURGERY

## 2023-02-22 PROCEDURE — 29425 APPL SHORT LEG CAST WALKING: CPT | Performed by: ORTHOPAEDIC SURGERY

## 2023-02-22 RX ORDER — ACETAMINOPHEN 160 MG
2000 TABLET,DISINTEGRATING ORAL DAILY
COMMUNITY

## 2023-02-22 NOTE — PROGRESS NOTES
"                          [facility] [place] [facility] Office Follow Up     Office Follow Up Visit     Date: 02/22/2023   Patient Name: Doris Miranda  MRN: [id number]  YOB: 1950  Chief Complaint:   Chief Complaint   Patient presents with   • Follow-up     3 week follow up; Closed right ankle fracture, with nonunion     History of Present Illness:   Doris Miranda is a 72 y.o. female who is here today for follow up for follow-up for right ankle fracture with nonunion.  Last seen 3 weeks ago.  Has been weightbearing in short leg cast and using bone stimulator on a daily basis.  Ambulates at baseline with a cane.  States things have been going well walking in the short leg cast.  No other new complaints.  No pain with ambulation.    Subjective   I reviewed the patient's chief complaint, history of present illness, review of systems, past medical history, surgical history, family history, social history, medications and allergy list   Objective    Vital Signs:   Vitals:    02/22/23 1446   BP: 130/78   Weight: 77.6 kg (171 lb 1.2 oz)   Height: 154.2 cm (60.71\")     Body mass index is 32.64 kg/m².    Ortho Exam:  right LE Foot and Ankle Exam:   Cast removed for exam.  Leg compartments soft and compressible.    Incision well-healed, Able to actively plantarflex and dorsiflex ankle and all toes and ankle.  No swelling.  Toes warm and well-perfused.  Brisk cap refill in all toes.  No tenderness to palpation over distal fibular fracture site.    Results Review:  XR Ankle 3+ View Right  Right Ankle X-Ray 2/22/23  Indication: Pain  Views: 3 weight bearing , comparison to previous  Findings: xrays reviewed by me today in the office and show hardware   intact with alignment similar to previous, no signs of hardware failure or   of any type  displacement of bones at fracture site, ankle mortise alignment similar to   previous       Assessment / Plan    Assessment/Plan:   Diagnoses and all orders for this visit:    1. " Closed right ankle fracture, with nonunion, subsequent encounter (Primary)  -     XR Ankle 3+ View Right    Fracture site and alignment continues to be similar to previous following weightbearing for the last month in a short leg cast.  Difficult to tell if there is additional callus formation at the fracture site.  That being said plan will be for patient to continue use of bone stimulator as well as continue to be weightbearing in a short leg cast.  Short leg cast was removed in clinic today and patient was placed in a new short leg fiberglass cast.  Plan to see patient back for follow-up in 4 weeks for repeat x-rays and takedown of cast for reevaluation at that time.  Counseled patient to return to contact clinic immediately if any concerns arise in the meantime.    Follow Up:   Return in about 4 weeks (around 3/22/2023) for F/U with Images, Recheck.      Pedro Cook MD  [facility] Orthopedic Surgeon

## 2023-03-22 ENCOUNTER — OFFICE VISIT (OUTPATIENT)
Dept: ORTHOPEDIC SURGERY | Facility: CLINIC | Age: 73
End: 2023-03-22
Payer: MEDICARE

## 2023-03-22 VITALS
SYSTOLIC BLOOD PRESSURE: 148 MMHG | DIASTOLIC BLOOD PRESSURE: 80 MMHG | HEIGHT: 61 IN | WEIGHT: 171.08 LBS | BODY MASS INDEX: 32.3 KG/M2

## 2023-03-22 DIAGNOSIS — S82.891K CLOSED RIGHT ANKLE FRACTURE, WITH NONUNION, SUBSEQUENT ENCOUNTER: Primary | ICD-10-CM

## 2023-03-22 PROCEDURE — 1160F RVW MEDS BY RX/DR IN RCRD: CPT | Performed by: ORTHOPAEDIC SURGERY

## 2023-03-22 PROCEDURE — 99213 OFFICE O/P EST LOW 20 MIN: CPT | Performed by: ORTHOPAEDIC SURGERY

## 2023-03-22 PROCEDURE — 3077F SYST BP >= 140 MM HG: CPT | Performed by: ORTHOPAEDIC SURGERY

## 2023-03-22 PROCEDURE — 1159F MED LIST DOCD IN RCRD: CPT | Performed by: ORTHOPAEDIC SURGERY

## 2023-03-22 PROCEDURE — 3079F DIAST BP 80-89 MM HG: CPT | Performed by: ORTHOPAEDIC SURGERY

## 2023-04-07 ENCOUNTER — TELEPHONE (OUTPATIENT)
Dept: ORTHOPEDIC SURGERY | Facility: CLINIC | Age: 73
End: 2023-04-07
Payer: MEDICARE

## 2023-04-07 NOTE — TELEPHONE ENCOUNTER
CALLED TO GET PATIENT SWITCHED TO DR. PONCE'S SCHEDULE. IF POSSIBLE FOR THE SAME DATE AND TIME. SHOULD BE A POST OP APPT. DR. LOPES WILL BE OUT OF THE OFFICE AND DR. PONCE HAS AGREED TO F/U WITH PATIENT.

## 2023-04-19 ENCOUNTER — OFFICE VISIT (OUTPATIENT)
Dept: ORTHOPEDIC SURGERY | Facility: CLINIC | Age: 73
End: 2023-04-19
Payer: MEDICARE

## 2023-04-19 VITALS
HEIGHT: 61 IN | DIASTOLIC BLOOD PRESSURE: 60 MMHG | SYSTOLIC BLOOD PRESSURE: 130 MMHG | WEIGHT: 171.1 LBS | BODY MASS INDEX: 32.3 KG/M2

## 2023-04-19 DIAGNOSIS — I87.8 VENOUS STASIS: ICD-10-CM

## 2023-04-19 DIAGNOSIS — S82.891K CLOSED RIGHT ANKLE FRACTURE, WITH NONUNION, SUBSEQUENT ENCOUNTER: Primary | ICD-10-CM

## 2023-04-19 NOTE — PROGRESS NOTES
NEW PATIENT    Patient: Doris Miranda  : 1950    Primary Care Provider: Earl Fuentes MD    Requesting Provider: As above    Initial Evaluation of the Right Ankle (Closed right ankle fracture, with nonunion)      History    Chief Complaint: Right ankle fracture    History of Present Illness: This is a very pleasant 72-year-old woman here with her son-in-law.  She is Dr. Cook's patient and I am going to be following her.  She is status post open reduction internal fixation of a trimalleolar ankle fracture.  She has a complex medical history.  She is at baseline and ambulator with a cane.  Dr. Cook did ORIF of the ankle fracture on 10/19/2022.  She has been very slow to heal the bone.  They are currently using a bone stimulator.  A CT scan showed the incomplete healing of the fibula fracture at the distalmost aspect of the prior comminution.  Its just at the level of the joint line.  She is supposed to be walking in a tall boot.  She reports however that she thinks the boot is preventing her from getting back to normal.  She notes that the foot and ankle swell persistently.  I explained to her that the swelling is entirely normal.  It is due to the ankle fracture trauma.  It will swell for several years and sometimes permanently.  The best way to control the swelling is with a compression sock and we discussed what type and how to find them.  I explained that the boot is to help prevent a complication from the delayed union.  I think it is acceptable to compromise I think she may stay out of the boot in her house but whenever she goes out such as to the doctor or stopping she absolutely needs to wear the boot.  She reports she has no pain at all in the ankle.          Current Outpatient Medications on File Prior to Visit   Medication Sig Dispense Refill   • acetaminophen (TYLENOL) 500 MG tablet Take 1 tablet by mouth Every 6 (Six) Hours As Needed for Mild Pain. 30 tablet 0   • acyclovir (ZOVIRAX) 400 MG  tablet Take 1 tablet by mouth 2 (Two) Times a Day With Meals. (Patient taking differently: Take 1 tablet by mouth 2 (Two) Times a Day With Meals. chronic) 60 tablet 11   • aspirin (aspirin) 81 MG EC tablet Take 1 tablet by mouth Daily. 30 tablet 3   • Cholecalciferol (Vitamin D3) 50 MCG (2000 UT) capsule Take 1 capsule by mouth Daily.     • cyclobenzaprine (FLEXERIL) 10 MG tablet Take 1 tablet by mouth three times daily as needed for muscle spasm 30 tablet 0   • esomeprazole (nexIUM) 20 MG capsule Take 1 capsule by mouth 2 (Two) Times a Day.     • melatonin 5 MG tablet tablet Take 1 tablet by mouth At Night As Needed (Sleep).     • metoprolol tartrate (LOPRESSOR) 25 MG tablet Take 1 tablet by mouth twice daily 180 tablet 0   • venlafaxine XR (EFFEXOR-XR) 37.5 MG 24 hr capsule Take 1 capsule by mouth once daily in the morning 90 capsule 0     No current facility-administered medications on file prior to visit.      No Known Allergies   Past Medical History:   Diagnosis Date   • Arthritis     hands   • Arthritis of back 2015   • Chronic bronchitis    • Chronic kidney disease     stage 3   • COPD (chronic obstructive pulmonary disease)    • DVT, lower extremity     in 30's   • Fracture of ankle 2022   • GERD (gastroesophageal reflux disease)    • Hip arthrosis 2016   • History of COVID-19 08/2021    hospital no intubation   • History of total right hip replacement 09/15/2019   • Hypertension    • Multiple myeloma    • Multiple myeloma, failed remission    • On home oxygen therapy     4liters a night   • Osteoporosis      Past Surgical History:   Procedure Laterality Date   • ANKLE OPEN REDUCTION INTERNAL FIXATION  10/22   • CATARACT EXTRACTION Bilateral    • COLONOSCOPY     • ENDOSCOPY     • EYE SURGERY Bilateral     catatacts   • JOINT REPLACEMENT  7/19 1/20   • LIMBAL STEM CELL TRANSPLANT  03/2016    @[facility] Dr. Josiah Spicer   • MOUTH SURGERY      full mouth extraction   • PARTIAL HIP ARTHROPLASTY Right 08/2019   •  "TOTAL HIP ARTHROPLASTY Left 2020    Dr Santa   • TRIMALLEOLAR FRACTURE OPEN REDUCTION INTERNAL FIXATION Right 10/19/2022    Procedure: TRIMALLEOLAR FRACTURE OPEN REDUCTION INTERNAL FIXATION RIGHT;  Surgeon: Pedro Cook MD;  Location: [facility];  Service: Orthopedics;  Laterality: Right;     Family History   Problem Relation Age of Onset   • Breast cancer Other    • Lung cancer Other    • Liver cancer Other    • Bone cancer Other    • Cancer Sister         Breast cancer      Social History     Socioeconomic History   • Marital status:    Tobacco Use   • Smoking status: Former     Packs/day: 2.00     Years: 45.00     Pack years: 90.00     Types: Cigarettes     Quit date: 2015     Years since quittin.7   • Smokeless tobacco: Never   Vaping Use   • Vaping Use: Never used   Substance and Sexual Activity   • Alcohol use: No   • Drug use: No   • Sexual activity: Not Currently     Partners: Male     Birth control/protection: None        Review of Systems   Constitutional: Negative.    HENT: Negative.    Eyes: Negative.    Respiratory: Negative.    Cardiovascular: Negative.    Gastrointestinal: Negative.    Endocrine: Negative.    Genitourinary: Negative.    Musculoskeletal: Positive for arthralgias.   Skin: Negative.    Allergic/Immunologic: Negative.    Neurological: Negative.    Hematological: Negative.    Psychiatric/Behavioral: Negative.        The following portions of the patient's history were reviewed and updated as appropriate: allergies, current medications, past family history, past medical history, past social history, past surgical history and problem list.    Physical Exam:   /60   Ht 154.2 cm (60.71\")   Wt 77.6 kg (171 lb 1.6 oz)   BMI 32.64 kg/m²   She ambulates with a cane in the left hand but her gait is reasonably symmetric with decent elie.  No antalgia.  Right ankle is entirely nontender.  The incisions are healed.  Mild normal swelling.  She has swelling in the left " leg also.  As above we discussed compression socks         Medical Decision Making    Data Review:   ordered and reviewed x-rays today    Assessment and Plan/ Diagnosis/Treatment options:   1. Closed right ankle fracture, with nonunion, subsequent encounter  The fibula appears to be a nonunion but it is asymptomatic.  I explained nonunions to the patient and her son-in-law.  I explained that if she were symptomatic I would push towards repeat surgery.  However because she is not symptomatic and the hardware has maintained good alignment with weightbearing I think it is acceptable to keep watching this.  I definitely would recommend she wear the boot when she is going out of the house but I think it is okay to take the boot off inside.  Again I advised her that the swelling is completely normal and has nothing to do with the boot.  The nonunion has stood up to the stress test of her walking around without the boot out in the world so hopefully she is going to avoid any further surgery.  She would like to avoid surgery.  I think she should continue with the bone stimulator.  I will see her again in 6 to 8 weeks with standing 3 views of the ankle  - XR Ankle 3+ View Right  - XR Ankle 3+ View Right    2. Venous stasis  Definitely should wear compression socks we discussed what type and where to find            Part of this encounter note is an electronic transcription/translation of spoken language to printed text. The electronic translation of spoken language may permit erroneous, or at times, nonsensical words or phrases to be inadvertently transcribed; Although I have reviewed the note for such errors, some may still exist.          Heide Rivera MD

## 2023-04-27 DIAGNOSIS — F43.23 SITUATIONAL MIXED ANXIETY AND DEPRESSIVE DISORDER: ICD-10-CM

## 2023-04-27 DIAGNOSIS — I10 ESSENTIAL HYPERTENSION: ICD-10-CM

## 2023-04-27 RX ORDER — VENLAFAXINE HYDROCHLORIDE 37.5 MG/1
CAPSULE, EXTENDED RELEASE ORAL
Qty: 90 CAPSULE | Refills: 0 | Status: SHIPPED | OUTPATIENT
Start: 2023-04-27

## 2023-05-08 RX ORDER — CYCLOBENZAPRINE HCL 10 MG
10 TABLET ORAL 3 TIMES DAILY PRN
Qty: 30 TABLET | Refills: 0 | Status: SHIPPED | OUTPATIENT
Start: 2023-05-08

## 2023-06-14 ENCOUNTER — OFFICE VISIT (OUTPATIENT)
Dept: ORTHOPEDIC SURGERY | Facility: CLINIC | Age: 73
End: 2023-06-14
Payer: MEDICARE

## 2023-06-14 VITALS
BODY MASS INDEX: 30.96 KG/M2 | SYSTOLIC BLOOD PRESSURE: 122 MMHG | WEIGHT: 164 LBS | DIASTOLIC BLOOD PRESSURE: 70 MMHG | HEIGHT: 61 IN

## 2023-06-14 DIAGNOSIS — S82.891K CLOSED RIGHT ANKLE FRACTURE, WITH NONUNION, SUBSEQUENT ENCOUNTER: Primary | ICD-10-CM

## 2023-06-14 NOTE — PROGRESS NOTES
"                          [facility] [place] [facility] Office Follow Up     Office Follow Up Visit     Date: 06/14/2023   Patient Name: Doris Miranda  MRN: [id number]  YOB: 1950  Chief Complaint:   Chief Complaint   Patient presents with    Follow-up     2 month f/u; Closed right ankle fracture, with nonunion     History of Present Illness:   Doris Miranda is a 72 y.o. female who is here today for follow up for follow-up for right ankle fracture with nonunion.  Patient last seen in clinic about 2 months ago.  Has been weightbearing as tolerated in a tall cam walking boot when out of the house and weightbearing as tolerated out of the boot while walking around the house.  No new complaints.  States not having any pain.  Happy with progress.  Still uses cane to ambulate at baseline.    Subjective   I reviewed the patient's chief complaint, history of present illness, review of systems, past medical history, surgical history, family history, social history, medications and allergy list   Objective    Vital Signs:   Vitals:    06/14/23 1357   BP: 122/70   Weight: 74.4 kg (164 lb)   Height: 154.2 cm (60.71\")     Body mass index is 31.29 kg/m².    Ortho Exam:  right LE Foot and Ankle Exam:   Boot removed for exam.  Leg compartments soft and compressible.    Incision well-healed, Able to actively plantarflex and dorsiflex ankle and all toes and ankle.  No swelling.  Toes warm and well-perfused.  Brisk cap refill in all toes.  No tenderness to palpation over distal fibular fracture site.    Results Review:  XR Ankle 3+ View Right  Right Ankle X-Ray 06/14/2023  Indication: Pain  Views: 3 weight bearing , comparison to previous  Findings: xrays reviewed by me today in the office and show hardware   intact with alignment  similar to previous, no signs of hardware failure or of any type  displacement of bones at fracture site, ankle mortise alignment similar to   previous, difficult to appreciate additional callus " formation at distal   fibular fracture site       Assessment / Plan    Assessment/Plan:   Diagnoses and all orders for this visit:    1. Closed right ankle fracture, with nonunion, subsequent encounter (Primary)  -     XR Ankle 3+ View Right      Fracture site and alignment continue to be similar to previous.  Once again difficult to tell if there is additional callus summation at fracture site on x-rays.  At this point patient can continue to weight-bear as tolerated in cam walking boot when out of the house and can be out of the boot weightbearing as tolerated around the house.  Counseled patient to continue to use stimulator as there is no downside.  Difficult to tell if going stimulator is contributing to new bone formation however patient's symptoms continue to be well controlled and this could likely be due to help from the stimulator.  Plan to see patient back in clinic for follow-up in 3 months for repeat x-rays and reevaluation.  Patient counseled to contact the clinic sooner if any concerns should arise.    Follow Up:   Return in about 3 months (around 9/14/2023) for Recheck, F/U with Images.      Pedro Cook MD  [facility] [place] Orthopedic Surgeon

## 2023-07-21 PROCEDURE — 85027 COMPLETE CBC AUTOMATED: CPT | Performed by: INTERNAL MEDICINE

## 2023-07-21 PROCEDURE — 82607 VITAMIN B-12: CPT | Performed by: INTERNAL MEDICINE

## 2023-07-21 PROCEDURE — 83540 ASSAY OF IRON: CPT | Performed by: INTERNAL MEDICINE

## 2023-07-21 PROCEDURE — 84443 ASSAY THYROID STIM HORMONE: CPT | Performed by: INTERNAL MEDICINE

## 2023-07-21 PROCEDURE — 80053 COMPREHEN METABOLIC PANEL: CPT | Performed by: INTERNAL MEDICINE

## 2023-09-18 ENCOUNTER — OFFICE VISIT (OUTPATIENT)
Dept: ORTHOPEDIC SURGERY | Facility: CLINIC | Age: 73
End: 2023-09-18
Payer: MEDICARE

## 2023-09-18 VITALS
HEIGHT: 61 IN | SYSTOLIC BLOOD PRESSURE: 120 MMHG | DIASTOLIC BLOOD PRESSURE: 70 MMHG | BODY MASS INDEX: 31.15 KG/M2 | WEIGHT: 165 LBS

## 2023-09-18 DIAGNOSIS — S82.891K CLOSED RIGHT ANKLE FRACTURE, WITH NONUNION, SUBSEQUENT ENCOUNTER: Primary | ICD-10-CM

## 2023-09-18 PROCEDURE — 3078F DIAST BP <80 MM HG: CPT | Performed by: ORTHOPAEDIC SURGERY

## 2023-09-18 PROCEDURE — 3074F SYST BP LT 130 MM HG: CPT | Performed by: ORTHOPAEDIC SURGERY

## 2023-09-18 PROCEDURE — 1159F MED LIST DOCD IN RCRD: CPT | Performed by: ORTHOPAEDIC SURGERY

## 2023-09-18 PROCEDURE — 1160F RVW MEDS BY RX/DR IN RCRD: CPT | Performed by: ORTHOPAEDIC SURGERY

## 2023-09-18 PROCEDURE — 99213 OFFICE O/P EST LOW 20 MIN: CPT | Performed by: ORTHOPAEDIC SURGERY

## 2023-09-18 NOTE — PROGRESS NOTES
"                          [facility] [place] [facility] [place]     Office Follow Up Visit     Date: 09/18/2023   Patient Name: Doris Miranda  MRN: [id number]  YOB: 1950  Chief Complaint:   Chief Complaint   Patient presents with    Follow-up     3 month follow up - Closed right ankle fracture     History of Present Illness:   Doris Miranda is a 73 y.o. female who is here today for follow up for follow-up following ORIF right ankle last October.  Patient has been weightbearing as tolerated around her house with no pain.  Continues to wear a cam walking boot when ambulating outside of house.  Has continued to use bone stimulator on a daily basis.  Patient continues to be able to do all the activities she desires to do.  Denies any pain at injury site.    Subjective   I reviewed the patient's chief complaint, history of present illness, review of systems, past medical history, surgical history, family history, social history, medications and allergy list   Objective    Vital Signs:   Vitals:    09/18/23 1424   BP: 120/70   Weight: 74.8 kg (165 lb)   Height: 154.2 cm (60.71\")     Body mass index is 31.48 kg/m².    Ortho Exam:  right LE Foot and Ankle Exam:   Boot removed for exam.  Leg compartments soft and compressible.    Incision well-healed, Able to actively plantarflex and dorsiflex ankle and all toes and ankle.  No swelling.  Toes warm and well-perfused.  Brisk cap refill in all toes.  No tenderness to palpation over distal fibular fracture site.       Results Review:  XR Ankle 3+ View Right  Right Ankle X-Ray 09/18/23   Indication: Pain  Views: 3 weight bearing , comparison to previous  Findings: xrays reviewed by me today in the office and show hardware   intact with alignment similar to previous no signs of hardware failure,   ankle mortise similar to previous, interval bone formation around fracture   site          Assessment / Plan    Assessment/Plan:   Diagnoses and all orders for this " visit:    1. Closed right ankle fracture, with nonunion, subsequent encounter (Primary)  -     XR Ankle 3+ View Right      At this point in time Ms. Miranda can begin to wean out of the cam walking boot for all activity.  I also counseled patient that I believe that discontinuing the bone stimulator as it may have reached maximal benefit is probably reasonable at this time as well.  Patient can resume all normal activities being cautious and counseled to abstain from activities that put her ankle at increased risk for repeat injury.  Plan to see patient back in 6 months for repeat x-rays and reevaluation.  It was a pleasure seeing her today.    Follow Up:   Return in about 6 months (around 3/18/2024).      Pedro Cook MD  [facility] [place] [facility]

## 2023-11-21 ENCOUNTER — OFFICE VISIT (OUTPATIENT)
Dept: INTERNAL MEDICINE | Facility: CLINIC | Age: 73
End: 2023-11-21
Payer: MEDICARE

## 2023-11-21 VITALS
DIASTOLIC BLOOD PRESSURE: 70 MMHG | WEIGHT: 167 LBS | HEIGHT: 61 IN | BODY MASS INDEX: 31.53 KG/M2 | OXYGEN SATURATION: 89 % | HEART RATE: 70 BPM | SYSTOLIC BLOOD PRESSURE: 130 MMHG

## 2023-11-21 DIAGNOSIS — I10 ESSENTIAL HYPERTENSION: Primary | Chronic | ICD-10-CM

## 2023-11-21 DIAGNOSIS — J42 CHRONIC BRONCHITIS, UNSPECIFIED CHRONIC BRONCHITIS TYPE: ICD-10-CM

## 2023-11-21 DIAGNOSIS — C90.02 MULTIPLE MYELOMA IN RELAPSE: ICD-10-CM

## 2023-11-21 DIAGNOSIS — N18.30 STAGE 3 CHRONIC KIDNEY DISEASE, UNSPECIFIED WHETHER STAGE 3A OR 3B CKD: Chronic | ICD-10-CM

## 2023-11-21 DIAGNOSIS — K21.9 GASTROESOPHAGEAL REFLUX DISEASE WITHOUT ESOPHAGITIS: Chronic | ICD-10-CM

## 2023-11-21 DIAGNOSIS — D12.6 TUBULAR ADENOMA OF COLON: ICD-10-CM

## 2023-11-21 DIAGNOSIS — K57.30 DIVERTICULOSIS OF LARGE INTESTINE WITHOUT HEMORRHAGE: ICD-10-CM

## 2023-11-21 NOTE — PROGRESS NOTES
Patient is a 73 y.o. female who is here for a follow up of hypertension.  Chief Complaint   Patient presents with    Hypertension         HPI:    Here for mgmt of COPD and HTN.  Using O2 prn.  BP has been good.  Allergies are improved with Singular.  GERD is controlled.  Using nebs qam.  No fever or chills.  Still dealing with diffuse body aches.      History:     Patient Active Problem List   Diagnosis    Diverticulosis of large intestine without hemorrhage    Tubular adenoma of colon    Essential hypertension    COPD (chronic obstructive pulmonary disease)    Arthritis    Gastroesophageal reflux disease without esophagitis    Chronic left-sided low back pain with sciatica    Thrombocytopenia since stem cell transplant March 2016    Hx of autologous stem cell transplant (March 2016)    CKD (chronic kidney disease), stage III    Former smoker    Non-rheumatic tricuspid valve insufficiency    Pulmonary hypertension    Chronic respiratory failure with hypoxia (been noncompliant with outpatient oxygen in past)    Gait instability    Chronic pain    Debility    Thrombocytopenia    On home oxygen therapy    Hypogammaglobulinemia, acquired    Multiple myeloma in relapse    COVID-19    Closed trimalleolar fracture of right ankle    Preop examination    Closed displaced pilon fracture of right tibia with routine healing    Status post open reduction and internal fixation (ORIF) of fracture    Closed right ankle fracture, with nonunion, subsequent encounter       Past Medical History:   Diagnosis Date    Arthritis     hands    Arthritis of back 2015    Chronic bronchitis     Chronic kidney disease     stage 3    COPD (chronic obstructive pulmonary disease)     DVT, lower extremity     in 30's    Fracture of ankle 2022    GERD (gastroesophageal reflux disease)     Hip arthrosis 2016    History of COVID-19 08/2021    hospital no intubation    History of total right hip replacement 09/15/2019    Hypertension     Irritable bowel  syndrome     Multiple myeloma     Multiple myeloma, failed remission     On home oxygen therapy     4liters a night    Osteoporosis     Visual impairment catarack surgery both eyes    6/15/22/right      6/23/22  left       Past Surgical History:   Procedure Laterality Date    ANKLE OPEN REDUCTION INTERNAL FIXATION  10/22    CATARACT EXTRACTION Bilateral     COLONOSCOPY      ENDOSCOPY      EYE SURGERY Bilateral     catatacts    FRACTURE SURGERY  10/22    right ankle    JOINT REPLACEMENT  7/19 1/20    LIMBAL STEM CELL TRANSPLANT  03/2016    @[facility] Dr. Josiah Spicer    MOUTH SURGERY      full mouth extraction    PARTIAL HIP ARTHROPLASTY Right 08/2019    TOTAL HIP ARTHROPLASTY Left 01/2020    Dr Santa    TRIMALLEOLAR FRACTURE OPEN REDUCTION INTERNAL FIXATION Right 10/19/2022    Procedure: TRIMALLEOLAR FRACTURE OPEN REDUCTION INTERNAL FIXATION RIGHT;  Surgeon: Pedro Cook MD;  Location: [facility];  Service: Orthopedics;  Laterality: Right;       Current Outpatient Medications on File Prior to Visit   Medication Sig    acetaminophen (TYLENOL) 500 MG tablet Take 1 tablet by mouth Every 6 (Six) Hours As Needed for Mild Pain.    albuterol sulfate  (90 Base) MCG/ACT inhaler Inhale 2 puffs Every 6 (Six) Hours As Needed for Wheezing or Shortness of Air.    aspirin (aspirin) 81 MG EC tablet Take 1 tablet by mouth Daily.    Cholecalciferol (Vitamin D3) 50 MCG (2000 UT) capsule Take 1 capsule by mouth Daily.    esomeprazole (nexIUM) 20 MG capsule Take 1 capsule by mouth 2 (Two) Times a Day.    melatonin 5 MG tablet tablet Take 1 tablet by mouth At Night As Needed (Sleep).    metoprolol tartrate (LOPRESSOR) 25 MG tablet Take 1 tablet by mouth 2 (Two) Times a Day.    montelukast (SINGULAIR) 10 MG tablet Take 1 tablet by mouth Every Night.    venlafaxine XR (EFFEXOR-XR) 37.5 MG 24 hr capsule Take 1 capsule by mouth Every Morning.    acyclovir (ZOVIRAX) 400 MG tablet Take 1 tablet by mouth 2 (Two) Times a Day With Meals.      No current facility-administered medications on file prior to visit.       Family History   Problem Relation Age of Onset    Breast cancer Other     Lung cancer Other     Liver cancer Other     Bone cancer Other     Cancer Sister         Breast cancer       Social History     Socioeconomic History    Marital status:    Tobacco Use    Smoking status: Former     Packs/day: 2.00     Years: 45.00     Additional pack years: 0.00     Total pack years: 90.00     Types: Cigarettes     Quit date: 2015     Years since quittin.3    Smokeless tobacco: Never   Vaping Use    Vaping Use: Never used   Substance and Sexual Activity    Alcohol use: No    Drug use: No    Sexual activity: Not Currently     Partners: Male     Birth control/protection: None         Review of Systems   Constitutional:  Positive for fatigue. Negative for activity change, appetite change and unexpected weight change.   HENT:  Negative for congestion, ear pain, rhinorrhea, sinus pressure, sore throat and trouble swallowing.    Eyes:  Negative for pain and visual disturbance.   Respiratory:  Positive for cough and shortness of breath. Negative for chest tightness and wheezing.    Cardiovascular:  Negative for chest pain, palpitations and leg swelling.   Gastrointestinal:  Negative for abdominal distention, abdominal pain, blood in stool, constipation, diarrhea and vomiting.         colonoscopy , repeat    Endocrine: Negative for cold intolerance, heat intolerance, polydipsia and polyphagia.   Genitourinary:  Negative for difficulty urinating, dyspareunia, dysuria, frequency, hematuria, menstrual problem, urgency and vaginal discharge.        Refusing   Musculoskeletal:  Positive for arthralgias, back pain and gait problem. Negative for joint swelling and myalgias.   Skin:  Negative for color change, pallor, rash and wound.        itching   Allergic/Immunologic: Negative for environmental allergies, food allergies and  "immunocompromised state.   Neurological:  Negative for dizziness, tremors, seizures, syncope, facial asymmetry, speech difficulty, weakness, numbness and headaches.   Hematological:  Negative for adenopathy. Does not bruise/bleed easily.   Psychiatric/Behavioral:  Negative for decreased concentration, dysphoric mood, sleep disturbance and suicidal ideas. The patient is nervous/anxious (situational).        /70   Pulse 70   Ht 154.2 cm (60.71\")   Wt 75.8 kg (167 lb)   SpO2 (!) 89%   BMI 31.86 kg/m²       Physical Exam  Constitutional:       Appearance: Normal appearance. She is well-developed.   HENT:      Head: Normocephalic and atraumatic.      Right Ear: External ear normal.      Left Ear: External ear normal.      Nose: Nose normal.      Mouth/Throat:      Mouth: Mucous membranes are moist.      Pharynx: Oropharynx is clear.   Eyes:      Extraocular Movements: Extraocular movements intact.      Conjunctiva/sclera: Conjunctivae normal.      Pupils: Pupils are equal, round, and reactive to light.   Cardiovascular:      Rate and Rhythm: Normal rate and regular rhythm.      Heart sounds: Normal heart sounds.   Pulmonary:      Effort: Pulmonary effort is normal.      Comments: Diminished BS  Abdominal:      General: Bowel sounds are normal.      Palpations: Abdomen is soft.   Musculoskeletal:         General: Deformity (kyphosis) present.      Cervical back: Normal range of motion and neck supple.   Lymphadenopathy:      Cervical: No cervical adenopathy.   Skin:     General: Skin is warm and dry.   Neurological:      General: No focal deficit present.      Mental Status: She is alert and oriented to person, place, and time.   Psychiatric:         Mood and Affect: Mood normal.         Behavior: Behavior normal.         Thought Content: Thought content normal.         Procedure:      Discussion/Summary:    Htn- cont metoprolol, advised goal of 130/80  copd-not an issue since stopping tob, proventil prn, " controlled on prn albuterol and prn O2  gerd/gastritis-cont ppi prn, controlled  MM/back pain with radiculopathy-f/u Dr Epps and  ortho prn, off chemo  Hx of TA-resope 12/21 overdue, wants to delay  Gait instability/chronic back pain-improved with surgery  Situational anxiety/depression-cont effexor xr, stable  Diverticulosis-increase fiber  Low platelets-avoid NSAIDs  CKD-counseled on NSAIDs avoidance and adequate hydration, Cr stable  ?rhinitis-rx singular  Other-advised Vit D 2000 IU qd     7/21 Labs noted and dw patient    Current Outpatient Medications:     acetaminophen (TYLENOL) 500 MG tablet, Take 1 tablet by mouth Every 6 (Six) Hours As Needed for Mild Pain., Disp: 30 tablet, Rfl: 0    albuterol sulfate  (90 Base) MCG/ACT inhaler, Inhale 2 puffs Every 6 (Six) Hours As Needed for Wheezing or Shortness of Air., Disp: 18 g, Rfl: 5    aspirin (aspirin) 81 MG EC tablet, Take 1 tablet by mouth Daily., Disp: 30 tablet, Rfl: 3    Cholecalciferol (Vitamin D3) 50 MCG (2000 UT) capsule, Take 1 capsule by mouth Daily., Disp: , Rfl:     esomeprazole (nexIUM) 20 MG capsule, Take 1 capsule by mouth 2 (Two) Times a Day., Disp: , Rfl:     melatonin 5 MG tablet tablet, Take 1 tablet by mouth At Night As Needed (Sleep)., Disp: , Rfl:     metoprolol tartrate (LOPRESSOR) 25 MG tablet, Take 1 tablet by mouth 2 (Two) Times a Day., Disp: 180 tablet, Rfl: 3    montelukast (SINGULAIR) 10 MG tablet, Take 1 tablet by mouth Every Night., Disp: 30 tablet, Rfl: 5    venlafaxine XR (EFFEXOR-XR) 37.5 MG 24 hr capsule, Take 1 capsule by mouth Every Morning., Disp: 90 capsule, Rfl: 3    acyclovir (ZOVIRAX) 400 MG tablet, Take 1 tablet by mouth 2 (Two) Times a Day With Meals., Disp: 60 tablet, Rfl: 11        Diagnoses and all orders for this visit:    1. Essential hypertension (Primary)    2. Tubular adenoma of colon    3. Diverticulosis of large intestine without hemorrhage    4. Gastroesophageal reflux disease without  esophagitis    5. Stage 3 chronic kidney disease, unspecified whether stage 3a or 3b CKD    6. Multiple myeloma in relapse    7. Chronic bronchitis, unspecified chronic bronchitis type

## 2024-01-08 RX ORDER — MONTELUKAST SODIUM 10 MG/1
10 TABLET ORAL NIGHTLY
Qty: 90 TABLET | Refills: 3 | Status: SHIPPED | OUTPATIENT
Start: 2024-01-08

## 2024-04-04 ENCOUNTER — OFFICE VISIT (OUTPATIENT)
Dept: INTERNAL MEDICINE | Facility: CLINIC | Age: 74
End: 2024-04-04
Payer: MEDICARE

## 2024-04-04 ENCOUNTER — LAB (OUTPATIENT)
Dept: INTERNAL MEDICINE | Facility: CLINIC | Age: 74
End: 2024-04-04
Payer: MEDICARE

## 2024-04-04 VITALS
SYSTOLIC BLOOD PRESSURE: 140 MMHG | BODY MASS INDEX: 31.34 KG/M2 | WEIGHT: 166 LBS | OXYGEN SATURATION: 92 % | HEIGHT: 61 IN | HEART RATE: 60 BPM | DIASTOLIC BLOOD PRESSURE: 64 MMHG

## 2024-04-04 DIAGNOSIS — C90.02 MULTIPLE MYELOMA IN RELAPSE: ICD-10-CM

## 2024-04-04 DIAGNOSIS — D12.6 TUBULAR ADENOMA OF COLON: ICD-10-CM

## 2024-04-04 DIAGNOSIS — G89.3 CHRONIC PAIN DUE TO NEOPLASM: ICD-10-CM

## 2024-04-04 DIAGNOSIS — K21.9 GASTROESOPHAGEAL REFLUX DISEASE WITHOUT ESOPHAGITIS: Chronic | ICD-10-CM

## 2024-04-04 DIAGNOSIS — I10 ESSENTIAL HYPERTENSION: Primary | Chronic | ICD-10-CM

## 2024-04-04 DIAGNOSIS — R31.9 URINARY TRACT INFECTION WITH HEMATURIA, SITE UNSPECIFIED: ICD-10-CM

## 2024-04-04 DIAGNOSIS — N18.30 STAGE 3 CHRONIC KIDNEY DISEASE, UNSPECIFIED WHETHER STAGE 3A OR 3B CKD: Chronic | ICD-10-CM

## 2024-04-04 DIAGNOSIS — N39.0 URINARY TRACT INFECTION WITH HEMATURIA, SITE UNSPECIFIED: ICD-10-CM

## 2024-04-04 DIAGNOSIS — Z00.00 ENCOUNTER FOR MEDICARE ANNUAL WELLNESS EXAM: ICD-10-CM

## 2024-04-04 LAB
BILIRUB BLD-MCNC: NEGATIVE MG/DL
CLARITY, POC: ABNORMAL
COLOR UR: YELLOW
DEPRECATED RDW RBC AUTO: 41 FL (ref 37–54)
ERYTHROCYTE [DISTWIDTH] IN BLOOD BY AUTOMATED COUNT: 12.5 % (ref 12.3–15.4)
EXPIRATION DATE: ABNORMAL
GLUCOSE UR STRIP-MCNC: NEGATIVE MG/DL
HCT VFR BLD AUTO: 46.1 % (ref 34–46.6)
HGB BLD-MCNC: 15.4 G/DL (ref 12–15.9)
KETONES UR QL: NEGATIVE
LEUKOCYTE EST, POC: ABNORMAL
Lab: ABNORMAL
MCH RBC QN AUTO: 30 PG (ref 26.6–33)
MCHC RBC AUTO-ENTMCNC: 33.4 G/DL (ref 31.5–35.7)
MCV RBC AUTO: 89.9 FL (ref 79–97)
NITRITE UR-MCNC: NEGATIVE MG/ML
PH UR: 6 [PH] (ref 5–8)
PLATELET # BLD AUTO: 153 10*3/MM3 (ref 140–450)
PMV BLD AUTO: 11.3 FL (ref 6–12)
PROT UR STRIP-MCNC: NEGATIVE MG/DL
RBC # BLD AUTO: 5.13 10*6/MM3 (ref 3.77–5.28)
RBC # UR STRIP: ABNORMAL /UL
SP GR UR: 1.01 (ref 1–1.03)
UROBILINOGEN UR QL: NORMAL
WBC NRBC COR # BLD AUTO: 6.89 10*3/MM3 (ref 3.4–10.8)

## 2024-04-04 PROCEDURE — 87186 SC STD MICRODIL/AGAR DIL: CPT | Performed by: INTERNAL MEDICINE

## 2024-04-04 PROCEDURE — 85027 COMPLETE CBC AUTOMATED: CPT | Performed by: INTERNAL MEDICINE

## 2024-04-04 PROCEDURE — 87077 CULTURE AEROBIC IDENTIFY: CPT | Performed by: INTERNAL MEDICINE

## 2024-04-04 PROCEDURE — 36415 COLL VENOUS BLD VENIPUNCTURE: CPT | Performed by: INTERNAL MEDICINE

## 2024-04-04 PROCEDURE — 84443 ASSAY THYROID STIM HORMONE: CPT | Performed by: INTERNAL MEDICINE

## 2024-04-04 PROCEDURE — 82607 VITAMIN B-12: CPT | Performed by: INTERNAL MEDICINE

## 2024-04-04 PROCEDURE — 80053 COMPREHEN METABOLIC PANEL: CPT | Performed by: INTERNAL MEDICINE

## 2024-04-04 PROCEDURE — 87086 URINE CULTURE/COLONY COUNT: CPT | Performed by: INTERNAL MEDICINE

## 2024-04-04 RX ORDER — TIZANIDINE 4 MG/1
4 TABLET ORAL NIGHTLY PRN
Qty: 30 TABLET | Refills: 2 | Status: SHIPPED | OUTPATIENT
Start: 2024-04-04

## 2024-04-04 NOTE — PROGRESS NOTES
The ABCs of the Annual Wellness Visit  Subsequent Medicare Wellness Visit    Chief Complaint   Patient presents with    Annual Exam      Subjective    History of Present Illness:  Doris Miranda is a 73 y.o. female who presents for a Subsequent Medicare Wellness Visit.    The following portions of the patient's history were reviewed and   updated as appropriate: current medications, past family history, past medical history, past social history, past surgical history, and problem list.     Compared to one year ago, the patient feels her physical   health is the same.    Compared to one year ago, the patient feels her mental   health is the same.    Recent Hospitalizations:  She was not admitted to the hospital during the last year.       Current Medical Providers:  Patient Care Team:  Earl Fuentes MD as PCP - General  Earl Fuentes MD as PCP - Family Medicine  [facility], Josiah Garcia MD as Consulting Physician (Hematology and Oncology)  Víctor Mixon MD as Consulting Physician (Pain Medicine)    Outpatient Medications Prior to Visit   Medication Sig Dispense Refill    acetaminophen (TYLENOL) 500 MG tablet Take 1 tablet by mouth Every 6 (Six) Hours As Needed for Mild Pain. 30 tablet 0    acyclovir (ZOVIRAX) 400 MG tablet Take 1 tablet by mouth 2 (Two) Times a Day With Meals. 60 tablet 11    albuterol sulfate  (90 Base) MCG/ACT inhaler Inhale 2 puffs Every 6 (Six) Hours As Needed for Wheezing or Shortness of Air. 18 g 5    aspirin (aspirin) 81 MG EC tablet Take 1 tablet by mouth Daily. 30 tablet 3    Cholecalciferol (Vitamin D3) 50 MCG (2000 UT) capsule Take 1 capsule by mouth Daily.      esomeprazole (nexIUM) 20 MG capsule Take 1 capsule by mouth 2 (Two) Times a Day.      melatonin 5 MG tablet tablet Take 1 tablet by mouth At Night As Needed (Sleep).      metoprolol tartrate (LOPRESSOR) 25 MG tablet Take 1 tablet by mouth 2 (Two) Times a Day. 180 tablet 3    montelukast (SINGULAIR) 10 MG  tablet TAKE 1 TABLET BY MOUTH ONCE DAILY AT NIGHT 90 tablet 3    venlafaxine XR (EFFEXOR-XR) 37.5 MG 24 hr capsule Take 1 capsule by mouth Every Morning. 90 capsule 3     No facility-administered medications prior to visit.       No opioid medication identified on active medication list. I have reviewed chart for other potential  high risk medication/s and harmful drug interactions in the elderly.        Aspirin is on active medication list. Aspirin use is not indicated based on review of current medical condition/s. Risk of harm outweighs potential benefits. Patient instructed to discontinue this medication.  .    Fall Risk Assessment was completed, and patient is at moderate risk for falls.      Patient Active Problem List   Diagnosis    Diverticulosis of large intestine without hemorrhage    Tubular adenoma of colon    Essential hypertension    COPD (chronic obstructive pulmonary disease)    Arthritis    Gastroesophageal reflux disease without esophagitis    Chronic left-sided low back pain with sciatica    Thrombocytopenia since stem cell transplant March 2016    Hx of autologous stem cell transplant (March 2016)    CKD (chronic kidney disease), stage III    Former smoker    Non-rheumatic tricuspid valve insufficiency    Pulmonary hypertension    Chronic respiratory failure with hypoxia (been noncompliant with outpatient oxygen in past)    Gait instability    Chronic pain    Debility    Thrombocytopenia    On home oxygen therapy    Hypogammaglobulinemia, acquired    Multiple myeloma in relapse    COVID-19    Closed trimalleolar fracture of right ankle    Preop examination    Closed displaced pilon fracture of right tibia with routine healing    Status post open reduction and internal fixation (ORIF) of fracture    Closed right ankle fracture, with nonunion, subsequent encounter     Advance Care Planning   Advance Directive is not on file.  ACP discussion was held with the patient during this visit. Patient does  "not have an advance directive, information provided.    Review of Systems   Constitutional:  Positive for fatigue. Negative for activity change, appetite change and unexpected weight change.   HENT:  Negative for congestion, ear pain, rhinorrhea, sinus pressure, sore throat and trouble swallowing.    Eyes:  Negative for pain and visual disturbance.   Respiratory:  Positive for shortness of breath. Negative for chest tightness and wheezing.    Cardiovascular:  Negative for chest pain, palpitations and leg swelling.   Gastrointestinal:  Negative for abdominal distention, abdominal pain, blood in stool, constipation, diarrhea and vomiting.        12/16 colonoscopy , repeat 12/21   Endocrine: Negative for cold intolerance, heat intolerance, polydipsia and polyphagia.   Genitourinary:  Negative for difficulty urinating, dyspareunia, dysuria, frequency, hematuria, menstrual problem, urgency and vaginal discharge.        Refusing   Musculoskeletal:  Positive for arthralgias, back pain and gait problem. Negative for joint swelling and myalgias.   Skin:  Negative for color change, pallor, rash and wound.        itching   Allergic/Immunologic: Negative for environmental allergies, food allergies and immunocompromised state.   Neurological:  Negative for dizziness, tremors, seizures, syncope, facial asymmetry, speech difficulty, weakness, numbness and headaches.   Hematological:  Negative for adenopathy. Does not bruise/bleed easily.   Psychiatric/Behavioral:  Negative for decreased concentration, dysphoric mood, sleep disturbance and suicidal ideas. The patient is nervous/anxious (situational).          Objective       Vitals:    04/04/24 1348 04/04/24 1419   BP: 110/68 140/64   BP Location: Left arm    Patient Position: Sitting    Pulse: 60    SpO2: 92%    Weight: 75.3 kg (166 lb)    Height: 154.2 cm (60.71\")    PainSc: 0-No pain      BMI Readings from Last 1 Encounters:   04/04/24 31.67 kg/m²   BMI is above normal " parameters. Recommendations include: exercise counseling and nutrition counseling    Does the patient have evidence of cognitive impairment? No    Physical Exam  Constitutional:       Appearance: Normal appearance. She is well-developed.   HENT:      Head: Normocephalic and atraumatic.      Right Ear: External ear normal.      Left Ear: External ear normal.      Nose: Nose normal.      Mouth/Throat:      Mouth: Mucous membranes are moist.      Pharynx: Oropharynx is clear.   Eyes:      Extraocular Movements: Extraocular movements intact.      Conjunctiva/sclera: Conjunctivae normal.      Pupils: Pupils are equal, round, and reactive to light.   Cardiovascular:      Rate and Rhythm: Normal rate and regular rhythm.      Heart sounds: Normal heart sounds.   Pulmonary:      Effort: Pulmonary effort is normal.      Comments: Diminished BS  Abdominal:      General: Bowel sounds are normal.      Palpations: Abdomen is soft.   Musculoskeletal:         General: Deformity (kyphosis) present.      Cervical back: Normal range of motion and neck supple.      Comments: Using cane   Lymphadenopathy:      Cervical: No cervical adenopathy.   Skin:     General: Skin is warm and dry.   Neurological:      General: No focal deficit present.      Mental Status: She is alert and oriented to person, place, and time.   Psychiatric:         Mood and Affect: Mood normal.         Behavior: Behavior normal.         Thought Content: Thought content normal.                 HEALTH RISK ASSESSMENT    Smoking Status:  Social History     Tobacco Use   Smoking Status Former    Current packs/day: 0.00    Average packs/day: 2.0 packs/day for 45.0 years (90.0 ttl pk-yrs)    Types: Cigarettes    Start date: 1970    Quit date: 2015    Years since quittin.6   Smokeless Tobacco Never     Alcohol Consumption:  Social History     Substance and Sexual Activity   Alcohol Use No     Fall Risk Screen:    STEADI Fall Risk Assessment was completed, and  patient is at LOW risk for falls.Assessment completed on:2024    Depression Screenin/4/2024     1:00 PM   PHQ-2/PHQ-9 Depression Screening   Little Interest or Pleasure in Doing Things 0-->not at all   Feeling Down, Depressed or Hopeless 0-->not at all   PHQ-9: Brief Depression Severity Measure Score 0       Health Habits and Functional and Cognitive Screenin/4/2024     1:00 PM   Functional & Cognitive Status   Do you have difficulty preparing food and eating? No   Do you have difficulty bathing yourself, getting dressed or grooming yourself? No   Do you have difficulty using the toilet? No   Do you have difficulty moving around from place to place? No   Do you have trouble with steps or getting out of a bed or a chair? No   Current Diet Unhealthy Diet   Dental Exam Up to date   Eye Exam Up to date   Exercise (times per week) 0 times per week   Do you need help using the phone?  No   Are you deaf or do you have serious difficulty hearing?  No   Do you need help to go to places out of walking distance? No   Do you need help shopping? No   Do you need help preparing meals?  No   Do you need help with housework?  No   Do you need help with laundry? No   Do you need help taking your medications? No   Do you need help managing money? No   Do you ever drive or ride in a car without wearing a seat belt? No   Have you felt unusual stress, anger or loneliness in the last month? No   Who do you live with? Other   If you need help, do you have trouble finding someone available to you? No   Have you been bothered in the last four weeks by sexual problems? No   Do you have difficulty concentrating, remembering or making decisions? No       Age-appropriate Screening Schedule:  Refer to the list below for future screening recommendations based on patient's age, sex and/or medical conditions. Orders for these recommended tests are listed in the plan section. The patient has been provided with a written  plan.    Health Maintenance   Topic Date Due    DXA SCAN  Never done    ZOSTER VACCINE (1 of 2) Never done    HEPATITIS C SCREENING  Never done    LUNG CANCER SCREENING  08/05/2022    TDAP/TD VACCINES (2 - Td or Tdap) 09/26/2022    COVID-19 Vaccine (6 - 2023-24 season) 12/06/2023    MAMMOGRAM  11/21/2024 (Originally 1950)    INFLUENZA VACCINE  08/01/2024    ANNUAL WELLNESS VISIT  04/04/2025    BMI FOLLOWUP  04/04/2025    COLORECTAL CANCER SCREENING  12/29/2026    RSV Vaccine - Adults  Completed    Pneumococcal Vaccine 65+  Completed              Assessment & Plan     CMS Preventative Services Quick Reference  Risk Factors Identified During Encounter  Fall Risk-High or Moderate: Information on Fall Prevention Shared in After Visit Summary  Immunizations Discussed/Encouraged:  utd  Inactivity/Sedentary: Patient was advised to exercise at least 150 minutes a week per CDC recommendations.  Polypharmacy: Medication List reviewed and Medications are appropriate for patient  The above risks/problems have been discussed with the patient.  Follow up actions/plans if indicated are seen below in the Assessment/Plan Section.  Pertinent information has been shared with the patient in the After Visit Summary.    Diagnoses and all orders for this visit:    1. Essential hypertension (Primary)    2. Tubular adenoma of colon    3. Gastroesophageal reflux disease without esophagitis    4. Stage 3 chronic kidney disease, unspecified whether stage 3a or 3b CKD    5. Multiple myeloma in relapse  -     tiZANidine (Zanaflex) 4 MG tablet; Take 1 tablet by mouth At Night As Needed for Muscle Spasms.  Dispense: 30 tablet; Refill: 2    6. Chronic pain due to neoplasm  -     tiZANidine (Zanaflex) 4 MG tablet; Take 1 tablet by mouth At Night As Needed for Muscle Spasms.  Dispense: 30 tablet; Refill: 2    7. Encounter for Medicare annual wellness exam  -     CBC (No Diff)  -     Comprehensive Metabolic Panel  -     TSH  -     Vitamin B12  -      POC Urinalysis Dipstick, Automated    8. Urinary tract infection with hematuria, site unspecified  -     Urine Culture - Urine, Urine, Clean Catch; Future  -     Urine Culture - Urine, Urine, Clean Catch        Follow Up:   No follow-ups on file.     An After Visit Summary and PPPS were given to the patient.             Htn- cont metoprolol, advised goal of 130/80  copd-not an issue since stopping tob, proventil prn, controlled on prn albuterol and prn O2  gerd/gastritis-cont ppi prn, controlled  MM/back pain with radiculopathy-f/u Dr Epps and  ortho prn, off chemo, add zanaflex  Hx of TA-resope 12/21 overdue, wants to delay  Gait instability/chronic back pain-improved with surgery  Situational anxiety/depression-cont effexor xr, stable  Diverticulosis-increase fiber  Low platelets-avoid NSAIDs  CKD-counseled on NSAIDs avoidance and adequate hydration, Cr stable  Other-advised Vit D 2000 IU qd     4/4 Labs noted and dw patient, will rx for UTI

## 2024-04-05 LAB
ALBUMIN SERPL-MCNC: 4.5 G/DL (ref 3.5–5.2)
ALBUMIN/GLOB SERPL: 1.8 G/DL
ALP SERPL-CCNC: 72 U/L (ref 39–117)
ALT SERPL W P-5'-P-CCNC: 23 U/L (ref 1–33)
ANION GAP SERPL CALCULATED.3IONS-SCNC: 11.5 MMOL/L (ref 5–15)
AST SERPL-CCNC: 25 U/L (ref 1–32)
BILIRUB SERPL-MCNC: 0.3 MG/DL (ref 0–1.2)
BUN SERPL-MCNC: 24 MG/DL (ref 8–23)
BUN/CREAT SERPL: 17.5 (ref 7–25)
CALCIUM SPEC-SCNC: 9.9 MG/DL (ref 8.6–10.5)
CHLORIDE SERPL-SCNC: 103 MMOL/L (ref 98–107)
CO2 SERPL-SCNC: 24.5 MMOL/L (ref 22–29)
CREAT SERPL-MCNC: 1.37 MG/DL (ref 0.57–1)
EGFRCR SERPLBLD CKD-EPI 2021: 40.9 ML/MIN/1.73
GLOBULIN UR ELPH-MCNC: 2.5 GM/DL
GLUCOSE SERPL-MCNC: 70 MG/DL (ref 65–99)
POTASSIUM SERPL-SCNC: 4.7 MMOL/L (ref 3.5–5.2)
PROT SERPL-MCNC: 7 G/DL (ref 6–8.5)
SODIUM SERPL-SCNC: 139 MMOL/L (ref 136–145)
TSH SERPL DL<=0.05 MIU/L-ACNC: 1.75 UIU/ML (ref 0.27–4.2)
VIT B12 BLD-MCNC: 555 PG/ML (ref 211–946)

## 2024-04-06 LAB — BACTERIA SPEC AEROBE CULT: ABNORMAL

## 2024-04-06 RX ORDER — CEFDINIR 300 MG/1
300 CAPSULE ORAL 2 TIMES DAILY
Qty: 10 CAPSULE | Refills: 0 | Status: SHIPPED | OUTPATIENT
Start: 2024-04-06

## 2024-06-26 DIAGNOSIS — I10 ESSENTIAL HYPERTENSION: ICD-10-CM

## 2024-06-26 DIAGNOSIS — F43.23 SITUATIONAL MIXED ANXIETY AND DEPRESSIVE DISORDER: ICD-10-CM

## 2024-06-26 RX ORDER — ACYCLOVIR 400 MG/1
400 TABLET ORAL 2 TIMES DAILY WITH MEALS
Qty: 180 TABLET | Refills: 0 | Status: SHIPPED | OUTPATIENT
Start: 2024-06-26

## 2024-06-26 RX ORDER — VENLAFAXINE HYDROCHLORIDE 37.5 MG/1
37.5 CAPSULE, EXTENDED RELEASE ORAL EVERY MORNING
Qty: 90 CAPSULE | Refills: 0 | Status: SHIPPED | OUTPATIENT
Start: 2024-06-26

## 2024-07-21 DIAGNOSIS — J42 CHRONIC BRONCHITIS, UNSPECIFIED CHRONIC BRONCHITIS TYPE: ICD-10-CM

## 2024-07-22 RX ORDER — ALBUTEROL SULFATE 90 UG/1
AEROSOL, METERED RESPIRATORY (INHALATION)
Qty: 18 G | Refills: 0 | Status: SHIPPED | OUTPATIENT
Start: 2024-07-22

## 2024-08-12 DIAGNOSIS — J42 CHRONIC BRONCHITIS, UNSPECIFIED CHRONIC BRONCHITIS TYPE: ICD-10-CM

## 2024-08-12 RX ORDER — ALBUTEROL SULFATE 90 UG/1
AEROSOL, METERED RESPIRATORY (INHALATION)
Qty: 9 G | Refills: 0 | Status: SHIPPED | OUTPATIENT
Start: 2024-08-12

## 2024-08-25 DIAGNOSIS — J42 CHRONIC BRONCHITIS, UNSPECIFIED CHRONIC BRONCHITIS TYPE: ICD-10-CM

## 2024-08-26 RX ORDER — ALBUTEROL SULFATE 90 UG/1
2 AEROSOL, METERED RESPIRATORY (INHALATION) EVERY 6 HOURS PRN
Qty: 9 G | Refills: 0 | Status: SHIPPED | OUTPATIENT
Start: 2024-08-26

## 2024-09-15 ENCOUNTER — APPOINTMENT (OUTPATIENT)
Facility: HOSPITAL | Age: 74
End: 2024-09-15
Payer: MEDICARE

## 2024-09-15 ENCOUNTER — HOSPITAL ENCOUNTER (EMERGENCY)
Facility: HOSPITAL | Age: 74
Discharge: HOME | End: 2024-09-15
Attending: EMERGENCY MEDICINE | Admitting: EMERGENCY MEDICINE
Payer: MEDICARE

## 2024-09-15 VITALS
BODY MASS INDEX: 31.15 KG/M2 | DIASTOLIC BLOOD PRESSURE: 88 MMHG | OXYGEN SATURATION: 94 % | HEIGHT: 61 IN | SYSTOLIC BLOOD PRESSURE: 135 MMHG | WEIGHT: 165 LBS | TEMPERATURE: 98.4 F | HEART RATE: 75 BPM | RESPIRATION RATE: 16 BRPM

## 2024-09-15 DIAGNOSIS — R09.81 CONGESTION OF NASAL SINUS: Primary | ICD-10-CM

## 2024-09-15 LAB
ALBUMIN SERPL-MCNC: 4.4 G/DL (ref 3.5–5.2)
ALBUMIN/GLOB SERPL: 1.8 G/DL
ALP SERPL-CCNC: 68 U/L (ref 39–117)
ALT SERPL W P-5'-P-CCNC: 25 U/L (ref 1–33)
ANION GAP SERPL CALCULATED.3IONS-SCNC: 13.2 MMOL/L (ref 5–15)
AST SERPL-CCNC: 33 U/L (ref 1–32)
BASOPHILS # BLD AUTO: 0.04 10*3/MM3 (ref 0–0.2)
BASOPHILS NFR BLD AUTO: 0.7 % (ref 0–1.5)
BILIRUB SERPL-MCNC: 0.4 MG/DL (ref 0–1.2)
BUN SERPL-MCNC: 12 MG/DL (ref 8–23)
BUN/CREAT SERPL: 10.3 (ref 7–25)
CALCIUM SPEC-SCNC: 9 MG/DL (ref 8.6–10.5)
CHLORIDE SERPL-SCNC: 105 MMOL/L (ref 98–107)
CO2 SERPL-SCNC: 21.8 MMOL/L (ref 22–29)
CREAT SERPL-MCNC: 1.17 MG/DL (ref 0.57–1)
DEPRECATED RDW RBC AUTO: 41.7 FL (ref 37–54)
EGFRCR SERPLBLD CKD-EPI 2021: 49.1 ML/MIN/1.73
EOSINOPHIL # BLD AUTO: 0.05 10*3/MM3 (ref 0–0.4)
EOSINOPHIL NFR BLD AUTO: 0.8 % (ref 0.3–6.2)
ERYTHROCYTE [DISTWIDTH] IN BLOOD BY AUTOMATED COUNT: 12.6 % (ref 12.3–15.4)
GLOBULIN UR ELPH-MCNC: 2.4 GM/DL
GLUCOSE SERPL-MCNC: 95 MG/DL (ref 65–99)
HCT VFR BLD AUTO: 45.1 % (ref 34–46.6)
HGB BLD-MCNC: 15.1 G/DL (ref 12–15.9)
HOLD SPECIMEN: NORMAL
IMM GRANULOCYTES # BLD AUTO: 0.01 10*3/MM3 (ref 0–0.05)
IMM GRANULOCYTES NFR BLD AUTO: 0.2 % (ref 0–0.5)
LYMPHOCYTES # BLD AUTO: 0.41 10*3/MM3 (ref 0.7–3.1)
LYMPHOCYTES NFR BLD AUTO: 7 % (ref 19.6–45.3)
MCH RBC QN AUTO: 29.9 PG (ref 26.6–33)
MCHC RBC AUTO-ENTMCNC: 33.5 G/DL (ref 31.5–35.7)
MCV RBC AUTO: 89.3 FL (ref 79–97)
MONOCYTES # BLD AUTO: 0.46 10*3/MM3 (ref 0.1–0.9)
MONOCYTES NFR BLD AUTO: 7.8 % (ref 5–12)
NEUTROPHILS NFR BLD AUTO: 4.92 10*3/MM3 (ref 1.7–7)
NEUTROPHILS NFR BLD AUTO: 83.5 % (ref 42.7–76)
NT-PROBNP SERPL-MCNC: 462 PG/ML (ref 0–900)
PLATELET # BLD AUTO: 99 10*3/MM3 (ref 140–450)
PMV BLD AUTO: 10.7 FL (ref 6–12)
POTASSIUM SERPL-SCNC: 4 MMOL/L (ref 3.5–5.2)
PROT SERPL-MCNC: 6.8 G/DL (ref 6–8.5)
RBC # BLD AUTO: 5.05 10*6/MM3 (ref 3.77–5.28)
SODIUM SERPL-SCNC: 140 MMOL/L (ref 136–145)
TROPONIN T SERPL HS-MCNC: 12 NG/L
WBC NRBC COR # BLD AUTO: 5.89 10*3/MM3 (ref 3.4–10.8)
WHOLE BLOOD HOLD COAG: NORMAL
WHOLE BLOOD HOLD SPECIMEN: NORMAL

## 2024-09-15 PROCEDURE — 84484 ASSAY OF TROPONIN QUANT: CPT | Performed by: EMERGENCY MEDICINE

## 2024-09-15 PROCEDURE — 93005 ELECTROCARDIOGRAM TRACING: CPT | Performed by: EMERGENCY MEDICINE

## 2024-09-15 PROCEDURE — 99284 EMERGENCY DEPT VISIT MOD MDM: CPT

## 2024-09-15 PROCEDURE — 83880 ASSAY OF NATRIURETIC PEPTIDE: CPT | Performed by: EMERGENCY MEDICINE

## 2024-09-15 PROCEDURE — 71045 X-RAY EXAM CHEST 1 VIEW: CPT

## 2024-09-15 PROCEDURE — 80053 COMPREHEN METABOLIC PANEL: CPT | Performed by: EMERGENCY MEDICINE

## 2024-09-15 PROCEDURE — 85025 COMPLETE CBC W/AUTO DIFF WBC: CPT | Performed by: EMERGENCY MEDICINE

## 2024-09-15 RX ORDER — AZITHROMYCIN 250 MG/1
TABLET, FILM COATED ORAL
Qty: 6 TABLET | Refills: 0 | Status: SHIPPED | OUTPATIENT
Start: 2024-09-15

## 2024-09-15 RX ORDER — SODIUM CHLORIDE 0.9 % (FLUSH) 0.9 %
10 SYRINGE (ML) INJECTION AS NEEDED
Status: DISCONTINUED | OUTPATIENT
Start: 2024-09-15 | End: 2024-09-15 | Stop reason: HOSPADM

## 2024-09-22 LAB
QT INTERVAL: 360 MS
QTC INTERVAL: 420 MS

## 2024-09-24 DIAGNOSIS — I10 ESSENTIAL HYPERTENSION: ICD-10-CM

## 2024-09-24 DIAGNOSIS — F43.23 SITUATIONAL MIXED ANXIETY AND DEPRESSIVE DISORDER: ICD-10-CM

## 2024-09-24 RX ORDER — METOPROLOL TARTRATE 25 MG/1
25 TABLET, FILM COATED ORAL 2 TIMES DAILY
Qty: 180 TABLET | Refills: 0 | Status: SHIPPED | OUTPATIENT
Start: 2024-09-24

## 2024-09-24 RX ORDER — VENLAFAXINE HYDROCHLORIDE 37.5 MG/1
37.5 CAPSULE, EXTENDED RELEASE ORAL EVERY MORNING
Qty: 90 CAPSULE | Refills: 0 | Status: SHIPPED | OUTPATIENT
Start: 2024-09-24

## 2024-09-24 RX ORDER — ACYCLOVIR 400 MG/1
400 TABLET ORAL 2 TIMES DAILY WITH MEALS
Qty: 180 TABLET | Refills: 0 | Status: SHIPPED | OUTPATIENT
Start: 2024-09-24

## 2024-09-25 ENCOUNTER — OFFICE VISIT (OUTPATIENT)
Dept: INTERNAL MEDICINE | Facility: CLINIC | Age: 74
End: 2024-09-25
Payer: MEDICARE

## 2024-09-25 VITALS
OXYGEN SATURATION: 90 % | BODY MASS INDEX: 30.4 KG/M2 | SYSTOLIC BLOOD PRESSURE: 124 MMHG | HEIGHT: 61 IN | WEIGHT: 161 LBS | HEART RATE: 78 BPM | DIASTOLIC BLOOD PRESSURE: 74 MMHG

## 2024-09-25 DIAGNOSIS — D69.6 THROMBOCYTOPENIA: ICD-10-CM

## 2024-09-25 DIAGNOSIS — J01.10 SUBACUTE FRONTAL SINUSITIS: ICD-10-CM

## 2024-09-25 DIAGNOSIS — D12.6 TUBULAR ADENOMA OF COLON: ICD-10-CM

## 2024-09-25 DIAGNOSIS — M19.90 ARTHRITIS: ICD-10-CM

## 2024-09-25 DIAGNOSIS — N18.31 STAGE 3A CHRONIC KIDNEY DISEASE: Chronic | ICD-10-CM

## 2024-09-25 DIAGNOSIS — C90.02 MULTIPLE MYELOMA IN RELAPSE: ICD-10-CM

## 2024-09-25 DIAGNOSIS — K21.9 GASTROESOPHAGEAL REFLUX DISEASE WITHOUT ESOPHAGITIS: Chronic | ICD-10-CM

## 2024-09-25 DIAGNOSIS — I10 ESSENTIAL HYPERTENSION: Primary | Chronic | ICD-10-CM

## 2024-09-25 DIAGNOSIS — K57.30 DIVERTICULOSIS OF LARGE INTESTINE WITHOUT HEMORRHAGE: ICD-10-CM

## 2024-09-25 DIAGNOSIS — J42 CHRONIC BRONCHITIS, UNSPECIFIED CHRONIC BRONCHITIS TYPE: ICD-10-CM

## 2024-09-25 PROCEDURE — 3074F SYST BP LT 130 MM HG: CPT | Performed by: INTERNAL MEDICINE

## 2024-09-25 PROCEDURE — 1160F RVW MEDS BY RX/DR IN RCRD: CPT | Performed by: INTERNAL MEDICINE

## 2024-09-25 PROCEDURE — 1126F AMNT PAIN NOTED NONE PRSNT: CPT | Performed by: INTERNAL MEDICINE

## 2024-09-25 PROCEDURE — 99214 OFFICE O/P EST MOD 30 MIN: CPT | Performed by: INTERNAL MEDICINE

## 2024-09-25 PROCEDURE — 1159F MED LIST DOCD IN RCRD: CPT | Performed by: INTERNAL MEDICINE

## 2024-09-25 PROCEDURE — 3078F DIAST BP <80 MM HG: CPT | Performed by: INTERNAL MEDICINE

## 2024-09-25 RX ORDER — CEFDINIR 300 MG/1
300 CAPSULE ORAL 2 TIMES DAILY
Qty: 20 CAPSULE | Refills: 0 | Status: SHIPPED | OUTPATIENT
Start: 2024-09-25

## 2024-09-26 DIAGNOSIS — J42 CHRONIC BRONCHITIS, UNSPECIFIED CHRONIC BRONCHITIS TYPE: ICD-10-CM

## 2024-09-26 RX ORDER — ALBUTEROL SULFATE 90 UG/1
2 INHALANT RESPIRATORY (INHALATION) EVERY 6 HOURS PRN
Qty: 9 G | Refills: 0 | Status: SHIPPED | OUTPATIENT
Start: 2024-09-26

## 2024-11-22 DIAGNOSIS — J42 CHRONIC BRONCHITIS, UNSPECIFIED CHRONIC BRONCHITIS TYPE: ICD-10-CM

## 2024-11-22 RX ORDER — ALBUTEROL SULFATE 90 UG/1
INHALANT RESPIRATORY (INHALATION)
Qty: 9 G | Refills: 0 | Status: SHIPPED | OUTPATIENT
Start: 2024-11-22

## 2024-11-24 DIAGNOSIS — F43.23 SITUATIONAL MIXED ANXIETY AND DEPRESSIVE DISORDER: ICD-10-CM

## 2024-11-24 DIAGNOSIS — I10 ESSENTIAL HYPERTENSION: ICD-10-CM

## 2024-11-25 RX ORDER — METOPROLOL TARTRATE 25 MG/1
25 TABLET, FILM COATED ORAL 2 TIMES DAILY
Qty: 180 TABLET | Refills: 0 | Status: SHIPPED | OUTPATIENT
Start: 2024-11-25

## 2024-11-25 RX ORDER — MONTELUKAST SODIUM 10 MG/1
10 TABLET ORAL NIGHTLY
Qty: 90 TABLET | Refills: 0 | Status: SHIPPED | OUTPATIENT
Start: 2024-11-25

## 2024-11-25 RX ORDER — VENLAFAXINE HYDROCHLORIDE 37.5 MG/1
37.5 CAPSULE, EXTENDED RELEASE ORAL EVERY MORNING
Qty: 90 CAPSULE | Refills: 0 | Status: SHIPPED | OUTPATIENT
Start: 2024-11-25

## 2024-12-13 DIAGNOSIS — J42 CHRONIC BRONCHITIS, UNSPECIFIED CHRONIC BRONCHITIS TYPE: ICD-10-CM

## 2024-12-13 RX ORDER — ALBUTEROL SULFATE 90 UG/1
INHALANT RESPIRATORY (INHALATION)
Qty: 9 G | Refills: 0 | Status: SHIPPED | OUTPATIENT
Start: 2024-12-13

## (undated) DEVICE — SNAP KOVER: Brand: UNBRANDED

## (undated) DEVICE — PATIENT RETURN ELECTRODE, SINGLE-USE, CONTACT QUALITY MONITORING, ADULT, WITH 9FT CORD, FOR PATIENTS WEIGING OVER 33LBS. (15KG): Brand: MEGADYNE

## (undated) DEVICE — GOWN,REINF,POLY,ECL,PP SLV,XXL: Brand: MEDLINE

## (undated) DEVICE — NON-OCCLUSIVE GAUZE STRIP OVERWRAP,3% BISMUTH TRIBROMOPHENATE IN OIL EMULSION: Brand: XEROFORM

## (undated) DEVICE — OLIVE WIRE, SMOOTH, 1.4MM
Type: IMPLANTABLE DEVICE | Site: ANKLE | Status: NON-FUNCTIONAL
Brand: BABY GORILLA/GORILLA PLATING SYSTEM
Removed: 2022-10-19

## (undated) DEVICE — SUT ETHLN 3/0 PC5 18IN 1893G

## (undated) DEVICE — Ø2.7MM X13 SOLID OVERDRIL: Brand: BABY GORILLA®/GORILLA® PLATING SYSTEM

## (undated) DEVICE — 3.5 X 18 MM R3CON NON-LOCKING PLATE SCREW
Type: IMPLANTABLE DEVICE | Site: ANKLE | Status: NON-FUNCTIONAL
Brand: GORILLA PLATING SYSTEM
Removed: 2022-10-19

## (undated) DEVICE — BNDG ELAS W/CLIP 6IN 10YD LF STRL

## (undated) DEVICE — TRAP FLD MINIVAC MEGADYNE 100ML

## (undated) DEVICE — TBG PENCL TELESCP MEGADYNE SMOKE EVAC 10FT

## (undated) DEVICE — Ø2.0 X 14CM SOLID DRILL: Brand: BABY GORILLA®/GORILLA® PLATING SYSTEM

## (undated) DEVICE — 2.7 X 18 MM R3CON NON-LOCKING PLATE SCREW
Type: IMPLANTABLE DEVICE | Site: ANKLE | Status: NON-FUNCTIONAL
Brand: GORILLA PLATING SYSTEM
Removed: 2022-10-19

## (undated) DEVICE — GLV SURG PREMIERPRO MIC LTX PF SZ8 BRN

## (undated) DEVICE — BNDG ELAS CO-FLEX SLF ADHR 4IN5YD LF STRL

## (undated) DEVICE — K-WIRE, SINGLE ENDED TROCAR TIP, SMOOTH, 1.2 X 150MM
Type: IMPLANTABLE DEVICE | Site: ANKLE | Status: NON-FUNCTIONAL
Brand: MONSTER SCREW SYSTEM
Removed: 2022-10-19

## (undated) DEVICE — UNDERCAST PADDING: Brand: DEROYAL

## (undated) DEVICE — APPL CHLORAPREP TINTED 26ML TEAL

## (undated) DEVICE — 2.7 X 32 MM R3CON NON-LOCKING PLATE SCREW
Type: IMPLANTABLE DEVICE | Site: ANKLE | Status: NON-FUNCTIONAL
Brand: GORILLA PLATING SYSTEM
Removed: 2022-10-19

## (undated) DEVICE — 2.7 X 34 MM R3CON NON-LOCKING PLATE SCREW
Type: IMPLANTABLE DEVICE | Site: ANKLE | Status: NON-FUNCTIONAL
Brand: GORILLA PLATING SYSTEM
Removed: 2022-10-19

## (undated) DEVICE — ANTIBACTERIAL UNDYED BRAIDED (POLYGLACTIN 910), SYNTHETIC ABSORBABLE SUTURE: Brand: COATED VICRYL

## (undated) DEVICE — PK EXTREM LOWR 10

## (undated) DEVICE — 2.4MM X 18CM, SS SOLID: Brand: BABY GORILLA®/GORILLA® PLATING SYSTEM

## (undated) DEVICE — 1010 S-DRAPE TOWEL DRAPE 10/BX: Brand: STERI-DRAPE™

## (undated) DEVICE — 3.5 X 16 MM R3CON NON-LOCKING PLATE SCREW
Type: IMPLANTABLE DEVICE | Site: ANKLE | Status: NON-FUNCTIONAL
Brand: GORILLA PLATING SYSTEM
Removed: 2022-10-19